# Patient Record
Sex: MALE | Race: WHITE | NOT HISPANIC OR LATINO | URBAN - METROPOLITAN AREA
[De-identification: names, ages, dates, MRNs, and addresses within clinical notes are randomized per-mention and may not be internally consistent; named-entity substitution may affect disease eponyms.]

---

## 2017-05-11 ENCOUNTER — INPATIENT (INPATIENT)
Facility: HOSPITAL | Age: 82
LOS: 3 days | Discharge: HOME HEALTH | End: 2017-05-15
Attending: HOSPITALIST

## 2017-06-28 DIAGNOSIS — E78.5 HYPERLIPIDEMIA, UNSPECIFIED: ICD-10-CM

## 2017-06-28 DIAGNOSIS — I50.33 ACUTE ON CHRONIC DIASTOLIC (CONGESTIVE) HEART FAILURE: ICD-10-CM

## 2017-06-28 DIAGNOSIS — I50.9 HEART FAILURE, UNSPECIFIED: ICD-10-CM

## 2017-06-28 DIAGNOSIS — M10.072 IDIOPATHIC GOUT, LEFT ANKLE AND FOOT: ICD-10-CM

## 2017-06-28 DIAGNOSIS — J40 BRONCHITIS, NOT SPECIFIED AS ACUTE OR CHRONIC: ICD-10-CM

## 2017-06-28 DIAGNOSIS — E87.70 FLUID OVERLOAD, UNSPECIFIED: ICD-10-CM

## 2017-06-28 DIAGNOSIS — Z87.891 PERSONAL HISTORY OF NICOTINE DEPENDENCE: ICD-10-CM

## 2017-06-28 DIAGNOSIS — Z95.0 PRESENCE OF CARDIAC PACEMAKER: ICD-10-CM

## 2017-06-28 DIAGNOSIS — E11.65 TYPE 2 DIABETES MELLITUS WITH HYPERGLYCEMIA: ICD-10-CM

## 2017-06-28 DIAGNOSIS — D63.8 ANEMIA IN OTHER CHRONIC DISEASES CLASSIFIED ELSEWHERE: ICD-10-CM

## 2017-06-28 DIAGNOSIS — I27.2 OTHER SECONDARY PULMONARY HYPERTENSION: ICD-10-CM

## 2017-06-28 DIAGNOSIS — I11.0 HYPERTENSIVE HEART DISEASE WITH HEART FAILURE: ICD-10-CM

## 2017-06-28 DIAGNOSIS — Z79.4 LONG TERM (CURRENT) USE OF INSULIN: ICD-10-CM

## 2017-06-28 DIAGNOSIS — G47.33 OBSTRUCTIVE SLEEP APNEA (ADULT) (PEDIATRIC): ICD-10-CM

## 2017-06-28 DIAGNOSIS — Z85.46 PERSONAL HISTORY OF MALIGNANT NEOPLASM OF PROSTATE: ICD-10-CM

## 2019-04-14 ENCOUNTER — INPATIENT (INPATIENT)
Facility: HOSPITAL | Age: 84
LOS: 2 days | Discharge: HOME | End: 2019-04-17
Attending: HOSPITALIST | Admitting: HOSPITALIST
Payer: MEDICARE

## 2019-04-14 VITALS
DIASTOLIC BLOOD PRESSURE: 74 MMHG | TEMPERATURE: 97 F | SYSTOLIC BLOOD PRESSURE: 179 MMHG | HEART RATE: 60 BPM | WEIGHT: 199.96 LBS | OXYGEN SATURATION: 95 % | RESPIRATION RATE: 22 BRPM

## 2019-04-14 DIAGNOSIS — Z96.651 PRESENCE OF RIGHT ARTIFICIAL KNEE JOINT: Chronic | ICD-10-CM

## 2019-04-14 DIAGNOSIS — R09.89 OTHER SPECIFIED SYMPTOMS AND SIGNS INVOLVING THE CIRCULATORY AND RESPIRATORY SYSTEMS: ICD-10-CM

## 2019-04-14 LAB
ALBUMIN SERPL ELPH-MCNC: 3.7 G/DL — SIGNIFICANT CHANGE UP (ref 3.5–5.2)
ALP SERPL-CCNC: 94 U/L — SIGNIFICANT CHANGE UP (ref 30–115)
ALT FLD-CCNC: 15 U/L — SIGNIFICANT CHANGE UP (ref 0–41)
ANION GAP SERPL CALC-SCNC: 12 MMOL/L — SIGNIFICANT CHANGE UP (ref 7–14)
AST SERPL-CCNC: 33 U/L — SIGNIFICANT CHANGE UP (ref 0–41)
BASE EXCESS BLDV CALC-SCNC: -2.8 MMOL/L — LOW (ref -2–2)
BILIRUB SERPL-MCNC: 0.7 MG/DL — SIGNIFICANT CHANGE UP (ref 0.2–1.2)
BUN SERPL-MCNC: 38 MG/DL — HIGH (ref 10–20)
CA-I SERPL-SCNC: 1.14 MMOL/L — SIGNIFICANT CHANGE UP (ref 1.12–1.3)
CALCIUM SERPL-MCNC: 8.7 MG/DL — SIGNIFICANT CHANGE UP (ref 8.5–10.1)
CHLORIDE SERPL-SCNC: 106 MMOL/L — SIGNIFICANT CHANGE UP (ref 98–110)
CO2 SERPL-SCNC: 19 MMOL/L — SIGNIFICANT CHANGE UP (ref 17–32)
CREAT SERPL-MCNC: 1.9 MG/DL — HIGH (ref 0.7–1.5)
GAS PNL BLDV: 138 MMOL/L — SIGNIFICANT CHANGE UP (ref 136–145)
GAS PNL BLDV: SIGNIFICANT CHANGE UP
GLUCOSE BLDC GLUCOMTR-MCNC: 211 MG/DL — HIGH (ref 70–99)
GLUCOSE SERPL-MCNC: 206 MG/DL — HIGH (ref 70–99)
HCO3 BLDV-SCNC: 22 MMOL/L — SIGNIFICANT CHANGE UP (ref 22–29)
HCT VFR BLD CALC: 32 % — LOW (ref 42–52)
HCT VFR BLDA CALC: 33.9 % — LOW (ref 34–44)
HGB BLD CALC-MCNC: 11.1 G/DL — LOW (ref 14–18)
HGB BLD-MCNC: 10.1 G/DL — LOW (ref 14–18)
HOROWITZ INDEX BLDV+IHG-RTO: 21 — SIGNIFICANT CHANGE UP
LACTATE BLDV-MCNC: 1.8 MMOL/L — HIGH (ref 0.5–1.6)
MCHC RBC-ENTMCNC: 27.5 PG — SIGNIFICANT CHANGE UP (ref 27–31)
MCHC RBC-ENTMCNC: 31.6 G/DL — LOW (ref 32–37)
MCV RBC AUTO: 87.2 FL — SIGNIFICANT CHANGE UP (ref 80–94)
NRBC # BLD: 0 /100 WBCS — SIGNIFICANT CHANGE UP (ref 0–0)
NT-PROBNP SERPL-SCNC: HIGH PG/ML (ref 0–300)
PCO2 BLDV: 39 MMHG — LOW (ref 41–51)
PH BLDV: 7.36 — SIGNIFICANT CHANGE UP (ref 7.26–7.43)
PLATELET # BLD AUTO: 185 K/UL — SIGNIFICANT CHANGE UP (ref 130–400)
PO2 BLDV: 25 MMHG — SIGNIFICANT CHANGE UP (ref 20–40)
POTASSIUM BLDV-SCNC: 4.6 MMOL/L — SIGNIFICANT CHANGE UP (ref 3.3–5.6)
POTASSIUM SERPL-MCNC: 7 MMOL/L — CRITICAL HIGH (ref 3.5–5)
POTASSIUM SERPL-SCNC: 7 MMOL/L — CRITICAL HIGH (ref 3.5–5)
PROT SERPL-MCNC: 7.2 G/DL — SIGNIFICANT CHANGE UP (ref 6–8)
RBC # BLD: 3.67 M/UL — LOW (ref 4.7–6.1)
RBC # FLD: 14.8 % — HIGH (ref 11.5–14.5)
SAO2 % BLDV: 41 % — SIGNIFICANT CHANGE UP
SODIUM SERPL-SCNC: 137 MMOL/L — SIGNIFICANT CHANGE UP (ref 135–146)
TROPONIN T SERPL-MCNC: 0.06 NG/ML — CRITICAL HIGH
TROPONIN T SERPL-MCNC: 0.07 NG/ML — CRITICAL HIGH
WBC # BLD: 6.65 K/UL — SIGNIFICANT CHANGE UP (ref 4.8–10.8)
WBC # FLD AUTO: 6.65 K/UL — SIGNIFICANT CHANGE UP (ref 4.8–10.8)

## 2019-04-14 PROCEDURE — 99285 EMERGENCY DEPT VISIT HI MDM: CPT

## 2019-04-14 PROCEDURE — 71045 X-RAY EXAM CHEST 1 VIEW: CPT | Mod: 26

## 2019-04-14 RX ORDER — FUROSEMIDE 40 MG
40 TABLET ORAL ONCE
Qty: 0 | Refills: 0 | Status: COMPLETED | OUTPATIENT
Start: 2019-04-14 | End: 2019-04-14

## 2019-04-14 RX ORDER — HEPARIN SODIUM 5000 [USP'U]/ML
5000 INJECTION INTRAVENOUS; SUBCUTANEOUS EVERY 8 HOURS
Qty: 0 | Refills: 0 | Status: DISCONTINUED | OUTPATIENT
Start: 2019-04-14 | End: 2019-04-17

## 2019-04-14 RX ORDER — METOPROLOL TARTRATE 50 MG
50 TABLET ORAL DAILY
Qty: 0 | Refills: 0 | Status: DISCONTINUED | OUTPATIENT
Start: 2019-04-14 | End: 2019-04-17

## 2019-04-14 RX ORDER — NIFEDIPINE 30 MG
60 TABLET, EXTENDED RELEASE 24 HR ORAL DAILY
Qty: 0 | Refills: 0 | Status: DISCONTINUED | OUTPATIENT
Start: 2019-04-14 | End: 2019-04-17

## 2019-04-14 RX ORDER — GABAPENTIN 400 MG/1
300 CAPSULE ORAL DAILY
Qty: 0 | Refills: 0 | Status: DISCONTINUED | OUTPATIENT
Start: 2019-04-14 | End: 2019-04-17

## 2019-04-14 RX ORDER — SIMVASTATIN 20 MG/1
40 TABLET, FILM COATED ORAL AT BEDTIME
Qty: 0 | Refills: 0 | Status: DISCONTINUED | OUTPATIENT
Start: 2019-04-14 | End: 2019-04-17

## 2019-04-14 RX ORDER — FUROSEMIDE 40 MG
40 TABLET ORAL EVERY 12 HOURS
Qty: 0 | Refills: 0 | Status: DISCONTINUED | OUTPATIENT
Start: 2019-04-15 | End: 2019-04-17

## 2019-04-14 RX ORDER — CLONAZEPAM 1 MG
1 TABLET ORAL DAILY
Qty: 0 | Refills: 0 | Status: DISCONTINUED | OUTPATIENT
Start: 2019-04-14 | End: 2019-04-17

## 2019-04-14 RX ADMIN — Medication 40 MILLIGRAM(S): at 15:51

## 2019-04-14 RX ADMIN — SIMVASTATIN 40 MILLIGRAM(S): 20 TABLET, FILM COATED ORAL at 21:40

## 2019-04-14 RX ADMIN — Medication 50 MILLIGRAM(S): at 21:40

## 2019-04-14 RX ADMIN — Medication 40 MILLIGRAM(S): at 21:40

## 2019-04-14 RX ADMIN — Medication 1 MILLIGRAM(S): at 21:44

## 2019-04-14 RX ADMIN — HEPARIN SODIUM 5000 UNIT(S): 5000 INJECTION INTRAVENOUS; SUBCUTANEOUS at 21:40

## 2019-04-14 NOTE — ED PROVIDER NOTE - MUSCULOSKELETAL, MLM
Spine appears normal, range of motion is not limited, no muscle or joint tenderness. Trace pitting edema bilaterally

## 2019-04-14 NOTE — H&P ADULT - NSICDXPASTMEDICALHX_GEN_ALL_CORE_FT
PAST MEDICAL HISTORY:  CHF (congestive heart failure)     DM (diabetes mellitus)     HTN (hypertension)     Pacemaker     Prostate CA in remission

## 2019-04-14 NOTE — H&P ADULT - ASSESSMENT
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 He reports orthopnea and PND. 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 He reports orthopnea and PND.    ASSESSMENT:   Congestive heart failure , Type not specified  elevated Troponin  PARKER on possible CKD( no baseline known)  PLAN:      Telemetry monitoring  Lasix 40mg IVP Q12hrs  Strict input/output monitoring  UA TSH HA1C LFT's  Daily weight monitoring.  Fluid restriction to 1 L/24 hrs  O2 via NC@2l/min, keep sat>94% at all times  3 Sets of cardiac enzymes   Aspirin 81 mg poqdaily  ECHO in am  Metoprolol 50mg po q12hrs  Enalapril held because of Elvated Cr  NTG 0.4mg SL q5mins x 3 doses PRN for chest pain  Simva 40mg poqhs  Monitor Electrolytes Qdaily, Keep K+>4, Mg>2  Cardiac Evaulation requested.   Continue home medications for chronic active medical conditions except those held, if any , as per plan.    Elevate Head end of bed >35*, aspiration prec  GI Prophylaxis with  protonix 40 milligrams daily  DVT Prophylaxis with Heparin 5000units sc q8hrs. . 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 He reports orthopnea and PND.    ASSESSMENT:   Congestive heart failure , Type not specified  elevated Troponin  PARKER on possible CKD( no baseline known)  PLAN:      Telemetry monitoring  Lasix 40mg IVP Q12hrs  Strict input/output monitoring  UA TSH HA1C LFT's  Daily weight monitoring.  Fluid restriction to 1 L/24 hrs  O2 via NC@2l/min, keep sat>94% at all times  3 Sets of cardiac enzymes - possibly secondary to demand. Will start heparin infusion ir repeat trop is significantly elevated  Aspirin 81 mg poqdaily  ECHO in am  Metoprolol 50mg po q12hrs  Enalapril held because of Elvated Cr  NTG 0.4mg SL q5mins x 3 doses PRN for chest pain  Simva 40mg poqhs  Monitor Electrolytes Qdaily, Keep K+>4, Mg>2  Cardiac Evaulation requested.   Continue home medications for chronic active medical conditions except those held, if any , as per plan.    Elevate Head end of bed >35*, aspiration prec  GI Prophylaxis with  protonix 40 milligrams daily  DVT Prophylaxis with Heparin 5000units sc q8hrs. .

## 2019-04-14 NOTE — ED ADULT NURSE NOTE - NSIMPLEMENTINTERV_GEN_ALL_ED
Implemented All Fall with Harm Risk Interventions:  [place] to call system. Call bell, personal items and telephone within reach. Instruct patient to call for assistance. Room bathroom lighting operational. Non-slip footwear when patient is off stretcher. Physically safe environment: no spills, clutter or unnecessary equipment. Stretcher in lowest position, wheels locked, appropriate side rails in place. Provide visual cue, wrist band, yellow gown, etc. Monitor gait and stability. Monitor for mental status changes and reorient to person, place, and time. Review medications for side effects contributing to fall risk. Reinforce activity limits and safety measures with patient and family. Provide visual clues: red socks.

## 2019-04-14 NOTE — H&P ADULT - HISTORY OF PRESENT ILLNESS
An 87 year old man with past medical history significant for Hypertension , Dyslipidemia ,  Congestive heart failure (type unspecified), 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 He reports orthopnea and PND. He denies most of the complaints on ROS.  Patient denies any headache, any vision complaints, runny nose, fever, chills, sore throat. Denies chest pain, palpitation. Denies nausea, vomiting, abdominal pain, diarrhoea, Denies urinary burning, urgency, frequency, dysuria. Denies weakness in any part of the body or numbness.

## 2019-04-14 NOTE — ED PROVIDER NOTE - ATTENDING CONTRIBUTION TO CARE
87y male above PMH with SOB since last night, no cp, no fever, +dry cough, eventually admits to chinese food last night and [employer] the night before, on exam vital signs appreciated, comfortable at rest but dyspneic on minimal exertion, conj pink cor reg lungs with bibasilar crackles abd protuberant, snt, trace bipedal edema calves nontender +healing abrasion left shin, labs and studies reviewed, will admit for diuresis

## 2019-04-14 NOTE — ED PROVIDER NOTE - OBJECTIVE STATEMENT
88 yo M with hx of CHF, HTN, DM, pacemaker, presents for evaluation of worsening of shortness of breath, onset last night, associated with dyspnea on exertion with a couple of steps. No fever, no chills, no headache, no chest pain, no back pain, no abdominal pain, no URI symptoms. Pt states that he has been taking his medications as indicated. He reports that last night he started having shortness of breath when walking. Pt denies any other symptoms 86 yo M with hx of CHF, HTN, DM, pacemaker, presents for evaluation of worsening of shortness of breath, onset last night, associated with dyspnea on exertion with a couple of steps. No fever, no chills, no headache, no chest pain, no back pain, no abdominal pain, no URI symptoms. Pt states that he has been taking his medications as indicated. He reports that last night he started having shortness of breath when walking. Pt reports eating chinese food and burger [employer] the past few days. Pt denies any other symptoms

## 2019-04-14 NOTE — ED PROVIDER NOTE - PROGRESS NOTE DETAILS
Pt in no distress Discussed labs and imaging with pt. Discussed need to be admitted for CHF exacerbation, pt understands and agrees with plan.

## 2019-04-14 NOTE — ED PROVIDER NOTE - CLINICAL SUMMARY MEDICAL DECISION MAKING FREE TEXT BOX
87y male above PMH with SOB since last night, no cp, no fever, +dry cough, eventually admits to chinese food last night and [employer] the night before, on exam vital signs appreciated, comfortable at rest but dyspneic on minimal exertion, conj pink cor reg lungs with bibasilar crackles abd protuberant, snt, trace bipedal edema calves nontender +healing abrasion left shin, labs and studies reviewed, will admit for diuresis

## 2019-04-14 NOTE — H&P ADULT - NSHPREVIEWOFSYSTEMS_GEN_ALL_CORE
He denies most of the complaints on ROS.  Patient denies any headache, any vision complaints, runny nose, fever, chills, sore throat. Denies chest pain, palpitation. Denies nausea, vomiting, abdominal pain, diarrhoea, Denies urinary burning, urgency, frequency, dysuria. Denies weakness in any part of the body or numbness.

## 2019-04-14 NOTE — ED ADULT NURSE NOTE - PMH
CHF (congestive heart failure)    DM (diabetes mellitus)    HTN (hypertension)    Pacemaker    Prostate CA  in remission

## 2019-04-15 LAB
ALBUMIN SERPL ELPH-MCNC: 3.7 G/DL — SIGNIFICANT CHANGE UP (ref 3.5–5.2)
ALP SERPL-CCNC: 89 U/L — SIGNIFICANT CHANGE UP (ref 30–115)
ALT FLD-CCNC: 13 U/L — SIGNIFICANT CHANGE UP (ref 0–41)
ANION GAP SERPL CALC-SCNC: 14 MMOL/L — SIGNIFICANT CHANGE UP (ref 7–14)
APPEARANCE UR: CLEAR — SIGNIFICANT CHANGE UP
AST SERPL-CCNC: 12 U/L — SIGNIFICANT CHANGE UP (ref 0–41)
BILIRUB SERPL-MCNC: 0.8 MG/DL — SIGNIFICANT CHANGE UP (ref 0.2–1.2)
BILIRUB UR-MCNC: NEGATIVE — SIGNIFICANT CHANGE UP
BUN SERPL-MCNC: 42 MG/DL — HIGH (ref 10–20)
CALCIUM SERPL-MCNC: 8.6 MG/DL — SIGNIFICANT CHANGE UP (ref 8.5–10.1)
CHLORIDE SERPL-SCNC: 102 MMOL/L — SIGNIFICANT CHANGE UP (ref 98–110)
CO2 SERPL-SCNC: 23 MMOL/L — SIGNIFICANT CHANGE UP (ref 17–32)
COLOR SPEC: YELLOW — SIGNIFICANT CHANGE UP
CREAT ?TM UR-MCNC: 119 MG/DL — SIGNIFICANT CHANGE UP
CREAT SERPL-MCNC: 2.1 MG/DL — HIGH (ref 0.7–1.5)
DIFF PNL FLD: ABNORMAL
EPI CELLS # UR: ABNORMAL /HPF
ESTIMATED AVERAGE GLUCOSE: 186 MG/DL — HIGH (ref 68–114)
GLUCOSE BLDC GLUCOMTR-MCNC: 237 MG/DL — HIGH (ref 70–99)
GLUCOSE BLDC GLUCOMTR-MCNC: 237 MG/DL — HIGH (ref 70–99)
GLUCOSE BLDC GLUCOMTR-MCNC: 268 MG/DL — HIGH (ref 70–99)
GLUCOSE BLDC GLUCOMTR-MCNC: 273 MG/DL — HIGH (ref 70–99)
GLUCOSE SERPL-MCNC: 250 MG/DL — HIGH (ref 70–99)
GLUCOSE UR QL: NEGATIVE MG/DL — SIGNIFICANT CHANGE UP
HBA1C BLD-MCNC: 8.1 % — HIGH (ref 4–5.6)
HCT VFR BLD CALC: 29.1 % — LOW (ref 42–52)
HGB BLD-MCNC: 9.4 G/DL — LOW (ref 14–18)
HYALINE CASTS # UR AUTO: SIGNIFICANT CHANGE UP /LPF
KETONES UR-MCNC: NEGATIVE — SIGNIFICANT CHANGE UP
LEUKOCYTE ESTERASE UR-ACNC: NEGATIVE — SIGNIFICANT CHANGE UP
MCHC RBC-ENTMCNC: 27.7 PG — SIGNIFICANT CHANGE UP (ref 27–31)
MCHC RBC-ENTMCNC: 32.3 G/DL — SIGNIFICANT CHANGE UP (ref 32–37)
MCV RBC AUTO: 85.8 FL — SIGNIFICANT CHANGE UP (ref 80–94)
NITRITE UR-MCNC: NEGATIVE — SIGNIFICANT CHANGE UP
NRBC # BLD: 0 /100 WBCS — SIGNIFICANT CHANGE UP (ref 0–0)
NT-PROBNP SERPL-SCNC: HIGH PG/ML (ref 0–300)
PH UR: 5.5 — SIGNIFICANT CHANGE UP (ref 5–8)
PLATELET # BLD AUTO: 183 K/UL — SIGNIFICANT CHANGE UP (ref 130–400)
POTASSIUM SERPL-MCNC: 4.4 MMOL/L — SIGNIFICANT CHANGE UP (ref 3.5–5)
POTASSIUM SERPL-SCNC: 4.4 MMOL/L — SIGNIFICANT CHANGE UP (ref 3.5–5)
PROT ?TM UR-MCNC: 84 MG/DLG/24H — SIGNIFICANT CHANGE UP
PROT SERPL-MCNC: 6.6 G/DL — SIGNIFICANT CHANGE UP (ref 6–8)
PROT UR-MCNC: 100 MG/DL
PROT/CREAT UR-RTO: 0.7 RATIO — HIGH (ref 0–0.2)
RBC # BLD: 3.39 M/UL — LOW (ref 4.7–6.1)
RBC # FLD: 14.9 % — HIGH (ref 11.5–14.5)
RBC CASTS # UR COMP ASSIST: ABNORMAL /HPF
SODIUM SERPL-SCNC: 139 MMOL/L — SIGNIFICANT CHANGE UP (ref 135–146)
SP GR SPEC: 1.02 — SIGNIFICANT CHANGE UP (ref 1.01–1.03)
TROPONIN T SERPL-MCNC: 0.06 NG/ML — CRITICAL HIGH
TSH SERPL-MCNC: 0.94 UIU/ML — SIGNIFICANT CHANGE UP (ref 0.27–4.2)
UROBILINOGEN FLD QL: 0.2 MG/DL — SIGNIFICANT CHANGE UP (ref 0.2–0.2)
WBC # BLD: 6.19 K/UL — SIGNIFICANT CHANGE UP (ref 4.8–10.8)
WBC # FLD AUTO: 6.19 K/UL — SIGNIFICANT CHANGE UP (ref 4.8–10.8)
WBC UR QL: NEGATIVE — SIGNIFICANT CHANGE UP

## 2019-04-15 PROCEDURE — 76770 US EXAM ABDO BACK WALL COMP: CPT | Mod: 26

## 2019-04-15 RX ORDER — DEXTROSE 50 % IN WATER 50 %
12.5 SYRINGE (ML) INTRAVENOUS ONCE
Qty: 0 | Refills: 0 | Status: DISCONTINUED | OUTPATIENT
Start: 2019-04-15 | End: 2019-04-17

## 2019-04-15 RX ORDER — GLUCAGON INJECTION, SOLUTION 0.5 MG/.1ML
1 INJECTION, SOLUTION SUBCUTANEOUS ONCE
Qty: 0 | Refills: 0 | Status: DISCONTINUED | OUTPATIENT
Start: 2019-04-15 | End: 2019-04-17

## 2019-04-15 RX ORDER — DEXTROSE 50 % IN WATER 50 %
25 SYRINGE (ML) INTRAVENOUS ONCE
Qty: 0 | Refills: 0 | Status: DISCONTINUED | OUTPATIENT
Start: 2019-04-15 | End: 2019-04-17

## 2019-04-15 RX ORDER — DEXTROSE 50 % IN WATER 50 %
15 SYRINGE (ML) INTRAVENOUS ONCE
Qty: 0 | Refills: 0 | Status: DISCONTINUED | OUTPATIENT
Start: 2019-04-15 | End: 2019-04-17

## 2019-04-15 RX ORDER — SODIUM CHLORIDE 9 MG/ML
1000 INJECTION, SOLUTION INTRAVENOUS
Qty: 0 | Refills: 0 | Status: DISCONTINUED | OUTPATIENT
Start: 2019-04-15 | End: 2019-04-17

## 2019-04-15 RX ORDER — INSULIN LISPRO 100/ML
VIAL (ML) SUBCUTANEOUS
Qty: 0 | Refills: 0 | Status: DISCONTINUED | OUTPATIENT
Start: 2019-04-15 | End: 2019-04-17

## 2019-04-15 RX ORDER — INSULIN GLARGINE 100 [IU]/ML
20 INJECTION, SOLUTION SUBCUTANEOUS AT BEDTIME
Qty: 0 | Refills: 0 | Status: DISCONTINUED | OUTPATIENT
Start: 2019-04-15 | End: 2019-04-17

## 2019-04-15 RX ADMIN — Medication 40 MILLIGRAM(S): at 05:15

## 2019-04-15 RX ADMIN — Medication 40 MILLIGRAM(S): at 16:47

## 2019-04-15 RX ADMIN — Medication 60 MILLIGRAM(S): at 05:15

## 2019-04-15 RX ADMIN — Medication 50 MILLIGRAM(S): at 05:13

## 2019-04-15 RX ADMIN — HEPARIN SODIUM 5000 UNIT(S): 5000 INJECTION INTRAVENOUS; SUBCUTANEOUS at 05:15

## 2019-04-15 RX ADMIN — Medication 2: at 16:46

## 2019-04-15 RX ADMIN — INSULIN GLARGINE 20 UNIT(S): 100 INJECTION, SOLUTION SUBCUTANEOUS at 21:15

## 2019-04-15 RX ADMIN — SIMVASTATIN 40 MILLIGRAM(S): 20 TABLET, FILM COATED ORAL at 21:13

## 2019-04-15 RX ADMIN — Medication 1 MILLIGRAM(S): at 21:49

## 2019-04-15 RX ADMIN — HEPARIN SODIUM 5000 UNIT(S): 5000 INJECTION INTRAVENOUS; SUBCUTANEOUS at 21:14

## 2019-04-15 RX ADMIN — HEPARIN SODIUM 5000 UNIT(S): 5000 INJECTION INTRAVENOUS; SUBCUTANEOUS at 13:06

## 2019-04-15 RX ADMIN — GABAPENTIN 300 MILLIGRAM(S): 400 CAPSULE ORAL at 13:05

## 2019-04-15 NOTE — CONSULT NOTE ADULT - ASSESSMENT
Patient feels much better. No overt chf now. Possible volume overload because ckd 4. He does not always follow diet. Follow lytes weight. Po lasix soon

## 2019-04-15 NOTE — CONSULT NOTE ADULT - SUBJECTIVE AND OBJECTIVE BOX
NEPHROLOGY CONSULTATION NOTE    Patient is a 87y Male whom presented to the hospital with dyspnea, found to have fluid overload and renal insuffiicency.  Pt with DM>35 yrs, HTN, CHF, on diuretics at home, and CPAP at home. Started on IV LAsix felt better,. Creat from 1/9->2.1.    PAST MEDICAL & SURGICAL HISTORY:  Pacemaker  Prostate CA: in remission  HTN (hypertension)  DM (diabetes mellitus)  CHF (congestive heart failure)  H/O total knee replacement, right    Allergies:  No Known Allergies    Home Medications Reviewed  Hospital Medications:   MEDICATIONS  (STANDING):  furosemide   Injectable 40 milliGRAM(s) IV Push every 12 hours  gabapentin 300 milliGRAM(s) Oral daily  heparin  Injectable 5000 Unit(s) SubCutaneous every 8 hours  metoprolol succinate ER 50 milliGRAM(s) Oral daily  NIFEdipine XL 60 milliGRAM(s) Oral daily  simvastatin 40 milliGRAM(s) Oral at bedtime      SOCIAL HISTORY:  Denies ETOH,Smoking,   FAMILY HISTORY:        REVIEW OF SYSTEMS:  CONSTITUTIONAL: No weakness, fevers or chills  EYES/ENT: No visual changes;  No vertigo or throat pain   NECK: No pain or stiffness  RESPIRATORY: No cough, wheezing, hemoptysis; shortness of breath- improved  CARDIOVASCULAR: No chest pain or palpitations.  GASTROINTESTINAL: No abdominal or epigastric pain. No nausea, vomiting,  GENITOURINARY: No dysuria, frequency, foamy urine,   SKIN: No itching, burning, rashes, or lesions   VASCULAR: No bilateral lower extremity edema.   All other review of systems is negative unless indicated above.    VITALS:  T(F): 98 (04-15-19 @ 06:03), Max: 99.1 (04-14-19 @ 22:06)  HR: 60 (04-15-19 @ 06:03)  BP: 138/63 (04-15-19 @ 06:03)  RR: 20 (04-15-19 @ 06:03)  SpO2: 97% (04-14-19 @ 17:44)    04-14 @ 07:01  -  04-15 @ 07:00  --------------------------------------------------------  IN: 0 mL / OUT: 1500 mL / NET: -1500 mL      Height (cm): 167.64 (04-14 @ 18:35)  Weight (kg): 93.2 (04-14 @ 18:35)  BMI (kg/m2): 33.2 (04-14 @ 18:35)  BSA (m2): 2.02 (04-14 @ 18:35)      I&O's Detail    14 Apr 2019 07:01  -  15 Apr 2019 07:00  --------------------------------------------------------  IN:  Total IN: 0 mL    OUT:    Voided: 1500 mL  Total OUT: 1500 mL    Total NET: -1500 mL            PHYSICAL EXAM:  Constitutional: NAD  HEENT: anicteric sclera, oropharynx clear, MMM  Neck: No JVD  Respiratory: CTAB, no wheezes, rales or rhonchi  Cardiovascular: S1, S2, RRR 3/6 HSM  Gastrointestinal: BS+, soft, NT/ND  Extremities: No cyanosis or clubbing. No peripheral edema  Neurological: A/O x 3  Psychiatric: Normal mood, normal affect  : No CVA tenderness. No bowden.   Skin: No rashes  Vascular Access:    LABS:  04-15    139  |  102  |  42<H>  ----------------------------<  250<H>  4.4   |  23  |  2.1<H>    Ca    8.6      15 Apr 2019 07:17    TPro  6.6  /  Alb  3.7  /  TBili  0.8  /  DBili      /  AST  12  /  ALT  13  /  AlkPhos  89  04-15    Creatinine Trend: 2.1 <--, 1.9 <--                        9.4    6.19  )-----------( 183      ( 15 Apr 2019 07:17 )             29.1     Urine Studies:              RADIOLOGY & ADDITIONAL STUDIES:    < from: Xray Chest 1 View-PORTABLE IMMEDIATE (04.14.19 @ 15:45) >    Right lower lobe opacity/pleural effusion. No air leak.    < end of copied text >

## 2019-04-15 NOTE — CONSULT NOTE ADULT - SUBJECTIVE AND OBJECTIVE BOX
CARDIOLOGY CONSULT NOTE     CHIEF COMPLAINT/REASON FOR CONSULT:    HPI:  An 87 year old man with past medical history significant for Hypertension , Dyslipidemia ,  Congestive heart failure (type unspecified), Diabetes mellitis type 2  with   complications including neuropathy, who comes to ER with complaint of dyspnea which started last night. He reports orthopnea and PND. He denies most of the complaints on ROS.  Patient denies any headache, any vision complaints, runny nose, fever, chills, sore throat. Denies chest pain, palpitation. Denies nausea, vomiting, abdominal pain, diarrhoea, Denies urinary burning, urgency, frequency, dysuria. Denies weakness in any part of the body or numbness. (14 Apr 2019 17:49)      PAST MEDICAL & SURGICAL HISTORY:  Pacemaker  Prostate CA: in remission  HTN (hypertension)  DM (diabetes mellitus)  CHF (congestive heart failure)  H/O total knee replacement, right      Cardiac Risks:   [x ]HTN, [x ] DM, [ ] Smoking, [ ] FH,  [x ] Lipids        MEDICATIONS:  MEDICATIONS  (STANDING):  furosemide   Injectable 40 milliGRAM(s) IV Push every 12 hours  gabapentin 300 milliGRAM(s) Oral daily  heparin  Injectable 5000 Unit(s) SubCutaneous every 8 hours  metoprolol succinate ER 50 milliGRAM(s) Oral daily  NIFEdipine XL 60 milliGRAM(s) Oral daily  simvastatin 40 milliGRAM(s) Oral at bedtime      FAMILY HISTORY:      SOCIAL HISTORY:      [ ] Marital status    Allergies    No Known Allergies        	    REVIEW OF SYSTEMS:  CONSTITUTIONAL: No fever, weight loss, or fatigue  EYES: No eye pain, visual disturbances, or discharge  ENMT:  No difficulty hearing, tinnitus, vertigo; No sinus or throat pain  NECK: No pain or stiffness  RESPIRATORY: No cough, wheezing, chills or hemoptysis; No Shortness of Breath  CARDIOVASCULAR: See above  GASTROINTESTINAL: No abdominal or epigastric pain. No nausea, vomiting, or hematemesis; No diarrhea or constipation. No melena or hematochezia.  GENITOURINARY: No dysuria, frequency, hematuria, or incontinence  NEUROLOGICAL: No headaches, memory loss, loss of strength, numbness, or tremors  SKIN: No itching, burning, rashes, or lesions   	      PHYSICAL EXAM:  T(C): 36.7 (04-15-19 @ 06:03), Max: 37.3 (04-14-19 @ 22:06)  HR: 60 (04-15-19 @ 06:03) (60 - 60)  BP: 138/63 (04-15-19 @ 06:03) (138/63 - 193/96)  RR: 20 (04-15-19 @ 06:03) (20 - 22)  SpO2: 97% (04-14-19 @ 17:44) (95% - 97%)  Wt(kg): --  I&O's Summary    14 Apr 2019 07:01  -  15 Apr 2019 07:00  --------------------------------------------------------  IN: 0 mL / OUT: 1500 mL / NET: -1500 mL        Appearance: Normal	  Psychiatry: A & O x 3, Mood & affect appropriate  HEENT:   Normal oral mucosa, PERRL, EOMI	  Lymphatic: No lymphadenopathy  Cardiovascular: Normal S1 S2,RRR, No JVD, No murmurs  Respiratory: Lungs clear to auscultation	  Gastrointestinal:  Soft, Non-tender, + BS	  Skin: No rashes, No ecchymoses, No cyanosis	  Neurologic: Non-focal  Extremities: Normal range of motion, No clubbing, cyanosis or edema  Vascular: Peripheral pulses palpable 2+ bilaterally      ECG:  	not available    	  LABS:	 	    CARDIAC MARKERS:          Serum Pro-Brain Natriuretic Peptide: 48636 pg/mL (04-15 @ 07:17)  Serum Pro-Brain Natriuretic Peptide: 25904 pg/mL (04-14 @ 15:34)                            9.4    6.19  )-----------( 183      ( 15 Apr 2019 07:17 )             29.1     04-15    139  |  102  |  42<H>  ----------------------------<  250<H>  4.4   |  23  |  2.1<H>    Ca    8.6      15 Apr 2019 07:17    TPro  6.6  /  Alb  3.7  /  TBili  0.8  /  DBili  x   /  AST  12  /  ALT  13  /  AlkPhos  89  04-15      proBNP: Serum Pro-Brain Natriuretic Peptide: 47407 pg/mL (04-15 @ 07:17)  Serum Pro-Brain Natriuretic Peptide: 30128 pg/mL (04-14 @ 15:34)

## 2019-04-15 NOTE — CONSULT NOTE ADULT - ASSESSMENT
87/M with DM>30 yrs, was on Metformin (lactic acid 1.8), HTN, Prostate Ca (in remission) admitted with dyspnea, high BNP, improving with IV diuretics.    PARKER on probable CKD - unknown baseline creatinine  - cerat is acceptable on IV LAsix - likely CRS   - please obtain UA, Urine protein/creat ratio, kidney and bladder sono  -strict Is and Os  - cont CPAP  - check phos, iPTH, 25 Vit D  -trend lactate (was on metformin)    HTN - well controlled  cont Nifedipine    Anemia - iron studies    Will follow  Thank you.

## 2019-04-16 LAB
ANION GAP SERPL CALC-SCNC: 13 MMOL/L — SIGNIFICANT CHANGE UP (ref 7–14)
BUN SERPL-MCNC: 46 MG/DL — HIGH (ref 10–20)
CALCIUM SERPL-MCNC: 8.9 MG/DL — SIGNIFICANT CHANGE UP (ref 8.5–10.1)
CHLORIDE SERPL-SCNC: 101 MMOL/L — SIGNIFICANT CHANGE UP (ref 98–110)
CO2 SERPL-SCNC: 24 MMOL/L — SIGNIFICANT CHANGE UP (ref 17–32)
CREAT SERPL-MCNC: 1.9 MG/DL — HIGH (ref 0.7–1.5)
ESTIMATED AVERAGE GLUCOSE: 186 MG/DL — HIGH (ref 68–114)
GLUCOSE BLDC GLUCOMTR-MCNC: 196 MG/DL — HIGH (ref 70–99)
GLUCOSE BLDC GLUCOMTR-MCNC: 245 MG/DL — HIGH (ref 70–99)
GLUCOSE BLDC GLUCOMTR-MCNC: 274 MG/DL — HIGH (ref 70–99)
GLUCOSE BLDC GLUCOMTR-MCNC: 386 MG/DL — HIGH (ref 70–99)
GLUCOSE SERPL-MCNC: 216 MG/DL — HIGH (ref 70–99)
HBA1C BLD-MCNC: 8.1 % — HIGH (ref 4–5.6)
HCT VFR BLD CALC: 30.1 % — LOW (ref 42–52)
HGB BLD-MCNC: 9.8 G/DL — LOW (ref 14–18)
MCHC RBC-ENTMCNC: 27.7 PG — SIGNIFICANT CHANGE UP (ref 27–31)
MCHC RBC-ENTMCNC: 32.6 G/DL — SIGNIFICANT CHANGE UP (ref 32–37)
MCV RBC AUTO: 85 FL — SIGNIFICANT CHANGE UP (ref 80–94)
NRBC # BLD: 0 /100 WBCS — SIGNIFICANT CHANGE UP (ref 0–0)
PLATELET # BLD AUTO: 184 K/UL — SIGNIFICANT CHANGE UP (ref 130–400)
POTASSIUM SERPL-MCNC: 4.5 MMOL/L — SIGNIFICANT CHANGE UP (ref 3.5–5)
POTASSIUM SERPL-SCNC: 4.5 MMOL/L — SIGNIFICANT CHANGE UP (ref 3.5–5)
RBC # BLD: 3.54 M/UL — LOW (ref 4.7–6.1)
RBC # FLD: 14.6 % — HIGH (ref 11.5–14.5)
SODIUM SERPL-SCNC: 138 MMOL/L — SIGNIFICANT CHANGE UP (ref 135–146)
TROPONIN T SERPL-MCNC: 0.03 NG/ML — CRITICAL HIGH
WBC # BLD: 5.02 K/UL — SIGNIFICANT CHANGE UP (ref 4.8–10.8)
WBC # FLD AUTO: 5.02 K/UL — SIGNIFICANT CHANGE UP (ref 4.8–10.8)

## 2019-04-16 RX ORDER — WARFARIN SODIUM 2.5 MG/1
1.5 TABLET ORAL ONCE
Qty: 0 | Refills: 0 | Status: DISCONTINUED | OUTPATIENT
Start: 2019-04-16 | End: 2019-04-16

## 2019-04-16 RX ORDER — ACETAMINOPHEN 500 MG
650 TABLET ORAL EVERY 6 HOURS
Qty: 0 | Refills: 0 | Status: DISCONTINUED | OUTPATIENT
Start: 2019-04-16 | End: 2019-04-17

## 2019-04-16 RX ADMIN — SIMVASTATIN 40 MILLIGRAM(S): 20 TABLET, FILM COATED ORAL at 21:38

## 2019-04-16 RX ADMIN — Medication 40 MILLIGRAM(S): at 05:32

## 2019-04-16 RX ADMIN — Medication 650 MILLIGRAM(S): at 10:34

## 2019-04-16 RX ADMIN — GABAPENTIN 300 MILLIGRAM(S): 400 CAPSULE ORAL at 11:49

## 2019-04-16 RX ADMIN — Medication 50 MILLIGRAM(S): at 05:32

## 2019-04-16 RX ADMIN — Medication 60 MILLIGRAM(S): at 05:32

## 2019-04-16 RX ADMIN — Medication 2: at 17:10

## 2019-04-16 RX ADMIN — HEPARIN SODIUM 5000 UNIT(S): 5000 INJECTION INTRAVENOUS; SUBCUTANEOUS at 05:32

## 2019-04-16 RX ADMIN — Medication 1: at 08:30

## 2019-04-16 RX ADMIN — Medication 5: at 11:48

## 2019-04-16 RX ADMIN — Medication 40 MILLIGRAM(S): at 17:11

## 2019-04-16 RX ADMIN — Medication 650 MILLIGRAM(S): at 11:19

## 2019-04-16 RX ADMIN — HEPARIN SODIUM 5000 UNIT(S): 5000 INJECTION INTRAVENOUS; SUBCUTANEOUS at 21:37

## 2019-04-16 RX ADMIN — Medication 1 MILLIGRAM(S): at 21:39

## 2019-04-16 RX ADMIN — HEPARIN SODIUM 5000 UNIT(S): 5000 INJECTION INTRAVENOUS; SUBCUTANEOUS at 13:51

## 2019-04-16 RX ADMIN — INSULIN GLARGINE 20 UNIT(S): 100 INJECTION, SOLUTION SUBCUTANEOUS at 21:38

## 2019-04-16 NOTE — DIETITIAN INITIAL EVALUATION ADULT. - OTHER INFO
Reason for assessment: Consult: reason not provided. PMH: CHF, DM, neuropathy, pacemaker, prostate CA (in remission), dyslipidemia. Pt presented to ED for dyspnea. In ED he was found to have fluid overload and renal insufficiency. On admit his K+ was 7 but now it's 4.4. Pt denies wt loss. Last BM was today 4/16. NKFA.

## 2019-04-16 NOTE — DIETITIAN INITIAL EVALUATION ADULT. - FACTORS AFF FOOD INTAKE
Pt reports that he had a poor appetite yesterday but he ate well for breakfast this morning. EMR reports 100% intake./none

## 2019-04-16 NOTE — PROVIDER CONTACT NOTE (MEDICATION) - SITUATION
Pt's /88. Notified Dr. Shah. Pt due for lasix IV push @ 1800. As per MD give lasix and recheck bp in 30 minutes. Will continue to monitor. Pt's /81. Notified Dr. Shah. Pt due for lasix IV push @ 1800. As per MD give lasix and recheck bp in 30 minutes. Will continue to monitor.

## 2019-04-16 NOTE — DIETITIAN INITIAL EVALUATION ADULT. - ENERGY NEEDS
Calories: 1550-1705kcals/day (MSJ A.F 1-1.1) lower A.F due to obesity   Protein: 65-78g/day (1-1.2g/IBW) Monitor renal function   Fluid: 1:1ml/kcal or fluid as per LIP

## 2019-04-17 ENCOUNTER — TRANSCRIPTION ENCOUNTER (OUTPATIENT)
Age: 84
End: 2019-04-17

## 2019-04-17 VITALS — HEART RATE: 60 BPM | DIASTOLIC BLOOD PRESSURE: 77 MMHG | SYSTOLIC BLOOD PRESSURE: 186 MMHG

## 2019-04-17 LAB
ANION GAP SERPL CALC-SCNC: 13 MMOL/L — SIGNIFICANT CHANGE UP (ref 7–14)
BUN SERPL-MCNC: 46 MG/DL — HIGH (ref 10–20)
CALCIUM SERPL-MCNC: 8.5 MG/DL — SIGNIFICANT CHANGE UP (ref 8.5–10.1)
CHLORIDE SERPL-SCNC: 100 MMOL/L — SIGNIFICANT CHANGE UP (ref 98–110)
CO2 SERPL-SCNC: 24 MMOL/L — SIGNIFICANT CHANGE UP (ref 17–32)
CREAT SERPL-MCNC: 1.7 MG/DL — HIGH (ref 0.7–1.5)
GLUCOSE BLDC GLUCOMTR-MCNC: 208 MG/DL — HIGH (ref 70–99)
GLUCOSE BLDC GLUCOMTR-MCNC: 368 MG/DL — HIGH (ref 70–99)
GLUCOSE SERPL-MCNC: 200 MG/DL — HIGH (ref 70–99)
HCT VFR BLD CALC: 29.3 % — LOW (ref 42–52)
HGB BLD-MCNC: 9.5 G/DL — LOW (ref 14–18)
MCHC RBC-ENTMCNC: 27.6 PG — SIGNIFICANT CHANGE UP (ref 27–31)
MCHC RBC-ENTMCNC: 32.4 G/DL — SIGNIFICANT CHANGE UP (ref 32–37)
MCV RBC AUTO: 85.2 FL — SIGNIFICANT CHANGE UP (ref 80–94)
NRBC # BLD: 0 /100 WBCS — SIGNIFICANT CHANGE UP (ref 0–0)
PLATELET # BLD AUTO: 196 K/UL — SIGNIFICANT CHANGE UP (ref 130–400)
POTASSIUM SERPL-MCNC: 4 MMOL/L — SIGNIFICANT CHANGE UP (ref 3.5–5)
POTASSIUM SERPL-SCNC: 4 MMOL/L — SIGNIFICANT CHANGE UP (ref 3.5–5)
RBC # BLD: 3.44 M/UL — LOW (ref 4.7–6.1)
RBC # FLD: 14.6 % — HIGH (ref 11.5–14.5)
SODIUM SERPL-SCNC: 137 MMOL/L — SIGNIFICANT CHANGE UP (ref 135–146)
WBC # BLD: 4.39 K/UL — LOW (ref 4.8–10.8)
WBC # FLD AUTO: 4.39 K/UL — LOW (ref 4.8–10.8)

## 2019-04-17 RX ORDER — SITAGLIPTIN 50 MG/1
1 TABLET, FILM COATED ORAL
Qty: 30 | Refills: 0
Start: 2019-04-17 | End: 2019-05-16

## 2019-04-17 RX ORDER — NIFEDIPINE 30 MG
90 TABLET, EXTENDED RELEASE 24 HR ORAL DAILY
Qty: 0 | Refills: 0 | Status: DISCONTINUED | OUTPATIENT
Start: 2019-04-17 | End: 2019-04-17

## 2019-04-17 RX ORDER — NIFEDIPINE 30 MG
30 TABLET, EXTENDED RELEASE 24 HR ORAL ONCE
Qty: 0 | Refills: 0 | Status: COMPLETED | OUTPATIENT
Start: 2019-04-17 | End: 2019-04-17

## 2019-04-17 RX ORDER — METFORMIN HYDROCHLORIDE 850 MG/1
1 TABLET ORAL
Qty: 0 | Refills: 0 | COMMUNITY

## 2019-04-17 RX ORDER — INSULIN LISPRO 100/ML
10 VIAL (ML) SUBCUTANEOUS ONCE
Qty: 0 | Refills: 0 | Status: COMPLETED | OUTPATIENT
Start: 2019-04-17 | End: 2019-04-17

## 2019-04-17 RX ORDER — SITAGLIPTIN 50 MG/1
1 TABLET, FILM COATED ORAL
Qty: 30 | Refills: 0 | OUTPATIENT
Start: 2019-04-17 | End: 2019-05-16

## 2019-04-17 RX ORDER — SITAGLIPTIN 50 MG/1
1 TABLET, FILM COATED ORAL
Qty: 0 | Refills: 0 | COMMUNITY

## 2019-04-17 RX ADMIN — GABAPENTIN 300 MILLIGRAM(S): 400 CAPSULE ORAL at 11:16

## 2019-04-17 RX ADMIN — Medication 50 MILLIGRAM(S): at 06:40

## 2019-04-17 RX ADMIN — Medication 40 MILLIGRAM(S): at 06:32

## 2019-04-17 RX ADMIN — Medication 30 MILLIGRAM(S): at 11:15

## 2019-04-17 RX ADMIN — Medication 5: at 12:04

## 2019-04-17 RX ADMIN — Medication 10 UNIT(S): at 12:36

## 2019-04-17 RX ADMIN — Medication 2: at 08:39

## 2019-04-17 RX ADMIN — HEPARIN SODIUM 5000 UNIT(S): 5000 INJECTION INTRAVENOUS; SUBCUTANEOUS at 06:40

## 2019-04-17 RX ADMIN — Medication 60 MILLIGRAM(S): at 06:40

## 2019-04-17 NOTE — PROGRESS NOTE ADULT - SUBJECTIVE AND OBJECTIVE BOX
Nephrology progress note    Patient is seen and examined, events over the last 24 h noted .  SOB improved.   Allergies:  No Known Allergies    Hospital Medications:   MEDICATIONS  (STANDING):  dextrose 5%. 1000 milliLiter(s) (50 mL/Hr) IV Continuous <Continuous>  dextrose 50% Injectable 12.5 Gram(s) IV Push once  dextrose 50% Injectable 25 Gram(s) IV Push once  dextrose 50% Injectable 25 Gram(s) IV Push once  furosemide   Injectable 40 milliGRAM(s) IV Push every 12 hours  gabapentin 300 milliGRAM(s) Oral daily  heparin  Injectable 5000 Unit(s) SubCutaneous every 8 hours  insulin glargine Injectable (LANTUS) 20 Unit(s) SubCutaneous at bedtime  insulin lispro (HumaLOG) corrective regimen sliding scale   SubCutaneous three times a day before meals  metoprolol succinate ER 50 milliGRAM(s) Oral daily  NIFEdipine XL 60 milliGRAM(s) Oral daily  simvastatin 40 milliGRAM(s) Oral at bedtime        VITALS:  T(F): 97 (19 @ 05:30), Max: 99.9 (04-15-19 @ 14:16)  HR: 60 (19 @ 05:30)  BP: 155/67 (19 @ 05:30)  RR: 20 (19 @ 05:30)  SpO2: 91% (04-15-19 @ 15:54)  Wt(kg): --     @ 07:  -  04-15 @ 07:00  --------------------------------------------------------  IN: 0 mL / OUT: 1500 mL / NET: -1500 mL    04-15 @ 07:01  -   @ 07:00  --------------------------------------------------------  IN: 0 mL / OUT: 1425 mL / NET: -1425 mL          PHYSICAL EXAM:  Constitutional: NAD  HEENT: anicteric sclera, MMM  Neck: No JVD  Respiratory: CTAB, no wheezes, rales or rhonchi  Cardiovascular: S1, S2, RRR  Gastrointestinal: BS+, soft, NT/ND  Extremities:  No peripheral edema  Neurological: A/O x 3  : No CVA tenderness. No bowden.   Skin: No rashes  Vascular Access:    LABS:      138  |  101  |  46<H>  ----------------------------<  216<H>  4.5   |  24  |  1.9<H>  Creatinine Trend: 1.9<--, 2.1<--, 1.9<--    Ca    8.9      2019 06:47    TPro  6.6  /  Alb  3.7  /  TBili  0.8  /  DBili      /  AST  12  /  ALT  13  /  AlkPhos  89  04-15                          9.8    5.02  )-----------( 184      ( 2019 06:47 )             30.1       Urine Studies:  Urinalysis Basic - ( 15 Apr 2019 14:09 )    Color: Yellow / Appearance: Clear / S.020 / pH:   Gluc:  / Ketone: Negative  / Bili: Negative / Urobili: 0.2 mg/dL   Blood:  / Protein: 100 mg/dL / Nitrite: Negative   Leuk Esterase: Negative / RBC: 3-5 /HPF / WBC Negative   Sq Epi:  / Non Sq Epi: Occasional /HPF / Bacteria:       Protein/Creatinine Ratio Calculation: 0.7 Ratio (04-15 @ 14:09)  Creatinine, Random Urine: 119 mg/dL (04-15 @ 14:09)    RADIOLOGY & ADDITIONAL STUDIES:  < from: US Renal (04.15.19 @ 13:45) >  No hydronephrosis or stone in either kidney.    Increase bilateral renal cortical echogenicity, consistent with renal   parenchymal disease.    < end of copied text >
Progress Note:  Provider Speciality                            Hospitalist      AMANDA CASTORENA MRN-[id number] 87y Male     CHIEF PRESENTING COMPLAINT:  Patient is a 87y old  Male who presents with a chief complaint of dyspnea (15 Apr 2019 09:49)        SUBJECTIVE:  Patient was seen and examined at bedside. Reports improvement in  presenting complaint. No significant overnight events reported.     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 He reports orthopnea and PND. He denies most of the complaints on ROS.  Patient denies any headache, any vision complaints, runny nose, fever, chills, sore throat. Denies chest pain, palpitation. Denies nausea, vomiting, abdominal pain, diarrhoea, Denies urinary burning, urgency, frequency, dysuria. Denies weakness in any part of the body or numbness. (2019 17:49)      PAST MEDICAL & SURGICAL HISTORY:  PAST MEDICAL & SURGICAL HISTORY:  Pacemaker  Prostate CA: in remission  HTN (hypertension)  DM (diabetes mellitus)  CHF (congestive heart failure)  H/O total knee replacement, right      VITAL SIGNS:  Vital Signs Last 24 Hrs  T(C): 36.1 (2019 05:30), Max: 37.7 (15 Apr 2019 14:16)  T(F): 97 (2019 05:30), Max: 99.9 (15 Apr 2019 14:16)  HR: 60 (2019 05:30) (60 - 60)  BP: 155/67 (2019 05:30) (155/67 - 180/72)  BP(mean): --  RR: 20 (2019 05:30) (20 - 20)  SpO2: 91% (15 Apr 2019 15:54) (91% - 91%)    PHYSICAL EXAMINATION:    Not in acute distress, obese  General: No pallor, or icterus, afebrile  HEENT:  BRANDI, EOMI, no JVD, no Bruit.  Heart: S1+S2 audible, no murmur  Lungs: bilateral  fair air entry, no wheezing, bilateral  crepitations.  Abdomen: Soft, non-tender, non-distended , no  rigidity or guarding.  CNS: AAOx3, CN  grossly intact.  Extremities:  bilateral pitting edema              REVIEW OF SYSTEMS:  Patient denies any headache, any vision complaints, runny nose, fever, chills, sore throat. Denies chest pain, shortness of breath, palpitation. Denies nausea, vomiting, abdominal pain, diarrhoea, Denies urinary burning, urgency, frequency, dysuria. Denies weakness in any part of the body or numbness.   At least 10 systems were reviewed in ROS. All systems reviewed  are within normal limits except for the complaints as described in Subjective.    CONSULTS:  Consultant(s) Notes Reviewed by me.   Care Discussed with Consultants/Other Providers where required.        MEDICATIONS:  MEDICATIONS  (STANDING):  dextrose 5%. 1000 milliLiter(s) (50 mL/Hr) IV Continuous <Continuous>  dextrose 50% Injectable 12.5 Gram(s) IV Push once  dextrose 50% Injectable 25 Gram(s) IV Push once  dextrose 50% Injectable 25 Gram(s) IV Push once  furosemide   Injectable 40 milliGRAM(s) IV Push every 12 hours  gabapentin 300 milliGRAM(s) Oral daily  heparin  Injectable 5000 Unit(s) SubCutaneous every 8 hours  insulin glargine Injectable (LANTUS) 20 Unit(s) SubCutaneous at bedtime  insulin lispro (HumaLOG) corrective regimen sliding scale   SubCutaneous three times a day before meals  metoprolol succinate ER 50 milliGRAM(s) Oral daily  NIFEdipine XL 60 milliGRAM(s) Oral daily  simvastatin 40 milliGRAM(s) Oral at bedtime    MEDICATIONS  (PRN):  acetaminophen   Tablet .. 650 milliGRAM(s) Oral every 6 hours PRN Temp greater or equal to 38C (100.4F), Mild Pain (1 - 3), Moderate Pain (4 - 6)  clonazePAM Tablet 1 milliGRAM(s) Oral daily PRN anxiety  dextrose 40% Gel 15 Gram(s) Oral once PRN Blood Glucose LESS THAN 70 milliGRAM(s)/deciliter  glucagon  Injectable 1 milliGRAM(s) IntraMuscular once PRN Glucose LESS THAN 70 milligrams/deciliter      [facility]/I & O's:                        9.8    5.02  )-----------( 184      ( 2019 06:47 )             30.1     04-16    138  |  101  |  46<H>  ----------------------------<  216<H>  4.5   |  24  |  1.9<H>    Ca    8.9      2019 06:47    TPro  6.6  /  Alb  3.7  /  TBili  0.8  /  DBili  x   /  AST  12  /  ALT  13  /  AlkPhos  89  04-15      Urinalysis Basic - ( 15 Apr 2019 14:09 )    Color: Yellow / Appearance: Clear / S.020 / pH: x  Gluc: x / Ketone: Negative  / Bili: Negative / Urobili: 0.2 mg/dL   Blood: x / Protein: 100 mg/dL / Nitrite: Negative   Leuk Esterase: Negative / RBC: 3-5 /HPF / WBC Negative   Sq Epi: x / Non Sq Epi: Occasional /HPF / Bacteria: x      CAPILLARY BLOOD GLUCOSE      POCT Blood Glucose.: 386 mg/dL (2019 11:00)  POCT Blood Glucose.: 196 mg/dL (2019 07:47)  POCT Blood Glucose.: 268 mg/dL (15 Apr 2019 21:11)  POCT Blood Glucose.: 237 mg/dL (15 Apr 2019 16:11)        Urinalysis Basic - ( 15 Apr 2019 14:09 )    Color: Yellow / Appearance: Clear / S.020 / pH: x  Gluc: x / Ketone: Negative  / Bili: Negative / Urobili: 0.2 mg/dL   Blood: x / Protein: 100 mg/dL / Nitrite: Negative   Leuk Esterase: Negative / RBC: 3-5 /HPF / WBC Negative   Sq Epi: x / Non Sq Epi: Occasional /HPF / Bacteria: x            04-15-19 @ 07:01  -  19 @ 07:00  --------------------------------------------------------  IN: 0 mL / OUT: 1425 mL / NET: -1425 mL              ASSESSMENT:    An 87 year old man with past medical history significant for Hypertension , Dyslipidemia ,  Congestive heart failure (type unspecified), 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 He reports orthopnea and PND.    ASSESSMENT:  Congestive heart failure , possibly diastolic  elevated Troponin-improving  PARKER on possible CKD stage 3B( no baseline known)  Proteinura- non-nephrotic    PLAN:    Telemetrydiscontinued   Lasix 40mg IVP Q12hrs  Strict input/output monitoring  UA TSH HA1C LFT's  Daily weight monitoring.  Fluid restriction to 1.2 L/24 hrs  O2 via NC@2l/min, keep sat>94% at all times  cardiac enzymes - trending down- possibly secondary to demand.   Aspirin 81 mg poqdaily  ECHO preliminary essentially normal with EF>55%, mild TR & MS  Metoprolol 50mg po q12hrs  Enalapril held because of Elvated Cr  NTG 0.4mg SL q5mins x 3 doses PRN for chest pain  Dyslipidemia Simva 40mg poqhs  Hypertension - well controlle don procardia, metoprolol  Monitor Electrolytes Qdaily, Keep K+>4, Mg>2  Cardiac Evaulation appreciated   Continue home medications for chronic active medical conditions except those held, if any , as per plan.    Elevate Head end of bed >35*, aspiration prec  GI Prophylaxis with  protonix 40 milligrams daily  DVT Prophylaxis with Heparin 5000units sc q8hrs.  Patient should not be on Metformin at discharge  Discharge Disposition: Anticipate discharge to home in 24-48 hrs after achieving euvolemia
Progress Note:  Provider Speciality                            Hospitalist      AMANDA CASTORENA MRN-[id number] 87y Male     CHIEF PRESENTING COMPLAINT:  Patient is a 87y old  Male who presents with a chief complaint of dyspnea (15 Apr 2019 09:49)        SUBJECTIVE:  Patient was seen and examined at bedside. Reports improvement in  presenting complaint. No significant overnight events reported.     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 He reports orthopnea and PND. He denies most of the complaints on ROS.  Patient denies any headache, any vision complaints, runny nose, fever, chills, sore throat. Denies chest pain, palpitation. Denies nausea, vomiting, abdominal pain, diarrhoea, Denies urinary burning, urgency, frequency, dysuria. Denies weakness in any part of the body or numbness. (2019 17:49)      PAST MEDICAL & SURGICAL HISTORY:  PAST MEDICAL & SURGICAL HISTORY:  Pacemaker  Prostate CA: in remission  HTN (hypertension)  DM (diabetes mellitus)  CHF (congestive heart failure)  H/O total knee replacement, right      VITAL SIGNS:  Vital Signs Last 24 Hrs  T(C): 37.7 (15 Apr 2019 14:16), Max: 37.7 (15 Apr 2019 14:16)  T(F): 99.9 (15 Apr 2019 14:16), Max: 99.9 (15 Apr 2019 14:16)  HR: 60 (15 Apr 2019 14:16) (60 - 60)  BP: 159/71 (15 Apr 2019 14:16) (138/63 - 189/74)  BP(mean): --  RR: 20 (15 Apr 2019 14:16) (20 - 20)  SpO2: 91% (15 Apr 2019 15:54) (91% - 97%)    PHYSICAL EXAMINATION:  Not in acute distress, obese  General: No pallor, or icterus, afebrile  HEENT:  BRANDI, EOMI, no JVD, no Bruit.  Heart: S1+S2 audible, no murmur  Lungs: bilateral  fair air entry, no wheezing, bilateral  crepitations.  Abdomen: Soft, non-tender, non-distended , no  rigidity or guarding.  CNS: AAOx3, CN  grossly intact.  Extremities:  bilateral pitting edema          REVIEW OF SYSTEMS:  Patient denies any headache, any vision complaints, runny nose, fever, chills, sore throat. Denies chest pain, shortness of breath, palpitation. Denies nausea, vomiting, abdominal pain, diarrhoea, Denies urinary burning, urgency, frequency, dysuria. Denies weakness in any part of the body or numbness.   At least 10 systems were reviewed in ROS. All systems reviewed  are within normal limits except for the complaints as described in Subjective.    CONSULTS:  Consultant(s) Notes Reviewed by me.   Care Discussed with Consultants/Other Providers where required.        MEDICATIONS:  MEDICATIONS  (STANDING):  dextrose 5%. 1000 milliLiter(s) (50 mL/Hr) IV Continuous <Continuous>  dextrose 50% Injectable 12.5 Gram(s) IV Push once  dextrose 50% Injectable 25 Gram(s) IV Push once  dextrose 50% Injectable 25 Gram(s) IV Push once  furosemide   Injectable 40 milliGRAM(s) IV Push every 12 hours  gabapentin 300 milliGRAM(s) Oral daily  heparin  Injectable 5000 Unit(s) SubCutaneous every 8 hours  insulin glargine Injectable (LANTUS) 20 Unit(s) SubCutaneous at bedtime  insulin lispro (HumaLOG) corrective regimen sliding scale   SubCutaneous three times a day before meals  metoprolol succinate ER 50 milliGRAM(s) Oral daily  NIFEdipine XL 60 milliGRAM(s) Oral daily  simvastatin 40 milliGRAM(s) Oral at bedtime    MEDICATIONS  (PRN):  clonazePAM Tablet 1 milliGRAM(s) Oral daily PRN anxiety  dextrose 40% Gel 15 Gram(s) Oral once PRN Blood Glucose LESS THAN 70 milliGRAM(s)/deciliter  glucagon  Injectable 1 milliGRAM(s) IntraMuscular once PRN Glucose LESS THAN 70 milligrams/deciliter      [facility] DATA/MICROBIOLOGY/I & O's:                        9.4    6.19  )-----------( 183      ( 15 Apr 2019 07:17 )             29.1     04-15    139  |  102  |  42<H>  ----------------------------<  250<H>  4.4   |  23  |  2.1<H>    Ca    8.6      15 Apr 2019 07:17    TPro  6.6  /  Alb  3.7  /  TBili  0.8  /  DBili  x   /  AST  12  /  ALT  13  /  AlkPhos  89  -15      Urinalysis Basic - ( 15 Apr 2019 14:09 )    Color: Yellow / Appearance: Clear / S.020 / pH: x  Gluc: x / Ketone: Negative  / Bili: Negative / Urobili: 0.2 mg/dL   Blood: x / Protein: 100 mg/dL / Nitrite: Negative   Leuk Esterase: Negative / RBC: 3-5 /HPF / WBC Negative   Sq Epi: x / Non Sq Epi: Occasional /HPF / Bacteria: x      CAPILLARY BLOOD GLUCOSE      POCT Blood Glucose.: 237 mg/dL (15 Apr 2019 16:11)  POCT Blood Glucose.: 273 mg/dL (15 Apr 2019 11:42)  POCT Blood Glucose.: 237 mg/dL (15 Apr 2019 07:27)  POCT Blood Glucose.: 211 mg/dL (2019 18:33)        Urinalysis Basic - ( 15 Apr 2019 14:09 )    Color: Yellow / Appearance: Clear / S.020 / pH: x  Gluc: x / Ketone: Negative  / Bili: Negative / Urobili: 0.2 mg/dL   Blood: x / Protein: 100 mg/dL / Nitrite: Negative   Leuk Esterase: Negative / RBC: 3-5 /HPF / WBC Negative   Sq Epi: x / Non Sq Epi: Occasional /HPF / Bacteria: x            19 @ 07:01  -  04-15-19 @ 07:00  --------------------------------------------------------  IN: 0 mL / OUT: 1500 mL / NET: -1500 mL    04-15-19 @ 07:01  -  04-15-19 @ 16:37  --------------------------------------------------------  IN: 0 mL / OUT: 200 mL / NET: -200 mL              ASSESSMENT:    An 87 year old man with past medical history significant for Hypertension , Dyslipidemia ,  Congestive heart failure (type unspecified), 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 He reports orthopnea and PND.    ASSESSMENT:  Congestive heart failure , Type not specified at this time  elevated Troponin  PARKER on possible CKD stage 4( no baseline known)    PLAN:    Telemetry monitoring  Lasix 40mg IVP Q12hrs  Strict input/output monitoring  UA TSH HA1C LFT's  Daily weight monitoring.  Fluid restriction to 1 L/24 hrs  O2 via NC@2l/min, keep sat>94% at all times  cardiac enzymes - plaeateau at 0.06- possibly secondary to demand. Will start heparin infusion ir repeat trop is significantly elevated  Aspirin 81 mg poqdaily  ECHO awaited  Metoprolol 50mg po q12hrs  Enalapril held because of Elvated Cr  NTG 0.4mg SL q5mins x 3 doses PRN for chest pain  Simva 40mg poqhs  Monitor Electrolytes Qdaily, Keep K+>4, Mg>2  Cardiac Evaulation appreciated   Continue home medications for chronic active medical conditions except those held, if any , as per plan.    Elevate Head end of bed >35*, aspiration prec  GI Prophylaxis with  protonix 40 milligrams daily  DVT Prophylaxis with Heparin 5000units sc q8hrs. .
Nephrology progress note    Patient is seen and examined at 8:30 am today, events over the last 24 h noted .    Allergies:  No Known Allergies    Hospital Medications:   MEDICATIONS  (STANDING):  dextrose 5%. 1000 milliLiter(s) (50 mL/Hr) IV Continuous <Continuous>  dextrose 50% Injectable 12.5 Gram(s) IV Push once  dextrose 50% Injectable 25 Gram(s) IV Push once  dextrose 50% Injectable 25 Gram(s) IV Push once  furosemide   Injectable 40 milliGRAM(s) IV Push every 12 hours  gabapentin 300 milliGRAM(s) Oral daily  heparin  Injectable 5000 Unit(s) SubCutaneous every 8 hours  insulin glargine Injectable (LANTUS) 20 Unit(s) SubCutaneous at bedtime  insulin lispro (HumaLOG) corrective regimen sliding scale   SubCutaneous three times a day before meals  metoprolol succinate ER 50 milliGRAM(s) Oral daily  NIFEdipine XL 90 milliGRAM(s) Oral daily  simvastatin 40 milliGRAM(s) Oral at bedtime        VITALS:  T(F): 98.4 (19 @ 06:00), Max: 98.4 (19 @ 06:00)  HR: 60 (19 @ 11:14)  BP: 186/77 (19 @ 11:14)  RR: 18 (19 @ 06:00)  SpO2: 94% (19 @ 09:45)  Wt(kg): --    04-15 @ 07:  -   @ 07:00  --------------------------------------------------------  IN: 0 mL / OUT: 1425 mL / NET: -1425 mL     @ 07:01  -   @ 07:00  --------------------------------------------------------  IN: 0 mL / OUT: 575 mL / NET: -575 mL     @ 07:01  -   @ 21:56  --------------------------------------------------------  IN: 720 mL / OUT: 700 mL / NET: 20 mL          PHYSICAL EXAM:  Constitutional: NAD  HEENT: anicteric sclera, oropharynx clear, MMM  Neck: No JVD  Respiratory: CTAB, no wheezes, rales or rhonchi  Cardiovascular: S1, S2, RRR  Gastrointestinal: BS+, soft, NT/ND  Extremities: No cyanosis or clubbing. No peripheral edema  Neurological: A/O x 3  : No CVA tenderness. No bowden.   Skin: No rashes  Vascular Access:    LABS:      137  |  100  |  46<H>  ----------------------------<  200<H>  4.0   |  24  |  1.7<H>    Ca    8.5      2019 07:04                            9.5    4.39  )-----------( 196      ( 2019 07:04 )             29.3       Urine Studies:  Urinalysis Basic - ( 15 Apr 2019 14:09 )    Color: Yellow / Appearance: Clear / S.020 / pH:   Gluc:  / Ketone: Negative  / Bili: Negative / Urobili: 0.2 mg/dL   Blood:  / Protein: 100 mg/dL / Nitrite: Negative   Leuk Esterase: Negative / RBC: 3-5 /HPF / WBC Negative   Sq Epi:  / Non Sq Epi: Occasional /HPF / Bacteria:       Protein/Creatinine Ratio Calculation: 0.7 Ratio (04-15 @ 14:09)  Creatinine, Random Urine: 119 mg/dL (04-15 @ 14:09)    RADIOLOGY & ADDITIONAL STUDIES:

## 2019-04-17 NOTE — DISCHARGE NOTE PROVIDER - NSDCCPCAREPLAN_GEN_ALL_CORE_FT
PRINCIPAL DISCHARGE DIAGNOSIS  Diagnosis: CHF exacerbation  Assessment and Plan of Treatment: continue fluid and salt restriction, continue on lasix      SECONDARY DISCHARGE DIAGNOSES  Diagnosis: Type 2 diabetes mellitus  Assessment and Plan of Treatment: stop metformin, decrease januvia to 50mg daily.  continue your other diabetes medications

## 2019-04-17 NOTE — PROGRESS NOTE ADULT - ASSESSMENT
87/M with DM>30 yrs, was on Metformin (lactic acid 1.8), HTN, Prostate Ca (in remission) admitted with dyspnea, high BNP, improving with IV diuretics.    PARKER on probable CKD - unknown baseline creatinine  - cerat is acceptable on IV LAsix - likely CRS   -  UA - noted protein 100, no blood  SPC ratio 0.7 non nephrotic, etiology likely DM, if MM is ruled out - can do the work up as OP  - kidney and bladder sono - no hydro, increased echogenicity c/w CKD  -strict Is and Os  - cont CPAP  - check phos, iPTH, 25 Vit D  -trend lactate (was on metformin)    HTN - well controlled  cont Nifedipine    DM - cannot be on MEtformin as OP  - cont LAntus  Anemia - iron studies    Will follow  Thank you.
87/M with DM>30 yrs, was on Metformin (lactic acid 1.8), HTN, Prostate Ca (in remission) admitted with dyspnea, high BNP, improving with IV diuretics.    PARKER on probable CKD - unknown baseline creatinine  - creat is stable on Lasix  -  UA - noted protein 100, no blood  SPC ratio 0.7 non nephrotic, etiology likely DM, if MM is ruled out - can do the work up as OP  - kidney and bladder sono - no hydro, increased echogenicity c/w CKD    - check phos, iPTH, 25 Vit D  -trend lactate (was on metformin)    HTN - BP to be monitored closely for meds adjustment as needed  cont Nifedipine    DM - cannot be on MEtformin as OP  - cont LAntus  Anemia - iron studies  OP follow up

## 2019-04-17 NOTE — DISCHARGE NOTE PROVIDER - CARE PROVIDER_API CALL
Thomas Parker)  [id number]  [address] [place] [address]  Phone: [phone]  Fax: [phone]  Follow Up Time: 1 week

## 2019-04-17 NOTE — DISCHARGE NOTE PROVIDER - HOSPITAL COURSE
An 87 year old man with past medical history significant for Hypertension , Dyslipidemia ,  diastolic CHF, Diabetes mellitis type 2 with complications including neuropathy,  CKD3, who came to the ER w/ SOB, dyspnea, and orthopnea.  He was also noted to have elevated troponins, likely from CHF exacerbation and was treated with IV lasix.   His respiratory status has improved and he is down to room air.  He had mild PARKER on CKD3, which improved with diuresis, likely has CRS.  his ECHO showed EF 55% with mild TR and MS.  His BP was elevated because his enalapril was held and his sugars have been elevated as his PO DM medications have been held on admission.  He will restart his home medication with exception of metformin, which has been discontinued.  His januvia is also decreased to 50mg daily due to his GFR.  Pt in otherwise stable condition and eager for discharge to home.        #acute on chronic diastolic CHF exacerbation    #troponemia    #PARKER on CKD3    #DM2    #diabetic neuropathy    #HTN    #HLD        pt medically stable for discharge to home    he needs close f/u with his PMD within 1 week            T(F): 98.4 (04-17-19 @ 06:00), Max: 98.5 (04-16-19 @ 21:01)    HR: 60 (04-17-19 @ 06:00)    BP: 179/79 (04-17-19 @ 06:00) (152/69 - 199/74)    RR: 18 (04-17-19 @ 06:00)    SpO2: 95% (04-16-19 @ 15:13)        GENERAL: NAD    HEENT: No Swelling    CHEST/LUNG: Good air entry, No wheezing, no rales    HEART: RRR, No murmurs    ABDOMEN: Soft, Bowel sounds present    EXTREMITIES:  no edema    NEURO: AOx3                                9.5      4.39  )-----------( 196      ( 17 Apr 2019 07:04 )               29.3         04-17        137  |  100  |  46<H>    ----------------------------<  200<H>    4.0   |  24  |  1.7<H>        Ca    8.5      17 Apr 2019 07:04                This is only a brief summary of the pt's hospital course.  Further details outlined in the EMR.        Total time:  40 min

## 2019-04-17 NOTE — DISCHARGE NOTE NURSING/CASE MANAGEMENT/SOCIAL WORK - NSDCDPATPORTLINK_GEN_ALL_CORE
You can access the [facility], offered by [facility], by registering with the following website: [email or web address]

## 2019-04-17 NOTE — DISCHARGE NOTE PROVIDER - CARE PROVIDERS DIRECT ADDRESSES
[email or web address]

## 2019-04-19 DIAGNOSIS — E11.22 TYPE 2 DIABETES MELLITUS WITH DIABETIC CHRONIC KIDNEY DISEASE: ICD-10-CM

## 2019-04-19 DIAGNOSIS — D64.9 ANEMIA, UNSPECIFIED: ICD-10-CM

## 2019-04-19 DIAGNOSIS — Z99.89 DEPENDENCE ON OTHER ENABLING MACHINES AND DEVICES: ICD-10-CM

## 2019-04-19 DIAGNOSIS — N18.3 CHRONIC KIDNEY DISEASE, STAGE 3 (MODERATE): ICD-10-CM

## 2019-04-19 DIAGNOSIS — R06.02 SHORTNESS OF BREATH: ICD-10-CM

## 2019-04-19 DIAGNOSIS — E11.65 TYPE 2 DIABETES MELLITUS WITH HYPERGLYCEMIA: ICD-10-CM

## 2019-04-19 DIAGNOSIS — Z95.0 PRESENCE OF CARDIAC PACEMAKER: ICD-10-CM

## 2019-04-19 DIAGNOSIS — Z85.46 PERSONAL HISTORY OF MALIGNANT NEOPLASM OF PROSTATE: ICD-10-CM

## 2019-04-19 DIAGNOSIS — Z87.891 PERSONAL HISTORY OF NICOTINE DEPENDENCE: ICD-10-CM

## 2019-04-19 DIAGNOSIS — I13.0 HYPERTENSIVE HEART AND CHRONIC KIDNEY DISEASE WITH HEART FAILURE AND STAGE 1 THROUGH STAGE 4 CHRONIC KIDNEY DISEASE, OR UNSPECIFIED CHRONIC KIDNEY DISEASE: ICD-10-CM

## 2019-04-19 DIAGNOSIS — E11.40 TYPE 2 DIABETES MELLITUS WITH DIABETIC NEUROPATHY, UNSPECIFIED: ICD-10-CM

## 2019-04-19 DIAGNOSIS — I50.33 ACUTE ON CHRONIC DIASTOLIC (CONGESTIVE) HEART FAILURE: ICD-10-CM

## 2019-04-19 DIAGNOSIS — R79.89 OTHER SPECIFIED ABNORMAL FINDINGS OF BLOOD CHEMISTRY: ICD-10-CM

## 2019-04-19 DIAGNOSIS — E78.5 HYPERLIPIDEMIA, UNSPECIFIED: ICD-10-CM

## 2019-04-19 DIAGNOSIS — Z91.11 PATIENT'S NONCOMPLIANCE WITH DIETARY REGIMEN: ICD-10-CM

## 2019-04-19 DIAGNOSIS — R80.9 PROTEINURIA, UNSPECIFIED: ICD-10-CM

## 2019-04-19 DIAGNOSIS — N17.9 ACUTE KIDNEY FAILURE, UNSPECIFIED: ICD-10-CM

## 2019-04-19 DIAGNOSIS — Z79.84 LONG TERM (CURRENT) USE OF ORAL HYPOGLYCEMIC DRUGS: ICD-10-CM

## 2021-06-09 ENCOUNTER — EMERGENCY (EMERGENCY)
Facility: HOSPITAL | Age: 86
LOS: 0 days | Discharge: HOME | End: 2021-06-09
Attending: EMERGENCY MEDICINE | Admitting: EMERGENCY MEDICINE
Payer: MEDICARE

## 2021-06-09 VITALS
RESPIRATION RATE: 18 BRPM | OXYGEN SATURATION: 98 % | HEART RATE: 59 BPM | WEIGHT: 190.04 LBS | DIASTOLIC BLOOD PRESSURE: 74 MMHG | HEIGHT: 66 IN | TEMPERATURE: 96 F | SYSTOLIC BLOOD PRESSURE: 159 MMHG

## 2021-06-09 VITALS
TEMPERATURE: 96 F | RESPIRATION RATE: 18 BRPM | DIASTOLIC BLOOD PRESSURE: 69 MMHG | HEART RATE: 60 BPM | SYSTOLIC BLOOD PRESSURE: 155 MMHG | OXYGEN SATURATION: 99 %

## 2021-06-09 DIAGNOSIS — S00.81XA ABRASION OF OTHER PART OF HEAD, INITIAL ENCOUNTER: ICD-10-CM
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
## 2021-06-09 DIAGNOSIS — E11.9 TYPE 2 DIABETES MELLITUS WITHOUT COMPLICATIONS: ICD-10-CM

## 2021-06-09 DIAGNOSIS — Z96.659 PRESENCE OF UNSPECIFIED ARTIFICIAL KNEE JOINT: ICD-10-CM

## 2021-06-09 DIAGNOSIS — Z85.3 PERSONAL HISTORY OF MALIGNANT NEOPLASM OF BREAST: ICD-10-CM

## 2021-06-09 DIAGNOSIS — S80.01XA CONTUSION OF RIGHT KNEE, INITIAL ENCOUNTER: ICD-10-CM

## 2021-06-09 DIAGNOSIS — Z96.651 PRESENCE OF RIGHT ARTIFICIAL KNEE JOINT: Chronic | ICD-10-CM

## 2021-06-09 DIAGNOSIS — S12.111A POSTERIOR DISPLACED TYPE II DENS FRACTURE, INITIAL ENCOUNTER FOR CLOSED FRACTURE: ICD-10-CM

## 2021-06-09 DIAGNOSIS — M54.2 CERVICALGIA: ICD-10-CM

## 2021-06-09 DIAGNOSIS — Z95.0 PRESENCE OF CARDIAC PACEMAKER: ICD-10-CM
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
## 2021-06-09 DIAGNOSIS — Z79.899 OTHER LONG TERM (CURRENT) DRUG THERAPY: ICD-10-CM

## 2021-06-09 DIAGNOSIS — S80.02XA CONTUSION OF LEFT KNEE, INITIAL ENCOUNTER: ICD-10-CM

## 2021-06-09 DIAGNOSIS — Z79.84 LONG TERM (CURRENT) USE OF ORAL HYPOGLYCEMIC DRUGS: ICD-10-CM

## 2021-06-09 DIAGNOSIS — I50.9 HEART FAILURE, UNSPECIFIED: ICD-10-CM

## 2021-06-09 DIAGNOSIS — I11.0 HYPERTENSIVE HEART DISEASE WITH HEART FAILURE: ICD-10-CM

## 2021-06-09 DIAGNOSIS — S13.100A SUBLUXATION OF UNSPECIFIED CERVICAL VERTEBRAE, INITIAL ENCOUNTER: ICD-10-CM

## 2021-06-09 DIAGNOSIS — Z20.822 CONTACT WITH AND (SUSPECTED) EXPOSURE TO COVID-19: ICD-10-CM

## 2021-06-09 PROBLEM — I10 ESSENTIAL (PRIMARY) HYPERTENSION: Chronic | Status: ACTIVE | Noted: 2019-04-14

## 2021-06-09 PROBLEM — C61 MALIGNANT NEOPLASM OF PROSTATE: Chronic | Status: ACTIVE | Noted: 2019-04-14

## 2021-06-09 LAB
ALBUMIN SERPL ELPH-MCNC: 4.1 G/DL — SIGNIFICANT CHANGE UP (ref 3.5–5.2)
ALP SERPL-CCNC: 135 U/L — HIGH (ref 30–115)
ALT FLD-CCNC: 12 U/L — SIGNIFICANT CHANGE UP (ref 0–41)
ANION GAP SERPL CALC-SCNC: 14 MMOL/L — SIGNIFICANT CHANGE UP (ref 7–14)
ANION GAP SERPL CALC-SCNC: 9 MMOL/L — SIGNIFICANT CHANGE UP (ref 7–14)
AST SERPL-CCNC: 15 U/L — SIGNIFICANT CHANGE UP (ref 0–41)
BASOPHILS # BLD AUTO: 0.04 K/UL — SIGNIFICANT CHANGE UP (ref 0–0.2)
BASOPHILS NFR BLD AUTO: 0.6 % — SIGNIFICANT CHANGE UP (ref 0–1)
BILIRUB SERPL-MCNC: 0.4 MG/DL — SIGNIFICANT CHANGE UP (ref 0.2–1.2)
BUN SERPL-MCNC: 54 MG/DL — HIGH (ref 10–20)
BUN SERPL-MCNC: 60 MG/DL — HIGH (ref 10–20)
CALCIUM SERPL-MCNC: 9.1 MG/DL — SIGNIFICANT CHANGE UP (ref 8.5–10.1)
CALCIUM SERPL-MCNC: 9.2 MG/DL — SIGNIFICANT CHANGE UP (ref 8.5–10.1)
CHLORIDE SERPL-SCNC: 100 MMOL/L — SIGNIFICANT CHANGE UP (ref 98–110)
CHLORIDE SERPL-SCNC: 98 MMOL/L — SIGNIFICANT CHANGE UP (ref 98–110)
CK SERPL-CCNC: 380 U/L — HIGH (ref 0–225)
CO2 SERPL-SCNC: 21 MMOL/L — SIGNIFICANT CHANGE UP (ref 17–32)
CO2 SERPL-SCNC: 23 MMOL/L — SIGNIFICANT CHANGE UP (ref 17–32)
CREAT SERPL-MCNC: 1.8 MG/DL — HIGH (ref 0.7–1.5)
CREAT SERPL-MCNC: 2 MG/DL — HIGH (ref 0.7–1.5)
EOSINOPHIL # BLD AUTO: 0.03 K/UL — SIGNIFICANT CHANGE UP (ref 0–0.7)
EOSINOPHIL NFR BLD AUTO: 0.4 % — SIGNIFICANT CHANGE UP (ref 0–8)
GLUCOSE SERPL-MCNC: 146 MG/DL — HIGH (ref 70–99)
GLUCOSE SERPL-MCNC: 351 MG/DL — HIGH (ref 70–99)
HCT VFR BLD CALC: 37.4 % — LOW (ref 42–52)
HGB BLD-MCNC: 12.5 G/DL — LOW (ref 14–18)
IMM GRANULOCYTES NFR BLD AUTO: 0.4 % — HIGH (ref 0.1–0.3)
LACTATE SERPL-SCNC: 1.1 MMOL/L — SIGNIFICANT CHANGE UP (ref 0.7–2)
LYMPHOCYTES # BLD AUTO: 1.06 K/UL — LOW (ref 1.2–3.4)
LYMPHOCYTES # BLD AUTO: 14.9 % — LOW (ref 20.5–51.1)
MCHC RBC-ENTMCNC: 27.9 PG — SIGNIFICANT CHANGE UP (ref 27–31)
MCHC RBC-ENTMCNC: 33.4 G/DL — SIGNIFICANT CHANGE UP (ref 32–37)
MCV RBC AUTO: 83.5 FL — SIGNIFICANT CHANGE UP (ref 80–94)
MONOCYTES # BLD AUTO: 0.48 K/UL — SIGNIFICANT CHANGE UP (ref 0.1–0.6)
MONOCYTES NFR BLD AUTO: 6.7 % — SIGNIFICANT CHANGE UP (ref 1.7–9.3)
NEUTROPHILS # BLD AUTO: 5.49 K/UL — SIGNIFICANT CHANGE UP (ref 1.4–6.5)
NEUTROPHILS NFR BLD AUTO: 77 % — HIGH (ref 42.2–75.2)
NRBC # BLD: 0 /100 WBCS — SIGNIFICANT CHANGE UP (ref 0–0)
PLATELET # BLD AUTO: 190 K/UL — SIGNIFICANT CHANGE UP (ref 130–400)
POTASSIUM SERPL-MCNC: 4.4 MMOL/L — SIGNIFICANT CHANGE UP (ref 3.5–5)
POTASSIUM SERPL-MCNC: 5.8 MMOL/L — HIGH (ref 3.5–5)
POTASSIUM SERPL-SCNC: 4.4 MMOL/L — SIGNIFICANT CHANGE UP (ref 3.5–5)
POTASSIUM SERPL-SCNC: 5.8 MMOL/L — HIGH (ref 3.5–5)
PROT SERPL-MCNC: 7.1 G/DL — SIGNIFICANT CHANGE UP (ref 6–8)
RAPID RVP RESULT: SIGNIFICANT CHANGE UP
RBC # BLD: 4.48 M/UL — LOW (ref 4.7–6.1)
RBC # FLD: 14.8 % — HIGH (ref 11.5–14.5)
SARS-COV-2 RNA SPEC QL NAA+PROBE: SIGNIFICANT CHANGE UP
SODIUM SERPL-SCNC: 130 MMOL/L — LOW (ref 135–146)
SODIUM SERPL-SCNC: 135 MMOL/L — SIGNIFICANT CHANGE UP (ref 135–146)
WBC # BLD: 7.13 K/UL — SIGNIFICANT CHANGE UP (ref 4.8–10.8)
WBC # FLD AUTO: 7.13 K/UL — SIGNIFICANT CHANGE UP (ref 4.8–10.8)

## 2021-06-09 PROCEDURE — 93010 ELECTROCARDIOGRAM REPORT: CPT

## 2021-06-09 PROCEDURE — 74177 CT ABD & PELVIS W/CONTRAST: CPT | Mod: 26,MA

## 2021-06-09 PROCEDURE — 93010 ELECTROCARDIOGRAM REPORT: CPT | Mod: NC

## 2021-06-09 PROCEDURE — 72125 CT NECK SPINE W/O DYE: CPT | Mod: 26

## 2021-06-09 PROCEDURE — 71045 X-RAY EXAM CHEST 1 VIEW: CPT | Mod: 26

## 2021-06-09 PROCEDURE — 70450 CT HEAD/BRAIN W/O DYE: CPT | Mod: 26

## 2021-06-09 PROCEDURE — 71260 CT THORAX DX C+: CPT | Mod: 26,MA

## 2021-06-09 PROCEDURE — 72170 X-RAY EXAM OF PELVIS: CPT | Mod: 26

## 2021-06-09 PROCEDURE — 99285 EMERGENCY DEPT VISIT HI MDM: CPT

## 2021-06-09 RX ORDER — ACETAMINOPHEN 500 MG
975 TABLET ORAL ONCE
Refills: 0 | Status: COMPLETED | OUTPATIENT
Start: 2021-06-09 | End: 2021-06-09

## 2021-06-09 RX ORDER — MORPHINE SULFATE 50 MG/1
4 CAPSULE, EXTENDED RELEASE ORAL ONCE
Refills: 0 | Status: DISCONTINUED | OUTPATIENT
Start: 2021-06-09 | End: 2021-06-09

## 2021-06-09 RX ORDER — INSULIN HUMAN 100 [IU]/ML
10 INJECTION, SOLUTION SUBCUTANEOUS ONCE
Refills: 0 | Status: COMPLETED | OUTPATIENT
Start: 2021-06-09 | End: 2021-06-09

## 2021-06-09 RX ADMIN — INSULIN HUMAN 10 UNIT(S): 100 INJECTION, SOLUTION SUBCUTANEOUS at 15:11

## 2021-06-09 RX ADMIN — Medication 975 MILLIGRAM(S): at 11:25

## 2021-06-09 RX ADMIN — MORPHINE SULFATE 4 MILLIGRAM(S): 50 CAPSULE, EXTENDED RELEASE ORAL at 15:16

## 2021-06-09 NOTE — ED PROVIDER NOTE - NSFOLLOWUPINSTRUCTIONS_ED_ALL_ED_FT
Vertebral Fracture    A vertebral fracture is a break in one of the bones that make up the spine (vertebrae). The vertebrae are stacked on top of each other to form the spinal column. They support the body and protect the spinal cord. The vertebral column has an upper part (cervical spine), a middle part (thoracic spine), and a lower part (lumbar spine). Most vertebral fractures occur in the thoracic spine or lumbar spine.     There are three main types of vertebral fractures:    Flexion fracture. This happens when vertebrae collapse. Vertebrae can collapse:  In the front (compression fracture). This type of fracture is common in people who have a condition that causes their bones to be weak and brittle (osteoporosis). The fracture can make a person lose height.  In the front and back (axial burst fracture).  Extension fracture. This happens when an external force pulls apart the vertebrae.  Rotation fracture. This happens when the spine bends extremely in one direction. This type can cause a piece of a vertebra to break off (transverse process fracture) or move out of its normal position (fracture dislocation). This type of fracture has a high risk for spinal cord injury.    Vertebral fractures can range from mild to very severe. The most severe types are those that cause the broken bones to move out of place (unstable) and those that injure or press on the spinal cord.    CAUSES  This condition is usually caused by a forceful injury. This type of injury commonly results from:    Car accidents.  Falling or jumping from a great height.  Collisions in contact sports.  Violent acts, such as an assault or a gunshot wound.    RISK FACTORS  This injury is more likely to happen to people who:    Have osteoporosis.  Participate in contact sports.  Are in situations that could result in falls or other violent injuries.    SYMPTOMS  Symptoms of this injury depend on the location and the type of fracture. The most common symptom is back pain that gets worse with movement. You may also have trouble standing or walking. If a fracture has damaged your spinal cord or is pressing on it, you may also have:    Numbness.  Tingling.  Weakness.  Loss of movement.  Loss of bowel or bladder control.    DIAGNOSIS  This injury may be diagnosed based on symptoms, medical history, and a physical exam. You may also have imaging tests to confirm the diagnosis. These may include:    Spine X-ray.  CT scan.  MRI.    TREATMENT  Treatment for this injury depends on the type of fracture. If your fracture is stable and does not affect your spinal cord, it may heal with nonsurgical treatment, such as:    Taking pain medicine.   Wearing a cast or a brace.  Doing physical therapy exercises.    If your vertebral fracture is unstable or it affects your spinal cord, you may need surgical treatment, such as:    Laminectomy. This procedure involves removing the part of a vertebra that is pushing on the spinal cord (spinal decompression surgery). Bone fragments may also be removed.  Spinal fusion. This procedure is used to stabilize an unstable fracture. Vertebrae may be joined together with a piece of bone from another part of your body (graft) and held in place with rods, plates, or screws.  Vertebroplasty. In this procedure, bone cement is used to rebuild collapsed vertebrae.    HOME CARE INSTRUCTIONS  General Instructions    Take medicines only as directed by your health care provider.  Do not drive or operate heavy machinery while taking pain medicine.  If directed, apply ice to the injured area:  Put ice in a plastic bag.  Place a towel between your skin and the bag.  Leave the ice on for 30 minutes every two hours at first. Then apply the ice as needed.  Wear your neck brace or back brace as directed by your health care provider.  Do not drink alcohol. Alcohol can interfere with your treatment.  Keep all follow-up visits as directed by your health care provider. This is important. It can help to prevent permanent injury, disability, and long-lasting (chronic) pain.    Activity    Stay in bed (on bed rest) only as directed by your health care provider. Being on bed rest for too long can make your condition worse.  Return to your normal activities as directed by your health care provider. Ask what activities are safe for you.  Do exercises to improve motion and strength in your back (physical therapy), as recommended by your health care provider.    Exercise regularly as directed by your health care provider.    SEEK MEDICAL CARE IF:  You have a fever.  You develop a cough that makes your pain worse.  Your pain medicine is not helping.  Your pain does not get better over time.  You cannot return to your normal activities as planned or expected.    SEEK IMMEDIATE MEDICAL CARE IF:  Your pain is very bad and it suddenly gets worse.  You are unable to move any body part (paralysis) that is below the level of your injury.  You have numbness, tingling, or weakness in any body part that is below the level of your injury.  You cannot control your bladder or bowels.    ADDITIONAL NOTES AND INSTRUCTIONS    Please follow up with your Primary MD in 24-48 hr.  Seek immediate medical care for any new/worsening signs or symptoms. Vertebral Fracture    A vertebral fracture is a break in one of the bones that make up the spine (vertebrae). The vertebrae are stacked on top of each other to form the spinal column. They support the body and protect the spinal cord. The vertebral column has an upper part (cervical spine), a middle part (thoracic spine), and a lower part (lumbar spine). Most vertebral fractures occur in the thoracic spine or lumbar spine.     There are three main types of vertebral fractures:    Flexion fracture. This happens when vertebrae collapse. Vertebrae can collapse:  In the front (compression fracture). This type of fracture is common in people who have a condition that causes their bones to be weak and brittle (osteoporosis). The fracture can make a person lose height.  In the front and back (axial burst fracture).  Extension fracture. This happens when an external force pulls apart the vertebrae.  Rotation fracture. This happens when the spine bends extremely in one direction. This type can cause a piece of a vertebra to break off (transverse process fracture) or move out of its normal position (fracture dislocation). This type of fracture has a high risk for spinal cord injury.    Vertebral fractures can range from mild to very severe. The most severe types are those that cause the broken bones to move out of place (unstable) and those that injure or press on the spinal cord.    CAUSES  This condition is usually caused by a forceful injury. This type of injury commonly results from:    Car accidents.  Falling or jumping from a great height.  Collisions in contact sports.  Violent acts, such as an assault or a gunshot wound.    RISK FACTORS  This injury is more likely to happen to people who:    Have osteoporosis.  Participate in contact sports.  Are in situations that could result in falls or other violent injuries.    SYMPTOMS  Symptoms of this injury depend on the location and the type of fracture. The most common symptom is back pain that gets worse with movement. You may also have trouble standing or walking. If a fracture has damaged your spinal cord or is pressing on it, you may also have:    Numbness.  Tingling.  Weakness.  Loss of movement.  Loss of bowel or bladder control.    DIAGNOSIS  This injury may be diagnosed based on symptoms, medical history, and a physical exam. You may also have imaging tests to confirm the diagnosis. These may include:    Spine X-ray.  CT scan.  MRI.    TREATMENT  Treatment for this injury depends on the type of fracture. If your fracture is stable and does not affect your spinal cord, it may heal with nonsurgical treatment, such as:    Taking pain medicine.   Wearing a cast or a brace.  Doing physical therapy exercises.    If your vertebral fracture is unstable or it affects your spinal cord, you may need surgical treatment, such as:    Laminectomy. This procedure involves removing the part of a vertebra that is pushing on the spinal cord (spinal decompression surgery). Bone fragments may also be removed.  Spinal fusion. This procedure is used to stabilize an unstable fracture. Vertebrae may be joined together with a piece of bone from another part of your body (graft) and held in place with rods, plates, or screws.  Vertebroplasty. In this procedure, bone cement is used to rebuild collapsed vertebrae.    HOME CARE INSTRUCTIONS  General Instructions    Take medicines only as directed by your health care provider.  Do not drive or operate heavy machinery while taking pain medicine.  If directed, apply ice to the injured area:  Put ice in a plastic bag.  Place a towel between your skin and the bag.  Leave the ice on for 30 minutes every two hours at first. Then apply the ice as needed.  Wear your neck brace or back brace as directed by your health care provider.  Do not drink alcohol. Alcohol can interfere with your treatment.  Keep all follow-up visits as directed by your health care provider. This is important. It can help to prevent permanent injury, disability, and long-lasting (chronic) pain.    Activity    Stay in bed (on bed rest) only as directed by your health care provider. Being on bed rest for too long can make your condition worse.  Return to your normal activities as directed by your health care provider. Ask what activities are safe for you.  Do exercises to improve motion and strength in your back (physical therapy), as recommended by your health care provider.    Exercise regularly as directed by your health care provider.    SEEK MEDICAL CARE IF:  You have a fever.  You develop a cough that makes your pain worse.  Your pain medicine is not helping.  Your pain does not get better over time.  You cannot return to your normal activities as planned or expected.    SEEK IMMEDIATE MEDICAL CARE IF:  Your pain is very bad and it suddenly gets worse.  You are unable to move any body part (paralysis) that is below the level of your injury.  You have numbness, tingling, or weakness in any body part that is below the level of your injury.  You cannot control your bladder or bowels.    ADDITIONAL NOTES AND INSTRUCTIONS    Please follow up with your Primary MD in 24-48 hr.  Seek immediate medical care for any new/worsening signs or symptoms.    maintain [place] collar at all times for at least 10-12 weeks  give pt second collar to switch for showering  will need to follow up as outpt homer Carr in 8 weeks or sooner if pt has neurologic changes

## 2021-06-09 NOTE — ED PROVIDER NOTE - CARE PROVIDER_API CALL
Jannet Carr)  [facility]  [address] [place] [address]  Phone: [phone]  Fax: [phone]  Follow Up Time: 1-3 Days

## 2021-06-09 NOTE — CONSULT NOTE ADULT - SUBJECTIVE AND OBJECTIVE BOX
89y m  TRAUMA ACTIVATION LEVEL:  Consult     MECHANISM OF INJURY:      [] Blunt  	[] MVC	[x] Fall	[] Pedestrian Struck	[] Motorcycle   [] Assault   [] Bicycle collision  [] Sports injury     [] Penetrating  	[] Gun Shot Wound 		[] Stab Wound    GCS: 	E: 4	V: 5	M: 6    HPI:  89y old m s/p unwitnessed fall from bed this morning, able to ambulate and get up after fall. Later in the day the patient told his family that he fell and that he had neck pain and they brought him to the [facility] site. He was scanned and found to have a dens fracture and was sent to the [facility] for neurosurgical and trauma eval. Here in the ED he is not in pain and ambulatory. A hard collar was placed. Neurosurgery saw the patient and due to his pacemaker he will not be able to get an MRI.     PAST MEDICAL & SURGICAL HISTORY:  CHF (congestive heart failure)  DM (diabetes mellitus)  HTN (hypertension)  Prostate CA in remission  Pacemaker  H/O total knee replacement, right    Allergies: No Known Allergies    Home Medications:  CeleXA:  (09 Jun 2021 11:32)  enalapril 20 mg oral tablet: 1 tab(s) orally once a day (09 Jun 2021 11:32)  gabapentin 300 mg oral tablet: orally once a day (09 Jun 2021 11:32)  glimepiride 4 mg oral tablet: orally 2 times a day (09 Jun 2021 11:32)  KlonoPIN 1 mg oral tablet:  (09 Jun 2021 11:32)  Lantus:  (09 Jun 2021 11:32)  Lasix 40 mg oral tablet: 1 tab(s) orally once a day (09 Jun 2021 11:32)  metoprolol succinate 50 mg oral tablet, extended release: 1 tab(s) orally once a day (09 Jun 2021 11:32)  Nifedical XL 60 mg oral tablet, extended release: 1 tab(s) orally once a day (09 Jun 2021 11:32)  simvastatin 40 mg oral tablet: 1 tab(s) orally once a day (at bedtime) (09 Jun 2021 11:32)    ROS: 10-system review is otherwise negative except HPI above.      Primary Survey:    A - airway intact  B - bilateral breath sounds and good chest rise  C - palpable pulses in all extremities  D - GCS 15 on arrival, AG  Exposure obtained    Vital Signs Last 24 Hrs  T(C): 35.6 (09 Jun 2021 10:58), Max: 35.6 (09 Jun 2021 10:58)  T(F): 96.1 (09 Jun 2021 10:58), Max: 96.1 (09 Jun 2021 10:58)  HR: 60 (09 Jun 2021 13:27) (59 - 60)  BP: 149/69 (09 Jun 2021 13:27) (149/69 - 159/74)  RR: 18 (09 Jun 2021 13:27) (18 - 18)  SpO2: 99% (09 Jun 2021 13:27) (98% - 99%)    Secondary Survey:   General: NAD  HEENT: Normocephalic, atraumatic, EOMI, PEERLA. no scalp lacerations   Neck: Soft, midline trachea. mild c spine tenderness, hard collar in place  Chest: No chest wall tenderness. or subq  emphysema   Cardiac: S1, S2, RRR  Respiratory: Bilateral breath sounds, clear and equal bilaterally  Abdomen: Soft, non-distended, non-tender, no rebound,   Groin: Normal appearing, pelvis stable   Ext: palp radial b/l UE, b/l DP palp in Lower Extrem.   Back: no TTP, no palpable runoff/stepoff/deformity      FAST    Procedures:    LABS:                     12.5   7.13  )-----------( 190      ( 09 Jun 2021 12:53 )             37.4       Auto Immature Granulocyte %: 0.4 % (06-09-21 @ 12:53)  Auto Neutrophil %: 77.0 % (06-09-21 @ 12:53)    06-09    130<L>  |  100  |  60<H>  ----------------------------<  351<H>  5.8<H>   |  21  |  2.0<H>      Calcium, Total Serum: 9.1 mg/dL (06-09-21 @ 12:53)      LFTs:             7.1  | 0.4  | 15       ------------------[135     ( 09 Jun 2021 12:53 )  4.1  | x    | 12          Lactate, Blood: 1.1 mmol/L (06-09-21 @ 14:27)    CARDIAC MARKERS ( 09 Jun 2021 14:27 )  x     / x     / 380 U/L / x     / x        RADIOLOGY & ADDITIONAL STUDIES:  < from: CT Cervical Spine No Cont (06.09.21 @ 12:11) >  IMPRESSION:  CT HEAD:  No acute intracranial findings.    CT CERVICAL SPINE:  Mildly displaced transverse fracture in the base of the dens with the fracture lineextending to the left junction of the odontoid and body compatible with type III dens fracture, likely acute. No associated cervical cord compression.    Mild posterior subluxation of the C1 and C2.    Multilevel degenerative changes with diffuse idiopathic skeletal hyperostosis pattern osteophytosis.  < end of copied text >      ---------------------------------------------------------------------------------------    ASSESSMENT:  89y old m s/p unwitnessed fall from bed with dens fracture     PLAN:    - Neurosurgery consult   - Maintain hard collar at all times   - NPO  - Completion pan scan with CT CAP  -  d/w Dr. Vera    89y m  TRAUMA ACTIVATION LEVEL:  Consult     MECHANISM OF INJURY:      [] Blunt  	[] MVC	[x] Fall	[] Pedestrian Struck	[] Motorcycle   [] Assault   [] Bicycle collision  [] Sports injury     [] Penetrating  	[] Gun Shot Wound 		[] Stab Wound    GCS: 	E: 4	V: 5	M: 6    HPI:  89y old m s/p unwitnessed fall from bed this morning, able to ambulate and get up after fall. Later in the day the patient told his family that he fell and that he had neck pain and they brought him to the [facility] site. He was scanned and found to have a dens fracture and was sent to the [facility] for neurosurgical and trauma eval. Here in the ED he is not in pain and ambulatory. A hard collar was placed. Neurosurgery saw the patient and due to his pacemaker he will not be able to get an MRI.     PAST MEDICAL & SURGICAL HISTORY:  CHF (congestive heart failure)  DM (diabetes mellitus)  HTN (hypertension)  Prostate CA in remission  Pacemaker  H/O total knee replacement, right    Allergies: No Known Allergies    Home Medications:  CeleXA:  (09 Jun 2021 11:32)  enalapril 20 mg oral tablet: 1 tab(s) orally once a day (09 Jun 2021 11:32)  gabapentin 300 mg oral tablet: orally once a day (09 Jun 2021 11:32)  glimepiride 4 mg oral tablet: orally 2 times a day (09 Jun 2021 11:32)  KlonoPIN 1 mg oral tablet:  (09 Jun 2021 11:32)  Lantus:  (09 Jun 2021 11:32)  Lasix 40 mg oral tablet: 1 tab(s) orally once a day (09 Jun 2021 11:32)  metoprolol succinate 50 mg oral tablet, extended release: 1 tab(s) orally once a day (09 Jun 2021 11:32)  Nifedical XL 60 mg oral tablet, extended release: 1 tab(s) orally once a day (09 Jun 2021 11:32)  simvastatin 40 mg oral tablet: 1 tab(s) orally once a day (at bedtime) (09 Jun 2021 11:32)    ROS: 10-system review is otherwise negative except HPI above.      Primary Survey:    A - airway intact  B - bilateral breath sounds and good chest rise  C - palpable pulses in all extremities  D - GCS 15 on arrival, AG  Exposure obtained    Vital Signs Last 24 Hrs  T(C): 35.6 (09 Jun 2021 10:58), Max: 35.6 (09 Jun 2021 10:58)  T(F): 96.1 (09 Jun 2021 10:58), Max: 96.1 (09 Jun 2021 10:58)  HR: 60 (09 Jun 2021 13:27) (59 - 60)  BP: 149/69 (09 Jun 2021 13:27) (149/69 - 159/74)  RR: 18 (09 Jun 2021 13:27) (18 - 18)  SpO2: 99% (09 Jun 2021 13:27) (98% - 99%)    Secondary Survey:   General: NAD  HEENT: Normocephalic, atraumatic, EOMI, PEERLA. no scalp lacerations   Neck: Soft, midline trachea. mild c spine tenderness, hard collar in place  Chest: No chest wall tenderness. or subq  emphysema   Cardiac: S1, S2, RRR  Respiratory: Bilateral breath sounds, clear and equal bilaterally  Abdomen: Soft, non-distended, non-tender, no rebound,   Groin: Normal appearing, pelvis stable   Ext: palp radial b/l UE, b/l DP palp in Lower Extrem.   Back: no TTP, no palpable runoff/stepoff/deformity      FAST    Procedures:    LABS:                     12.5   7.13  )-----------( 190      ( 09 Jun 2021 12:53 )             37.4       Auto Immature Granulocyte %: 0.4 % (06-09-21 @ 12:53)  Auto Neutrophil %: 77.0 % (06-09-21 @ 12:53)    06-09    130<L>  |  100  |  60<H>  ----------------------------<  351<H>  5.8<H>   |  21  |  2.0<H>      Calcium, Total Serum: 9.1 mg/dL (06-09-21 @ 12:53)      LFTs:             7.1  | 0.4  | 15       ------------------[135     ( 09 Jun 2021 12:53 )  4.1  | x    | 12          Lactate, Blood: 1.1 mmol/L (06-09-21 @ 14:27)    CARDIAC MARKERS ( 09 Jun 2021 14:27 )  x     / x     / 380 U/L / x     / x        RADIOLOGY & ADDITIONAL STUDIES:  < from: CT Cervical Spine No Cont (06.09.21 @ 12:11) >  IMPRESSION:  CT HEAD:  No acute intracranial findings.    CT CERVICAL SPINE:  Mildly displaced transverse fracture in the base of the dens with the fracture lineextending to the left junction of the odontoid and body compatible with type III dens fracture, likely acute. No associated cervical cord compression.    Mild posterior subluxation of the C1 and C2.    Multilevel degenerative changes with diffuse idiopathic skeletal hyperostosis pattern osteophytosis.  < end of copied text >      ---------------------------------------------------------------------------------------

## 2021-06-09 NOTE — ED ADULT TRIAGE NOTE - CHIEF COMPLAINT QUOTE
Patient complaining of pain to neck and back of head. Patient fell at 5am, patient believes he lost his footing. Abrasion noted to left upper eyebrow region, swelling noted to right knee. Patient denies LOC. Pain 10/10

## 2021-06-09 NOTE — ED PROVIDER NOTE - ATTENDING CONTRIBUTION TO CARE
89 yo M PMHx noted including HTN, DM, prostate cancer, knee replacement presents with daughter from home for evaluation s/p fall this am.  Pt states he got up to use the bathroom and tripped and fell.  Pt did hit head, no LOC.  Initially had a hard time getting up but he eventually did and then called his family.  He also made himself a pot of coffee.  no back pain, no abc pain, no n/v.  On exam pt in NAD AAO x 3, GCS 15, + abrasion to right forehead with bruise, no raccoon no dawson, PERRL, EOMI, + tender cervical spine, no step off, no bruising to back, abd soft nt nd, equal AE b/l. + bruise b/l knees, good rom, good tone, equal strength, sensation intact    will image, treat pain

## 2021-06-09 NOTE — CONSULT NOTE ADULT - SUBJECTIVE AND OBJECTIVE BOX
HPI: This is an 89 yo male s/p unwitnessed fall from bed this morning, able to ambulate and get up after fall. Later in the day the patient told his family that he fell and that he had neck pain and they brought him to the [facility] site. He was scanned and found to have a dens fracture and was sent to the [facility] for neurosurgical and trauma eval. Here in the ED he is not in pain and ambulatory. A hard collar was placed. Neurosurgery saw the patient and due to his pacemaker he will not be able to get an MRI.     Pt seen and examined at bedside with daughter present.  Pt reportedly fell from standing per the daughter.  It was an unwitnessed fall.  Pt c/o neck pain since fall but denies pain radiating down arms, numbness, weakness or paresthesias.  Pt was able to ambulate once he got off floor.      PAST MEDICAL & SURGICAL HISTORY:  CHF (congestive heart failure)    DM (diabetes mellitus)    HTN (hypertension)    Prostate CA  in remission    Pacemaker    H/O total knee replacement, right        Imaging:< from: CT Cervical Spine No Cont (06.09.21 @ 12:11) >  CT HEAD:  No acute intracranial findings.        CT CERVICAL SPINE:  Mildly displaced transverse fracture in the base of the dens with the fracture line extending to the left junction of the odontoid and body compatible with type III dens fracture, likely acute. No associated cervical cord compression.  Mild posterior subluxation of the C1 and C2.  Multilevel degenerative changes with diffuse idiopathic skeletal hyperostosis pattern osteophytosis.  < end of copied text >      Home Medications:  CeleXA:  (09 Jun 2021 11:32)  enalapril 20 mg oral tablet: 1 tab(s) orally once a day (09 Jun 2021 11:32)  gabapentin 300 mg oral tablet: orally once a day (09 Jun 2021 11:32)  glimepiride 4 mg oral tablet: orally 2 times a day (09 Jun 2021 11:32)  KlonoPIN 1 mg oral tablet:  (09 Jun 2021 11:32)  Lantus:  (09 Jun 2021 11:32)  Lasix 40 mg oral tablet: 1 tab(s) orally once a day (09 Jun 2021 11:32)  metoprolol succinate 50 mg oral tablet, extended release: 1 tab(s) orally once a day (09 Jun 2021 11:32)  Nifedical XL 60 mg oral tablet, extended release: 1 tab(s) orally once a day (09 Jun 2021 11:32)  simvastatin 40 mg oral tablet: 1 tab(s) orally once a day (at bedtime) (09 Jun 2021 11:32)      Allergies    No Known Allergies    Intolerances        ROS:  [ x ] A ten-point review of systems is negative except as noted   [  ] Due to altered mental status/intubation, subjective information were not able to be obtained from the patient. History was obtained, to the extent possible, from review of the chart and collateral sources of information    MEDICATIONS  (STANDING):    MEDICATIONS  (PRN):      ICU Vital Signs Last 24 Hrs  T(C): 35.6 (09 Jun 2021 10:58), Max: 35.6 (09 Jun 2021 10:58)  T(F): 96.1 (09 Jun 2021 10:58), Max: 96.1 (09 Jun 2021 10:58)  HR: 60 (09 Jun 2021 13:27) (59 - 60)  BP: 149/69 (09 Jun 2021 13:27) (149/69 - 159/74)  BP(mean): --  ABP: --  ABP(mean): --  RR: 18 (09 Jun 2021 13:27) (18 - 18)  SpO2: 99% (09 Jun 2021 13:27) (98% - 99%)      I&O's Detail      CBC Full  -  ( 09 Jun 2021 12:53 )  WBC Count : 7.13 K/uL  RBC Count : 4.48 M/uL  Hemoglobin : 12.5 g/dL  Hematocrit : 37.4 %  Platelet Count - Automated : 190 K/uL  Mean Cell Volume : 83.5 fL  Mean Cell Hemoglobin : 27.9 pg  Mean Cell Hemoglobin Concentration : 33.4 g/dL  Auto Neutrophil # : 5.49 K/uL  Auto Lymphocyte # : 1.06 K/uL  Auto Monocyte # : 0.48 K/uL  Auto Eosinophil # : 0.03 K/uL  Auto Basophil # : 0.04 K/uL  Auto Neutrophil % : 77.0 %  Auto Lymphocyte % : 14.9 %  Auto Monocyte % : 6.7 %  Auto Eosinophil % : 0.4 %  Auto Basophil % : 0.6 %    06-09    130<L>  |  100  |  60<H>  ----------------------------<  351<H>  5.8<H>   |  21  |  2.0<H>    Ca    9.1      09 Jun 2021 12:53    TPro  7.1  /  Alb  4.1  /  TBili  0.4  /  DBili  x   /  AST  15  /  ALT  12  /  AlkPhos  135<H>  06-09    CARDIAC MARKERS ( 09 Jun 2021 14:27 )  x     / x     / 380 U/L / x     / x              AAOX3. Verbal function intact  follows commands  facial motions symmetric  PERRL  Motor: MAEx4  5/5 power in b/l bicep/tricep/delt  5/5 power in b/l hand   intrinsic strength good  No Hoffmans  5/5 power b/l LE's  Sensation: equal b/l to FT  TTP posterior neck    [place] initially in place-> changed to [place]-J      Assessment/Plan:  No acute neurosurgical intervention  maintain [place]-J collar at all times  if MRI contraindicated 2/2 PPM, obtain Bone scan  d/w attg   HPI: This is an 88 yo male s/p unwitnessed fall from bed this morning, able to ambulate and get up after fall. Later in the day the patient told his family that he fell and that he had neck pain and they brought him to the [facility] site. He was scanned and found to have a dens fracture and was sent to the [facility] for neurosurgical and trauma eval. Here in the ED he is not in pain and ambulatory. A hard collar was placed. Neurosurgery saw the patient and due to his pacemaker he will not be able to get an MRI.     Pt seen and examined at bedside with daughter present.  Pt reportedly fell from standing per the daughter.  It was an unwitnessed fall.  Pt c/o neck pain since fall but denies pain radiating down arms, numbness, weakness or paresthesias.  Pt was able to ambulate once he got off floor.      PAST MEDICAL & SURGICAL HISTORY:  CHF (congestive heart failure)    DM (diabetes mellitus)    HTN (hypertension)    Prostate CA  in remission    Pacemaker    H/O total knee replacement, right        Imaging:< from: CT Cervical Spine No Cont (06.09.21 @ 12:11) >  CT HEAD:  No acute intracranial findings.        CT CERVICAL SPINE:  Mildly displaced transverse fracture in the base of the dens with the fracture line extending to the left junction of the odontoid and body compatible with type III dens fracture, likely acute. No associated cervical cord compression.  Mild posterior subluxation of the C1 and C2.  Multilevel degenerative changes with diffuse idiopathic skeletal hyperostosis pattern osteophytosis.  < end of copied text >      Home Medications:  CeleXA:  (09 Jun 2021 11:32)  enalapril 20 mg oral tablet: 1 tab(s) orally once a day (09 Jun 2021 11:32)  gabapentin 300 mg oral tablet: orally once a day (09 Jun 2021 11:32)  glimepiride 4 mg oral tablet: orally 2 times a day (09 Jun 2021 11:32)  KlonoPIN 1 mg oral tablet:  (09 Jun 2021 11:32)  Lantus:  (09 Jun 2021 11:32)  Lasix 40 mg oral tablet: 1 tab(s) orally once a day (09 Jun 2021 11:32)  metoprolol succinate 50 mg oral tablet, extended release: 1 tab(s) orally once a day (09 Jun 2021 11:32)  Nifedical XL 60 mg oral tablet, extended release: 1 tab(s) orally once a day (09 Jun 2021 11:32)  simvastatin 40 mg oral tablet: 1 tab(s) orally once a day (at bedtime) (09 Jun 2021 11:32)      Allergies    No Known Allergies    Intolerances        ROS:  [ x ] A ten-point review of systems is negative except as noted   [  ] Due to altered mental status/intubation, subjective information were not able to be obtained from the patient. History was obtained, to the extent possible, from review of the chart and collateral sources of information    MEDICATIONS  (STANDING):    MEDICATIONS  (PRN):      ICU Vital Signs Last 24 Hrs  T(C): 35.6 (09 Jun 2021 10:58), Max: 35.6 (09 Jun 2021 10:58)  T(F): 96.1 (09 Jun 2021 10:58), Max: 96.1 (09 Jun 2021 10:58)  HR: 60 (09 Jun 2021 13:27) (59 - 60)  BP: 149/69 (09 Jun 2021 13:27) (149/69 - 159/74)  BP(mean): --  ABP: --  ABP(mean): --  RR: 18 (09 Jun 2021 13:27) (18 - 18)  SpO2: 99% (09 Jun 2021 13:27) (98% - 99%)      I&O's Detail      CBC Full  -  ( 09 Jun 2021 12:53 )  WBC Count : 7.13 K/uL  RBC Count : 4.48 M/uL  Hemoglobin : 12.5 g/dL  Hematocrit : 37.4 %  Platelet Count - Automated : 190 K/uL  Mean Cell Volume : 83.5 fL  Mean Cell Hemoglobin : 27.9 pg  Mean Cell Hemoglobin Concentration : 33.4 g/dL  Auto Neutrophil # : 5.49 K/uL  Auto Lymphocyte # : 1.06 K/uL  Auto Monocyte # : 0.48 K/uL  Auto Eosinophil # : 0.03 K/uL  Auto Basophil # : 0.04 K/uL  Auto Neutrophil % : 77.0 %  Auto Lymphocyte % : 14.9 %  Auto Monocyte % : 6.7 %  Auto Eosinophil % : 0.4 %  Auto Basophil % : 0.6 %    06-09    130<L>  |  100  |  60<H>  ----------------------------<  351<H>  5.8<H>   |  21  |  2.0<H>    Ca    9.1      09 Jun 2021 12:53    TPro  7.1  /  Alb  4.1  /  TBili  0.4  /  DBili  x   /  AST  15  /  ALT  12  /  AlkPhos  135<H>  06-09    CARDIAC MARKERS ( 09 Jun 2021 14:27 )  x     / x     / 380 U/L / x     / x              AAOX3. Verbal function intact  follows commands  facial motions symmetric  PERRL  Motor: MAEx4  5/5 power in b/l bicep/tricep/delt  5/5 power in b/l hand   intrinsic strength good  No Hoffmans  5/5 power b/l LE's  Sensation: equal b/l to FT  TTP posterior neck    [place] initially in place-> changed to [place]-J      Assessment/Plan:  No acute neurosurgical intervention  maintain [place]-J collar at all times for at least 10-12 weeks  give pt second collar to switch for showering  If pt ambulates with C-collar on with normal gait, may be discharged from neurosurgical perspective  will need to follow up as outpt w Dr Carr in 8 weeks or sooner if pt has neurologic changes  Dr Carr [phone]  d/w attg

## 2021-06-09 NOTE — CONSULT NOTE ADULT - ASSESSMENT
ASSESSMENT:  89y old m s/p unwitnessed fall from bed with dens fracture     PLAN:    - Neurosurgery consult   - Maintain hard collar at all times, even in shower for at least 12 weeks   - Will follow up with Dr. Carr in office   - Patient can go home if he can ambulate   - Cleared from trauma perspective   - d/w Dr. Vera

## 2021-06-09 NOTE — ED ADULT NURSE REASSESSMENT NOTE - NS ED NURSE REASSESS COMMENT FT1
[place]  [place] [facility] attempted to give report to crit jassi Lopez, who stated she was busy and unable to take report at this time. The phone for charge RN is continually busy

## 2021-06-09 NOTE — ED PROVIDER NOTE - NS_ATTENDINGSCRIBE_ED_ALL_ED
oral I personally performed the service described in the documentation recorded by the scribe in my presence, and it accurately and completely records my words and actions.

## 2021-06-09 NOTE — ED PROVIDER NOTE - OBJECTIVE STATEMENT
88 y/o male with a PMH of CHF, DM, HTN, Pacemaker, and Prostate CA in remission presents to the ED for evaluation of neck pain s/p fall around 5AM. as per pt daughter, pt lives alone and got up this morning, and is unaware of how, but fell forward onto the floor in his house. pt reports he was able to get himself up off the floor, and called his family who brought him to the [facility] ED. pt was found to have a dens fracture with c1/c2 subluxation and transferred to the ED for trauma evaluation. pt reports mild neck pain. pt denies headache, dizziness, use of blood thinners, chest pain, sob, abdominal pain, back pain, numbness, tingling, weakness, n/v/d/c, or urinary or bowel retention or incontinence.

## 2021-06-09 NOTE — ED PROVIDER NOTE - NS ED ROS FT
CONST: No fever, chills or bodyaches  EYES: No pain, redness, drainage or visual changes.  ENT: No ear pain or discharge, nasal discharge or congestion. No sore throat  CARD: No chest pain, palpitations  RESP: No SOB, cough, hemoptysis. No hx of asthma or COPD  GI: No abdominal pain, N/V/D  : No urinary symptoms  MS: (+) neck pain. No joint pain, back pain or extremity pain/injury  SKIN: No rashes  NEURO: No headache, dizziness, paresthesias or LOC

## 2021-06-09 NOTE — ED PROVIDER NOTE - PROGRESS NOTE DETAILS
neuro surg aware, trauma deroma aware, transfer awaree by dr major. KS- Family and pt updated on results.  Pt is in C collar, repeat exam without deficits.  Will transfer [facility].  Neurosurgery JEZ Rain aware.  ED Dr Diallo aware and Dr Pandey of trauma aware,  Pt and family agree with plan PT ARRIVED AT [facility]. NEUROSURGERY AND TRAUMA CONSULTED. FF: pt does not have a nephrologist but is aware of kidney disease. PT SIGNED OUT TO DR. TREJO, FOLLOW UP NEUROSURGERY AND TRAUMA CONSULTS, REASSESS AND DISPO. ATTENDING NOTE: 88 y/o M signed out to me by Dr. Flores presents s/p fall this morning with neck pain. Pt was initially evaluated at [facility] and was found to have a DEN’s fracture. Pt was neurologically intact and transferred to [facility] for further evaluation. Trauma and neuro-surgery saw the Pt at bedside and cleared him. Plan: [place]-J-Collar for the next 12 weeks at all times. Will D/c with o/p follow up to Dr. Carr in 8 weeks. Advised if he has any changed in symptoms to follow up sooner. FF: pt is ambulating without assistance. as per neurosurg recommendation giving pt a second [place] j collar for showers. FF: pt is ambulating without assistance. as per neurosurg recommendation giving pt a second [place] j collar for showers. pt had rp bmp FF: pt rp bmp potassium is within normal limits FF: discussed radiology and lab results with pt and daughter at bedside. pt rp bmp potassium is within normal limits. pt is ambulating in the ED without assistance with the [place] j collar on. pt given a second [place] j to use for the shower. advised collar must be worn at all times for 10-12 weeks. advised pt should be seen by neurosurg dr. godfrey in 8 weeks. advised of strict return precautions discussed at bedside. agreeable to dc. Progress Authored by Dr. Rashad Rausch: Family and Pt aware of pulmonary nodule, thyroid abnormality, and mediastinal lymph nodes; circled on CT results. Will follow up. wrap up note jass: 88 y/o M signed out to me by Dr. Flores presents s/p fall this morning with neck pain. Pt was initially evaluated at [facility] and was found to have a DEN’s fracture. Pt was neurologically intact and transferred to [facility] for further evaluation. Trauma and neuro-surgery saw the Pt at bedside and cleared him. Plan: [place]-J-Collar for the next 12 weeks at all times. Will D/c with o/p follow up to Dr. Carr in 8 weeks. Advised if he has any changed in symptoms to follow up sooner.

## 2021-06-09 NOTE — ED PROVIDER NOTE - CARE PLAN
Principal Discharge DX:	Fall   Principal Discharge DX:	Fall  Secondary Diagnosis:	Dens fracture  Secondary Diagnosis:	Subluxation complex (vertebral) of cervical region

## 2021-06-09 NOTE — ED PROVIDER NOTE - PATIENT PORTAL LINK FT
You can access the FollowMyHealth Patient Portal offered by [facility] by registering at the following website: [email or web address]. By joining [facility]’s FollowMyHealth portal, you will also be able to view your health information using other applications (apps) compatible with our system.

## 2021-06-09 NOTE — ED PROVIDER NOTE - PHYSICAL EXAMINATION
Physical Exam    Vital Signs: I have reviewed the initial vital signs.  Constitutional: well-nourished, appears stated age, no acute distress  Eyes: Conjunctiva pink, Sclera clear, PERRLA, EOMI without pain. negative hyphema. negative subconjunctival hemorrhage. visual fields intact.  Cardiovascular: S1 and S2, regular rate, regular rhythm, well-perfused extremities, radial pulses equal and 2+ b/l.   Respiratory: unlabored respiratory effort, clear to auscultation bilaterally no wheezing, rales and rhonchi. pt is speaking full sentences. no accessory muscle use. no reproducible chest tenderness or chest wall crepitus.   Gastrointestinal: soft, non-tender, nondistended abdomen, no pulsatile mass, normal bowl sounds, no rebound, no guarding, negative psoas, negative obturator, negative murphys. no organomegaly.   Musculoskeletal: no lower extremity edema, no calf tenderness, no midline tenderness, no palpable spinal step offs. pt is in a neck collar. FROM of b/l upper and lower extremities.   Integumentary: warm, dry, no rash. no abrasions, lacerations, or ecchymosis.   Neurologic: awake, alert, cranial nerves II-XII grossly intact, extremities’ motor and sensory functions grossly intact. finger to nose intact. negative pronator drift. 5/5 strength throughout.   Psychiatric: appropriate mood, appropriate affect Physical Exam    Vital Signs: I have reviewed the initial vital signs.  Constitutional: well-nourished, appears stated age, no acute distress  Eyes: Conjunctiva pink, Sclera clear, PERRLA, EOMI without pain. negative hyphema. negative subconjunctival hemorrhage. visual fields intact.  Cardiovascular: S1 and S2, regular rate, regular rhythm, well-perfused extremities, radial pulses equal and 2+ b/l.   Respiratory: unlabored respiratory effort, clear to auscultation bilaterally no wheezing, rales and rhonchi. pt is speaking full sentences. no accessory muscle use. no reproducible chest tenderness or chest wall crepitus.   Gastrointestinal: soft, non-tender, nondistended abdomen, no pulsatile mass, normal bowl sounds, no rebound, no guarding, negative psoas, negative obturator, negative murphys. no organomegaly.   Musculoskeletal: no lower extremity edema, no calf tenderness, no midline tenderness, no palpable spinal step offs. pt is in a neck collar. FROM of b/l upper and lower extremities.   Integumentary: warm, dry, no rash. no abrasions, lacerations, or ecchymosis.   Neurologic: awake, alert, cranial nerves II-XII grossly intact, extremities’ motor and sensory functions grossly intact. finger to nose intact. negative pronator drift. 5/5 strength throughout.   Psychiatric: appropriate mood, appropriate affect.

## 2021-06-10 NOTE — ED POST DISCHARGE NOTE - DETAILS
SPOKE WITH DAUGHTER ARUN, AND SHE IS AWARE OF LUNG NODULE AND THYROID NODULE AND WILL HAVE PATIENT F/U WITH PMD.

## 2021-06-10 NOTE — ED POST DISCHARGE NOTE - RESULT SUMMARY
CT ABD- 1.2 CM APPLE NODULE NOTED, CHEST CT 3 MONTHS. THYROID NODULE NOTED, THYROID SONO ADVISED. CT ABD- 1.2 CM APPLE NODULE NOTED, CHEST CT  OR PET SCAN IN 3 MONTHS. THYROID NODULE NOTED, F/U WITH PMD FOR FURTHER EVAUATION.

## 2021-06-11 ENCOUNTER — INPATIENT (INPATIENT)
Facility: HOSPITAL | Age: 86
LOS: 3 days | Discharge: NURSING, REHAB OR LONG-TERM CARE | End: 2021-06-15
Attending: HOSPITALIST | Admitting: HOSPITALIST
Payer: MEDICARE

## 2021-06-11 VITALS
SYSTOLIC BLOOD PRESSURE: 141 MMHG | WEIGHT: 190.04 LBS | HEIGHT: 66 IN | TEMPERATURE: 96 F | HEART RATE: 87 BPM | OXYGEN SATURATION: 97 % | RESPIRATION RATE: 18 BRPM | DIASTOLIC BLOOD PRESSURE: 73 MMHG

## 2021-06-11 DIAGNOSIS — Z96.651 PRESENCE OF RIGHT ARTIFICIAL KNEE JOINT: Chronic | ICD-10-CM

## 2021-06-11 LAB
ALBUMIN SERPL ELPH-MCNC: 3.9 G/DL — SIGNIFICANT CHANGE UP (ref 3.5–5.2)
ALP SERPL-CCNC: 132 U/L — HIGH (ref 30–115)
ALT FLD-CCNC: 9 U/L — SIGNIFICANT CHANGE UP (ref 0–41)
ANION GAP SERPL CALC-SCNC: 13 MMOL/L — SIGNIFICANT CHANGE UP (ref 7–14)
AST SERPL-CCNC: 18 U/L — SIGNIFICANT CHANGE UP (ref 0–41)
BASOPHILS # BLD AUTO: 0.06 K/UL — SIGNIFICANT CHANGE UP (ref 0–0.2)
BASOPHILS NFR BLD AUTO: 0.9 % — SIGNIFICANT CHANGE UP (ref 0–1)
BILIRUB SERPL-MCNC: 0.3 MG/DL — SIGNIFICANT CHANGE UP (ref 0.2–1.2)
BUN SERPL-MCNC: 52 MG/DL — HIGH (ref 10–20)
CALCIUM SERPL-MCNC: 8.7 MG/DL — SIGNIFICANT CHANGE UP (ref 8.5–10.1)
CHLORIDE SERPL-SCNC: 102 MMOL/L — SIGNIFICANT CHANGE UP (ref 98–110)
CO2 SERPL-SCNC: 19 MMOL/L — SIGNIFICANT CHANGE UP (ref 17–32)
CREAT SERPL-MCNC: 2 MG/DL — HIGH (ref 0.7–1.5)
EOSINOPHIL # BLD AUTO: 0.27 K/UL — SIGNIFICANT CHANGE UP (ref 0–0.7)
EOSINOPHIL NFR BLD AUTO: 3.9 % — SIGNIFICANT CHANGE UP (ref 0–8)
GLUCOSE BLDC GLUCOMTR-MCNC: 243 MG/DL — HIGH (ref 70–99)
GLUCOSE BLDC GLUCOMTR-MCNC: 263 MG/DL — HIGH (ref 70–99)
GLUCOSE BLDC GLUCOMTR-MCNC: 267 MG/DL — HIGH (ref 70–99)
GLUCOSE SERPL-MCNC: 363 MG/DL — HIGH (ref 70–99)
HCT VFR BLD CALC: 38.4 % — LOW (ref 42–52)
HGB BLD-MCNC: 12.5 G/DL — LOW (ref 14–18)
IMM GRANULOCYTES NFR BLD AUTO: 0.6 % — HIGH (ref 0.1–0.3)
LYMPHOCYTES # BLD AUTO: 1.31 K/UL — SIGNIFICANT CHANGE UP (ref 1.2–3.4)
LYMPHOCYTES # BLD AUTO: 19 % — LOW (ref 20.5–51.1)
MCHC RBC-ENTMCNC: 27.8 PG — SIGNIFICANT CHANGE UP (ref 27–31)
MCHC RBC-ENTMCNC: 32.6 G/DL — SIGNIFICANT CHANGE UP (ref 32–37)
MCV RBC AUTO: 85.5 FL — SIGNIFICANT CHANGE UP (ref 80–94)
MONOCYTES # BLD AUTO: 0.5 K/UL — SIGNIFICANT CHANGE UP (ref 0.1–0.6)
MONOCYTES NFR BLD AUTO: 7.2 % — SIGNIFICANT CHANGE UP (ref 1.7–9.3)
NEUTROPHILS # BLD AUTO: 4.73 K/UL — SIGNIFICANT CHANGE UP (ref 1.4–6.5)
NEUTROPHILS NFR BLD AUTO: 68.4 % — SIGNIFICANT CHANGE UP (ref 42.2–75.2)
NRBC # BLD: 0 /100 WBCS — SIGNIFICANT CHANGE UP (ref 0–0)
PLATELET # BLD AUTO: 206 K/UL — SIGNIFICANT CHANGE UP (ref 130–400)
POTASSIUM SERPL-MCNC: 5.3 MMOL/L — HIGH (ref 3.5–5)
POTASSIUM SERPL-SCNC: 5.3 MMOL/L — HIGH (ref 3.5–5)
PROT SERPL-MCNC: 7 G/DL — SIGNIFICANT CHANGE UP (ref 6–8)
RBC # BLD: 4.49 M/UL — LOW (ref 4.7–6.1)
RBC # FLD: 15 % — HIGH (ref 11.5–14.5)
SARS-COV-2 RNA SPEC QL NAA+PROBE: SIGNIFICANT CHANGE UP
SODIUM SERPL-SCNC: 134 MMOL/L — LOW (ref 135–146)
WBC # BLD: 6.91 K/UL — SIGNIFICANT CHANGE UP (ref 4.8–10.8)
WBC # FLD AUTO: 6.91 K/UL — SIGNIFICANT CHANGE UP (ref 4.8–10.8)

## 2021-06-11 PROCEDURE — 99223 1ST HOSP IP/OBS HIGH 75: CPT

## 2021-06-11 PROCEDURE — 99285 EMERGENCY DEPT VISIT HI MDM: CPT

## 2021-06-11 RX ORDER — SODIUM CHLORIDE 9 MG/ML
1000 INJECTION, SOLUTION INTRAVENOUS
Refills: 0 | Status: DISCONTINUED | OUTPATIENT
Start: 2021-06-11 | End: 2021-06-15

## 2021-06-11 RX ORDER — INSULIN LISPRO 100/ML
VIAL (ML) SUBCUTANEOUS
Refills: 0 | Status: DISCONTINUED | OUTPATIENT
Start: 2021-06-11 | End: 2021-06-15

## 2021-06-11 RX ORDER — CITALOPRAM 10 MG/1
0 TABLET, FILM COATED ORAL
Qty: 0 | Refills: 0 | DISCHARGE

## 2021-06-11 RX ORDER — PANTOPRAZOLE SODIUM 20 MG/1
40 TABLET, DELAYED RELEASE ORAL
Refills: 0 | Status: DISCONTINUED | OUTPATIENT
Start: 2021-06-11 | End: 2021-06-15

## 2021-06-11 RX ORDER — SODIUM CHLORIDE 9 MG/ML
1000 INJECTION INTRAMUSCULAR; INTRAVENOUS; SUBCUTANEOUS ONCE
Refills: 0 | Status: COMPLETED | OUTPATIENT
Start: 2021-06-11 | End: 2021-06-11

## 2021-06-11 RX ORDER — HEPARIN SODIUM 5000 [USP'U]/ML
5000 INJECTION INTRAVENOUS; SUBCUTANEOUS EVERY 12 HOURS
Refills: 0 | Status: DISCONTINUED | OUTPATIENT
Start: 2021-06-11 | End: 2021-06-15

## 2021-06-11 RX ORDER — DEXTROSE 50 % IN WATER 50 %
25 SYRINGE (ML) INTRAVENOUS ONCE
Refills: 0 | Status: DISCONTINUED | OUTPATIENT
Start: 2021-06-11 | End: 2021-06-15

## 2021-06-11 RX ORDER — CLONAZEPAM 1 MG
0 TABLET ORAL
Qty: 0 | Refills: 0 | DISCHARGE

## 2021-06-11 RX ORDER — FUROSEMIDE 40 MG
40 TABLET ORAL DAILY
Refills: 0 | Status: DISCONTINUED | OUTPATIENT
Start: 2021-06-11 | End: 2021-06-15

## 2021-06-11 RX ORDER — MORPHINE SULFATE 50 MG/1
4 CAPSULE, EXTENDED RELEASE ORAL ONCE
Refills: 0 | Status: DISCONTINUED | OUTPATIENT
Start: 2021-06-11 | End: 2021-06-11

## 2021-06-11 RX ORDER — CLONAZEPAM 1 MG
1 TABLET ORAL THREE TIMES A DAY
Refills: 0 | Status: DISCONTINUED | OUTPATIENT
Start: 2021-06-11 | End: 2021-06-15

## 2021-06-11 RX ORDER — INSULIN GLARGINE 100 [IU]/ML
0 INJECTION, SOLUTION SUBCUTANEOUS
Qty: 0 | Refills: 0 | DISCHARGE

## 2021-06-11 RX ORDER — INSULIN GLARGINE 100 [IU]/ML
18 INJECTION, SOLUTION SUBCUTANEOUS AT BEDTIME
Refills: 0 | Status: DISCONTINUED | OUTPATIENT
Start: 2021-06-11 | End: 2021-06-15

## 2021-06-11 RX ORDER — DEXTROSE 50 % IN WATER 50 %
15 SYRINGE (ML) INTRAVENOUS ONCE
Refills: 0 | Status: DISCONTINUED | OUTPATIENT
Start: 2021-06-11 | End: 2021-06-15

## 2021-06-11 RX ORDER — DEXTROSE 50 % IN WATER 50 %
12.5 SYRINGE (ML) INTRAVENOUS ONCE
Refills: 0 | Status: DISCONTINUED | OUTPATIENT
Start: 2021-06-11 | End: 2021-06-15

## 2021-06-11 RX ORDER — GABAPENTIN 400 MG/1
600 CAPSULE ORAL AT BEDTIME
Refills: 0 | Status: DISCONTINUED | OUTPATIENT
Start: 2021-06-11 | End: 2021-06-15

## 2021-06-11 RX ORDER — METOPROLOL TARTRATE 50 MG
50 TABLET ORAL DAILY
Refills: 0 | Status: DISCONTINUED | OUTPATIENT
Start: 2021-06-11 | End: 2021-06-13

## 2021-06-11 RX ORDER — GABAPENTIN 400 MG/1
0 CAPSULE ORAL
Qty: 0 | Refills: 0 | DISCHARGE

## 2021-06-11 RX ORDER — NIFEDIPINE 30 MG
60 TABLET, EXTENDED RELEASE 24 HR ORAL DAILY
Refills: 0 | Status: DISCONTINUED | OUTPATIENT
Start: 2021-06-11 | End: 2021-06-14

## 2021-06-11 RX ORDER — NIFEDIPINE 30 MG
1 TABLET, EXTENDED RELEASE 24 HR ORAL
Qty: 0 | Refills: 0 | DISCHARGE

## 2021-06-11 RX ORDER — GABAPENTIN 400 MG/1
300 CAPSULE ORAL DAILY
Refills: 0 | Status: DISCONTINUED | OUTPATIENT
Start: 2021-06-11 | End: 2021-06-15

## 2021-06-11 RX ORDER — ACETAMINOPHEN 500 MG
650 TABLET ORAL EVERY 6 HOURS
Refills: 0 | Status: DISCONTINUED | OUTPATIENT
Start: 2021-06-11 | End: 2021-06-15

## 2021-06-11 RX ORDER — MORPHINE SULFATE 50 MG/1
2 CAPSULE, EXTENDED RELEASE ORAL EVERY 4 HOURS
Refills: 0 | Status: DISCONTINUED | OUTPATIENT
Start: 2021-06-11 | End: 2021-06-15

## 2021-06-11 RX ORDER — GLUCAGON INJECTION, SOLUTION 0.5 MG/.1ML
1 INJECTION, SOLUTION SUBCUTANEOUS ONCE
Refills: 0 | Status: DISCONTINUED | OUTPATIENT
Start: 2021-06-11 | End: 2021-06-15

## 2021-06-11 RX ORDER — SIMVASTATIN 20 MG/1
40 TABLET, FILM COATED ORAL AT BEDTIME
Refills: 0 | Status: DISCONTINUED | OUTPATIENT
Start: 2021-06-11 | End: 2021-06-15

## 2021-06-11 RX ORDER — INSULIN LISPRO 100/ML
6 VIAL (ML) SUBCUTANEOUS
Refills: 0 | Status: DISCONTINUED | OUTPATIENT
Start: 2021-06-11 | End: 2021-06-15

## 2021-06-11 RX ADMIN — Medication 650 MILLIGRAM(S): at 23:48

## 2021-06-11 RX ADMIN — MORPHINE SULFATE 2 MILLIGRAM(S): 50 CAPSULE, EXTENDED RELEASE ORAL at 22:39

## 2021-06-11 RX ADMIN — Medication 650 MILLIGRAM(S): at 18:19

## 2021-06-11 RX ADMIN — INSULIN GLARGINE 18 UNIT(S): 100 INJECTION, SOLUTION SUBCUTANEOUS at 23:52

## 2021-06-11 RX ADMIN — Medication 1 MILLIGRAM(S): at 22:24

## 2021-06-11 RX ADMIN — MORPHINE SULFATE 4 MILLIGRAM(S): 50 CAPSULE, EXTENDED RELEASE ORAL at 13:35

## 2021-06-11 RX ADMIN — MORPHINE SULFATE 2 MILLIGRAM(S): 50 CAPSULE, EXTENDED RELEASE ORAL at 22:24

## 2021-06-11 RX ADMIN — SODIUM CHLORIDE 1000 MILLILITER(S): 9 INJECTION INTRAMUSCULAR; INTRAVENOUS; SUBCUTANEOUS at 14:50

## 2021-06-11 RX ADMIN — GABAPENTIN 600 MILLIGRAM(S): 400 CAPSULE ORAL at 22:27

## 2021-06-11 RX ADMIN — HEPARIN SODIUM 5000 UNIT(S): 5000 INJECTION INTRAVENOUS; SUBCUTANEOUS at 18:19

## 2021-06-11 RX ADMIN — SIMVASTATIN 40 MILLIGRAM(S): 20 TABLET, FILM COATED ORAL at 22:25

## 2021-06-11 RX ADMIN — Medication 2: at 23:51

## 2021-06-11 NOTE — H&P ADULT - ASSESSMENT
89 YO M with PMHx of HTN, HLD, DM type II, CHF (unknown EF), PPM, prostate CA, CKD III presented to the ED with c/c of neck pain s/p recent fall    #Type III Dens fracture s/p fall  - CT cervical spine: Mildly displaced transverse fracture in the base of the dens with the fracture line extending to the left junction of the odontoid and body compatible with type III dens fracture, likely acute. No associated cervical cord compression.  - Recently evaluated by trauma and neurosurgery   - Maintain Cervical collar for 12 weeks  - Tylenol 650 Q6 for pain. Morphine PRN  - Gabapentin 300mg in AM and 600mg in PM  - Physiatry, PT, OT    #Diabetes Mellitus type II   - Follow HbA1C. Hold home oral medications  - Monitor FS. Keep between 140 - 180. Carb consistent diet  - Will start 18 units Lantus and 6 Lispro units TID + 1 CF SS    #CHF; unknown EF  - Cardiologist Dr Amor. Not in acute decompensation. Outpatient follow up  - Lasix 40 mg PO QD. Metoprolol succinate 50 mg PO QD. simvastatin 40 mg PO QHS    #CKD III - stable. Outpatient follow up    #HTN - Enalapril 20 mg PO QD. Nifedipine 60 mg PO QD    #Anxiety - Clonazepam 1 mg PO TID PRN    #Misc  - DVT Prophylaxis: Heparin   - Diet: DASH/TLC, Carb consistent diet   - GI Prophylaxis: Pantoprazole   - Activity: AAT  - Code Status: Full Code    Dispo: Admit to medicine. Family requesting rehab 88 YO M with PMHx of HTN, HLD, DM type II, CHF (unknown EF), PPM, prostate CA, CKD III presented to the ED with c/c of neck pain s/p recent fall    #Type III Dens fracture s/p fall  - CT cervical spine: Mildly displaced transverse fracture in the base of the dens with the fracture line extending to the left junction of the odontoid and body compatible with type III dens fracture, likely acute. No associated cervical cord compression.  - Recently evaluated by trauma and neurosurgery   - Maintain Cervical collar for 12 weeks  - Tylenol 650 Q6 for pain. Morphine PRN  - Gabapentin 300mg in AM and 600mg in PM  - Physiatry, PT, OT    #Diabetes Mellitus type II   - Follow HbA1C. Hold home oral medications  - Monitor FS. Keep between 140 - 180. Carb consistent diet  - Will start 18 units Lantus and 6 Lispro units TID + 1 CF SS    #CHF; unknown EF  - Cardiologist Dr Amor. Not in acute decompensation. Outpatient follow up  - Lasix 40 mg PO QD. Metoprolol succinate 50 mg PO QD. simvastatin 40 mg PO QHS    #CKD III - stable. Outpatient follow up  #HTN - Enalapril 20 mg PO QD. Nifedipine 60 mg PO QD  #Anxiety - Clonazepam 1 mg PO TID PRN    #Misc  - DVT Prophylaxis: Heparin   - Diet: DASH/TLC, Carb consistent diet   - GI Prophylaxis: Pantoprazole   - Activity: AAT  - Code Status: Full Code    Dispo: Admit to medicine. Family requesting rehab

## 2021-06-11 NOTE — H&P ADULT - HISTORY OF PRESENT ILLNESS
89 YO M with PMHx of HTN, HLD, DM type II, CHF (unknown EF), PPM, prostate CA, CKD III presented to the ED with c/c of neck pain s/p recent fall  Patient was recently in ED after a fall where he sustained a type III dens fracture. He was evaluated by neurosurgery and trauma surgery and was discharged with instructions to maintain [place]-J collar at all times for at least 10-12 weeks. He returns today with c/c of uncontrolled pain and inability to take care of himself due to pain.  Patient denies weakness, fevers, chills, headache, cough, shortness of breath, chest pain, palpitations, abdominal pain, nausea, vomiting, diarrhea, constipation, melena, hematochezia, dysuria, urinary frequency or urgency, numbness, tingling, recent travel or any known sick contact. 89 YO M with PMHx of HTN, HLD, DM type II, CHF (unknown EF), s/p PPM, prostate CA (in remission), CKD III presented to the ED with c/c of neck pain s/p recent fall  Patient was recently in ED after a fall which resulted in type III dens fracture. He was evaluated by neurosurgery and trauma surgery and was discharged with instructions to maintain cervical collar at all times for at least 10-12 weeks. He returns today with c/c of uncontrolled pain and inability to take care of himself due to pain.  Patient denies weakness, fevers, chills, headache, cough, shortness of breath, chest pain, palpitations, abdominal pain, nausea, vomiting, diarrhea, constipation, melena, hematochezia, dysuria, urinary frequency or urgency, numbness, tingling, recent travel or any known sick contact.

## 2021-06-11 NOTE — ED ADULT TRIAGE NOTE - ARRIVAL INFO ADDITIONAL COMMENTS
no neuro deficits in triage, pt a&ox3 & ambulatory no neuro deficits in triage, pt a&ox3 & ambulatory  cleared by critical care MD

## 2021-06-11 NOTE — PATIENT PROFILE ADULT - NSTRANSFERBELONGINGSDISPO_GEN_A_NUR
ICD-10-CM ICD-9-CM    1. Myofascial pain M79.1 729.1 ACUPUNCT W/ ELEC STIMUL 15 MIN   2. Cervical spondylosis without myelopathy M47.812 721.0 ACUPUNCT W/ ELEC STIMUL 15 MIN   3. Degeneration of cervical intervertebral disc M50.30 722.4 ACUPUNCT W/ ELEC STIMUL 15 MIN     Here for second treatment for above diagnoses. No new complaints. [facility]  OFFICE PROCEDURE PROGRESS NOTE    Chart reviewed for the following:   Maya Hankins MD, have reviewed the History, Physical and updated the Allergic reactions for [address] performed immediately prior to start of procedure:   Maya Hankins MD, have performed the following reviews on [address] [place] [address] prior to the start of the procedure:            * Patient was identified by name and date of birth   * Agreement on procedure being performed was verified  * Procedure site verified and marked as necessary  * Patient was positioned for comfort  * Consent was signed and verified    Patient consents to acupuncture after having been made aware of the following possible complications: infection (rare), bruising and bleeding into the tissues, pain and discomfort, weakness, pneumothorax, tiredness, fainting, nausea, aggravation of existing symptoms for a short time, etc.      Time: 10:45am    Date of procedure: 9/18/2018    Procedure performed by: Wade Soliman MD    Provider assisted by:  MD    Patient assisted by: self     Electroacupuncture Treatment:    Cervical myofascial treatment as follows:    Acupuncture needles placed along the bilateral bladder line using the following points: BL-10, BL-18, BL-22. Also needles placed along bilateral SI-12 or near functionally tender point of the trapezius on each side. Electric stimulation applied at 30Hz connecting BL-10 (negative) to BL-18 (positive) for 20 minutes. On the contralateral side, the current running the opposite direction.   Also bilateral SI-12 functional point (negative) to BL-22 (positive) at [facility] for 20 minutes. Patient is advised to refrain from use of cigarettes, alcohol, or illicit drug use, especially on day of treatment. Patient is advised to avoid strensuous activity and extremes of temperature.       How tolerated by patient: tolerated the procedure well with no complications with patient

## 2021-06-11 NOTE — ED ADULT TRIAGE NOTE - CHIEF COMPLAINT QUOTE
pt reports falling 2 days ago and was d/c with dx of a "broken neck"  pt returns c/o worsening neck pain despite use of acetaminophen

## 2021-06-11 NOTE — ED ADULT NURSE NOTE - NSIMPLEMENTINTERV_GEN_ALL_ED
Implemented All Fall with Harm Risk Interventions:  Philip to call system. Call bell, personal items and telephone within reach. Instruct patient to call for assistance. Room bathroom lighting operational. Non-slip footwear when patient is off stretcher. Physically safe environment: no spills, clutter or unnecessary equipment. Stretcher in lowest position, wheels locked, appropriate side rails in place. Provide visual cue, wrist band, yellow gown, etc. Monitor gait and stability. Monitor for mental status changes and reorient to person, place, and time. Review medications for side effects contributing to fall risk. Reinforce activity limits and safety measures with patient and family. Provide visual clues: red socks.

## 2021-06-11 NOTE — H&P ADULT - ATTENDING COMMENTS
88 YO M with PMH of HTN, HLD, DM2, CHF (unknown EF) s/p PPM, prostate CA (in remission), CKD3, and recent type III dens fracture s/p mechanical fall presents to the hospital with a c/o inability to care for self due to neck pain. Denies any bowel/bladder incontinence or saddle paresthesia. No focal weakness or sensory deficits. ROS negative except as stated above.     In the ED, pt given pain meds. Will be admitted for PT eval and possible rehab placement.     FMHx: Reviewed, not relevant    Physical exam shows pt in NAD. VSS, afebrile, not hypoxic on RA. A&Ox3. Non-focal neuro exam. Muscle strength/sensation intact. CTA B/L with no W/C/R. RRR, no M/G/R. ABD is soft and non-tender, normoactive BSs. LEs without swelling. No rashes. Labs and radiology as above.     Inability to care for self due to neck pain from type III dens fracture. Maintain C-collar. PRN pain meds. PT eval. Fall precautions.     Hypertensive urgency. Restart home meds. Monitor VSs.     Normocytic anemia, at baseline. Pt denies bleeding symptoms. Replace as necessary.     Hyperkalemia, hemolyzed. Repeat BMP    Diabetes mellitus with hyperglycemia. A1c. FSs. Insulin PRN.     HX of HTN, HLD, CHF (unknown EF) s/p PPM, prostate CA (in remission), and CKD3. Restart home meds, except as stated above. DVT PPX. Inform PCP of pt's admission to hospital. My note supersedes the residents note.

## 2021-06-11 NOTE — ED PROVIDER NOTE - CLINICAL SUMMARY MEDICAL DECISION MAKING FREE TEXT BOX
pt here for persistent pain, unable to be managed at home requesting admission for pain management and possible placement

## 2021-06-11 NOTE — ED PROVIDER NOTE - ATTENDING CONTRIBUTION TO CARE
89 y/o male with a PMH of CHF, DM, HTN, Pacemaker, and Prostate CA presented for eval of neck pain. pt was s/p fall 6/9 and was found to have    < from: CT Cervical Spine No Cont (06.09.21 @ 12:11) >  Mildly displaced transverse fracture in the base of the dens with the fracture lineextending to the left junction of the odontoid and body compatible with type III dens fracture, likely acute. No associated cervical cord compression.  Mild posterior subluxation of the C1 and C2.  < end of copied text >    pt was seen by trauma and nsurg and cleared for outpt management.  pt was managing pain at home with acetaminophen. pt states he could not adequately manage the pain at home and came for reassessment and pain management. no ha, n,v,numbness, weakness. no change in pain. no new trauma.     vss  gen- NAD, aaox3  card-rrr  lungs-ctab, no wheezing or rhonchi  abd-sntnd, no guarding or rebound  neuro- full str/sensation, cn ii-xii grossly intact, normal coordination  Spine- collar in place    will get basic labs, pain control, plan to admit

## 2021-06-11 NOTE — ED PROVIDER NOTE - PHYSICAL EXAMINATION
Gen: NAD, AOx3  Head: NCAT  HEENT: PERRL, oral mucosa moist, normal conjunctiva, oropharynx clear without exudate or erythema  Lung: CTAB, no respiratory distress, no wheezing, rales, rhonchi  CV: normal s1/s2, rrr, Normal perfusion, pulses 2+ throughout  Abd: soft, NTND, no CVA tenderness  Genitourinary: no pelvic tenderness  MSK: No edema, no visible deformities, full range of motion in all 4 extremities, c-collar in place   Neuro: CN II-XII grossly intact, No focal neurologic deficits, strength 5/5 BUE/BLE  Skin: No rash   Psych: normal affect

## 2021-06-11 NOTE — H&P ADULT - NSHPLABSRESULTS_GEN_ALL_CORE
VITAL SIGNS: Last 24 Hours  T(C): 35.7 (11 Jun 2021 12:32), Max: 35.7 (11 Jun 2021 12:32)  T(F): 96.3 (11 Jun 2021 12:32), Max: 96.3 (11 Jun 2021 12:32)  HR: 87 (11 Jun 2021 12:32) (87 - 87)  BP: 141/73 (11 Jun 2021 12:32) (141/73 - 141/73)  BP(mean): --  RR: 18 (11 Jun 2021 12:32) (18 - 18)  SpO2: 97% (11 Jun 2021 12:32) (97% - 97%)    LABS:                        12.5   6.91  )-----------( 206      ( 11 Jun 2021 13:33 )             38.4     06-11    134<L>  |  102  |  52<H>  ----------------------------<  363<H>  5.3<H>   |  19  |  2.0<H>    Ca    8.7      11 Jun 2021 13:33    TPro  7.0  /  Alb  3.9  /  TBili  0.3  /  DBili  x   /  AST  18  /  ALT  9   /  AlkPhos  132<H>  06-11    RADIOLOGY:    < from: CT Cervical Spine No Cont (06.09.21 @ 12:11) >  IMPRESSION:  CT HEAD:  No acute intracranial findings.    CT CERVICAL SPINE:  Mildly displaced transverse fracture in the base of the dens with the fracture lineextending to the left junction of the odontoid and body compatible with type III dens fracture, likely acute. No associated cervical cord compression.    Mild posterior subluxation of the C1 and C2.    Multilevel degenerative changes with diffuse idiopathic skeletal hyperostosis pattern osteophytosis.  < end of copied text >

## 2021-06-11 NOTE — H&P ADULT - NSHPPHYSICALEXAM_GEN_ALL_CORE
CONSTITUTIONAL: No acute distress, well-developed, well-groomed, AAOx3  HEAD: Atraumatic, normocephalic  EYES: EOM intact, PERRLA, conjunctiva and sclera clear  ENT: Supple, no masses, no thyromegaly, no bruits, no JVD; moist mucous membranes  PULMONARY: Clear to auscultation bilaterally; no wheezes, rales, or rhonchi  CARDIOVASCULAR: Regular rate and rhythm; no murmurs, rubs, or gallops  GASTROINTESTINAL: Soft, non-tender, non-distended; bowel sounds present  MUSCULOSKELETAL: C-collar in place   NEUROLOGY: non-focal  SKIN: No rashes or lesions; warm and dry

## 2021-06-11 NOTE — ED PROVIDER NOTE - OBJECTIVE STATEMENT
88 yo male with a pmh of HTN, DM, CHF, pacer, and prostate CA presents for neck pain. pt was recently in ED after a fall where he obtain a type III dens fracture and returns today due to uncontrolled pain. pt denies any new fall/numbness/tingling. denies any other symptoms including fevers, chill, headache, recent illness/travel, cough, abdominal pain, chest pain, or SOB.

## 2021-06-11 NOTE — PATIENT PROFILE ADULT - FLU SEASON?
Writer received a call from patient's pharmacy [facility] after discharge, regarding the inability for them to compound the liquid vanco and the expense related to the alternative capsule vano.  MD notified and new orders where sent to [facility] for the patient. Patient decided to fill the capsule order.    No

## 2021-06-12 LAB
A1C WITH ESTIMATED AVERAGE GLUCOSE RESULT: 10.4 % — HIGH (ref 4–5.6)
ALBUMIN SERPL ELPH-MCNC: 3.8 G/DL — SIGNIFICANT CHANGE UP (ref 3.5–5.2)
ALP SERPL-CCNC: 118 U/L — HIGH (ref 30–115)
ALT FLD-CCNC: 9 U/L — SIGNIFICANT CHANGE UP (ref 0–41)
ANION GAP SERPL CALC-SCNC: 10 MMOL/L — SIGNIFICANT CHANGE UP (ref 7–14)
AST SERPL-CCNC: 13 U/L — SIGNIFICANT CHANGE UP (ref 0–41)
BASOPHILS # BLD AUTO: 0.05 K/UL — SIGNIFICANT CHANGE UP (ref 0–0.2)
BASOPHILS NFR BLD AUTO: 1.1 % — HIGH (ref 0–1)
BILIRUB SERPL-MCNC: 0.3 MG/DL — SIGNIFICANT CHANGE UP (ref 0.2–1.2)
BUN SERPL-MCNC: 46 MG/DL — HIGH (ref 10–20)
CALCIUM SERPL-MCNC: 8.4 MG/DL — LOW (ref 8.5–10.1)
CHLORIDE SERPL-SCNC: 104 MMOL/L — SIGNIFICANT CHANGE UP (ref 98–110)
CHOLEST SERPL-MCNC: 154 MG/DL — SIGNIFICANT CHANGE UP
CO2 SERPL-SCNC: 24 MMOL/L — SIGNIFICANT CHANGE UP (ref 17–32)
COVID-19 SPIKE DOMAIN AB INTERP: POSITIVE
COVID-19 SPIKE DOMAIN ANTIBODY RESULT: >250 U/ML — HIGH
CREAT SERPL-MCNC: 1.7 MG/DL — HIGH (ref 0.7–1.5)
EOSINOPHIL # BLD AUTO: 0.31 K/UL — SIGNIFICANT CHANGE UP (ref 0–0.7)
EOSINOPHIL NFR BLD AUTO: 6.5 % — SIGNIFICANT CHANGE UP (ref 0–8)
ESTIMATED AVERAGE GLUCOSE: 252 MG/DL — HIGH (ref 68–114)
GLUCOSE BLDC GLUCOMTR-MCNC: 109 MG/DL — HIGH (ref 70–99)
GLUCOSE BLDC GLUCOMTR-MCNC: 156 MG/DL — HIGH (ref 70–99)
GLUCOSE BLDC GLUCOMTR-MCNC: 191 MG/DL — HIGH (ref 70–99)
GLUCOSE BLDC GLUCOMTR-MCNC: 241 MG/DL — HIGH (ref 70–99)
GLUCOSE SERPL-MCNC: 146 MG/DL — HIGH (ref 70–99)
HCT VFR BLD CALC: 35.9 % — LOW (ref 42–52)
HDLC SERPL-MCNC: 28 MG/DL — LOW
HGB BLD-MCNC: 11.7 G/DL — LOW (ref 14–18)
IMM GRANULOCYTES NFR BLD AUTO: 0.8 % — HIGH (ref 0.1–0.3)
LIPID PNL WITH DIRECT LDL SERPL: 85 MG/DL — SIGNIFICANT CHANGE UP
LYMPHOCYTES # BLD AUTO: 1.29 K/UL — SIGNIFICANT CHANGE UP (ref 1.2–3.4)
LYMPHOCYTES # BLD AUTO: 27.2 % — SIGNIFICANT CHANGE UP (ref 20.5–51.1)
MAGNESIUM SERPL-MCNC: 2 MG/DL — SIGNIFICANT CHANGE UP (ref 1.8–2.4)
MCHC RBC-ENTMCNC: 28.2 PG — SIGNIFICANT CHANGE UP (ref 27–31)
MCHC RBC-ENTMCNC: 32.6 G/DL — SIGNIFICANT CHANGE UP (ref 32–37)
MCV RBC AUTO: 86.5 FL — SIGNIFICANT CHANGE UP (ref 80–94)
MONOCYTES # BLD AUTO: 0.41 K/UL — SIGNIFICANT CHANGE UP (ref 0.1–0.6)
MONOCYTES NFR BLD AUTO: 8.6 % — SIGNIFICANT CHANGE UP (ref 1.7–9.3)
NEUTROPHILS # BLD AUTO: 2.64 K/UL — SIGNIFICANT CHANGE UP (ref 1.4–6.5)
NEUTROPHILS NFR BLD AUTO: 55.8 % — SIGNIFICANT CHANGE UP (ref 42.2–75.2)
NON HDL CHOLESTEROL: 126 MG/DL — SIGNIFICANT CHANGE UP
NRBC # BLD: 0 /100 WBCS — SIGNIFICANT CHANGE UP (ref 0–0)
PHOSPHATE SERPL-MCNC: 4.1 MG/DL — SIGNIFICANT CHANGE UP (ref 2.1–4.9)
PLATELET # BLD AUTO: 205 K/UL — SIGNIFICANT CHANGE UP (ref 130–400)
POTASSIUM SERPL-MCNC: 4.7 MMOL/L — SIGNIFICANT CHANGE UP (ref 3.5–5)
POTASSIUM SERPL-SCNC: 4.7 MMOL/L — SIGNIFICANT CHANGE UP (ref 3.5–5)
PROT SERPL-MCNC: 6.3 G/DL — SIGNIFICANT CHANGE UP (ref 6–8)
RBC # BLD: 4.15 M/UL — LOW (ref 4.7–6.1)
RBC # FLD: 15.2 % — HIGH (ref 11.5–14.5)
SARS-COV-2 IGG+IGM SERPL QL IA: >250 U/ML — HIGH
SARS-COV-2 IGG+IGM SERPL QL IA: POSITIVE
SODIUM SERPL-SCNC: 138 MMOL/L — SIGNIFICANT CHANGE UP (ref 135–146)
TRIGL SERPL-MCNC: 187 MG/DL — HIGH
WBC # BLD: 4.74 K/UL — LOW (ref 4.8–10.8)
WBC # FLD AUTO: 4.74 K/UL — LOW (ref 4.8–10.8)

## 2021-06-12 PROCEDURE — 99233 SBSQ HOSP IP/OBS HIGH 50: CPT

## 2021-06-12 RX ADMIN — Medication 650 MILLIGRAM(S): at 11:16

## 2021-06-12 RX ADMIN — Medication 6 UNIT(S): at 11:15

## 2021-06-12 RX ADMIN — Medication 20 MILLIGRAM(S): at 06:38

## 2021-06-12 RX ADMIN — Medication 650 MILLIGRAM(S): at 21:19

## 2021-06-12 RX ADMIN — HEPARIN SODIUM 5000 UNIT(S): 5000 INJECTION INTRAVENOUS; SUBCUTANEOUS at 06:38

## 2021-06-12 RX ADMIN — Medication 6 UNIT(S): at 08:12

## 2021-06-12 RX ADMIN — Medication 1: at 17:14

## 2021-06-12 RX ADMIN — Medication 40 MILLIGRAM(S): at 06:38

## 2021-06-12 RX ADMIN — Medication 1: at 08:11

## 2021-06-12 RX ADMIN — Medication 6 UNIT(S): at 17:15

## 2021-06-12 RX ADMIN — SIMVASTATIN 40 MILLIGRAM(S): 20 TABLET, FILM COATED ORAL at 21:16

## 2021-06-12 RX ADMIN — PANTOPRAZOLE SODIUM 40 MILLIGRAM(S): 20 TABLET, DELAYED RELEASE ORAL at 06:38

## 2021-06-12 RX ADMIN — Medication 650 MILLIGRAM(S): at 00:18

## 2021-06-12 RX ADMIN — Medication 60 MILLIGRAM(S): at 06:38

## 2021-06-12 RX ADMIN — Medication 50 MILLIGRAM(S): at 06:38

## 2021-06-12 RX ADMIN — Medication 2: at 11:15

## 2021-06-12 RX ADMIN — INSULIN GLARGINE 18 UNIT(S): 100 INJECTION, SOLUTION SUBCUTANEOUS at 21:16

## 2021-06-12 RX ADMIN — GABAPENTIN 600 MILLIGRAM(S): 400 CAPSULE ORAL at 21:12

## 2021-06-12 RX ADMIN — HEPARIN SODIUM 5000 UNIT(S): 5000 INJECTION INTRAVENOUS; SUBCUTANEOUS at 17:15

## 2021-06-12 RX ADMIN — Medication 650 MILLIGRAM(S): at 17:15

## 2021-06-12 RX ADMIN — Medication 650 MILLIGRAM(S): at 06:38

## 2021-06-12 RX ADMIN — GABAPENTIN 300 MILLIGRAM(S): 400 CAPSULE ORAL at 11:15

## 2021-06-12 RX ADMIN — Medication 650 MILLIGRAM(S): at 23:43

## 2021-06-12 NOTE — PROGRESS NOTE ADULT - ASSESSMENT
88 YO M with PMHx of HTN, HLD, DM type II, CHF (unknown EF), PPM, prostate CA, CKD III presented to the ED with c/c of neck pain s/p recent fall    #Type III Dens fracture s/p fall  - CT cervical spine: Mildly displaced transverse fracture in the base of the dens with the fracture line extending to the left junction of the odontoid and body compatible with type III dens fracture, likely acute. No associated cervical cord compression.  - Recently evaluated by trauma and neurosurgery  and no intervention to be done   will call neurosx today ro reassess  - Maintain Cervical collar for 12 weeks  - Tylenol 650 Q6 for pain. Morphine PRN  - Gabapentin 300mg in AM and 600mg in PM  - Physiatry, PT, OT    #Diabetes Mellitus type II   - Follow HbA1C. Hold home oral medications  - Monitor FS. Keep between 140 - 180. Carb consistent diet      #CHF; unknown EF  - Cardiologist Dr Amor. Not in acute decompensation. Outpatient follow up  - Lasix 40 mg PO QD. Metoprolol succinate 50 mg PO QD. simvastatin 40 mg PO QHS    #CKD III - stable. Outpatient follow up  #HTN - Enalapril 20 mg PO QD. Nifedipine 60 mg PO QD  #Anxiety - Clonazepam 1 mg PO TID PRN    #Misc  - DVT Prophylaxis: Heparin   - Diet: DASH/TLC, Carb consistent diet   - GI Prophylaxis: Pantoprazole   - Activity: AAT  - Code Status: Full Code    Dispo: Admit to medicine. Family requesting rehab

## 2021-06-12 NOTE — PROGRESS NOTE ADULT - SUBJECTIVE AND OBJECTIVE BOX
SUBJECTIVE:    Patient is a 89y old Male who presents with a chief complaint of Neck pain s/p recent fall (11 Jun 2021 16:21)    Currently admitted to medicine with the primary diagnosis of Dens fracture       Today is hospital day 1d. This morning he is resting comfortably in bed and reports no new issues or overnight events.     PAST MEDICAL & SURGICAL HISTORY  CHF (congestive heart failure)    DM (diabetes mellitus)    HTN (hypertension)    Prostate CA  in remission    Pacemaker    H/O total knee replacement, right      SOCIAL HISTORY:  Negative for smoking/alcohol/drug use.     ALLERGIES:  No Known Allergies    MEDICATIONS:  STANDING MEDICATIONS  acetaminophen   Tablet .. 650 milliGRAM(s) Oral every 6 hours  dextrose 40% Gel 15 Gram(s) Oral once  dextrose 5%. 1000 milliLiter(s) IV Continuous <Continuous>  dextrose 5%. 1000 milliLiter(s) IV Continuous <Continuous>  dextrose 50% Injectable 25 Gram(s) IV Push once  dextrose 50% Injectable 12.5 Gram(s) IV Push once  dextrose 50% Injectable 25 Gram(s) IV Push once  enalapril 20 milliGRAM(s) Oral daily  furosemide    Tablet 40 milliGRAM(s) Oral daily  gabapentin 300 milliGRAM(s) Oral daily  gabapentin 600 milliGRAM(s) Oral at bedtime  glucagon  Injectable 1 milliGRAM(s) IntraMuscular once  heparin   Injectable 5000 Unit(s) SubCutaneous every 12 hours  insulin glargine Injectable (LANTUS) 18 Unit(s) SubCutaneous at bedtime  insulin lispro (ADMELOG) corrective regimen sliding scale   SubCutaneous three times a day before meals  insulin lispro Injectable (ADMELOG) 6 Unit(s) SubCutaneous three times a day before meals  metoprolol succinate ER 50 milliGRAM(s) Oral daily  NIFEdipine XL 60 milliGRAM(s) Oral daily  pantoprazole    Tablet 40 milliGRAM(s) Oral before breakfast  simvastatin 40 milliGRAM(s) Oral at bedtime    PRN MEDICATIONS  clonazePAM  Tablet 1 milliGRAM(s) Oral three times a day PRN  morphine  - Injectable 2 milliGRAM(s) IV Push every 4 hours PRN    VITALS:   T(F): 96.4  HR: 60  BP: 188/78  RR: 18  SpO2: 97%    LABS:                        12.5   6.91  )-----------( 206      ( 11 Jun 2021 13:33 )             38.4     06-11    134<L>  |  102  |  52<H>  ----------------------------<  363<H>  5.3<H>   |  19  |  2.0<H>    Ca    8.7      11 Jun 2021 13:33    TPro  7.0  /  Alb  3.9  /  TBili  0.3  /  DBili  x   /  AST  18  /  ALT  9   /  AlkPhos  132<H>  06-11                  RADIOLOGY:    PHYSICAL EXAM:  GEN: No acute distress  LUNGS: Clear to auscultation bilaterally   HEART: S1/S2 present. RRR.   ABD: Soft, non-tender, non-distended. Bowel sounds present  EXT: NC/NC/NE/2+PP/AG/Skin Intact.   NEURO: AAOX3    Intravenous access:   NG tube:   Wen Catheter:

## 2021-06-12 NOTE — PHYSICAL THERAPY INITIAL EVALUATION ADULT - PERTINENT HX OF CURRENT PROBLEM, REHAB EVAL
89 YO M with PMHx of HTN, HLD, DM type II, CHF (unknown EF), s/p PPM, prostate CA (in remission), CKD III presented to the ED with c/c of neck pain s/p recent fallPatient was recently in ED after a fall which resulted in type III dens fracture. He was evaluated by neurosurgery and trauma surgery and was discharged with instructions to maintain cervical collar at all times for at least 10-12 weeks

## 2021-06-12 NOTE — PHYSICAL THERAPY INITIAL EVALUATION ADULT - ADDITIONAL COMMENTS
no MARTY Pt lives alone. Pt has son and dtr however they are unable to provide adequate support for pt to minimize falls risk.

## 2021-06-12 NOTE — PHYSICAL THERAPY INITIAL EVALUATION ADULT - GENERAL OBSERVATIONS, REHAB EVAL
13:40-14:05. Pt encountered semifowlers in bed in NAD. +pts dtr & son present at b/s. +C-Collar in place. Pt agreeable to PT IE. PT IE Complete. Pt educated on seated/supine therex. Pt educated to continue ambulation with [facility] staff. PT to f/u.

## 2021-06-12 NOTE — CONSULT NOTE ADULT - ASSESSMENT
IMPRESSION: Rehab of gait abnormality, dens FX    PRECAUTIONS: [x  ] Cardiac  [  ] Respiratory  [  ] Seizures [  ] Contact Isolation  [  ] Droplet Isolation  [ x ] Other: hard cervical collar    Weight Bearing Status:     RECOMMENDATION: Psychiatry consult is suggested when at the SNF; he is 89 yo and reports his primary care doctor has prescribed clonopin 1 mg po tid for insomnia.     Out of Bed to Chair     DVT/Decubiti Prophylaxis    REHAB PLAN:     [  x ] Bedside P/T 3-5 times a week   [   ]   Bedside O/T  2-3 times a week             [   ] No Rehab Therapy Indicated                   [   ]  Speech Therapy   Conditioning/ROM                                    ADL  Bed Mobility                                               Conditioning/ROM  Transfers                                                     Bed Mobility  Sitting /Standing Balance                         Transfers                                        Gait Training                                               Sitting/Standing Balance  Stair Training [   ]Applicable                    Home equipment Eval                                                                        Splinting  [   ] Only      GOALS:   ADL   [  x ]   Independent                    Transfers  [ x  ] Independent                          Ambulation  [ x  ] Independent     [    ] With device                            [   ]  CG                                                         [   ]  CG                                                                  [   ] CG                            [    ] Min A                                                   [   ] Min A                                                              [   ] Min  A          DISCHARGE PLAN:   [   ]  Good candidate for Intensive Rehabilitation/Hospital based-[facility]                                             Will tolerate 3hrs Intensive Rehab Daily                                       [  xx  ]  Short Term Rehab in Skilled Nursing Facility                                       [    ]  Home with Outpatient or VN services                                         [    ]  Possible Candidate for Intensive Hospital based Rehab

## 2021-06-12 NOTE — PHYSICAL THERAPY INITIAL EVALUATION ADULT - DISCHARGE DISPOSITION, PT EVAL
home w/ home PT Pt will benefit from continued PT at rehab facility in order to improve functional status and minimize falls risk at home./rehabilitation facility

## 2021-06-12 NOTE — CONSULT NOTE ADULT - SUBJECTIVE AND OBJECTIVE BOX
HPI:  88 YO M with PMHx of HTN, HLD, DM type II, CHF (unknown EF), s/p PPM, prostate CA (in remission), CKD III presented to the ED with c/c of neck pain s/p recent fall  Patient was recently in ED after a fall which resulted in type III dens fracture. He was evaluated by neurosurgery and trauma surgery and was discharged with instructions to maintain cervical collar at all times for at least 10-12 weeks. He returns today with c/c of uncontrolled pain and inability to take care of himself due to pain.  Patient denies weakness, fevers, chills, headache, cough, shortness of breath, chest pain, palpitations, abdominal pain, nausea, vomiting, diarrhea, constipation, melena, hematochezia, dysuria, urinary frequency or urgency, numbness, tingling, recent travel or any known sick contact. (11 Jun 2021 16:21)      PAST MEDICAL & SURGICAL HISTORY:  CHF (congestive heart failure)    DM (diabetes mellitus)    HTN (hypertension)    Prostate CA  in remission    Pacemaker    H/O total knee replacement, right        Hospital Course:  He has cervical pain. He is in a heard collar.    TODAY'S SUBJECTIVE & REVIEW OF SYMPTOMS:     Constitutional WNL   Cardio CHF, PPM   Resp WNL   GI WNL  Heme WNL  Endo WNL  Skin WNL  MSK WNL  Neuro WNL  Cognitive WNL  Psych reports taking clonopin 1 mg po tid prescirbed by a primary care doctor for insomnia      MEDICATIONS  (STANDING):  acetaminophen   Tablet .. 650 milliGRAM(s) Oral every 6 hours  dextrose 40% Gel 15 Gram(s) Oral once  dextrose 5%. 1000 milliLiter(s) (50 mL/Hr) IV Continuous <Continuous>  dextrose 5%. 1000 milliLiter(s) (100 mL/Hr) IV Continuous <Continuous>  dextrose 50% Injectable 25 Gram(s) IV Push once  dextrose 50% Injectable 12.5 Gram(s) IV Push once  dextrose 50% Injectable 25 Gram(s) IV Push once  enalapril 20 milliGRAM(s) Oral daily  furosemide    Tablet 40 milliGRAM(s) Oral daily  gabapentin 300 milliGRAM(s) Oral daily  gabapentin 600 milliGRAM(s) Oral at bedtime  glucagon  Injectable 1 milliGRAM(s) IntraMuscular once  heparin   Injectable 5000 Unit(s) SubCutaneous every 12 hours  insulin glargine Injectable (LANTUS) 18 Unit(s) SubCutaneous at bedtime  insulin lispro (ADMELOG) corrective regimen sliding scale   SubCutaneous three times a day before meals  insulin lispro Injectable (ADMELOG) 6 Unit(s) SubCutaneous three times a day before meals  metoprolol succinate ER 50 milliGRAM(s) Oral daily  NIFEdipine XL 60 milliGRAM(s) Oral daily  pantoprazole    Tablet 40 milliGRAM(s) Oral before breakfast  simvastatin 40 milliGRAM(s) Oral at bedtime    MEDICATIONS  (PRN):  clonazePAM  Tablet 1 milliGRAM(s) Oral three times a day PRN Anxiety  morphine  - Injectable 2 milliGRAM(s) IV Push every 4 hours PRN Severe Pain (7 - 10)      FAMILY HISTORY:      Allergies    No Known Allergies    Intolerances        SOCIAL HISTORY:    [  ] Etoh  [  ] Smoking  [  ] Substance abuse     Home Environment:  [ x ] Home Alone  [  ] Lives with Family  [  ] Home Health Aid    Dwelling:  [ x ] Apartment  [  ] Private House  [  ] Adult Home  [  ] Skilled Nursing Facility      [  ] Short Term  [  ] Long Term  [  ] Stairs       Elevator [  ]    FUNCTIONAL STATUS PTA: (Check all that apply)  Ambulation: [ x  ]Independent    [  ] Dependent     [  ] Non-Ambulatory  Assistive Device: [  ] SA Cane  [  ]  Q Cane  [  ] Walker  [  ]  Wheelchair  ADL : [x  ] Independent  [  ]  Dependent       Vital Signs Last 24 Hrs  T(C): 35.8 (12 Jun 2021 08:11), Max: 36.4 (11 Jun 2021 17:25)  T(F): 96.4 (12 Jun 2021 08:11), Max: 97.6 (11 Jun 2021 17:25)  HR: 60 (12 Jun 2021 08:11) (60 - 87)  BP: 188/78 (12 Jun 2021 08:11) (137/59 - 188/78)  BP(mean): 85 (11 Jun 2021 20:48) (85 - 85)  RR: 18 (12 Jun 2021 08:11) (18 - 18)  SpO2: 97% (12 Jun 2021 00:00) (97% - 97%)      PHYSICAL EXAM: Alert & Oriented X3  GENERAL: NAD, well-groomed, well-developed  HEAD:  Atraumatic, Normocephalic  EYES: EOMI, PERRLA, conjunctiva and sclera clear  NECK: Supple, No JVD, Normal thyroid  CHEST/LUNG: Clear bilaterally; No rales, rhonchi, wheezing, or rubs  HEART: Regular rate and rhythm; No murmurs, rubs, or gallops  ABDOMEN: Soft, Nontender, Nondistended; Bowel sounds present  EXTREMITIES:  2+ Peripheral Pulses, No clubbing, cyanosis, or edema    NERVOUS SYSTEM:  Cranial Nerves 2-12 intact [  ] Abnormal  [  ]  ROM: WFL all extremities [  ]  Abnormal [  ]  Motor Strength: WFL all extremities  [  ]  Abnormal [  ]  Sensation: intact to light touch [  ] Abnormal [  ]  Reflexes: Symmetric [  ]  Abnormal [  ]    FUNCTIONAL STATUS:  Bed Mobility: Independent [  ]  Supervision [  ]  Needs Assistance [  ]  N/A [  ]  Transfers: Independent [  ]  Supervision [  ]  Needs Assistance [  ]  N/A [  ]   Ambulation: Independent [  ]  Supervision [  ]  Needs Assistance [  ]  N/A [  ]  ADL: Independent [  ] Requires Assistance [  ] N/A [  ]      LABS:                        11.7   4.74  )-----------( 205      ( 12 Jun 2021 07:27 )             35.9     06-12    138  |  104  |  46<H>  ----------------------------<  146<H>  4.7   |  24  |  1.7<H>    Ca    8.4<L>      12 Jun 2021 07:27  Phos  4.1     06-12  Mg     2.0     06-12    TPro  6.3  /  Alb  3.8  /  TBili  0.3  /  DBili  x   /  AST  13  /  ALT  9   /  AlkPhos  118<H>  06-12          RADIOLOGY & ADDITIONAL STUDIES:    Assesment:

## 2021-06-13 LAB
ANION GAP SERPL CALC-SCNC: 12 MMOL/L — SIGNIFICANT CHANGE UP (ref 7–14)
BUN SERPL-MCNC: 42 MG/DL — HIGH (ref 10–20)
CALCIUM SERPL-MCNC: 8.6 MG/DL — SIGNIFICANT CHANGE UP (ref 8.5–10.1)
CHLORIDE SERPL-SCNC: 106 MMOL/L — SIGNIFICANT CHANGE UP (ref 98–110)
CO2 SERPL-SCNC: 20 MMOL/L — SIGNIFICANT CHANGE UP (ref 17–32)
CREAT SERPL-MCNC: 1.7 MG/DL — HIGH (ref 0.7–1.5)
GLUCOSE BLDC GLUCOMTR-MCNC: 189 MG/DL — HIGH (ref 70–99)
GLUCOSE BLDC GLUCOMTR-MCNC: 202 MG/DL — HIGH (ref 70–99)
GLUCOSE BLDC GLUCOMTR-MCNC: 228 MG/DL — HIGH (ref 70–99)
GLUCOSE BLDC GLUCOMTR-MCNC: 320 MG/DL — HIGH (ref 70–99)
GLUCOSE SERPL-MCNC: 136 MG/DL — HIGH (ref 70–99)
MAGNESIUM SERPL-MCNC: 1.8 MG/DL — SIGNIFICANT CHANGE UP (ref 1.8–2.4)
POTASSIUM SERPL-MCNC: 4.5 MMOL/L — SIGNIFICANT CHANGE UP (ref 3.5–5)
POTASSIUM SERPL-SCNC: 4.5 MMOL/L — SIGNIFICANT CHANGE UP (ref 3.5–5)
SODIUM SERPL-SCNC: 138 MMOL/L — SIGNIFICANT CHANGE UP (ref 135–146)

## 2021-06-13 PROCEDURE — 99233 SBSQ HOSP IP/OBS HIGH 50: CPT

## 2021-06-13 RX ORDER — CARVEDILOL PHOSPHATE 80 MG/1
25 CAPSULE, EXTENDED RELEASE ORAL EVERY 12 HOURS
Refills: 0 | Status: DISCONTINUED | OUTPATIENT
Start: 2021-06-13 | End: 2021-06-15

## 2021-06-13 RX ORDER — CARVEDILOL PHOSPHATE 80 MG/1
12.5 CAPSULE, EXTENDED RELEASE ORAL EVERY 12 HOURS
Refills: 0 | Status: DISCONTINUED | OUTPATIENT
Start: 2021-06-13 | End: 2021-06-13

## 2021-06-13 RX ADMIN — Medication 1: at 17:08

## 2021-06-13 RX ADMIN — Medication 40 MILLIGRAM(S): at 05:51

## 2021-06-13 RX ADMIN — CARVEDILOL PHOSPHATE 12.5 MILLIGRAM(S): 80 CAPSULE, EXTENDED RELEASE ORAL at 10:51

## 2021-06-13 RX ADMIN — MORPHINE SULFATE 2 MILLIGRAM(S): 50 CAPSULE, EXTENDED RELEASE ORAL at 23:09

## 2021-06-13 RX ADMIN — Medication 6 UNIT(S): at 10:49

## 2021-06-13 RX ADMIN — Medication 6 UNIT(S): at 08:09

## 2021-06-13 RX ADMIN — INSULIN GLARGINE 18 UNIT(S): 100 INJECTION, SOLUTION SUBCUTANEOUS at 21:26

## 2021-06-13 RX ADMIN — Medication 4: at 10:50

## 2021-06-13 RX ADMIN — MORPHINE SULFATE 2 MILLIGRAM(S): 50 CAPSULE, EXTENDED RELEASE ORAL at 21:25

## 2021-06-13 RX ADMIN — Medication 2: at 08:09

## 2021-06-13 RX ADMIN — Medication 60 MILLIGRAM(S): at 05:51

## 2021-06-13 RX ADMIN — GABAPENTIN 300 MILLIGRAM(S): 400 CAPSULE ORAL at 11:14

## 2021-06-13 RX ADMIN — Medication 20 MILLIGRAM(S): at 05:51

## 2021-06-13 RX ADMIN — Medication 50 MILLIGRAM(S): at 05:51

## 2021-06-13 RX ADMIN — Medication 650 MILLIGRAM(S): at 23:58

## 2021-06-13 RX ADMIN — Medication 650 MILLIGRAM(S): at 17:09

## 2021-06-13 RX ADMIN — GABAPENTIN 600 MILLIGRAM(S): 400 CAPSULE ORAL at 21:25

## 2021-06-13 RX ADMIN — Medication 6 UNIT(S): at 17:08

## 2021-06-13 RX ADMIN — HEPARIN SODIUM 5000 UNIT(S): 5000 INJECTION INTRAVENOUS; SUBCUTANEOUS at 05:51

## 2021-06-13 RX ADMIN — CARVEDILOL PHOSPHATE 25 MILLIGRAM(S): 80 CAPSULE, EXTENDED RELEASE ORAL at 17:11

## 2021-06-13 RX ADMIN — SIMVASTATIN 40 MILLIGRAM(S): 20 TABLET, FILM COATED ORAL at 21:25

## 2021-06-13 RX ADMIN — HEPARIN SODIUM 5000 UNIT(S): 5000 INJECTION INTRAVENOUS; SUBCUTANEOUS at 17:10

## 2021-06-13 RX ADMIN — PANTOPRAZOLE SODIUM 40 MILLIGRAM(S): 20 TABLET, DELAYED RELEASE ORAL at 08:10

## 2021-06-13 NOTE — PROGRESS NOTE ADULT - ASSESSMENT
89 YO M with PMH of HTN, HLD, DM2, CHF (unknown EF) s/p PPM, prostate CA (in remission), CKD3, and recent type III dens fracture s/p mechanical fall presents to the hospital with a c/o inability to care for self due to neck pain. Denies any bowel/bladder incontinence or saddle paresthesia. No focal weakness or sensory deficits. ROS negative except as stated above.     In the ED, pt given pain meds. Will be admitted for PT eval and possible rehab placement.       #Inability to care for self due to neck pain from type III dens fracture. Maintain C-collar. PRN pain meds. PT eval. Fall precautions.  gabapentin dose is adjusted    #Hypertensive urgency.  -- on nifedipine XL 60mg and enalapril 20mg along with lasix 40mg.-- metoprolol changed to carvedilol 25mg q12h    #Normocytic anemia, hb is stable    # Diabetes mellitus with hyperglycemia. A1c  10.4.  Insulin glargine 18 and lispro 8units     # chr diastolic CHF (unknown EF) s/p PPM-- on po lasix   #prostate CA (in remission)  patient will need SNF placement.

## 2021-06-13 NOTE — PROGRESS NOTE ADULT - SUBJECTIVE AND OBJECTIVE BOX
SUBJECTIVE:    Patient is a 89y old Male who presents with a chief complaint of Neck pain s/p recent fall (12 Jun 2021 11:34)    Currently admitted to medicine with the primary diagnosis of Dens fracture       Today is hospital day 2d.     PAST MEDICAL & SURGICAL HISTORY  CHF (congestive heart failure)    DM (diabetes mellitus)    HTN (hypertension)    Prostate CA  in remission    Pacemaker    H/O total knee replacement, right      ALLERGIES:  No Known Allergies    MEDICATIONS:  STANDING MEDICATIONS  acetaminophen   Tablet .. 650 milliGRAM(s) Oral every 6 hours  carvedilol 12.5 milliGRAM(s) Oral every 12 hours  dextrose 40% Gel 15 Gram(s) Oral once  dextrose 5%. 1000 milliLiter(s) IV Continuous <Continuous>  dextrose 5%. 1000 milliLiter(s) IV Continuous <Continuous>  dextrose 50% Injectable 25 Gram(s) IV Push once  dextrose 50% Injectable 12.5 Gram(s) IV Push once  dextrose 50% Injectable 25 Gram(s) IV Push once  enalapril 20 milliGRAM(s) Oral daily  furosemide    Tablet 40 milliGRAM(s) Oral daily  gabapentin 300 milliGRAM(s) Oral daily  gabapentin 600 milliGRAM(s) Oral at bedtime  glucagon  Injectable 1 milliGRAM(s) IntraMuscular once  heparin   Injectable 5000 Unit(s) SubCutaneous every 12 hours  insulin glargine Injectable (LANTUS) 18 Unit(s) SubCutaneous at bedtime  insulin lispro (ADMELOG) corrective regimen sliding scale   SubCutaneous three times a day before meals  insulin lispro Injectable (ADMELOG) 6 Unit(s) SubCutaneous three times a day before meals  NIFEdipine XL 60 milliGRAM(s) Oral daily  pantoprazole    Tablet 40 milliGRAM(s) Oral before breakfast  simvastatin 40 milliGRAM(s) Oral at bedtime    PRN MEDICATIONS  clonazePAM  Tablet 1 milliGRAM(s) Oral three times a day PRN  morphine  - Injectable 2 milliGRAM(s) IV Push every 4 hours PRN    VITALS:   T(F): 98.1  HR: 60  BP: 188/79  RR: 18  SpO2: 96%    LABS:                        11.7   4.74  )-----------( 205      ( 12 Jun 2021 07:27 )             35.9     06-13    138  |  106  |  42<H>  ----------------------------<  136<H>  4.5   |  20  |  1.7<H>    Ca    8.6      13 Jun 2021 05:33  Phos  4.1     06-12  Mg     1.8     06-13    TPro  6.3  /  Alb  3.8  /  TBili  0.3  /  DBili  x   /  AST  13  /  ALT  9   /  AlkPhos  118<H>  06-12                  RADIOLOGY:    PHYSICAL EXAM:  GEN: No acute distress, neck in collar  LUNGS: Clear to auscultation bilaterally   HEART: S1/S2 present. RRR.   ABD/ GI: Soft, non-tender, non-distended. Bowel sounds present  EXT: NC/NC/NE/2+PP/AG  NEURO: AAOX3

## 2021-06-14 LAB
ALBUMIN SERPL ELPH-MCNC: 3.6 G/DL — SIGNIFICANT CHANGE UP (ref 3.5–5.2)
ALP SERPL-CCNC: 104 U/L — SIGNIFICANT CHANGE UP (ref 30–115)
ALT FLD-CCNC: 12 U/L — SIGNIFICANT CHANGE UP (ref 0–41)
ANION GAP SERPL CALC-SCNC: 11 MMOL/L — SIGNIFICANT CHANGE UP (ref 7–14)
AST SERPL-CCNC: 17 U/L — SIGNIFICANT CHANGE UP (ref 0–41)
BILIRUB SERPL-MCNC: 0.2 MG/DL — SIGNIFICANT CHANGE UP (ref 0.2–1.2)
BUN SERPL-MCNC: 39 MG/DL — HIGH (ref 10–20)
CALCIUM SERPL-MCNC: 8.3 MG/DL — LOW (ref 8.5–10.1)
CHLORIDE SERPL-SCNC: 108 MMOL/L — SIGNIFICANT CHANGE UP (ref 98–110)
CO2 SERPL-SCNC: 22 MMOL/L — SIGNIFICANT CHANGE UP (ref 17–32)
CREAT SERPL-MCNC: 1.6 MG/DL — HIGH (ref 0.7–1.5)
GLUCOSE BLDC GLUCOMTR-MCNC: 166 MG/DL — HIGH (ref 70–99)
GLUCOSE BLDC GLUCOMTR-MCNC: 182 MG/DL — HIGH (ref 70–99)
GLUCOSE BLDC GLUCOMTR-MCNC: 183 MG/DL — HIGH (ref 70–99)
GLUCOSE BLDC GLUCOMTR-MCNC: 243 MG/DL — HIGH (ref 70–99)
GLUCOSE SERPL-MCNC: 131 MG/DL — HIGH (ref 70–99)
HCT VFR BLD CALC: 33.6 % — LOW (ref 42–52)
HGB BLD-MCNC: 10.8 G/DL — LOW (ref 14–18)
MCHC RBC-ENTMCNC: 27.8 PG — SIGNIFICANT CHANGE UP (ref 27–31)
MCHC RBC-ENTMCNC: 32.1 G/DL — SIGNIFICANT CHANGE UP (ref 32–37)
MCV RBC AUTO: 86.4 FL — SIGNIFICANT CHANGE UP (ref 80–94)
NRBC # BLD: 0 /100 WBCS — SIGNIFICANT CHANGE UP (ref 0–0)
PLATELET # BLD AUTO: 195 K/UL — SIGNIFICANT CHANGE UP (ref 130–400)
POTASSIUM SERPL-MCNC: 4.4 MMOL/L — SIGNIFICANT CHANGE UP (ref 3.5–5)
POTASSIUM SERPL-SCNC: 4.4 MMOL/L — SIGNIFICANT CHANGE UP (ref 3.5–5)
PROT SERPL-MCNC: 6 G/DL — SIGNIFICANT CHANGE UP (ref 6–8)
RBC # BLD: 3.89 M/UL — LOW (ref 4.7–6.1)
RBC # FLD: 15.4 % — HIGH (ref 11.5–14.5)
SODIUM SERPL-SCNC: 141 MMOL/L — SIGNIFICANT CHANGE UP (ref 135–146)
WBC # BLD: 5.42 K/UL — SIGNIFICANT CHANGE UP (ref 4.8–10.8)
WBC # FLD AUTO: 5.42 K/UL — SIGNIFICANT CHANGE UP (ref 4.8–10.8)

## 2021-06-14 PROCEDURE — 99232 SBSQ HOSP IP/OBS MODERATE 35: CPT

## 2021-06-14 RX ORDER — NIFEDIPINE 30 MG
60 TABLET, EXTENDED RELEASE 24 HR ORAL DAILY
Refills: 0 | Status: DISCONTINUED | OUTPATIENT
Start: 2021-06-15 | End: 2021-06-15

## 2021-06-14 RX ORDER — OXYCODONE AND ACETAMINOPHEN 5; 325 MG/1; MG/1
1 TABLET ORAL EVERY 6 HOURS
Refills: 0 | Status: DISCONTINUED | OUTPATIENT
Start: 2021-06-14 | End: 2021-06-15

## 2021-06-14 RX ORDER — NIFEDIPINE 30 MG
30 TABLET, EXTENDED RELEASE 24 HR ORAL ONCE
Refills: 0 | Status: DISCONTINUED | OUTPATIENT
Start: 2021-06-14 | End: 2021-06-14

## 2021-06-14 RX ADMIN — GABAPENTIN 300 MILLIGRAM(S): 400 CAPSULE ORAL at 11:37

## 2021-06-14 RX ADMIN — MORPHINE SULFATE 2 MILLIGRAM(S): 50 CAPSULE, EXTENDED RELEASE ORAL at 23:09

## 2021-06-14 RX ADMIN — Medication 650 MILLIGRAM(S): at 17:03

## 2021-06-14 RX ADMIN — CARVEDILOL PHOSPHATE 25 MILLIGRAM(S): 80 CAPSULE, EXTENDED RELEASE ORAL at 06:27

## 2021-06-14 RX ADMIN — HEPARIN SODIUM 5000 UNIT(S): 5000 INJECTION INTRAVENOUS; SUBCUTANEOUS at 06:27

## 2021-06-14 RX ADMIN — Medication 650 MILLIGRAM(S): at 12:48

## 2021-06-14 RX ADMIN — SIMVASTATIN 40 MILLIGRAM(S): 20 TABLET, FILM COATED ORAL at 21:00

## 2021-06-14 RX ADMIN — Medication 6 UNIT(S): at 17:02

## 2021-06-14 RX ADMIN — Medication 6 UNIT(S): at 11:36

## 2021-06-14 RX ADMIN — Medication 650 MILLIGRAM(S): at 06:27

## 2021-06-14 RX ADMIN — Medication 1: at 17:02

## 2021-06-14 RX ADMIN — Medication 20 MILLIGRAM(S): at 06:27

## 2021-06-14 RX ADMIN — OXYCODONE AND ACETAMINOPHEN 1 TABLET(S): 5; 325 TABLET ORAL at 12:48

## 2021-06-14 RX ADMIN — GABAPENTIN 600 MILLIGRAM(S): 400 CAPSULE ORAL at 21:00

## 2021-06-14 RX ADMIN — Medication 650 MILLIGRAM(S): at 23:06

## 2021-06-14 RX ADMIN — OXYCODONE AND ACETAMINOPHEN 1 TABLET(S): 5; 325 TABLET ORAL at 11:12

## 2021-06-14 RX ADMIN — Medication 1 MILLIGRAM(S): at 21:56

## 2021-06-14 RX ADMIN — Medication 60 MILLIGRAM(S): at 06:27

## 2021-06-14 RX ADMIN — Medication 650 MILLIGRAM(S): at 22:49

## 2021-06-14 RX ADMIN — Medication 650 MILLIGRAM(S): at 06:28

## 2021-06-14 RX ADMIN — PANTOPRAZOLE SODIUM 40 MILLIGRAM(S): 20 TABLET, DELAYED RELEASE ORAL at 06:26

## 2021-06-14 RX ADMIN — CARVEDILOL PHOSPHATE 25 MILLIGRAM(S): 80 CAPSULE, EXTENDED RELEASE ORAL at 17:03

## 2021-06-14 RX ADMIN — INSULIN GLARGINE 18 UNIT(S): 100 INJECTION, SOLUTION SUBCUTANEOUS at 21:01

## 2021-06-14 RX ADMIN — HEPARIN SODIUM 5000 UNIT(S): 5000 INJECTION INTRAVENOUS; SUBCUTANEOUS at 17:05

## 2021-06-14 RX ADMIN — Medication 40 MILLIGRAM(S): at 06:27

## 2021-06-14 RX ADMIN — Medication 6 UNIT(S): at 07:37

## 2021-06-14 RX ADMIN — Medication 2: at 11:36

## 2021-06-14 RX ADMIN — Medication 650 MILLIGRAM(S): at 11:37

## 2021-06-14 RX ADMIN — Medication 1: at 07:37

## 2021-06-14 NOTE — PROGRESS NOTE ADULT - SUBJECTIVE AND OBJECTIVE BOX
SUBJECTIVE:    Patient is a 89y old Male who presents with a chief complaint of Neck pain s/p recent fall (13 Jun 2021 16:34)    Currently admitted to medicine with the primary diagnosis of Dens fracture     Today is hospital day 4d. Patient seen and examined at bedside this AM. No acute overnight events.    PAST MEDICAL & SURGICAL HISTORY  CHF (congestive heart failure)    DM (diabetes mellitus)    HTN (hypertension)    Prostate CA  in remission    Pacemaker    H/O total knee replacement, right      SOCIAL HISTORY:  Negative for smoking/alcohol/drug use.     ALLERGIES:  No Known Allergies    MEDICATIONS:  STANDING MEDICATIONS  acetaminophen   Tablet .. 650 milliGRAM(s) Oral every 6 hours  carvedilol 25 milliGRAM(s) Oral every 12 hours  dextrose 40% Gel 15 Gram(s) Oral once  dextrose 5%. 1000 milliLiter(s) IV Continuous <Continuous>  dextrose 5%. 1000 milliLiter(s) IV Continuous <Continuous>  dextrose 50% Injectable 25 Gram(s) IV Push once  dextrose 50% Injectable 12.5 Gram(s) IV Push once  dextrose 50% Injectable 25 Gram(s) IV Push once  enalapril 20 milliGRAM(s) Oral daily  furosemide    Tablet 40 milliGRAM(s) Oral daily  gabapentin 300 milliGRAM(s) Oral daily  gabapentin 600 milliGRAM(s) Oral at bedtime  glucagon  Injectable 1 milliGRAM(s) IntraMuscular once  heparin   Injectable 5000 Unit(s) SubCutaneous every 12 hours  insulin glargine Injectable (LANTUS) 18 Unit(s) SubCutaneous at bedtime  insulin lispro (ADMELOG) corrective regimen sliding scale   SubCutaneous three times a day before meals  insulin lispro Injectable (ADMELOG) 6 Unit(s) SubCutaneous three times a day before meals  pantoprazole    Tablet 40 milliGRAM(s) Oral before breakfast  simvastatin 40 milliGRAM(s) Oral at bedtime    PRN MEDICATIONS  clonazePAM  Tablet 1 milliGRAM(s) Oral three times a day PRN  morphine  - Injectable 2 milliGRAM(s) IV Push every 4 hours PRN  oxycodone    5 mG/acetaminophen 325 mG 1 Tablet(s) Oral every 6 hours PRN    VITALS:   T(F): 96.4  HR: 60  BP: 144/66  RR: 19  SpO2: --    LABS:                        10.8   5.42  )-----------( 195      ( 14 Jun 2021 06:22 )             33.6     06-14    141  |  108  |  39<H>  ----------------------------<  131<H>  4.4   |  22  |  1.6<H>    Ca    8.3<L>      14 Jun 2021 06:22  Mg     1.8     06-13    TPro  6.0  /  Alb  3.6  /  TBili  0.2  /  DBili  x   /  AST  17  /  ALT  12  /  AlkPhos  104  06-14    PHYSICAL EXAM:  GEN: No acute distress  HEENT: C-collar in place  LUNGS: b/l breath sounds, no respiratory distress  HEART: S1/S2 present, not tachycardic  ABD: Soft, non-tender, non-distended  EXT: no LE edema   NEURO: awake and alert, follows commands, responds appropriately to questions    Intravenous access:   NG tube:   Wen Catheter:

## 2021-06-14 NOTE — PROGRESS NOTE ADULT - ASSESSMENT
89 M with PMH of HTN, HLD, DM2, CHF (unknown EF) s/p PPM, prostate CA (in remission), CKD3, and recent type III dens fracture s/p mechanical fall presents to the hospital with a c/o inability to care for self due to neck pain.    #Neck pain  - Secondary to type III dens fracture  - Maintain C-collar  - PRN pain meds -> on Tylenol ATC, Percocet added on today as pain was not so well controlled previously, PRN morphine  - Evaluated by PT on 6/12  - Fall precautions  - C/w gabapentin 300mg once during the daytime and 600mg QHS    #Hypertensive urgency, improved  - C/w Coreg 25 Q12, Lasix 40 PO QD, enalapril 20 QD, and Procardia 60 QD    #Normocytic anemia, stable  - Monitor CBC    #DM2  - A1C 10.4 on 6/12/21  - C/w basal/bolus insulin regimen 18/6/6/6 + sliding scale  - Monitor FS    #Chronic diastolic CHF (unknown EF) s/p PPM  - Appears compensated  - C/w Coreg 25 Q12H and Lasix 40 PO QD    #CKD 3  - Cr seems to be around baseline  - Monitor BMP  - Avoid nephrotoxins    #HLD  - C/w Zocor    #Prostate CA (in remission)    #Misc  Diet: DASH/TLC  GI PPx: Protonix  DVT PPx: heparin subq  Dispo: SNF

## 2021-06-15 ENCOUNTER — TRANSCRIPTION ENCOUNTER (OUTPATIENT)
Age: 86
End: 2021-06-15

## 2021-06-15 VITALS
HEART RATE: 60 BPM | TEMPERATURE: 98 F | SYSTOLIC BLOOD PRESSURE: 129 MMHG | DIASTOLIC BLOOD PRESSURE: 60 MMHG | RESPIRATION RATE: 18 BRPM

## 2021-06-15 LAB
ALBUMIN SERPL ELPH-MCNC: 3.6 G/DL — SIGNIFICANT CHANGE UP (ref 3.5–5.2)
ALP SERPL-CCNC: 105 U/L — SIGNIFICANT CHANGE UP (ref 30–115)
ALT FLD-CCNC: 28 U/L — SIGNIFICANT CHANGE UP (ref 0–41)
ANION GAP SERPL CALC-SCNC: 10 MMOL/L — SIGNIFICANT CHANGE UP (ref 7–14)
AST SERPL-CCNC: 30 U/L — SIGNIFICANT CHANGE UP (ref 0–41)
BILIRUB SERPL-MCNC: 0.3 MG/DL — SIGNIFICANT CHANGE UP (ref 0.2–1.2)
BUN SERPL-MCNC: 42 MG/DL — HIGH (ref 10–20)
CALCIUM SERPL-MCNC: 8.6 MG/DL — SIGNIFICANT CHANGE UP (ref 8.5–10.1)
CHLORIDE SERPL-SCNC: 103 MMOL/L — SIGNIFICANT CHANGE UP (ref 98–110)
CO2 SERPL-SCNC: 25 MMOL/L — SIGNIFICANT CHANGE UP (ref 17–32)
CREAT SERPL-MCNC: 1.7 MG/DL — HIGH (ref 0.7–1.5)
GLUCOSE BLDC GLUCOMTR-MCNC: 132 MG/DL — HIGH (ref 70–99)
GLUCOSE BLDC GLUCOMTR-MCNC: 168 MG/DL — HIGH (ref 70–99)
GLUCOSE BLDC GLUCOMTR-MCNC: 263 MG/DL — HIGH (ref 70–99)
GLUCOSE BLDC GLUCOMTR-MCNC: 287 MG/DL — HIGH (ref 70–99)
GLUCOSE SERPL-MCNC: 145 MG/DL — HIGH (ref 70–99)
HCT VFR BLD CALC: 34.6 % — LOW (ref 42–52)
HGB BLD-MCNC: 11 G/DL — LOW (ref 14–18)
MAGNESIUM SERPL-MCNC: 1.9 MG/DL — SIGNIFICANT CHANGE UP (ref 1.8–2.4)
MCHC RBC-ENTMCNC: 27.4 PG — SIGNIFICANT CHANGE UP (ref 27–31)
MCHC RBC-ENTMCNC: 31.8 G/DL — LOW (ref 32–37)
MCV RBC AUTO: 86.3 FL — SIGNIFICANT CHANGE UP (ref 80–94)
NRBC # BLD: 0 /100 WBCS — SIGNIFICANT CHANGE UP (ref 0–0)
PLATELET # BLD AUTO: 189 K/UL — SIGNIFICANT CHANGE UP (ref 130–400)
POTASSIUM SERPL-MCNC: 4.5 MMOL/L — SIGNIFICANT CHANGE UP (ref 3.5–5)
POTASSIUM SERPL-SCNC: 4.5 MMOL/L — SIGNIFICANT CHANGE UP (ref 3.5–5)
PROT SERPL-MCNC: 6.4 G/DL — SIGNIFICANT CHANGE UP (ref 6–8)
RBC # BLD: 4.01 M/UL — LOW (ref 4.7–6.1)
RBC # FLD: 15 % — HIGH (ref 11.5–14.5)
SARS-COV-2 RNA SPEC QL NAA+PROBE: SIGNIFICANT CHANGE UP
SODIUM SERPL-SCNC: 138 MMOL/L — SIGNIFICANT CHANGE UP (ref 135–146)
WBC # BLD: 4.03 K/UL — LOW (ref 4.8–10.8)
WBC # FLD AUTO: 4.03 K/UL — LOW (ref 4.8–10.8)

## 2021-06-15 PROCEDURE — 99239 HOSP IP/OBS DSCHRG MGMT >30: CPT

## 2021-06-15 RX ORDER — METOPROLOL TARTRATE 50 MG
1 TABLET ORAL
Qty: 0 | Refills: 0 | DISCHARGE

## 2021-06-15 RX ORDER — NIFEDIPINE 30 MG
1 TABLET, EXTENDED RELEASE 24 HR ORAL
Qty: 0 | Refills: 0 | DISCHARGE

## 2021-06-15 RX ORDER — GLIMEPIRIDE 1 MG
0 TABLET ORAL
Qty: 0 | Refills: 0 | DISCHARGE

## 2021-06-15 RX ORDER — INSULIN GLARGINE 100 [IU]/ML
20 INJECTION, SOLUTION SUBCUTANEOUS
Qty: 0 | Refills: 0 | DISCHARGE

## 2021-06-15 RX ORDER — ACETAMINOPHEN 500 MG
2 TABLET ORAL
Qty: 0 | Refills: 0 | DISCHARGE
Start: 2021-06-15

## 2021-06-15 RX ORDER — CARVEDILOL PHOSPHATE 80 MG/1
1 CAPSULE, EXTENDED RELEASE ORAL
Qty: 0 | Refills: 0 | DISCHARGE
Start: 2021-06-15

## 2021-06-15 RX ORDER — NIFEDIPINE 30 MG
1 TABLET, EXTENDED RELEASE 24 HR ORAL
Qty: 0 | Refills: 0 | DISCHARGE
Start: 2021-06-15

## 2021-06-15 RX ADMIN — Medication 650 MILLIGRAM(S): at 05:21

## 2021-06-15 RX ADMIN — Medication 6 UNIT(S): at 16:49

## 2021-06-15 RX ADMIN — Medication 3: at 16:49

## 2021-06-15 RX ADMIN — Medication 650 MILLIGRAM(S): at 11:20

## 2021-06-15 RX ADMIN — Medication 6 UNIT(S): at 11:19

## 2021-06-15 RX ADMIN — HEPARIN SODIUM 5000 UNIT(S): 5000 INJECTION INTRAVENOUS; SUBCUTANEOUS at 05:20

## 2021-06-15 RX ADMIN — Medication 650 MILLIGRAM(S): at 05:22

## 2021-06-15 RX ADMIN — Medication 1: at 07:52

## 2021-06-15 RX ADMIN — Medication 6 UNIT(S): at 07:51

## 2021-06-15 RX ADMIN — Medication 650 MILLIGRAM(S): at 16:53

## 2021-06-15 RX ADMIN — MORPHINE SULFATE 2 MILLIGRAM(S): 50 CAPSULE, EXTENDED RELEASE ORAL at 05:23

## 2021-06-15 RX ADMIN — Medication 60 MILLIGRAM(S): at 05:21

## 2021-06-15 RX ADMIN — Medication 20 MILLIGRAM(S): at 05:22

## 2021-06-15 RX ADMIN — GABAPENTIN 600 MILLIGRAM(S): 400 CAPSULE ORAL at 21:40

## 2021-06-15 RX ADMIN — SIMVASTATIN 40 MILLIGRAM(S): 20 TABLET, FILM COATED ORAL at 21:40

## 2021-06-15 RX ADMIN — CARVEDILOL PHOSPHATE 25 MILLIGRAM(S): 80 CAPSULE, EXTENDED RELEASE ORAL at 05:21

## 2021-06-15 RX ADMIN — Medication 650 MILLIGRAM(S): at 17:01

## 2021-06-15 RX ADMIN — Medication 3: at 11:20

## 2021-06-15 RX ADMIN — PANTOPRAZOLE SODIUM 40 MILLIGRAM(S): 20 TABLET, DELAYED RELEASE ORAL at 05:21

## 2021-06-15 RX ADMIN — MORPHINE SULFATE 2 MILLIGRAM(S): 50 CAPSULE, EXTENDED RELEASE ORAL at 17:57

## 2021-06-15 RX ADMIN — HEPARIN SODIUM 5000 UNIT(S): 5000 INJECTION INTRAVENOUS; SUBCUTANEOUS at 17:01

## 2021-06-15 RX ADMIN — GABAPENTIN 300 MILLIGRAM(S): 400 CAPSULE ORAL at 11:20

## 2021-06-15 RX ADMIN — Medication 40 MILLIGRAM(S): at 05:21

## 2021-06-15 RX ADMIN — CARVEDILOL PHOSPHATE 25 MILLIGRAM(S): 80 CAPSULE, EXTENDED RELEASE ORAL at 17:01

## 2021-06-15 NOTE — DISCHARGE NOTE PROVIDER - NSDCCPCAREPLAN_GEN_ALL_CORE_FT
PRINCIPAL DISCHARGE DIAGNOSIS  Diagnosis: Dens fracture  Assessment and Plan of Treatment: You presented with neck pain secondary to a fracture of your dens (a bone in your cervical spine). You were evaluated by physical therapy and will be discharged to a rehabilitation facility.

## 2021-06-15 NOTE — DISCHARGE NOTE PROVIDER - CARE PROVIDER_API CALL
Thomas Parker)  [id number]  [address] [place] [address]  Phone: [phone]  Fax: [phone]  Established Patient  Follow Up Time: 1 week    Jannet Carr)  [facility]  [address] [place] [address]  Phone: [phone]  Fax: [phone]  Established Patient  Follow Up Time: 1 month

## 2021-06-15 NOTE — DISCHARGE NOTE PROVIDER - NSDCMRMEDTOKEN_GEN_ALL_CORE_FT
acetaminophen 325 mg oral tablet: 2 tab(s) orally 2 times a day, As Needed  carvedilol 25 mg oral tablet: 1 tab(s) orally every 12 hours  clonazePAM 1 mg oral tablet: 1 tab(s) orally 3 times a day, As Needed  enalapril 20 mg oral tablet: 1 tab(s) orally once a day  gabapentin 300 mg oral capsule: 1 cap(s) orally once a day  gabapentin 600 mg oral tablet: 1 tab(s) orally once a day (at bedtime)  insulin lispro 100 units/mL injectable solution: 6 unit(s) subcutaneous 3 times a day (before meals)  Lantus 100 units/mL subcutaneous solution: 18 unit(s) subcutaneous once a day (at bedtime)  Lasix 40 mg oral tablet: 1 tab(s) orally once a day  NIFEdipine 60 mg oral tablet, extended release: 1 tab(s) orally once a day  oxycodone-acetaminophen 5 mg-325 mg oral tablet: 1 tab(s) orally once a day, As Needed - 6)  simvastatin 40 mg oral tablet: 1 tab(s) orally once a day (at bedtime)

## 2021-06-15 NOTE — DISCHARGE NOTE PROVIDER - CARE PROVIDERS DIRECT ADDRESSES
[email or web address]

## 2021-06-15 NOTE — DISCHARGE NOTE PROVIDER - HOSPITAL COURSE
89 yo M with PMH of HTN, HLD, DM2, CHF (unknown EF) s/p PPM, prostate CA (in remission), CKD3, and recent type III dens fracture s/p mechanical fall presented to the hospital with a c/o inability to care for self due to neck pain. Denied any bowel/bladder incontinence or saddle paresthesia. No focal weakness or sensory deficits. In the ED, pt given pain meds. Admitted for PT eval and possible rehab placement. Hospital course significant for hypertensive urgency, which improved with administration of anti-HTN meds and with better pain control. Evaluated by PT -> recommended discharge to rehabilitation facility. The patient is medically stable for discharge. Medication reconciliation and plan discussed with attending on day of discharge.

## 2021-06-15 NOTE — PROGRESS NOTE ADULT - ATTENDING COMMENTS
88 YO M with PMH of HTN, HLD, DM2, CHF (unknown EF) s/p PPM, prostate CA (in remission), CKD3, and recent type III dens fracture s/p mechanical fall presents to the hospital with a c/o inability to care for self due to neck pain. Denies any bowel/bladder incontinence or saddle paresthesia. No focal weakness or sensory deficits. ROS negative except as stated above.     In the ED, pt given pain meds. Will be admitted for PT eval and possible rehab placement.       #Inability to care for self due to neck pain from type III dens fracture. Maintain C-collar. PRN pain meds. PT eval. Fall precautions.  gabapentin dose is adjusted    #Hypertensive urgency.  -- on nifedipine XL 60mg and enalapril 20mg along with lasix 40mg.    #Normocytic anemia, hb is stable    # Diabetes mellitus with hyperglycemia. A1c pending.  Insulin PRN.      # chr diastolic CHF (unknown EF) s/p PPM-- on po lasix   #prostate CA (in remission)  patient will need SNF placement.
89 YO M with PMH of HTN, HLD, DM2, CHF (unknown EF) s/p PPM, prostate CA (in remission), CKD3, and recent type III dens fracture s/p mechanical fall presents to the hospital with a c/o inability to care for self due to neck pain. Denies any bowel/bladder incontinence or saddle paresthesia. No focal weakness or sensory deficits. ROS negative except as stated above.     In the ED, pt given pain meds. Will be admitted for PT eval and possible rehab placement.       #Inability to care for self due to neck pain from type III dens fracture. Maintain C-collar. PRN pain meds. PT eval. Fall precautions.  gabapentin dose is adjusted    #Hypertensive urgency.  -- on nifedipine XL 60mg and enalapril 20mg along with lasix 40mg.-- metoprolol changed to carvedilol 25mg q12h-- BP is better controlled    #Normocytic anemia, hb is stable    # Diabetes mellitus with hyperglycemia. A1c  10.4.  Insulin glargine 18 and lispro 8units     # chr diastolic CHF (unknown EF) s/p PPM-- on po lasix   #prostate CA (in remission)  patient will need SNF placement.  DC to SNF spent more than 30mins-- pending covid test.
89 YO M with PMH of HTN, HLD, DM2, CHF (unknown EF) s/p PPM, prostate CA (in remission), CKD3, and recent type III dens fracture s/p mechanical fall presents to the hospital with a c/o inability to care for self due to neck pain. Denies any bowel/bladder incontinence or saddle paresthesia. No focal weakness or sensory deficits. ROS negative except as stated above.     In the ED, pt given pain meds. Will be admitted for PT eval and possible rehab placement.       #Inability to care for self due to neck pain from type III dens fracture. Maintain C-collar. PRN pain meds. PT eval. Fall precautions.  gabapentin dose is adjusted    #Hypertensive urgency.  -- on nifedipine XL 60mg and enalapril 20mg along with lasix 40mg.-- metoprolol changed to carvedilol 25mg q12h-- BP is better today.    #Normocytic anemia, hb is stable    # Diabetes mellitus with hyperglycemia. A1c  10.4.  Insulin glargine 18 and lispro 8units     # chr diastolic CHF (unknown EF) s/p PPM-- on po lasix   #prostate CA (in remission)  patient will need SNF placement.

## 2021-06-15 NOTE — PROGRESS NOTE ADULT - ASSESSMENT
89 M with PMH of HTN, HLD, DM2, CHF (unknown EF) s/p PPM, prostate CA (in remission), CKD3, and recent type III dens fracture s/p mechanical fall presents to the hospital with a c/o inability to care for self due to neck pain.    #Neck pain  - Secondary to type III dens fracture  - Maintain C-collar  - PRN pain meds -> on Tylenol ATC, PRN Percocet, PRN morphine  - Evaluated by PT on 6/12  - Fall precautions  - C/w gabapentin 300mg once during the daytime and 600mg QHS    #Hypertensive urgency, improved  - C/w Coreg 25 Q12, Lasix 40 PO QD, enalapril 20 QD, and Procardia 60 QD    #Normocytic anemia, stable  - Monitor CBC    #DM2  - A1C 10.4 on 6/12/21  - C/w basal/bolus insulin regimen 18/6/6/6 + sliding scale  - Monitor FS    #Chronic diastolic CHF (unknown EF) s/p PPM  - Appears compensated  - C/w Coreg 25 Q12H and Lasix 40 PO QD    #CKD 3  - Cr seems to be around baseline  - Monitor BMP  - Avoid nephrotoxins    #HLD  - C/w Zocor    #Prostate CA (in remission)    #Misc  Diet: DASH/TLC  GI PPx: Protonix  DVT PPx: heparin subq  Dispo: SNF

## 2021-06-15 NOTE — DISCHARGE NOTE PROVIDER - NSDCFUADDINST_GEN_ALL_CORE_FT
Please follow up with your primary care provider within 1 week of discharge.    You must maintain your [place] cervical collar at all times for at least 10-12 weeks.    You will need to follow up as outpatient with neurosurgery Dr Carr in 8 weeks or sooner if you notice any neurologic changes, such as new-onset weakness, numbness, bladder or bowel incontinence, and/or worsening neck pain.    Dr Carr: [phone]    Return to the ED immediately if you experience chest pain, shortness of breath, worsening neck pain, new-onset weakness, numbness, or bladder or bowel incontinence.

## 2021-06-15 NOTE — DISCHARGE NOTE PROVIDER - PROVIDER TOKENS
PROVIDER:[TOKEN:[[id number]],FOLLOWUP:[1 week],ESTABLISHEDPATIENT:[T]],PROVIDER:[TOKEN:[[id number]],FOLLOWUP:[1 month],ESTABLISHEDPATIENT:[T]]

## 2021-06-15 NOTE — PROGRESS NOTE ADULT - SUBJECTIVE AND OBJECTIVE BOX
SUBJECTIVE:    Patient is a 89y old Male who presents with a chief complaint of Neck pain s/p recent fall (14 Jun 2021 09:08)    Currently admitted to medicine with the primary diagnosis of Dens fracture     Today is hospital day 5d. Patient seen and examined at bedside this AM. No acute overnight events.    PAST MEDICAL & SURGICAL HISTORY  CHF (congestive heart failure)    DM (diabetes mellitus)    HTN (hypertension)    Prostate CA  in remission    Pacemaker    H/O total knee replacement, right      SOCIAL HISTORY:  Negative for smoking/alcohol/drug use.     ALLERGIES:  No Known Allergies    MEDICATIONS:  STANDING MEDICATIONS  acetaminophen   Tablet .. 650 milliGRAM(s) Oral every 6 hours  carvedilol 25 milliGRAM(s) Oral every 12 hours  dextrose 40% Gel 15 Gram(s) Oral once  dextrose 5%. 1000 milliLiter(s) IV Continuous <Continuous>  dextrose 5%. 1000 milliLiter(s) IV Continuous <Continuous>  dextrose 50% Injectable 25 Gram(s) IV Push once  dextrose 50% Injectable 12.5 Gram(s) IV Push once  dextrose 50% Injectable 25 Gram(s) IV Push once  enalapril 20 milliGRAM(s) Oral daily  furosemide    Tablet 40 milliGRAM(s) Oral daily  gabapentin 300 milliGRAM(s) Oral daily  gabapentin 600 milliGRAM(s) Oral at bedtime  glucagon  Injectable 1 milliGRAM(s) IntraMuscular once  heparin   Injectable 5000 Unit(s) SubCutaneous every 12 hours  insulin glargine Injectable (LANTUS) 18 Unit(s) SubCutaneous at bedtime  insulin lispro (ADMELOG) corrective regimen sliding scale   SubCutaneous three times a day before meals  insulin lispro Injectable (ADMELOG) 6 Unit(s) SubCutaneous three times a day before meals  NIFEdipine XL 60 milliGRAM(s) Oral daily  pantoprazole    Tablet 40 milliGRAM(s) Oral before breakfast  simvastatin 40 milliGRAM(s) Oral at bedtime    PRN MEDICATIONS  clonazePAM  Tablet 1 milliGRAM(s) Oral three times a day PRN  morphine  - Injectable 2 milliGRAM(s) IV Push every 4 hours PRN  oxycodone    5 mG/acetaminophen 325 mG 1 Tablet(s) Oral every 6 hours PRN    VITALS:   T(F): 97.1  HR: 59  BP: 125/59  RR: 18  SpO2: 99%    LABS:                        11.0   4.03  )-----------( 189      ( 15 Magdy 2021 06:41 )             34.6     06-15    138  |  103  |  42<H>  ----------------------------<  145<H>  4.5   |  25  |  1.7<H>    Ca    8.6      15 Magdy 2021 06:41  Mg     1.9     06-15    TPro  6.4  /  Alb  3.6  /  TBili  0.3  /  DBili  x   /  AST  30  /  ALT  28  /  AlkPhos  105  06-15                  RADIOLOGY:    PHYSICAL EXAM:  GEN: No acute distress  LUNGS: Clear to auscultation bilaterally   HEART: Regular  ABD: Soft, non-tender, non-distended.  EXT: NC/NC/NE/2+PP/AG/Skin Intact.   NEURO: AAOX3    Intravenous access:   NG tube:   Wen Catheter:        SUBJECTIVE:    Patient is a 89y old Male who presents with a chief complaint of Neck pain s/p recent fall (14 Jun 2021 09:08)    Currently admitted to medicine with the primary diagnosis of Dens fracture     Today is hospital day 5d. Patient seen and examined at bedside this AM. No acute overnight events.    PAST MEDICAL & SURGICAL HISTORY  CHF (congestive heart failure)    DM (diabetes mellitus)    HTN (hypertension)    Prostate CA  in remission    Pacemaker    H/O total knee replacement, right      SOCIAL HISTORY:  Negative for smoking/alcohol/drug use.     ALLERGIES:  No Known Allergies    MEDICATIONS:  STANDING MEDICATIONS  acetaminophen   Tablet .. 650 milliGRAM(s) Oral every 6 hours  carvedilol 25 milliGRAM(s) Oral every 12 hours  dextrose 40% Gel 15 Gram(s) Oral once  dextrose 5%. 1000 milliLiter(s) IV Continuous <Continuous>  dextrose 5%. 1000 milliLiter(s) IV Continuous <Continuous>  dextrose 50% Injectable 25 Gram(s) IV Push once  dextrose 50% Injectable 12.5 Gram(s) IV Push once  dextrose 50% Injectable 25 Gram(s) IV Push once  enalapril 20 milliGRAM(s) Oral daily  furosemide    Tablet 40 milliGRAM(s) Oral daily  gabapentin 300 milliGRAM(s) Oral daily  gabapentin 600 milliGRAM(s) Oral at bedtime  glucagon  Injectable 1 milliGRAM(s) IntraMuscular once  heparin   Injectable 5000 Unit(s) SubCutaneous every 12 hours  insulin glargine Injectable (LANTUS) 18 Unit(s) SubCutaneous at bedtime  insulin lispro (ADMELOG) corrective regimen sliding scale   SubCutaneous three times a day before meals  insulin lispro Injectable (ADMELOG) 6 Unit(s) SubCutaneous three times a day before meals  NIFEdipine XL 60 milliGRAM(s) Oral daily  pantoprazole    Tablet 40 milliGRAM(s) Oral before breakfast  simvastatin 40 milliGRAM(s) Oral at bedtime    PRN MEDICATIONS  clonazePAM  Tablet 1 milliGRAM(s) Oral three times a day PRN  morphine  - Injectable 2 milliGRAM(s) IV Push every 4 hours PRN  oxycodone    5 mG/acetaminophen 325 mG 1 Tablet(s) Oral every 6 hours PRN    VITALS:   T(F): 97.1  HR: 59  BP: 125/59  RR: 18  SpO2: 99%    LABS:                        11.0   4.03  )-----------( 189      ( 15 Magdy 2021 06:41 )             34.6     06-15    138  |  103  |  42<H>  ----------------------------<  145<H>  4.5   |  25  |  1.7<H>    Ca    8.6      15 Magdy 2021 06:41  Mg     1.9     06-15    TPro  6.4  /  Alb  3.6  /  TBili  0.3  /  DBili  x   /  AST  30  /  ALT  28  /  AlkPhos  105  06-15    PHYSICAL EXAM:  GEN: No acute distress  HEENT: C-collar in place  LUNGS: bilateral breath sounds, no respiratory distress   HEART: S1/S2 present, not tachycardic  ABD: Soft, non-tender, non-distended  EXT: no LE edema   NEURO: awake and alert, follows commands, responds appropriately to questions    Intravenous access:   NG tube:   Wen Catheter:

## 2021-06-15 NOTE — DISCHARGE NOTE NURSING/CASE MANAGEMENT/SOCIAL WORK - PATIENT PORTAL LINK FT
You can access the FollowMyHealth Patient Portal offered by [facility] by registering at the following website: [email or web address]. By joining [employer]’s FollowMyHealth portal, you will also be able to view your health information using other applications (apps) compatible with our system.

## 2021-06-23 DIAGNOSIS — S12.120A OTHER DISPLACED DENS FRACTURE, INITIAL ENCOUNTER FOR CLOSED FRACTURE: ICD-10-CM

## 2021-06-23 DIAGNOSIS — E11.22 TYPE 2 DIABETES MELLITUS WITH DIABETIC CHRONIC KIDNEY DISEASE: ICD-10-CM

## 2021-06-23 DIAGNOSIS — D64.9 ANEMIA, UNSPECIFIED: ICD-10-CM
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
## 2021-06-23 DIAGNOSIS — F41.9 ANXIETY DISORDER, UNSPECIFIED: ICD-10-CM

## 2021-06-23 DIAGNOSIS — N18.30 CHRONIC KIDNEY DISEASE, STAGE 3 UNSPECIFIED: ICD-10-CM
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
## 2021-06-23 DIAGNOSIS — E11.65 TYPE 2 DIABETES MELLITUS WITH HYPERGLYCEMIA: ICD-10-CM

## 2021-06-23 DIAGNOSIS — I50.32 CHRONIC DIASTOLIC (CONGESTIVE) HEART FAILURE: ICD-10-CM

## 2021-06-23 DIAGNOSIS — Z85.46 PERSONAL HISTORY OF MALIGNANT NEOPLASM OF PROSTATE: ICD-10-CM

## 2021-06-23 DIAGNOSIS — E78.5 HYPERLIPIDEMIA, UNSPECIFIED: ICD-10-CM

## 2021-06-23 DIAGNOSIS — Z20.822 CONTACT WITH AND (SUSPECTED) EXPOSURE TO COVID-19: ICD-10-CM

## 2021-06-23 DIAGNOSIS — Z87.891 PERSONAL HISTORY OF NICOTINE DEPENDENCE: ICD-10-CM

## 2021-06-23 DIAGNOSIS — Z79.4 LONG TERM (CURRENT) USE OF INSULIN: ICD-10-CM

## 2021-06-23 DIAGNOSIS — I13.0 HYPERTENSIVE HEART AND CHRONIC KIDNEY DISEASE WITH HEART FAILURE AND STAGE 1 THROUGH STAGE 4 CHRONIC KIDNEY DISEASE, OR UNSPECIFIED CHRONIC KIDNEY DISEASE: ICD-10-CM

## 2021-06-23 DIAGNOSIS — Z95.0 PRESENCE OF CARDIAC PACEMAKER: ICD-10-CM

## 2021-06-23 DIAGNOSIS — E87.5 HYPERKALEMIA: ICD-10-CM

## 2021-06-23 DIAGNOSIS — G47.00 INSOMNIA, UNSPECIFIED: ICD-10-CM

## 2021-06-23 DIAGNOSIS — I16.0 HYPERTENSIVE URGENCY: ICD-10-CM

## 2021-07-19 ENCOUNTER — TRANSCRIPTION ENCOUNTER (OUTPATIENT)
Age: 86
End: 2021-07-19

## 2021-09-03 ENCOUNTER — OUTPATIENT (OUTPATIENT)
Dept: OUTPATIENT SERVICES | Facility: HOSPITAL | Age: 86
LOS: 1 days | Discharge: HOME | End: 2021-09-03
Payer: MEDICARE

## 2021-09-03 DIAGNOSIS — S12.190D: ICD-10-CM

## 2021-09-03 DIAGNOSIS — Z96.651 PRESENCE OF RIGHT ARTIFICIAL KNEE JOINT: Chronic | ICD-10-CM

## 2021-09-03 PROCEDURE — 72125 CT NECK SPINE W/O DYE: CPT | Mod: 26,MH

## 2021-09-03 PROCEDURE — 72050 X-RAY EXAM NECK SPINE 4/5VWS: CPT | Mod: 26

## 2021-09-30 ENCOUNTER — EMERGENCY (EMERGENCY)
Facility: HOSPITAL | Age: 86
LOS: 0 days | Discharge: HOME | End: 2021-09-30
Attending: EMERGENCY MEDICINE | Admitting: EMERGENCY MEDICINE
Payer: MEDICARE

## 2021-09-30 VITALS
TEMPERATURE: 98 F | OXYGEN SATURATION: 98 % | SYSTOLIC BLOOD PRESSURE: 180 MMHG | HEIGHT: 66 IN | HEART RATE: 101 BPM | DIASTOLIC BLOOD PRESSURE: 91 MMHG | RESPIRATION RATE: 22 BRPM | WEIGHT: 220.02 LBS

## 2021-09-30 VITALS — SYSTOLIC BLOOD PRESSURE: 180 MMHG | HEART RATE: 98 BPM | DIASTOLIC BLOOD PRESSURE: 88 MMHG

## 2021-09-30 DIAGNOSIS — Z85.46 PERSONAL HISTORY OF MALIGNANT NEOPLASM OF PROSTATE: ICD-10-CM

## 2021-09-30 DIAGNOSIS — M79.621 PAIN IN RIGHT UPPER ARM: ICD-10-CM

## 2021-09-30 DIAGNOSIS — I11.0 HYPERTENSIVE HEART DISEASE WITH HEART FAILURE: ICD-10-CM

## 2021-09-30 DIAGNOSIS — R06.02 SHORTNESS OF BREATH: ICD-10-CM

## 2021-09-30 DIAGNOSIS — E11.9 TYPE 2 DIABETES MELLITUS WITHOUT COMPLICATIONS: ICD-10-CM

## 2021-09-30 DIAGNOSIS — E78.5 HYPERLIPIDEMIA, UNSPECIFIED: ICD-10-CM

## 2021-09-30 DIAGNOSIS — Z96.651 PRESENCE OF RIGHT ARTIFICIAL KNEE JOINT: Chronic | ICD-10-CM

## 2021-09-30 DIAGNOSIS — Z96.651 PRESENCE OF RIGHT ARTIFICIAL KNEE JOINT: ICD-10-CM

## 2021-09-30 DIAGNOSIS — R07.89 OTHER CHEST PAIN: ICD-10-CM

## 2021-09-30 DIAGNOSIS — Z20.822 CONTACT WITH AND (SUSPECTED) EXPOSURE TO COVID-19: ICD-10-CM

## 2021-09-30 DIAGNOSIS — Z79.4 LONG TERM (CURRENT) USE OF INSULIN: ICD-10-CM

## 2021-09-30 DIAGNOSIS — M25.511 PAIN IN RIGHT SHOULDER: ICD-10-CM

## 2021-09-30 DIAGNOSIS — Z95.0 PRESENCE OF CARDIAC PACEMAKER: ICD-10-CM

## 2021-09-30 DIAGNOSIS — I50.9 HEART FAILURE, UNSPECIFIED: ICD-10-CM

## 2021-09-30 LAB
ALBUMIN SERPL ELPH-MCNC: 4 G/DL — SIGNIFICANT CHANGE UP (ref 3.5–5.2)
ALP SERPL-CCNC: 146 U/L — HIGH (ref 30–115)
ALT FLD-CCNC: 14 U/L — SIGNIFICANT CHANGE UP (ref 0–41)
ANION GAP SERPL CALC-SCNC: 12 MMOL/L — SIGNIFICANT CHANGE UP (ref 7–14)
AST SERPL-CCNC: 14 U/L — SIGNIFICANT CHANGE UP (ref 0–41)
BASOPHILS # BLD AUTO: 0.07 K/UL — SIGNIFICANT CHANGE UP (ref 0–0.2)
BASOPHILS NFR BLD AUTO: 0.8 % — SIGNIFICANT CHANGE UP (ref 0–1)
BILIRUB SERPL-MCNC: <0.2 MG/DL — SIGNIFICANT CHANGE UP (ref 0.2–1.2)
BUN SERPL-MCNC: 35 MG/DL — HIGH (ref 10–20)
CALCIUM SERPL-MCNC: 8.5 MG/DL — SIGNIFICANT CHANGE UP (ref 8.5–10.1)
CHLORIDE SERPL-SCNC: 103 MMOL/L — SIGNIFICANT CHANGE UP (ref 98–110)
CO2 SERPL-SCNC: 20 MMOL/L — SIGNIFICANT CHANGE UP (ref 17–32)
CREAT SERPL-MCNC: 1.5 MG/DL — SIGNIFICANT CHANGE UP (ref 0.7–1.5)
D DIMER BLD IA.RAPID-MCNC: 964 NG/ML DDU — HIGH (ref 0–230)
EOSINOPHIL # BLD AUTO: 0.37 K/UL — SIGNIFICANT CHANGE UP (ref 0–0.7)
EOSINOPHIL NFR BLD AUTO: 4.4 % — SIGNIFICANT CHANGE UP (ref 0–8)
GLUCOSE SERPL-MCNC: 237 MG/DL — HIGH (ref 70–99)
HCT VFR BLD CALC: 36 % — LOW (ref 42–52)
HGB BLD-MCNC: 11.6 G/DL — LOW (ref 14–18)
IMM GRANULOCYTES NFR BLD AUTO: 0.6 % — HIGH (ref 0.1–0.3)
LYMPHOCYTES # BLD AUTO: 1.57 K/UL — SIGNIFICANT CHANGE UP (ref 1.2–3.4)
LYMPHOCYTES # BLD AUTO: 18.8 % — LOW (ref 20.5–51.1)
MCHC RBC-ENTMCNC: 29.1 PG — SIGNIFICANT CHANGE UP (ref 27–31)
MCHC RBC-ENTMCNC: 32.2 G/DL — SIGNIFICANT CHANGE UP (ref 32–37)
MCV RBC AUTO: 90.2 FL — SIGNIFICANT CHANGE UP (ref 80–94)
MONOCYTES # BLD AUTO: 0.51 K/UL — SIGNIFICANT CHANGE UP (ref 0.1–0.6)
MONOCYTES NFR BLD AUTO: 6.1 % — SIGNIFICANT CHANGE UP (ref 1.7–9.3)
NEUTROPHILS # BLD AUTO: 5.78 K/UL — SIGNIFICANT CHANGE UP (ref 1.4–6.5)
NEUTROPHILS NFR BLD AUTO: 69.3 % — SIGNIFICANT CHANGE UP (ref 42.2–75.2)
NRBC # BLD: 0 /100 WBCS — SIGNIFICANT CHANGE UP (ref 0–0)
NT-PROBNP SERPL-SCNC: 1830 PG/ML — HIGH (ref 0–300)
PLATELET # BLD AUTO: 193 K/UL — SIGNIFICANT CHANGE UP (ref 130–400)
POTASSIUM SERPL-MCNC: 5.4 MMOL/L — HIGH (ref 3.5–5)
POTASSIUM SERPL-SCNC: 5.4 MMOL/L — HIGH (ref 3.5–5)
PROT SERPL-MCNC: 6.8 G/DL — SIGNIFICANT CHANGE UP (ref 6–8)
RBC # BLD: 3.99 M/UL — LOW (ref 4.7–6.1)
RBC # FLD: 15.2 % — HIGH (ref 11.5–14.5)
SARS-COV-2 RNA SPEC QL NAA+PROBE: SIGNIFICANT CHANGE UP
SODIUM SERPL-SCNC: 135 MMOL/L — SIGNIFICANT CHANGE UP (ref 135–146)
TROPONIN T SERPL-MCNC: 0.05 NG/ML — CRITICAL HIGH
WBC # BLD: 8.35 K/UL — SIGNIFICANT CHANGE UP (ref 4.8–10.8)
WBC # FLD AUTO: 8.35 K/UL — SIGNIFICANT CHANGE UP (ref 4.8–10.8)

## 2021-09-30 PROCEDURE — 99053 MED SERV 10PM-8AM 24 HR FAC: CPT

## 2021-09-30 PROCEDURE — 71045 X-RAY EXAM CHEST 1 VIEW: CPT | Mod: 26

## 2021-09-30 PROCEDURE — 71275 CT ANGIOGRAPHY CHEST: CPT | Mod: 26,MA

## 2021-09-30 PROCEDURE — 93010 ELECTROCARDIOGRAM REPORT: CPT

## 2021-09-30 PROCEDURE — 99285 EMERGENCY DEPT VISIT HI MDM: CPT

## 2021-09-30 RX ORDER — CALCIUM GLUCONATE 100 MG/ML
1 VIAL (ML) INTRAVENOUS ONCE
Refills: 0 | Status: DISCONTINUED | OUTPATIENT
Start: 2021-09-30 | End: 2021-09-30

## 2021-09-30 NOTE — ED PROVIDER NOTE - CLINICAL SUMMARY MEDICAL DECISION MAKING FREE TEXT BOX
89 yo M, hx of recent dens fx in June, sp imagining 1 month ago, is stable, HTN, DMII, ppm for complete heart block, HLD, CHF HTN, here for assessment of R shoulder, arm and chest pain. Patient notes he woke up with pain in those areas, not sure where it started. Put on his soft neck collar and pain improved.     No associated diaphoresis, dizziness, nausea. Does note mild dyspnea when pain was severe. Describes pain as sharp, worse with movement.    No recent trauma.    VS notable for tachycardia, no hypoxia, tachypnea. On exam has clear lungs, RR, soft, NT, Nd abdomen, has mild trapezius spam on R, no midline CTL spine ttp, intact radial pulses, intact median, radial and ulnar nerve function and sensation.     Ekg without acute ischemia though difficult to compare to previous as his most recent EKGs were completely ventricularly paced.    Labs reivewed, trop 0.05, previous 0.06-0.07, BNP elevated 7K but previous were 10-11k, d dimer positive, but CTA negative for clot. CTA does demonstrate mild nonspecific GG changes in RUL, ahs no cough, fever, leukocytosis to suggest PNA.     Currently pain free. Despite significant risk factors, pain was very atypical for cardiac ischemia, had negative trop. Was more concerning for PE but CTA negative.    Discussed results with the patient and daughter -- aware of need for VERY close monitoring of sx, follow up, very low threshold for return.

## 2021-09-30 NOTE — ED PROVIDER NOTE - OBJECTIVE STATEMENT
89 year old male, past medical history htn, hdl, chf, htn, dm, ppm, dens fx 6/2021, who presents with chest pain. 89 year old male, past medical history htn, hdl, chf, htn, dm, ppm, dens fx 6/2021, who presents with chest pain. patient reports sudden onset of right sided chest pain radiating to RUE that began x1 hour PTA. reports associated SOB. denies fever, chills, abd pain, nausea/vomiting, weakness/numbness/paresthesias. no recent falls/trauma.

## 2021-09-30 NOTE — ED PROVIDER NOTE - PHYSICAL EXAMINATION
CONSTITUTIONAL: Well-developed; well-nourished; in no acute distress, nontoxic appearing  SKIN: skin exam is warm and dry  HEAD: Normocephalic; atraumatic  EYES: PERRL, conjunctiva and sclera clear  ENT: MMM  NECK: soft collar to neck   CARD: S1, S2 normal, no murmur  RESP: No increased WOB. Good air movement bilaterally  ABD: soft; non-distended; non-tender. No CVAT   EXT: Normal ROM. no chest wall ttp no skin changes. pulses 2+ bilaterally    NEURO: awake, alert, following commands, oriented, grossly unremarkable. No Focal deficits. GCS 15.   PSYCH: Cooperative, appropriate.

## 2021-09-30 NOTE — ED PROVIDER NOTE - CARE PROVIDER_API CALL
Thomas Parker)  [id number]  [address] [place] [address]  Phone: [phone]  Fax: [phone]  Follow Up Time: 1-3 Days

## 2021-09-30 NOTE — ED PROVIDER NOTE - CARE PROVIDERS DIRECT ADDRESSES
[email or web address]

## 2021-09-30 NOTE — ED PROVIDER NOTE - NSFOLLOWUPINSTRUCTIONS_ED_ALL_ED_FT
Please follow up with your primary care doctor in 1-3 days  Please be aware of any new or worsening signs or symptoms that should prompt your return to the ER.      Arm Pain    WHAT YOU NEED TO KNOW:    Your arm pain may be caused by a number of conditions. Examples include arthritis, nerve problems, or an awkward position while you sleep. X-rays did not show a broken bone in your arm or wrist. Arm pain may be a sign of a serious condition that needs immediate care, such as a heart attack.    DISCHARGE INSTRUCTIONS:    Call 911 for any of the following: You have any of the following signs of a heart attack:     Squeezing, pressure, or pain in your chest that lasts longer than 5 minutes or returns      Discomfort or pain in your back, neck, jaw, stomach, or arm       Trouble breathing or a fast, fluttery heartbeat      Nausea or vomiting      Lightheadedness or a sudden cold sweat, especially with chest pain or trouble breathing    Return to the emergency department if:     You have severe pain, or pain that spreads from your arm to other areas.      You have swelling, tingling, or numbness in your hand or fingers, or the skin turns blue.      You cannot move your arm.    Contact your healthcare provider if:     You have questions or concerns about your condition or care.        Medicines: You may need any of the following:     Prescription pain medicine may be given. Ask how to take this medicine safely.      NSAIDs, such as ibuprofen, help decrease swelling, pain, and fever. This medicine is available with or without a doctor's order. NSAIDs can cause stomach bleeding or kidney problems in certain people. If you take blood thinner medicine, always ask your healthcare provider if NSAIDs are safe for you. Always read the medicine label and follow directions.      Take your medicine as directed. Contact your healthcare provider if you think your medicine is not helping or if you have side effects. Tell him or her if you are allergic to any medicine. Keep a list of the medicines, vitamins, and herbs you take. Include the amounts, and when and why you take them. Bring the list or the pill bottles to follow-up visits. Carry your medicine list with you in case of an emergency.    Self-care:     Rest your arm as directed. A sling may be used to keep your arm from moving while it heals.      Apply ice as directed. Ice helps decrease pain and swelling. Ice may also help prevent tissue damage. Use an ice pack, or put crushed ice in a plastic bag. Cover it with a towel. Apply it to your arm for 20 minutes every few hours, or as directed. Ask how many times to apply ice each day, and for how many days.      Elevate your arm above the level of your heart as often as you can. This will help decrease swelling and pain. Prop your arm on pillows or blankets to keep the area elevated comfortably.      Adjust your position if you work in front of a computer. You may need arm or wrist supports or change the height of your chair.       Keep a pain record. Write down when your pain happens and how severe it is. Include any other symptoms you have with your pain. A record will help you keep track of pain cycles. Bring the record with you to your follow-up visits. It may also help your healthcare provider find out what is causing your pain.    Follow up with your healthcare provider as directed: You may need physical therapy. You may need to see an orthopedic specialist. Write down your questions so you remember to ask them during your visits.       © Copyright [employer] 2019 All illustrations and images included in CareNotes are the copyrighted property of [employer] or [employer].

## 2021-09-30 NOTE — ED PROVIDER NOTE - NS ED ROS FT
Review of Systems:  	•	CONSTITUTIONAL: no fever, no chills  	•	SKIN: no rash  	•	ENT: no sore throat, no difficulty   	•	RESPIRATORY: no shortness of breath, no cough  	•	CARDIAC: no chest pain, no palpitations  	•	GI: no abd pain, no nausea, no vomiting, no diarrhea  	•	GENITO-URINARY: no discharge, no dysuria; no hematuria, no increased urinary frequency  	•	MUSCULOSKELETAL: no joint paint, no swelling, no redness  	•	NEUROLOGIC: no weakness, no numbness, no paresthesias, no headache   	•	PSYCH: no anxiety, non suicidal, non homicidal, no hallucination, no depression

## 2021-09-30 NOTE — ED ADULT NURSE NOTE - NSIMPLEMENTINTERV_GEN_ALL_ED
Implemented All Fall with Harm Risk Interventions:  [place] to call system. Call bell, personal items and telephone within reach. Instruct patient to call for assistance. Room bathroom lighting operational. Non-slip footwear when patient is off stretcher. Physically safe environment: no spills, clutter or unnecessary equipment. Stretcher in lowest position, wheels locked, appropriate side rails in place. Provide visual cue, wrist band, yellow gown, etc. Monitor gait and stability. Monitor for mental status changes and reorient to person, place, and time. Review medications for side effects contributing to fall risk. Reinforce activity limits and safety measures with patient and family. Provide visual clues: red socks.

## 2021-09-30 NOTE — ED ADULT TRIAGE NOTE - BP NONINVASIVE DIASTOLIC (MM HG)
91 Alert-The patient is alert, awake and responds to voice. The patient is oriented to time, place, and person. The triage nurse is able to obtain subjective information.

## 2021-09-30 NOTE — ED PROVIDER NOTE - PATIENT PORTAL LINK FT
You can access the FollowMyHealth Patient Portal offered by [facility] by registering at the following website: [email or web address]. By joining [employer]’s FollowMyHealth portal, you will also be able to view your health information using other applications (apps) compatible with our system.

## 2021-09-30 NOTE — ED PROVIDER NOTE - PROGRESS NOTE DETAILS
ck: results discussed with patient educated on signs and symptoms to be aware of that should prompt return to the er. patient and daughter at bedside verbalize understanding of plan.

## 2021-10-13 ENCOUNTER — INPATIENT (INPATIENT)
Facility: HOSPITAL | Age: 86
LOS: 0 days | Discharge: HOME | End: 2021-10-14
Attending: INTERNAL MEDICINE | Admitting: INTERNAL MEDICINE
Payer: MEDICARE

## 2021-10-13 VITALS
RESPIRATION RATE: 22 BRPM | WEIGHT: 195.11 LBS | HEART RATE: 99 BPM | SYSTOLIC BLOOD PRESSURE: 147 MMHG | TEMPERATURE: 97 F | HEIGHT: 66 IN | DIASTOLIC BLOOD PRESSURE: 69 MMHG

## 2021-10-13 DIAGNOSIS — I21.A1 MYOCARDIAL INFARCTION TYPE 2: ICD-10-CM

## 2021-10-13 DIAGNOSIS — Z91.11 PATIENT'S NONCOMPLIANCE WITH DIETARY REGIMEN: ICD-10-CM

## 2021-10-13 DIAGNOSIS — N18.30 CHRONIC KIDNEY DISEASE, STAGE 3 UNSPECIFIED: ICD-10-CM

## 2021-10-13 DIAGNOSIS — R06.02 SHORTNESS OF BREATH: ICD-10-CM

## 2021-10-13 DIAGNOSIS — I13.0 HYPERTENSIVE HEART AND CHRONIC KIDNEY DISEASE WITH HEART FAILURE AND STAGE 1 THROUGH STAGE 4 CHRONIC KIDNEY DISEASE, OR UNSPECIFIED CHRONIC KIDNEY DISEASE: ICD-10-CM

## 2021-10-13 DIAGNOSIS — Z95.0 PRESENCE OF CARDIAC PACEMAKER: ICD-10-CM

## 2021-10-13 DIAGNOSIS — E11.40 TYPE 2 DIABETES MELLITUS WITH DIABETIC NEUROPATHY, UNSPECIFIED: ICD-10-CM

## 2021-10-13 DIAGNOSIS — E11.22 TYPE 2 DIABETES MELLITUS WITH DIABETIC CHRONIC KIDNEY DISEASE: ICD-10-CM

## 2021-10-13 DIAGNOSIS — E87.5 HYPERKALEMIA: ICD-10-CM

## 2021-10-13 DIAGNOSIS — I50.33 ACUTE ON CHRONIC DIASTOLIC (CONGESTIVE) HEART FAILURE: ICD-10-CM

## 2021-10-13 DIAGNOSIS — I25.10 ATHEROSCLEROTIC HEART DISEASE OF NATIVE CORONARY ARTERY WITHOUT ANGINA PECTORIS: ICD-10-CM

## 2021-10-13 DIAGNOSIS — Z85.46 PERSONAL HISTORY OF MALIGNANT NEOPLASM OF PROSTATE: ICD-10-CM

## 2021-10-13 DIAGNOSIS — Z96.651 PRESENCE OF RIGHT ARTIFICIAL KNEE JOINT: ICD-10-CM

## 2021-10-13 DIAGNOSIS — Z92.22 PERSONAL HISTORY OF MONOCLONAL DRUG THERAPY: ICD-10-CM

## 2021-10-13 DIAGNOSIS — Z96.651 PRESENCE OF RIGHT ARTIFICIAL KNEE JOINT: Chronic | ICD-10-CM

## 2021-10-13 DIAGNOSIS — E11.65 TYPE 2 DIABETES MELLITUS WITH HYPERGLYCEMIA: ICD-10-CM

## 2021-10-13 DIAGNOSIS — D64.9 ANEMIA, UNSPECIFIED: ICD-10-CM

## 2021-10-13 DIAGNOSIS — Z79.84 LONG TERM (CURRENT) USE OF ORAL HYPOGLYCEMIC DRUGS: ICD-10-CM

## 2021-10-13 LAB
ALBUMIN SERPL ELPH-MCNC: 3.7 G/DL — SIGNIFICANT CHANGE UP (ref 3.5–5.2)
ALP SERPL-CCNC: 129 U/L — HIGH (ref 30–115)
ALT FLD-CCNC: 11 U/L — SIGNIFICANT CHANGE UP (ref 0–41)
ANION GAP SERPL CALC-SCNC: 11 MMOL/L — SIGNIFICANT CHANGE UP (ref 7–14)
ANION GAP SERPL CALC-SCNC: 12 MMOL/L — SIGNIFICANT CHANGE UP (ref 7–14)
AST SERPL-CCNC: 11 U/L — SIGNIFICANT CHANGE UP (ref 0–41)
BASOPHILS # BLD AUTO: 0.01 K/UL — SIGNIFICANT CHANGE UP (ref 0–0.2)
BASOPHILS # BLD AUTO: 0.05 K/UL — SIGNIFICANT CHANGE UP (ref 0–0.2)
BASOPHILS NFR BLD AUTO: 0.3 % — SIGNIFICANT CHANGE UP (ref 0–1)
BASOPHILS NFR BLD AUTO: 0.6 % — SIGNIFICANT CHANGE UP (ref 0–1)
BILIRUB SERPL-MCNC: 0.2 MG/DL — SIGNIFICANT CHANGE UP (ref 0.2–1.2)
BUN SERPL-MCNC: 25 MG/DL — HIGH (ref 10–20)
BUN SERPL-MCNC: 27 MG/DL — HIGH (ref 10–20)
CALCIUM SERPL-MCNC: 8.6 MG/DL — SIGNIFICANT CHANGE UP (ref 8.5–10.1)
CALCIUM SERPL-MCNC: 9.4 MG/DL — SIGNIFICANT CHANGE UP (ref 8.5–10.1)
CHLORIDE SERPL-SCNC: 103 MMOL/L — SIGNIFICANT CHANGE UP (ref 98–110)
CHLORIDE SERPL-SCNC: 96 MMOL/L — LOW (ref 98–110)
CO2 SERPL-SCNC: 20 MMOL/L — SIGNIFICANT CHANGE UP (ref 17–32)
CO2 SERPL-SCNC: 23 MMOL/L — SIGNIFICANT CHANGE UP (ref 17–32)
CREAT SERPL-MCNC: 1.5 MG/DL — SIGNIFICANT CHANGE UP (ref 0.7–1.5)
CREAT SERPL-MCNC: 1.5 MG/DL — SIGNIFICANT CHANGE UP (ref 0.7–1.5)
EOSINOPHIL # BLD AUTO: 0.01 K/UL — SIGNIFICANT CHANGE UP (ref 0–0.7)
EOSINOPHIL # BLD AUTO: 0.4 K/UL — SIGNIFICANT CHANGE UP (ref 0–0.7)
EOSINOPHIL NFR BLD AUTO: 0.3 % — SIGNIFICANT CHANGE UP (ref 0–8)
EOSINOPHIL NFR BLD AUTO: 5.1 % — SIGNIFICANT CHANGE UP (ref 0–8)
GLUCOSE BLDC GLUCOMTR-MCNC: 228 MG/DL — HIGH (ref 70–99)
GLUCOSE BLDC GLUCOMTR-MCNC: 274 MG/DL — HIGH (ref 70–99)
GLUCOSE BLDC GLUCOMTR-MCNC: 374 MG/DL — HIGH (ref 70–99)
GLUCOSE BLDC GLUCOMTR-MCNC: 432 MG/DL — HIGH (ref 70–99)
GLUCOSE SERPL-MCNC: 328 MG/DL — HIGH (ref 70–99)
GLUCOSE SERPL-MCNC: 466 MG/DL — CRITICAL HIGH (ref 70–99)
HCT VFR BLD CALC: 32.5 % — LOW (ref 42–52)
HCT VFR BLD CALC: 36.6 % — LOW (ref 42–52)
HGB BLD-MCNC: 10.5 G/DL — LOW (ref 14–18)
HGB BLD-MCNC: 11.8 G/DL — LOW (ref 14–18)
IMM GRANULOCYTES NFR BLD AUTO: 0.8 % — HIGH (ref 0.1–0.3)
IMM GRANULOCYTES NFR BLD AUTO: 0.9 % — HIGH (ref 0.1–0.3)
LYMPHOCYTES # BLD AUTO: 0.44 K/UL — LOW (ref 1.2–3.4)
LYMPHOCYTES # BLD AUTO: 0.94 K/UL — LOW (ref 1.2–3.4)
LYMPHOCYTES # BLD AUTO: 11.9 % — LOW (ref 20.5–51.1)
LYMPHOCYTES # BLD AUTO: 12.8 % — LOW (ref 20.5–51.1)
MAGNESIUM SERPL-MCNC: 1.7 MG/DL — LOW (ref 1.8–2.4)
MAGNESIUM SERPL-MCNC: 1.8 MG/DL — SIGNIFICANT CHANGE UP (ref 1.8–2.4)
MCHC RBC-ENTMCNC: 28.9 PG — SIGNIFICANT CHANGE UP (ref 27–31)
MCHC RBC-ENTMCNC: 29.3 PG — SIGNIFICANT CHANGE UP (ref 27–31)
MCHC RBC-ENTMCNC: 32.2 G/DL — SIGNIFICANT CHANGE UP (ref 32–37)
MCHC RBC-ENTMCNC: 32.3 G/DL — SIGNIFICANT CHANGE UP (ref 32–37)
MCV RBC AUTO: 89.7 FL — SIGNIFICANT CHANGE UP (ref 80–94)
MCV RBC AUTO: 90.8 FL — SIGNIFICANT CHANGE UP (ref 80–94)
MONOCYTES # BLD AUTO: 0.07 K/UL — LOW (ref 0.1–0.6)
MONOCYTES # BLD AUTO: 0.48 K/UL — SIGNIFICANT CHANGE UP (ref 0.1–0.6)
MONOCYTES NFR BLD AUTO: 2 % — SIGNIFICANT CHANGE UP (ref 1.7–9.3)
MONOCYTES NFR BLD AUTO: 6.1 % — SIGNIFICANT CHANGE UP (ref 1.7–9.3)
NEUTROPHILS # BLD AUTO: 2.87 K/UL — SIGNIFICANT CHANGE UP (ref 1.4–6.5)
NEUTROPHILS # BLD AUTO: 5.95 K/UL — SIGNIFICANT CHANGE UP (ref 1.4–6.5)
NEUTROPHILS NFR BLD AUTO: 75.5 % — HIGH (ref 42.2–75.2)
NEUTROPHILS NFR BLD AUTO: 83.7 % — HIGH (ref 42.2–75.2)
NRBC # BLD: 0 /100 WBCS — SIGNIFICANT CHANGE UP (ref 0–0)
NRBC # BLD: 0 /100 WBCS — SIGNIFICANT CHANGE UP (ref 0–0)
NT-PROBNP SERPL-SCNC: 3101 PG/ML — HIGH (ref 0–300)
PLATELET # BLD AUTO: 186 K/UL — SIGNIFICANT CHANGE UP (ref 130–400)
PLATELET # BLD AUTO: 198 K/UL — SIGNIFICANT CHANGE UP (ref 130–400)
POTASSIUM SERPL-MCNC: 4.8 MMOL/L — SIGNIFICANT CHANGE UP (ref 3.5–5)
POTASSIUM SERPL-MCNC: 6.2 MMOL/L — CRITICAL HIGH (ref 3.5–5)
POTASSIUM SERPL-SCNC: 4.8 MMOL/L — SIGNIFICANT CHANGE UP (ref 3.5–5)
POTASSIUM SERPL-SCNC: 6.2 MMOL/L — CRITICAL HIGH (ref 3.5–5)
PROT SERPL-MCNC: 6.3 G/DL — SIGNIFICANT CHANGE UP (ref 6–8)
RBC # BLD: 3.58 M/UL — LOW (ref 4.7–6.1)
RBC # BLD: 4.08 M/UL — LOW (ref 4.7–6.1)
RBC # FLD: 14.9 % — HIGH (ref 11.5–14.5)
RBC # FLD: 14.9 % — HIGH (ref 11.5–14.5)
SARS-COV-2 RNA SPEC QL NAA+PROBE: SIGNIFICANT CHANGE UP
SODIUM SERPL-SCNC: 131 MMOL/L — LOW (ref 135–146)
SODIUM SERPL-SCNC: 134 MMOL/L — LOW (ref 135–146)
TROPONIN T SERPL-MCNC: 0.04 NG/ML — CRITICAL HIGH
WBC # BLD: 3.43 K/UL — LOW (ref 4.8–10.8)
WBC # BLD: 7.88 K/UL — SIGNIFICANT CHANGE UP (ref 4.8–10.8)
WBC # FLD AUTO: 3.43 K/UL — LOW (ref 4.8–10.8)
WBC # FLD AUTO: 7.88 K/UL — SIGNIFICANT CHANGE UP (ref 4.8–10.8)

## 2021-10-13 PROCEDURE — 99223 1ST HOSP IP/OBS HIGH 75: CPT

## 2021-10-13 PROCEDURE — 99291 CRITICAL CARE FIRST HOUR: CPT

## 2021-10-13 PROCEDURE — 99053 MED SERV 10PM-8AM 24 HR FAC: CPT

## 2021-10-13 PROCEDURE — 71045 X-RAY EXAM CHEST 1 VIEW: CPT | Mod: 26

## 2021-10-13 PROCEDURE — 99222 1ST HOSP IP/OBS MODERATE 55: CPT

## 2021-10-13 PROCEDURE — 93306 TTE W/DOPPLER COMPLETE: CPT | Mod: 26

## 2021-10-13 PROCEDURE — 99497 ADVNCD CARE PLAN 30 MIN: CPT | Mod: 25

## 2021-10-13 PROCEDURE — 93010 ELECTROCARDIOGRAM REPORT: CPT | Mod: 76

## 2021-10-13 RX ORDER — INSULIN LISPRO 100/ML
6 VIAL (ML) SUBCUTANEOUS
Refills: 0 | Status: DISCONTINUED | OUTPATIENT
Start: 2021-10-13 | End: 2021-10-13

## 2021-10-13 RX ORDER — FUROSEMIDE 40 MG
40 TABLET ORAL DAILY
Refills: 0 | Status: DISCONTINUED | OUTPATIENT
Start: 2021-10-13 | End: 2021-10-14

## 2021-10-13 RX ORDER — SODIUM ZIRCONIUM CYCLOSILICATE 10 G/10G
5 POWDER, FOR SUSPENSION ORAL THREE TIMES A DAY
Refills: 0 | Status: DISCONTINUED | OUTPATIENT
Start: 2021-10-13 | End: 2021-10-14

## 2021-10-13 RX ORDER — CARVEDILOL PHOSPHATE 80 MG/1
25 CAPSULE, EXTENDED RELEASE ORAL EVERY 12 HOURS
Refills: 0 | Status: DISCONTINUED | OUTPATIENT
Start: 2021-10-13 | End: 2021-10-14

## 2021-10-13 RX ORDER — SODIUM CHLORIDE 9 MG/ML
1000 INJECTION, SOLUTION INTRAVENOUS
Refills: 0 | Status: DISCONTINUED | OUTPATIENT
Start: 2021-10-13 | End: 2021-10-14

## 2021-10-13 RX ORDER — DEXTROSE 50 % IN WATER 50 %
15 SYRINGE (ML) INTRAVENOUS ONCE
Refills: 0 | Status: DISCONTINUED | OUTPATIENT
Start: 2021-10-13 | End: 2021-10-14

## 2021-10-13 RX ORDER — LANOLIN ALCOHOL/MO/W.PET/CERES
5 CREAM (GRAM) TOPICAL AT BEDTIME
Refills: 0 | Status: DISCONTINUED | OUTPATIENT
Start: 2021-10-13 | End: 2021-10-14

## 2021-10-13 RX ORDER — OXYCODONE AND ACETAMINOPHEN 5; 325 MG/1; MG/1
1 TABLET ORAL DAILY
Refills: 0 | Status: DISCONTINUED | OUTPATIENT
Start: 2021-10-13 | End: 2021-10-14

## 2021-10-13 RX ORDER — DEXTROSE 50 % IN WATER 50 %
12.5 SYRINGE (ML) INTRAVENOUS ONCE
Refills: 0 | Status: DISCONTINUED | OUTPATIENT
Start: 2021-10-13 | End: 2021-10-14

## 2021-10-13 RX ORDER — INSULIN LISPRO 100/ML
VIAL (ML) SUBCUTANEOUS
Refills: 0 | Status: DISCONTINUED | OUTPATIENT
Start: 2021-10-13 | End: 2021-10-14

## 2021-10-13 RX ORDER — CLONAZEPAM 1 MG
1 TABLET ORAL EVERY 8 HOURS
Refills: 0 | Status: DISCONTINUED | OUTPATIENT
Start: 2021-10-13 | End: 2021-10-14

## 2021-10-13 RX ORDER — INSULIN GLARGINE 100 [IU]/ML
24 INJECTION, SOLUTION SUBCUTANEOUS AT BEDTIME
Refills: 0 | Status: DISCONTINUED | OUTPATIENT
Start: 2021-10-13 | End: 2021-10-14

## 2021-10-13 RX ORDER — INSULIN LISPRO 100/ML
VIAL (ML) SUBCUTANEOUS
Refills: 0 | Status: DISCONTINUED | OUTPATIENT
Start: 2021-10-13 | End: 2021-10-13

## 2021-10-13 RX ORDER — INSULIN LISPRO 100/ML
8 VIAL (ML) SUBCUTANEOUS
Refills: 0 | Status: DISCONTINUED | OUTPATIENT
Start: 2021-10-13 | End: 2021-10-14

## 2021-10-13 RX ORDER — SIMVASTATIN 20 MG/1
40 TABLET, FILM COATED ORAL AT BEDTIME
Refills: 0 | Status: DISCONTINUED | OUTPATIENT
Start: 2021-10-13 | End: 2021-10-14

## 2021-10-13 RX ORDER — GLUCAGON INJECTION, SOLUTION 0.5 MG/.1ML
1 INJECTION, SOLUTION SUBCUTANEOUS ONCE
Refills: 0 | Status: DISCONTINUED | OUTPATIENT
Start: 2021-10-13 | End: 2021-10-14

## 2021-10-13 RX ORDER — INSULIN GLARGINE 100 [IU]/ML
18 INJECTION, SOLUTION SUBCUTANEOUS AT BEDTIME
Refills: 0 | Status: DISCONTINUED | OUTPATIENT
Start: 2021-10-13 | End: 2021-10-13

## 2021-10-13 RX ORDER — GABAPENTIN 400 MG/1
300 CAPSULE ORAL DAILY
Refills: 0 | Status: DISCONTINUED | OUTPATIENT
Start: 2021-10-13 | End: 2021-10-14

## 2021-10-13 RX ORDER — FUROSEMIDE 40 MG
60 TABLET ORAL ONCE
Refills: 0 | Status: COMPLETED | OUTPATIENT
Start: 2021-10-13 | End: 2021-10-13

## 2021-10-13 RX ORDER — FUROSEMIDE 40 MG
40 TABLET ORAL ONCE
Refills: 0 | Status: COMPLETED | OUTPATIENT
Start: 2021-10-13 | End: 2021-10-13

## 2021-10-13 RX ORDER — DEXTROSE 50 % IN WATER 50 %
25 SYRINGE (ML) INTRAVENOUS ONCE
Refills: 0 | Status: DISCONTINUED | OUTPATIENT
Start: 2021-10-13 | End: 2021-10-14

## 2021-10-13 RX ORDER — GABAPENTIN 400 MG/1
600 CAPSULE ORAL AT BEDTIME
Refills: 0 | Status: DISCONTINUED | OUTPATIENT
Start: 2021-10-13 | End: 2021-10-14

## 2021-10-13 RX ORDER — ASPIRIN/CALCIUM CARB/MAGNESIUM 324 MG
81 TABLET ORAL DAILY
Refills: 0 | Status: DISCONTINUED | OUTPATIENT
Start: 2021-10-13 | End: 2021-10-14

## 2021-10-13 RX ORDER — NIFEDIPINE 30 MG
60 TABLET, EXTENDED RELEASE 24 HR ORAL DAILY
Refills: 0 | Status: DISCONTINUED | OUTPATIENT
Start: 2021-10-13 | End: 2021-10-14

## 2021-10-13 RX ADMIN — OXYCODONE AND ACETAMINOPHEN 1 TABLET(S): 5; 325 TABLET ORAL at 21:27

## 2021-10-13 RX ADMIN — INSULIN GLARGINE 24 UNIT(S): 100 INJECTION, SOLUTION SUBCUTANEOUS at 21:27

## 2021-10-13 RX ADMIN — GABAPENTIN 300 MILLIGRAM(S): 400 CAPSULE ORAL at 11:57

## 2021-10-13 RX ADMIN — CARVEDILOL PHOSPHATE 25 MILLIGRAM(S): 80 CAPSULE, EXTENDED RELEASE ORAL at 06:59

## 2021-10-13 RX ADMIN — SODIUM ZIRCONIUM CYCLOSILICATE 5 GRAM(S): 10 POWDER, FOR SUSPENSION ORAL at 16:13

## 2021-10-13 RX ADMIN — Medication 40 MILLIGRAM(S): at 03:07

## 2021-10-13 RX ADMIN — Medication 5: at 08:35

## 2021-10-13 RX ADMIN — Medication 81 MILLIGRAM(S): at 11:57

## 2021-10-13 RX ADMIN — SIMVASTATIN 40 MILLIGRAM(S): 20 TABLET, FILM COATED ORAL at 21:27

## 2021-10-13 RX ADMIN — Medication 8 UNIT(S): at 17:06

## 2021-10-13 RX ADMIN — OXYCODONE AND ACETAMINOPHEN 1 TABLET(S): 5; 325 TABLET ORAL at 21:58

## 2021-10-13 RX ADMIN — Medication 6: at 17:06

## 2021-10-13 RX ADMIN — Medication 60 MILLIGRAM(S): at 06:59

## 2021-10-13 RX ADMIN — Medication 20 MILLIGRAM(S): at 06:59

## 2021-10-13 RX ADMIN — GABAPENTIN 600 MILLIGRAM(S): 400 CAPSULE ORAL at 21:27

## 2021-10-13 RX ADMIN — CARVEDILOL PHOSPHATE 25 MILLIGRAM(S): 80 CAPSULE, EXTENDED RELEASE ORAL at 18:16

## 2021-10-13 RX ADMIN — SODIUM ZIRCONIUM CYCLOSILICATE 5 GRAM(S): 10 POWDER, FOR SUSPENSION ORAL at 21:28

## 2021-10-13 RX ADMIN — Medication 60 MILLIGRAM(S): at 16:13

## 2021-10-13 RX ADMIN — Medication 40 MILLIGRAM(S): at 06:59

## 2021-10-13 RX ADMIN — Medication 6: at 11:57

## 2021-10-13 RX ADMIN — Medication 6 UNIT(S): at 11:57

## 2021-10-13 RX ADMIN — Medication 1 MILLIGRAM(S): at 22:31

## 2021-10-13 NOTE — H&P ADULT - ASSESSMENT
89 yr old male with hx of HTN, HLD, DM2, CAD, HFpEF, s/p PPM, prostate CA (in remission), CKD3 admitted for progressive sob for past 3 days.     # Acute on Chronic HFpEF   # s/p PPM  # CAD  - hypervolumic on exam   - CXR   - echo (04/2019): EF 55%  - repeat echo   - C/w Coreg 25 Q12H and Lasix 40 PO QD  - cardiology consult     # Elevated Trop   - chronic  - likely NSTEMI type 2    # HTN  - c/w Lasix, Nifedipine enalapril, carvedilol     # DM2 with neuropathy   - monitor FS  - start basal/bolus insulin regimen 18/6/6/6 + sliding scale  - c/w gabapentin     # CKD 3  - Cr 1.5 (at baseline)   - Monitor BMP  - Avoid nephrotoxins    # Chronic Normocytic anemia  - Hb 10.5 (at baseline)  - no further workup at this time     # HLD  - c/w Zocor    # Prostate CA (in remission)    # DASH diet    # Ambulate as tolerated  - PT eval     # Full code 89 yr old male with hx of HTN, HLD, DM2, CAD, HFpEF, s/p PPM, prostate CA (in remission), CKD3 admitted for progressive sob for past 3 days.     # Acute on Chronic HFpEF   - hypervolumic on exam   - CXR: pending  - echo (04/2019): EF 55%  - start lasix 40mgm IV daily (take 40mg PO daily)  - daily weight  - strict ins and outs  - c/w coreg    - repeat echo   - cardiology consult     # Elevated Trop   - chronic  - likely NSTEMI type 2    # s/p PPM  # CAD  - c/w ASA, coreg, statin     # HTN  - c/w Lasix, Nifedipine enalapril, carvedilol     # DM2 with neuropathy   - monitor FS  - start basal/bolus insulin regimen 18/6/6/6 + sliding scale  - c/w gabapentin     # CKD 3  - Cr 1.5 (at baseline)   - Monitor BMP  - Avoid nephrotoxins    # Chronic Normocytic anemia  - Hb 10.5 (at baseline)  - no further workup at this time     # Prostate CA (in remission)  - outpt f/u    # DASH diet    # Ambulate as tolerated  - PT eval     # Full code 89 yr old male with hx of HTN, HLD, DM2, CAD, HFpEF, s/p PPM, prostate CA (in remission), CKD3 admitted for progressive sob for since midnight.   Pt's daughter providing HPI, pt is wearing CPaP.  Daughter states that pt was out and about today running errands etc.  During the early night pt states that he had difficulty sleeping due to SOB, SOB worsened about midnight, pt activated his "life alert" and was later BIBA.  Pt is NAD, lives alone.      # Acute on Chronic HFpEF   - hypervolumic on exam   - CXR: pending  - echo (04/2019): EF 55%  - start lasix 40mgm IV daily (take 40mg PO daily)  - daily weight  - strict ins and outs  - c/w coreg    - repeat echo   - cardiology consult     # Elevated Trop   - chronic  - likely NSTEMI type 2    # s/p PPM  # CAD  - c/w ASA, coreg, statin     # HTN  - c/w Lasix, Nifedipine enalapril, carvedilol     # DM2 with neuropathy   - monitor FS  - start basal/bolus insulin regimen 18/6/6/6 + sliding scale  - c/w gabapentin     # CKD 3  - Cr 1.5 (at baseline)   - Monitor BMP  - Avoid nephrotoxins    # Chronic Normocytic anemia  - Hb 10.5 (at baseline)  - no further workup at this time     # Prostate CA (in remission)  - outpt f/u    # DASH diet    # Ambulate as tolerated  - PT eval     # Full code 89 yr old male with hx of HTN, HLD, DM2, CAD, HFpEF, s/p PPM, prostate CA (in remission), CKD3 admitted for progressive sob for since midnight    # Acute on Chronic HFpEF   # s/p PPM  - hypervolemic on exam   - BNP: 3100  - EKG: Afib with acute ST or T wave changes   - CXR: pending  - echo (04/2019): EF 55%  - start lasix 40mgm IV daily (take 40mg PO daily)  - c/w coreg    - daily weight  - strict ins and outs  - repeat echo   - cardiology consult     # Elevated Trop   - chronic  - likely NSTEMI type 2    # CAD  - c/w ASA, coreg and statin      # HTN  - c/w Lasix, Nifedipine enalapril, carvedilol     # DM2 with neuropathy   - monitor FS  - start basal/bolus insulin regimen 18/6/6/6 + sliding scale  - c/w gabapentin     # CKD 3  - Cr 1.5 (at baseline)   - Monitor BMP  - Avoid nephrotoxins    # Chronic Normocytic anemia  - Hb 10.5 (at baseline)  - no further workup at this time     # Prostate CA (in remission)  - outpt f/u    # DASH diet    # Ambulate as tolerated  - PT eval     # Full code    *** pls verify meds in am

## 2021-10-13 NOTE — CONSULT NOTE ADULT - SUBJECTIVE AND OBJECTIVE BOX
CARDIOLOGY CONSULT NOTE     CHIEF COMPLAINT/REASON FOR CONSULT:    HPI:  89 yr old male with hx of HTN, HLD, DM2, CAD, HFpEF, s/p PPM, prostate CA (in remission), CKD3 admitted for progressive sob for past 3 days.    Pt is wearing CPaP.  Daughter states that pt was out and about yesterday running errands etc.  During the early night pt states that he had difficulty sleeping due to SOB worsened about midnight, pt activated his "life alert" and was later BIBA.  Pt is NAD, lives alone.   (13 Oct 2021 04:53)      PAST MEDICAL & SURGICAL HISTORY:  CHF (congestive heart failure)    DM (diabetes mellitus)    HTN (hypertension)    Prostate CA  in remission    Pacemaker    H/O total knee replacement, right        Cardiac Risks:   [x ]HTN, [x ] DM, [ ] Smoking, [ ] FH,  [ ] Lipids        MEDICATIONS:  MEDICATIONS  (STANDING):  aspirin  chewable 81 milliGRAM(s) Oral daily  carvedilol 25 milliGRAM(s) Oral every 12 hours  dextrose 40% Gel 15 Gram(s) Oral once  dextrose 5%. 1000 milliLiter(s) (50 mL/Hr) IV Continuous <Continuous>  dextrose 5%. 1000 milliLiter(s) (100 mL/Hr) IV Continuous <Continuous>  dextrose 50% Injectable 25 Gram(s) IV Push once  dextrose 50% Injectable 12.5 Gram(s) IV Push once  dextrose 50% Injectable 25 Gram(s) IV Push once  enalapril 20 milliGRAM(s) Oral daily  furosemide   Injectable 40 milliGRAM(s) IV Push daily  gabapentin 600 milliGRAM(s) Oral at bedtime  gabapentin 300 milliGRAM(s) Oral daily  glucagon  Injectable 1 milliGRAM(s) IntraMuscular once  insulin glargine Injectable (LANTUS) 18 Unit(s) SubCutaneous at bedtime  insulin lispro (ADMELOG) corrective regimen sliding scale   SubCutaneous three times a day before meals  insulin lispro Injectable (ADMELOG) 6 Unit(s) SubCutaneous before breakfast  insulin lispro Injectable (ADMELOG) 6 Unit(s) SubCutaneous before lunch  insulin lispro Injectable (ADMELOG) 6 Unit(s) SubCutaneous before dinner  NIFEdipine XL 60 milliGRAM(s) Oral daily  simvastatin 40 milliGRAM(s) Oral at bedtime      FAMILY HISTORY:  FH: type 2 diabetes (Father)        SOCIAL HISTORY:      [ ] Marital status    Allergies    No Known Allergies    Intolerances    	    REVIEW OF SYSTEMS:  CONSTITUTIONAL: No fever, weight loss, or fatigue  EYES: No eye pain, visual disturbances, or discharge  ENMT:  No difficulty hearing, tinnitus, vertigo; No sinus or throat pain  NECK: No pain or stiffness  RESPIRATORY: No cough, wheezing, chills or hemoptysis; No Shortness of Breath  CARDIOVASCULAR: See above  GASTROINTESTINAL: No abdominal or epigastric pain. No nausea, vomiting, or hematemesis; No diarrhea or constipation. No melena or hematochezia.  GENITOURINARY: No dysuria, frequency, hematuria, or incontinence  NEUROLOGICAL: No headaches, memory loss, loss of strength, numbness, or tremors  SKIN: No itching, burning, rashes, or lesions   	      PHYSICAL EXAM:  T(C): 36 (10-13-21 @ 06:45), Max: 36.1 (10-13-21 @ 02:27)  HR: 71 (10-13-21 @ 06:45) (68 - 99)  BP: 192/77 (10-13-21 @ 06:45) (147/69 - 192/77)  RR: 20 (10-13-21 @ 06:45) (20 - 22)  SpO2: 100% (10-13-21 @ 07:50) (100% - 100%)  Wt(kg): --  I&O's Summary      Appearance: Normal	  Psychiatry: A & O x 3, Mood & affect appropriate  HEENT:   Normal oral mucosa, PERRL, EOMI	  Lymphatic: No lymphadenopathy  Cardiovascular: Normal S1 S2,RRR, No JVD, No murmurs  Respiratory: Lungs clear to auscultation	Decreased bs  Gastrointestinal:  Soft, Non-tender, + BS	  Skin: No rashes, No ecchymoses, No cyanosis	  Neurologic: Non-focal  Extremities: Normal range of motion, No clubbing, cyanosis or edema  Vascular: Peripheral pulses palpable 2+ bilaterally      ECG:  	< from: 12 Lead ECG (09.30.21 @ 02:41) >  Diagnosis Line Sinus tachycardia with marked sinus arrhythmia with Premature ventricular  complexes or Fusion complexes  Incomplete right bundle branch block  Possible Right ventricular hypertrophy  Nonspecific ST-T changes    Confirmed by MYKEL ANDERSON MD ([id number]) on 9/30/2021 12:43:59 PM    < end of copied text >      	  LABS:	 	    CARDIAC MARKERS:          Serum Pro-Brain Natriuretic Peptide: 3101 pg/mL (10-13 @ 03:00)                            10.5   7.88  )-----------( 186      ( 13 Oct 2021 03:00 )             32.5     10-13    134<L>  |  103  |  25<H>  ----------------------------<  328<H>  4.8   |  20  |  1.5    Ca    8.6      13 Oct 2021 03:00  Mg     1.8     10-13    TPro  6.3  /  Alb  3.7  /  TBili  0.2  /  DBili  x   /  AST  11  /  ALT  11  /  AlkPhos  129<H>  10-13      proBNP: Serum Pro-Brain Natriuretic Peptide: 3101 pg/mL (10-13 @ 03:00)

## 2021-10-13 NOTE — PHYSICAL THERAPY INITIAL EVALUATION ADULT - GAIT DEVIATIONS NOTED, PT EVAL
forward flexed trunk, dec heel strike and push off/decreased maurizio/decreased step length/decreased stride length/decreased weight-shifting ability

## 2021-10-13 NOTE — H&P ADULT - NSHPPHYSICALEXAM_GEN_ALL_CORE
GENERAL: frail   HEAD:  Atraumatic, Normocephalic  EYES: EOMI, PERRLA, conjunctiva and sclera clear  ENT: Normal tympanic membrane. No nasal obstruction or discharge. No tonsillar exudate, swelling or erythema.  NECK: Supple, No JVD  CHEST/LUNG: Clear to auscultation bilaterally; No wheeze  HEART: Regular rate and rhythm; No murmurs, rubs, or gallops  ABDOMEN: Soft, Nontender, Nondistended; Bowel sounds present  EXTREMITIES:  2+ Peripheral Pulses, No clubbing, cyanosis, or edema  PSYCH: AAOx3  NEUROLOGY: non-focal  SKIN: No rashes or lesions GENERAL: appears young for age  HEAD:  Atraumatic, Normocephalic  EYES: EOMI, PERRLA, conjunctiva and sclera clear  ENT: Normal tympanic membrane. No nasal obstruction or discharge. No tonsillar exudate, swelling or erythema.  NECK: Supple, No JVD  CHEST/LUNG: Clear to auscultation bilaterally; No wheeze  HEART: Regular rate and rhythm; No murmurs, rubs, or gallops  ABDOMEN: Soft, Nontender, Nondistended; Bowel sounds present  EXTREMITIES:  2+ Peripheral Pulses, No clubbing, cyanosis, or edema  PSYCH: AAOx3  NEUROLOGY: non-focal  SKIN: No rashes or lesions GENERAL: appears young for age  HEAD:  Atraumatic, Normocephalic  EYES: EOMI, PERRLA, conjunctiva and sclera clear  ENT: Normal tympanic membrane. No nasal obstruction or discharge. No tonsillar exudate, swelling or erythema.  NECK: Supple, No JVD  CHEST/LUNG: mild crackles at lung base; No wheeze  HEART: irregular rate and rhythm; No murmurs, rubs, or gallops  ABDOMEN: Soft, Nontender, Nondistended; Bowel sounds present  EXTREMITIES:  2+ Peripheral Pulses, No clubbing, cyanosis, 1+ b/l LE edema   PSYCH: AAOx3  NEUROLOGY: CN intact, motor and sensory to all ext intact   SKIN: No rashes or lesions

## 2021-10-13 NOTE — PHYSICAL THERAPY INITIAL EVALUATION ADULT - ADL SKILLS, REHAB EVAL
needed assist
Detail Level: Generalized
General Sunscreen Counseling: I recommended a broad spectrum sunscreen with a SPF of 30 or higher.  I explained that SPF 30 sunscreens block approximately 97 percent of the sun's harmful rays.  Sunscreens should be applied at least 15 minutes prior to expected sun exposure and then every 2 hours after that as long as sun exposure continues. If swimming or exercising sunscreen should be reapplied every 45 minutes to an hour after getting wet or sweating.  One ounce, or the equivalent of a shot glass full of sunscreen, is adequate to protect the skin not covered by a bathing suit. I also recommended a lip balm with a sunscreen as well. Sun protective clothing can be used in lieu of sunscreen but must be worn the entire time you are exposed to the sun's rays.

## 2021-10-13 NOTE — PHYSICAL THERAPY INITIAL EVALUATION ADULT - ADDITIONAL COMMENTS
Pt reports he lives alone in an apartment in 2 family house. Pt states 1 step to enter apt then all living space on 1st level. Pt reports he has SC and RW at home - ambulates with AD as needed. Pt states he is able to take care of himself during the day but daughter assists with other ADLs.

## 2021-10-13 NOTE — ED PROVIDER NOTE - CLINICAL SUMMARY MEDICAL DECISION MAKING FREE TEXT BOX
[id number] lives alone  pmhx CHF  on lasix 40 (intermittent compliance with diet - per daughter at bedside) ,  PM, (cardiology  [facility])  IDDM htn hld, ckd3 pw orthopnea  SOB  started at midnight t no chest pain  fever cough  no leg pain or swelling     pt  with tachypnea mild accessory  muscle use  pt was  86% on RA  100% on NRB  bedside US with bl  b-lines  trace pleural effusions.  ekg  no acute  ischemia trop .04,  bnp 3000,  ckd 1.5  baseline crea.  pt  given pip and lasix  , feels bett er-  pt  admitted for  CHF exacerbation

## 2021-10-13 NOTE — ED ADULT TRIAGE NOTE - HEIGHT IN FEET
· As addressed in HPI  · Reassessment of upper thoracic and generator site incision revealed appropriately healed scar , intact skin  · Refer to attachment photos of both incisions  · SCS  is not relieving burning sensation in right sided cervical and scapular  area, right sided  cervical paraspinal area  Severity 7/10 on verbal numeric scale  · Spinal cord stimulator relieving pain  in bilateral hands and arms  · Current medication regimen not helping pain, cyclobenzaprine,  Gabapentin or percocet  not helping  Was prescribed methocarbamol but insurance would not cover  Cymbalta she reports is for anxiety and mood  · Is tearful , argumentative with spouse , remarked she is in so much discomfort this causes anxiety, asked for another antispasmodic order  ·      PLAN;  · Instructed to call office if incision dehiscence, drainage , redness, fever , chills   · Tizanidine prescribed 30 days supply  to assess effect , call me in 3 weeks with condition update   · F/U with pain management , maintain relationship  Dr Nicolette Monte   5

## 2021-10-13 NOTE — CONSULT NOTE ADULT - ASSESSMENT
Patient with CKD 3 . It was worse past. Troponin elevated higher in past. Now volume overloaded. Probably secondary renal function diet and chf preserved LV. Continue meds. Agree Iv lasix. Check lytes and daily weight. Po lasix when improved. Review low na diet

## 2021-10-13 NOTE — ED PROVIDER NOTE - OBJECTIVE STATEMENT
89 year old past medical history of CHF on lasix, CKD, comes to emergency room for shortness of breath. patient states tarted suddenly tonight around 12am and has been getting worse. 911 called found patient hypoxic to 86% on RA and placed on NRB and transported to hospital.

## 2021-10-13 NOTE — H&P ADULT - NSHPSOCIALHISTORY_GEN_ALL_CORE
Substance Use (street drugs): ( x ) never used  (  ) other:  Tobacco Usage:  (   ) never smoked   ( x  ) former smoker   (   ) current smoker  (     ) pack year  Alcohol Usage: None

## 2021-10-13 NOTE — PHYSICAL THERAPY INITIAL EVALUATION ADULT - GENERAL OBSERVATIONS, REHAB EVAL
Pt encountered semi-reclined in bed, NAD, on 3 L02/min via NC, ok to be seen by PT as confirmed by RN and pt agreeable. +chart reviewed. Pt states fall a few months ago with cervical fx - wears soft collar only as needed

## 2021-10-13 NOTE — PROVIDER CONTACT NOTE (CRITICAL VALUE NOTIFICATION) - SITUATION
pt to receive 6u admelog sliding scale and 6u standing dose. no further interventions. 12u total administered.

## 2021-10-13 NOTE — ED PROVIDER NOTE - NS ED MD DISPO ISOLATION TYPES
None Tissue Cultured Epidermal Autograft Text: The defect edges were debeveled with a #15 scalpel blade.  Given the location of the defect, shape of the defect and the proximity to free margins a tissue cultured epidermal autograft was deemed most appropriate.  The graft was then trimmed to fit the size of the defect.  The graft was then placed in the primary defect and oriented appropriately.

## 2021-10-13 NOTE — H&P ADULT - HISTORY OF PRESENT ILLNESS
89 yr old male with hx of HTN, HLD, DM2, CAD, HFpEF, s/p PPM, prostate CA (in remission), CKD3 admitted for progressive sob for past 3 days.  89 yr old male with hx of HTN, HLD, DM2, CAD, HFpEF, s/p PPM, prostate CA (in remission), CKD3 admitted for progressive sob for past 3 days.   Pt's daughter providing HPI, pt is wearing CPaP.  Daughter states that pt was out and about today running errands etc.  During the early night pt states that he had difficulty sleeping due to SOB, SOB worsened about midnight, pt activated his "life alert" and was later BIBA.  Pt is NAD, lives alone.   89 yr old male with hx of HTN, HLD, DM2, CAD, HFpEF, s/p PPM, prostate CA (in remission), CKD3 admitted for progressive sob for past 3 days.   Pt's daughter providing HPI, pt is wearing CPaP.  Daughter states that pt was out and about today running errands etc.  During the early night pt states that he had difficulty sleeping due to SOB worsened about midnight, pt activated his "life alert" and was later BIBA.  Pt is NAD, lives alone.

## 2021-10-13 NOTE — ED ADULT NURSE NOTE - NSIMPLEMENTINTERV_GEN_ALL_ED
Implemented All Fall with Harm Risk Interventions:  [place] to call system. Call bell, personal items and telephone within reach. Instruct patient to call for assistance. Room bathroom lighting operational. Non-slip footwear when patient is off stretcher. Physically safe environment: no spills, clutter or unnecessary equipment. Stretcher in lowest position, wheels locked, appropriate side rails in place. Provide visual cue, wrist band, yellow gown, etc. Monitor gait and stability. Monitor for mental status changes and reorient to person, place, and time. Review medications for side effects contributing to fall risk. Reinforce activity limits and safety measures with patient and family. Provide visual clues: red socks.

## 2021-10-13 NOTE — ED PROVIDER NOTE - PHYSICAL EXAMINATION
Physical Exam    Vital Signs: I have reviewed the initial vital signs.  Constitutional: well-nourished, appears stated age, Mod acute distress  Eyes: Conjunctiva pink, Sclera clear, PERRLA, EOMI.  Cardiovascular: S1 and S2, regular rate, regular rhythm, well-perfused extremities, radial pulses equal and 2+  Respiratory: +labored respiratory effort, +b/l rales and decreased lung sounds at bases   Gastrointestinal: soft, non-tender abdomen, no pulsatile mass, normal bowl sounds  Musculoskeletal: supple neck, + lower extremity edema, no midline tenderness  Integumentary: warm, dry, no rash  Neurologic: awake, alert, cranial nerves II-XII grossly intact, extremities’ motor and sensory functions grossly intact  Psychiatric: appropriate mood, appropriate affect

## 2021-10-13 NOTE — H&P ADULT - NSHPLABSRESULTS_GEN_ALL_CORE
10.5   7.88  )-----------( 186      ( 13 Oct 2021 03:00 )             32.5       134<L>  |  103  |  25<H>  ----------------------------<  328<H>  4.8   |  20  |  1.5    Ca    8.6      13 Oct 2021 03:00  Mg     1.8     10-13    TPro  6.3  /  Alb  3.7  /  TBili  0.2  /  DBili  x   /  AST  11  /  ALT  11  /  AlkPhos  129<H>  10-13

## 2021-10-13 NOTE — ED ADULT NURSE NOTE - CHIEF COMPLAINT QUOTE
Pt BIB [facility] from home for SOB; HX COPD; 86% RA on scene; 100% on NRB; 12mg dexamethasone given IV by EMS.

## 2021-10-14 ENCOUNTER — TRANSCRIPTION ENCOUNTER (OUTPATIENT)
Age: 86
End: 2021-10-14

## 2021-10-14 VITALS
SYSTOLIC BLOOD PRESSURE: 126 MMHG | TEMPERATURE: 97 F | OXYGEN SATURATION: 95 % | HEART RATE: 67 BPM | DIASTOLIC BLOOD PRESSURE: 58 MMHG | RESPIRATION RATE: 18 BRPM

## 2021-10-14 LAB
A1C WITH ESTIMATED AVERAGE GLUCOSE RESULT: 8 % — HIGH (ref 4–5.6)
ANION GAP SERPL CALC-SCNC: 15 MMOL/L — HIGH (ref 7–14)
BUN SERPL-MCNC: 38 MG/DL — HIGH (ref 10–20)
CALCIUM SERPL-MCNC: 8.6 MG/DL — SIGNIFICANT CHANGE UP (ref 8.5–10.1)
CHLORIDE SERPL-SCNC: 98 MMOL/L — SIGNIFICANT CHANGE UP (ref 98–110)
CO2 SERPL-SCNC: 21 MMOL/L — SIGNIFICANT CHANGE UP (ref 17–32)
CREAT SERPL-MCNC: 1.8 MG/DL — HIGH (ref 0.7–1.5)
ESTIMATED AVERAGE GLUCOSE: 183 MG/DL — HIGH (ref 68–114)
GLUCOSE BLDC GLUCOMTR-MCNC: 217 MG/DL — HIGH (ref 70–99)
GLUCOSE BLDC GLUCOMTR-MCNC: 335 MG/DL — HIGH (ref 70–99)
GLUCOSE SERPL-MCNC: 213 MG/DL — HIGH (ref 70–99)
HCT VFR BLD CALC: 31.4 % — LOW (ref 42–52)
HGB BLD-MCNC: 10.2 G/DL — LOW (ref 14–18)
MCHC RBC-ENTMCNC: 29 PG — SIGNIFICANT CHANGE UP (ref 27–31)
MCHC RBC-ENTMCNC: 32.5 G/DL — SIGNIFICANT CHANGE UP (ref 32–37)
MCV RBC AUTO: 89.2 FL — SIGNIFICANT CHANGE UP (ref 80–94)
NRBC # BLD: 0 /100 WBCS — SIGNIFICANT CHANGE UP (ref 0–0)
PLATELET # BLD AUTO: 194 K/UL — SIGNIFICANT CHANGE UP (ref 130–400)
POTASSIUM SERPL-MCNC: 4.6 MMOL/L — SIGNIFICANT CHANGE UP (ref 3.5–5)
POTASSIUM SERPL-SCNC: 4.6 MMOL/L — SIGNIFICANT CHANGE UP (ref 3.5–5)
RBC # BLD: 3.52 M/UL — LOW (ref 4.7–6.1)
RBC # FLD: 15 % — HIGH (ref 11.5–14.5)
SODIUM SERPL-SCNC: 134 MMOL/L — LOW (ref 135–146)
WBC # BLD: 7.47 K/UL — SIGNIFICANT CHANGE UP (ref 4.8–10.8)
WBC # FLD AUTO: 7.47 K/UL — SIGNIFICANT CHANGE UP (ref 4.8–10.8)

## 2021-10-14 PROCEDURE — 99238 HOSP IP/OBS DSCHRG MGMT 30/<: CPT

## 2021-10-14 RX ORDER — CLONAZEPAM 1 MG
1 TABLET ORAL
Qty: 0 | Refills: 0 | DISCHARGE

## 2021-10-14 RX ORDER — INSULIN LISPRO 100/ML
6 VIAL (ML) SUBCUTANEOUS
Qty: 0 | Refills: 0 | DISCHARGE

## 2021-10-14 RX ORDER — INSULIN GLARGINE 100 [IU]/ML
18 INJECTION, SOLUTION SUBCUTANEOUS
Qty: 0 | Refills: 0 | DISCHARGE

## 2021-10-14 RX ORDER — GABAPENTIN 400 MG/1
1 CAPSULE ORAL
Qty: 0 | Refills: 0 | DISCHARGE

## 2021-10-14 RX ORDER — CARVEDILOL PHOSPHATE 80 MG/1
1 CAPSULE, EXTENDED RELEASE ORAL
Qty: 60 | Refills: 0
Start: 2021-10-14 | End: 2021-11-12

## 2021-10-14 RX ORDER — INSULIN GLARGINE 100 [IU]/ML
24 INJECTION, SOLUTION SUBCUTANEOUS
Qty: 0 | Refills: 0 | DISCHARGE

## 2021-10-14 RX ORDER — HEPARIN SODIUM 5000 [USP'U]/ML
5000 INJECTION INTRAVENOUS; SUBCUTANEOUS EVERY 12 HOURS
Refills: 0 | Status: DISCONTINUED | OUTPATIENT
Start: 2021-10-14 | End: 2021-10-14

## 2021-10-14 RX ORDER — ASPIRIN/CALCIUM CARB/MAGNESIUM 324 MG
1 TABLET ORAL
Qty: 0 | Refills: 0 | DISCHARGE

## 2021-10-14 RX ORDER — INSULIN LISPRO 100/ML
8 VIAL (ML) SUBCUTANEOUS
Qty: 0 | Refills: 0 | DISCHARGE

## 2021-10-14 RX ADMIN — Medication 81 MILLIGRAM(S): at 12:38

## 2021-10-14 RX ADMIN — Medication 40 MILLIGRAM(S): at 05:59

## 2021-10-14 RX ADMIN — Medication 4: at 08:09

## 2021-10-14 RX ADMIN — Medication 60 MILLIGRAM(S): at 05:59

## 2021-10-14 RX ADMIN — CARVEDILOL PHOSPHATE 25 MILLIGRAM(S): 80 CAPSULE, EXTENDED RELEASE ORAL at 05:59

## 2021-10-14 RX ADMIN — GABAPENTIN 300 MILLIGRAM(S): 400 CAPSULE ORAL at 12:38

## 2021-10-14 RX ADMIN — Medication 8 UNIT(S): at 08:08

## 2021-10-14 RX ADMIN — Medication 8: at 12:19

## 2021-10-14 RX ADMIN — Medication 8 UNIT(S): at 12:18

## 2021-10-14 RX ADMIN — SODIUM ZIRCONIUM CYCLOSILICATE 5 GRAM(S): 10 POWDER, FOR SUSPENSION ORAL at 05:59

## 2021-10-14 NOTE — DISCHARGE NOTE PROVIDER - CARE PROVIDER_API CALL
Thomas Parker)  [id number]  [address] [place] [address]  Phone: [phone]  Fax: [phone]  Follow Up Time: 2 weeks

## 2021-10-14 NOTE — PROGRESS NOTE ADULT - SUBJECTIVE AND OBJECTIVE BOX
HPI:  Pt feels better, denies any cp or sob today.    PAST MEDICAL & SURGICAL HISTORY:  CHF (congestive heart failure)    DM (diabetes mellitus)    HTN (hypertension)    Prostate CA  in remission    Pacemaker    H/O total knee replacement, right        Allergies    No Known Allergies      MEDICATIONS  (STANDING):  aspirin  chewable 81 milliGRAM(s) Oral daily  carvedilol 25 milliGRAM(s) Oral every 12 hours  dextrose 40% Gel 15 Gram(s) Oral once  dextrose 5%. 1000 milliLiter(s) (50 mL/Hr) IV Continuous <Continuous>  dextrose 5%. 1000 milliLiter(s) (100 mL/Hr) IV Continuous <Continuous>  dextrose 50% Injectable 25 Gram(s) IV Push once  dextrose 50% Injectable 12.5 Gram(s) IV Push once  dextrose 50% Injectable 25 Gram(s) IV Push once  furosemide   Injectable 40 milliGRAM(s) IV Push daily  gabapentin 600 milliGRAM(s) Oral at bedtime  gabapentin 300 milliGRAM(s) Oral daily  glucagon  Injectable 1 milliGRAM(s) IntraMuscular once  insulin glargine Injectable (LANTUS) 24 Unit(s) SubCutaneous at bedtime  insulin lispro (ADMELOG) corrective regimen sliding scale   SubCutaneous three times a day before meals  insulin lispro Injectable (ADMELOG) 8 Unit(s) SubCutaneous three times a day with meals  NIFEdipine XL 60 milliGRAM(s) Oral daily  simvastatin 40 milliGRAM(s) Oral at bedtime  sodium zirconium cyclosilicate 5 Gram(s) Oral three times a day    MEDICATIONS  (PRN):  clonazePAM  Tablet 1 milliGRAM(s) Oral every 8 hours PRN anxiety  melatonin 5 milliGRAM(s) Oral at bedtime PRN Insomnia  oxycodone    5 mG/acetaminophen 325 mG 1 Tablet(s) Oral daily PRN Moderate Pain (4 - 6)    ROS:  General:	no fever, weight loss,  chills  Skin: no rash, ulcers  Respiratory and Thorax: no cough, wheeze, + sob  Cardiovascular:	no chest pain, palpitations, dizziness  Gastrointestinal:	no nausea, vomiting, diarrhea, abd pain  Genitourinary:	no dysuria, hematuria  Musculoskeletal:	no joint pains  Neurological:	 no speech disturbance, focal weakness, numbness        Vital Signs Last 24 Hrs  T(F): 96.3 (14 Oct 2021 05:00), Max: 96.8 (13 Oct 2021 22:38)  HR: 63 (14 Oct 2021 05:00) (63 - 89)  BP: 142/76 (14 Oct 2021 05:00) (142/76 - 155/70)  RR: 18 (14 Oct 2021 09:01) (18 - 18)  SpO2: 97% (14 Oct 2021 09:01) (97% - 98%)    PHYSICAL EXAM:      Constitutional: A&Ox4  Respiratory: cta b/l  Cardiovascular: s1 s2 rrr  Gastrointestinal: soft nt  nd + bs no rebound or guarding  Genitourinary: no cva tenderness  Extremities: normal rom, no edema, calf tenderness  Neurological: no focal deficits  Skin: no rash                            10.2   7.47  )-----------( 194      ( 14 Oct 2021 06:34 )             31.4           < from: Xray Chest 1 View- PORTABLE-Urgent (10.13.21 @ 07:23) >  IMPRESSION:    Increasing interstitial opacities bilaterally in left basilar opacity/effusion. Correlate for fluid overload.

## 2021-10-14 NOTE — DISCHARGE NOTE PROVIDER - NSDCMRMEDTOKEN_GEN_ALL_CORE_FT
acetaminophen 325 mg oral tablet: 2 tab(s) orally 2 times a day, As Needed  aspirin 81 mg oral tablet, chewable: 1 tab(s) orally once a day  carvedilol 25 mg oral tablet: 1 tab(s) orally every 12 hours  clonazePAM 1 mg oral tablet: 1 tab(s) orally 3 times a day, As Needed  gabapentin 300 mg oral capsule: 1 cap(s) orally once a day  gabapentin 600 mg oral tablet: 1 tab(s) orally once a day (at bedtime)  insulin lispro 100 units/mL injectable solution: 8 unit(s) subcutaneous 3 times a day (before meals)  Lantus 100 units/mL subcutaneous solution: 24 unit(s) subcutaneous once a day (at bedtime)  Lasix 40 mg oral tablet: 1 tab(s) orally once a day  NIFEdipine 60 mg oral tablet, extended release: 1 tab(s) orally once a day  oxycodone-acetaminophen 5 mg-325 mg oral tablet: 1 tab(s) orally once a day, As Needed - 6)  simvastatin 40 mg oral tablet: 1 tab(s) orally once a day (at bedtime)   allopurinol 100 mg oral tablet: 1 tab(s) orally once a day  carvedilol 25 mg oral tablet: 1 tab(s) orally every 12 hours  citalopram 20 mg oral tablet: 1 tab(s) orally once a day  clonazePAM 1 mg oral tablet: 2 tab(s) orally once a day (at bedtime), As Needed  gabapentin 300 mg oral capsule: 1 cap(s) orally 2 times a day  glimepiride 4 mg oral tablet: 1 tab(s) orally 2 times a day  Lantus 100 units/mL subcutaneous solution: 30 unit(s) subcutaneous once a day (at bedtime)  Lasix 40 mg oral tablet: 1 tab(s) orally once a day  NIFEdipine 60 mg oral tablet, extended release: 1 tab(s) orally once a day  simvastatin 40 mg oral tablet: 1 tab(s) orally once a day (at bedtime)

## 2021-10-14 NOTE — DISCHARGE NOTE PROVIDER - CARE PROVIDERS DIRECT ADDRESSES
[email or web address]

## 2021-10-14 NOTE — PROGRESS NOTE ADULT - ASSESSMENT
89 yr old male with hx of HTN, HLD, DM2, CAD, HFpEF, s/p PPM, prostate CA (in remission), CKD3 admitted for progressive sob for since midnight    # Acute on Chronic HFpEF   # s/p PPM  - hypervolemic on exam, much improved today   - BNP: 3100 on admission  - admission EKG: Afib with acute ST or T wave changes   - CXR: as above  - echo--pending  - continue lasix 40mg IV daily  - c/w coreg    - daily weight  - strict ins and outs  - cardiology consult appreciated  - taper oxygen and check ambulating pox today    #hyperkalemia  - enalapril on hold  - c/w lokelma  - on lasix  - f/u potassium level today  - added potassium restriction to diet    # Elevated Trop   - chronic  - cardiology consult appreciated    # CAD  - c/w ASA, coreg and statin      # HTN  - c/w Lasix, Nifedipine, carvedilol  - enalapril on hold for hyperkalemia     # DM2 with neuropathy and hypergylcemia  - continue basal/bolus insulin regimen, + sliding scale, insulin increased yesterday and bs improving  - monitor FS  - ada diet  - c/w gabapentin     #DVT prophylaxis  -sq heparin    # CKD 3  - Cr 1.5 (at baseline)   - Monitor BMP  - Avoid nephrotoxins    # Chronic Normocytic anemia  - Hb 10.5 (at baseline)  - no further workup at this time     # Prostate CA (in remission)  - outpt f/u    # DASH diet    # Ambulate as tolerated  - PT eval     # Full code  #dispo--pt followup  and tapering oxygen

## 2021-10-14 NOTE — DISCHARGE NOTE PROVIDER - HOSPITAL COURSE
89 yr old male with hx of HTN, HLD, DM2, CAD, HFpEF, s/p PPM, prostate CA (in remission), CKD3 admitted for progressive sob for since midnight. Patient was admitted with acute on chronic diastolic CHF. Clinically improved with IV lasix and BIPAP therapy. Medically stable for discharge with close cardio follow-up.

## 2021-10-14 NOTE — DISCHARGE NOTE PROVIDER - NSDCCPCAREPLAN_GEN_ALL_CORE_FT
PRINCIPAL DISCHARGE DIAGNOSIS  Diagnosis: Acute CHF  Assessment and Plan of Treatment: Continue taking lasix 40mg once a day. Restrict fluid intake to 1.2-1.5L per day. Monitor weights daily. If you notice weight going up, may take lasix lasix 40mg twice a day. Please follow up with your cardiologist within 1-2 weeks.       PRINCIPAL DISCHARGE DIAGNOSIS  Diagnosis: Acute CHF  Assessment and Plan of Treatment: Continue taking lasix 40mg once a day. Restrict fluid intake to 1.2-1.5L per day. Monitor weights daily. If you notice weight going up, may take lasix lasix 40mg twice a day. Please follow up with your cardiologist within 1-2 weeks.      SECONDARY DISCHARGE DIAGNOSES  Diagnosis: Hyperkalemia  Assessment and Plan of Treatment: Due to hyperkalemia, enalapril 20mg once a day has been discontinued. Metoprolol ER 50mg once a day discontinued, and coreg 25 twice a day has been prescribed for BP control. Follow up with your cardiologist for further management.

## 2021-10-14 NOTE — PROGRESS NOTE ADULT - ATTENDING COMMENTS
Pt seen and examined. Endorses feeling well today. Pulse ox on ambulation on RA 91%, 96% at rest. Plan for discharge today.

## 2021-10-14 NOTE — DISCHARGE NOTE NURSING/CASE MANAGEMENT/SOCIAL WORK - PATIENT PORTAL LINK FT
You can access the FollowMyHealth Patient Portal offered by [facility] by registering at the following website: [email or web address]. By joining [employer]’s FollowMyHealth portal, you will also be able to view your health information using other applications (apps) compatible with our system.

## 2021-10-15 LAB
COVID-19 SPIKE DOMAIN AB INTERP: POSITIVE
COVID-19 SPIKE DOMAIN ANTIBODY RESULT: >250 U/ML — HIGH
SARS-COV-2 IGG+IGM SERPL QL IA: >250 U/ML — HIGH
SARS-COV-2 IGG+IGM SERPL QL IA: POSITIVE

## 2021-10-25 ENCOUNTER — INPATIENT (INPATIENT)
Facility: HOSPITAL | Age: 86
LOS: 2 days | Discharge: HOME | End: 2021-10-28
Attending: INTERNAL MEDICINE | Admitting: INTERNAL MEDICINE
Payer: MEDICARE

## 2021-10-25 VITALS — HEIGHT: 66 IN

## 2021-10-25 DIAGNOSIS — Z96.651 PRESENCE OF RIGHT ARTIFICIAL KNEE JOINT: Chronic | ICD-10-CM

## 2021-10-25 LAB
ALBUMIN SERPL ELPH-MCNC: 4 G/DL — SIGNIFICANT CHANGE UP (ref 3.5–5.2)
ALP SERPL-CCNC: 127 U/L — HIGH (ref 30–115)
ALT FLD-CCNC: 12 U/L — SIGNIFICANT CHANGE UP (ref 0–41)
ANION GAP SERPL CALC-SCNC: 15 MMOL/L — HIGH (ref 7–14)
ANION GAP SERPL CALC-SCNC: 17 MMOL/L — HIGH (ref 7–14)
APPEARANCE UR: CLEAR — SIGNIFICANT CHANGE UP
AST SERPL-CCNC: 16 U/L — SIGNIFICANT CHANGE UP (ref 0–41)
B-OH-BUTYR SERPL-SCNC: 0.2 MMOL/L — SIGNIFICANT CHANGE UP
BASE EXCESS BLDV CALC-SCNC: -6 MMOL/L — LOW (ref -2–3)
BASE EXCESS BLDV CALC-SCNC: -6.7 MMOL/L — LOW (ref -2–3)
BASOPHILS # BLD AUTO: 0.04 K/UL — SIGNIFICANT CHANGE UP (ref 0–0.2)
BASOPHILS NFR BLD AUTO: 0.5 % — SIGNIFICANT CHANGE UP (ref 0–1)
BILIRUB SERPL-MCNC: 0.4 MG/DL — SIGNIFICANT CHANGE UP (ref 0.2–1.2)
BILIRUB UR-MCNC: NEGATIVE — SIGNIFICANT CHANGE UP
BUN SERPL-MCNC: 52 MG/DL — HIGH (ref 10–20)
BUN SERPL-MCNC: 52 MG/DL — HIGH (ref 10–20)
CA-I SERPL-SCNC: 1.09 MMOL/L — LOW (ref 1.15–1.33)
CALCIUM SERPL-MCNC: 8.3 MG/DL — LOW (ref 8.5–10.1)
CALCIUM SERPL-MCNC: 8.4 MG/DL — LOW (ref 8.5–10.1)
CHLORIDE SERPL-SCNC: 102 MMOL/L — SIGNIFICANT CHANGE UP (ref 98–110)
CHLORIDE SERPL-SCNC: 103 MMOL/L — SIGNIFICANT CHANGE UP (ref 98–110)
CO2 SERPL-SCNC: 15 MMOL/L — LOW (ref 17–32)
CO2 SERPL-SCNC: 18 MMOL/L — SIGNIFICANT CHANGE UP (ref 17–32)
COLOR SPEC: COLORLESS — SIGNIFICANT CHANGE UP
CREAT SERPL-MCNC: 1.8 MG/DL — HIGH (ref 0.7–1.5)
CREAT SERPL-MCNC: 1.9 MG/DL — HIGH (ref 0.7–1.5)
DIFF PNL FLD: NEGATIVE — SIGNIFICANT CHANGE UP
EOSINOPHIL # BLD AUTO: 0.25 K/UL — SIGNIFICANT CHANGE UP (ref 0–0.7)
EOSINOPHIL NFR BLD AUTO: 2.9 % — SIGNIFICANT CHANGE UP (ref 0–8)
GAS PNL BLDV: 125 MMOL/L — LOW (ref 136–145)
GAS PNL BLDV: SIGNIFICANT CHANGE UP
GAS PNL BLDV: SIGNIFICANT CHANGE UP
GLUCOSE BLDC GLUCOMTR-MCNC: 197 MG/DL — HIGH (ref 70–99)
GLUCOSE SERPL-MCNC: 224 MG/DL — HIGH (ref 70–99)
GLUCOSE SERPL-MCNC: 365 MG/DL — HIGH (ref 70–99)
GLUCOSE UR QL: NEGATIVE — SIGNIFICANT CHANGE UP
HCO3 BLDV-SCNC: 22 MMOL/L — SIGNIFICANT CHANGE UP (ref 22–29)
HCO3 BLDV-SCNC: 22 MMOL/L — SIGNIFICANT CHANGE UP (ref 22–29)
HCT VFR BLD CALC: 31.7 % — LOW (ref 42–52)
HCT VFR BLDA CALC: 65 % — CRITICAL HIGH (ref 39–51)
HGB BLD CALC-MCNC: 21.8 G/DL — CRITICAL HIGH (ref 12.6–17.4)
HGB BLD-MCNC: 10 G/DL — LOW (ref 14–18)
IMM GRANULOCYTES NFR BLD AUTO: 0.7 % — HIGH (ref 0.1–0.3)
KETONES UR-MCNC: NEGATIVE — SIGNIFICANT CHANGE UP
LACTATE BLDV-MCNC: 1 MMOL/L — SIGNIFICANT CHANGE UP (ref 0.5–2)
LACTATE BLDV-MCNC: 1.4 MMOL/L — SIGNIFICANT CHANGE UP (ref 0.5–2)
LACTATE SERPL-SCNC: 1.7 MMOL/L — SIGNIFICANT CHANGE UP (ref 0.7–2)
LEUKOCYTE ESTERASE UR-ACNC: NEGATIVE — SIGNIFICANT CHANGE UP
LYMPHOCYTES # BLD AUTO: 0.87 K/UL — LOW (ref 1.2–3.4)
LYMPHOCYTES # BLD AUTO: 10.2 % — LOW (ref 20.5–51.1)
MCHC RBC-ENTMCNC: 28.4 PG — SIGNIFICANT CHANGE UP (ref 27–31)
MCHC RBC-ENTMCNC: 31.5 G/DL — LOW (ref 32–37)
MCV RBC AUTO: 90.1 FL — SIGNIFICANT CHANGE UP (ref 80–94)
MONOCYTES # BLD AUTO: 0.39 K/UL — SIGNIFICANT CHANGE UP (ref 0.1–0.6)
MONOCYTES NFR BLD AUTO: 4.6 % — SIGNIFICANT CHANGE UP (ref 1.7–9.3)
NEUTROPHILS # BLD AUTO: 6.9 K/UL — HIGH (ref 1.4–6.5)
NEUTROPHILS NFR BLD AUTO: 81.1 % — HIGH (ref 42.2–75.2)
NITRITE UR-MCNC: NEGATIVE — SIGNIFICANT CHANGE UP
NRBC # BLD: 0 /100 WBCS — SIGNIFICANT CHANGE UP (ref 0–0)
NT-PROBNP SERPL-SCNC: 8494 PG/ML — HIGH (ref 0–300)
PCO2 BLDV: 49 MMHG — SIGNIFICANT CHANGE UP (ref 42–55)
PCO2 BLDV: 54 MMHG — SIGNIFICANT CHANGE UP (ref 42–55)
PH BLDV: 7.22 — LOW (ref 7.32–7.43)
PH BLDV: 7.25 — LOW (ref 7.32–7.43)
PH UR: 6 — SIGNIFICANT CHANGE UP (ref 5–8)
PLATELET # BLD AUTO: 239 K/UL — SIGNIFICANT CHANGE UP (ref 130–400)
PO2 BLDV: 25 MMHG — SIGNIFICANT CHANGE UP
PO2 BLDV: 33 MMHG — SIGNIFICANT CHANGE UP
POTASSIUM BLDV-SCNC: 5.2 MMOL/L — HIGH (ref 3.5–5.1)
POTASSIUM SERPL-MCNC: 5.1 MMOL/L — HIGH (ref 3.5–5)
POTASSIUM SERPL-MCNC: 5.9 MMOL/L — HIGH (ref 3.5–5)
POTASSIUM SERPL-SCNC: 5.1 MMOL/L — HIGH (ref 3.5–5)
POTASSIUM SERPL-SCNC: 5.9 MMOL/L — HIGH (ref 3.5–5)
PROT SERPL-MCNC: 7.3 G/DL — SIGNIFICANT CHANGE UP (ref 6–8)
PROT UR-MCNC: SIGNIFICANT CHANGE UP
RBC # BLD: 3.52 M/UL — LOW (ref 4.7–6.1)
RBC # FLD: 15.2 % — HIGH (ref 11.5–14.5)
SAO2 % BLDV: 32.2 % — SIGNIFICANT CHANGE UP
SAO2 % BLDV: 55.3 % — SIGNIFICANT CHANGE UP
SARS-COV-2 RNA SPEC QL NAA+PROBE: SIGNIFICANT CHANGE UP
SODIUM SERPL-SCNC: 135 MMOL/L — SIGNIFICANT CHANGE UP (ref 135–146)
SODIUM SERPL-SCNC: 135 MMOL/L — SIGNIFICANT CHANGE UP (ref 135–146)
SP GR SPEC: 1.01 — SIGNIFICANT CHANGE UP (ref 1.01–1.03)
TROPONIN T SERPL-MCNC: 0.16 NG/ML — CRITICAL HIGH
TROPONIN T SERPL-MCNC: 0.17 NG/ML — CRITICAL HIGH
UROBILINOGEN FLD QL: SIGNIFICANT CHANGE UP
WBC # BLD: 8.51 K/UL — SIGNIFICANT CHANGE UP (ref 4.8–10.8)
WBC # FLD AUTO: 8.51 K/UL — SIGNIFICANT CHANGE UP (ref 4.8–10.8)

## 2021-10-25 PROCEDURE — 99223 1ST HOSP IP/OBS HIGH 75: CPT

## 2021-10-25 PROCEDURE — 93010 ELECTROCARDIOGRAM REPORT: CPT

## 2021-10-25 PROCEDURE — 99285 EMERGENCY DEPT VISIT HI MDM: CPT

## 2021-10-25 PROCEDURE — 71045 X-RAY EXAM CHEST 1 VIEW: CPT | Mod: 26

## 2021-10-25 RX ORDER — GABAPENTIN 400 MG/1
1 CAPSULE ORAL
Qty: 0 | Refills: 0 | DISCHARGE

## 2021-10-25 RX ORDER — INSULIN HUMAN 100 [IU]/ML
5 INJECTION, SOLUTION SUBCUTANEOUS
Qty: 100 | Refills: 0 | Status: DISCONTINUED | OUTPATIENT
Start: 2021-10-25 | End: 2021-10-25

## 2021-10-25 RX ORDER — NIFEDIPINE 30 MG
60 TABLET, EXTENDED RELEASE 24 HR ORAL DAILY
Refills: 0 | Status: DISCONTINUED | OUTPATIENT
Start: 2021-10-26 | End: 2021-10-28

## 2021-10-25 RX ORDER — CITALOPRAM 10 MG/1
20 TABLET, FILM COATED ORAL DAILY
Refills: 0 | Status: DISCONTINUED | OUTPATIENT
Start: 2021-10-25 | End: 2021-10-28

## 2021-10-25 RX ORDER — INSULIN GLARGINE 100 [IU]/ML
30 INJECTION, SOLUTION SUBCUTANEOUS
Qty: 0 | Refills: 0 | DISCHARGE

## 2021-10-25 RX ORDER — CHLORHEXIDINE GLUCONATE 213 G/1000ML
1 SOLUTION TOPICAL
Refills: 0 | Status: DISCONTINUED | OUTPATIENT
Start: 2021-10-25 | End: 2021-10-28

## 2021-10-25 RX ORDER — FUROSEMIDE 40 MG
40 TABLET ORAL ONCE
Refills: 0 | Status: COMPLETED | OUTPATIENT
Start: 2021-10-25 | End: 2021-10-25

## 2021-10-25 RX ORDER — INSULIN LISPRO 100/ML
VIAL (ML) SUBCUTANEOUS
Refills: 0 | Status: DISCONTINUED | OUTPATIENT
Start: 2021-10-25 | End: 2021-10-28

## 2021-10-25 RX ORDER — ALLOPURINOL 300 MG
1 TABLET ORAL
Qty: 0 | Refills: 0 | DISCHARGE

## 2021-10-25 RX ORDER — HEPARIN SODIUM 5000 [USP'U]/ML
5000 INJECTION INTRAVENOUS; SUBCUTANEOUS EVERY 8 HOURS
Refills: 0 | Status: DISCONTINUED | OUTPATIENT
Start: 2021-10-25 | End: 2021-10-28

## 2021-10-25 RX ORDER — INSULIN GLARGINE 100 [IU]/ML
21 INJECTION, SOLUTION SUBCUTANEOUS AT BEDTIME
Refills: 0 | Status: DISCONTINUED | OUTPATIENT
Start: 2021-10-25 | End: 2021-10-26

## 2021-10-25 RX ORDER — CITALOPRAM 10 MG/1
1 TABLET, FILM COATED ORAL
Qty: 0 | Refills: 0 | DISCHARGE

## 2021-10-25 RX ORDER — CLONAZEPAM 1 MG
2 TABLET ORAL
Qty: 0 | Refills: 0 | DISCHARGE

## 2021-10-25 RX ORDER — INSULIN HUMAN 100 [IU]/ML
9 INJECTION, SOLUTION SUBCUTANEOUS
Qty: 100 | Refills: 0 | Status: DISCONTINUED | OUTPATIENT
Start: 2021-10-25 | End: 2021-10-25

## 2021-10-25 RX ORDER — ACETAMINOPHEN 500 MG
650 TABLET ORAL EVERY 6 HOURS
Refills: 0 | Status: DISCONTINUED | OUTPATIENT
Start: 2021-10-25 | End: 2021-10-28

## 2021-10-25 RX ORDER — SODIUM CHLORIDE 9 MG/ML
1000 INJECTION, SOLUTION INTRAVENOUS
Refills: 0 | Status: DISCONTINUED | OUTPATIENT
Start: 2021-10-25 | End: 2021-10-28

## 2021-10-25 RX ORDER — CARVEDILOL PHOSPHATE 80 MG/1
25 CAPSULE, EXTENDED RELEASE ORAL EVERY 12 HOURS
Refills: 0 | Status: DISCONTINUED | OUTPATIENT
Start: 2021-10-25 | End: 2021-10-28

## 2021-10-25 RX ORDER — CLONAZEPAM 1 MG
2 TABLET ORAL AT BEDTIME
Refills: 0 | Status: DISCONTINUED | OUTPATIENT
Start: 2021-10-25 | End: 2021-10-28

## 2021-10-25 RX ORDER — FUROSEMIDE 40 MG
40 TABLET ORAL
Refills: 0 | Status: DISCONTINUED | OUTPATIENT
Start: 2021-10-26 | End: 2021-10-27

## 2021-10-25 RX ORDER — DEXTROSE 50 % IN WATER 50 %
25 SYRINGE (ML) INTRAVENOUS ONCE
Refills: 0 | Status: DISCONTINUED | OUTPATIENT
Start: 2021-10-25 | End: 2021-10-28

## 2021-10-25 RX ORDER — SIMVASTATIN 20 MG/1
40 TABLET, FILM COATED ORAL AT BEDTIME
Refills: 0 | Status: DISCONTINUED | OUTPATIENT
Start: 2021-10-25 | End: 2021-10-28

## 2021-10-25 RX ORDER — INSULIN LISPRO 100/ML
7 VIAL (ML) SUBCUTANEOUS
Refills: 0 | Status: DISCONTINUED | OUTPATIENT
Start: 2021-10-25 | End: 2021-10-28

## 2021-10-25 RX ORDER — GLUCAGON INJECTION, SOLUTION 0.5 MG/.1ML
1 INJECTION, SOLUTION SUBCUTANEOUS ONCE
Refills: 0 | Status: DISCONTINUED | OUTPATIENT
Start: 2021-10-25 | End: 2021-10-28

## 2021-10-25 RX ORDER — DEXTROSE 50 % IN WATER 50 %
15 SYRINGE (ML) INTRAVENOUS ONCE
Refills: 0 | Status: DISCONTINUED | OUTPATIENT
Start: 2021-10-25 | End: 2021-10-28

## 2021-10-25 RX ORDER — ALLOPURINOL 300 MG
100 TABLET ORAL DAILY
Refills: 0 | Status: DISCONTINUED | OUTPATIENT
Start: 2021-10-25 | End: 2021-10-28

## 2021-10-25 RX ORDER — GABAPENTIN 400 MG/1
300 CAPSULE ORAL
Refills: 0 | Status: DISCONTINUED | OUTPATIENT
Start: 2021-10-25 | End: 2021-10-28

## 2021-10-25 RX ADMIN — Medication 40 MILLIGRAM(S): at 17:48

## 2021-10-25 RX ADMIN — INSULIN HUMAN 9 UNIT(S)/HR: 100 INJECTION, SOLUTION SUBCUTANEOUS at 20:45

## 2021-10-25 RX ADMIN — Medication 40 MILLIGRAM(S): at 22:48

## 2021-10-25 RX ADMIN — Medication 2 MILLIGRAM(S): at 23:53

## 2021-10-25 RX ADMIN — INSULIN GLARGINE 21 UNIT(S): 100 INJECTION, SOLUTION SUBCUTANEOUS at 22:48

## 2021-10-25 NOTE — H&P ADULT - NSHPSOCIALHISTORY_GEN_ALL_CORE
denies smoking, drinking alcohol, drug use  walks independently and lives alone despite neck fracture

## 2021-10-25 NOTE — H&P ADULT - HISTORY OF PRESENT ILLNESS
89yo M w/ pmhx of HTN, HLD, DM2, CAD, HFpEF (EF 50-55%), s/p PPM, prostate CA (in remission), CKD3, neck fracture presents to ED with SOB. Patient denies any orthopnea, PND, chest pain, or palpitations. Felt SOB this afternoon associated with wet cough but not productive of sputum. Denies any fever/chills. Takes meds and compliant with diet. Of note, patient was recently admitted to [facility] for HF and echo showed EF 50-55% with wall motion abnormalities.     ED course:   vitals: /58, HR 60, T 98F, O2 100% on bipap 40% for respiratory distress  labs: Hgb 10, K 5.9 hemolyzed, Cr 1.9 -> 1.8, Trop 0.16 -> 0.17  imaging:   - CXR b/l pleural effusions on wet read   given: lasix 40mg IV   progress: patient comfortable and weaned to nasal cannula

## 2021-10-25 NOTE — ED PROVIDER NOTE - ATTENDING CONTRIBUTION TO CARE
I personally evaluated the patient. I reviewed the Resident’s or Physician Assistant’s note (as assigned above), and agree with the findings and plan except as documented in my note.     89 year old male with pmhx of HTN, HLD, DM2, CAD, HFpEF EF 50%, s/p PPM, prostate CA (in remission), CKD3, recently discharged 10 days ago presenting for SOB that started this morning and worsened prior to presentation. Patient denies any recent changes to his medications or his diet. Patient's symptoms started to improve with CPAP and Nitro x1 by EMS. Patient was started on BiPAP in the ED. Patient's cardiologist is Dr. Amor.

## 2021-10-25 NOTE — ED PROVIDER NOTE - PHYSICAL EXAMINATION
Vital Signs: I have reviewed the initial vital signs.  Constitutional: well-nourished, appears stated age, no acute distress.  HEENT: Airway patent, moist MM  CV: regular rate, regular rhythm, well-perfused extremities  Lungs: Diffuse crackles.  ABD: Non-tender, Non-distended, no guarding or rebound  MSK: Neck supple, nontender Chest nontender. Back nontender   INTEG: Skin warm, dry, no rash.  NEURO: A&Ox3, moving all extremities, normal speech  PSYCH: Calm, cooperative, normal affect and interaction.

## 2021-10-25 NOTE — H&P ADULT - NSHPLABSRESULTS_GEN_ALL_CORE
LABS:  cret                        10.0   8.51  )-----------( 239      ( 25 Oct 2021 17:47 )             31.7     10-25    135  |  102  |  52<H>  ----------------------------<  224<H>  5.1<H>   |  18  |  1.8<H>    Ca    8.4<L>      25 Oct 2021 21:37    TPro  7.3  /  Alb  4.0  /  TBili  0.4  /  DBili  x   /  AST  16  /  ALT  12  /  AlkPhos  127<H>  10-25    imaging:   - CXR b/l pleural effusions on wet read

## 2021-10-25 NOTE — H&P ADULT - NSHPREVIEWOFSYSTEMS_GEN_ALL_CORE
CONSTITUTIONAL: No weakness, fevers or chills  EYES/ENT: No visual changes;  No vertigo or throat pain   NECK: No pain or stiffness  RESPIRATORY: + SOB, + cough; no wheezing, no hemoptysis   CARDIOVASCULAR: No chest pain or palpitations  GASTROINTESTINAL: No abdominal or epigastric pain. No nausea, vomiting, or hematemesis; No diarrhea or constipation. No melena or hematochezia.  GENITOURINARY: No dysuria, frequency or hematuria  NEUROLOGICAL: No numbness or weakness  SKIN: No itching, rashes

## 2021-10-25 NOTE — ED PROVIDER NOTE - CARE PLAN
1 Principal Discharge DX:	Shortness of breath  Secondary Diagnosis:	Fluid overload  Secondary Diagnosis:	Elevated troponin

## 2021-10-25 NOTE — ED PROVIDER NOTE - CLINICAL SUMMARY MEDICAL DECISION MAKING FREE TEXT BOX
89 year old male with pmhx of HTN, HLD, DM2, CAD, HFpEF EF 50%, s/p PPM, prostate CA (in remission), CKD3, recently discharged 10 days ago presenting for SOB that started this morning and worsened prior to presentation. Patient denies any recent changes to his medications or his diet. Patient's symptoms started to improve with CPAP and Nitro x1 by EMS. Patient was started on BiPAP in the ED. Patient's cardiologist is Dr. Amor.    CHF exacerbation s/p improvement w/ nitro and Bipap   Labs reviewed  Cardiology fellow Selvin evaluated patient for troponin elevation   Patient to be admitted to Telemetry   Lasix 40 mg IV   Patient off bipap and improved WOB satting 100% on RA

## 2021-10-25 NOTE — H&P ADULT - ATTENDING COMMENTS
HPI:  89yo M w/ pmhx of HTN, HLD, DM2, CAD, HFpEF (EF 50-55%), s/p PPM, prostate CA (in remission), CKD3, neck fracture presents to ED with SOB. Patient denies any orthopnea, PND, chest pain, or palpitations. Felt SOB this afternoon associated with wet cough but not productive of sputum. Denies any fever/chills. Takes meds and compliant with diet. Of note, patient was recently admitted to [facility] for HF and echo showed EF 50-55% with wall motion abnormalities.     ED course:   vitals: /58, HR 60, T 98F, O2 100% on bipap 40% for respiratory distress  labs: Hgb 10, K 5.9 hemolyzed, Cr 1.9 -> 1.8, Trop 0.16 -> 0.17  imaging:   - CXR b/l pleural effusions on wet read   given: lasix 40mg IV   progress: patient comfortable and weaned to nasal cannula    (25 Oct 2021 22:18)    REVIEW OF SYSTEMS: see cc/HPI   CONSTITUTIONAL: No weakness, fevers or chills  EYES/ENT: No visual changes;  No vertigo or throat pain   NECK: No pain or stiffness  RESPIRATORY: No cough, wheezing, hemoptysis; (+) shortness of breath  CARDIOVASCULAR: No chest pain or palpitations, (+) see cc/HPI  GASTROINTESTINAL: No abdominal or epigastric pain. No nausea, vomiting, or hematemesis; No diarrhea or constipation. No melena or hematochezia.  GENITOURINARY: No dysuria, frequency or hematuria  NEUROLOGICAL: No numbness or weakness  SKIN: No itching, rashes    Physical Exam:   General: WN/WD NAD  Neurology: A&Ox3, nonfocal, follows commands  Eyes: PERRLA/ EOMI  ENT/Neck: Neck supple, trachea midline, No JVD  Respiratory: CTA B/L, No wheezing, rales, rhonchi  CV: Normal rate regular rhythm, S1S2, no murmurs, rubs or gallops  Abdominal: Soft, NT, ND +BS,   Extremities: No edema, + peripheral pulses  Skin: No Rashes, Hematoma, Ecchymosis  Incisions: n/a  Tubes: n/a    A/p  Acute exacerbation for HFrEF   Elevated Trop   H/o CAD    PARKER on CKD III    HTN     Dyslipidemia     DM type II     JOHN on CPAP     s/p Neck fracture HPI:  89yo M w/ pmhx of HTN, HLD, DM2, CAD, HFpEF (EF 50-55%), s/p PPM, prostate CA (in remission), CKD3, neck fracture presents to ED with SOB. Patient denies any orthopnea, PND, chest pain, or palpitations. Felt SOB this afternoon associated with wet cough but not productive of sputum. Denies any fever/chills. Takes meds and compliant with diet. Of note, patient was recently admitted to [facility] for HF and echo showed EF 50-55% with wall motion abnormalities.     ED course:   vitals: /58, HR 60, T 98F, O2 100% on bipap 40% for respiratory distress  labs: Hgb 10, K 5.9 hemolyzed, Cr 1.9 -> 1.8, Trop 0.16 -> 0.17  imaging:   - CXR b/l pleural effusions on wet read   given: lasix 40mg IV   progress: patient comfortable and weaned to nasal cannula    (25 Oct 2021 22:18)    REVIEW OF SYSTEMS: see cc/HPI   CONSTITUTIONAL: No weakness, fevers or chills  EYES/ENT: No visual changes;  No vertigo or throat pain   NECK: No pain or stiffness  RESPIRATORY: No cough, wheezing, hemoptysis; (+) shortness of breath  CARDIOVASCULAR: No chest pain or palpitations, (+) see cc/HPI  GASTROINTESTINAL: No abdominal or epigastric pain. No nausea, vomiting, or hematemesis; No diarrhea or constipation. No melena or hematochezia.  GENITOURINARY: No dysuria, frequency or hematuria  NEUROLOGICAL: No numbness or weakness  SKIN: No itching, rashes    Physical Exam:   General: WN/WD NAD  Neurology: A&Ox3, nonfocal, follows commands  Eyes: PERRLA/ EOMI  ENT/Neck: Neck supple, trachea midline, No JVD  Respiratory: CTA B/L, No wheezing, rales, rhonchi  CV: Normal rate regular rhythm, S1S2, no murmurs, rubs or gallops  Abdominal: Soft, NT, ND +BS,   Extremities: No edema, + peripheral pulses  Skin: No Rashes, Hematoma, Ecchymosis  Incisions: n/a  Tubes: n/a    A/p  Acute exacerbation for HFrEF improved w/ BiPAP / Lasix / NTG  Elevated Trop   H/o CAD  -admit to tele   -serial EKG and Dorys   -IV lasix / daily weights/ fluid restriction/ Is and Os  -Cardiology eval     PARKER on CKD III/Hyperkalemia ( w/ enalapril recently held)  -Is and Os   -urine lytes and renal U/S     HTN -stable  -monitor for hypotension w/ diuresing     Dyslipidemia   -statin     DM type II - uncontrolled  -Lantus / AC Lispro w/ sliding scale     JOHN on CPAP     s/p Neck fracture  -maintain collar     DVT prophylaxis HPI:  89yo M w/ pmhx of HTN, HLD, DM2, CAD, HFpEF (EF 50-55%), s/p PPM, prostate CA (in remission), CKD3, neck fracture presents to ED with SOB. Patient denies any orthopnea, PND, chest pain, or palpitations. Felt SOB this afternoon associated with wet cough but not productive of sputum. Denies any fever/chills. Takes meds and compliant with diet. Of note, patient was recently admitted to [facility] for HF and echo showed EF 50-55% with wall motion abnormalities.     ED course:   vitals: /58, HR 60, T 98F, O2 100% on bipap 40% for respiratory distress  labs: Hgb 10, K 5.9 hemolyzed, Cr 1.9 -> 1.8, Trop 0.16 -> 0.17  imaging:   - CXR b/l pleural effusions on wet read   given: lasix 40mg IV   progress: patient comfortable and weaned to nasal cannula    (25 Oct 2021 22:18)    REVIEW OF SYSTEMS: see cc/HPI   CONSTITUTIONAL: No weakness, fevers or chills  EYES/ENT: No visual changes;  No vertigo or throat pain   NECK: No pain or stiffness  RESPIRATORY: No cough, wheezing, hemoptysis; (+) shortness of breath  CARDIOVASCULAR: No chest pain or palpitations, (+) see cc/HPI  GASTROINTESTINAL: No abdominal or epigastric pain. No nausea, vomiting, or hematemesis; No diarrhea or constipation. No melena or hematochezia.  GENITOURINARY: No dysuria, frequency or hematuria  NEUROLOGICAL: No numbness or weakness  SKIN: No itching, rashes    Physical Exam:   General: WN/WD NAD  Neurology: A&Ox3, nonfocal, follows commands  Eyes: PERRLA/ EOMI  ENT/Neck: Neck brace in place, trachea midline, No JVD  Respiratory: B/L decrease breath sounds at the bases , No wheezing, (+) minimal rales- s/p Lasix, No rhonchi  CV: Normal rate regular rhythm, S1S2, no murmurs, rubs or gallops  Abdominal: Soft, NT, ND +BS,   Extremities: (+) pedal edema, + peripheral pulses  Skin: No Rashes, Hematoma, Ecchymosis  Incisions: n/a  Tubes: n/a    A/p  Acute exacerbation for HFrEF improved w/ BiPAP / Lasix / NTG  Elevated Trop   H/o CAD  -admit to tele   -serial EKG and Dorys   -IV lasix / daily weights/ fluid restriction/ Is and Os  -Cardiology eval     PARKER on CKD III/Hyperkalemia ( w/ enalapril recently held)  -Is and Os   -urine lytes and renal U/S     HTN -stable  -monitor for hypotension w/ diuresing     Dyslipidemia   -statin     DM type II - uncontrolled  -Lantus / AC Lispro w/ sliding scale     JOHN on CPAP     s/p Neck fracture  -maintain collar     DVT prophylaxis

## 2021-10-25 NOTE — H&P ADULT - ASSESSMENT
89yo M w/ pmhx of HTN, HLD, DM2, CAD, HFpEF, s/p PPM, prostate CA (in remission), CKD3, neck fracture presents to ED with SOB. Admitted for HF exacerbation.     #Acute decompensated HFpEF s/p PPM   #CAD  - trop 0.16 -> 0.17  - Echo 10/13: EF 50-55%, Apical septal segment and apical inferior segment are abnormal   - PVCs on tele   - get echo; trend trops, get lipid profile/a1c/TSH, repeat ecg in am   - Cardio consult Dr. Amor for HF exacerbation and wall motion abnormalities   - c/w ASA, coreg and statin   - start lasix 40mg IV BID (takes 40mg daily at home)   - strict i/o, daily weights, fluid restriction   - wean O2     #PARKER on CKD 3 likely pre-renal from CHF   - Cr 1.9 on admission; Cr 1.5 (at baseline)   - get urine lytes, renal ultrasound    #normocytic anemia  - hgb 10, at baseline  - get iron studies (useful for HF as well)     # HTN  - c/w Lasix, Nifedipine 60mg daily and coreg 25mg bid; enalapril was on hold in [facility] for hyperkalemia    #HLD  - c/w simvastatin 40mg hs     # DM2 with neuropathy   - takes glimepiride 4mg bid and lantus 30u at home   - monitor FS; start basal/bolus insulin regimen  - c/w gabapentin     # Prostate CA (in remission)  - outpt f/u    #gout  - c/w allopurinol     #JOHN on CPAP  - unsure of pressure setting, can do bipap hs for now     DVT ppx: hep subq  GI ppx: none  Diet: DASH/CC/fluid restriction  Code Status: full code  Dispo: from home; cardio consult, echo, lipid/a1c/TSH, iron studies, trend trops     89yo M w/ pmhx of HTN, HLD, DM2, CAD, HFpEF, s/p PPM, prostate CA (in remission), CKD3, neck fracture presents to ED with SOB. Admitted for HF exacerbation.     #Acute decompensated HFpEF s/p PPM   #CAD  - trop 0.16 -> 0.17  - Echo 10/13: EF 50-55%, Apical septal segment and apical inferior segment are abnormal   - PVCs on tele   - get echo; trend trops, get lipid profile/a1c/TSH, repeat ecg in am   - Cardio consult Dr. Amor for HF exacerbation and wall motion abnormalities   - c/w ASA, coreg and statin   - start lasix 40mg IV BID (takes 40mg daily at home)   - strict i/o, daily weights, fluid restriction   - wean O2     #PARKER on CKD 3 likely pre-renal from CHF   - Cr 1.9 on admission; Cr 1.5 (at baseline)   - get urine lytes, renal ultrasound    #normocytic anemia  - hgb 10, at baseline  - get iron studies (useful for HF as well)     # HTN  - c/w Lasix, Nifedipine 60mg daily and coreg 25mg bid; en[facility]april was on hold in [facility] for hyperkalemia    #HLD  - c/w simvastatin 40mg hs     # DM2 with neuropathy   - takes glimepiride 4mg bid and lantus 30u at home   - monitor FS; start basal/bolus insulin regimen  - c/w gabapentin     # Prostate CA (in remission)  - outpt f/u    #gout  - c/w allopurinol     #JOHN on CPAP  - unsure of pressure setting, can do bipap hs for now     #Hx of neck fracture  - f/u o/p Dr. Carr  - doesn't get PT at home; hold off on PT eval for now     DVT ppx: hep subq  GI ppx: none  Diet: DASH/CC/fluid restriction  Code Status: full code  Dispo: from home; cardio consult, echo, lipid/a1c/TSH, iron studies, trend trops

## 2021-10-25 NOTE — ED PROVIDER NOTE - OBJECTIVE STATEMENT
Patient is a 89 year old male with pmhx of HTN, HLD, DM2, CAD, HFpEF EF 50%, s/p PPM, prostate CA (in remission), CKD3, recently discharged 10 days ago presenting for SOB that started this morning. Patient denies any recent changes to his medications or his diet. Patient's symptoms improved with Patient is a 89 year old male with pmhx of HTN, HLD, DM2, CAD, HFpEF EF 50%, s/p PPM, prostate CA (in remission), CKD3, recently discharged 10 days ago presenting for SOB that started this morning and worsened prior to presentation. Patient denies any recent changes to his medications or his diet. Patient's symptoms started to improve with CPAP and Nitro x1 by EMS. Patient was started on BiPAP in the ED. Patient's cardiologist is Dr. Amor.

## 2021-10-25 NOTE — ED ADULT NURSE NOTE - NSIMPLEMENTINTERV_GEN_ALL_ED
Implemented All Fall with Harm Risk Interventions:  [place] to call system. Call bell, personal items and telephone within reach. Instruct patient to call for assistance. Room bathroom lighting operational. Non-slip footwear when patient is off stretcher. Physically safe environment: no spills, clutter or unnecessary equipment. Stretcher in lowest position, wheels locked, appropriate side rails in place. Provide visual cue, wrist band, yellow gown, etc. Monitor gait and stability. Monitor for mental status changes and reorient to person, place, and time. Review medications for side effects contributing to fall risk. Reinforce activity limits and safety measures with patient and family. Provide visual clues: red socks.

## 2021-10-26 LAB
A1C WITH ESTIMATED AVERAGE GLUCOSE RESULT: 7.6 % — HIGH (ref 4–5.6)
ALBUMIN SERPL ELPH-MCNC: 4 G/DL — SIGNIFICANT CHANGE UP (ref 3.5–5.2)
ALP SERPL-CCNC: 119 U/L — HIGH (ref 30–115)
ALT FLD-CCNC: 11 U/L — SIGNIFICANT CHANGE UP (ref 0–41)
ANION GAP SERPL CALC-SCNC: 16 MMOL/L — HIGH (ref 7–14)
ANION GAP SERPL CALC-SCNC: 17 MMOL/L — HIGH (ref 7–14)
AST SERPL-CCNC: 12 U/L — SIGNIFICANT CHANGE UP (ref 0–41)
BASOPHILS # BLD AUTO: 0.06 K/UL — SIGNIFICANT CHANGE UP (ref 0–0.2)
BASOPHILS NFR BLD AUTO: 1 % — SIGNIFICANT CHANGE UP (ref 0–1)
BILIRUB SERPL-MCNC: 0.4 MG/DL — SIGNIFICANT CHANGE UP (ref 0.2–1.2)
BUN SERPL-MCNC: 51 MG/DL — HIGH (ref 10–20)
BUN SERPL-MCNC: 54 MG/DL — HIGH (ref 10–20)
CALCIUM SERPL-MCNC: 8.5 MG/DL — SIGNIFICANT CHANGE UP (ref 8.5–10.1)
CALCIUM SERPL-MCNC: 9 MG/DL — SIGNIFICANT CHANGE UP (ref 8.5–10.1)
CHLORIDE SERPL-SCNC: 101 MMOL/L — SIGNIFICANT CHANGE UP (ref 98–110)
CHLORIDE SERPL-SCNC: 102 MMOL/L — SIGNIFICANT CHANGE UP (ref 98–110)
CHOLEST SERPL-MCNC: 155 MG/DL — SIGNIFICANT CHANGE UP
CO2 SERPL-SCNC: 18 MMOL/L — SIGNIFICANT CHANGE UP (ref 17–32)
CO2 SERPL-SCNC: 19 MMOL/L — SIGNIFICANT CHANGE UP (ref 17–32)
CREAT SERPL-MCNC: 1.9 MG/DL — HIGH (ref 0.7–1.5)
CREAT SERPL-MCNC: 2.1 MG/DL — HIGH (ref 0.7–1.5)
EOSINOPHIL # BLD AUTO: 0.28 K/UL — SIGNIFICANT CHANGE UP (ref 0–0.7)
EOSINOPHIL NFR BLD AUTO: 4.8 % — SIGNIFICANT CHANGE UP (ref 0–8)
ESTIMATED AVERAGE GLUCOSE: 171 MG/DL — HIGH (ref 68–114)
GLUCOSE BLDC GLUCOMTR-MCNC: 170 MG/DL — HIGH (ref 70–99)
GLUCOSE BLDC GLUCOMTR-MCNC: 198 MG/DL — HIGH (ref 70–99)
GLUCOSE BLDC GLUCOMTR-MCNC: 272 MG/DL — HIGH (ref 70–99)
GLUCOSE BLDC GLUCOMTR-MCNC: 311 MG/DL — HIGH (ref 70–99)
GLUCOSE BLDC GLUCOMTR-MCNC: 319 MG/DL — HIGH (ref 70–99)
GLUCOSE SERPL-MCNC: 227 MG/DL — HIGH (ref 70–99)
GLUCOSE SERPL-MCNC: 233 MG/DL — HIGH (ref 70–99)
HCT VFR BLD CALC: 30.9 % — LOW (ref 42–52)
HDLC SERPL-MCNC: 31 MG/DL — LOW
HGB BLD-MCNC: 9.7 G/DL — LOW (ref 14–18)
IMM GRANULOCYTES NFR BLD AUTO: 0.5 % — HIGH (ref 0.1–0.3)
IRON SATN MFR SERPL: 23 % — SIGNIFICANT CHANGE UP (ref 15–50)
IRON SATN MFR SERPL: 59 UG/DL — SIGNIFICANT CHANGE UP (ref 35–150)
LIPID PNL WITH DIRECT LDL SERPL: 88 MG/DL — SIGNIFICANT CHANGE UP
LYMPHOCYTES # BLD AUTO: 1.01 K/UL — LOW (ref 1.2–3.4)
LYMPHOCYTES # BLD AUTO: 17.1 % — LOW (ref 20.5–51.1)
MAGNESIUM SERPL-MCNC: 2 MG/DL — SIGNIFICANT CHANGE UP (ref 1.8–2.4)
MCHC RBC-ENTMCNC: 29.2 PG — SIGNIFICANT CHANGE UP (ref 27–31)
MCHC RBC-ENTMCNC: 31.4 G/DL — LOW (ref 32–37)
MCV RBC AUTO: 93.1 FL — SIGNIFICANT CHANGE UP (ref 80–94)
MONOCYTES # BLD AUTO: 0.49 K/UL — SIGNIFICANT CHANGE UP (ref 0.1–0.6)
MONOCYTES NFR BLD AUTO: 8.3 % — SIGNIFICANT CHANGE UP (ref 1.7–9.3)
NEUTROPHILS # BLD AUTO: 4.02 K/UL — SIGNIFICANT CHANGE UP (ref 1.4–6.5)
NEUTROPHILS NFR BLD AUTO: 68.3 % — SIGNIFICANT CHANGE UP (ref 42.2–75.2)
NON HDL CHOLESTEROL: 124 MG/DL — SIGNIFICANT CHANGE UP
NRBC # BLD: 0 /100 WBCS — SIGNIFICANT CHANGE UP (ref 0–0)
PHOSPHATE SERPL-MCNC: 3.4 MG/DL — SIGNIFICANT CHANGE UP (ref 2.1–4.9)
PLATELET # BLD AUTO: 226 K/UL — SIGNIFICANT CHANGE UP (ref 130–400)
POTASSIUM SERPL-MCNC: 4.3 MMOL/L — SIGNIFICANT CHANGE UP (ref 3.5–5)
POTASSIUM SERPL-MCNC: 5.3 MMOL/L — HIGH (ref 3.5–5)
POTASSIUM SERPL-SCNC: 4.3 MMOL/L — SIGNIFICANT CHANGE UP (ref 3.5–5)
POTASSIUM SERPL-SCNC: 5.3 MMOL/L — HIGH (ref 3.5–5)
PROT SERPL-MCNC: 7 G/DL — SIGNIFICANT CHANGE UP (ref 6–8)
RBC # BLD: 3.32 M/UL — LOW (ref 4.7–6.1)
RBC # FLD: 15.3 % — HIGH (ref 11.5–14.5)
SODIUM SERPL-SCNC: 136 MMOL/L — SIGNIFICANT CHANGE UP (ref 135–146)
SODIUM SERPL-SCNC: 137 MMOL/L — SIGNIFICANT CHANGE UP (ref 135–146)
TIBC SERPL-MCNC: 254 UG/DL — SIGNIFICANT CHANGE UP (ref 220–430)
TRIGL SERPL-MCNC: 172 MG/DL — HIGH
TROPONIN T SERPL-MCNC: 0.21 NG/ML — CRITICAL HIGH
TSH SERPL-MCNC: 0.75 UIU/ML — SIGNIFICANT CHANGE UP (ref 0.27–4.2)
UIBC SERPL-MCNC: 195 UG/DL — SIGNIFICANT CHANGE UP (ref 110–370)
WBC # BLD: 5.89 K/UL — SIGNIFICANT CHANGE UP (ref 4.8–10.8)
WBC # FLD AUTO: 5.89 K/UL — SIGNIFICANT CHANGE UP (ref 4.8–10.8)

## 2021-10-26 PROCEDURE — 99233 SBSQ HOSP IP/OBS HIGH 50: CPT

## 2021-10-26 PROCEDURE — 93010 ELECTROCARDIOGRAM REPORT: CPT

## 2021-10-26 PROCEDURE — 76770 US EXAM ABDO BACK WALL COMP: CPT | Mod: 26

## 2021-10-26 PROCEDURE — 71045 X-RAY EXAM CHEST 1 VIEW: CPT | Mod: 26

## 2021-10-26 RX ORDER — INSULIN GLARGINE 100 [IU]/ML
28 INJECTION, SOLUTION SUBCUTANEOUS AT BEDTIME
Refills: 0 | Status: DISCONTINUED | OUTPATIENT
Start: 2021-10-26 | End: 2021-10-28

## 2021-10-26 RX ORDER — SODIUM ZIRCONIUM CYCLOSILICATE 10 G/10G
5 POWDER, FOR SUSPENSION ORAL ONCE
Refills: 0 | Status: COMPLETED | OUTPATIENT
Start: 2021-10-26 | End: 2021-10-26

## 2021-10-26 RX ORDER — ASPIRIN/CALCIUM CARB/MAGNESIUM 324 MG
81 TABLET ORAL DAILY
Refills: 0 | Status: DISCONTINUED | OUTPATIENT
Start: 2021-10-26 | End: 2021-10-28

## 2021-10-26 RX ADMIN — Medication 7 UNIT(S): at 09:41

## 2021-10-26 RX ADMIN — Medication 4: at 12:08

## 2021-10-26 RX ADMIN — HEPARIN SODIUM 5000 UNIT(S): 5000 INJECTION INTRAVENOUS; SUBCUTANEOUS at 22:28

## 2021-10-26 RX ADMIN — SIMVASTATIN 40 MILLIGRAM(S): 20 TABLET, FILM COATED ORAL at 22:28

## 2021-10-26 RX ADMIN — HEPARIN SODIUM 5000 UNIT(S): 5000 INJECTION INTRAVENOUS; SUBCUTANEOUS at 08:19

## 2021-10-26 RX ADMIN — Medication 40 MILLIGRAM(S): at 08:20

## 2021-10-26 RX ADMIN — CARVEDILOL PHOSPHATE 25 MILLIGRAM(S): 80 CAPSULE, EXTENDED RELEASE ORAL at 18:31

## 2021-10-26 RX ADMIN — INSULIN GLARGINE 28 UNIT(S): 100 INJECTION, SOLUTION SUBCUTANEOUS at 22:28

## 2021-10-26 RX ADMIN — GABAPENTIN 300 MILLIGRAM(S): 400 CAPSULE ORAL at 08:20

## 2021-10-26 RX ADMIN — CARVEDILOL PHOSPHATE 25 MILLIGRAM(S): 80 CAPSULE, EXTENDED RELEASE ORAL at 08:20

## 2021-10-26 RX ADMIN — GABAPENTIN 300 MILLIGRAM(S): 400 CAPSULE ORAL at 18:31

## 2021-10-26 RX ADMIN — Medication 1: at 18:35

## 2021-10-26 RX ADMIN — Medication 60 MILLIGRAM(S): at 08:20

## 2021-10-26 RX ADMIN — Medication 100 MILLIGRAM(S): at 11:59

## 2021-10-26 RX ADMIN — HEPARIN SODIUM 5000 UNIT(S): 5000 INJECTION INTRAVENOUS; SUBCUTANEOUS at 15:00

## 2021-10-26 RX ADMIN — CITALOPRAM 20 MILLIGRAM(S): 10 TABLET, FILM COATED ORAL at 11:59

## 2021-10-26 RX ADMIN — Medication 7 UNIT(S): at 18:35

## 2021-10-26 RX ADMIN — Medication 4: at 09:41

## 2021-10-26 RX ADMIN — SODIUM ZIRCONIUM CYCLOSILICATE 5 GRAM(S): 10 POWDER, FOR SUSPENSION ORAL at 16:01

## 2021-10-26 RX ADMIN — Medication 40 MILLIGRAM(S): at 18:31

## 2021-10-26 RX ADMIN — Medication 7 UNIT(S): at 12:08

## 2021-10-26 NOTE — CONSULT NOTE ADULT - SUBJECTIVE AND OBJECTIVE BOX
Cardiologist: Dr. Amor    HPI:  89yo M w/ pmhx of HTN, HLD, DM2, CAD, HFpEF (EF 50-55%), s/p PPM, prostate CA (in remission), CKD3, neck fracture presents to ED with SOB. Patient denies any orthopnea, PND, chest pain, or palpitations. Felt SOB this afternoon associated with wet cough but not productive of sputum. Denies any fever/chills. Takes meds and compliant with diet. Of note, patient was recently admitted to [facility] for HF and echo showed EF 50-55% with wall motion abnormalities.     ED course:   vitals: /58, HR 60, T 98F, O2 100% on bipap 40% for respiratory distress  labs: Hgb 10, K 5.9 hemolyzed, Cr 1.9 -> 1.8, Trop 0.16 -> 0.17  imaging:   - CXR b/l pleural effusions on wet read   given: lasix 40mg IV   progress: patient comfortable and weaned to nasal cannula    (25 Oct 2021 22:18)    PAST MEDICAL & SURGICAL HISTORY  CHF (congestive heart failure)    DM (diabetes mellitus)    HTN (hypertension)    Prostate CA  in remission    Pacemaker    H/O total knee replacement, right    FAMILY HISTORY:  FAMILY HISTORY:  FH: type 2 diabetes (Father)    [ ] no pertinent family history of premature cardiovascular disease in first degree relatives.  Mother:   Father:   Siblings:     SOCIAL HISTORY:  []smoker  []Alcohol  []Drug    ALLERGIES:  No Known Allergies    MEDICATIONS:  MEDICATIONS  (STANDING):  allopurinol 100 milliGRAM(s) Oral daily  carvedilol 25 milliGRAM(s) Oral every 12 hours  chlorhexidine 4% Liquid 1 Application(s) Topical <User Schedule>  citalopram 20 milliGRAM(s) Oral daily  dextrose 40% Gel 15 Gram(s) Oral once  dextrose 5%. 1000 milliLiter(s) (50 mL/Hr) IV Continuous <Continuous>  dextrose 50% Injectable 25 Gram(s) IV Push once  furosemide   Injectable 40 milliGRAM(s) IV Push two times a day  gabapentin 300 milliGRAM(s) Oral two times a day  glucagon  Injectable 1 milliGRAM(s) IntraMuscular once  heparin   Injectable 5000 Unit(s) SubCutaneous every 8 hours  insulin glargine Injectable (LANTUS) 21 Unit(s) SubCutaneous at bedtime  insulin lispro (ADMELOG) corrective regimen sliding scale   SubCutaneous three times a day before meals  insulin lispro Injectable (ADMELOG) 7 Unit(s) SubCutaneous three times a day before meals  NIFEdipine XL 60 milliGRAM(s) Oral daily  simvastatin 40 milliGRAM(s) Oral at bedtime    MEDICATIONS  (PRN):  acetaminophen     Tablet .. 650 milliGRAM(s) Oral every 6 hours PRN Temp greater or equal to 38C (100.4F), Mild Pain (1 - 3)  clonazePAM  Tablet 2 milliGRAM(s) Oral at bedtime PRN anxiety      HOME MEDICATIONS:  Home Medications:  allopurinol 100 mg oral tablet: 1 tab(s) orally once a day (25 Oct 2021 22:30)  citalopram 20 mg oral tablet: 1 tab(s) orally once a day (25 Oct 2021 22:30)  clonazePAM 1 mg oral tablet: 2 tab(s) orally once a day (at bedtime), As Needed (25 Oct 2021 22:30)  gabapentin 300 mg oral capsule: 1 cap(s) orally 2 times a day (25 Oct 2021 22:30)  glimepiride 4 mg oral tablet: 1 tab(s) orally 2 times a day (25 Oct 2021 22:30)  Lantus 100 units/mL subcutaneous solution: 30 unit(s) subcutaneous once a day (at bedtime) (25 Oct 2021 22:30)  Lasix 40 mg oral tablet: 1 tab(s) orally once a day (25 Oct 2021 22:30)  NIFEdipine 60 mg oral tablet, extended release: 1 tab(s) orally once a day (25 Oct 2021 22:30)  simvastatin 40 mg oral tablet: 1 tab(s) orally once a day (at bedtime) (25 Oct 2021 22:30)      VITALS:   T(F): 96.9 (10-26 @ 07:50), Max: 98.7 (10-25 @ 17:40)  HR: 62 (10-26 @ 08:15) (58 - 77)  BP: 149/67 (10-26 @ 08:15) (126/81 - 184/77)  BP(mean): --  RR: 18 (10-26 @ 08:15) (18 - 22)  SpO2: 98% (10-26 @ 08:15) (98% - 100%)    I&O's Summary      REVIEW OF SYSTEMS:  CONSTITUTIONAL: No weakness, fevers or chills  EYES: No visual changes  ENT: No vertigo or throat pain   NECK: No pain or stiffness  RESPIRATORY: No cough, wheezing, hemoptysis; No shortness of breath  CARDIOVASCULAR: No chest pain or palpitations  GASTROINTESTINAL: No abdominal or epigastric pain. No nausea, vomiting, or hematemesis; No diarrhea or constipation. No melena or hematochezia.  GENITOURINARY: No dysuria, frequency or hematuria  NEUROLOGICAL: No numbness or weakness  SKIN: No itching, no rashes  MSK: FROM x4    PHYSICAL EXAM:  NEURO: patient is awake , alert and oriented  GEN: Not in acute distress  NECK: no thyroid enlargement, no JVD  LUNGS: course b/l bs  CARDIOVASCULAR: S1/S2 present, RRR , no murmurs or rubs, no carotid bruits,  + PP bilaterally  ABD: Soft, non-tender, non-distended, +BS  EXT: 1+ MERLYN  SKIN: Intact    LABS:                        10.0   8.51  )-----------( 239      ( 25 Oct 2021 17:47 )             31.7     10-25    135  |  102  |  52<H>  ----------------------------<  224<H>  5.1<H>   |  18  |  1.8<H>    Ca    8.4<L>      25 Oct 2021 21:37    TPro  7.3  /  Alb  4.0  /  TBili  0.4  /  DBili  x   /  AST  16  /  ALT  12  /  AlkPhos  127<H>  10-25      Troponin T, Serum: 0.17 ng/mL *HH* (10-25-21 @ 21:37)  Troponin T, Serum: 0.16 ng/mL *HH* (10-25-21 @ 17:47)  Lactate, Blood: 1.7 mmol/L (10-25-21 @ 17:47)    CARDIAC MARKERS ( 25 Oct 2021 21:37 )  x     / 0.17 ng/mL / x     / x     / x      CARDIAC MARKERS ( 25 Oct 2021 17:47 )  x     / 0.16 ng/mL / x     / x     / x        Serum Pro-Brain Natriuretic Peptide: 8494 pg/mL (10-25-21 @ 17:47)    RADIOLOGY:  -CXR:  < from: Xray Chest 1 View- PORTABLE-Routine (Xray Chest 1 View- PORTABLE-Routine in AM.) (10.26.21 @ 06:02) >  Impression:    Bilateral opacities, unchanged.    Left-sided permanent pacemaker.    < end of copied text >    -TTE:  < from: TTE Echo Complete w/o Contrast w/ Doppler (10.13.21 @ 08:36) >  Summary:   1. Left ventricular ejection fraction, by visual estimation, is 50 to 55%.   2. Apical septal segment and apical inferior segment are abnormal as described above.   3. Moderately enlarged left atrium.   4. There is no evidence of pericardial effusion.   5. Mild mitral valve regurgitation.   6. Structurally normal mitral valve, with normal leaflet excursion.   7. Sclerotic aortic valve with normal opening.   8. There is mild aortic root calcification.    < end of copied text >    TTE (2019): EF >55% and no wall motion abnormalities    -CCTA:    -STRESS TEST:    -CATHETERIZATION:      ECG:  < from: 12 Lead ECG (10.26.21 @ 00:25) >  Ventricular Rate 62 BPM    Atrial Rate 75 BPM    QRS Duration 178 ms    Q-T Interval 518 ms    QTC Calculation(Bazett) 525 ms    R Axis -35 degrees    T Axis 143 degrees    Diagnosis Line Ventricular-paced rhythm  Abnormal ECG    < end of copied text >      TELEMETRY EVENTS:

## 2021-10-26 NOTE — PROGRESS NOTE ADULT - SUBJECTIVE AND OBJECTIVE BOX
HPI  Patient is a 89y old Male who presents with a chief complaint of HF exacerbation (25 Oct 2021 22:18)    Currently admitted to medicine with the primary diagnosis of Shortness of breath       Today is hospital day 1d.     INTERVAL HPI / OVERNIGHT EVENTS:  Patient was examined and seen at bedside. This morning he is on bipap, reports feeling somewhat better. He is complaining of neck pain, he has a hx of neck fracture from fall 3-4 weeks prior.     ROS: Otherwise unremarkable     PAST MEDICAL & SURGICAL HISTORY  CHF (congestive heart failure)    DM (diabetes mellitus)    HTN (hypertension)    Prostate CA  in remission    Pacemaker    H/O total knee replacement, right      ALLERGIES  No Known Allergies    MEDICATIONS  STANDING MEDICATIONS  allopurinol 100 milliGRAM(s) Oral daily  carvedilol 25 milliGRAM(s) Oral every 12 hours  chlorhexidine 4% Liquid 1 Application(s) Topical <User Schedule>  citalopram 20 milliGRAM(s) Oral daily  dextrose 40% Gel 15 Gram(s) Oral once  dextrose 5%. 1000 milliLiter(s) IV Continuous <Continuous>  dextrose 50% Injectable 25 Gram(s) IV Push once  furosemide   Injectable 40 milliGRAM(s) IV Push two times a day  gabapentin 300 milliGRAM(s) Oral two times a day  glucagon  Injectable 1 milliGRAM(s) IntraMuscular once  heparin   Injectable 5000 Unit(s) SubCutaneous every 8 hours  insulin glargine Injectable (LANTUS) 21 Unit(s) SubCutaneous at bedtime  insulin lispro (ADMELOG) corrective regimen sliding scale   SubCutaneous three times a day before meals  insulin lispro Injectable (ADMELOG) 7 Unit(s) SubCutaneous three times a day before meals  NIFEdipine XL 60 milliGRAM(s) Oral daily  simvastatin 40 milliGRAM(s) Oral at bedtime    PRN MEDICATIONS  acetaminophen     Tablet .. 650 milliGRAM(s) Oral every 6 hours PRN  clonazePAM  Tablet 2 milliGRAM(s) Oral at bedtime PRN    VITALS:  T(F): 96.9  HR: 62  BP: 149/67  RR: 18  SpO2: 98%    PHYSICAL EXAM  GEN: NAD, Resting comfortably in bed, frail elderly male, on BIPAP  PULM: Decreased breath sounds bilaterally  CVS: Regular rate and rhythm, S1-S2, no murmurs  ABD: Soft, non-tender, non-distended, no guarding  EXT: 1+ edema in left leg  NEURO: A&Ox3, no focal deficits    LABS                        10.0   8.51  )-----------( 239      ( 25 Oct 2021 17:47 )             31.7     10-25    135  |  102  |  52<H>  ----------------------------<  224<H>  5.1<H>   |  18  |  1.8<H>    Ca    8.4<L>      25 Oct 2021 21:37    TPro  7.3  /  Alb  4.0  /  TBili  0.4  /  DBili  x   /  AST  16  /  ALT  12  /  AlkPhos  127<H>  10-25      Urinalysis Basic - ( 25 Oct 2021 18:49 )    Color: Colorless / Appearance: Clear / S.010 / pH: x  Gluc: x / Ketone: Negative  / Bili: Negative / Urobili: <2 mg/dL   Blood: x / Protein: Trace / Nitrite: Negative   Leuk Esterase: Negative / RBC: x / WBC x   Sq Epi: x / Non Sq Epi: x / Bacteria: x        Troponin T, Serum: 0.17 ng/mL *HH* (10-25-21 @ 21:37)  Troponin T, Serum: 0.16 ng/mL *HH* (10-25-21 @ 17:47)  Lactate, Blood: 1.7 mmol/L (10-25-21 @ 17:47)      CARDIAC MARKERS ( 25 Oct 2021 21:37 )  x     / 0.17 ng/mL / x     / x     / x      CARDIAC MARKERS ( 25 Oct 2021 17:47 )  x     / 0.16 ng/mL / x     / x     / x          RADIOLOGY      EXAM:  XR CHEST PORTABLE ROUTINE 1V            PROCEDURE DATE:  10/26/2021            INTERPRETATION:  Clinical History / Reason for exam: Heart failure.    Comparison : Chest radiograph 2021.    Technique/Positioning: Adequate.    Findings:    Support devices: Left-sided permanent pacemaker.    Cardiac/mediastinum/hilum: Stable    Lung parenchyma/Pleura: Bilateral opacities, unchanged. No pneumothorax is seen.    Skeleton/soft tissues: Stable    Impression:    Bilateral opacities, unchanged.    Left-sided permanent pacemaker.

## 2021-10-26 NOTE — CONSULT NOTE ADULT - ASSESSMENT
Acute HFpEF exacerbation  CAD, new segmental apical septal and apical inferior hypokinesis since 2019  PARKER on CKD III  HTN  DLD  DM II  HFpEF (EF 50-55%)  s/p PPM    -telemetry monitoring  -c/w iv lasix BID  -keep euvolemic  -may need cath to assess for obstructive CAD in light of new wall motion abnl with increasing troponin once PARKER resolves  -trend troponin  -repeat EKG especially if chest pain  -bp control  -c/w nifedipine, carvedilol  -hold ACEI due to PARKER

## 2021-10-26 NOTE — PROGRESS NOTE ADULT - ATTENDING COMMENTS
Patient seen by nocturnist in AM.   Patient seen and examined in the ED. Patient appears well. On nasal cannula. Improved with diuresis. Plan: c/w IV diuresis for now, cardio f/u regarding ischemic w/u. Start ASA. C/w statin.   Hyperglycemia noted, insulin adjusted.   Rest of plan as H&P.

## 2021-10-27 LAB
ALBUMIN SERPL ELPH-MCNC: 3.6 G/DL — SIGNIFICANT CHANGE UP (ref 3.5–5.2)
ALP SERPL-CCNC: 109 U/L — SIGNIFICANT CHANGE UP (ref 30–115)
ALT FLD-CCNC: 10 U/L — SIGNIFICANT CHANGE UP (ref 0–41)
ANION GAP SERPL CALC-SCNC: 16 MMOL/L — HIGH (ref 7–14)
APPEARANCE UR: CLEAR — SIGNIFICANT CHANGE UP
AST SERPL-CCNC: 11 U/L — SIGNIFICANT CHANGE UP (ref 0–41)
BASOPHILS # BLD AUTO: 0.04 K/UL — SIGNIFICANT CHANGE UP (ref 0–0.2)
BASOPHILS NFR BLD AUTO: 0.6 % — SIGNIFICANT CHANGE UP (ref 0–1)
BILIRUB SERPL-MCNC: 0.5 MG/DL — SIGNIFICANT CHANGE UP (ref 0.2–1.2)
BILIRUB UR-MCNC: NEGATIVE — SIGNIFICANT CHANGE UP
BUN SERPL-MCNC: 54 MG/DL — HIGH (ref 10–20)
CALCIUM SERPL-MCNC: 8.6 MG/DL — SIGNIFICANT CHANGE UP (ref 8.5–10.1)
CHLORIDE SERPL-SCNC: 105 MMOL/L — SIGNIFICANT CHANGE UP (ref 98–110)
CO2 SERPL-SCNC: 18 MMOL/L — SIGNIFICANT CHANGE UP (ref 17–32)
COLOR SPEC: YELLOW — SIGNIFICANT CHANGE UP
COVID-19 SPIKE DOMAIN AB INTERP: POSITIVE
COVID-19 SPIKE DOMAIN ANTIBODY RESULT: >250 U/ML — HIGH
CREAT ?TM UR-MCNC: 127 MG/DL — SIGNIFICANT CHANGE UP
CREAT SERPL-MCNC: 2 MG/DL — HIGH (ref 0.7–1.5)
DIFF PNL FLD: NEGATIVE — SIGNIFICANT CHANGE UP
EOSINOPHIL # BLD AUTO: 0.21 K/UL — SIGNIFICANT CHANGE UP (ref 0–0.7)
EOSINOPHIL NFR BLD AUTO: 3.2 % — SIGNIFICANT CHANGE UP (ref 0–8)
FERRITIN SERPL-MCNC: 277 NG/ML — SIGNIFICANT CHANGE UP (ref 30–400)
GLUCOSE BLDC GLUCOMTR-MCNC: 187 MG/DL — HIGH (ref 70–99)
GLUCOSE BLDC GLUCOMTR-MCNC: 238 MG/DL — HIGH (ref 70–99)
GLUCOSE BLDC GLUCOMTR-MCNC: 253 MG/DL — HIGH (ref 70–99)
GLUCOSE SERPL-MCNC: 233 MG/DL — HIGH (ref 70–99)
GLUCOSE UR QL: NEGATIVE — SIGNIFICANT CHANGE UP
HCT VFR BLD CALC: 29.1 % — LOW (ref 42–52)
HGB BLD-MCNC: 9.4 G/DL — LOW (ref 14–18)
IMM GRANULOCYTES NFR BLD AUTO: 0.6 % — HIGH (ref 0.1–0.3)
KETONES UR-MCNC: NEGATIVE — SIGNIFICANT CHANGE UP
LEUKOCYTE ESTERASE UR-ACNC: NEGATIVE — SIGNIFICANT CHANGE UP
LYMPHOCYTES # BLD AUTO: 1.19 K/UL — LOW (ref 1.2–3.4)
LYMPHOCYTES # BLD AUTO: 17.9 % — LOW (ref 20.5–51.1)
MAGNESIUM SERPL-MCNC: 1.8 MG/DL — SIGNIFICANT CHANGE UP (ref 1.8–2.4)
MCHC RBC-ENTMCNC: 29.6 PG — SIGNIFICANT CHANGE UP (ref 27–31)
MCHC RBC-ENTMCNC: 32.3 G/DL — SIGNIFICANT CHANGE UP (ref 32–37)
MCV RBC AUTO: 91.5 FL — SIGNIFICANT CHANGE UP (ref 80–94)
MONOCYTES # BLD AUTO: 0.47 K/UL — SIGNIFICANT CHANGE UP (ref 0.1–0.6)
MONOCYTES NFR BLD AUTO: 7.1 % — SIGNIFICANT CHANGE UP (ref 1.7–9.3)
NEUTROPHILS # BLD AUTO: 4.7 K/UL — SIGNIFICANT CHANGE UP (ref 1.4–6.5)
NEUTROPHILS NFR BLD AUTO: 70.6 % — SIGNIFICANT CHANGE UP (ref 42.2–75.2)
NITRITE UR-MCNC: NEGATIVE — SIGNIFICANT CHANGE UP
NRBC # BLD: 0 /100 WBCS — SIGNIFICANT CHANGE UP (ref 0–0)
PH UR: 5 — SIGNIFICANT CHANGE UP (ref 5–8)
PLATELET # BLD AUTO: 212 K/UL — SIGNIFICANT CHANGE UP (ref 130–400)
POTASSIUM SERPL-MCNC: 4.3 MMOL/L — SIGNIFICANT CHANGE UP (ref 3.5–5)
POTASSIUM SERPL-SCNC: 4.3 MMOL/L — SIGNIFICANT CHANGE UP (ref 3.5–5)
PROT ?TM UR-MCNC: 17 MG/DLG/24H — SIGNIFICANT CHANGE UP
PROT SERPL-MCNC: 6.5 G/DL — SIGNIFICANT CHANGE UP (ref 6–8)
PROT UR-MCNC: SIGNIFICANT CHANGE UP
PROT/CREAT UR-RTO: 0.1 RATIO — SIGNIFICANT CHANGE UP (ref 0–0.2)
RBC # BLD: 3.18 M/UL — LOW (ref 4.7–6.1)
RBC # FLD: 15.2 % — HIGH (ref 11.5–14.5)
SARS-COV-2 IGG+IGM SERPL QL IA: >250 U/ML — HIGH
SARS-COV-2 IGG+IGM SERPL QL IA: POSITIVE
SODIUM SERPL-SCNC: 139 MMOL/L — SIGNIFICANT CHANGE UP (ref 135–146)
SODIUM UR-SCNC: 47 MMOL/L — SIGNIFICANT CHANGE UP
SP GR SPEC: 1.01 — SIGNIFICANT CHANGE UP (ref 1.01–1.03)
UROBILINOGEN FLD QL: SIGNIFICANT CHANGE UP
UUN UR-MCNC: 540 MG/DL — SIGNIFICANT CHANGE UP
WBC # BLD: 6.65 K/UL — SIGNIFICANT CHANGE UP (ref 4.8–10.8)
WBC # FLD AUTO: 6.65 K/UL — SIGNIFICANT CHANGE UP (ref 4.8–10.8)

## 2021-10-27 PROCEDURE — 93306 TTE W/DOPPLER COMPLETE: CPT | Mod: 26

## 2021-10-27 PROCEDURE — 99233 SBSQ HOSP IP/OBS HIGH 50: CPT

## 2021-10-27 RX ORDER — FUROSEMIDE 40 MG
40 TABLET ORAL EVERY 12 HOURS
Refills: 0 | Status: DISCONTINUED | OUTPATIENT
Start: 2021-10-27 | End: 2021-10-28

## 2021-10-27 RX ADMIN — Medication 7 UNIT(S): at 08:20

## 2021-10-27 RX ADMIN — CARVEDILOL PHOSPHATE 25 MILLIGRAM(S): 80 CAPSULE, EXTENDED RELEASE ORAL at 05:51

## 2021-10-27 RX ADMIN — Medication 2: at 12:49

## 2021-10-27 RX ADMIN — Medication 7 UNIT(S): at 18:16

## 2021-10-27 RX ADMIN — Medication 1: at 18:16

## 2021-10-27 RX ADMIN — Medication 40 MILLIGRAM(S): at 17:12

## 2021-10-27 RX ADMIN — Medication 60 MILLIGRAM(S): at 05:52

## 2021-10-27 RX ADMIN — CITALOPRAM 20 MILLIGRAM(S): 10 TABLET, FILM COATED ORAL at 12:47

## 2021-10-27 RX ADMIN — HEPARIN SODIUM 5000 UNIT(S): 5000 INJECTION INTRAVENOUS; SUBCUTANEOUS at 05:52

## 2021-10-27 RX ADMIN — CARVEDILOL PHOSPHATE 25 MILLIGRAM(S): 80 CAPSULE, EXTENDED RELEASE ORAL at 17:11

## 2021-10-27 RX ADMIN — HEPARIN SODIUM 5000 UNIT(S): 5000 INJECTION INTRAVENOUS; SUBCUTANEOUS at 13:46

## 2021-10-27 RX ADMIN — Medication 100 MILLIGRAM(S): at 12:47

## 2021-10-27 RX ADMIN — GABAPENTIN 300 MILLIGRAM(S): 400 CAPSULE ORAL at 17:12

## 2021-10-27 RX ADMIN — GABAPENTIN 300 MILLIGRAM(S): 400 CAPSULE ORAL at 05:52

## 2021-10-27 RX ADMIN — HEPARIN SODIUM 5000 UNIT(S): 5000 INJECTION INTRAVENOUS; SUBCUTANEOUS at 22:05

## 2021-10-27 RX ADMIN — Medication 81 MILLIGRAM(S): at 12:47

## 2021-10-27 RX ADMIN — Medication 40 MILLIGRAM(S): at 05:52

## 2021-10-27 RX ADMIN — INSULIN GLARGINE 28 UNIT(S): 100 INJECTION, SOLUTION SUBCUTANEOUS at 22:05

## 2021-10-27 RX ADMIN — Medication 7 UNIT(S): at 12:48

## 2021-10-27 RX ADMIN — SIMVASTATIN 40 MILLIGRAM(S): 20 TABLET, FILM COATED ORAL at 22:05

## 2021-10-27 RX ADMIN — CHLORHEXIDINE GLUCONATE 1 APPLICATION(S): 213 SOLUTION TOPICAL at 05:52

## 2021-10-27 RX ADMIN — Medication 3: at 08:20

## 2021-10-27 NOTE — PROGRESS NOTE ADULT - SUBJECTIVE AND OBJECTIVE BOX
SUBJ: Patient seen and examined. No events overnight. Reports breathing better      MEDICATIONS  (STANDING):  allopurinol 100 milliGRAM(s) Oral daily  aspirin  chewable 81 milliGRAM(s) Oral daily  carvedilol 25 milliGRAM(s) Oral every 12 hours  chlorhexidine 4% Liquid 1 Application(s) Topical <User Schedule>  citalopram 20 milliGRAM(s) Oral daily  dextrose 40% Gel 15 Gram(s) Oral once  dextrose 5%. 1000 milliLiter(s) (50 mL/Hr) IV Continuous <Continuous>  dextrose 50% Injectable 25 Gram(s) IV Push once  furosemide   Injectable 40 milliGRAM(s) IV Push two times a day  gabapentin 300 milliGRAM(s) Oral two times a day  glucagon  Injectable 1 milliGRAM(s) IntraMuscular once  heparin   Injectable 5000 Unit(s) SubCutaneous every 8 hours  insulin glargine Injectable (LANTUS) 28 Unit(s) SubCutaneous at bedtime  insulin lispro (ADMELOG) corrective regimen sliding scale   SubCutaneous three times a day before meals  insulin lispro Injectable (ADMELOG) 7 Unit(s) SubCutaneous three times a day before meals  NIFEdipine XL 60 milliGRAM(s) Oral daily  simvastatin 40 milliGRAM(s) Oral at bedtime    MEDICATIONS  (PRN):  acetaminophen     Tablet .. 650 milliGRAM(s) Oral every 6 hours PRN Temp greater or equal to 38C (100.4F), Mild Pain (1 - 3)  clonazePAM  Tablet 2 milliGRAM(s) Oral at bedtime PRN anxiety            Vital Signs Last 24 Hrs  T(C): 36.1 (27 Oct 2021 05:53), Max: 36.1 (27 Oct 2021 05:53)  T(F): 97 (27 Oct 2021 05:53), Max: 97 (27 Oct 2021 05:53)  HR: 60 (27 Oct 2021 05:53) (59 - 64)  BP: 138/80 (27 Oct 2021 05:53) (134/64 - 138/80)  BP(mean): --  RR: 18 (27 Oct 2021 09:18) (18 - 18)  SpO2: 94% (27 Oct 2021 09:18) (90% - 99%)     REVIEW OF SYSTEMS:  CONSTITUTIONAL: No fever  NECK: No pain or stiffness  CARDIOVASCULAR: patient denies chest pain, shortness of breath improved.  Repiratory: No cough or wheezing.  NEUROLOGICAL: No focal deficits to report.  GI: no BRBPR, no N,V,diarrhea.    PSYCHIATRY: normal mood and affect  HEENT: no nasal discharge, no ecchymosis  SKIN: no ecchymosis, no breakdown  MUSCULOSKELETAL: Full range of motion x4.      PHYSICAL EXAM:  GENERAL: NAD  HEAD:  Atraumatic, Normocephalic  NECK: Supple, No JVD  NERVOUS SYSTEM:  Alert & Oriented X3, Good concentration  CHEST/LUNG: Clear to anterior auscultation bilaterally  HEART: Regular rate and rhythm  ABDOMEN: Soft, Nontender, Nondistended;   EXTREMITIES:  Trace edema  SKIN: No rashes or lesions    	      LABS:                        9.4    6.65  )-----------( 212      ( 27 Oct 2021 06:20 )             29.1     10-27    139  |  105  |  54<H>  ----------------------------<  233<H>  4.3   |  18  |  2.0<H>    Ca    8.6      27 Oct 2021 06:20  Phos  3.4     10-26  Mg     1.8     10-27    TPro  6.5  /  Alb  3.6  /  TBili  0.5  /  DBili  x   /  AST  11  /  ALT  10  /  AlkPhos  109  10-27    CARDIAC MARKERS ( 26 Oct 2021 04:30 )  x     / 0.21 ng/mL / x     / x     / x      CARDIAC MARKERS ( 25 Oct 2021 21:37 )  x     / 0.17 ng/mL / x     / x     / x      CARDIAC MARKERS ( 25 Oct 2021 17:47 )  x     / 0.16 ng/mL / x     / x     / x              I&O's Summary    26 Oct 2021 07:01  -  27 Oct 2021 07:00  --------------------------------------------------------  IN: 180 mL / OUT: 400 mL / NET: -220 mL      BNP  RADIOLOGY & ADDITIONAL STUDIES:    IMPRESSION AND PLAN:    Acute HFpEF exacerbation  Type II MI  TTE showed new segmental apical septal and apical inferior hypokinesis since 2019  PARKER on CKD III  HTN/DLD/DM II  s/p PPM  -Management as per primary team   -May switch to oral diuresis   -I/O  -c/w nifedipine, carvedilol  -hold ACEI due to PARKER  - Will plan for out patient [facility] given his PARKER

## 2021-10-27 NOTE — PHYSICAL THERAPY INITIAL EVALUATION ADULT - GENERAL OBSERVATIONS, REHAB EVAL
Pt encountered OOB sitting in chair in NAD, +tele, +O2 3L via NC, no c/o pain and agreeable to PT. Pt needs CGA in transfer mobility and ambulated 150 ft CGA using RW with cont O2 3L. Pt reports fatigue after ambulation but tolerated therapy session. Pt left seated in chair as found.

## 2021-10-27 NOTE — PROGRESS NOTE ADULT - SUBJECTIVE AND OBJECTIVE BOX
AMANDA CASTORENA 89y Male  MRN#: [id number]   CODE STATUS:  Hospital Day: 2d  Pt is currently admitted with the primary diagnosis of: CHF Exacerbation.    SUBJECTIVE  Overnight events: None reported.  Subjective complaints: Denies shortness of breath, chest pain, palpitations, leg swelling.                                          ----------------------------------------------------------  OBJECTIVE  PAST MEDICAL & SURGICAL HISTORY  CHF (congestive heart failure)    DM (diabetes mellitus)    HTN (hypertension)    Prostate CA  in remission    Pacemaker    H/O total knee replacement, right                                          -----------------------------------------------------------  ALLERGIES:  No Known Allergies                                          ------------------------------------------------------------  HOME MEDICATIONS  Home Medications:  allopurinol 100 mg oral tablet: 1 tab(s) orally once a day (25 Oct 2021 22:30)  citalopram 20 mg oral tablet: 1 tab(s) orally once a day (25 Oct 2021 22:30)  clonazePAM 1 mg oral tablet: 2 tab(s) orally once a day (at bedtime), As Needed (25 Oct 2021 22:30)  gabapentin 300 mg oral capsule: 1 cap(s) orally 2 times a day (25 Oct 2021 22:30)  glimepiride 4 mg oral tablet: 1 tab(s) orally 2 times a day (25 Oct 2021 22:30)  Lantus 100 units/mL subcutaneous solution: 30 unit(s) subcutaneous once a day (at bedtime) (25 Oct 2021 22:30)  Lasix 40 mg oral tablet: 1 tab(s) orally once a day (25 Oct 2021 22:30)  NIFEdipine 60 mg oral tablet, extended release: 1 tab(s) orally once a day (25 Oct 2021 22:30)  simvastatin 40 mg oral tablet: 1 tab(s) orally once a day (at bedtime) (25 Oct 2021 22:30)                         MEDICATIONS:  STANDING MEDICATIONS  allopurinol 100 milliGRAM(s) Oral daily  aspirin  chewable 81 milliGRAM(s) Oral daily  carvedilol 25 milliGRAM(s) Oral every 12 hours  chlorhexidine 4% Liquid 1 Application(s) Topical <User Schedule>  citalopram 20 milliGRAM(s) Oral daily  dextrose 40% Gel 15 Gram(s) Oral once  dextrose 5%. 1000 milliLiter(s) IV Continuous <Continuous>  dextrose 50% Injectable 25 Gram(s) IV Push once  furosemide   Injectable 40 milliGRAM(s) IV Push two times a day  gabapentin 300 milliGRAM(s) Oral two times a day  glucagon  Injectable 1 milliGRAM(s) IntraMuscular once  heparin   Injectable 5000 Unit(s) SubCutaneous every 8 hours  insulin glargine Injectable (LANTUS) 28 Unit(s) SubCutaneous at bedtime  insulin lispro (ADMELOG) corrective regimen sliding scale   SubCutaneous three times a day before meals  insulin lispro Injectable (ADMELOG) 7 Unit(s) SubCutaneous three times a day before meals  NIFEdipine XL 60 milliGRAM(s) Oral daily  simvastatin 40 milliGRAM(s) Oral at bedtime    PRN MEDICATIONS  acetaminophen     Tablet .. 650 milliGRAM(s) Oral every 6 hours PRN  clonazePAM  Tablet 2 milliGRAM(s) Oral at bedtime PRN                                          ------------------------------------------------------------  VITAL SIGNS: Last 24 Hours  T(C): 36.1 (27 Oct 2021 05:53), Max: 36.1 (27 Oct 2021 05:53)  T(F): 97 (27 Oct 2021 05:53), Max: 97 (27 Oct 2021 05:53)  HR: 60 (27 Oct 2021 05:53) (59 - 64)  BP: 138/80 (27 Oct 2021 05:53) (134/64 - 138/80)  BP(mean): --  RR: 18 (27 Oct 2021 09:18) (18 - 18)  SpO2: 94% (27 Oct 2021 09:18) (90% - 99%)    10-26-21 @ 07:01  -  10-27-21 @ 07:00  --------------------------------------------------------  IN: 180 mL / OUT: 400 mL / NET: -220 mL                                         --------------------------------------------------------------  LABS:             9.4    6.65  )-----------( 212      ( 27 Oct 2021 06:20 )             29.1     10    139  |  105  |  54<H>  ----------------------------<  233<H>  4.3   |  18  |  2.0<H>    Ca    8.6      27 Oct 2021 06:20  Phos  3.4     10-26  Mg     1.8     10-27    TPro  6.5  /  Alb  3.6  /  TBili  0.5  /  DBili  x   /  AST  11  /  ALT  10  /  AlkPhos  109  10-27    Urinalysis Basic - ( 25 Oct 2021 18:49 )    Color: Colorless / Appearance: Clear / S.010 / pH: x  Gluc: x / Ketone: Negative  / Bili: Negative / Urobili: <2 mg/dL   Blood: x / Protein: Trace / Nitrite: Negative   Leuk Esterase: Negative / RBC: x / WBC x   Sq Epi: x / Non Sq Epi: x / Bacteria: x    Culture - Blood (collected 25 Oct 2021 17:47)  Source: .Blood Blood-Peripheral  Preliminary Report (27 Oct 2021 01:02):    No growth to date.    Culture - Blood (collected 25 Oct 2021 17:47)  Source: .Blood Blood-Peripheral  Preliminary Report (27 Oct 2021 01:02):    No growth to date.    CARDIAC MARKERS ( 26 Oct 2021 04:30 )  x     / 0.21 ng/mL / x     / x     / x      CARDIAC MARKERS ( 25 Oct 2021 21:37 )  x     / 0.17 ng/mL / x     / x     / x      CARDIAC MARKERS ( 25 Oct 2021 17:47 )  x     / 0.16 ng/mL / x     / x     / x                                              -------------------------------------------------------------  RADIOLOGY:  < from: US Kidney and Bladder (10.26.21 @ 14:31) >  IMPRESSION:    Normal renal and urinary bladder ultrasound.    < end of copied text >                                          --------------------------------------------------------------  PHYSICAL EXAM:  General: alert, awake, no acute distress  HEENT: atraumatic  LUNGS: clear to auscultation bilaterally, no wheezes noted  HEART: regular rate/rhythm, no murmurs/gallops  ABDOMEN: soft, nontender, nondistended, normoactive bowel sounds  EXT: 2+ radial pulses, no edema noted  NEURO: aaox4, no focal deficits noted  SKIN: intact, no new lesions noted                                         --------------------------------------------------------------  ASSESSMENT & PLAN  Past medical history and hospital course:  89yo M w/ pmhx of HTN, HLD, DM2, CAD, HFpEF, s/p PPM, prostate CA (in remission), CKD3, neck fracture presents to ED with SOB. Admitted for HF exacerbation.     #Hx of coronary artery diseaseompensated HFpEF s/p PPM   #Hx of CAD  #Troponin emia - 0.16, 0.17, 0.21 this AM  - TTE 10/13: EF 50-55%, Apical septal segment and apical inferior segment are abnormal, repeat TTE 10/25 pending  - PVCs on tele   - Cardiology consulted (follows with Dr. Amor, seen by Dr. Sheth today), planning for outpatient left heart catheterization  - c/w ASA, coreg and statin, Lasix 40mg IV BID  - monitor Electrolytes while diuresis, keep K>4, Mg > 2   - strict Is/Os, daily weights, fluid restriction   - wean O2 as tolerated  - 10/26 lipid panel: Lipid Profile in AM (10.26.21 @ 04:30), Cholesterol, Serum: 155 mg/dL, Triglycerides, Serum: 172 mg/dL, HDL Cholesterol, Serum: 31 mg/dL, LDL 88    #PARKER on CKD 3 likely pre-renal from CHF   - creatinine has been around 2.0 since admission; Cr 1.5 (at baseline)   - Urine lytes pending, renal ultrasound no hydronephrosis    #Normocytic anemia- Hb around 9-10, at baseline  - Iron studies unremarkable, likely secondary to anemia of chronic (kidney) disease    #HTN  - c/w Lasix, Nifedipine 60mg daily and coreg 25mg bid  - holding ACEI as per cardiology given CKD    #HLD  - c/w simvastatin 40mg hs     # DM2 with #Diabetic Neuropathy   - takes glimepiride 4mg bid and lantus 30u at home   - Lantus 28U + Lispro  + SSI  - c/w gabapentin     #Prostate CA (in remission) - outpatient f/u    #Gout - c/w allopurinol     #JOHN on CPAP  - unsure of pressure setting, can do bipap hs for now     #Hx of neck fracture  - f/u o/p Dr. Carr  - currently has soft collar   - doesn't get PT at home; hold off on PT eval for now until clinical improvement in CHF                                                                            ----------------------------------------------------  # DVT prophylaxis: Heparin 5000U subQ q8h  # GI prophylaxis: N/A  # Diet: DASH/TLC + 1.2L Fluid Restriction  # Activity: Increase as Tolerated  # Code status: Full  # Disposition: Remain on 3C                                                                           --------------------------------------------------------  # Handoff: f/u with Cardio

## 2021-10-27 NOTE — PROGRESS NOTE ADULT - SUBJECTIVE AND OBJECTIVE BOX
CIRILOAMANDA NEGRETE  89y  Male      Patient is a 89y old  Male who presents with a chief complaint of CHF exacerbation (27 Oct 2021 10:56)      INTERVAL HPI/OVERNIGHT EVENTS:  He feels better, no chest pain or SOB.   Vital Signs Last 24 Hrs  T(C): 36.1 (27 Oct 2021 05:53), Max: 36.1 (27 Oct 2021 05:53)  T(F): 97 (27 Oct 2021 05:53), Max: 97 (27 Oct 2021 05:53)  HR: 60 (27 Oct 2021 18:17) (59 - 64)  BP: 121/60 (27 Oct 2021 18:17) (121/60 - 138/80)  BP(mean): --  RR: 17 (27 Oct 2021 19:52) (17 - 18)  SpO2: 98% (27 Oct 2021 19:52) (90% - 98%)      10-26-21 @ 07:01  -  10-27-21 @ 07:00  --------------------------------------------------------  IN: 180 mL / OUT: 400 mL / NET: -220 mL    10-27-21 @ 07:01  -  10-27-21 @ 20:42  --------------------------------------------------------  IN: 400 mL / OUT: 750 mL / NET: -350 mL            Consultant(s) Notes Reviewed:  [x ] YES  [ ] NO          MEDICATIONS  (STANDING):  allopurinol 100 milliGRAM(s) Oral daily  aspirin  chewable 81 milliGRAM(s) Oral daily  carvedilol 25 milliGRAM(s) Oral every 12 hours  chlorhexidine 4% Liquid 1 Application(s) Topical <User Schedule>  citalopram 20 milliGRAM(s) Oral daily  dextrose 40% Gel 15 Gram(s) Oral once  dextrose 5%. 1000 milliLiter(s) (50 mL/Hr) IV Continuous <Continuous>  dextrose 50% Injectable 25 Gram(s) IV Push once  furosemide    Tablet 40 milliGRAM(s) Oral every 12 hours  gabapentin 300 milliGRAM(s) Oral two times a day  glucagon  Injectable 1 milliGRAM(s) IntraMuscular once  heparin   Injectable 5000 Unit(s) SubCutaneous every 8 hours  insulin glargine Injectable (LANTUS) 28 Unit(s) SubCutaneous at bedtime  insulin lispro (ADMELOG) corrective regimen sliding scale   SubCutaneous three times a day before meals  insulin lispro Injectable (ADMELOG) 7 Unit(s) SubCutaneous three times a day before meals  NIFEdipine XL 60 milliGRAM(s) Oral daily  simvastatin 40 milliGRAM(s) Oral at bedtime    MEDICATIONS  (PRN):  acetaminophen     Tablet .. 650 milliGRAM(s) Oral every 6 hours PRN Temp greater or equal to 38C (100.4F), Mild Pain (1 - 3)  clonazePAM  Tablet 2 milliGRAM(s) Oral at bedtime PRN anxiety      LABS                          9.4    6.65  )-----------( 212      ( 27 Oct 2021 06:20 )             29.1     10-27    139  |  105  |  54<H>  ----------------------------<  233<H>  4.3   |  18  |  2.0<H>    Ca    8.6      27 Oct 2021 06:20  Phos  3.4     10-26  Mg     1.8     10-27    TPro  6.5  /  Alb  3.6  /  TBili  0.5  /  DBili  x   /  AST  11  /  ALT  10  /  AlkPhos  109  10-27      Urinalysis Basic - ( 27 Oct 2021 11:00 )    Color: Yellow / Appearance: Clear / S.013 / pH: x  Gluc: x / Ketone: Negative  / Bili: Negative / Urobili: <2 mg/dL   Blood: x / Protein: Trace / Nitrite: Negative   Leuk Esterase: Negative / RBC: x / WBC x   Sq Epi: x / Non Sq Epi: x / Bacteria: x        Lactate Trend  10-25 @ 17:47 Lactate:1.7     CARDIAC MARKERS ( 26 Oct 2021 04:30 )  x     / 0.21 ng/mL / x     / x     / x      CARDIAC MARKERS ( 25 Oct 2021 21:37 )  x     / 0.17 ng/mL / x     / x     / x          CAPILLARY BLOOD GLUCOSE      POCT Blood Glucose.: 187 mg/dL (27 Oct 2021 16:55)      Culture - Blood (collected 10-25-21 @ 17:47)  Source: .Blood Blood-Peripheral  Preliminary Report (10-27-21 @ 01:02):    No growth to date.    Culture - Blood (collected 10-25-21 @ 17:47)  Source: .Blood Blood-Peripheral  Preliminary Report (10-27-21 @ 01:02):    No growth to date.        RADIOLOGY & ADDITIONAL TESTS:    Imaging Personally Reviewed:  [ ] YES  [ ] NO    HEALTH ISSUES - PROBLEM Dx:          PHYSICAL EXAM:  GENERAL: NAD, well-developed.  HEAD:  Atraumatic, Normocephalic.  EYES: EOMI, PERRLA, conjunctiva and sclera clear.  NECK: Supple, No JVD.  CHEST/LUNG: Bibasilar rales.   HEART: Regular rate and rhythm; S1 S2.   ABDOMEN: Soft, Nontender, Nondistended; Bowel sounds present.  EXTREMITIES:  2+ Peripheral Pulses, No clubbing, cyanosis, or edema.  PSYCH: AAOx3.  NEUROLOGY: non-focal.  SKIN: No rashes or lesions.

## 2021-10-27 NOTE — PHYSICAL THERAPY INITIAL EVALUATION ADULT - PERTINENT HX OF CURRENT PROBLEM, REHAB EVAL
89yo M w/ pmhx of HTN, HLD, DM2, CAD, HFpEF (EF 50-55%), s/p PPM, prostate CA (in remission), CKD3, neck fracture presents to ED with SOB. Patient denies any orthopnea, PND, chest pain, or palpitations. Felt SOB this afternoon associated with wet cough but not productive of sputum. Denies any fever/chills. Takes meds and compliant with diet. Of note, patient was recently admitted to [facility] for HF and echo showed EF 50-55% with wall motion abnormalities.

## 2021-10-28 ENCOUNTER — TRANSCRIPTION ENCOUNTER (OUTPATIENT)
Age: 86
End: 2021-10-28

## 2021-10-28 VITALS
DIASTOLIC BLOOD PRESSURE: 54 MMHG | TEMPERATURE: 98 F | HEART RATE: 60 BPM | RESPIRATION RATE: 18 BRPM | SYSTOLIC BLOOD PRESSURE: 107 MMHG

## 2021-10-28 LAB
ALBUMIN SERPL ELPH-MCNC: 3.6 G/DL — SIGNIFICANT CHANGE UP (ref 3.5–5.2)
ALP SERPL-CCNC: 105 U/L — SIGNIFICANT CHANGE UP (ref 30–115)
ALT FLD-CCNC: 10 U/L — SIGNIFICANT CHANGE UP (ref 0–41)
ANION GAP SERPL CALC-SCNC: 13 MMOL/L — SIGNIFICANT CHANGE UP (ref 7–14)
AST SERPL-CCNC: 10 U/L — SIGNIFICANT CHANGE UP (ref 0–41)
BASOPHILS # BLD AUTO: 0.05 K/UL — SIGNIFICANT CHANGE UP (ref 0–0.2)
BASOPHILS NFR BLD AUTO: 0.9 % — SIGNIFICANT CHANGE UP (ref 0–1)
BILIRUB SERPL-MCNC: 0.4 MG/DL — SIGNIFICANT CHANGE UP (ref 0.2–1.2)
BUN SERPL-MCNC: 60 MG/DL — HIGH (ref 10–20)
CALCIUM SERPL-MCNC: 8.5 MG/DL — SIGNIFICANT CHANGE UP (ref 8.5–10.1)
CHLORIDE SERPL-SCNC: 103 MMOL/L — SIGNIFICANT CHANGE UP (ref 98–110)
CO2 SERPL-SCNC: 21 MMOL/L — SIGNIFICANT CHANGE UP (ref 17–32)
CREAT SERPL-MCNC: 2 MG/DL — HIGH (ref 0.7–1.5)
EOSINOPHIL # BLD AUTO: 0.27 K/UL — SIGNIFICANT CHANGE UP (ref 0–0.7)
EOSINOPHIL NFR BLD AUTO: 4.8 % — SIGNIFICANT CHANGE UP (ref 0–8)
GLUCOSE BLDC GLUCOMTR-MCNC: 109 MG/DL — HIGH (ref 70–99)
GLUCOSE BLDC GLUCOMTR-MCNC: 138 MG/DL — HIGH (ref 70–99)
GLUCOSE BLDC GLUCOMTR-MCNC: 283 MG/DL — HIGH (ref 70–99)
GLUCOSE BLDC GLUCOMTR-MCNC: 286 MG/DL — HIGH (ref 70–99)
GLUCOSE SERPL-MCNC: 147 MG/DL — HIGH (ref 70–99)
HCT VFR BLD CALC: 26.7 % — LOW (ref 42–52)
HGB BLD-MCNC: 8.6 G/DL — LOW (ref 14–18)
IMM GRANULOCYTES NFR BLD AUTO: 0.4 % — HIGH (ref 0.1–0.3)
LYMPHOCYTES # BLD AUTO: 1.05 K/UL — LOW (ref 1.2–3.4)
LYMPHOCYTES # BLD AUTO: 18.5 % — LOW (ref 20.5–51.1)
MAGNESIUM SERPL-MCNC: 2 MG/DL — SIGNIFICANT CHANGE UP (ref 1.8–2.4)
MCHC RBC-ENTMCNC: 29.1 PG — SIGNIFICANT CHANGE UP (ref 27–31)
MCHC RBC-ENTMCNC: 32.2 G/DL — SIGNIFICANT CHANGE UP (ref 32–37)
MCV RBC AUTO: 90.2 FL — SIGNIFICANT CHANGE UP (ref 80–94)
MONOCYTES # BLD AUTO: 0.45 K/UL — SIGNIFICANT CHANGE UP (ref 0.1–0.6)
MONOCYTES NFR BLD AUTO: 7.9 % — SIGNIFICANT CHANGE UP (ref 1.7–9.3)
NEUTROPHILS # BLD AUTO: 3.84 K/UL — SIGNIFICANT CHANGE UP (ref 1.4–6.5)
NEUTROPHILS NFR BLD AUTO: 67.5 % — SIGNIFICANT CHANGE UP (ref 42.2–75.2)
NRBC # BLD: 0 /100 WBCS — SIGNIFICANT CHANGE UP (ref 0–0)
PLATELET # BLD AUTO: 196 K/UL — SIGNIFICANT CHANGE UP (ref 130–400)
POTASSIUM SERPL-MCNC: 4.4 MMOL/L — SIGNIFICANT CHANGE UP (ref 3.5–5)
POTASSIUM SERPL-SCNC: 4.4 MMOL/L — SIGNIFICANT CHANGE UP (ref 3.5–5)
PROT SERPL-MCNC: 6.3 G/DL — SIGNIFICANT CHANGE UP (ref 6–8)
RBC # BLD: 2.96 M/UL — LOW (ref 4.7–6.1)
RBC # FLD: 15.2 % — HIGH (ref 11.5–14.5)
SODIUM SERPL-SCNC: 137 MMOL/L — SIGNIFICANT CHANGE UP (ref 135–146)
WBC # BLD: 5.68 K/UL — SIGNIFICANT CHANGE UP (ref 4.8–10.8)
WBC # FLD AUTO: 5.68 K/UL — SIGNIFICANT CHANGE UP (ref 4.8–10.8)

## 2021-10-28 PROCEDURE — 99239 HOSP IP/OBS DSCHRG MGMT >30: CPT

## 2021-10-28 RX ORDER — ASPIRIN/CALCIUM CARB/MAGNESIUM 324 MG
1 TABLET ORAL
Qty: 30 | Refills: 0
Start: 2021-10-28 | End: 2021-11-26

## 2021-10-28 RX ORDER — FUROSEMIDE 40 MG
1 TABLET ORAL
Qty: 0 | Refills: 0 | DISCHARGE

## 2021-10-28 RX ORDER — FUROSEMIDE 40 MG
1 TABLET ORAL
Qty: 60 | Refills: 0
Start: 2021-10-28 | End: 2021-11-26

## 2021-10-28 RX ADMIN — Medication 7 UNIT(S): at 11:47

## 2021-10-28 RX ADMIN — Medication 7 UNIT(S): at 09:16

## 2021-10-28 RX ADMIN — Medication 60 MILLIGRAM(S): at 05:34

## 2021-10-28 RX ADMIN — CHLORHEXIDINE GLUCONATE 1 APPLICATION(S): 213 SOLUTION TOPICAL at 05:34

## 2021-10-28 RX ADMIN — HEPARIN SODIUM 5000 UNIT(S): 5000 INJECTION INTRAVENOUS; SUBCUTANEOUS at 14:26

## 2021-10-28 RX ADMIN — GABAPENTIN 300 MILLIGRAM(S): 400 CAPSULE ORAL at 05:34

## 2021-10-28 RX ADMIN — Medication 40 MILLIGRAM(S): at 05:34

## 2021-10-28 RX ADMIN — HEPARIN SODIUM 5000 UNIT(S): 5000 INJECTION INTRAVENOUS; SUBCUTANEOUS at 05:34

## 2021-10-28 RX ADMIN — CARVEDILOL PHOSPHATE 25 MILLIGRAM(S): 80 CAPSULE, EXTENDED RELEASE ORAL at 05:34

## 2021-10-28 RX ADMIN — CITALOPRAM 20 MILLIGRAM(S): 10 TABLET, FILM COATED ORAL at 11:48

## 2021-10-28 RX ADMIN — Medication 100 MILLIGRAM(S): at 11:48

## 2021-10-28 RX ADMIN — Medication 81 MILLIGRAM(S): at 11:49

## 2021-10-28 NOTE — DISCHARGE NOTE PROVIDER - HOSPITAL COURSE
89yo M w/ pmhx of HTN, HLD, DM2, CAD, HFpEF (EF 50-55%), s/p PPM, prostate CA (in remission), CKD3, neck fracture presents to ED with SOB. Patient denies any orthopnea, PND, chest pain, or palpitations. Felt SOB this afternoon associated with wet cough but not productive of sputum. Denies any fever/chills. Takes meds and compliant with diet. Of note, patient was recently admitted to [facility] for HF and echo showed EF 50-55% with wall motion abnormalities.     ED course:   vitals: /58, HR 60, T 98F, O2 100% on bipap 40% for respiratory distress  labs: Hgb 10, K 5.9 hemolyzed, Cr 1.9 -> 1.8, Trop 0.16 -> 0.17  imaging:   - CXR b/l pleural effusions on wet read   given: lasix 40mg IV   progress: patient comfortable and weaned to nasal cannula     89yo M w/ pmhx of HTN, HLD, DM2, CAD, HFpEF, s/p PPM, prostate CA (in remission), CKD3, neck fracture presents to ED with SOB. Admitted for HF exacerbation.     #Hx of coronary artery diseaseompensated HFpEF s/p PPM   #Hx of CAD  #Troponin emia - 0.16, 0.17, 0.21 this AM  - TTE 10/13: EF 50-55%, Apical septal segment and apical inferior segment are abnormal, repeat TTE 10/25 pending  - PVCs on tele   - Cardiology consulted (follows with Dr. Amor, seen by Dr. Sheth today), planning for outpatient left heart catheterization  - c/w ASA, coreg and statin, Lasix 40mg IV BID  - monitor Electrolytes while diuresis, keep K>4, Mg > 2   - strict Is/Os, daily weights, fluid restriction   - wean O2 as tolerated  - 10/26 lipid panel: Lipid Profile in AM (10.26.21 @ 04:30), Cholesterol, Serum: 155 mg/dL, Triglycerides, Serum: 172 mg/dL, HDL Cholesterol, Serum: 31 mg/dL, LDL 88    #PARKER on CKD 3 likely pre-renal from CHF   - creatinine has been around 2.0 since admission; Cr 1.5 (at baseline)   - Urine lytes pending, renal ultrasound no hydronephrosis    #Normocytic anemia- Hb around 9-10, at baseline  - Iron studies unremarkable, likely secondary to anemia of chronic (kidney) disease    #HTN  - c/w Lasix, Nifedipine 60mg daily and coreg 25mg bid  - holding ACEI as per cardiology given CKD    #HLD  - c/w simvastatin 40mg hs     # DM2 with #Diabetic Neuropathy   - takes glimepiride 4mg bid and lantus 30u at home   - Lantus 28U + Lispro 7/7/7 + SSI  - c/w gabapentin     #Prostate CA (in remission) - outpatient f/u    #Gout - c/w allopurinol     #JOHN on CPAP  - unsure of pressure setting, can do bipap hs for now     #Hx of neck fracture  - f/u o/p Dr. Carr  - currently has soft collar   - doesn't get PT at home; hold off on PT eval for now until clinical improvement in CHF                                                                            ----------------------------------------------------  # DVT prophylaxis: Heparin 5000U subQ q8h  # GI prophylaxis: N/A  # Diet: DASH/TLC + 1.2L Fluid Restriction  # Activity: Increase as Tolerated  # Code status: Full  # Disposition: Remain on 3C                                                                           --------------------------------------------------------  # Handoff: f/u with Cardio     Patient is a 89 year old male w/ PMHx of HTN, HLD, DM2, CAD, HFpEF (EF 50-55%), s/p PPM, prostate CA (in remission), CKD3, neck fracture that presented to ED with SOB. Patient denied any orthopnea, PND, chest pain, or palpitations. Felt SOB prior PM associated with wet cough but not productive of sputum. Denies any fever/chills. Takes meds and compliant with diet. Of note, patient was recently admitted to [facility] for HF and Echo showed EF 50-55% with wall motion abnormalities.     In ED found to be in PARKER with elevated troponins. CXR showing bilateral pleural effusions on wet read. Given Lasix 40mg IV    Patient admitted to Tele. Lasix BID given IV push. Troponins increased slightly. New TTE showed moderated MS. Patient's volume overload improved with diuresis. Cardiology followed (Dr. Amor follows outpatient), planned for possible outpatient left heart catheterization. Volume overload has resolved. PARKER has been stable. Stable for discharge. Patient is a 89 year old male w/ PMHx of HTN, HLD, DM2, CAD, HFpEF (EF 50-55%), s/p PPM, prostate CA (in remission), CKD3, neck fracture that presented to ED with SOB. Patient denied any orthopnea, PND, chest pain, or palpitations. Felt SOB prior PM associated with wet cough but not productive of sputum. Denies any fever/chills. Takes meds and compliant with diet. Of note, patient was recently admitted to [facility] for HF and Echo showed EF 50-55% with wall motion abnormalities.     In ED found to be in PARKER with elevated troponins. CXR showing bilateral pleural effusions on wet read. Given Lasix 40mg IV    Patient admitted to [facility]. Lasix BID given IV push. Troponins increased slightly. New TTE showed moderated MS. Patient's volume overload improved with diuresis. Cardiology followed (Dr. Amor follows outpatient), planned for possible outpatient left heart catheterization. Volume overload has resolved. PARKER has been stable. Stable for discharge.  Acute HFpEF:   Severe Pulmonary HTN:   patient with SOB, Pro-BNP: 8K, was 3 K on 10/13  CXR showed diffuse bilateral interstitial infiltrates   EKG paced rhythm.   Echo showed normal LVEF, mild MR, mild to mod TR, severe pulmonary HTN, mod MS, mild AS.  s/p Lasix IV, switch to Lasix 40mg PO BID for one week until follow up with cardiology.  Fluid restriction, low sodium diet.     CAD  NSTEMI type 2:   Troponin 0.16, 0.17, 0.21   Echo 10/13: EF 50-55%, Apical septal segment and apical inferior segment are abnormal, repeat TTE 10/25 pending  Cardiology recommended cardiac cath outpatient.   Continue ASA, Coreg and statin,     PARKER on CKD 3 likely pre-renal from CHF   Cr 2.0, baseline 1.5,   Reduce diuresis. Hold ACEi for now, can be resumed outpatient once renal function is stable.     Normocytic anemia- Hb around 9-10, at baseline  Iron studies unremarkable, likely secondary to anemia of chronic (kidney) disease    HTN  Continue Nifedipine 60mg daily and coreg 25mg bid       DM2 with Diabetic Neuropathy   takes glimepiride 4mg bid and lantus 30u at home

## 2021-10-28 NOTE — DISCHARGE NOTE NURSING/CASE MANAGEMENT/SOCIAL WORK - PATIENT PORTAL LINK FT
You can access the FollowMyHealth Patient Portal offered by [facility] by registering at the following website: [email or web address]. By joining [facility]’s FollowMyHealth portal, you will also be able to view your health information using other applications (apps) compatible with our system.

## 2021-10-28 NOTE — PROGRESS NOTE ADULT - SUBJECTIVE AND OBJECTIVE BOX
GILLLASHELLAMANDA NEGRETE  89y  Male      Patient is a 89y old  Male who presents with a chief complaint of HF exacerbation (28 Oct 2021 09:44)      INTERVAL HPI/OVERNIGHT EVENTS:  He feels ok, no chest pain or SOB.   Vital Signs Last 24 Hrs  T(C): 35.7 (28 Oct 2021 05:31), Max: 35.8 (27 Oct 2021 21:04)  T(F): 96.2 (28 Oct 2021 05:31), Max: 96.4 (27 Oct 2021 21:04)  HR: 65 (28 Oct 2021 05:31) (59 - 65)  BP: 130/63 (28 Oct 2021 05:31) (121/60 - 130/63)  BP(mean): --  RR: 18 (28 Oct 2021 10:54) (17 - 18)  SpO2: 95% (28 Oct 2021 10:54) (95% - 98%)      10-27-21 @ 07:01  -  10-28-21 @ 07:00  --------------------------------------------------------  IN: 990 mL / OUT: 1450 mL / NET: -460 mL            Consultant(s) Notes Reviewed:  [x ] YES  [ ] NO          MEDICATIONS  (STANDING):  allopurinol 100 milliGRAM(s) Oral daily  aspirin  chewable 81 milliGRAM(s) Oral daily  carvedilol 25 milliGRAM(s) Oral every 12 hours  chlorhexidine 4% Liquid 1 Application(s) Topical <User Schedule>  citalopram 20 milliGRAM(s) Oral daily  dextrose 40% Gel 15 Gram(s) Oral once  dextrose 5%. 1000 milliLiter(s) (50 mL/Hr) IV Continuous <Continuous>  dextrose 50% Injectable 25 Gram(s) IV Push once  furosemide    Tablet 40 milliGRAM(s) Oral every 12 hours  gabapentin 300 milliGRAM(s) Oral two times a day  glucagon  Injectable 1 milliGRAM(s) IntraMuscular once  heparin   Injectable 5000 Unit(s) SubCutaneous every 8 hours  insulin glargine Injectable (LANTUS) 28 Unit(s) SubCutaneous at bedtime  insulin lispro (ADMELOG) corrective regimen sliding scale   SubCutaneous three times a day before meals  insulin lispro Injectable (ADMELOG) 7 Unit(s) SubCutaneous three times a day before meals  NIFEdipine XL 60 milliGRAM(s) Oral daily  simvastatin 40 milliGRAM(s) Oral at bedtime    MEDICATIONS  (PRN):  acetaminophen     Tablet .. 650 milliGRAM(s) Oral every 6 hours PRN Temp greater or equal to 38C (100.4F), Mild Pain (1 - 3)  clonazePAM  Tablet 2 milliGRAM(s) Oral at bedtime PRN anxiety      LABS                          8.6    5.68  )-----------( 196      ( 28 Oct 2021 06:30 )             26.7     10-28    137  |  103  |  60<H>  ----------------------------<  147<H>  4.4   |  21  |  2.0<H>    Ca    8.5      28 Oct 2021 06:30  Mg     2.0     10-28    TPro  6.3  /  Alb  3.6  /  TBili  0.4  /  DBili  x   /  AST  10  /  ALT  10  /  AlkPhos  105  10-28      Urinalysis Basic - ( 27 Oct 2021 11:00 )    Color: Yellow / Appearance: Clear / S.013 / pH: x  Gluc: x / Ketone: Negative  / Bili: Negative / Urobili: <2 mg/dL   Blood: x / Protein: Trace / Nitrite: Negative   Leuk Esterase: Negative / RBC: x / WBC x   Sq Epi: x / Non Sq Epi: x / Bacteria: x        Lactate Trend  10-25 @ 17:47 Lactate:1.7         CAPILLARY BLOOD GLUCOSE      POCT Blood Glucose.: 109 mg/dL (28 Oct 2021 11:38)      Culture - Blood (collected 10-25-21 @ 17:47)  Source: .Blood Blood-Peripheral  Preliminary Report (10-27-21 @ 01:02):    No growth to date.    Culture - Blood (collected 10-25-21 @ 17:47)  Source: .Blood Blood-Peripheral  Preliminary Report (10-27-21 @ 01:02):    No growth to date.        RADIOLOGY & ADDITIONAL TESTS:    Imaging Personally Reviewed:  [ ] YES  [ ] NO    HEALTH ISSUES - PROBLEM Dx:          PHYSICAL EXAM:  GENERAL: NAD, well-developed.  HEAD:  Atraumatic, Normocephalic.  EYES: EOMI, PERRLA, conjunctiva and sclera clear.  NECK: Supple, No JVD.  CHEST/LUNG: Bibasilar rales.   HEART: Regular rate and rhythm; S1 S2.   ABDOMEN: Soft, Nontender, Nondistended; Bowel sounds present.  EXTREMITIES:  2+ Peripheral Pulses, No clubbing, cyanosis, or edema.  PSYCH: AAOx3.  NEUROLOGY: non-focal.  SKIN: No rashes or lesions.

## 2021-10-28 NOTE — PROGRESS NOTE ADULT - SUBJECTIVE AND OBJECTIVE BOX
AMANDA CASTORENA 89y Male  MRN#: [id number]   CODE STATUS:  Hospital Day: 3d  Pt is currently admitted with the primary diagnosis of: CHF Exacerbation.    SUBJECTIVE  Overnight events: None reported.  Subjective complaints: Denies shortness of breath, chest pain, palpitations, leg swelling.                                          ----------------------------------------------------------  OBJECTIVE  PAST MEDICAL & SURGICAL HISTORY  CHF (congestive heart failure)    DM (diabetes mellitus)    HTN (hypertension)    Prostate CA  in remission    Pacemaker    H/O total knee replacement, right                                          -----------------------------------------------------------  ALLERGIES:  No Known Allergies                                          ------------------------------------------------------------  HOME MEDICATIONS  Home Medications:  allopurinol 100 mg oral tablet: 1 tab(s) orally once a day (25 Oct 2021 22:30)  citalopram 20 mg oral tablet: 1 tab(s) orally once a day (25 Oct 2021 22:30)  clonazePAM 1 mg oral tablet: 2 tab(s) orally once a day (at bedtime), As Needed (25 Oct 2021 22:30)  gabapentin 300 mg oral capsule: 1 cap(s) orally 2 times a day (25 Oct 2021 22:30)  glimepiride 4 mg oral tablet: 1 tab(s) orally 2 times a day (25 Oct 2021 22:30)  Lantus 100 units/mL subcutaneous solution: 30 unit(s) subcutaneous once a day (at bedtime) (25 Oct 2021 22:30)  Lasix 40 mg oral tablet: 1 tab(s) orally once a day (25 Oct 2021 22:30)  NIFEdipine 60 mg oral tablet, extended release: 1 tab(s) orally once a day (25 Oct 2021 22:30)  simvastatin 40 mg oral tablet: 1 tab(s) orally once a day (at bedtime) (25 Oct 2021 22:30)                         MEDICATIONS:  STANDING MEDICATIONS  allopurinol 100 milliGRAM(s) Oral daily  aspirin  chewable 81 milliGRAM(s) Oral daily  carvedilol 25 milliGRAM(s) Oral every 12 hours  chlorhexidine 4% Liquid 1 Application(s) Topical <User Schedule>  citalopram 20 milliGRAM(s) Oral daily  dextrose 40% Gel 15 Gram(s) Oral once  dextrose 5%. 1000 milliLiter(s) IV Continuous <Continuous>  dextrose 50% Injectable 25 Gram(s) IV Push once  furosemide    Tablet 40 milliGRAM(s) Oral every 12 hours  gabapentin 300 milliGRAM(s) Oral two times a day  glucagon  Injectable 1 milliGRAM(s) IntraMuscular once  heparin   Injectable 5000 Unit(s) SubCutaneous every 8 hours  insulin glargine Injectable (LANTUS) 28 Unit(s) SubCutaneous at bedtime  insulin lispro (ADMELOG) corrective regimen sliding scale   SubCutaneous three times a day before meals  insulin lispro Injectable (ADMELOG) 7 Unit(s) SubCutaneous three times a day before meals  NIFEdipine XL 60 milliGRAM(s) Oral daily  simvastatin 40 milliGRAM(s) Oral at bedtime    PRN MEDICATIONS  acetaminophen     Tablet .. 650 milliGRAM(s) Oral every 6 hours PRN  clonazePAM  Tablet 2 milliGRAM(s) Oral at bedtime PRN                                          ------------------------------------------------------------  VITAL SIGNS: Last 24 Hours  T(C): 35.7 (28 Oct 2021 05:31), Max: 35.8 (27 Oct 2021 21:04)  T(F): 96.2 (28 Oct 2021 05:31), Max: 96.4 (27 Oct 2021 21:04)  HR: 65 (28 Oct 2021 05:31) (59 - 65)  BP: 130/63 (28 Oct 2021 05:31) (121/60 - 130/63)  BP(mean): --  RR: 18 (28 Oct 2021 05:31) (17 - 18)  SpO2: 98% (27 Oct 2021 19:52) (90% - 98%)    10-27-21 @ 07:01  -  10-28-21 @ 07:00  --------------------------------------------------------  IN: 990 mL / OUT: 1450 mL / NET: -460 mL                                         --------------------------------------------------------------  LABS:             8.6    5.68  )-----------( 196      ( 28 Oct 2021 06:30 )             26.7   10    137  |  103  |  60<H>  ----------------------------<  147<H>  4.4   |  21  |  2.0<H>    Ca    8.5      28 Oct 2021 06:30  Mg     2.0     10-28    TPro  6.3  /  Alb  3.6  /  TBili  0.4  /  DBili  x   /  AST  10  /  ALT  10  /  AlkPhos  105  10-28    Urinalysis Basic - ( 27 Oct 2021 11:00 )    Color: Yellow / Appearance: Clear / S.013 / pH: x  Gluc: x / Ketone: Negative  / Bili: Negative / Urobili: <2 mg/dL   Blood: x / Protein: Trace / Nitrite: Negative   Leuk Esterase: Negative / RBC: x / WBC x   Sq Epi: x / Non Sq Epi: x / Bacteria: x    Culture - Blood (collected 25 Oct 2021 17:47)  Source: .Blood Blood-Peripheral  Preliminary Report (27 Oct 2021 01:02):    No growth to date.    Culture - Blood (collected 25 Oct 2021 17:47)  Source: .Blood Blood-Peripheral  Preliminary Report (27 Oct 2021 01:02):    No growth to date.                                          -------------------------------------------------------------  RADIOLOGY:                                          --------------------------------------------------------------  PHYSICAL EXAM:  General: alert, awake, no acute distress  HEENT: atraumatic  LUNGS: clear to auscultation bilaterally, no wheezes noted  HEART: regular rate/rhythm, no murmurs/gallops  ABDOMEN: soft, nontender, nondistended, normoactive bowel sounds  EXT: 2+ radial pulses, no edema noted  NEURO: aaox4, no focal deficits noted  SKIN: intact, no new lesions noted                                         --------------------------------------------------------------  ASSESSMENT & PLAN  Past medical history and hospital course:  89yo M w/ pmhx of HTN, HLD, DM2, CAD, HFpEF, s/p PPM, prostate CA (in remission), CKD3, neck fracture presents to ED with SOB. Admitted for HF exacerbation.     #Decompensated HFpEF s/p PPM   #PMHx of CAD  #Troponinemia - 0.16, 0.17, 0.21 this AM  - TTE 10/13: EF 50-55%, Apical septal segment and apical inferior segment are abnormal, repeat TTE 10/25 pending  - PVCs on tele   - Cardiology consulted (follows with Dr. Amor, seen by Dr. Sheth today), planning for outpatient left heart catheterization  - c/w ASA, coreg and statin, Lasix 40mg IV BID  - monitor Electrolytes while diuresis, keep K>4, Mg > 2   - strict Is/Os, daily weights, fluid restriction   - wean O2 as tolerated  - 10/26 lipid panel: Lipid Profile in AM (10.26.21 @ 04:30), Cholesterol, Serum: 155 mg/dL, Triglycerides, Serum: 172 mg/dL, HDL Cholesterol, Serum: 31 mg/dL, LDL 88    #PARKER on CKD 3 likely pre-renal from CHF   - creatinine has been around 2.0 since admission; Cr 1.5 (at baseline)   - Urine lytes pending, renal ultrasound no hydronephrosis    #Normocytic anemia- Hb around 9-10, at baseline  - Iron studies unremarkable, likely secondary to anemia of chronic (kidney) disease    #HTN  - c/w Lasix, Nifedipine 60mg daily and coreg 25mg bid  - holding ACEI as per cardiology given CKD    #HLD  - c/w simvastatin 40mg hs     # DM2 with #Diabetic Neuropathy   - takes glimepiride 4mg bid and lantus 30u at home   - Lantus 28U + Lispro  + SSI  - c/w gabapentin     #Prostate CA (in remission) - outpatient f/u    #Gout - c/w allopurinol     #JOHN on CPAP  - unsure of pressure setting, can do bipap hs for now     #Hx of neck fracture  - f/u o/p Dr. Carr  - currently has soft collar   - doesn't get PT at home; hold off on PT eval for now until clinical improvement in CHF                                                                            ----------------------------------------------------  # DVT prophylaxis: Heparin 5000U subQ q8h  # GI prophylaxis: N/A  # Diet: DASH/TLC + 1.2L Fluid Restriction  # Activity: Increase as Tolerated  # Code status: Full  # Disposition: Remain on 3C                                                                           --------------------------------------------------------  # Handoff: f/u with Cardio   AMANDA CASTORENA 89y Male  MRN#: [id number]   CODE STATUS:  Hospital Day: 3d  Pt is currently admitted with the primary diagnosis of: CHF Exacerbation.    SUBJECTIVE  Overnight events: None reported.  Subjective complaints: Denies shortness of breath, chest pain, palpitations, leg swelling.                                          ----------------------------------------------------------  OBJECTIVE  PAST MEDICAL & SURGICAL HISTORY  CHF (congestive heart failure)    DM (diabetes mellitus)    HTN (hypertension)    Prostate CA  in remission    Pacemaker    H/O total knee replacement, right                                          -----------------------------------------------------------  ALLERGIES:  No Known Allergies                                          ------------------------------------------------------------  HOME MEDICATIONS  Home Medications:  allopurinol 100 mg oral tablet: 1 tab(s) orally once a day (25 Oct 2021 22:30)  citalopram 20 mg oral tablet: 1 tab(s) orally once a day (25 Oct 2021 22:30)  clonazePAM 1 mg oral tablet: 2 tab(s) orally once a day (at bedtime), As Needed (25 Oct 2021 22:30)  gabapentin 300 mg oral capsule: 1 cap(s) orally 2 times a day (25 Oct 2021 22:30)  glimepiride 4 mg oral tablet: 1 tab(s) orally 2 times a day (25 Oct 2021 22:30)  Lantus 100 units/mL subcutaneous solution: 30 unit(s) subcutaneous once a day (at bedtime) (25 Oct 2021 22:30)  Lasix 40 mg oral tablet: 1 tab(s) orally once a day (25 Oct 2021 22:30)  NIFEdipine 60 mg oral tablet, extended release: 1 tab(s) orally once a day (25 Oct 2021 22:30)  simvastatin 40 mg oral tablet: 1 tab(s) orally once a day (at bedtime) (25 Oct 2021 22:30)                         MEDICATIONS:  STANDING MEDICATIONS  allopurinol 100 milliGRAM(s) Oral daily  aspirin  chewable 81 milliGRAM(s) Oral daily  carvedilol 25 milliGRAM(s) Oral every 12 hours  chlorhexidine 4% Liquid 1 Application(s) Topical <User Schedule>  citalopram 20 milliGRAM(s) Oral daily  dextrose 40% Gel 15 Gram(s) Oral once  dextrose 5%. 1000 milliLiter(s) IV Continuous <Continuous>  dextrose 50% Injectable 25 Gram(s) IV Push once  furosemide    Tablet 40 milliGRAM(s) Oral every 12 hours  gabapentin 300 milliGRAM(s) Oral two times a day  glucagon  Injectable 1 milliGRAM(s) IntraMuscular once  heparin   Injectable 5000 Unit(s) SubCutaneous every 8 hours  insulin glargine Injectable (LANTUS) 28 Unit(s) SubCutaneous at bedtime  insulin lispro (ADMELOG) corrective regimen sliding scale   SubCutaneous three times a day before meals  insulin lispro Injectable (ADMELOG) 7 Unit(s) SubCutaneous three times a day before meals  NIFEdipine XL 60 milliGRAM(s) Oral daily  simvastatin 40 milliGRAM(s) Oral at bedtime    PRN MEDICATIONS  acetaminophen     Tablet .. 650 milliGRAM(s) Oral every 6 hours PRN  clonazePAM  Tablet 2 milliGRAM(s) Oral at bedtime PRN                                          ------------------------------------------------------------  VITAL SIGNS: Last 24 Hours  T(C): 35.7 (28 Oct 2021 05:31), Max: 35.8 (27 Oct 2021 21:04)  T(F): 96.2 (28 Oct 2021 05:31), Max: 96.4 (27 Oct 2021 21:04)  HR: 65 (28 Oct 2021 05:31) (59 - 65)  BP: 130/63 (28 Oct 2021 05:31) (121/60 - 130/63)  BP(mean): --  RR: 18 (28 Oct 2021 05:31) (17 - 18)  SpO2: 98% (27 Oct 2021 19:52) (90% - 98%)    10-27-21 @ 07:01  -  10-28-21 @ 07:00  --------------------------------------------------------  IN: 990 mL / OUT: 1450 mL / NET: -460 mL                                         --------------------------------------------------------------  LABS:             8.6    5.68  )-----------( 196      ( 28 Oct 2021 06:30 )             26.7   10    137  |  103  |  60<H>  ----------------------------<  147<H>  4.4   |  21  |  2.0<H>    Ca    8.5      28 Oct 2021 06:30  Mg     2.0     10-28    TPro  6.3  /  Alb  3.6  /  TBili  0.4  /  DBili  x   /  AST  10  /  ALT  10  /  AlkPhos  105  10-28    Urinalysis Basic - ( 27 Oct 2021 11:00 )    Color: Yellow / Appearance: Clear / S.013 / pH: x  Gluc: x / Ketone: Negative  / Bili: Negative / Urobili: <2 mg/dL   Blood: x / Protein: Trace / Nitrite: Negative   Leuk Esterase: Negative / RBC: x / WBC x   Sq Epi: x / Non Sq Epi: x / Bacteria: x    Culture - Blood (collected 25 Oct 2021 17:47)  Source: .Blood Blood-Peripheral  Preliminary Report (27 Oct 2021 01:02):    No growth to date.    Culture - Blood (collected 25 Oct 2021 17:47)  Source: .Blood Blood-Peripheral  Preliminary Report (27 Oct 2021 01:02):    No growth to date.                                          -------------------------------------------------------------  RADIOLOGY:                                          --------------------------------------------------------------  PHYSICAL EXAM:  General: alert, awake, no acute distress  HEENT: atraumatic  LUNGS: clear to auscultation bilaterally, no wheezes noted  HEART: regular rate/rhythm, no murmurs/gallops  ABDOMEN: soft, nontender, nondistended, normoactive bowel sounds  EXT: 2+ radial pulses, no edema noted  NEURO: aaox4, no focal deficits noted  SKIN: intact, no new lesions noted                                         --------------------------------------------------------------  ASSESSMENT & PLAN  Past medical history and hospital course:  89yo M w/ pmhx of HTN, HLD, DM2, CAD, HFpEF, s/p PPM, prostate CA (in remission), CKD3, neck fracture presents to ED with SOB. Admitted for HF exacerbation.     #Decompensated HFpEF s/p PPM   #PMHx of CAD  #Troponinemia - 0.16, 0.17, 0.21 this AM  - TTE 10/13: EF 50-55%, Apical septal segment and apical inferior segment are abnormal, repeat TTE 10/25 pending  - PVCs on tele   - Cardiology consulted (follows with Dr. Amor, seen by Dr. Sheth today), planning for outpatient left heart catheterization  - c/w ASA, coreg and statin, Lasix 40mg IV BID  - monitor Electrolytes while diuresis, keep K>4, Mg > 2   - strict Is/Os, daily weights, fluid restriction   - wean O2 as tolerated  - 10/26 lipid panel: Lipid Profile in AM (10.26.21 @ 04:30), Cholesterol, Serum: 155 mg/dL, Triglycerides, Serum: 172 mg/dL, HDL Cholesterol, Serum: 31 mg/dL, LDL 88    #PARKER on CKD 3 likely pre-renal from CHF   - creatinine has been around 2.0 since admission; Cr 1.5 (at baseline)   - Urine lytes pending, renal ultrasound no hydronephrosis    #Normocytic anemia- Hb around 9-10, at baseline  - Iron studies unremarkable, likely secondary to anemia of chronic (kidney) disease    #HTN  - c/w Lasix, Nifedipine 60mg daily and coreg 25mg bid  - holding ACEI as per cardiology given CKD    #HLD  - c/w simvastatin 40mg hs     # DM2 with #Diabetic Neuropathy   - takes glimepiride 4mg bid and lantus 30u at home   - Lantus 28U + Lispro  + SSI  - c/w gabapentin     #Prostate CA (in remission) - outpatient f/u    #Gout - c/w allopurinol     #JOHN on CPAP  - unsure of pressure setting, can do bipap hs for now     #Hx of neck fracture  - f/u o/p Dr. Carr  - currently has soft collar   - doesn't get PT at home; hold off on PT eval for now until clinical improvement in CHF                                                                            ----------------------------------------------------  # DVT prophylaxis: Heparin 5000U subQ q8h  # GI prophylaxis: N/A  # Diet: DASH/TLC + 1.2L Fluid Restriction  # Activity: Increase as Tolerated  # Code status: Full  # Disposition: d/c today                                                                           --------------------------------------------------------  # Handoff: d/c today AMANDA CASTORENA 89y Male  MRN#: [id number]   CODE STATUS:  Hospital Day: 3d  Pt is currently admitted with the primary diagnosis of: CHF Exacerbation.    SUBJECTIVE  Overnight events: None reported.  Subjective complaints: Denies shortness of breath, chest pain, palpitations, leg swelling.                                          ----------------------------------------------------------  OBJECTIVE  PAST MEDICAL & SURGICAL HISTORY  CHF (congestive heart failure)    DM (diabetes mellitus)    HTN (hypertension)    Prostate CA  in remission    Pacemaker    H/O total knee replacement, right                                          -----------------------------------------------------------  ALLERGIES:  No Known Allergies                                          ------------------------------------------------------------  HOME MEDICATIONS  Home Medications:  allopurinol 100 mg oral tablet: 1 tab(s) orally once a day (25 Oct 2021 22:30)  citalopram 20 mg oral tablet: 1 tab(s) orally once a day (25 Oct 2021 22:30)  clonazePAM 1 mg oral tablet: 2 tab(s) orally once a day (at bedtime), As Needed (25 Oct 2021 22:30)  gabapentin 300 mg oral capsule: 1 cap(s) orally 2 times a day (25 Oct 2021 22:30)  glimepiride 4 mg oral tablet: 1 tab(s) orally 2 times a day (25 Oct 2021 22:30)  Lantus 100 units/mL subcutaneous solution: 30 unit(s) subcutaneous once a day (at bedtime) (25 Oct 2021 22:30)  Lasix 40 mg oral tablet: 1 tab(s) orally once a day (25 Oct 2021 22:30)  NIFEdipine 60 mg oral tablet, extended release: 1 tab(s) orally once a day (25 Oct 2021 22:30)  simvastatin 40 mg oral tablet: 1 tab(s) orally once a day (at bedtime) (25 Oct 2021 22:30)                         MEDICATIONS:  STANDING MEDICATIONS  allopurinol 100 milliGRAM(s) Oral daily  aspirin  chewable 81 milliGRAM(s) Oral daily  carvedilol 25 milliGRAM(s) Oral every 12 hours  chlorhexidine 4% Liquid 1 Application(s) Topical <User Schedule>  citalopram 20 milliGRAM(s) Oral daily  dextrose 40% Gel 15 Gram(s) Oral once  dextrose 5%. 1000 milliLiter(s) IV Continuous <Continuous>  dextrose 50% Injectable 25 Gram(s) IV Push once  furosemide    Tablet 40 milliGRAM(s) Oral every 12 hours  gabapentin 300 milliGRAM(s) Oral two times a day  glucagon  Injectable 1 milliGRAM(s) IntraMuscular once  heparin   Injectable 5000 Unit(s) SubCutaneous every 8 hours  insulin glargine Injectable (LANTUS) 28 Unit(s) SubCutaneous at bedtime  insulin lispro (ADMELOG) corrective regimen sliding scale   SubCutaneous three times a day before meals  insulin lispro Injectable (ADMELOG) 7 Unit(s) SubCutaneous three times a day before meals  NIFEdipine XL 60 milliGRAM(s) Oral daily  simvastatin 40 milliGRAM(s) Oral at bedtime    PRN MEDICATIONS  acetaminophen     Tablet .. 650 milliGRAM(s) Oral every 6 hours PRN  clonazePAM  Tablet 2 milliGRAM(s) Oral at bedtime PRN                                          ------------------------------------------------------------  VITAL SIGNS: Last 24 Hours  T(C): 35.7 (28 Oct 2021 05:31), Max: 35.8 (27 Oct 2021 21:04)  T(F): 96.2 (28 Oct 2021 05:31), Max: 96.4 (27 Oct 2021 21:04)  HR: 65 (28 Oct 2021 05:31) (59 - 65)  BP: 130/63 (28 Oct 2021 05:31) (121/60 - 130/63)  BP(mean): --  RR: 18 (28 Oct 2021 05:31) (17 - 18)  SpO2: 98% (27 Oct 2021 19:52) (90% - 98%)    10-27-21 @ 07:01  -  10-28-21 @ 07:00  --------------------------------------------------------  IN: 990 mL / OUT: 1450 mL / NET: -460 mL                                         --------------------------------------------------------------  LABS:             8.6    5.68  )-----------( 196      ( 28 Oct 2021 06:30 )             26.7   10    137  |  103  |  60<H>  ----------------------------<  147<H>  4.4   |  21  |  2.0<H>    Ca    8.5      28 Oct 2021 06:30  Mg     2.0     10-28    TPro  6.3  /  Alb  3.6  /  TBili  0.4  /  DBili  x   /  AST  10  /  ALT  10  /  AlkPhos  105  10-28    Urinalysis Basic - ( 27 Oct 2021 11:00 )    Color: Yellow / Appearance: Clear / S.013 / pH: x  Gluc: x / Ketone: Negative  / Bili: Negative / Urobili: <2 mg/dL   Blood: x / Protein: Trace / Nitrite: Negative   Leuk Esterase: Negative / RBC: x / WBC x   Sq Epi: x / Non Sq Epi: x / Bacteria: x    Culture - Blood (collected 25 Oct 2021 17:47)  Source: .Blood Blood-Peripheral  Preliminary Report (27 Oct 2021 01:02):    No growth to date.    Culture - Blood (collected 25 Oct 2021 17:47)  Source: .Blood Blood-Peripheral  Preliminary Report (27 Oct 2021 01:02):    No growth to date.                                          -------------------------------------------------------------  RADIOLOGY:                                          --------------------------------------------------------------  PHYSICAL EXAM:  General: alert, awake, no acute distress  HEENT: atraumatic  LUNGS: clear to auscultation bilaterally, no wheezes noted  HEART: regular rate/rhythm, no murmurs/gallops  ABDOMEN: soft, nontender, nondistended, normoactive bowel sounds  EXT: 2+ radial pulses, no edema noted  NEURO: aaox4, no focal deficits noted  SKIN: intact, no new lesions noted                                         --------------------------------------------------------------  ASSESSMENT & PLAN  Past medical history and hospital course:  89yo M w/ pmhx of HTN, HLD, DM2, CAD, HFpEF, s/p PPM, prostate CA (in remission), CKD3, neck fracture presents to ED with SOB. Admitted for HF exacerbation.     #Decompensated HFpEF s/p PPM   #PMHx of CAD  #Troponinemia - 0.16, 0.17, 0.21 this AM  - TTE 10/13: EF 50-55%, Apical septal segment and apical inferior segment are abnormal, repeat TTE 10/25 pending  - PVCs on tele   - Cardiology consulted (follows with Dr. Amor, seen by Dr. Sheth today), planning for outpatient left heart catheterization  - c/w ASA, coreg and statin, converted to PO Lasix 40mg BID  - strict Is/Os, daily weights, fluid restriction   - 10/26 lipid panel: Lipid Profile in AM (10.26.21 @ 04:30), Cholesterol, Serum: 155 mg/dL, Triglycerides, Serum: 172 mg/dL, HDL Cholesterol, Serum: 31 mg/dL, LDL 88    #PARKER on CKD 3 likely pre-renal from CHF   - creatinine has been around 2.0 since admission; Cr 1.5 (at baseline)   - Urine lytes pending, renal ultrasound no hydronephrosis    #Normocytic anemia- Hb around 9-10, at baseline  - Iron studies unremarkable, likely secondary to anemia of chronic (kidney) disease    #HTN  - c/w Lasix, Nifedipine 60mg daily and coreg 25mg bid  - holding ACEI as per cardiology given CKD    #HLD  - c/w simvastatin 40mg hs     # DM2 with #Diabetic Neuropathy   - takes glimepiride 4mg bid and lantus 30u at home   - Lantus 28U + Lispro  + SSI  - c/w gabapentin     #Prostate CA (in remission) - outpatient f/u    #Gout - c/w allopurinol     #JOHN on CPAP  - unsure of pressure setting, can do bipap hs for now     #Hx of neck fracture  - f/u o/p Dr. Carr  - currently has soft collar   - doesn't get PT at home; hold off on PT eval for now until clinical improvement in CHF                                                                            ----------------------------------------------------  # DVT prophylaxis: Heparin 5000U subQ q8h  # GI prophylaxis: N/A  # Diet: DASH/TLC + 1.2L Fluid Restriction  # Activity: Increase as Tolerated  # Code status: Full  # Disposition: d/c today                                                                           --------------------------------------------------------  # Handoff: d/c today

## 2021-10-28 NOTE — DISCHARGE NOTE PROVIDER - CARE PROVIDERS DIRECT ADDRESSES
,DirectAddress_Unknown,[email or web address]

## 2021-10-28 NOTE — PROGRESS NOTE ADULT - ASSESSMENT
89yo M w/ pmhx of HTN, HLD, DM2, CAD, HFpEF, s/p PPM, prostate CA (in remission), CKD3, neck fracture presents to ED with SOB. Admitted for HF exacerbation.     A/P:   Acute HFpEF:   Severe Pulmonary HTN:   patient with SOB, Pro-BNP: 8K, was 3 K on 10/13  CXR showed diffuse bilateral interstitial infiltrates   EKG paced rhythm.   Echo showed normal LVEF, mild MR, mild to mod TR, severe pulmonary HTN, mod MS, mild AS.  s/p Lasix IV, switch to Lasix 40mg PO BID for one week until follow up with cardiology.  Fluid restriction, low sodium diet.     CAD  NSTEMI type 2:   Troponin 0.16, 0.17, 0.21   Echo 10/13: EF 50-55%, Apical septal segment and apical inferior segment are abnormal, repeat TTE 10/25 pending  Cardiology recommended cardiac cath outpatient.   Continue ASA, Coreg and statin,     PARKER on CKD 3 likely pre-renal from CHF   Cr 2.0, baseline 1.5,   Reduce diuresis. Hold ACEi for now, can be resumed outpatient once renal function is stable.     Normocytic anemia- Hb around 9-10, at baseline  Iron studies unremarkable, likely secondary to anemia of chronic (kidney) disease    HTN  Continue Nifedipine 60mg daily and coreg 25mg bid       DM2 with Diabetic Neuropathy   takes glimepiride 4mg bid and lantus 30u at home     Prostate CA (in remission) - outpatient f/u  Gout - c/w allopurinol   JOHN on CPAP      #Hx of neck fracture  - f/u o/p Dr. Carr  - currently has soft collar   - doesn't get PT at home; hold off on PT eval for now until clinical improvement in CHF                                                                        DVT prophylaxis: Heparin 5000U subQ q8h      #Progress Note Handoff:  Pending (specify):    Family discussion:  Disposition: Home today.   
89yo M w/ pmhx of HTN, HLD, DM2, CAD, HFpEF, s/p PPM, prostate CA (in remission), CKD3, neck fracture presents to ED with SOB. Admitted for HF exacerbation.     A/P:   Acute HFpEF:   Severe Pulmonary HTN:   patient with SOB, Pro-BNP: 8K, was 3 K on 10/13  CXR showed diffuse bilateral interstitial infiltrates   EKG paced rhythm.   Echo showed normal LVEF, mild MR, mild to mod TR, severe pulmonary HTN, mod MS, mild AS.  On Lasix, IV, patient improved, switch to Lasix po tomorrow.   Fluid restriction, low sodium diet.     CAD  NSTEMI type 2:   Troponin 0.16, 0.17, 0.21   Echo 10/13: EF 50-55%, Apical septal segment and apical inferior segment are abnormal, repeat TTE 10/25 pending  Cardiology recommended cardiac cath outpatient.    ASA, coreg and statin,     PARKER on CKD 3 likely pre-renal from CHF   Cr 2.0, baseline   Reduce diuresis.     Normocytic anemia- Hb around 9-10, at baseline  Iron studies unremarkable, likely secondary to anemia of chronic (kidney) disease    HTN  Continue Nifedipine 60mg daily and coreg 25mg bid  holding ACEI as per cardiology given CKD       DM2 with Diabetic Neuropathy   takes glimepiride 4mg bid and lantus 30u at home   - Lantus 28U + Lispro 7/7/7 + SSI  c/w gabapentin     Prostate CA (in remission) - outpatient f/u    Gout - c/w allopurinol     JOHN on CPAP  - unsure of pressure setting, can do bipap hs for now     #Hx of neck fracture  - f/u o/p Dr. Carr  - currently has soft collar   - doesn't get PT at home; hold off on PT eval for now until clinical improvement in CHF                                                                        # DVT prophylaxis: Heparin 5000U subQ q8h      #Progress Note Handoff:  Pending (specify):  improving Cr, renal failure, monitor for SOB, on IV diuresis.   Family discussion:  Disposition: Home in 24-48 hrs. 
89yo M w/ pmhx of HTN, HLD, DM2, CAD, HFpEF, s/p PPM, prostate CA (in remission), CKD3, neck fracture presents to ED with SOB. Admitted for HF exacerbation.     #Acute decompensated HFpEF s/p PPM   #Hx of CAD  - trop 0.16 -> 0.17  - Echo 10/13: EF 50-55%, Apical septal segment and apical inferior segment are abnormal   - PVCs on tele   - f/u Echo, trend Trop   - Cardiology consult (Dr. Amor) pending   - c/w ASA, coreg and statin   - continue with Lasix 40mg IV BID  - monitor Electrolytes while diuresis, keep K>4, Mg > 2   - strict i/o, daily weights, fluid restriction   - wean O2     #PARKER on CKD 3 likely pre-renal from CHF   - Cr 1.9 on admission; Cr 1.5 (at baseline)   - Urine lytes and Renal ultrasound pending   - trend Cr    #Normocytic anemia  - hgb 10, at baseline  - Iron studies pending     # HTN  - c/w Lasix, Nifedipine 60mg daily and coreg 25mg bid  - enalapril was on hold in [facility] for hyperkalemia    #HLD  - c/w simvastatin 40mg hs     # DM2 with neuropathy   - takes glimepiride 4mg bid and lantus 30u at home   - monitor FS; start basal/bolus insulin regimen  - c/w gabapentin     # Prostate CA (in remission)  - outpt f/u    #gout  - c/w allopurinol     #JOHN on CPAP  - unsure of pressure setting, can do bipap hs for now     #Hx of neck fracture  - f/u o/p Dr. Carr  - currently has soft collar   - doesn't get PT at home; hold off on PT eval for now until clinic improvement in CHF    DVT ppx: hep subq  GI ppx: none  Diet: DASH/CC/fluid restriction  Dispo: from home; cardio consult, echo, lipid/a1c/TSH, iron studies, trend trops

## 2021-10-28 NOTE — DISCHARGE NOTE PROVIDER - PROVIDER TOKENS
PROVIDER:[TOKEN:[[id number]],FOLLOWUP:[1 week]],PROVIDER:[TOKEN:[[id number]],FOLLOWUP:[1 month]]

## 2021-10-28 NOTE — DISCHARGE NOTE PROVIDER - NSDCCPCAREPLAN_GEN_ALL_CORE_FT
PRINCIPAL DISCHARGE DIAGNOSIS  Diagnosis: CHF exacerbation  Assessment and Plan of Treatment: Please follow-up with your cardiologist in one week.  Congestive heart failure is a chronic condition in which the heart has trouble pumping blood. In some cases of heart failure, fluid may back up into your lungs or you may have swelling (edema) in your lower legs. There are many causes of heart failure including high blood pressure, coronary artery disease, abnormal heart valves, heart muscle disease, lung disease, diabetes, etc. Symptoms include shortness of breath with activity or when lying flat, cough, swelling of the legs, fatigue, or increased urination during the night.   Treatment is aimed at managing the symptoms of heart failure and may include lifestyle changes, medications, or surgical procedures. Take medicines only as directed by your health care provider and do not stop unless instructed to do so. Eat heart-healthy foods with low or no trans/saturated fats, cholesterol and salt. Weigh yourself every day for early recognition of fluid accumulation.  SEEK IMMEDIATE MEDICAL CARE IF YOU HAVE ANY OF THE FOLLOWING SYMPTOMS: shortness of breath, change in mental status, chest pain, lightheadedness/dizziness/fainting, or worsening of symptoms including not being able to conduct normal physical activity.      SECONDARY DISCHARGE DIAGNOSES  Diagnosis: Elevated troponin  Assessment and Plan of Treatment: Troponins are cardiac enzymes that may be elevated secondary to cardiac disease or kidney disease Please follow-up with your cardiologist in one week.

## 2021-10-28 NOTE — DISCHARGE NOTE PROVIDER - CARE PROVIDER_API CALL
Bell Amor)  Cardiovascular Disease; Interventional Cardiology  [address] [place] [address]  Phone: [phone]  Fax: [phone]  Follow Up Time: 1 week    Thomas Parker)  [address] [place], [place] [id number] [place] [id number]  Phone: [phone]  Fax: [phone]  Follow Up Time: 1 month

## 2021-10-28 NOTE — DISCHARGE NOTE PROVIDER - NSDCMRMEDTOKEN_GEN_ALL_CORE_FT
allopurinol 100 mg oral tablet: 1 tab(s) orally once a day  carvedilol 25 mg oral tablet: 1 tab(s) orally every 12 hours  citalopram 20 mg oral tablet: 1 tab(s) orally once a day  clonazePAM 1 mg oral tablet: 2 tab(s) orally once a day (at bedtime), As Needed  gabapentin 300 mg oral capsule: 1 cap(s) orally 2 times a day  glimepiride 4 mg oral tablet: 1 tab(s) orally 2 times a day  Lantus 100 units/mL subcutaneous solution: 30 unit(s) subcutaneous once a day (at bedtime)  Lasix 40 mg oral tablet: 1 tab(s) orally once a day  NIFEdipine 60 mg oral tablet, extended release: 1 tab(s) orally once a day  simvastatin 40 mg oral tablet: 1 tab(s) orally once a day (at bedtime)   allopurinol 100 mg oral tablet: 1 tab(s) orally once a day  aspirin 81 mg oral tablet, chewable: 1 tab(s) orally once a day  carvedilol 25 mg oral tablet: 1 tab(s) orally every 12 hours  citalopram 20 mg oral tablet: 1 tab(s) orally once a day  clonazePAM 1 mg oral tablet: 2 tab(s) orally once a day (at bedtime), As Needed  furosemide 40 mg oral tablet: 1 tab(s) orally every 12 hours  gabapentin 300 mg oral capsule: 1 cap(s) orally 2 times a day  glimepiride 4 mg oral tablet: 1 tab(s) orally 2 times a day  Lantus 100 units/mL subcutaneous solution: 30 unit(s) subcutaneous once a day (at bedtime)  NIFEdipine 60 mg oral tablet, extended release: 1 tab(s) orally once a day  simvastatin 40 mg oral tablet: 1 tab(s) orally once a day (at bedtime)

## 2021-10-31 LAB
CULTURE RESULTS: SIGNIFICANT CHANGE UP
CULTURE RESULTS: SIGNIFICANT CHANGE UP
SPECIMEN SOURCE: SIGNIFICANT CHANGE UP
SPECIMEN SOURCE: SIGNIFICANT CHANGE UP

## 2021-11-02 DIAGNOSIS — G47.33 OBSTRUCTIVE SLEEP APNEA (ADULT) (PEDIATRIC): ICD-10-CM

## 2021-11-02 DIAGNOSIS — Z95.0 PRESENCE OF CARDIAC PACEMAKER: ICD-10-CM

## 2021-11-02 DIAGNOSIS — E78.5 HYPERLIPIDEMIA, UNSPECIFIED: ICD-10-CM

## 2021-11-02 DIAGNOSIS — E11.22 TYPE 2 DIABETES MELLITUS WITH DIABETIC CHRONIC KIDNEY DISEASE: ICD-10-CM

## 2021-11-02 DIAGNOSIS — I25.10 ATHEROSCLEROTIC HEART DISEASE OF NATIVE CORONARY ARTERY WITHOUT ANGINA PECTORIS: ICD-10-CM

## 2021-11-02 DIAGNOSIS — I13.0 HYPERTENSIVE HEART AND CHRONIC KIDNEY DISEASE WITH HEART FAILURE AND STAGE 1 THROUGH STAGE 4 CHRONIC KIDNEY DISEASE, OR UNSPECIFIED CHRONIC KIDNEY DISEASE: ICD-10-CM

## 2021-11-02 DIAGNOSIS — E11.65 TYPE 2 DIABETES MELLITUS WITH HYPERGLYCEMIA: ICD-10-CM

## 2021-11-02 DIAGNOSIS — I21.A1 MYOCARDIAL INFARCTION TYPE 2: ICD-10-CM

## 2021-11-02 DIAGNOSIS — I50.33 ACUTE ON CHRONIC DIASTOLIC (CONGESTIVE) HEART FAILURE: ICD-10-CM

## 2021-11-02 DIAGNOSIS — M10.9 GOUT, UNSPECIFIED: ICD-10-CM

## 2021-11-02 DIAGNOSIS — I08.3 COMBINED RHEUMATIC DISORDERS OF MITRAL, AORTIC AND TRICUSPID VALVES: ICD-10-CM

## 2021-11-02 DIAGNOSIS — N18.30 CHRONIC KIDNEY DISEASE, STAGE 3 UNSPECIFIED: ICD-10-CM

## 2021-11-02 DIAGNOSIS — Z99.89 DEPENDENCE ON OTHER ENABLING MACHINES AND DEVICES: ICD-10-CM

## 2021-11-02 DIAGNOSIS — E11.40 TYPE 2 DIABETES MELLITUS WITH DIABETIC NEUROPATHY, UNSPECIFIED: ICD-10-CM

## 2021-11-02 DIAGNOSIS — D63.1 ANEMIA IN CHRONIC KIDNEY DISEASE: ICD-10-CM

## 2021-11-02 DIAGNOSIS — R06.02 SHORTNESS OF BREATH: ICD-10-CM

## 2021-11-02 DIAGNOSIS — I27.20 PULMONARY HYPERTENSION, UNSPECIFIED: ICD-10-CM

## 2022-03-03 ENCOUNTER — APPOINTMENT (OUTPATIENT)
Dept: NEUROSURGERY | Facility: CLINIC | Age: 87
End: 2022-03-03
Payer: MEDICARE

## 2022-03-03 PROCEDURE — 99204 OFFICE O/P NEW MOD 45 MIN: CPT

## 2022-03-16 PROBLEM — Z00.00 ENCOUNTER FOR PREVENTIVE HEALTH EXAMINATION: Status: ACTIVE | Noted: 2022-03-16

## 2022-10-12 NOTE — ED PROVIDER NOTE - HIV OFFER
Interventional Cardiologist: Francisco Shen MD  Assistant : Francisco Shen MD
Previously Declined (within the last year)

## 2022-10-18 ENCOUNTER — INPATIENT (INPATIENT)
Facility: HOSPITAL | Age: 87
LOS: 8 days | Discharge: HOME HEALTH | End: 2022-10-27
Attending: INTERNAL MEDICINE | Admitting: INTERNAL MEDICINE

## 2022-10-18 VITALS
RESPIRATION RATE: 18 BRPM | HEART RATE: 88 BPM | WEIGHT: 188.05 LBS | DIASTOLIC BLOOD PRESSURE: 96 MMHG | HEIGHT: 66 IN | SYSTOLIC BLOOD PRESSURE: 217 MMHG | OXYGEN SATURATION: 99 %

## 2022-10-18 DIAGNOSIS — Z96.651 PRESENCE OF RIGHT ARTIFICIAL KNEE JOINT: Chronic | ICD-10-CM

## 2022-10-18 LAB
ALBUMIN SERPL ELPH-MCNC: 4.1 G/DL — SIGNIFICANT CHANGE UP (ref 3.5–5.2)
ALP SERPL-CCNC: 144 U/L — HIGH (ref 30–115)
ALT FLD-CCNC: 11 U/L — SIGNIFICANT CHANGE UP (ref 0–41)
ANION GAP SERPL CALC-SCNC: 14 MMOL/L — SIGNIFICANT CHANGE UP (ref 7–14)
APTT BLD: 28.9 SEC — SIGNIFICANT CHANGE UP (ref 27–39.2)
AST SERPL-CCNC: 16 U/L — SIGNIFICANT CHANGE UP (ref 0–41)
BASOPHILS # BLD AUTO: 0.04 K/UL — SIGNIFICANT CHANGE UP (ref 0–0.2)
BASOPHILS NFR BLD AUTO: 0.6 % — SIGNIFICANT CHANGE UP (ref 0–1)
BILIRUB SERPL-MCNC: 0.3 MG/DL — SIGNIFICANT CHANGE UP (ref 0.2–1.2)
BUN SERPL-MCNC: 38 MG/DL — HIGH (ref 10–20)
CALCIUM SERPL-MCNC: 9 MG/DL — SIGNIFICANT CHANGE UP (ref 8.4–10.5)
CHLORIDE SERPL-SCNC: 100 MMOL/L — SIGNIFICANT CHANGE UP (ref 98–110)
CO2 SERPL-SCNC: 20 MMOL/L — SIGNIFICANT CHANGE UP (ref 17–32)
CREAT SERPL-MCNC: 1.6 MG/DL — HIGH (ref 0.7–1.5)
EGFR: 40 ML/MIN/1.73M2 — LOW
EOSINOPHIL # BLD AUTO: 0.24 K/UL — SIGNIFICANT CHANGE UP (ref 0–0.7)
EOSINOPHIL NFR BLD AUTO: 3.4 % — SIGNIFICANT CHANGE UP (ref 0–8)
GLUCOSE SERPL-MCNC: 367 MG/DL — HIGH (ref 70–99)
HCT VFR BLD CALC: 38.3 % — LOW (ref 42–52)
HGB BLD-MCNC: 12.7 G/DL — LOW (ref 14–18)
IMM GRANULOCYTES NFR BLD AUTO: 0.7 % — HIGH (ref 0.1–0.3)
INR BLD: 0.99 RATIO — SIGNIFICANT CHANGE UP (ref 0.65–1.3)
LYMPHOCYTES # BLD AUTO: 1.11 K/UL — LOW (ref 1.2–3.4)
LYMPHOCYTES # BLD AUTO: 15.7 % — LOW (ref 20.5–51.1)
MAGNESIUM SERPL-MCNC: 2 MG/DL — SIGNIFICANT CHANGE UP (ref 1.8–2.4)
MCHC RBC-ENTMCNC: 28.5 PG — SIGNIFICANT CHANGE UP (ref 27–31)
MCHC RBC-ENTMCNC: 33.2 G/DL — SIGNIFICANT CHANGE UP (ref 32–37)
MCV RBC AUTO: 85.9 FL — SIGNIFICANT CHANGE UP (ref 80–94)
MONOCYTES # BLD AUTO: 0.41 K/UL — SIGNIFICANT CHANGE UP (ref 0.1–0.6)
MONOCYTES NFR BLD AUTO: 5.8 % — SIGNIFICANT CHANGE UP (ref 1.7–9.3)
NEUTROPHILS # BLD AUTO: 5.2 K/UL — SIGNIFICANT CHANGE UP (ref 1.4–6.5)
NEUTROPHILS NFR BLD AUTO: 73.8 % — SIGNIFICANT CHANGE UP (ref 42.2–75.2)
NRBC # BLD: 0 /100 WBCS — SIGNIFICANT CHANGE UP (ref 0–0)
PLATELET # BLD AUTO: 219 K/UL — SIGNIFICANT CHANGE UP (ref 130–400)
POTASSIUM SERPL-MCNC: 5.3 MMOL/L — HIGH (ref 3.5–5)
POTASSIUM SERPL-SCNC: 5.3 MMOL/L — HIGH (ref 3.5–5)
PROT SERPL-MCNC: 7.5 G/DL — SIGNIFICANT CHANGE UP (ref 6–8)
PROTHROM AB SERPL-ACNC: 11.3 SEC — SIGNIFICANT CHANGE UP (ref 9.95–12.87)
RBC # BLD: 4.46 M/UL — LOW (ref 4.7–6.1)
RBC # FLD: 14.2 % — SIGNIFICANT CHANGE UP (ref 11.5–14.5)
SODIUM SERPL-SCNC: 134 MMOL/L — LOW (ref 135–146)
TROPONIN T SERPL-MCNC: 0.11 NG/ML — CRITICAL HIGH
WBC # BLD: 7.05 K/UL — SIGNIFICANT CHANGE UP (ref 4.8–10.8)
WBC # FLD AUTO: 7.05 K/UL — SIGNIFICANT CHANGE UP (ref 4.8–10.8)

## 2022-10-18 PROCEDURE — 74177 CT ABD & PELVIS W/CONTRAST: CPT | Mod: 26,MA

## 2022-10-18 PROCEDURE — 71275 CT ANGIOGRAPHY CHEST: CPT | Mod: 26,MA

## 2022-10-18 PROCEDURE — 99053 MED SERV 10PM-8AM 24 HR FAC: CPT

## 2022-10-18 PROCEDURE — 99285 EMERGENCY DEPT VISIT HI MDM: CPT

## 2022-10-18 PROCEDURE — 71045 X-RAY EXAM CHEST 1 VIEW: CPT | Mod: 26

## 2022-10-18 PROCEDURE — 93010 ELECTROCARDIOGRAM REPORT: CPT

## 2022-10-18 NOTE — ED ADULT TRIAGE NOTE - GLASGOW COMA SCALE: BEST VERBAL RESPONSE, MLM
(V5) oriented Telephone Encounter by Riya Culp RN at 02/06/17 01:40 PM     Author:  Riya Culp RN Service:  (none) Author Type:  Registered Nurse     Filed:  02/06/17 01:41 PM Encounter Date:  2/6/2017 Status:  Signed     :  Riya Culp RN (Registered Nurse)            Left message on answering machine to call back. [YP1.1T]        Revision History        User Key Date/Time User Provider Type Action    > YP1.1 02/06/17 01:41 PM Riya Culp RN Registered Nurse Sign    T - Template

## 2022-10-18 NOTE — ED ADULT NURSE NOTE - NS PRO PASSIVE SMOKE EXP
Any Depression?: No Hypertriglyceridemia Monitoring: I explained this is common when taking isotretinoin. If this worsens they will contact us. Hypertriglyceridemia Treatment: I explained this is common when taking isotretinoin. If this worsens they will contact us. They may try OTC ibuprofen. Counseling Text: I reviewed the side effect in detail. Patient should get monthly blood tests, not donate blood, not drive at night if vision affected, and not share medication. Female Pregnancy Counseling Text: Female patients should also be on two forms of birth control while taking this medication and for one month after their last dose. No Hypercholesterolemia Monitoring: I explained this is common when taking isotretinoin. We will monitor closely. Use Therapeutic Ranged Or Therapeutic Target: please select Range or Target Pounds Preamble Statement (Weight Entered In Details Tab): Reported Weight in pounds: Female Completion Statement: After discussing her treatment course we decided to discontinue isotretinoin therapy at this time. I explained that she would need to continue her birth control methods for at least one month after the last dosage. She should also get a pregnancy test one month after the last dose. She shouldn't donate blood for one month after the last dose. She should call with any new symptoms of depression. Kilograms Preamble Statement (Weight Entered In Details Tab): Reported Weight in kilograms: Are Labs Available For Review?: Yes Xerosis Normal Treatment: I recommended application of Cetaphil or CeraVe numerous times a day going to bed to all dry areas. Xerosis Aggressive Treatment: I recommended application of Cetaphil or CeraVe numerous times a day going to bed to all dry areas. I also prescribed a topical steroid for twice daily use. Ipledge Number (Optional): [id number] Retinoid Dermatitis Normal Treatment: I recommended more frequent application of Cetaphil or CeraVe to the areas of dermatitis. Male Completion Statement: After discussing his treatment course we decided to discontinue isotretinoin therapy at this time. He shouldn't donate blood for one month after the last dose. He should call with any new symptoms of depression. Lower Range (In Mg/Kg): 120 Weight Units: pounds Nosebleeds Normal Treatment: I explained this is common when taking isotretinoin. I recommended saline mist in each nostril multiple times a day. If this worsens they will contact us. Headache Monitoring: I recommended monitoring the headaches for now. There is no evidence of increased intracranial pressure. They were instructed to call if the headaches are worsening. Upper Range (In Mg/Kg): 150 Retinoid Dermatitis Aggressive Treatment: I recommended more frequent application of Cetaphil or CeraVe to the areas of dermatitis. I also prescribed a topical steroid for twice daily use until the dermatitis resolves. Patient Weight (Optional But Required For Cumulative Dose-Numbers And Decimals Only): 143 Cheilitis Normal Treatment: I recommended application of Vaseline or Aquaphor numerous times a day (as often as every hour) and before going to bed. Target Cumulative Dosage (In Mg/Kg): 135 Cheilitis Aggressive Treatment: I recommended application of Vaseline or Aquaphor numerous times a day (as often as every hour) and before going to bed. I also prescribed a topical steroid for twice daily use. Next Month's Dosage: 10mg BID Months Of Therapy Completed: 1 Xerosis Aggressive Treatment: I recommended application of Cetaphil or CeraVe numerous times a day and before going to bed to all dry areas. I also prescribed a topical steroid for twice daily use. Xerosis Normal Treatment: I recommended application of Cetaphil or CeraVe numerous times a day and before going to bed to all dry areas. Dosing Month 1 (Required For Cumulative Dosing): 10mg Daily Detail Level: Zone What Is The Patient's Gender: Female

## 2022-10-18 NOTE — ED ADULT TRIAGE NOTE - SPO2 (%)
----- Message from Anay Field sent at 12/17/2018 11:53 AM CST -----  Contact: Matt [phone]  The patient is returning a call back to Ms. Clement. Please call at your earliest convenience.   99

## 2022-10-19 LAB
APTT BLD: 33.7 SEC — SIGNIFICANT CHANGE UP (ref 27–39.2)
APTT BLD: 34.6 SEC — SIGNIFICANT CHANGE UP (ref 27–39.2)
APTT BLD: 44.3 SEC — HIGH (ref 27–39.2)
CK MB CFR SERPL CALC: 61.1 NG/ML — HIGH (ref 0.6–6.3)
CK MB CFR SERPL CALC: 94.1 NG/ML — HIGH (ref 0.6–6.3)
CK SERPL-CCNC: 411 U/L — HIGH (ref 0–225)
CK SERPL-CCNC: 555 U/L — HIGH (ref 0–225)
GLUCOSE BLDC GLUCOMTR-MCNC: 177 MG/DL — HIGH (ref 70–99)
GLUCOSE BLDC GLUCOMTR-MCNC: 230 MG/DL — HIGH (ref 70–99)
GLUCOSE BLDC GLUCOMTR-MCNC: 289 MG/DL — HIGH (ref 70–99)
GLUCOSE BLDC GLUCOMTR-MCNC: 303 MG/DL — HIGH (ref 70–99)
HCT VFR BLD CALC: 35.1 % — LOW (ref 42–52)
HCT VFR BLD CALC: 35.3 % — LOW (ref 42–52)
HGB BLD-MCNC: 11.7 G/DL — LOW (ref 14–18)
HGB BLD-MCNC: 11.8 G/DL — LOW (ref 14–18)
MCHC RBC-ENTMCNC: 28.7 PG — SIGNIFICANT CHANGE UP (ref 27–31)
MCHC RBC-ENTMCNC: 28.8 PG — SIGNIFICANT CHANGE UP (ref 27–31)
MCHC RBC-ENTMCNC: 33.1 G/DL — SIGNIFICANT CHANGE UP (ref 32–37)
MCHC RBC-ENTMCNC: 33.6 G/DL — SIGNIFICANT CHANGE UP (ref 32–37)
MCV RBC AUTO: 85.6 FL — SIGNIFICANT CHANGE UP (ref 80–94)
MCV RBC AUTO: 86.5 FL — SIGNIFICANT CHANGE UP (ref 80–94)
NRBC # BLD: 0 /100 WBCS — SIGNIFICANT CHANGE UP (ref 0–0)
NRBC # BLD: 0 /100 WBCS — SIGNIFICANT CHANGE UP (ref 0–0)
PLATELET # BLD AUTO: 194 K/UL — SIGNIFICANT CHANGE UP (ref 130–400)
PLATELET # BLD AUTO: 203 K/UL — SIGNIFICANT CHANGE UP (ref 130–400)
RBC # BLD: 4.08 M/UL — LOW (ref 4.7–6.1)
RBC # BLD: 4.1 M/UL — LOW (ref 4.7–6.1)
RBC # FLD: 14.3 % — SIGNIFICANT CHANGE UP (ref 11.5–14.5)
RBC # FLD: 14.3 % — SIGNIFICANT CHANGE UP (ref 11.5–14.5)
SARS-COV-2 RNA SPEC QL NAA+PROBE: SIGNIFICANT CHANGE UP
TROPONIN T SERPL-MCNC: 1.75 NG/ML — CRITICAL HIGH
TROPONIN T SERPL-MCNC: 2.23 NG/ML — CRITICAL HIGH
TROPONIN T SERPL-MCNC: 2.3 NG/ML — CRITICAL HIGH
WBC # BLD: 6.03 K/UL — SIGNIFICANT CHANGE UP (ref 4.8–10.8)
WBC # BLD: 6.82 K/UL — SIGNIFICANT CHANGE UP (ref 4.8–10.8)
WBC # FLD AUTO: 6.03 K/UL — SIGNIFICANT CHANGE UP (ref 4.8–10.8)
WBC # FLD AUTO: 6.82 K/UL — SIGNIFICANT CHANGE UP (ref 4.8–10.8)

## 2022-10-19 PROCEDURE — 93306 TTE W/DOPPLER COMPLETE: CPT | Mod: 26

## 2022-10-19 PROCEDURE — 99222 1ST HOSP IP/OBS MODERATE 55: CPT

## 2022-10-19 PROCEDURE — 99223 1ST HOSP IP/OBS HIGH 75: CPT

## 2022-10-19 RX ORDER — DEXTROSE 50 % IN WATER 50 %
15 SYRINGE (ML) INTRAVENOUS ONCE
Refills: 0 | Status: DISCONTINUED | OUTPATIENT
Start: 2022-10-19 | End: 2022-10-27

## 2022-10-19 RX ORDER — ONDANSETRON 8 MG/1
4 TABLET, FILM COATED ORAL EVERY 8 HOURS
Refills: 0 | Status: DISCONTINUED | OUTPATIENT
Start: 2022-10-19 | End: 2022-10-27

## 2022-10-19 RX ORDER — FUROSEMIDE 40 MG
40 TABLET ORAL EVERY 12 HOURS
Refills: 0 | Status: DISCONTINUED | OUTPATIENT
Start: 2022-10-19 | End: 2022-10-26

## 2022-10-19 RX ORDER — SODIUM CHLORIDE 9 MG/ML
1000 INJECTION, SOLUTION INTRAVENOUS
Refills: 0 | Status: DISCONTINUED | OUTPATIENT
Start: 2022-10-19 | End: 2022-10-27

## 2022-10-19 RX ORDER — DEXTROSE 50 % IN WATER 50 %
25 SYRINGE (ML) INTRAVENOUS ONCE
Refills: 0 | Status: DISCONTINUED | OUTPATIENT
Start: 2022-10-19 | End: 2022-10-27

## 2022-10-19 RX ORDER — CARVEDILOL PHOSPHATE 80 MG/1
25 CAPSULE, EXTENDED RELEASE ORAL EVERY 12 HOURS
Refills: 0 | Status: DISCONTINUED | OUTPATIENT
Start: 2022-10-19 | End: 2022-10-27

## 2022-10-19 RX ORDER — NIFEDIPINE 30 MG
60 TABLET, EXTENDED RELEASE 24 HR ORAL DAILY
Refills: 0 | Status: DISCONTINUED | OUTPATIENT
Start: 2022-10-19 | End: 2022-10-27

## 2022-10-19 RX ORDER — INSULIN LISPRO 100/ML
3 VIAL (ML) SUBCUTANEOUS
Refills: 0 | Status: DISCONTINUED | OUTPATIENT
Start: 2022-10-19 | End: 2022-10-24

## 2022-10-19 RX ORDER — HEPARIN SODIUM 5000 [USP'U]/ML
INJECTION INTRAVENOUS; SUBCUTANEOUS
Qty: 25000 | Refills: 0 | Status: DISCONTINUED | OUTPATIENT
Start: 2022-10-19 | End: 2022-10-20

## 2022-10-19 RX ORDER — ACETAMINOPHEN 500 MG
650 TABLET ORAL EVERY 6 HOURS
Refills: 0 | Status: DISCONTINUED | OUTPATIENT
Start: 2022-10-19 | End: 2022-10-27

## 2022-10-19 RX ORDER — SIMVASTATIN 20 MG/1
40 TABLET, FILM COATED ORAL AT BEDTIME
Refills: 0 | Status: DISCONTINUED | OUTPATIENT
Start: 2022-10-19 | End: 2022-10-19

## 2022-10-19 RX ORDER — DEXTROSE 50 % IN WATER 50 %
12.5 SYRINGE (ML) INTRAVENOUS ONCE
Refills: 0 | Status: DISCONTINUED | OUTPATIENT
Start: 2022-10-19 | End: 2022-10-27

## 2022-10-19 RX ORDER — HEPARIN SODIUM 5000 [USP'U]/ML
5000 INJECTION INTRAVENOUS; SUBCUTANEOUS EVERY 8 HOURS
Refills: 0 | Status: DISCONTINUED | OUTPATIENT
Start: 2022-10-19 | End: 2022-10-19

## 2022-10-19 RX ORDER — ATORVASTATIN CALCIUM 80 MG/1
80 TABLET, FILM COATED ORAL AT BEDTIME
Refills: 0 | Status: DISCONTINUED | OUTPATIENT
Start: 2022-10-19 | End: 2022-10-27

## 2022-10-19 RX ORDER — INSULIN LISPRO 100/ML
VIAL (ML) SUBCUTANEOUS
Refills: 0 | Status: DISCONTINUED | OUTPATIENT
Start: 2022-10-19 | End: 2022-10-27

## 2022-10-19 RX ORDER — ALLOPURINOL 300 MG
100 TABLET ORAL DAILY
Refills: 0 | Status: DISCONTINUED | OUTPATIENT
Start: 2022-10-19 | End: 2022-10-27

## 2022-10-19 RX ORDER — HEPARIN SODIUM 5000 [USP'U]/ML
5000 INJECTION INTRAVENOUS; SUBCUTANEOUS EVERY 6 HOURS
Refills: 0 | Status: DISCONTINUED | OUTPATIENT
Start: 2022-10-19 | End: 2022-10-25

## 2022-10-19 RX ORDER — CITALOPRAM 10 MG/1
20 TABLET, FILM COATED ORAL DAILY
Refills: 0 | Status: DISCONTINUED | OUTPATIENT
Start: 2022-10-19 | End: 2022-10-27

## 2022-10-19 RX ORDER — ASPIRIN/CALCIUM CARB/MAGNESIUM 324 MG
81 TABLET ORAL DAILY
Refills: 0 | Status: DISCONTINUED | OUTPATIENT
Start: 2022-10-19 | End: 2022-10-27

## 2022-10-19 RX ORDER — INSULIN GLARGINE 100 [IU]/ML
28 INJECTION, SOLUTION SUBCUTANEOUS AT BEDTIME
Refills: 0 | Status: DISCONTINUED | OUTPATIENT
Start: 2022-10-19 | End: 2022-10-27

## 2022-10-19 RX ORDER — GABAPENTIN 400 MG/1
300 CAPSULE ORAL
Refills: 0 | Status: DISCONTINUED | OUTPATIENT
Start: 2022-10-19 | End: 2022-10-27

## 2022-10-19 RX ORDER — GLUCAGON INJECTION, SOLUTION 0.5 MG/.1ML
1 INJECTION, SOLUTION SUBCUTANEOUS ONCE
Refills: 0 | Status: DISCONTINUED | OUTPATIENT
Start: 2022-10-19 | End: 2022-10-27

## 2022-10-19 RX ORDER — CLONAZEPAM 1 MG
2 TABLET ORAL AT BEDTIME
Refills: 0 | Status: DISCONTINUED | OUTPATIENT
Start: 2022-10-19 | End: 2022-10-25

## 2022-10-19 RX ORDER — LANOLIN ALCOHOL/MO/W.PET/CERES
3 CREAM (GRAM) TOPICAL AT BEDTIME
Refills: 0 | Status: DISCONTINUED | OUTPATIENT
Start: 2022-10-19 | End: 2022-10-27

## 2022-10-19 RX ADMIN — Medication 6: at 11:40

## 2022-10-19 RX ADMIN — Medication 2: at 17:00

## 2022-10-19 RX ADMIN — Medication 3 UNIT(S): at 16:59

## 2022-10-19 RX ADMIN — CITALOPRAM 20 MILLIGRAM(S): 10 TABLET, FILM COATED ORAL at 11:38

## 2022-10-19 RX ADMIN — ATORVASTATIN CALCIUM 80 MILLIGRAM(S): 80 TABLET, FILM COATED ORAL at 21:55

## 2022-10-19 RX ADMIN — GABAPENTIN 300 MILLIGRAM(S): 400 CAPSULE ORAL at 18:09

## 2022-10-19 RX ADMIN — Medication 40 MILLIGRAM(S): at 18:10

## 2022-10-19 RX ADMIN — Medication 81 MILLIGRAM(S): at 11:39

## 2022-10-19 RX ADMIN — CARVEDILOL PHOSPHATE 25 MILLIGRAM(S): 80 CAPSULE, EXTENDED RELEASE ORAL at 18:09

## 2022-10-19 RX ADMIN — Medication 8: at 07:40

## 2022-10-19 RX ADMIN — Medication 3 UNIT(S): at 11:40

## 2022-10-19 RX ADMIN — HEPARIN SODIUM 1000 UNIT(S)/HR: 5000 INJECTION INTRAVENOUS; SUBCUTANEOUS at 11:03

## 2022-10-19 RX ADMIN — Medication 3 UNIT(S): at 07:40

## 2022-10-19 RX ADMIN — GABAPENTIN 300 MILLIGRAM(S): 400 CAPSULE ORAL at 06:36

## 2022-10-19 RX ADMIN — Medication 40 MILLIGRAM(S): at 06:36

## 2022-10-19 RX ADMIN — CARVEDILOL PHOSPHATE 25 MILLIGRAM(S): 80 CAPSULE, EXTENDED RELEASE ORAL at 06:36

## 2022-10-19 RX ADMIN — Medication 100 MILLIGRAM(S): at 11:39

## 2022-10-19 RX ADMIN — HEPARIN SODIUM 5000 UNIT(S): 5000 INJECTION INTRAVENOUS; SUBCUTANEOUS at 06:40

## 2022-10-19 RX ADMIN — Medication 60 MILLIGRAM(S): at 06:37

## 2022-10-19 RX ADMIN — INSULIN GLARGINE 28 UNIT(S): 100 INJECTION, SOLUTION SUBCUTANEOUS at 21:56

## 2022-10-19 RX ADMIN — HEPARIN SODIUM 1250 UNIT(S)/HR: 5000 INJECTION INTRAVENOUS; SUBCUTANEOUS at 18:18

## 2022-10-19 NOTE — H&P ADULT - NSHPPHYSICALEXAM_GEN_ALL_CORE
GENERAL:  89y/o Male NAD, resting comfortably.  HEAD:  Atraumatic, Normocephalic  EYES: EOMI, PERRLA, conjunctiva and sclera clear  NECK: Supple, No JVD, no cervical lymphadenopathy, non-tender  CHEST/LUNG: Clear to auscultation bilaterally; No wheeze, rhonchi, or rales  HEART: Regular rate and rhythm; S1&S2  ABDOMEN: Soft, Nontender, Nondistended x 4 quadrants; Bowel sounds present  EXTREMITIES:   Peripheral Pulses Present, No clubbing, no cyanosis, or no edema, no calf tenderness  PSYCH: AAOx3, cooperative, appropriate  NEUROLOGY: WNL  SKIN: WNL

## 2022-10-19 NOTE — ED PROVIDER NOTE - ATTENDING APP SHARED VISIT CONTRIBUTION OF CARE
Patient is a 89-year-old male past medical history of hypertension CHF pacemaker for evaluation of chest pain that started suddenly at rest at 5 PM with radiation to his upper back as well as down both his arms no diaphoresis no shortness of breath no vomiting he denies any other abdominal pain patient has a history of CHF and states that this feels similar in the past    On physical exam patient is normocephalic atraumatic pupils equally round react light accommodation extraocular muscles intact oropharynx dry chest clear to auscultation bilaterally abdomen soft nontender nondistended bowel sounds positive no guarding no rebound extremities patient has no edema pedal pulses 2+ and equal radial pulses 2+ and equal able to move all 4 extremities    Assessment plan we obtained EKG not consistent with STEMI considering the patient's history of radiation to upper back with chest pain we obtained a dissection protocol which is negative we obtained troponin found to be elevated however upon review of the patient's chart patient has had higher elevations in the past his pain is improved at this time nevertheless elevation in troponin as well as BNP is concerning I will admit to telemetry for further evaluation

## 2022-10-19 NOTE — PATIENT PROFILE ADULT - FALL HARM RISK - HARM RISK INTERVENTIONS

## 2022-10-19 NOTE — H&P ADULT - NSHPLABSRESULTS_GEN_ALL_CORE
12.7   7.05  )-----------( 219      ( 18 Oct 2022 22:50 )             38.3       10-18    134<L>  |  100  |  38<H>  ----------------------------<  367<H>  5.3<H>   |  20  |  1.6<H>    Ca    9.0      18 Oct 2022 22:50  Mg     2.0     10-18    TPro  7.5  /  Alb  4.1  /  TBili  0.3  /  DBili  x   /  AST  16  /  ALT  11  /  AlkPhos  144<H>  10-18                  PT/INR - ( 18 Oct 2022 22:50 )   PT: 11.30 sec;   INR: 0.99 ratio         PTT - ( 18 Oct 2022 22:50 )  PTT:28.9 sec    Lactate Trend      CARDIAC MARKERS ( 18 Oct 2022 22:50 )  x     / 0.11 ng/mL / x     / x     / x            CAPILLARY BLOOD GLUCOSE        Vital Signs Last 24 Hrs  T(C): --  T(F): --  HR: 90 (19 Oct 2022 01:20) (88 - 90)  BP: 179/92 (19 Oct 2022 01:20) (179/92 - 217/96)  BP(mean): --  RR: 18 (19 Oct 2022 01:20) (18 - 18)  SpO2: 95% (19 Oct 2022 01:20) (95% - 99%)    Parameters below as of 19 Oct 2022 01:20  Patient On (Oxygen Delivery Method): room air

## 2022-10-19 NOTE — CHART NOTE - NSCHARTNOTEFT_GEN_A_CORE
Patient was admitted earlier this AM; see H&P for full A/P    Patient was seen and examined at bedside this AM; he reports feeling better; reports his chest pain has resolved.  PE:  Gen: NAD, comfortable  HEENT: AT, NC, EOMI, MMM  Resp: CTAb/l, no w/r/r  CVS: RRR, no m/r/g, no LE edema  Abd: soft, NT, ND, +BS  Neuro: AAOX3, no focal deficit    89 y/o male  with a past medical HX of bypass, CAD, CHF , HTN, dyslipidemia, DM , anxiety c/o mid line chest pain radiating to right side and right     #Elevated troponin, chest pain likely 2/2 NSTEMI  #Hx of CAD  -Upgraded to CCU, spoke to Dr. Pina  -tele  -ECHO  -trend trops  -Endorsed to Dr. Grant  -will start heparin drip  -c/w ASA and statin

## 2022-10-19 NOTE — PATIENT PROFILE ADULT - FUNCTIONAL ASSESSMENT - BASIC MOBILITY 6.
3-calculated by average/Not able to assess (calculate score using [facility] averaging method)

## 2022-10-19 NOTE — H&P ADULT - HISTORY OF PRESENT ILLNESS
89 y/o male  with a past medical HX of bypass, CAD, CHF , HTN, dyslipidemia, DM , anxiety c/o mid line chest pain radiating to right side and right arm. Pt states the pain started at 4 pm continuous pain 7/10. Pt denies sob , N/V dizziness, or visual change.

## 2022-10-19 NOTE — ED PROVIDER NOTE - NS ED ATTENDING STATEMENT MOD
This was a shared visit with the ELSY. I reviewed and verified the documentation and independently performed the documented:

## 2022-10-19 NOTE — ED PROVIDER NOTE - CARE PLAN
1 Principal Discharge DX:	Chest pain  Secondary Diagnosis:	CKD (chronic kidney disease)  Secondary Diagnosis:	Elevated troponin

## 2022-10-19 NOTE — ED PROVIDER NOTE - CLINICAL SUMMARY MEDICAL DECISION MAKING FREE TEXT BOX
we obtained EKG not consistent with STEMI considering the patient's history of radiation to upper back with chest pain we obtained a dissection protocol which is negative we obtained troponin found to be elevated however upon review of the patient's chart patient has had higher elevations in the past his pain is improved at this time nevertheless elevation in troponin as well as BNP is concerning I will admit to telemetry for further evaluation

## 2022-10-19 NOTE — H&P ADULT - NSHPPOAPULMEMBOLUS_GEN_A_CORE
Dr. Kaylyn Armas triage RN called states pt came into office said he just walked right in office states pain and swollen and was requesting pain medication from them at  please advise no

## 2022-10-19 NOTE — ED PROVIDER NOTE - OBJECTIVE STATEMENT
Mic Lambert(Attending)
89 year old male past medical history of CHF, Hypertension, pacemaker comes to emergency room for chest pain. patient stats that started suddenly at 5pm and goes to his back and down both of his arms.

## 2022-10-19 NOTE — H&P ADULT - NS ATTEND AMEND GEN_ALL_CORE FT
Seen at bedside agree with assessment and plan as outlined (Not in decompensated CHF and current creatinine level seems better then baseline to me)

## 2022-10-19 NOTE — ED PROVIDER NOTE - PHYSICAL EXAMINATION
Physical Exam    Vital Signs: I have reviewed the initial vital signs.  Constitutional: elderly, no acute distress  Eyes: Conjunctiva pink, Sclera clear, PERRLA, EOMI.  Cardiovascular: S1 and S2, regular rate, regular rhythm, well-perfused extremities, radial pulses equal and 2+  Respiratory: unlabored respiratory effort, clear to auscultation bilaterally no wheezing, rales and rhonchi  Gastrointestinal: soft, non-tender abdomen, no pulsatile mass, normal bowl sounds  Musculoskeletal: supple neck, no lower extremity edema, no midline tenderness  Integumentary: warm, dry, no rash  Neurologic: awake, alert, cranial nerves II-XII grossly intact, extremities’ motor and sensory functions grossly intact  Psychiatric: appropriate mood, appropriate affect

## 2022-10-19 NOTE — H&P ADULT - ASSESSMENT
89 y/o male  with a past medical HX of bypass, CAD, CHF , HTN, dyslipidemia, DM , anxiety c/o mid line chest pain radiating to right side and right arm. Pt states the pain started at 4 pm continuous pain 7/10. Pt denies sob , N/V dizziness, or visual change.    DX chest pain . R/O ACS  tele  Am EKG  CE X3  cardiology  TTE    CHF  suspect systolic   continue lasix 40mg po q 12    DM  FS ACHS  lantus to 30 unit  at QHS    continue hoome meds as per PMD    prophylaxis GI/VTE

## 2022-10-20 LAB
A1C WITH ESTIMATED AVERAGE GLUCOSE RESULT: 9.7 % — HIGH (ref 4–5.6)
ALBUMIN SERPL ELPH-MCNC: 3.5 G/DL — SIGNIFICANT CHANGE UP (ref 3.5–5.2)
ALP SERPL-CCNC: 112 U/L — SIGNIFICANT CHANGE UP (ref 30–115)
ALT FLD-CCNC: 10 U/L — SIGNIFICANT CHANGE UP (ref 0–41)
ANION GAP SERPL CALC-SCNC: 10 MMOL/L — SIGNIFICANT CHANGE UP (ref 7–14)
APTT BLD: 50.8 SEC — HIGH (ref 27–39.2)
APTT BLD: 60.6 SEC — HIGH (ref 27–39.2)
APTT BLD: 63.9 SEC — HIGH (ref 27–39.2)
AST SERPL-CCNC: 23 U/L — SIGNIFICANT CHANGE UP (ref 0–41)
BASOPHILS # BLD AUTO: 0.04 K/UL — SIGNIFICANT CHANGE UP (ref 0–0.2)
BASOPHILS NFR BLD AUTO: 0.8 % — SIGNIFICANT CHANGE UP (ref 0–1)
BILIRUB SERPL-MCNC: 0.3 MG/DL — SIGNIFICANT CHANGE UP (ref 0.2–1.2)
BLD GP AB SCN SERPL QL: SIGNIFICANT CHANGE UP
BUN SERPL-MCNC: 39 MG/DL — HIGH (ref 10–20)
CALCIUM SERPL-MCNC: 8.3 MG/DL — LOW (ref 8.4–10.5)
CHLORIDE SERPL-SCNC: 100 MMOL/L — SIGNIFICANT CHANGE UP (ref 98–110)
CO2 SERPL-SCNC: 23 MMOL/L — SIGNIFICANT CHANGE UP (ref 17–32)
CREAT SERPL-MCNC: 1.8 MG/DL — HIGH (ref 0.7–1.5)
EGFR: 35 ML/MIN/1.73M2 — LOW
EOSINOPHIL # BLD AUTO: 0.25 K/UL — SIGNIFICANT CHANGE UP (ref 0–0.7)
EOSINOPHIL NFR BLD AUTO: 4.8 % — SIGNIFICANT CHANGE UP (ref 0–8)
ESTIMATED AVERAGE GLUCOSE: 232 MG/DL — HIGH (ref 68–114)
GLUCOSE BLDC GLUCOMTR-MCNC: 214 MG/DL — HIGH (ref 70–99)
GLUCOSE BLDC GLUCOMTR-MCNC: 231 MG/DL — HIGH (ref 70–99)
GLUCOSE BLDC GLUCOMTR-MCNC: 234 MG/DL — HIGH (ref 70–99)
GLUCOSE BLDC GLUCOMTR-MCNC: 375 MG/DL — HIGH (ref 70–99)
GLUCOSE SERPL-MCNC: 243 MG/DL — HIGH (ref 70–99)
HCT VFR BLD CALC: 32.5 % — LOW (ref 42–52)
HGB BLD-MCNC: 10.8 G/DL — LOW (ref 14–18)
IMM GRANULOCYTES NFR BLD AUTO: 0.8 % — HIGH (ref 0.1–0.3)
LYMPHOCYTES # BLD AUTO: 1.43 K/UL — SIGNIFICANT CHANGE UP (ref 1.2–3.4)
LYMPHOCYTES # BLD AUTO: 27.3 % — SIGNIFICANT CHANGE UP (ref 20.5–51.1)
MAGNESIUM SERPL-MCNC: 1.9 MG/DL — SIGNIFICANT CHANGE UP (ref 1.8–2.4)
MCHC RBC-ENTMCNC: 28.6 PG — SIGNIFICANT CHANGE UP (ref 27–31)
MCHC RBC-ENTMCNC: 33.2 G/DL — SIGNIFICANT CHANGE UP (ref 32–37)
MCV RBC AUTO: 86 FL — SIGNIFICANT CHANGE UP (ref 80–94)
MONOCYTES # BLD AUTO: 0.43 K/UL — SIGNIFICANT CHANGE UP (ref 0.1–0.6)
MONOCYTES NFR BLD AUTO: 8.2 % — SIGNIFICANT CHANGE UP (ref 1.7–9.3)
NEUTROPHILS # BLD AUTO: 3.04 K/UL — SIGNIFICANT CHANGE UP (ref 1.4–6.5)
NEUTROPHILS NFR BLD AUTO: 58.1 % — SIGNIFICANT CHANGE UP (ref 42.2–75.2)
NRBC # BLD: 0 /100 WBCS — SIGNIFICANT CHANGE UP (ref 0–0)
PLATELET # BLD AUTO: 181 K/UL — SIGNIFICANT CHANGE UP (ref 130–400)
POTASSIUM SERPL-MCNC: 4.8 MMOL/L — SIGNIFICANT CHANGE UP (ref 3.5–5)
POTASSIUM SERPL-SCNC: 4.8 MMOL/L — SIGNIFICANT CHANGE UP (ref 3.5–5)
PROT SERPL-MCNC: 5.9 G/DL — LOW (ref 6–8)
RBC # BLD: 3.78 M/UL — LOW (ref 4.7–6.1)
RBC # FLD: 14.4 % — SIGNIFICANT CHANGE UP (ref 11.5–14.5)
SODIUM SERPL-SCNC: 133 MMOL/L — LOW (ref 135–146)
TROPONIN T SERPL-MCNC: 1.53 NG/ML — CRITICAL HIGH
WBC # BLD: 5.23 K/UL — SIGNIFICANT CHANGE UP (ref 4.8–10.8)
WBC # FLD AUTO: 5.23 K/UL — SIGNIFICANT CHANGE UP (ref 4.8–10.8)

## 2022-10-20 PROCEDURE — 99233 SBSQ HOSP IP/OBS HIGH 50: CPT

## 2022-10-20 PROCEDURE — 93010 ELECTROCARDIOGRAM REPORT: CPT

## 2022-10-20 RX ORDER — HEPARIN SODIUM 5000 [USP'U]/ML
1450 INJECTION INTRAVENOUS; SUBCUTANEOUS
Qty: 25000 | Refills: 0 | Status: DISCONTINUED | OUTPATIENT
Start: 2022-10-20 | End: 2022-10-25

## 2022-10-20 RX ORDER — SODIUM CHLORIDE 9 MG/ML
1000 INJECTION INTRAMUSCULAR; INTRAVENOUS; SUBCUTANEOUS
Refills: 0 | Status: DISCONTINUED | OUTPATIENT
Start: 2022-10-20 | End: 2022-10-21

## 2022-10-20 RX ORDER — INSULIN GLARGINE 100 [IU]/ML
15 INJECTION, SOLUTION SUBCUTANEOUS ONCE
Refills: 0 | Status: COMPLETED | OUTPATIENT
Start: 2022-10-20 | End: 2022-10-20

## 2022-10-20 RX ADMIN — Medication 4: at 07:46

## 2022-10-20 RX ADMIN — ATORVASTATIN CALCIUM 80 MILLIGRAM(S): 80 TABLET, FILM COATED ORAL at 21:39

## 2022-10-20 RX ADMIN — Medication 60 MILLIGRAM(S): at 06:18

## 2022-10-20 RX ADMIN — Medication 100 MILLIGRAM(S): at 14:20

## 2022-10-20 RX ADMIN — HEPARIN SODIUM 1450 UNIT(S)/HR: 5000 INJECTION INTRAVENOUS; SUBCUTANEOUS at 10:03

## 2022-10-20 RX ADMIN — HEPARIN SODIUM 1450 UNIT(S)/HR: 5000 INJECTION INTRAVENOUS; SUBCUTANEOUS at 18:36

## 2022-10-20 RX ADMIN — Medication 10: at 11:43

## 2022-10-20 RX ADMIN — GABAPENTIN 300 MILLIGRAM(S): 400 CAPSULE ORAL at 17:08

## 2022-10-20 RX ADMIN — Medication 4: at 16:55

## 2022-10-20 RX ADMIN — CARVEDILOL PHOSPHATE 25 MILLIGRAM(S): 80 CAPSULE, EXTENDED RELEASE ORAL at 06:18

## 2022-10-20 RX ADMIN — Medication 3 UNIT(S): at 11:43

## 2022-10-20 RX ADMIN — INSULIN GLARGINE 15 UNIT(S): 100 INJECTION, SOLUTION SUBCUTANEOUS at 21:41

## 2022-10-20 RX ADMIN — SODIUM CHLORIDE 50 MILLILITER(S): 9 INJECTION INTRAMUSCULAR; INTRAVENOUS; SUBCUTANEOUS at 16:56

## 2022-10-20 RX ADMIN — Medication 2 MILLIGRAM(S): at 21:39

## 2022-10-20 RX ADMIN — Medication 40 MILLIGRAM(S): at 17:09

## 2022-10-20 RX ADMIN — HEPARIN SODIUM 1450 UNIT(S)/HR: 5000 INJECTION INTRAVENOUS; SUBCUTANEOUS at 07:29

## 2022-10-20 RX ADMIN — Medication 81 MILLIGRAM(S): at 12:06

## 2022-10-20 RX ADMIN — CARVEDILOL PHOSPHATE 25 MILLIGRAM(S): 80 CAPSULE, EXTENDED RELEASE ORAL at 17:08

## 2022-10-20 RX ADMIN — GABAPENTIN 300 MILLIGRAM(S): 400 CAPSULE ORAL at 06:18

## 2022-10-20 RX ADMIN — Medication 3 UNIT(S): at 16:55

## 2022-10-20 RX ADMIN — HEPARIN SODIUM 1450 UNIT(S)/HR: 5000 INJECTION INTRAVENOUS; SUBCUTANEOUS at 06:31

## 2022-10-20 RX ADMIN — Medication 40 MILLIGRAM(S): at 06:18

## 2022-10-20 RX ADMIN — HEPARIN SODIUM 1450 UNIT(S)/HR: 5000 INJECTION INTRAVENOUS; SUBCUTANEOUS at 01:28

## 2022-10-20 RX ADMIN — CITALOPRAM 20 MILLIGRAM(S): 10 TABLET, FILM COATED ORAL at 14:20

## 2022-10-20 NOTE — PROGRESS NOTE ADULT - ASSESSMENT
89 y/o M with a past medical HX of bypass, CAD, CHF , HTN, dyslipidemia, DM , anxiety c/o mid line chest pain radiating to right side and right arm    #NSTEMI  #H/O CAD s/p CABG  - Trop 0.11 on admission, repeat 2.13, trending down  - ECG w/o signs of ischemia  - Placed on Hep GGT, Monitor PTT, keep b/w 55-70  - Cardio consult appreciated, will go for cath reji, NPO after MN  - TTE EF 55-60%, no wall abnormalities noted  - c/w ASA 81 mg qD, Lipitor 80 mg qD, Carvedilol 23 mg BID, Lasix 40 mg qD    #T2DM - c/w Lantus 28 U qHS, Lispro 3 U qAC w/ SS, monitor FS  #Gout - c/w Allopurinol 100 mg qD  #HTN - c/w Home Meds - Nifedipine 60 mg qD  #Anxiety - c/w Citalopram 20 mg qD    #Diet: DASH/CC  #DVT pro: Hep GGT  #GI pro: Protonix  #Dispo: CCU

## 2022-10-20 NOTE — PROGRESS NOTE ADULT - SUBJECTIVE AND OBJECTIVE BOX
Patient seen and evaluated this am, no chest pain, no SOB      T(F): 96.7 (10-20-22 @ 07:10), Max: 97.9 (10-19-22 @ 21:13)  HR: 68 (10-20-22 @ 08:54)  BP: 133/61 (10-20-22 @ 08:54)  RR: 20  SpO2: 96% (10-20-22 @ 08:54) (92% - 100%)    PHYSICAL EXAM:  GENERAL: NAD  HEAD:  Atraumatic, Normocephalic  EYES: EOMI, PERRLA, conjunctiva and sclera clear  NERVOUS SYSTEM:  Alert & Oriented X3, no focal deficits   CHEST/LUNG: Clear to percussion bilaterally; No rales, rhonchi, wheezing, or rubs  HEART: Regular rate and rhythm; No murmurs, rubs, or gallops  ABDOMEN: Soft, Nontender, Nondistended; Bowel sounds present  EXTREMITIES:  2+ Peripheral Pulses, No clubbing, cyanosis, or edema    LABS  10-20    133<L>  |  100  |  39<H>  ----------------------------<  243<H>  4.8   |  23  |  1.8<H>    Ca    8.3<L>      20 Oct 2022 06:03  Mg     1.9     10-20    TPro  5.9<L>  /  Alb  3.5  /  TBili  0.3  /  DBili  x   /  AST  23  /  ALT  10  /  AlkPhos  112  10-20                          10.8   5.23  )-----------( 181      ( 20 Oct 2022 06:03 )             32.5     PT/INR - ( 18 Oct 2022 22:50 )   PT: 11.30 sec;   INR: 0.99 ratio         PTT - ( 20 Oct 2022 07:00 )  PTT:63.9 sec    CARDIAC ENZYMES  Creatine Kinase, Serum: 411 (10-19 @ 15:23)  Creatine Kinase, Serum: 555 (10-19 @ 06:12)    CKMB Units: 61.1 (10-19 @ 15:23)  CKMB Units: 94.1 (10-19 @ 06:12)    Troponin T, Serum: 1.53 ng/mL (10-20-22 @ 06:03)  Troponin T, Serum: 1.75 ng/mL (10-19-22 @ 21:34)  Troponin T, Serum: 2.30 ng/mL (10-19-22 @ 15:23)  Troponin T, Serum: 2.23 ng/mL (10-19-22 @ 06:12)  Troponin T, Serum: 0.11 ng/mL (10-18-22 @ 22:50)    < from: 12 Lead ECG (10.18.22 @ 22:24) >    Diagnosis Line Normal sinus rhythm  Incomplete right bundle branch block  Right ventricular hypertrophy with repolarization abnormality  Nonspecific T wave abnormality  Abnormal ECG      < end of copied text >  < from: TTE Echo Complete w/o Contrast w/ Doppler (10.19.22 @ 14:20) >  Summary:   1. Technically difficult study.   2. Left ventricular ejection fraction, by visual estimation, is 55 to   60%.   3. Normal left ventricular internal cavity size.   4. Mildly enlarged left atrium.   5. Mild mitral annular calcification.   6. Mild aortic valve stenosis.    < end of copied text >      RADIOLOGY  < from: CT Abdomen and Pelvis w/ IV Cont (10.18.22 @ 23:05) >  IMPRESSION:    1. Interstitial edema with evidence of mild pulmonary edema, greatest at   right lung. Evidence of CHF.    2. No evidence of acute aortic or acute intra-abdominal pathology.    3. Unchanged 1.3 cm left upper lobe solid pulmonary nodule with punctate   calcifications, unchanged since 6/9/2021. Continued follow-up recommended.    < end of copied text >    MEDICATIONS  (STANDING):  allopurinol 100 milliGRAM(s) Oral daily  aspirin  chewable 81 milliGRAM(s) Oral daily  atorvastatin 80 milliGRAM(s) Oral at bedtime  carvedilol 25 milliGRAM(s) Oral every 12 hours  citalopram 20 milliGRAM(s) Oral daily  furosemide    Tablet 40 milliGRAM(s) Oral every 12 hours  gabapentin 300 milliGRAM(s) Oral two times a day  heparin  Infusion. 1450 Unit(s)/Hr (14.5 mL/Hr) IV Continuous <Continuous>  insulin glargine Injectable (LANTUS) 28 Unit(s) SubCutaneous at bedtime  insulin lispro (ADMELOG) corrective regimen sliding scale   SubCutaneous three times a day before meals  insulin lispro Injectable (ADMELOG) 3 Unit(s) SubCutaneous three times a day before meals  NIFEdipine XL 60 milliGRAM(s) Oral daily    MEDICATIONS  (PRN):  acetaminophen     Tablet .. 650 milliGRAM(s) Oral every 6 hours PRN Temp greater or equal to 38C (100.4F), Mild Pain (1 - 3)  aluminum hydroxide/magnesium hydroxide/simethicone Suspension 30 milliLiter(s) Oral every 4 hours PRN Dyspepsia  clonazePAM  Tablet 2 milliGRAM(s) Oral at bedtime PRN anxiety  dextrose Oral Gel 15 Gram(s) Oral once PRN Blood Glucose LESS THAN 70 milliGRAM(s)/deciliter  heparin   Injectable 5000 Unit(s) IV Push every 6 hours PRN For aPTT less than 40  melatonin 3 milliGRAM(s) Oral at bedtime PRN Insomnia  ondansetron Injectable 4 milliGRAM(s) IV Push every 8 hours PRN Nausea and/or Vomiting

## 2022-10-20 NOTE — PROGRESS NOTE ADULT - SUBJECTIVE AND OBJECTIVE BOX
Patient is a 89y old  Male who presents with a chief complaint of cp (19 Oct 2022 10:33)      T(F): 97.4 (10-20-22 @ 05:00), Max: 97.9 (10-19-22 @ 21:13)  HR: 67 (10-20-22 @ 07:06)  BP: 117/65 (10-20-22 @ 07:06)  RR: 18 (10-20-22 @ 07:06)  SpO2: 94% (10-20-22 @ 07:06) (92% - 100%)    PHYSICAL EXAM:  GENERAL: NAD, well-groomed, well-developed  HEAD:  Atraumatic, Normocephalic  EYES: EOMI, PERRLA, conjunctiva and sclera clear  ENMT: No tonsillar erythema, exudates, or enlargement; Moist mucous membranes, Good dentition, No lesions  NECK: Supple, No JVD, Normal thyroid  NERVOUS SYSTEM:  Alert & Oriented X3,  Motor Strength 5/5 B/L upper and lower extremities  CHEST/LUNG: Clear to percussion bilaterally; No rales, rhonchi, wheezing, or rubs  HEART: Regular rate and rhythm; No murmurs, rubs, or gallops  ABDOMEN: Soft, Nontender, Nondistended; Bowel sounds present  EXTREMITIES:   No clubbing, cyanosis, or edema  LYMPH: No lymphadenopathy noted  SKIN: No rashes or lesions    labs  10-19    137  |  103  |  35<H>  ----------------------------<  315<H>  4.9   |  24  |  1.6<H>    Ca    8.6      19 Oct 2022 06:12  Mg     2.0     10-18    TPro  7.5  /  Alb  4.1  /  TBili  0.3  /  DBili  x   /  AST  16  /  ALT  11  /  AlkPhos  144<H>  10-18                          10.8   5.23  )-----------( 181      ( 20 Oct 2022 06:03 )             32.5       PT/INR - ( 18 Oct 2022 22:50 )   PT: 11.30 sec;   INR: 0.99 ratio         PTT - ( 20 Oct 2022 00:30 )  PTT:50.8 sec    Troponin T, Serum: 1.53 ng/mL *HH* (10-20-22 @ 06:03)  Troponin T, Serum: 1.75 ng/mL *HH* (10-19-22 @ 21:34)  Creatine Kinase, Serum: 411 U/L *H* (10-19-22 @ 15:23)  Troponin T, Serum: 2.30 ng/mL *HH* (10-19-22 @ 15:23)      acetaminophen     Tablet .. 650 milliGRAM(s) Oral every 6 hours PRN  allopurinol 100 milliGRAM(s) Oral daily  aluminum hydroxide/magnesium hydroxide/simethicone Suspension 30 milliLiter(s) Oral every 4 hours PRN  aspirin  chewable 81 milliGRAM(s) Oral daily  atorvastatin 80 milliGRAM(s) Oral at bedtime  carvedilol 25 milliGRAM(s) Oral every 12 hours  citalopram 20 milliGRAM(s) Oral daily  clonazePAM  Tablet 2 milliGRAM(s) Oral at bedtime PRN  dextrose 5%. 1000 milliLiter(s) IV Continuous <Continuous>  dextrose 5%. 1000 milliLiter(s) IV Continuous <Continuous>  dextrose 50% Injectable 25 Gram(s) IV Push once  dextrose 50% Injectable 12.5 Gram(s) IV Push once  dextrose 50% Injectable 25 Gram(s) IV Push once  dextrose Oral Gel 15 Gram(s) Oral once PRN  furosemide    Tablet 40 milliGRAM(s) Oral every 12 hours  gabapentin 300 milliGRAM(s) Oral two times a day  glucagon  Injectable 1 milliGRAM(s) IntraMuscular once  heparin   Injectable 5000 Unit(s) IV Push every 6 hours PRN  heparin  Infusion. 1450 Unit(s)/Hr IV Continuous <Continuous>  insulin glargine Injectable (LANTUS) 28 Unit(s) SubCutaneous at bedtime  insulin lispro (ADMELOG) corrective regimen sliding scale   SubCutaneous three times a day before meals  insulin lispro Injectable (ADMELOG) 3 Unit(s) SubCutaneous three times a day before meals  melatonin 3 milliGRAM(s) Oral at bedtime PRN  NIFEdipine XL 60 milliGRAM(s) Oral daily  ondansetron Injectable 4 milliGRAM(s) IV Push every 8 hours PRN

## 2022-10-20 NOTE — PROGRESS NOTE ADULT - ASSESSMENT
89 year old male past medical history of chronic HFpEF, Hypertension, DM,  pacemaker comes to emergency room for chest pain. patient states that it was located across anterior R and L chest wall   and radiated  to his back and down both of his arms. It resolved when he arrived at the emergency room, now with no pain    NSTEMI     - aspirin, Lipitor, coreg and IV heparin   - check ptt and adjust   - NPO after midnight for cardiac cath in am   - tele

## 2022-10-20 NOTE — PROGRESS NOTE ADULT - SUBJECTIVE AND OBJECTIVE BOX
SUBJECTIVE:  HPI:  89 y/o male  with a past medical HX of bypass, CAD, CHF , HTN, dyslipidemia, DM , anxiety c/o mid line chest pain radiating to right side and right arm. Pt states the pain started at 4 pm continuous pain 7/10. Pt denies sob , N/V dizziness, or visual change. (19 Oct 2022 01:49)      PAST MEDICAL & SURGICAL HISTORY  PAST MEDICAL & SURGICAL HISTORY:  CHF (congestive heart failure)      DM (diabetes mellitus)      HTN (hypertension)      Prostate CA  in remission      Pacemaker      H/O total knee replacement, right        SOCIAL HISTORY:    ALLERGIES:  No Known Allergies    MEDICATIONS:  STANDING MEDICATIONS  allopurinol 100 milliGRAM(s) Oral daily  aspirin  chewable 81 milliGRAM(s) Oral daily  atorvastatin 80 milliGRAM(s) Oral at bedtime  carvedilol 25 milliGRAM(s) Oral every 12 hours  citalopram 20 milliGRAM(s) Oral daily  dextrose 5%. 1000 milliLiter(s) IV Continuous <Continuous>  dextrose 5%. 1000 milliLiter(s) IV Continuous <Continuous>  dextrose 50% Injectable 25 Gram(s) IV Push once  dextrose 50% Injectable 12.5 Gram(s) IV Push once  dextrose 50% Injectable 25 Gram(s) IV Push once  furosemide    Tablet 40 milliGRAM(s) Oral every 12 hours  gabapentin 300 milliGRAM(s) Oral two times a day  glucagon  Injectable 1 milliGRAM(s) IntraMuscular once  heparin  Infusion. 1450 Unit(s)/Hr IV Continuous <Continuous>  insulin glargine Injectable (LANTUS) 28 Unit(s) SubCutaneous at bedtime  insulin lispro (ADMELOG) corrective regimen sliding scale   SubCutaneous three times a day before meals  insulin lispro Injectable (ADMELOG) 3 Unit(s) SubCutaneous three times a day before meals  NIFEdipine XL 60 milliGRAM(s) Oral daily    PRN MEDICATIONS  acetaminophen     Tablet .. 650 milliGRAM(s) Oral every 6 hours PRN  aluminum hydroxide/magnesium hydroxide/simethicone Suspension 30 milliLiter(s) Oral every 4 hours PRN  clonazePAM  Tablet 2 milliGRAM(s) Oral at bedtime PRN  dextrose Oral Gel 15 Gram(s) Oral once PRN  heparin   Injectable 5000 Unit(s) IV Push every 6 hours PRN  melatonin 3 milliGRAM(s) Oral at bedtime PRN  ondansetron Injectable 4 milliGRAM(s) IV Push every 8 hours PRN    VITALS:   T(F): 96.7  HR: 67  BP: 117/65  RR: 23  SpO2: 94%    LABS:                        10.8   5.23  )-----------( 181      ( 20 Oct 2022 06:03 )             32.5     10-20    133<L>  |  100  |  39<H>  ----------------------------<  243<H>  4.8   |  23  |  1.8<H>    Ca    8.3<L>      20 Oct 2022 06:03  Mg     1.9     10-20    TPro  5.9<L>  /  Alb  3.5  /  TBili  0.3  /  DBili  x   /  AST  23  /  ALT  10  /  AlkPhos  112  10-20    PT/INR - ( 18 Oct 2022 22:50 )   PT: 11.30 sec;   INR: 0.99 ratio         PTT - ( 20 Oct 2022 07:00 )  PTT:63.9 sec      Troponin T, Serum: 1.53 ng/mL *HH* (10-20-22 @ 06:03)  Troponin T, Serum: 1.75 ng/mL *HH* (10-19-22 @ 21:34)  Creatine Kinase, Serum: 411 U/L *H* (10-19-22 @ 15:23)  Troponin T, Serum: 2.30 ng/mL *HH* (10-19-22 @ 15:23)      CARDIAC MARKERS ( 20 Oct 2022 06:03 )  x     / 1.53 ng/mL / x     / x     / x      CARDIAC MARKERS ( 19 Oct 2022 21:34 )  x     / 1.75 ng/mL / x     / x     / x      CARDIAC MARKERS ( 19 Oct 2022 15:23 )  x     / 2.30 ng/mL / 411 U/L / x     / 61.1 ng/mL  CARDIAC MARKERS ( 19 Oct 2022 06:12 )  x     / 2.23 ng/mL / 555 U/L / x     / 94.1 ng/mL  CARDIAC MARKERS ( 18 Oct 2022 22:50 )  x     / 0.11 ng/mL / x     / x     / x          RADIOLOGY:    PHYSICAL EXAM:  GEN: No acute distress  HEENT: AT/NC PEERLA, EOMI  LUNGS: Clear to auscultation bilaterally  HEART: S1/S2 present  ABD: Soft, non-tender, non-distended. Bowel sounds present in all 4 quadrants  EXT: No edema, no rashes, no cyanosis  NEURO: AAOX3

## 2022-10-20 NOTE — PROGRESS NOTE ADULT - ASSESSMENT
Patient with no further chest pain. No sob . No complaints. He had MI. Echo ef 55%. OOB to chair. He needs asa beta heparin statin. Possible cardiac cath

## 2022-10-20 NOTE — CHART NOTE - NSCHARTNOTEFT_GEN_A_CORE
spoke with pt's cardiologist Dr Amor, [facility] now planned for tmrw 10/21 at [facility], primary team made aware  NPO from midnight except meds

## 2022-10-21 LAB
ANION GAP SERPL CALC-SCNC: 12 MMOL/L — SIGNIFICANT CHANGE UP (ref 7–14)
APTT BLD: 24.7 SEC — LOW (ref 27–39.2)
APTT BLD: 26.1 SEC — LOW (ref 27–39.2)
APTT BLD: 74.7 SEC — CRITICAL HIGH (ref 27–39.2)
BLD GP AB SCN SERPL QL: SIGNIFICANT CHANGE UP
BUN SERPL-MCNC: 45 MG/DL — HIGH (ref 10–20)
CALCIUM SERPL-MCNC: 8 MG/DL — LOW (ref 8.4–10.5)
CHLORIDE SERPL-SCNC: 102 MMOL/L — SIGNIFICANT CHANGE UP (ref 98–110)
CO2 SERPL-SCNC: 22 MMOL/L — SIGNIFICANT CHANGE UP (ref 17–32)
CREAT SERPL-MCNC: 1.7 MG/DL — HIGH (ref 0.7–1.5)
EGFR: 38 ML/MIN/1.73M2 — LOW
GLUCOSE BLDC GLUCOMTR-MCNC: 248 MG/DL — HIGH (ref 70–99)
GLUCOSE BLDC GLUCOMTR-MCNC: 275 MG/DL — HIGH (ref 70–99)
GLUCOSE BLDC GLUCOMTR-MCNC: 319 MG/DL — HIGH (ref 70–99)
GLUCOSE SERPL-MCNC: 247 MG/DL — HIGH (ref 70–99)
HCT VFR BLD CALC: 32.4 % — LOW (ref 42–52)
HGB BLD-MCNC: 10.8 G/DL — LOW (ref 14–18)
MCHC RBC-ENTMCNC: 28.9 PG — SIGNIFICANT CHANGE UP (ref 27–31)
MCHC RBC-ENTMCNC: 33.3 G/DL — SIGNIFICANT CHANGE UP (ref 32–37)
MCV RBC AUTO: 86.6 FL — SIGNIFICANT CHANGE UP (ref 80–94)
NRBC # BLD: 0 /100 WBCS — SIGNIFICANT CHANGE UP (ref 0–0)
PLATELET # BLD AUTO: 181 K/UL — SIGNIFICANT CHANGE UP (ref 130–400)
POTASSIUM SERPL-MCNC: 4.4 MMOL/L — SIGNIFICANT CHANGE UP (ref 3.5–5)
POTASSIUM SERPL-SCNC: 4.4 MMOL/L — SIGNIFICANT CHANGE UP (ref 3.5–5)
RBC # BLD: 3.74 M/UL — LOW (ref 4.7–6.1)
RBC # FLD: 14.4 % — SIGNIFICANT CHANGE UP (ref 11.5–14.5)
SODIUM SERPL-SCNC: 136 MMOL/L — SIGNIFICANT CHANGE UP (ref 135–146)
WBC # BLD: 5.17 K/UL — SIGNIFICANT CHANGE UP (ref 4.8–10.8)
WBC # FLD AUTO: 5.17 K/UL — SIGNIFICANT CHANGE UP (ref 4.8–10.8)

## 2022-10-21 PROCEDURE — 99233 SBSQ HOSP IP/OBS HIGH 50: CPT

## 2022-10-21 PROCEDURE — 93010 ELECTROCARDIOGRAM REPORT: CPT | Mod: 76

## 2022-10-21 PROCEDURE — 99232 SBSQ HOSP IP/OBS MODERATE 35: CPT

## 2022-10-21 RX ORDER — SODIUM CHLORIDE 9 MG/ML
1000 INJECTION INTRAMUSCULAR; INTRAVENOUS; SUBCUTANEOUS
Refills: 0 | Status: DISCONTINUED | OUTPATIENT
Start: 2022-10-21 | End: 2022-10-27

## 2022-10-21 RX ADMIN — SODIUM CHLORIDE 50 MILLILITER(S): 9 INJECTION INTRAMUSCULAR; INTRAVENOUS; SUBCUTANEOUS at 17:38

## 2022-10-21 RX ADMIN — Medication 81 MILLIGRAM(S): at 11:24

## 2022-10-21 RX ADMIN — Medication 8: at 17:21

## 2022-10-21 RX ADMIN — INSULIN GLARGINE 28 UNIT(S): 100 INJECTION, SOLUTION SUBCUTANEOUS at 23:02

## 2022-10-21 RX ADMIN — Medication 3 UNIT(S): at 17:51

## 2022-10-21 RX ADMIN — CARVEDILOL PHOSPHATE 25 MILLIGRAM(S): 80 CAPSULE, EXTENDED RELEASE ORAL at 06:18

## 2022-10-21 RX ADMIN — Medication 60 MILLIGRAM(S): at 06:18

## 2022-10-21 RX ADMIN — GABAPENTIN 300 MILLIGRAM(S): 400 CAPSULE ORAL at 17:38

## 2022-10-21 RX ADMIN — ATORVASTATIN CALCIUM 80 MILLIGRAM(S): 80 TABLET, FILM COATED ORAL at 23:02

## 2022-10-21 RX ADMIN — HEPARIN SODIUM 1450 UNIT(S)/HR: 5000 INJECTION INTRAVENOUS; SUBCUTANEOUS at 00:44

## 2022-10-21 RX ADMIN — HEPARIN SODIUM 1350 UNIT(S)/HR: 5000 INJECTION INTRAVENOUS; SUBCUTANEOUS at 09:11

## 2022-10-21 RX ADMIN — HEPARIN SODIUM 1600 UNIT(S)/HR: 5000 INJECTION INTRAVENOUS; SUBCUTANEOUS at 23:40

## 2022-10-21 RX ADMIN — Medication 40 MILLIGRAM(S): at 06:18

## 2022-10-21 RX ADMIN — CARVEDILOL PHOSPHATE 25 MILLIGRAM(S): 80 CAPSULE, EXTENDED RELEASE ORAL at 17:38

## 2022-10-21 RX ADMIN — Medication 40 MILLIGRAM(S): at 17:38

## 2022-10-21 NOTE — PROGRESS NOTE ADULT - ASSESSMENT
89 y/o M with a past medical HX of bypass, CAD, CHF , HTN, dyslipidemia, DM , anxiety c/o mid line chest pain radiating to right side and right arm    #NSTEMI  #H/O CAD s/p CABG  - Trop 0.11 on admission, repeat 2.13, trending down  - ECG w/o signs of ischemia  - Placed on Hep GGT, Monitor PTT, keep b/w 55-70  - Cardio consult appreciated, will go for cath today, NPO for now  - TTE EF 55-60%, no wall abnormalities noted  - c/w ASA 81 mg qD, Lipitor 80 mg qD, Carvedilol 23 mg BID, Lasix 40 mg qD    #T2DM - c/w Lantus 28 U qHS, Lispro 3 U qAC w/ SS, monitor FS  #Gout - c/w Allopurinol 100 mg qD  #HTN - c/w Home Meds - Nifedipine 60 mg qD  #Anxiety - c/w Citalopram 20 mg qD    #Diet: DASH/CC  #DVT pro: Hep GGT  #GI pro: Protonix  #Dispo: CCU

## 2022-10-21 NOTE — PROGRESS NOTE ADULT - SUBJECTIVE AND OBJECTIVE BOX
Patient is a 89y old  Male who presents with a chief complaint of chest pain (20 Oct 2022 11:09)      T(F): 98.6 (10-21-22 @ 05:00), Max: 98.6 (10-21-22 @ 03:00)  HR: 67 (10-21-22 @ 05:30)  BP: 144/67 (10-21-22 @ 05:30)  RR: 22 (10-21-22 @ 05:30)  SpO2: 99% (10-21-22 @ 05:30) (95% - 100%)    PHYSICAL EXAM:  GENERAL: NAD, well-groomed, well-developed  HEAD:  Atraumatic, Normocephalic  EYES: EOMI, PERRLA, conjunctiva and sclera clear  ENMT: No tonsillar erythema, exudates, or enlargement; Moist mucous membranes, Good dentition, No lesions  NECK: Supple, No JVD, Normal thyroid  NERVOUS SYSTEM:  Alert & Oriented X3,  Motor Strength 5/5 B/L upper and lower extremities  CHEST/LUNG: Clear to percussion bilaterally; No rales, rhonchi, wheezing, or rubs  HEART: Regular rate and rhythm; No murmurs, rubs, or gallops  ABDOMEN: Soft, Nontender, Nondistended; Bowel sounds present  EXTREMITIES:   No clubbing, cyanosis, or edema  LYMPH: No lymphadenopathy noted  SKIN: No rashes or lesions    labs  10-20    133<L>  |  100  |  39<H>  ----------------------------<  243<H>  4.8   |  23  |  1.8<H>    Ca    8.3<L>      20 Oct 2022 06:03  Mg     1.9     10-20    TPro  5.9<L>  /  Alb  3.5  /  TBili  0.3  /  DBili  x   /  AST  23  /  ALT  10  /  AlkPhos  112  10-20                          10.8   5.17  )-----------( 181      ( 21 Oct 2022 05:59 )             32.4       PTT - ( 20 Oct 2022 17:40 )  PTT:60.6 sec        acetaminophen     Tablet .. 650 milliGRAM(s) Oral every 6 hours PRN  allopurinol 100 milliGRAM(s) Oral daily  aluminum hydroxide/magnesium hydroxide/simethicone Suspension 30 milliLiter(s) Oral every 4 hours PRN  aspirin  chewable 81 milliGRAM(s) Oral daily  atorvastatin 80 milliGRAM(s) Oral at bedtime  carvedilol 25 milliGRAM(s) Oral every 12 hours  citalopram 20 milliGRAM(s) Oral daily  clonazePAM  Tablet 2 milliGRAM(s) Oral at bedtime PRN  dextrose 5%. 1000 milliLiter(s) IV Continuous <Continuous>  dextrose 5%. 1000 milliLiter(s) IV Continuous <Continuous>  dextrose 50% Injectable 25 Gram(s) IV Push once  dextrose 50% Injectable 12.5 Gram(s) IV Push once  dextrose 50% Injectable 25 Gram(s) IV Push once  dextrose Oral Gel 15 Gram(s) Oral once PRN  furosemide    Tablet 40 milliGRAM(s) Oral every 12 hours  gabapentin 300 milliGRAM(s) Oral two times a day  glucagon  Injectable 1 milliGRAM(s) IntraMuscular once  heparin   Injectable 5000 Unit(s) IV Push every 6 hours PRN  heparin  Infusion. 1450 Unit(s)/Hr IV Continuous <Continuous>  insulin glargine Injectable (LANTUS) 28 Unit(s) SubCutaneous at bedtime  insulin lispro (ADMELOG) corrective regimen sliding scale   SubCutaneous three times a day before meals  insulin lispro Injectable (ADMELOG) 3 Unit(s) SubCutaneous three times a day before meals  melatonin 3 milliGRAM(s) Oral at bedtime PRN  NIFEdipine XL 60 milliGRAM(s) Oral daily  ondansetron Injectable 4 milliGRAM(s) IV Push every 8 hours PRN  sodium chloride 0.9%. 1000 milliLiter(s) IV Continuous <Continuous>

## 2022-10-21 NOTE — CHART NOTE - NSCHARTNOTEFT_GEN_A_CORE
PRE-OP DIAGNOSIS:      PROCEDURE:   [ ] Coronary Angiogram   [ x] LHC   [ ] LVG   [ ] RHC   [ ] Intervention (see below)       PHYSICIAN:  Dr. Amor  FELLOW: Kristin    PROCEDURE DESCRIPTION:     Consent:    [x] Patient   [] Family Member   []  Used      Anesthesia:   [ ] General   [X] Sedation   [X] Local     Access & Closure:   [x ] 6 Fr Radial Artery  -> D-stat     [x ] 6 Fr Femoral Artery -> Manual compression    [ ]  Fr Femoral Vein ->   [ ]  Fr Brachial Vein ->     IV Contrast: 75 mL      Intervention: none    Implants:  none                FINDINGS:   Coronary Dominance: right  LM: mild disease  LAD: 99% pLAD and 99% mLAD [place]. Severe atherosclerosis in remainder of vessel and D1  CX: 90% pLCx stenosis. Moderate to severe atherosclerosis in remainder of vessel  RCA: mild diffuse disease    LVEDP:  24 mmHg     EF:  50%      ESTIMATED BLOOD LOSS: < 10 mL      CONDITION:   [x] Good   [ ] Fair   [ ] Critical     SPECIMEN REMOVED: N/A     POST-OP DIAGNOSIS:    [ ] Normal Coronary Angiogram   [ ] Mild Coronary Artery Disease (< 50% stenosis)   [ x] 2- Vessel Coronary Artery Disease      PLAN OF CARE:   [ ] D/C Home Today   [x ] Return to In-patient bed   [ ] Admit for observation   [ x] Return for Staged Procedure next tuesday 10/25/22  [ ] CT Surgery Consult   [X] Medications: ASA,  statin, plavix  [X] IV Fluids: NS @ 100cc/h x 6 hours  [ ] EP Consult  [x] Remove D-stat and femoral sheath and Hold manual pressure if signs of hematoma or bleeding over radial and/or femoral access site.  [x] Smoking cessation  [x] Monitor Cr

## 2022-10-21 NOTE — PROGRESS NOTE ADULT - SUBJECTIVE AND OBJECTIVE BOX
SUBJECTIVE:  89 y/o male  with a past medical HX of bypass, CAD, CHF , HTN, dyslipidemia, DM , anxiety c/o mid line chest pain radiating to right side and right arm. Pt states the pain started at 4 pm continuous pain 7/10. Pt denies sob , N/V dizziness, or visual change. (19 Oct 2022 01:49)      PAST MEDICAL & SURGICAL HISTORY  PAST MEDICAL & SURGICAL HISTORY:  CHF (congestive heart failure)      DM (diabetes mellitus)      HTN (hypertension)      Prostate CA  in remission      Pacemaker      H/O total knee replacement, right        SOCIAL HISTORY:    ALLERGIES:  No Known Allergies    MEDICATIONS:  STANDING MEDICATIONS  allopurinol 100 milliGRAM(s) Oral daily  aspirin  chewable 81 milliGRAM(s) Oral daily  atorvastatin 80 milliGRAM(s) Oral at bedtime  carvedilol 25 milliGRAM(s) Oral every 12 hours  citalopram 20 milliGRAM(s) Oral daily  dextrose 5%. 1000 milliLiter(s) IV Continuous <Continuous>  dextrose 5%. 1000 milliLiter(s) IV Continuous <Continuous>  dextrose 50% Injectable 25 Gram(s) IV Push once  dextrose 50% Injectable 12.5 Gram(s) IV Push once  dextrose 50% Injectable 25 Gram(s) IV Push once  furosemide    Tablet 40 milliGRAM(s) Oral every 12 hours  gabapentin 300 milliGRAM(s) Oral two times a day  glucagon  Injectable 1 milliGRAM(s) IntraMuscular once  heparin  Infusion. 1450 Unit(s)/Hr IV Continuous <Continuous>  insulin glargine Injectable (LANTUS) 28 Unit(s) SubCutaneous at bedtime  insulin lispro (ADMELOG) corrective regimen sliding scale   SubCutaneous three times a day before meals  insulin lispro Injectable (ADMELOG) 3 Unit(s) SubCutaneous three times a day before meals  NIFEdipine XL 60 milliGRAM(s) Oral daily  sodium chloride 0.9%. 1000 milliLiter(s) IV Continuous <Continuous>    PRN MEDICATIONS  acetaminophen     Tablet .. 650 milliGRAM(s) Oral every 6 hours PRN  aluminum hydroxide/magnesium hydroxide/simethicone Suspension 30 milliLiter(s) Oral every 4 hours PRN  clonazePAM  Tablet 2 milliGRAM(s) Oral at bedtime PRN  dextrose Oral Gel 15 Gram(s) Oral once PRN  heparin   Injectable 5000 Unit(s) IV Push every 6 hours PRN  melatonin 3 milliGRAM(s) Oral at bedtime PRN  ondansetron Injectable 4 milliGRAM(s) IV Push every 8 hours PRN    VITALS:   T(F): 98.3  HR: 62  BP: 124/63  RR: 18  SpO2: 94%    LABS:                        10.8   5.17  )-----------( 181      ( 21 Oct 2022 05:59 )             32.4     10-21    136  |  102  |  45<H>  ----------------------------<  247<H>  4.4   |  22  |  1.7<H>    Ca    8.0<L>      21 Oct 2022 05:59  Mg     1.9     10-20    TPro  5.9<L>  /  Alb  3.5  /  TBili  0.3  /  DBili  x   /  AST  23  /  ALT  10  /  AlkPhos  112  10-20    PTT - ( 21 Oct 2022 05:59 )  PTT:74.7 sec          CARDIAC MARKERS ( 20 Oct 2022 06:03 )  x     / 1.53 ng/mL / x     / x     / x      CARDIAC MARKERS ( 19 Oct 2022 21:34 )  x     / 1.75 ng/mL / x     / x     / x      CARDIAC MARKERS ( 19 Oct 2022 15:23 )  x     / 2.30 ng/mL / 411 U/L / x     / 61.1 ng/mL      RADIOLOGY:    PHYSICAL EXAM:  GEN: No acute distress  HEENT: AT/NC PEERLA, EOMI  LUNGS: Clear to auscultation bilaterally  HEART: S1/S2 present  ABD: Soft, non-tender, non-distended. Bowel sounds present in all 4 quadrants  EXT: No edema, no rashes, no cyanosis  NEURO: AAOX3

## 2022-10-21 NOTE — PROGRESS NOTE ADULT - ASSESSMENT
89 year old male past medical history of chronic HFpEF, Hypertension, DM,  pacemaker comes to emergency room for chest pain. patient states that it was located across anterior R and L chest wall   and radiated  to his back and down both of his arms. It resolved when he arrived at the emergency room, now with no pain    NSTEMI     - aspirin, Lipitor, coreg and IV heparin   - check ptt and adjust   - NPO  for cardiac cath today

## 2022-10-21 NOTE — PROGRESS NOTE ADULT - SUBJECTIVE AND OBJECTIVE BOX
Patient is comfortable in bed, no chest pain, no SOB      T(F): 98.3 (10-21-22 @ 07:10), Max: 98.6 (10-21-22 @ 03:00)  HR: 67 (10-21-22 @ 05:30)  BP: 144/67 (10-21-22 @ 05:30)  RR: 18  SpO2: 99% (10-21-22 @ 05:30) (95% - 100%)    PHYSICAL EXAM:  GENERAL: NAD  HEAD:  Atraumatic, Normocephalic  EYES: EOMI, PERRLA, conjunctiva and sclera clear  NERVOUS SYSTEM:  Alert & Oriented X3, no focal deficits   CHEST/LUNG: Clear to percussion bilaterally; No rales, rhonchi, wheezing, or rubs  HEART: Regular rate and rhythm; No murmurs, rubs, or gallops  ABDOMEN: Soft, Nontender, Nondistended; Bowel sounds present  EXTREMITIES:  2+ Peripheral Pulses, No clubbing, cyanosis, or edema    LABS  10-21    136  |  102  |  45<H>  ----------------------------<  247<H>  4.4   |  22  |  1.7<H>    Ca    8.0<L>      21 Oct 2022 05:59  Mg     1.9     10-20    TPro  5.9<L>  /  Alb  3.5  /  TBili  0.3  /  DBili  x   /  AST  23  /  ALT  10  /  AlkPhos  112  10-20                          10.8   5.17  )-----------( 181      ( 21 Oct 2022 05:59 )             32.4     PTT - ( 21 Oct 2022 05:59 )  PTT:74.7 sec    CARDIAC ENZYMES  Creatine Kinase, Serum: 411 (10-19 @ 15:23)  Creatine Kinase, Serum: 555 (10-19 @ 06:12)    CKMB Units: 61.1 (10-19 @ 15:23)  CKMB Units: 94.1 (10-19 @ 06:12)    Troponin T, Serum: 1.53 ng/mL (10-20-22 @ 06:03)  Troponin T, Serum: 1.75 ng/mL (10-19-22 @ 21:34)  Troponin T, Serum: 2.30 ng/mL (10-19-22 @ 15:23)  Troponin T, Serum: 2.23 ng/mL (10-19-22 @ 06:12)  Troponin T, Serum: 0.11 ng/mL (10-18-22 @ 22:50)    < from: 12 Lead ECG (10.20.22 @ 08:31) >  Diagnosis Line Normal sinus rhythm  Right bundle branch block  T wave abnormality, consider lateral ischemia  Abnormal ECG      < end of copied text >  < from: TTE Echo Complete w/o Contrast w/ Doppler (10.19.22 @ 14:20) >    Summary:   1. Technically difficult study.   2. Left ventricular ejection fraction, by visual estimation, is 55 to   60%.   3. Normal left ventricular internal cavity size.   4. Mildly enlarged left atrium.   5. Mild mitral annular calcification.   6. Mild aortic valve stenosis.    < end of copied text >      RADIOLOGY  < from: Xray Chest 1 View-PORTABLE IMMEDIATE (10.18.22 @ 23:40) >    No radiographically evident acute displaced fracture within the   limitations of this exam.    < end of copied text >    MEDICATIONS  (STANDING):  allopurinol 100 milliGRAM(s) Oral daily  aspirin  chewable 81 milliGRAM(s) Oral daily  atorvastatin 80 milliGRAM(s) Oral at bedtime  carvedilol 25 milliGRAM(s) Oral every 12 hours  citalopram 20 milliGRAM(s) Oral daily  furosemide    Tablet 40 milliGRAM(s) Oral every 12 hours  gabapentin 300 milliGRAM(s) Oral two times a day  glucagon  Injectable 1 milliGRAM(s) IntraMuscular once  heparin  Infusion. 1450 Unit(s)/Hr (14.5 mL/Hr) IV Continuous <Continuous>  insulin glargine Injectable (LANTUS) 28 Unit(s) SubCutaneous at bedtime  insulin lispro (ADMELOG) corrective regimen sliding scale   SubCutaneous three times a day before meals  insulin lispro Injectable (ADMELOG) 3 Unit(s) SubCutaneous three times a day before meals  NIFEdipine XL 60 milliGRAM(s) Oral daily  sodium chloride 0.9%. 1000 milliLiter(s) (50 mL/Hr) IV Continuous <Continuous>    MEDICATIONS  (PRN):  acetaminophen     Tablet .. 650 milliGRAM(s) Oral every 6 hours PRN Temp greater or equal to 38C (100.4F), Mild Pain (1 - 3)  aluminum hydroxide/magnesium hydroxide/simethicone Suspension 30 milliLiter(s) Oral every 4 hours PRN Dyspepsia  clonazePAM  Tablet 2 milliGRAM(s) Oral at bedtime PRN anxiety  dextrose Oral Gel 15 Gram(s) Oral once PRN Blood Glucose LESS THAN 70 milliGRAM(s)/deciliter  heparin   Injectable 5000 Unit(s) IV Push every 6 hours PRN For aPTT less than 40  melatonin 3 milliGRAM(s) Oral at bedtime PRN Insomnia  ondansetron Injectable 4 milliGRAM(s) IV Push every 8 hours PRN Nausea and/or Vomiting

## 2022-10-21 NOTE — PROGRESS NOTE ADULT - ASSESSMENT
Patient with no further chest pain. No sob . No complaints. Lying flat.  He had MI. Echo ef 55%. OOB to chair. He needs asa beta heparin statin. Cardiac cath today

## 2022-10-22 LAB
ALBUMIN SERPL ELPH-MCNC: 3.5 G/DL — SIGNIFICANT CHANGE UP (ref 3.5–5.2)
ALP SERPL-CCNC: 110 U/L — SIGNIFICANT CHANGE UP (ref 30–115)
ALT FLD-CCNC: 12 U/L — SIGNIFICANT CHANGE UP (ref 0–41)
ANION GAP SERPL CALC-SCNC: 11 MMOL/L — SIGNIFICANT CHANGE UP (ref 7–14)
APTT BLD: 66.5 SEC — HIGH (ref 27–39.2)
APTT BLD: 72.5 SEC — CRITICAL HIGH (ref 27–39.2)
AST SERPL-CCNC: 15 U/L — SIGNIFICANT CHANGE UP (ref 0–41)
BASOPHILS # BLD AUTO: 0.03 K/UL — SIGNIFICANT CHANGE UP (ref 0–0.2)
BASOPHILS NFR BLD AUTO: 0.6 % — SIGNIFICANT CHANGE UP (ref 0–1)
BILIRUB SERPL-MCNC: 0.3 MG/DL — SIGNIFICANT CHANGE UP (ref 0.2–1.2)
BUN SERPL-MCNC: 52 MG/DL — HIGH (ref 10–20)
CALCIUM SERPL-MCNC: 7.8 MG/DL — LOW (ref 8.4–10.5)
CHLORIDE SERPL-SCNC: 102 MMOL/L — SIGNIFICANT CHANGE UP (ref 98–110)
CO2 SERPL-SCNC: 22 MMOL/L — SIGNIFICANT CHANGE UP (ref 17–32)
CREAT SERPL-MCNC: 1.9 MG/DL — HIGH (ref 0.7–1.5)
EGFR: 33 ML/MIN/1.73M2 — LOW
EOSINOPHIL # BLD AUTO: 0.23 K/UL — SIGNIFICANT CHANGE UP (ref 0–0.7)
EOSINOPHIL NFR BLD AUTO: 4.7 % — SIGNIFICANT CHANGE UP (ref 0–8)
GLUCOSE BLDC GLUCOMTR-MCNC: 187 MG/DL — HIGH (ref 70–99)
GLUCOSE BLDC GLUCOMTR-MCNC: 229 MG/DL — HIGH (ref 70–99)
GLUCOSE BLDC GLUCOMTR-MCNC: 245 MG/DL — HIGH (ref 70–99)
GLUCOSE BLDC GLUCOMTR-MCNC: 348 MG/DL — HIGH (ref 70–99)
GLUCOSE SERPL-MCNC: 227 MG/DL — HIGH (ref 70–99)
HCT VFR BLD CALC: 30.6 % — LOW (ref 42–52)
HGB BLD-MCNC: 10.2 G/DL — LOW (ref 14–18)
IMM GRANULOCYTES NFR BLD AUTO: 0.6 % — HIGH (ref 0.1–0.3)
LYMPHOCYTES # BLD AUTO: 1.27 K/UL — SIGNIFICANT CHANGE UP (ref 1.2–3.4)
LYMPHOCYTES # BLD AUTO: 25.7 % — SIGNIFICANT CHANGE UP (ref 20.5–51.1)
MAGNESIUM SERPL-MCNC: 1.8 MG/DL — SIGNIFICANT CHANGE UP (ref 1.8–2.4)
MCHC RBC-ENTMCNC: 28.9 PG — SIGNIFICANT CHANGE UP (ref 27–31)
MCHC RBC-ENTMCNC: 33.3 G/DL — SIGNIFICANT CHANGE UP (ref 32–37)
MCV RBC AUTO: 86.7 FL — SIGNIFICANT CHANGE UP (ref 80–94)
MONOCYTES # BLD AUTO: 0.44 K/UL — SIGNIFICANT CHANGE UP (ref 0.1–0.6)
MONOCYTES NFR BLD AUTO: 8.9 % — SIGNIFICANT CHANGE UP (ref 1.7–9.3)
NEUTROPHILS # BLD AUTO: 2.94 K/UL — SIGNIFICANT CHANGE UP (ref 1.4–6.5)
NEUTROPHILS NFR BLD AUTO: 59.5 % — SIGNIFICANT CHANGE UP (ref 42.2–75.2)
NRBC # BLD: 0 /100 WBCS — SIGNIFICANT CHANGE UP (ref 0–0)
PLATELET # BLD AUTO: 169 K/UL — SIGNIFICANT CHANGE UP (ref 130–400)
POTASSIUM SERPL-MCNC: 4.5 MMOL/L — SIGNIFICANT CHANGE UP (ref 3.5–5)
POTASSIUM SERPL-SCNC: 4.5 MMOL/L — SIGNIFICANT CHANGE UP (ref 3.5–5)
PROT SERPL-MCNC: 5.8 G/DL — LOW (ref 6–8)
RBC # BLD: 3.53 M/UL — LOW (ref 4.7–6.1)
RBC # FLD: 14.5 % — SIGNIFICANT CHANGE UP (ref 11.5–14.5)
SODIUM SERPL-SCNC: 135 MMOL/L — SIGNIFICANT CHANGE UP (ref 135–146)
WBC # BLD: 4.94 K/UL — SIGNIFICANT CHANGE UP (ref 4.8–10.8)
WBC # FLD AUTO: 4.94 K/UL — SIGNIFICANT CHANGE UP (ref 4.8–10.8)

## 2022-10-22 PROCEDURE — 99232 SBSQ HOSP IP/OBS MODERATE 35: CPT

## 2022-10-22 RX ADMIN — Medication 3 UNIT(S): at 08:34

## 2022-10-22 RX ADMIN — Medication 40 MILLIGRAM(S): at 06:10

## 2022-10-22 RX ADMIN — HEPARIN SODIUM 1600 UNIT(S)/HR: 5000 INJECTION INTRAVENOUS; SUBCUTANEOUS at 20:33

## 2022-10-22 RX ADMIN — Medication 2: at 17:43

## 2022-10-22 RX ADMIN — GABAPENTIN 300 MILLIGRAM(S): 400 CAPSULE ORAL at 06:10

## 2022-10-22 RX ADMIN — HEPARIN SODIUM 1600 UNIT(S)/HR: 5000 INJECTION INTRAVENOUS; SUBCUTANEOUS at 10:52

## 2022-10-22 RX ADMIN — Medication 3 UNIT(S): at 12:03

## 2022-10-22 RX ADMIN — Medication 4: at 08:33

## 2022-10-22 RX ADMIN — Medication 60 MILLIGRAM(S): at 06:10

## 2022-10-22 RX ADMIN — CARVEDILOL PHOSPHATE 25 MILLIGRAM(S): 80 CAPSULE, EXTENDED RELEASE ORAL at 06:10

## 2022-10-22 RX ADMIN — INSULIN GLARGINE 28 UNIT(S): 100 INJECTION, SOLUTION SUBCUTANEOUS at 22:37

## 2022-10-22 RX ADMIN — CITALOPRAM 20 MILLIGRAM(S): 10 TABLET, FILM COATED ORAL at 12:01

## 2022-10-22 RX ADMIN — ATORVASTATIN CALCIUM 80 MILLIGRAM(S): 80 TABLET, FILM COATED ORAL at 21:30

## 2022-10-22 RX ADMIN — CARVEDILOL PHOSPHATE 25 MILLIGRAM(S): 80 CAPSULE, EXTENDED RELEASE ORAL at 17:45

## 2022-10-22 RX ADMIN — Medication 40 MILLIGRAM(S): at 17:45

## 2022-10-22 RX ADMIN — Medication 8: at 12:02

## 2022-10-22 RX ADMIN — Medication 3 UNIT(S): at 17:44

## 2022-10-22 RX ADMIN — GABAPENTIN 300 MILLIGRAM(S): 400 CAPSULE ORAL at 17:46

## 2022-10-22 RX ADMIN — Medication 81 MILLIGRAM(S): at 12:01

## 2022-10-22 RX ADMIN — HEPARIN SODIUM 1600 UNIT(S)/HR: 5000 INJECTION INTRAVENOUS; SUBCUTANEOUS at 15:02

## 2022-10-22 RX ADMIN — Medication 100 MILLIGRAM(S): at 12:02

## 2022-10-22 NOTE — PROGRESS NOTE ADULT - ASSESSMENT
89 yo M PMHx CAD s/p CABG, chronic HFpEF, HTN, dyslipidemia, DM II, anxiety presented for evaluation of mid line chest pain radiating to right side and right arm. Pt admitted to [facility] to CCU and was transferred north for cath.    NSTEMI  CAD s/p CABG  - Trop 0.11 on admission, repeat 2.13, trending down  - ECG w/o signs of ischemia  - c/w heparin  - TTE EF 55-60%, no wall abnormalities noted  - c/w ASA 81 mg qD, Lipitor 80 mg qD, Carvedilol 23 mg BID, Lasix 40 mg qD  - cath showed 2 v disease. Planned for staged PCI on 10/25    DM II   - c/w Lantus 28 U qHS, Lispro 3 U qAC w/ SS, monitor FS    Gout   - c/w Allopurinol 100 mg qD    HTN   - c/w Home Meds - Nifedipine 60 mg qD    Anxiety   - c/w Citalopram 20 mg qD    #Progress Note Handoff:  Pending (specify):  staged pci  Family discussion: spoke with pt regarding pending cath  Disposition: Home___/SNF___/Other________/Unknown at this time________   89 yo M PMHx CAD s/p CABG, chronic HFpEF, HTN, dyslipidemia, DM II, anxiety presented for evaluation of mid line chest pain radiating to right side and right arm. Pt admitted to [facility] to CCU and was transferred north for cath.    NSTEMI  CAD s/p CABG  - Trop 0.11 on admission, repeat 2.13, trending down  - ECG w/o signs of ischemia  - c/w heparin  - TTE EF 55-60%, no wall abnormalities noted  - c/w ASA 81 mg qD, Lipitor 80 mg qD, Carvedilol 23 mg BID, Lasix 40 mg qD  - cath showed 2 v disease. Planned for staged PCI on 10/25    DM II   - c/w Lantus 28 U qHS, Lispro 3 U qAC w/ SS, monitor FS    Gout   - c/w Allopurinol 100 mg qD    CKD III  - stable    HTN   - c/w Home Meds - Nifedipine 60 mg qD    Anxiety   - c/w Citalopram 20 mg qD    #Progress Note Handoff:  Pending (specify):  staged pci  Family discussion: spoke with pt regarding pending cath  Disposition: Home___/SNF___/Other________/Unknown at this time________

## 2022-10-22 NOTE — PROGRESS NOTE ADULT - SUBJECTIVE AND OBJECTIVE BOX
CHIEF COMPLAINT:    Patient is a 89y old  Male who presents with a chief complaint of cp (21 Oct 2022 11:38)      INTERVAL HPI/OVERNIGHT EVENTS:    Patient seen and examined at bedside. No acute overnight events occurred.    ROS: All other systems are negative.    Medications:  Standing  allopurinol 100 milliGRAM(s) Oral daily  aspirin  chewable 81 milliGRAM(s) Oral daily  atorvastatin 80 milliGRAM(s) Oral at bedtime  carvedilol 25 milliGRAM(s) Oral every 12 hours  citalopram 20 milliGRAM(s) Oral daily  dextrose 5%. 1000 milliLiter(s) IV Continuous <Continuous>  dextrose 5%. 1000 milliLiter(s) IV Continuous <Continuous>  dextrose 50% Injectable 25 Gram(s) IV Push once  dextrose 50% Injectable 12.5 Gram(s) IV Push once  dextrose 50% Injectable 25 Gram(s) IV Push once  furosemide    Tablet 40 milliGRAM(s) Oral every 12 hours  gabapentin 300 milliGRAM(s) Oral two times a day  glucagon  Injectable 1 milliGRAM(s) IntraMuscular once  heparin  Infusion. 1450 Unit(s)/Hr IV Continuous <Continuous>  insulin glargine Injectable (LANTUS) 28 Unit(s) SubCutaneous at bedtime  insulin lispro (ADMELOG) corrective regimen sliding scale   SubCutaneous three times a day before meals  insulin lispro Injectable (ADMELOG) 3 Unit(s) SubCutaneous three times a day before meals  NIFEdipine XL 60 milliGRAM(s) Oral daily  sodium chloride 0.9%. 1000 milliLiter(s) IV Continuous <Continuous>    PRN Meds  acetaminophen     Tablet .. 650 milliGRAM(s) Oral every 6 hours PRN  aluminum hydroxide/magnesium hydroxide/simethicone Suspension 30 milliLiter(s) Oral every 4 hours PRN  clonazePAM  Tablet 2 milliGRAM(s) Oral at bedtime PRN  dextrose Oral Gel 15 Gram(s) Oral once PRN  heparin   Injectable 5000 Unit(s) IV Push every 6 hours PRN  melatonin 3 milliGRAM(s) Oral at bedtime PRN  ondansetron Injectable 4 milliGRAM(s) IV Push every 8 hours PRN        Vital Signs:    T(F): 98 (10-22-22 @ 04:50), Max: 98.8 (10-21-22 @ 20:00)  HR: 63 (10-22-22 @ 06:05) (58 - 75)  BP: 142/65 (10-22-22 @ 06:05) (123/59 - 142/65)  RR: 18 (10-22-22 @ 06:05) (18 - 19)  SpO2: 96% (10-22-22 @ 06:05) (96% - 96%)  I&O's Summary    21 Oct 2022 07:01  -  22 Oct 2022 07:00  --------------------------------------------------------  IN: 292 mL / OUT: 1000 mL / NET: -708 mL      Daily     Daily Weight in k (22 Oct 2022 04:50)  CAPILLARY BLOOD GLUCOSE      POCT Blood Glucose.: 348 mg/dL (22 Oct 2022 11:57)  POCT Blood Glucose.: 229 mg/dL (22 Oct 2022 07:56)  POCT Blood Glucose.: 248 mg/dL (21 Oct 2022 21:47)  POCT Blood Glucose.: 319 mg/dL (21 Oct 2022 17:17)      PHYSICAL EXAM:  GENERAL:  NAD  SKIN: No rashes or lesions  HEENT: Atraumatic. Normocephalic. Anicteric  NECK:  No JVD.   PULMONARY: Clear to ausculation bilaterally. No wheezing. No rales  CVS: Normal S1, S2. Regular rate and rhythm. No murmurs.  ABDOMEN/GI: Soft, Nontender, Nondistended; Bowel sounds are present  EXTREMITIES:  No edema B/L LE.  NEUROLOGIC:  No motor deficit.  PSYCH: Alert & oriented x 3, normal affect      LABS:                        10.2   4.94  )-----------( 169      ( 22 Oct 2022 07:10 )             30.6     10    135  |  102  |  52<H>  ----------------------------<  227<H>  4.5   |  22  |  1.9<H>    Ca    7.8<L>      22 Oct 2022 07:10  Mg     1.8     10-22    TPro  5.8<L>  /  Alb  3.5  /  TBili  0.3  /  DBili  x   /  AST  15  /  ALT  12  /  AlkPhos  110  10-22    PTT - ( 22 Oct 2022 07:10 )  PTT:72.5 sec    Trop 1.53, CKMB --, CK --, 10-20-22 @ 06:03  Trop 1.75, CKMB --, CK --, 10-19-22 @ 21:34  Trop 2.30, CKMB 61.1, , 10-19-22 @ 15:23        RADIOLOGY & ADDITIONAL TESTS:  Imaging or report Personally Reviewed:  [ ] YES  [ ] NO -->no new images    Telemetry reviewed independently - NSR, no acute events  EKG reviewed independently -->no new EKGs    Consultant(s) Notes Reviewed:  [ ] YES  [ ] NO  Care Discussed with Consultants/Other Providers [ ] YES  [ ] NO    Case discussed with resident  Care discussed with pt         CHIEF COMPLAINT:    Patient is a 89y old  Male who presents with a chief complaint of cp (21 Oct 2022 11:38)      INTERVAL HPI/OVERNIGHT EVENTS:    Patient seen and examined at bedside. No acute overnight events occurred.    ROS: Denies chest pain. All other systems are negative.    Medications:  Standing  allopurinol 100 milliGRAM(s) Oral daily  aspirin  chewable 81 milliGRAM(s) Oral daily  atorvastatin 80 milliGRAM(s) Oral at bedtime  carvedilol 25 milliGRAM(s) Oral every 12 hours  citalopram 20 milliGRAM(s) Oral daily  dextrose 5%. 1000 milliLiter(s) IV Continuous <Continuous>  dextrose 5%. 1000 milliLiter(s) IV Continuous <Continuous>  dextrose 50% Injectable 25 Gram(s) IV Push once  dextrose 50% Injectable 12.5 Gram(s) IV Push once  dextrose 50% Injectable 25 Gram(s) IV Push once  furosemide    Tablet 40 milliGRAM(s) Oral every 12 hours  gabapentin 300 milliGRAM(s) Oral two times a day  glucagon  Injectable 1 milliGRAM(s) IntraMuscular once  heparin  Infusion. 1450 Unit(s)/Hr IV Continuous <Continuous>  insulin glargine Injectable (LANTUS) 28 Unit(s) SubCutaneous at bedtime  insulin lispro (ADMELOG) corrective regimen sliding scale   SubCutaneous three times a day before meals  insulin lispro Injectable (ADMELOG) 3 Unit(s) SubCutaneous three times a day before meals  NIFEdipine XL 60 milliGRAM(s) Oral daily  sodium chloride 0.9%. 1000 milliLiter(s) IV Continuous <Continuous>    PRN Meds  acetaminophen     Tablet .. 650 milliGRAM(s) Oral every 6 hours PRN  aluminum hydroxide/magnesium hydroxide/simethicone Suspension 30 milliLiter(s) Oral every 4 hours PRN  clonazePAM  Tablet 2 milliGRAM(s) Oral at bedtime PRN  dextrose Oral Gel 15 Gram(s) Oral once PRN  heparin   Injectable 5000 Unit(s) IV Push every 6 hours PRN  melatonin 3 milliGRAM(s) Oral at bedtime PRN  ondansetron Injectable 4 milliGRAM(s) IV Push every 8 hours PRN        Vital Signs:    T(F): 98 (10-22-22 @ 04:50), Max: 98.8 (10-21-22 @ 20:00)  HR: 63 (10-22-22 @ 06:05) (58 - 75)  BP: 142/65 (10-22-22 @ 06:05) (123/59 - 142/65)  RR: 18 (10-22-22 @ 06:05) (18 - 19)  SpO2: 96% (10-22-22 @ 06:05) (96% - 96%)  I&O's Summary    21 Oct 2022 07:01  -  22 Oct 2022 07:00  --------------------------------------------------------  IN: 292 mL / OUT: 1000 mL / NET: -708 mL      Daily     Daily Weight in k (22 Oct 2022 04:50)  CAPILLARY BLOOD GLUCOSE      POCT Blood Glucose.: 348 mg/dL (22 Oct 2022 11:57)  POCT Blood Glucose.: 229 mg/dL (22 Oct 2022 07:56)  POCT Blood Glucose.: 248 mg/dL (21 Oct 2022 21:47)  POCT Blood Glucose.: 319 mg/dL (21 Oct 2022 17:17)      PHYSICAL EXAM:  GENERAL:  NAD  SKIN: No rashes or lesions  HEENT: Atraumatic. Normocephalic. Anicteric  NECK:  No JVD.   PULMONARY: Clear to ausculation bilaterally. No wheezing. No rales  CVS: Normal S1, S2. Regular rate and rhythm. No murmurs.  ABDOMEN/GI: Soft, Nontender, Nondistended; Bowel sounds are present  EXTREMITIES:  No edema B/L LE.  NEUROLOGIC:  No motor deficit.  PSYCH: Alert & oriented x 3, normal affect      LABS:                        10.2   4.94  )-----------( 169      ( 22 Oct 2022 07:10 )             30.6     10    135  |  102  |  52<H>  ----------------------------<  227<H>  4.5   |  22  |  1.9<H>    Ca    7.8<L>      22 Oct 2022 07:10  Mg     1.8     10-22    TPro  5.8<L>  /  Alb  3.5  /  TBili  0.3  /  DBili  x   /  AST  15  /  ALT  12  /  AlkPhos  110  10-22    PTT - ( 22 Oct 2022 07:10 )  PTT:72.5 sec    Trop 1.53, CKMB --, CK --, 10-20-22 @ 06:03  Trop 1.75, CKMB --, CK --, 10-19-22 @ 21:34  Trop 2.30, CKMB 61.1, , 10-19-22 @ 15:23        RADIOLOGY & ADDITIONAL TESTS:  Imaging or report Personally Reviewed:  [ ] YES  [ ] NO -->no new images    Telemetry reviewed independently - NSR, no acute events  EKG reviewed independently -->no new EKGs    Consultant(s) Notes Reviewed:  [ ] YES  [ ] NO  Care Discussed with Consultants/Other Providers [ ] YES  [ ] NO    Case discussed with resident  Care discussed with pt

## 2022-10-23 ENCOUNTER — TRANSCRIPTION ENCOUNTER (OUTPATIENT)
Age: 87
End: 2022-10-23

## 2022-10-23 LAB
APTT BLD: 63.8 SEC — HIGH (ref 27–39.2)
GLUCOSE BLDC GLUCOMTR-MCNC: 234 MG/DL — HIGH (ref 70–99)
GLUCOSE BLDC GLUCOMTR-MCNC: 247 MG/DL — HIGH (ref 70–99)
GLUCOSE BLDC GLUCOMTR-MCNC: 293 MG/DL — HIGH (ref 70–99)
GLUCOSE BLDC GLUCOMTR-MCNC: 329 MG/DL — HIGH (ref 70–99)

## 2022-10-23 PROCEDURE — 99233 SBSQ HOSP IP/OBS HIGH 50: CPT

## 2022-10-23 RX ADMIN — Medication 3 UNIT(S): at 12:35

## 2022-10-23 RX ADMIN — Medication 3 UNIT(S): at 08:14

## 2022-10-23 RX ADMIN — Medication 100 MILLIGRAM(S): at 13:48

## 2022-10-23 RX ADMIN — GABAPENTIN 300 MILLIGRAM(S): 400 CAPSULE ORAL at 06:09

## 2022-10-23 RX ADMIN — Medication 60 MILLIGRAM(S): at 06:00

## 2022-10-23 RX ADMIN — CARVEDILOL PHOSPHATE 25 MILLIGRAM(S): 80 CAPSULE, EXTENDED RELEASE ORAL at 18:19

## 2022-10-23 RX ADMIN — Medication 81 MILLIGRAM(S): at 12:34

## 2022-10-23 RX ADMIN — GABAPENTIN 300 MILLIGRAM(S): 400 CAPSULE ORAL at 18:19

## 2022-10-23 RX ADMIN — Medication 40 MILLIGRAM(S): at 06:00

## 2022-10-23 RX ADMIN — Medication 3 UNIT(S): at 18:23

## 2022-10-23 RX ADMIN — ATORVASTATIN CALCIUM 80 MILLIGRAM(S): 80 TABLET, FILM COATED ORAL at 21:21

## 2022-10-23 RX ADMIN — HEPARIN SODIUM 1600 UNIT(S)/HR: 5000 INJECTION INTRAVENOUS; SUBCUTANEOUS at 13:49

## 2022-10-23 RX ADMIN — CARVEDILOL PHOSPHATE 25 MILLIGRAM(S): 80 CAPSULE, EXTENDED RELEASE ORAL at 06:01

## 2022-10-23 RX ADMIN — Medication 40 MILLIGRAM(S): at 18:19

## 2022-10-23 RX ADMIN — HEPARIN SODIUM 1600 UNIT(S)/HR: 5000 INJECTION INTRAVENOUS; SUBCUTANEOUS at 22:06

## 2022-10-23 RX ADMIN — Medication 6: at 18:20

## 2022-10-23 RX ADMIN — CITALOPRAM 20 MILLIGRAM(S): 10 TABLET, FILM COATED ORAL at 12:34

## 2022-10-23 RX ADMIN — HEPARIN SODIUM 1600 UNIT(S)/HR: 5000 INJECTION INTRAVENOUS; SUBCUTANEOUS at 06:00

## 2022-10-23 RX ADMIN — Medication 4: at 08:13

## 2022-10-23 RX ADMIN — Medication 8: at 12:35

## 2022-10-23 RX ADMIN — INSULIN GLARGINE 28 UNIT(S): 100 INJECTION, SOLUTION SUBCUTANEOUS at 22:05

## 2022-10-23 NOTE — PROGRESS NOTE ADULT - SUBJECTIVE AND OBJECTIVE BOX
CHIEF COMPLAINT:    Patient is a 89y old  Male who presents with a chief complaint of cp (22 Oct 2022 12:50)      INTERVAL HPI/OVERNIGHT EVENTS:    Patient seen and examined at bedside. No acute overnight events occurred.    ROS: Denies chest pain. All other systems are negative.    Medications:  Standing  allopurinol 100 milliGRAM(s) Oral daily  aspirin  chewable 81 milliGRAM(s) Oral daily  atorvastatin 80 milliGRAM(s) Oral at bedtime  carvedilol 25 milliGRAM(s) Oral every 12 hours  citalopram 20 milliGRAM(s) Oral daily  dextrose 5%. 1000 milliLiter(s) IV Continuous <Continuous>  dextrose 5%. 1000 milliLiter(s) IV Continuous <Continuous>  dextrose 50% Injectable 25 Gram(s) IV Push once  dextrose 50% Injectable 12.5 Gram(s) IV Push once  dextrose 50% Injectable 25 Gram(s) IV Push once  furosemide    Tablet 40 milliGRAM(s) Oral every 12 hours  gabapentin 300 milliGRAM(s) Oral two times a day  glucagon  Injectable 1 milliGRAM(s) IntraMuscular once  heparin  Infusion. 1450 Unit(s)/Hr IV Continuous <Continuous>  insulin glargine Injectable (LANTUS) 28 Unit(s) SubCutaneous at bedtime  insulin lispro (ADMELOG) corrective regimen sliding scale   SubCutaneous three times a day before meals  insulin lispro Injectable (ADMELOG) 3 Unit(s) SubCutaneous three times a day before meals  NIFEdipine XL 60 milliGRAM(s) Oral daily  sodium chloride 0.9%. 1000 milliLiter(s) IV Continuous <Continuous>    PRN Meds  acetaminophen     Tablet .. 650 milliGRAM(s) Oral every 6 hours PRN  aluminum hydroxide/magnesium hydroxide/simethicone Suspension 30 milliLiter(s) Oral every 4 hours PRN  clonazePAM  Tablet 2 milliGRAM(s) Oral at bedtime PRN  dextrose Oral Gel 15 Gram(s) Oral once PRN  heparin   Injectable 5000 Unit(s) IV Push every 6 hours PRN  melatonin 3 milliGRAM(s) Oral at bedtime PRN  ondansetron Injectable 4 milliGRAM(s) IV Push every 8 hours PRN        Vital Signs:    T(F): 97 (10-23-22 @ 05:00), Max: 98.7 (10-22-22 @ 21:00)  HR: 59 (10-23-22 @ 05:00) (59 - 90)  BP: 129/58 (10-23-22 @ 05:00) (105/56 - 149/66)  RR: 20 (10-23-22 @ 05:00) (18 - 20)  SpO2: --  I&O's Summary    22 Oct 2022 07:01  -  23 Oct 2022 07:00  --------------------------------------------------------  IN: 568 mL / OUT: 0 mL / NET: 568 mL      Daily     Daily   CAPILLARY BLOOD GLUCOSE      POCT Blood Glucose.: 245 mg/dL (22 Oct 2022 21:36)  POCT Blood Glucose.: 187 mg/dL (22 Oct 2022 16:37)  POCT Blood Glucose.: 348 mg/dL (22 Oct 2022 11:57)  POCT Blood Glucose.: 229 mg/dL (22 Oct 2022 07:56)      PHYSICAL EXAM:  GENERAL:  NAD  SKIN: No rashes or lesions  HEENT: Atraumatic. Normocephalic. Anicteric  NECK:  No JVD.   PULMONARY: Clear to ausculation bilaterally. No wheezing. No rales  CVS: Normal S1, S2. Regular rate and rhythm. No murmurs.  ABDOMEN/GI: Soft, Nontender, Nondistended; Bowel sounds are present  EXTREMITIES:  No edema B/L LE.  NEUROLOGIC:  No motor deficit.  PSYCH: Alert & oriented x 3, normal affect      LABS:                        10.2   4.94  )-----------( 169      ( 22 Oct 2022 07:10 )             30.6     10-22    135  |  102  |  52<H>  ----------------------------<  227<H>  4.5   |  22  |  1.9<H>    Ca    7.8<L>      22 Oct 2022 07:10  Mg     1.8     10-22    TPro  5.8<L>  /  Alb  3.5  /  TBili  0.3  /  DBili  x   /  AST  15  /  ALT  12  /  AlkPhos  110  10-22    PTT - ( 22 Oct 2022 19:42 )  PTT:66.5 sec          RADIOLOGY & ADDITIONAL TESTS:  Imaging or report Personally Reviewed:  [ ] YES  [ ] NO -->no new images    Telemetry reviewed independently - NSR, no acute events  EKG reviewed independently -->no new EKGs    Consultant(s) Notes Reviewed:  [ ] YES  [ ] NO  Care Discussed with Consultants/Other Providers [ ] YES  [ ] NO    Case discussed with resident  Care discussed with pt

## 2022-10-23 NOTE — DISCHARGE NOTE NURSING/CASE MANAGEMENT/SOCIAL WORK - CASE MANAGER'S NAME
watson Suarez/BARAK /CALEB /[phone] watson Suarez/RN /CALEB /[phone]/RN/CALEB/[phone]

## 2022-10-23 NOTE — PROGRESS NOTE ADULT - ASSESSMENT
89 yo M PMHx CAD s/p CABG, chronic HFpEF, HTN, dyslipidemia, DM II, anxiety presented for evaluation of mid line chest pain radiating to right side and right arm. Pt admitted to [facility] to CCU and was transferred north for cath.    NSTEMI  CAD s/p CABG  - Trop 0.11 on admission, repeat 2.13, trending down  - ECG w/o signs of ischemia  - c/w heparin  - TTE EF 55-60%, no wall abnormalities noted  - c/w ASA 81 mg qD, Lipitor 80 mg qD, Carvedilol 23 mg BID, Lasix 40 mg qD  - cath showed 2 v disease. Planned for staged PCI on 10/25    DM II   - c/w Lantus 28 U qHS, Lispro 3 U qAC w/ SS, monitor FS    Gout   - c/w Allopurinol 100 mg qD    CKD III  - stable    HTN   - c/w Home Meds - Nifedipine 60 mg qD    Anxiety   - c/w Citalopram 20 mg qD    #Progress Note Handoff:  Pending (specify):  staged pci  Family discussion: spoke with pt regarding pending cath as well as son Bert ([phone]) who stated cardiology is awaiting family decision regarding cath due to risk of HANNAH. Nephrology consult placed to assist in risk stratification.   Disposition: Home___/SNF___/Other________/Unknown at this time________

## 2022-10-23 NOTE — PROGRESS NOTE ADULT - ASSESSMENT
89 yo M PMHx CAD s/p CABG, chronic HFpEF, HTN, dyslipidemia, DM II, anxiety presented for evaluation of mid line chest pain radiating to right side and right arm. Pt admitted to [facility] to CCU and was transferred north for cath.    NSTEMI  CAD s/p CABG  - Trop 0.11 on admission, repeat 2.13, trending down  - ECG w/o signs of ischemia  - c/w heparin as per cardiology  - TTE EF 55-60%, no wall abnormalities noted  - c/w ASA 81 mg qD, Lipitor 80 mg qD, Carvedilol 23 mg BID, Lasix 40 mg-- currently on BID, listed as BID as home dose  - cath showed 2 v disease. Planned for staged PCI on 10/25    DM II   -c/w lispro, lantus  - will need to adjust throughout the day as FS uncontrolled    Gout   - c/w Allopurinol 100 mg qD    CKD III  - stable    HTN   - c/w Home Meds - Nifedipine 60 mg qD    Anxiety   - c/w Citalopram 20 mg qD    #Progress Note Handoff:  Pending (specify):  staged pci  Family discussion: spoke with pt regarding pending cath  Disposition: Home___/SNF___/Other________/Unknown at this time________

## 2022-10-23 NOTE — DISCHARGE NOTE NURSING/CASE MANAGEMENT/SOCIAL WORK - PATIENT PORTAL LINK FT
You can access the FollowMyHealth Patient Portal offered by [facility] by registering at the following website: [email or web address]. By joining [employer]’s FollowMyHealth portal, you will also be able to view your health information using other applications (apps) compatible with our system.

## 2022-10-23 NOTE — PROGRESS NOTE ADULT - SUBJECTIVE AND OBJECTIVE BOX
CHIEF COMPLAINT:    Patient is a 89y old  Male who presents with a chief complaint of cp     INTERVAL HPI/OVERNIGHT EVENTS:    Patient seen and examined at bedside. No acute overnight events occurred.    ROS: Denies chest pain. All other systems are negative.    Medications:  Standing  allopurinol 100 milliGRAM(s) Oral daily  aspirin  chewable 81 milliGRAM(s) Oral daily  atorvastatin 80 milliGRAM(s) Oral at bedtime  carvedilol 25 milliGRAM(s) Oral every 12 hours  citalopram 20 milliGRAM(s) Oral daily  dextrose 5%. 1000 milliLiter(s) IV Continuous <Continuous>  dextrose 5%. 1000 milliLiter(s) IV Continuous <Continuous>  dextrose 50% Injectable 25 Gram(s) IV Push once  dextrose 50% Injectable 12.5 Gram(s) IV Push once  dextrose 50% Injectable 25 Gram(s) IV Push once  furosemide    Tablet 40 milliGRAM(s) Oral every 12 hours  gabapentin 300 milliGRAM(s) Oral two times a day  glucagon  Injectable 1 milliGRAM(s) IntraMuscular once  heparin  Infusion. 1450 Unit(s)/Hr IV Continuous <Continuous>  insulin glargine Injectable (LANTUS) 28 Unit(s) SubCutaneous at bedtime  insulin lispro (ADMELOG) corrective regimen sliding scale   SubCutaneous three times a day before meals  insulin lispro Injectable (ADMELOG) 3 Unit(s) SubCutaneous three times a day before meals  NIFEdipine XL 60 milliGRAM(s) Oral daily  sodium chloride 0.9%. 1000 milliLiter(s) IV Continuous <Continuous>    PRN Meds  acetaminophen     Tablet .. 650 milliGRAM(s) Oral every 6 hours PRN  aluminum hydroxide/magnesium hydroxide/simethicone Suspension 30 milliLiter(s) Oral every 4 hours PRN  clonazePAM  Tablet 2 milliGRAM(s) Oral at bedtime PRN  dextrose Oral Gel 15 Gram(s) Oral once PRN  heparin   Injectable 5000 Unit(s) IV Push every 6 hours PRN  melatonin 3 milliGRAM(s) Oral at bedtime PRN  ondansetron Injectable 4 milliGRAM(s) IV Push every 8 hours PRN        Vital Signs:    T(F): 97 (10-23-22 @ 05:00), Max: 98.7 (10-22-22 @ 21:00)  HR: 59 (10-23-22 @ 05:00) (59 - 90)  BP: 129/58 (10-23-22 @ 05:00) (105/56 - 149/66)  RR: 20 (10-23-22 @ 05:00) (18 - 20)  SpO2: --  I&O's Summary    22 Oct 2022 07:01  -  23 Oct 2022 07:00  --------------------------------------------------------  IN: 568 mL / OUT: 0 mL / NET: 568 mL        POCT Blood Glucose.: 329 mg/dL (23 Oct 2022 11:49)  POCT Blood Glucose.: 234 mg/dL (23 Oct 2022 07:46)  POCT Blood Glucose.: 245 mg/dL (22 Oct 2022 21:36)  POCT Blood Glucose.: 187 mg/dL (22 Oct 2022 16:37)      PHYSICAL EXAM:  GENERAL:  NAD  SKIN: No rashes or lesions  HEENT: Atraumatic. Normocephalic. Anicteric  NECK:  No JVD.   PULMONARY: Clear to ausculation bilaterally. No wheezing. No rales  CVS: Normal S1, S2. Regular rate and rhythm. No murmurs.  ABDOMEN/GI: Soft, Nontender, Nondistended; Bowel sounds are present  EXTREMITIES:  No edema B/L LE.  NEUROLOGIC:  No motor deficit.  PSYCH: Alert & oriented x 3, normal affect      LABS:                        10.2   4.94  )-----------( 169      ( 22 Oct 2022 07:10 )             30.6     10-22    135  |  102  |  52<H>  ----------------------------<  227<H>  4.5   |  22  |  1.9<H>    Ca    7.8<L>      22 Oct 2022 07:10  Mg     1.8     10-22    TPro  5.8<L>  /  Alb  3.5  /  TBili  0.3  /  DBili  x   /  AST  15  /  ALT  12  /  AlkPhos  110  10-22    PTT - ( 23 Oct 2022 08:40 )  PTT:63.8 sec          RADIOLOGY & ADDITIONAL TESTS:  Imaging or report Personally Reviewed:  [ ] YES  [ ] NO -->no new images    Telemetry reviewed independently - NSR, no acute events  EKG reviewed independently -->no new EKGs    Consultant(s) Notes Reviewed:  [ ] YES  [ ] NO  Care Discussed with Consultants/Other Providers [ ] YES  [ ] NO    Case discussed with resident  Care discussed with pt

## 2022-10-24 LAB
APTT BLD: 68.7 SEC — HIGH (ref 27–39.2)
GLUCOSE BLDC GLUCOMTR-MCNC: 232 MG/DL — HIGH (ref 70–99)
GLUCOSE BLDC GLUCOMTR-MCNC: 236 MG/DL — HIGH (ref 70–99)
GLUCOSE BLDC GLUCOMTR-MCNC: 246 MG/DL — HIGH (ref 70–99)
SARS-COV-2 RNA SPEC QL NAA+PROBE: SIGNIFICANT CHANGE UP

## 2022-10-24 PROCEDURE — 99221 1ST HOSP IP/OBS SF/LOW 40: CPT

## 2022-10-24 PROCEDURE — 99232 SBSQ HOSP IP/OBS MODERATE 35: CPT

## 2022-10-24 RX ORDER — INSULIN LISPRO 100/ML
10 VIAL (ML) SUBCUTANEOUS
Refills: 0 | Status: DISCONTINUED | OUTPATIENT
Start: 2022-10-24 | End: 2022-10-27

## 2022-10-24 RX ADMIN — HEPARIN SODIUM 1600 UNIT(S)/HR: 5000 INJECTION INTRAVENOUS; SUBCUTANEOUS at 16:49

## 2022-10-24 RX ADMIN — GABAPENTIN 300 MILLIGRAM(S): 400 CAPSULE ORAL at 06:00

## 2022-10-24 RX ADMIN — CITALOPRAM 20 MILLIGRAM(S): 10 TABLET, FILM COATED ORAL at 11:27

## 2022-10-24 RX ADMIN — Medication 4: at 08:15

## 2022-10-24 RX ADMIN — Medication 100 MILLIGRAM(S): at 11:28

## 2022-10-24 RX ADMIN — Medication 4: at 11:27

## 2022-10-24 RX ADMIN — HEPARIN SODIUM 1600 UNIT(S)/HR: 5000 INJECTION INTRAVENOUS; SUBCUTANEOUS at 08:16

## 2022-10-24 RX ADMIN — Medication 60 MILLIGRAM(S): at 06:00

## 2022-10-24 RX ADMIN — CARVEDILOL PHOSPHATE 25 MILLIGRAM(S): 80 CAPSULE, EXTENDED RELEASE ORAL at 06:00

## 2022-10-24 RX ADMIN — INSULIN GLARGINE 28 UNIT(S): 100 INJECTION, SOLUTION SUBCUTANEOUS at 23:03

## 2022-10-24 RX ADMIN — Medication 10 UNIT(S): at 16:48

## 2022-10-24 RX ADMIN — Medication 40 MILLIGRAM(S): at 06:00

## 2022-10-24 RX ADMIN — Medication 81 MILLIGRAM(S): at 11:30

## 2022-10-24 RX ADMIN — CARVEDILOL PHOSPHATE 25 MILLIGRAM(S): 80 CAPSULE, EXTENDED RELEASE ORAL at 17:33

## 2022-10-24 RX ADMIN — ATORVASTATIN CALCIUM 80 MILLIGRAM(S): 80 TABLET, FILM COATED ORAL at 21:59

## 2022-10-24 RX ADMIN — Medication 3 UNIT(S): at 08:15

## 2022-10-24 RX ADMIN — Medication 4: at 16:47

## 2022-10-24 RX ADMIN — Medication 3 UNIT(S): at 11:27

## 2022-10-24 RX ADMIN — GABAPENTIN 300 MILLIGRAM(S): 400 CAPSULE ORAL at 17:41

## 2022-10-24 RX ADMIN — Medication 40 MILLIGRAM(S): at 17:33

## 2022-10-24 NOTE — CONSULT NOTE ADULT - NS ATTEND AMEND GEN_ALL_CORE FT
The patient is a 89-year-old gentleman we were called to evaluate by the medical and cardiology teams for a discussion regarding his coronary artery disease.    The patient tells me that he has been following up with the cardiologist for the past 5 or 6 years and underwent a permanent pacemaker implantation following his initial consultations with the cardiologist.    He was admitted with an acute myocardial infarction at this outside hospital and was transferred to [facility] for cardiac catheterization which was completed on 10/21/2022 which showed severe double vessel coronary artery disease as described above.    His past history significant for hypertension, hypercholesterolemia, diabetes.    He is an ex-smoker having quit many years ago and he retired as a .    Over the last couple years he has had at least 3 admissions for recurrent congestive heart failure but this is the first time that he has had a cardiac catheterization in his life.    He has a remote history of prostate cancer for which he got radiation seeds.    Also present at the bedside during the evaluation of the patient was the patient's daughter.    I reviewed the cardiac catheterization which is discussed above and it does show critical double vessel disease in the circumflex and LAD with diffuse distal disease additionally.  There is a dominant right coronary artery with mild disease.    He also has severe mitral annular calcification.    His baseline creatinine appears to be between 1.6 and 1.9 and this would put him at a high risk of dialysis dependent renal failure.    I discussed the various approaches to coronary artery disease including very high risk surgery versus percutaneous coronary intervention and the patient and the family were quite sure that they would rather prefer coronary stents rather than very high risk surgery.  Given his advanced age, renal insufficiency, clinical frailty and multiple medical issues he would be a high risk surgical candidate and therefore his best option would be an attempt at a high risk PCI.    Had to spend a significant amount of time at the patient's bedside as the patient and his daughter had multiple questions and these were all answered.    I also discussed his care with his referring cardiologist, Dr. Stevens

## 2022-10-24 NOTE — CONSULT NOTE ADULT - SUBJECTIVE AND OBJECTIVE BOX
CARDIOLOGY CONSULT NOTE     CHIEF COMPLAINT/REASON FOR CONSULT:    HPI:  89 y/o male  with a past medical HX of bypass, CAD, CHF , HTN, dyslipidemia, DM , anxiety c/o mid line chest pain radiating to right side and right arm. Pt states the pain started at 4 pm continuous pain 7/10. Pt denies sob , N/V dizziness, or visual change. (19 Oct 2022 01:49)      PAST MEDICAL & SURGICAL HISTORY:  CHF (congestive heart failure)      DM (diabetes mellitus)      HTN (hypertension)      Prostate CA  in remission      Pacemaker      H/O total knee replacement, right          Cardiac Risks:   [x ]HTN, [x ] DM, [ ] Smoking, [ ] FH,  [x ] Lipids        MEDICATIONS:  MEDICATIONS  (STANDING):  allopurinol 100 milliGRAM(s) Oral daily  aspirin  chewable 81 milliGRAM(s) Oral daily  carvedilol 25 milliGRAM(s) Oral every 12 hours  citalopram 20 milliGRAM(s) Oral daily  dextrose 5%. 1000 milliLiter(s) (100 mL/Hr) IV Continuous <Continuous>  dextrose 5%. 1000 milliLiter(s) (50 mL/Hr) IV Continuous <Continuous>  dextrose 50% Injectable 25 Gram(s) IV Push once  dextrose 50% Injectable 12.5 Gram(s) IV Push once  dextrose 50% Injectable 25 Gram(s) IV Push once  furosemide    Tablet 40 milliGRAM(s) Oral every 12 hours  gabapentin 300 milliGRAM(s) Oral two times a day  glucagon  Injectable 1 milliGRAM(s) IntraMuscular once  heparin   Injectable 5000 Unit(s) SubCutaneous every 8 hours  insulin glargine Injectable (LANTUS) 28 Unit(s) SubCutaneous at bedtime  insulin lispro (ADMELOG) corrective regimen sliding scale   SubCutaneous three times a day before meals  insulin lispro Injectable (ADMELOG) 3 Unit(s) SubCutaneous three times a day before meals  NIFEdipine XL 60 milliGRAM(s) Oral daily  simvastatin 40 milliGRAM(s) Oral at bedtime      FAMILY HISTORY:  FH: type 2 diabetes (Father)        SOCIAL HISTORY:      Allergies    No Known Allergies        	    REVIEW OF SYSTEMS:  CONSTITUTIONAL: No fever, weight loss, or fatigue  EYES: No eye pain, visual disturbances, or discharge  ENMT:  No difficulty hearing, tinnitus, vertigo; No sinus or throat pain  NECK: No pain or stiffness  RESPIRATORY: No cough, wheezing, chills or hemoptysis; No Shortness of Breath  CARDIOVASCULAR: No chest pain, palpitations, passing out, dizziness, or leg swelling  GASTROINTESTINAL: No abdominal or epigastric pain. No nausea, vomiting, or hematemesis; No diarrhea or constipation. No melena or hematochezia.  GENITOURINARY: No dysuria, frequency, hematuria, or incontinence  NEUROLOGICAL: No headaches, memory loss, loss of strength, numbness, or tremors  SKIN: No itching, burning, rashes, or lesions   	        PHYSICAL EXAM:  T(C): 37.1 (10-19-22 @ 06:41), Max: 37.1 (10-19-22 @ 06:41)  HR: 77 (10-19-22 @ 05:52) (77 - 90)  BP: 145/70 (10-19-22 @ 06:41) (145/70 - 217/96)  RR: 18 (10-19-22 @ 05:52) (18 - 18)  SpO2: 95% (10-19-22 @ 05:52) (95% - 99%)  Wt(kg): --  I&O's Summary      Appearance: Normal	  Psychiatry: A & O x 3, Mood & affect appropriate  HEENT:   Normal oral mucosa, PERRL, EOMI	  Lymphatic: No lymphadenopathy  Cardiovascular: Normal S1 S2,RRR, No JVD, I/vi erasmo lsb  Respiratory: Lungs clear to auscultation	  Gastrointestinal:  Soft, Non-tender, + BS	  Skin: No rashes, No ecchymoses, No cyanosis	  Neurologic: Non-focal  Extremities: Normal range of motion, No clubbing, cyanosis or edema  Vascular: Peripheral pulses palpable 2+ bilaterally      ECG:  	< from: 12 Lead ECG (10.26.21 @ 00:25) >  Diagnosis Line Ventricular-paced rhythm  Abnormal ECG    Confirmed by NINI EDWARDS MD ([id number]) on 10/26/2021 7:01:33 AM    < end of copied text >      	  LABS:	 	    CARDIAC MARKERS:                                    12.7   7.05  )-----------( 219      ( 18 Oct 2022 22:50 )             38.3     10-19    137  |  103  |  35<H>  ----------------------------<  315<H>  4.9   |  24  |  1.6<H>    Ca    8.6      19 Oct 2022 06:12  Mg     2.0     10-18    TPro  7.5  /  Alb  4.1  /  TBili  0.3  /  DBili  x   /  AST  16  /  ALT  11  /  AlkPhos  144<H>  10-18    PT/INR - ( 18 Oct 2022 22:50 )   PT: 11.30 sec;   INR: 0.99 ratio         PTT - ( 18 Oct 2022 22:50 )  PTT:28.9 sec        
NEPHROLOGY CONSULTATION NOTE    89 yo M PMHx CAD s/p CABG, chronic HFpEF, HTN, dyslipidemia, DM II, anxiety presented for evaluation of mid line chest pain radiating to right side and right arm , found to have NSTEMI. Pt admitted to [facility] to CCU and was transferred north for cath.  Patient seen at bedside, no major complaints.  VS within acceptable range, saturating well on RA.    PAST MEDICAL & SURGICAL HISTORY:  CHF (congestive heart failure)      DM (diabetes mellitus)      HTN (hypertension)      Prostate CA  in remission      Pacemaker      H/O total knee replacement, right        Allergies:  No Known Allergies    Home Medications Reviewed  Hospital Medications:   MEDICATIONS  (STANDING):  allopurinol 100 milliGRAM(s) Oral daily  aspirin  chewable 81 milliGRAM(s) Oral daily  atorvastatin 80 milliGRAM(s) Oral at bedtime  carvedilol 25 milliGRAM(s) Oral every 12 hours  citalopram 20 milliGRAM(s) Oral daily  dextrose 5%. 1000 milliLiter(s) (100 mL/Hr) IV Continuous <Continuous>  dextrose 5%. 1000 milliLiter(s) (50 mL/Hr) IV Continuous <Continuous>  dextrose 50% Injectable 25 Gram(s) IV Push once  dextrose 50% Injectable 12.5 Gram(s) IV Push once  dextrose 50% Injectable 25 Gram(s) IV Push once  furosemide    Tablet 40 milliGRAM(s) Oral every 12 hours  gabapentin 300 milliGRAM(s) Oral two times a day  glucagon  Injectable 1 milliGRAM(s) IntraMuscular once  heparin  Infusion. 1450 Unit(s)/Hr (14.5 mL/Hr) IV Continuous <Continuous>  insulin glargine Injectable (LANTUS) 28 Unit(s) SubCutaneous at bedtime  insulin lispro (ADMELOG) corrective regimen sliding scale   SubCutaneous three times a day before meals  insulin lispro Injectable (ADMELOG) 10 Unit(s) SubCutaneous three times a day before meals  NIFEdipine XL 60 milliGRAM(s) Oral daily  sodium chloride 0.9%. 1000 milliLiter(s) (50 mL/Hr) IV Continuous <Continuous>    SOCIAL HISTORY:  Denies ETOH,Smoking,   FAMILY HISTORY:  FH: type 2 diabetes (Father)        REVIEW OF SYSTEMS:  CONSTITUTIONAL: No weakness, fevers or chills  EYES/ENT: No visual changes;  No vertigo or throat pain   NECK: No pain or stiffness  RESPIRATORY: No cough, wheezing, hemoptysis; No shortness of breath  CARDIOVASCULAR: No chest pain or palpitations.  GASTROINTESTINAL: No abdominal or epigastric pain. No nausea, vomiting, or hematemesis; No diarrhea or constipation. No melena or hematochezia.  GENITOURINARY: No dysuria, frequency, foamy urine, urinary urgency, incontinence or hematuria  NEUROLOGICAL: No numbness or weakness  SKIN: No itching, burning, rashes, or lesions   VASCULAR: No bilateral lower extremity edema.   All other review of systems is negative unless indicated above.    VITALS:  Vital Signs Last 24 Hrs  T(C): 36.9 (24 Oct 2022 12:49), Max: 36.9 (24 Oct 2022 12:49)  T(F): 98.5 (24 Oct 2022 12:49), Max: 98.5 (24 Oct 2022 12:49)  HR: 60 (24 Oct 2022 12:49) (60 - 60)  BP: 139/60 (24 Oct 2022 12:49) (123/59 - 139/60)  BP(mean): --  RR: 17 (24 Oct 2022 12:49) (17 - 18)  SpO2: 100% (24 Oct 2022 05:46) (100% - 100%)    Parameters below as of 24 Oct 2022 05:46  Patient On (Oxygen Delivery Method): BiPAP/CPAP        10-23 @ 07:01  -  10-24 @ 07:00  --------------------------------------------------------  IN: 536 mL / OUT: 400 mL / NET: 136 mL    10-24 @ 07:01  -  10-24 @ 21:04  --------------------------------------------------------  IN: 420 mL / OUT: 0 mL / NET: 420 mL        PHYSICAL EXAM:  Constitutional: NAD  HEENT: anicteric sclera, oropharynx clear, MMM  Neck: No JVD  Respiratory: CTAB, no wheezes, rales or rhonchi  Cardiovascular: S1, S2, RRR  Gastrointestinal: BS+, soft, NT/ND  Extremities: No cyanosis or clubbing. No peripheral edema  Neurological: A/O x 3, no focal deficits  Psychiatric: Normal mood, normal affect  : No CVA tenderness. No bowden.   Skin: No rashes    LABS:        Creatinine Trend: 1.9 <--, 1.7 <--, 1.8 <--, 1.6 <--, 1.6 <--                    
Surgeon: /Hardeep/Canelo/Juliocesar    Consult requesting by: dr albarran    HISTORY OF PRESENT ILLNESS:  89 y/o male  with a past medical HX of CHF , HTN, dyslipidemia, DM , and  anxiety c/o mid line chest pain radiating to right side and right arm presented to the [facility].  Pt ruled in for a NSTEMI via cardiac enzymes and was then transferred to [facility] for cardiac cath that demonstrated multi-vessel CAD.  Patient currently is without complaints.      NYHA functional class    [ ] Class I (no limitation) [ ] Class II (slight limitation) [ ] Class III (marked limitation) [ ] Class IV (symptoms at rest)    PAST MEDICAL & SURGICAL HISTORY:  CHF (congestive heart failure)      DM (diabetes mellitus)      HTN (hypertension)      Prostate CA  in remission      Pacemaker    H/O total knee replacement, right    MEDICATIONS  (STANDING):  allopurinol 100 milliGRAM(s) Oral daily  aspirin  chewable 81 milliGRAM(s) Oral daily  atorvastatin 80 milliGRAM(s) Oral at bedtime  carvedilol 25 milliGRAM(s) Oral every 12 hours  citalopram 20 milliGRAM(s) Oral daily  dextrose 5%. 1000 milliLiter(s) (100 mL/Hr) IV Continuous <Continuous>  dextrose 5%. 1000 milliLiter(s) (50 mL/Hr) IV Continuous <Continuous>  dextrose 50% Injectable 25 Gram(s) IV Push once  dextrose 50% Injectable 12.5 Gram(s) IV Push once  dextrose 50% Injectable 25 Gram(s) IV Push once  furosemide    Tablet 40 milliGRAM(s) Oral every 12 hours  gabapentin 300 milliGRAM(s) Oral two times a day  glucagon  Injectable 1 milliGRAM(s) IntraMuscular once  heparin  Infusion. 1450 Unit(s)/Hr (14.5 mL/Hr) IV Continuous <Continuous>  insulin glargine Injectable (LANTUS) 28 Unit(s) SubCutaneous at bedtime  insulin lispro (ADMELOG) corrective regimen sliding scale   SubCutaneous three times a day before meals  insulin lispro Injectable (ADMELOG) 10 Unit(s) SubCutaneous three times a day before meals  NIFEdipine XL 60 milliGRAM(s) Oral daily  sodium chloride 0.9%. 1000 milliLiter(s) (50 mL/Hr) IV Continuous <Continuous>    MEDICATIONS  (PRN):  acetaminophen     Tablet .. 650 milliGRAM(s) Oral every 6 hours PRN Temp greater or equal to 38C (100.4F), Mild Pain (1 - 3)  aluminum hydroxide/magnesium hydroxide/simethicone Suspension 30 milliLiter(s) Oral every 4 hours PRN Dyspepsia  clonazePAM  Tablet 2 milliGRAM(s) Oral at bedtime PRN anxiety  dextrose Oral Gel 15 Gram(s) Oral once PRN Blood Glucose LESS THAN 70 milliGRAM(s)/deciliter  heparin   Injectable 5000 Unit(s) IV Push every 6 hours PRN For aPTT less than 40  melatonin 3 milliGRAM(s) Oral at bedtime PRN Insomnia  ondansetron Injectable 4 milliGRAM(s) IV Push every 8 hours PRN Nausea and/or Vomiting    Antiplatelet therapy:                           Last dose/amt:    Allergies    No Known Allergies    Intolerances    SOCIAL HISTORY:  Smoker: [ x] Yes  [ ] No      20 PACK YEARS:                         WHEN QUIT? 40 years ago  ETOH use: [ ] Yes  [x ] No              FREQUENCY / QUANTITY:  Ilicit Drug use:  [ ] Yes  [x ] No  Occupation: retired  Lives with: alone  Assisted device use:- not done pt on bedrest from cath  5 meter walk test: 1____sec, 2____sec, 3___sec  FAMILY HISTORY:  FH: type 2 diabetes (Father)        Review of Systems  CONSTITUTIONAL:  Fevers[ ] chills[ ] sweats[ ] fatigue[ ] weight loss[ ] weight gain [ ]                                     NEGATIVE [X ]   NEURO:  parathesias[ ] seizures [ ]  syncope [ ]  confusion [ ]                                                                                NEGATIVE[ X]   EYES: glasses[ ]  blurry vision[ ]  discharge[ ] pain[ ] glaucoma [ ]                                                                          NEGATIVE[X ]   ENMT:  difficulty hearing [ ]  vertigo[ ]  dysphagia[ ] epistaxis[ ] recent dental work [ ]                                    NEGATIVE[ X]   CV:  chest pain[ ] palpitations[ ] DEL TORO [ ] diaphoresis [ ]                                                                                           NEGATIVE[ X]   RESPIRATORY:  wheezing[ ] SOB[ ] cough [ ] sputum[ ] hemoptysis[ ]                                                                  NEGATIVE[ ]   GI:  nausea[ ]  vommiting [ ]  diarrhea[ ] constipation [ ] melena [ ]                                                                      NEGATIVE[ X]   : hematuria[ ]  dysuria[ ] urgency[ ] incontinence[ ]                                                                                            NEGATIVE[ X]   MUSKULOSKELETAL:  arthritis[ ]  joint swelling [ ] muscle weakness [ ] Hx vein stripping [ ]                             NEGATIVE[X ]   SKIN/BREAST:  rash[ ] itching [ ]  hair loss[ ] masses[ ]                                                                                              NEGATIVE[ X]   PSYCH:  dementia [ ] depresion [ ] anxiety[ ]                                                                                                               NEGATIVE[X ]   HEME/LYMPH:  bruises easily[ ] enlarged lymph nodes[ ] tender lymph nodes[ ]                                               NEGATIVE[ X]   ENDOCRINE:  cold intolerance[ ] heat intolerance[ ] polydipsia[ ]                                                                          NEGATIVE[ X]     PHYSICAL EXAM  Vital Signs Last 24 Hrs  T(C): 36.9 (24 Oct 2022 12:49), Max: 36.9 (24 Oct 2022 12:49)  T(F): 98.5 (24 Oct 2022 12:49), Max: 98.5 (24 Oct 2022 12:49)  HR: 60 (24 Oct 2022 12:49) (60 - 60)  BP: 139/60 (24 Oct 2022 12:49) (123/59 - 139/60)  BP(mean): --  RR: 17 (24 Oct 2022 12:49) (17 - 18)  SpO2: 100% (24 Oct 2022 05:46) (100% - 100%)    Parameters below as of 24 Oct 2022 05:46  Patient On (Oxygen Delivery Method): BiPAP/CPAP    CONSTITUTIONAL:                                                                          WNL[ ]   Neuro: WNL[ ] Normal exam oriented to person/place & time with no focal motor or sensory  deficits. Other                     Eyes: WNL[ ]   Normal exam of conjunctiva & lids, pupils equally reactive. Other     ENT: WNL[ ]    Normal exam of nasal/oral mucosa with absence of cyanosis. Other  Neck: WNL[ ]  Normal exam of jugular veins, trachea & thyroid. Other  Chest: WNL[ ] Normal lung exam with good air movement absence of wheezes, rales, or rhonchi: Other                                                                                CV:  Auscultation: normal [ ] S3[ ] S4[ ] Irregular [ ] Rub[ ] Clicks[ ]    Murmurs none:[ ]systolic [ ]  diastolic [ ] holosystolic [ ]  Carotids: No Bruits[ ] Other                 Abdominal Aorta: normal [ ] nonpalpable[ ]Other                                                                                      GI:           WNL[ ] Normal exam of abdomen, liver & spleen with no noted masses or tenderness. Other                                                                                                        Extremities: WNL[ ] Normal no evidence of cyanosis or deformity Edema: none[ ]trace[ ]1+[ ]2+[ ]3+[ ]4+[ ]  Lower Extremity Pulses: Right[ ] Left[ ]Varicosities[ ]  SKIN :WNL[ ] Normal exam to inspection & palation. Other:                                                          LABS:          PTT - ( 24 Oct 2022 06:36 )  PTT:68.7 sec            Cardiac Cath:  FINDINGS:   Coronary Dominance: right  LM: mild disease  LAD: 99% pLAD and 99% mLAD st[place]. Severe atherosclerosis in remainder of vessel and D1  CX: 90% pLCx stenosis. Moderate to severe atherosclerosis in remainder of vessel  RCA: mild diffuse disease    TTE / ELLIOT:      ACC: [id number] EXAM:  ECHO TTE WO CON COMP W DOPP                          PROCEDURE DATE:  10/19/2022          INTERPRETATION:   [place] [address]                Phone: [phone].   TRANSTHORACIC ECHOCARDIOGRAM REPORT    Patient Name:   AMANDA CASTORENA Accession #: [id number]  Medical Rec #:  [id number]         Height:      66.0 in 167.6 cm  YOB: 1931        Weight:      188.0 lb 85.28 kg  Patient Age:    89 years         BSA:         1.95 m²  Patient Gender: M                BP:          145/70 mmHg    Date of Exam:        10/19/2022 2:20:18 PM  Referring Physician: [id number] ED UNASSIGNED  Sonographer:         Jojo Riggs  Reading Physician:   Norberto Pina M.D.    Procedure:     2D Echo/Doppler/Color Doppler Complete.  Indications:   R07.9 - Chest Pain, unspecified  Diagnosis:     R07.9 - Chest Pain, unspecified  Study Details: Technically difficult study.    Summary:   1. Technically difficult study.   2. Left ventricular ejection fraction, by visual estimation, is 55 to   60%.   3. Normal left ventricular internal cavity size.   4. Mildly enlarged left atrium.   5. Mild mitral annular calcification.   6. Mild aortic valve stenosis.    PHYSICIAN INTERPRETATION:  Left Ventricle: The left ventricular internal cavity size is normal. Left   ventricular wall thickness is normal. Left ventricular ejection fraction,   by visual estimation, is 55 to 60%. Normal segmental left ventricular   systolic function.  Left Atrium: Mildly enlarged left atrium.  Mitral Valve: There is mild mitral annular calcification.  Aortic Valve: Mild aortic stenosis is present.  SPECTRAL DOPPLER ANALYSIS:  LV DIASTOLIC FUNCTION:  MV Peak E: 1.39 m/s Decel Time: 203 msec  MV Peak A: 1.41 m/s  E/A Ratio: 0.99    Aortic Valve:  AoV VMax:    1.83 m/s  AoV VTI:     0.40 m  AoV Pk Grad: 13.5 mmHg  AoV Mn Grad: 7.6 mmHg    LVOT Vmax: 0.98 m/s  LVOT VTI:  0.22 m    Mitral Valve:  MV VMax:    1.56 m/s MV P1/2 Time: 58.97 msec  MV Mn Grad: 5.3 mmHg MV Area, PHT: 3.73 cm²      [id number] Norberto Pina M.D., Electronically signed on 10/19/2022 at   4:09:05 PM      *** Final ***    STS Score: Risk of Mortality:  8.436%  Renal Failure:  21.241%  Permanent Stroke:  1.798%  Prolonged Ventilation:  19.221%  DSW Infection:  0.774%  Reoperation:  3.539%  Morbidity or Mortality:  34.548%  Short Length of Stay:  9.459%  Long Length of Stay:  25.063%      Impression: Patient is a 89 yo male s/p nstemi with significant double vessel cad with parker as well as with a hx of hld, dm, prostate ca, oa, htn, acute diastolic dysfunction chf  cont present tx as per card/medicine   cad- pt most likely will undergo pci for tx of significant 2 vessel cad as opposed to cabg due to his advanced age, elevated STS risk score, and other co-morbid conditions  surgeon note to follow    CAD [x ]  Valvular  disease [ ]   Aortic Disease [ ]   PARKER: Yes[ x] No [ ]   CKD Stage I [ ] , Stage II [ ] , Stage III [x ], Stage IV [ ]   Anemia: Yes [x ], No [ ]  Diabetes :Yes [x ], No [ ]  Acute MI: Yes [ x], No [ ]   Heart Failure: Yes [ ] , No [ ] HFpEF [ ], HFrEF [ ]

## 2022-10-24 NOTE — PROGRESS NOTE ADULT - ASSESSMENT
89 yo M PMHx CAD s/p CABG, chronic HFpEF, HTN, dyslipidemia, DM II, anxiety presented for evaluation of mid line chest pain radiating to right side and right arm. Pt admitted to [facility] to CCU and was transferred north for cath.    NSTEMI  CAD s/p CABG  - Trop 0.11 on admission, repeat 2.13, trending down  - ECG w/o signs of ischemia  - c/w heparin  - TTE EF 55-60%, no wall abnormalities noted  - c/w ASA 81 mg qD, Lipitor 80 mg qD, Carvedilol 23 mg BID, Lasix 40 mg qD  - cath showed 2 v disease. Planned for staged PCI on 10/25  - cardiology team requseting CTSx eval prior to cath--case d/w CT sx PA  - nephrology eval requested for HANNAH risk assessment    DM II   - c/w Lantus   - FS uncontrolled, lispro increasead    Gout   - c/w Allopurinol 100 mg qD    CKD III  - stable    HTN   - c/w Home Meds - Nifedipine 60 mg qD    Anxiety   - c/w Citalopram 20 mg qD    #Progress Note Handoff:  Pending (specify):  staged pci  Family discussion: spoke with pt regarding pending cath  Disposition: Home___/SNF___/Other________/Unknown at this time________

## 2022-10-24 NOTE — CONSULT NOTE ADULT - ASSESSMENT
89 y/o male  with a past medical HX of bypass, CAD, CHF , HTN, dyslipidemia, DM , anxiety c/o mid line chest pain radiating to right side and right arm. Pt states the pain started at 4 pm continuous pain 7/10. Pt denies sob . Patient with a acute mi. Rx with beta asa statin heparin. Repeat enzymes echo. He sees Dr aaron. Bennett discuss further rx, 
89 yo M PMHx CAD s/p CABG, chronic HFpEF, HTN, dyslipidemia, DM II, anxiety presented for evaluation of mid line chest pain radiating to right side and right arm , found to have NSTEMI. Pt admitted to [facility] to CCU and was transferred north for cath. Nephro eval for contrast induced nephropathy risk assessment.    Assessment and plan  CKD 3b/ NSTEMI/ HFpEF  Creatinine at baseline  Patient euvolemic on exam  patient at moderate risk for contrast induced nephropathy  no IVF pre and post procedure given HF history  hold lasix on day of cardiac cath  risk explained in details for patient and family at bedside  if benefits outweigh risks proceed with cath  continue to monitor BMP and UO  will sign off, recall prn

## 2022-10-24 NOTE — PROGRESS NOTE ADULT - ASSESSMENT
89 yo M PMHx CAD s/p CABG, chronic HFpEF, HTN, dyslipidemia, DM II, anxiety presented for evaluation of mid line chest pain radiating to right side and right arm. Pt admitted to [facility] to CCU and was transferred north for cath.    NSTEMI  CAD s/p CABG  - Trop 0.11 on admission, repeat 2.13, trending down  - ECG w/o signs of ischemia  - c/w heparin  - TTE EF 55-60%, no wall abnormalities noted  - c/w ASA 81 mg qD, Lipitor 80 mg qD, Carvedilol 23 mg BID, Lasix 40 mg qD  - cath showed 2 v disease. Planned for staged PCI on 10/25  - cardiology team requseting CTSx eval prior to cath--case d/w CT sx PA - CT recs PCI, high risk for CABG  - nephrology eval requested for HANNAH risk assessment    DM II   - c/w Lantus   - FS uncontrolled, lispro increasead    Gout   - c/w Allopurinol 100 mg qD    CKD III  - stable    HTN   - c/w Home Meds - Nifedipine 60 mg qD    Anxiety   - c/w Citalopram 20 mg qD    #Progress Note Handoff:  Pending (specify):  staged pci  Family discussion: spoke with pt regarding pending cath  Disposition: Home___/SNF___/Other________/Unknown at this time________

## 2022-10-24 NOTE — PROGRESS NOTE ADULT - SUBJECTIVE AND OBJECTIVE BOX
CHIEF COMPLAINT:    Patient is a 89y old  Male who presents with a chief complaint of cp     INTERVAL HPI/OVERNIGHT EVENTS:    Patient seen and examined at bedside. No acute overnight events occurred.    ROS: Denies chest pain. All other systems are negative.    Medications:  Standing  allopurinol 100 milliGRAM(s) Oral daily  aspirin  chewable 81 milliGRAM(s) Oral daily  atorvastatin 80 milliGRAM(s) Oral at bedtime  carvedilol 25 milliGRAM(s) Oral every 12 hours  citalopram 20 milliGRAM(s) Oral daily  dextrose 5%. 1000 milliLiter(s) IV Continuous <Continuous>  dextrose 5%. 1000 milliLiter(s) IV Continuous <Continuous>  dextrose 50% Injectable 25 Gram(s) IV Push once  dextrose 50% Injectable 25 Gram(s) IV Push once  dextrose 50% Injectable 12.5 Gram(s) IV Push once  furosemide    Tablet 40 milliGRAM(s) Oral every 12 hours  gabapentin 300 milliGRAM(s) Oral two times a day  glucagon  Injectable 1 milliGRAM(s) IntraMuscular once  heparin  Infusion. 1450 Unit(s)/Hr IV Continuous <Continuous>  insulin glargine Injectable (LANTUS) 28 Unit(s) SubCutaneous at bedtime  insulin lispro (ADMELOG) corrective regimen sliding scale   SubCutaneous three times a day before meals  insulin lispro Injectable (ADMELOG) 3 Unit(s) SubCutaneous three times a day before meals  NIFEdipine XL 60 milliGRAM(s) Oral daily  sodium chloride 0.9%. 1000 milliLiter(s) IV Continuous <Continuous>    PRN Meds  acetaminophen     Tablet .. 650 milliGRAM(s) Oral every 6 hours PRN  aluminum hydroxide/magnesium hydroxide/simethicone Suspension 30 milliLiter(s) Oral every 4 hours PRN  clonazePAM  Tablet 2 milliGRAM(s) Oral at bedtime PRN  dextrose Oral Gel 15 Gram(s) Oral once PRN  heparin   Injectable 5000 Unit(s) IV Push every 6 hours PRN  melatonin 3 milliGRAM(s) Oral at bedtime PRN  ondansetron Injectable 4 milliGRAM(s) IV Push every 8 hours PRN        Vital Signs:    T(F): 98.5 (10-24-22 @ 12:49), Max: 98.5 (10-24-22 @ 12:49)  HR: 60 (10-24-22 @ 12:49) (60 - 60)  BP: 139/60 (10-24-22 @ 12:49) (123/59 - 139/60)  RR: 17 (10-24-22 @ 12:49) (17 - 19)  SpO2: 100% (10-24-22 @ 05:46) (100% - 100%)  I&O's Summary    23 Oct 2022 07:01  -  24 Oct 2022 07:00  --------------------------------------------------------  IN: 536 mL / OUT: 400 mL / NET: 136 mL    24 Oct 2022 07:01  -  24 Oct 2022 13:49  --------------------------------------------------------  IN: 420 mL / OUT: 0 mL / NET: 420 mL      Daily     Daily   CAPILLARY BLOOD GLUCOSE      POCT Blood Glucose.: 236 mg/dL (24 Oct 2022 11:11)  POCT Blood Glucose.: 246 mg/dL (24 Oct 2022 07:55)  POCT Blood Glucose.: 247 mg/dL (23 Oct 2022 21:31)  POCT Blood Glucose.: 293 mg/dL (23 Oct 2022 16:38)      PHYSICAL EXAM:  GENERAL:  NAD  SKIN: No rashes or lesions  HEENT: Atraumatic. Normocephalic. Anicteric  NECK:  No JVD.   PULMONARY: Clear to ausculation bilaterally. No wheezing. No rales  CVS: Normal S1, S2. Regular rate and rhythm. No murmurs.  ABDOMEN/GI: Soft, Nontender, Nondistended; Bowel sounds are present  EXTREMITIES:  No edema B/L LE.  NEUROLOGIC:  No motor deficit.  PSYCH: Alert & oriented x 3, normal affect      LABS:          PTT - ( 24 Oct 2022 06:36 )  PTT:68.7 sec          RADIOLOGY & ADDITIONAL TESTS:  Imaging or report Personally Reviewed:  [ ] YES  [ ] NO -->no new images    Telemetry reviewed independently - NSR, no acute events  EKG reviewed independently -->no new EKGs    Consultant(s) Notes Reviewed:  [ ] YES  [ ] NO  Care Discussed with Consultants/Other Providers [ ] YES  [ ] NO    Case discussed with resident  Care discussed with pt

## 2022-10-24 NOTE — PROGRESS NOTE ADULT - SUBJECTIVE AND OBJECTIVE BOX
SUBJ: Patient seen and examined. No events overnight.       MEDICATIONS  (STANDING):  allopurinol 100 milliGRAM(s) Oral daily  aspirin  chewable 81 milliGRAM(s) Oral daily  atorvastatin 80 milliGRAM(s) Oral at bedtime  carvedilol 25 milliGRAM(s) Oral every 12 hours  citalopram 20 milliGRAM(s) Oral daily  dextrose 5%. 1000 milliLiter(s) (100 mL/Hr) IV Continuous <Continuous>  dextrose 5%. 1000 milliLiter(s) (50 mL/Hr) IV Continuous <Continuous>  dextrose 50% Injectable 25 Gram(s) IV Push once  dextrose 50% Injectable 12.5 Gram(s) IV Push once  dextrose 50% Injectable 25 Gram(s) IV Push once  furosemide    Tablet 40 milliGRAM(s) Oral every 12 hours  gabapentin 300 milliGRAM(s) Oral two times a day  glucagon  Injectable 1 milliGRAM(s) IntraMuscular once  heparin  Infusion. 1450 Unit(s)/Hr (14.5 mL/Hr) IV Continuous <Continuous>  insulin glargine Injectable (LANTUS) 28 Unit(s) SubCutaneous at bedtime  insulin lispro (ADMELOG) corrective regimen sliding scale   SubCutaneous three times a day before meals  insulin lispro Injectable (ADMELOG) 10 Unit(s) SubCutaneous three times a day before meals  NIFEdipine XL 60 milliGRAM(s) Oral daily  sodium chloride 0.9%. 1000 milliLiter(s) (50 mL/Hr) IV Continuous <Continuous>    MEDICATIONS  (PRN):  acetaminophen     Tablet .. 650 milliGRAM(s) Oral every 6 hours PRN Temp greater or equal to 38C (100.4F), Mild Pain (1 - 3)  aluminum hydroxide/magnesium hydroxide/simethicone Suspension 30 milliLiter(s) Oral every 4 hours PRN Dyspepsia  clonazePAM  Tablet 2 milliGRAM(s) Oral at bedtime PRN anxiety  dextrose Oral Gel 15 Gram(s) Oral once PRN Blood Glucose LESS THAN 70 milliGRAM(s)/deciliter  heparin   Injectable 5000 Unit(s) IV Push every 6 hours PRN For aPTT less than 40  melatonin 3 milliGRAM(s) Oral at bedtime PRN Insomnia  ondansetron Injectable 4 milliGRAM(s) IV Push every 8 hours PRN Nausea and/or Vomiting            Vital Signs Last 24 Hrs  T(C): 36.9 (24 Oct 2022 12:49), Max: 36.9 (24 Oct 2022 12:49)  T(F): 98.5 (24 Oct 2022 12:49), Max: 98.5 (24 Oct 2022 12:49)  HR: 60 (24 Oct 2022 12:49) (60 - 60)  BP: 139/60 (24 Oct 2022 12:49) (123/59 - 139/60)  BP(mean): --  RR: 17 (24 Oct 2022 12:49) (17 - 18)  SpO2: 100% (24 Oct 2022 05:46) (100% - 100%)    Parameters below as of 24 Oct 2022 05:46  Patient On (Oxygen Delivery Method): BiPAP/CPAP         REVIEW OF SYSTEMS:  CONSTITUTIONAL: No fever  NECK: No pain or stiffness  CARDIOVASCULAR: no chest pain  Repiratory: No cough or wheezing.  NEUROLOGICAL: No focal deficits to report.  GI: no BRBPR, no N,V,diarrhea.    PSYCHIATRY: normal mood and affect  HEENT: no nasal discharge, no ecchymosis        PHYSICAL EXAM:  GENERAL: NAD  HEAD:  Atraumatic, Normocephalic  NECK: Supple, No JVD  NERVOUS SYSTEM:  Alert & Oriented X3, Good concentration  CHEST/LUNG: Clear to anterior auscultation bilaterally  HEART: Regular rate and rhythm;  ABDOMEN: Soft, Nontender, Nondistended;   EXTREMITIES:  No  edema      	  TELEMETRY:      LABS:              PTT - ( 24 Oct 2022 06:36 )  PTT:68.7 sec    I&O's Summary    23 Oct 2022 07:01  -  24 Oct 2022 07:00  --------------------------------------------------------  IN: 536 mL / OUT: 400 mL / NET: 136 mL    24 Oct 2022 07:01  -  24 Oct 2022 20:03  --------------------------------------------------------  IN: 420 mL / OUT: 0 mL / NET: 420 mL      BNP  RADIOLOGY & ADDITIONAL STUDIES:    IMPRESSION AND PLAN:    2 vessel VAD  CKD  Chest pain free    Management as per primary team  Please consult CT surgery / patient might benefit from minimally invasive LIMA to LAD   Renal consult  Will follow

## 2022-10-24 NOTE — PROGRESS NOTE ADULT - SUBJECTIVE AND OBJECTIVE BOX
SUBJECTIVE / OVERNIGHT EVENTS  Patient slept well overnight. No acute complaints this AM. Patient does not report fevers, chills, CP, SOB, or n/v/d    MEDICATIONS  allopurinol 100 milliGRAM(s) Oral daily  aspirin  chewable 81 milliGRAM(s) Oral daily  atorvastatin 80 milliGRAM(s) Oral at bedtime  carvedilol 25 milliGRAM(s) Oral every 12 hours  citalopram 20 milliGRAM(s) Oral daily  dextrose 5%. 1000 milliLiter(s) IV Continuous <Continuous>  dextrose 5%. 1000 milliLiter(s) IV Continuous <Continuous>  dextrose 50% Injectable 25 Gram(s) IV Push once  dextrose 50% Injectable 12.5 Gram(s) IV Push once  dextrose 50% Injectable 25 Gram(s) IV Push once  furosemide    Tablet 40 milliGRAM(s) Oral every 12 hours  gabapentin 300 milliGRAM(s) Oral two times a day  glucagon  Injectable 1 milliGRAM(s) IntraMuscular once  heparin  Infusion. 1450 Unit(s)/Hr IV Continuous <Continuous>  insulin glargine Injectable (LANTUS) 28 Unit(s) SubCutaneous at bedtime  insulin lispro (ADMELOG) corrective regimen sliding scale   SubCutaneous three times a day before meals  insulin lispro Injectable (ADMELOG) 10 Unit(s) SubCutaneous three times a day before meals  NIFEdipine XL 60 milliGRAM(s) Oral daily  sodium chloride 0.9%. 1000 milliLiter(s) IV Continuous <Continuous>    acetaminophen     Tablet .. 650 milliGRAM(s) Oral every 6 hours PRN Temp greater or equal to 38C (100.4F), Mild Pain (1 - 3)  aluminum hydroxide/magnesium hydroxide/simethicone Suspension 30 milliLiter(s) Oral every 4 hours PRN Dyspepsia  clonazePAM  Tablet 2 milliGRAM(s) Oral at bedtime PRN anxiety  dextrose Oral Gel 15 Gram(s) Oral once PRN Blood Glucose LESS THAN 70 milliGRAM(s)/deciliter  heparin   Injectable 5000 Unit(s) IV Push every 6 hours PRN For aPTT less than 40  melatonin 3 milliGRAM(s) Oral at bedtime PRN Insomnia  ondansetron Injectable 4 milliGRAM(s) IV Push every 8 hours PRN Nausea and/or Vomiting    VITALS /  EXAM    T(C): 36.9 (10-24-22 @ 12:49), Max: 36.9 (10-24-22 @ 12:49)  HR: 60 (10-24-22 @ 12:49) (60 - 60)  BP: 139/60 (10-24-22 @ 12:49) (123/59 - 139/60)  RR: 17 (10-24-22 @ 12:49) (17 - 18)  SpO2: 100% (10-24-22 @ 05:46) (100% - 100%)  POCT Blood Glucose.: 232 mg/dL (10-24-22 @ 16:35)  POCT Blood Glucose.: 236 mg/dL (10-24-22 @ 11:11)  POCT Blood Glucose.: 246 mg/dL (10-24-22 @ 07:55)  POCT Blood Glucose.: 247 mg/dL (10-23-22 @ 21:31)    GENERAL: NAD, well-developed  CHEST/LUNG: Clear to auscultation bilaterally; No wheezes, rales or rhonchi  HEART: Regular rate and rhythm; No murmurs, rubs, or gallops  ABDOMEN: Soft, Nontender, Nondistended; Bowel sounds present, no masses.  EXTREMITIES:  2+ Peripheral Pulses, No clubbing, cyanosis, or edema    I's & O's     10-23-22 @ 07:01  -  10-24-22 @ 07:00  --------------------------------------------------------  IN:    Heparin Infusion: 336 mL    Oral Fluid: 200 mL  Total IN: 536 mL    OUT:    Voided (mL): 400 mL  Total OUT: 400 mL    Total NET: 136 mL      10-24-22 @ 07:01  -  10-24-22 @ 18:09  --------------------------------------------------------  IN:    Oral Fluid: 420 mL  Total IN: 420 mL    OUT:  Total OUT: 0 mL    Total NET: 420 mL        LABS          PTT - ( 10-24-22 @ 06:36 )  PTT:68.7 sec                    MICRO / IMAGING / CARDIOLOGY  Telemetry: Reviewed   EKG: Reviewed    CULTURES    IMAGING    CARDIOLOGY

## 2022-10-25 LAB
BASOPHILS # BLD AUTO: 0.04 K/UL — SIGNIFICANT CHANGE UP (ref 0–0.2)
BASOPHILS NFR BLD AUTO: 0.8 % — SIGNIFICANT CHANGE UP (ref 0–1)
EOSINOPHIL # BLD AUTO: 0.27 K/UL — SIGNIFICANT CHANGE UP (ref 0–0.7)
EOSINOPHIL NFR BLD AUTO: 5.3 % — SIGNIFICANT CHANGE UP (ref 0–8)
GLUCOSE BLDC GLUCOMTR-MCNC: 111 MG/DL — HIGH (ref 70–99)
GLUCOSE BLDC GLUCOMTR-MCNC: 121 MG/DL — HIGH (ref 70–99)
GLUCOSE BLDC GLUCOMTR-MCNC: 140 MG/DL — HIGH (ref 70–99)
GLUCOSE BLDC GLUCOMTR-MCNC: 186 MG/DL — HIGH (ref 70–99)
GLUCOSE BLDC GLUCOMTR-MCNC: 262 MG/DL — HIGH (ref 70–99)
HCT VFR BLD CALC: 28.9 % — LOW (ref 42–52)
HGB BLD-MCNC: 9.7 G/DL — LOW (ref 14–18)
IMM GRANULOCYTES NFR BLD AUTO: 0.6 % — HIGH (ref 0.1–0.3)
LYMPHOCYTES # BLD AUTO: 0.88 K/UL — LOW (ref 1.2–3.4)
LYMPHOCYTES # BLD AUTO: 17.3 % — LOW (ref 20.5–51.1)
MCHC RBC-ENTMCNC: 29.1 PG — SIGNIFICANT CHANGE UP (ref 27–31)
MCHC RBC-ENTMCNC: 33.6 G/DL — SIGNIFICANT CHANGE UP (ref 32–37)
MCV RBC AUTO: 86.8 FL — SIGNIFICANT CHANGE UP (ref 80–94)
MONOCYTES # BLD AUTO: 0.39 K/UL — SIGNIFICANT CHANGE UP (ref 0.1–0.6)
MONOCYTES NFR BLD AUTO: 7.7 % — SIGNIFICANT CHANGE UP (ref 1.7–9.3)
NEUTROPHILS # BLD AUTO: 3.48 K/UL — SIGNIFICANT CHANGE UP (ref 1.4–6.5)
NEUTROPHILS NFR BLD AUTO: 68.3 % — SIGNIFICANT CHANGE UP (ref 42.2–75.2)
NRBC # BLD: 0 /100 WBCS — SIGNIFICANT CHANGE UP (ref 0–0)
PLATELET # BLD AUTO: 158 K/UL — SIGNIFICANT CHANGE UP (ref 130–400)
RBC # BLD: 3.33 M/UL — LOW (ref 4.7–6.1)
RBC # FLD: 14.5 % — SIGNIFICANT CHANGE UP (ref 11.5–14.5)
WBC # BLD: 5.09 K/UL — SIGNIFICANT CHANGE UP (ref 4.8–10.8)
WBC # FLD AUTO: 5.09 K/UL — SIGNIFICANT CHANGE UP (ref 4.8–10.8)

## 2022-10-25 PROCEDURE — 93010 ELECTROCARDIOGRAM REPORT: CPT

## 2022-10-25 PROCEDURE — 99233 SBSQ HOSP IP/OBS HIGH 50: CPT

## 2022-10-25 RX ORDER — CLOPIDOGREL BISULFATE 75 MG/1
75 TABLET, FILM COATED ORAL DAILY
Refills: 0 | Status: DISCONTINUED | OUTPATIENT
Start: 2022-10-26 | End: 2022-10-27

## 2022-10-25 RX ORDER — HEPARIN SODIUM 5000 [USP'U]/ML
5000 INJECTION INTRAVENOUS; SUBCUTANEOUS EVERY 8 HOURS
Refills: 0 | Status: DISCONTINUED | OUTPATIENT
Start: 2022-10-26 | End: 2022-10-27

## 2022-10-25 RX ORDER — SODIUM CHLORIDE 9 MG/ML
1000 INJECTION INTRAMUSCULAR; INTRAVENOUS; SUBCUTANEOUS
Refills: 0 | Status: DISCONTINUED | OUTPATIENT
Start: 2022-10-25 | End: 2022-10-27

## 2022-10-25 RX ADMIN — Medication 100 MILLIGRAM(S): at 12:02

## 2022-10-25 RX ADMIN — CITALOPRAM 20 MILLIGRAM(S): 10 TABLET, FILM COATED ORAL at 12:02

## 2022-10-25 RX ADMIN — Medication 40 MILLIGRAM(S): at 05:22

## 2022-10-25 RX ADMIN — CARVEDILOL PHOSPHATE 25 MILLIGRAM(S): 80 CAPSULE, EXTENDED RELEASE ORAL at 05:21

## 2022-10-25 RX ADMIN — Medication 10 UNIT(S): at 17:04

## 2022-10-25 RX ADMIN — Medication 40 MILLIGRAM(S): at 17:03

## 2022-10-25 RX ADMIN — GABAPENTIN 300 MILLIGRAM(S): 400 CAPSULE ORAL at 05:22

## 2022-10-25 RX ADMIN — Medication 6: at 17:04

## 2022-10-25 RX ADMIN — CARVEDILOL PHOSPHATE 25 MILLIGRAM(S): 80 CAPSULE, EXTENDED RELEASE ORAL at 17:03

## 2022-10-25 RX ADMIN — ATORVASTATIN CALCIUM 80 MILLIGRAM(S): 80 TABLET, FILM COATED ORAL at 21:47

## 2022-10-25 RX ADMIN — HEPARIN SODIUM 1600 UNIT(S)/HR: 5000 INJECTION INTRAVENOUS; SUBCUTANEOUS at 05:11

## 2022-10-25 RX ADMIN — INSULIN GLARGINE 28 UNIT(S): 100 INJECTION, SOLUTION SUBCUTANEOUS at 21:47

## 2022-10-25 RX ADMIN — GABAPENTIN 300 MILLIGRAM(S): 400 CAPSULE ORAL at 17:03

## 2022-10-25 RX ADMIN — Medication 60 MILLIGRAM(S): at 05:22

## 2022-10-25 RX ADMIN — Medication 81 MILLIGRAM(S): at 06:51

## 2022-10-25 RX ADMIN — SODIUM CHLORIDE 50 MILLILITER(S): 9 INJECTION INTRAMUSCULAR; INTRAVENOUS; SUBCUTANEOUS at 10:23

## 2022-10-25 RX ADMIN — Medication 2 MILLIGRAM(S): at 21:47

## 2022-10-25 NOTE — PROGRESS NOTE ADULT - SUBJECTIVE AND OBJECTIVE BOX
CHIEF COMPLAINT:    Patient is a 89y old  Male who presents with a chief complaint of cp     INTERVAL HPI/OVERNIGHT EVENTS:    Patient seen and examined at bedside. No acute overnight events occurred.    ROS: Denies SOB, chest pain. All other systems are negative.    Medications:  Standing  allopurinol 100 milliGRAM(s) Oral daily  aspirin  chewable 81 milliGRAM(s) Oral daily  atorvastatin 80 milliGRAM(s) Oral at bedtime  carvedilol 25 milliGRAM(s) Oral every 12 hours  citalopram 20 milliGRAM(s) Oral daily  dextrose 5%. 1000 milliLiter(s) IV Continuous <Continuous>  dextrose 5%. 1000 milliLiter(s) IV Continuous <Continuous>  dextrose 50% Injectable 25 Gram(s) IV Push once  dextrose 50% Injectable 12.5 Gram(s) IV Push once  dextrose 50% Injectable 25 Gram(s) IV Push once  furosemide    Tablet 40 milliGRAM(s) Oral every 12 hours  gabapentin 300 milliGRAM(s) Oral two times a day  glucagon  Injectable 1 milliGRAM(s) IntraMuscular once  insulin glargine Injectable (LANTUS) 28 Unit(s) SubCutaneous at bedtime  insulin lispro (ADMELOG) corrective regimen sliding scale   SubCutaneous three times a day before meals  insulin lispro Injectable (ADMELOG) 10 Unit(s) SubCutaneous three times a day before meals  NIFEdipine XL 60 milliGRAM(s) Oral daily  sodium chloride 0.9%. 1000 milliLiter(s) IV Continuous <Continuous>  sodium chloride 0.9%. 1000 milliLiter(s) IV Continuous <Continuous>    PRN Meds  acetaminophen     Tablet .. 650 milliGRAM(s) Oral every 6 hours PRN  aluminum hydroxide/magnesium hydroxide/simethicone Suspension 30 milliLiter(s) Oral every 4 hours PRN  clonazePAM  Tablet 2 milliGRAM(s) Oral at bedtime PRN  dextrose Oral Gel 15 Gram(s) Oral once PRN  melatonin 3 milliGRAM(s) Oral at bedtime PRN  ondansetron Injectable 4 milliGRAM(s) IV Push every 8 hours PRN        Vital Signs:    T(F): 97.3 (10-25-22 @ 13:05), Max: 97.3 (10-25-22 @ 13:05)  HR: 60 (10-25-22 @ 13:05) (60 - 60)  BP: 122/58 (10-25-22 @ 13:05) (122/58 - 131/61)  RR: 18 (10-25-22 @ 13:05) (18 - 18)  SpO2: --  I&O's Summary    24 Oct 2022 07:01  -  25 Oct 2022 07:00  --------------------------------------------------------  IN: 420 mL / OUT: 0 mL / NET: 420 mL    25 Oct 2022 07:01  -  25 Oct 2022 14:07  --------------------------------------------------------  IN: 384 mL / OUT: 700 mL / NET: -316 mL      POCT Blood Glucose.: 186 mg/dL (25 Oct 2022 13:10)  POCT Blood Glucose.: 121 mg/dL (25 Oct 2022 12:04)  POCT Blood Glucose.: 111 mg/dL (25 Oct 2022 06:55)  POCT Blood Glucose.: 232 mg/dL (24 Oct 2022 16:35)      PHYSICAL EXAM:  GENERAL:  NAD  SKIN: No rashes or lesions  HEENT: Atraumatic. Normocephalic. Anicteric  NECK:  No JVD.   PULMONARY: Clear to ausculation bilaterally. No wheezing. No rales  CVS: Normal S1, S2. Regular rate and rhythm. No murmurs.  ABDOMEN/GI: Soft, Nontender, Nondistended; Bowel sounds are present  EXTREMITIES:  No edema B/L LE.  NEUROLOGIC:  No motor deficit.  PSYCH: Alert & oriented x 3, normal affect      LABS:    PTT - ( 24 Oct 2022 06:36 )  PTT:68.7 sec    RADIOLOGY & ADDITIONAL TESTS:  Imaging or report Personally Reviewed:  [ ] YES  [ ] NO -->no new images    Telemetry reviewed independently - NSR, no acute events  EKG reviewed independently -->no new EKGs    Consultant(s) Notes Reviewed:  [ ] YES  [ ] NO  Care Discussed with Consultants/Other Providers [ ] YES  [ ] NO    Case discussed with resident  Care discussed with pt

## 2022-10-25 NOTE — CHART NOTE - NSCHARTNOTEFT_GEN_A_CORE
PRE-OP DIAGNOSIS:    NSTEMI s/p cath on 10/21/22, deemed high risk for surgery  Planned PCI to LAD    PROCEDURE:   [x] Coronary Angiogram   [] LHC   [] RHC   [x] Intervention (see below)        PHYSICIAN: Dr. Amor   CARDIOLOGY FELLOW: Dr. Ramírez      PROCEDURE DESCRIPTION:     Consent:  [x] Patient   [] Family Member   []  Used     Anesthesia:   [] General   [x] Sedation   [x] Local     Access & Closure:   [] 6Fr right Radial Artery   [x] 6Fr right Femoral Artery --> Perclose  [] 6Fr right Femoral Vein   [] 6Fr right Brachial Vein       IV Contrast:       75 mL        Intervention:   - PCI to prox and mid LAD (AUC 8 for revascularization)  - Rotational atherectomy to prox and mid LAD    Implants:   - CHAYA 2.22p53np [place] to prox LAD  - CHAYA 2.5x18mm [place] to mid LAD     FINDINGS:     LM: Mild disease  LAD: 95% stenosis prox LAD, 99% stenosis mid LAD  Diag: Moderate diffuse disease  LCx: 100% stenosis  in distal segment  OM1: small, moderate diffuse disease    ESTIMATED BLOOD LOSS: < 10 mL      CONDITION:   [x] Good   [] Fair   [] Critical     SPECIMEN REMOVED: N/A     POST-OP DIAGNOSIS:    [] Normal Coronary Angiogram   [] Minor luminal irregularities  [] Mild Non-obstructive Coronary Artery Disease (< 50% stenosis)   [x] Significant 2-Vessel Coronary Artery Disease s/p PCI To prox and mid LAD with rotational atherectomy (AUC 8 for revascularization)     PLAN OF CARE:   [] D/C Home Today   [x] Return to In-patient bed   [] Admit for observation   [] Return for Staged Procedure   [] CT Surgery Consult   [x] Medications: Aspirin, plavix, statin, BB  [x] IV Fluids: NS at 50cc/hr for 8 hours

## 2022-10-25 NOTE — PROGRESS NOTE ADULT - ASSESSMENT
AP  89 yo M PMHx CAD s/p CABG, chronic HFpEF, HTN, dyslipidemia, DM II, anxiety presented for evaluation of mid line chest pain radiating to right side and right arm. Pt admitted to [facility] to CCU and was transferred north for cath.    NSTEMI  CAD s/p CABG  - Trop 0.11 on admission, repeat 2.13, trending down  - ECG w/o signs of ischemia  - c/w heparin  - TTE EF 55-60%, no wall abnormalities noted  - c/w ASA 81 mg qD, Lipitor 80 mg qD, Carvedilol 23 mg BID, Lasix 40 mg qD  - cath showed 2 v disease.   - staged PCI done today, 2 stents to LAD, f/u creatinine, possible dc tomorrow    DM II   - c/w Lantus   - FS uncontrolled, lispro increased    Gout   - c/w Allopurinol 100 mg qD    CKD III  - stable    HTN   - c/w Home Meds - Nifedipine 60 mg qD    Anxiety   - c/w Citalopram 20 mg qD

## 2022-10-25 NOTE — PROGRESS NOTE ADULT - ASSESSMENT
89 yo M PMHx CAD s/p CABG, chronic HFpEF, HTN, dyslipidemia, DM II, anxiety presented for evaluation of mid line chest pain radiating to right side and right arm. Pt admitted to [facility] to CCU and was transferred north for cath.    NSTEMI  CAD s/p CABG  - Trop 0.11 on admission, repeat 2.13, trending down  - ECG w/o signs of ischemia  - c/w heparin  - TTE EF 55-60%, no wall abnormalities noted  - c/w ASA 81 mg qD, Lipitor 80 mg qD, Carvedilol 23 mg BID, Lasix 40 mg qD  - cath showed 2 v disease.   - staged PCI done today, 2 stents to LAD    DM II   - c/w Lantus   - FS uncontrolled, lispro increased    Gout   - c/w Allopurinol 100 mg qD    CKD III  - stable    HTN   - c/w Home Meds - Nifedipine 60 mg qD    Anxiety   - c/w Citalopram 20 mg qD    #Progress Note Handoff:  Pending (specify):  Monitor SCr, hopeful dc in AM  Family discussion: spoke and family regarding cath results  Disposition: Home___/SNF___/Other________/Unknown at this time________

## 2022-10-25 NOTE — CHART NOTE - NSCHARTNOTEFT_GEN_A_CORE
As per nephrology:    patient at moderate risk for contrast induced nephropathy however, no IVF pre and post procedure given HF history

## 2022-10-25 NOTE — PROGRESS NOTE ADULT - SUBJECTIVE AND OBJECTIVE BOX
SUBJECTIVE / OVERNIGHT EVENTS  got staged PCI at cath lab today  Patient slept well overnight. No acute complaints this AM. Patient does not report fevers, chills, CP, SOB, or n/v/d    MEDICATIONS  allopurinol 100 milliGRAM(s) Oral daily  aspirin  chewable 81 milliGRAM(s) Oral daily  atorvastatin 80 milliGRAM(s) Oral at bedtime  carvedilol 25 milliGRAM(s) Oral every 12 hours  citalopram 20 milliGRAM(s) Oral daily  dextrose 5%. 1000 milliLiter(s) IV Continuous <Continuous>  dextrose 5%. 1000 milliLiter(s) IV Continuous <Continuous>  dextrose 50% Injectable 25 Gram(s) IV Push once  dextrose 50% Injectable 12.5 Gram(s) IV Push once  dextrose 50% Injectable 25 Gram(s) IV Push once  furosemide    Tablet 40 milliGRAM(s) Oral every 12 hours  gabapentin 300 milliGRAM(s) Oral two times a day  glucagon  Injectable 1 milliGRAM(s) IntraMuscular once  insulin glargine Injectable (LANTUS) 28 Unit(s) SubCutaneous at bedtime  insulin lispro (ADMELOG) corrective regimen sliding scale   SubCutaneous three times a day before meals  insulin lispro Injectable (ADMELOG) 10 Unit(s) SubCutaneous three times a day before meals  NIFEdipine XL 60 milliGRAM(s) Oral daily  sodium chloride 0.9%. 1000 milliLiter(s) IV Continuous <Continuous>  sodium chloride 0.9%. 1000 milliLiter(s) IV Continuous <Continuous>    acetaminophen     Tablet .. 650 milliGRAM(s) Oral every 6 hours PRN Temp greater or equal to 38C (100.4F), Mild Pain (1 - 3)  aluminum hydroxide/magnesium hydroxide/simethicone Suspension 30 milliLiter(s) Oral every 4 hours PRN Dyspepsia  clonazePAM  Tablet 2 milliGRAM(s) Oral at bedtime PRN anxiety  dextrose Oral Gel 15 Gram(s) Oral once PRN Blood Glucose LESS THAN 70 milliGRAM(s)/deciliter  melatonin 3 milliGRAM(s) Oral at bedtime PRN Insomnia  ondansetron Injectable 4 milliGRAM(s) IV Push every 8 hours PRN Nausea and/or Vomiting    VITALS /  EXAM    T(C): 36.3 (10-25-22 @ 13:05), Max: 36.3 (10-25-22 @ 13:05)  HR: 60 (10-25-22 @ 13:05) (60 - 60)  BP: 122/58 (10-25-22 @ 13:05) (122/58 - 131/61)  RR: 18 (10-25-22 @ 13:05) (18 - 18)  SpO2: --  POCT Blood Glucose.: 186 mg/dL (10-25-22 @ 13:10)  POCT Blood Glucose.: 121 mg/dL (10-25-22 @ 12:04)  POCT Blood Glucose.: 111 mg/dL (10-25-22 @ 06:55)  POCT Blood Glucose.: 232 mg/dL (10-24-22 @ 16:35)    GENERAL: NAD, well-developed  CHEST/LUNG: Clear to auscultation bilaterally; No wheezes, rales or rhonchi  HEART: Regular rate and rhythm; No murmurs, rubs, or gallops  ABDOMEN: Soft, Nontender, Nondistended; Bowel sounds present, no masses.  EXTREMITIES:  2+ Peripheral Pulses, No clubbing, cyanosis, or edema    I's & O's     10-24-22 @ 07:01  -  10-25-22 @ 07:00  --------------------------------------------------------  IN:    Oral Fluid: 420 mL  Total IN: 420 mL    OUT:  Total OUT: 0 mL    Total NET: 420 mL      10-25-22 @ 07:01  -  10-25-22 @ 16:26  --------------------------------------------------------  IN:    Oral Fluid: 384 mL    sodium chloride 0.9%: 250 mL  Total IN: 634 mL    OUT:    Voided (mL): 700 mL  Total OUT: 700 mL    Total NET: -66 mL        LABS          PTT - ( 10-24-22 @ 06:36 )  PTT:68.7 sec                    MICRO / IMAGING / CARDIOLOGY  Telemetry: Reviewed   EKG: Reviewed    CULTURES    IMAGING    CARDIOLOGY

## 2022-10-26 LAB
ALBUMIN SERPL ELPH-MCNC: 3.4 G/DL — LOW (ref 3.5–5.2)
ALBUMIN SERPL ELPH-MCNC: 3.4 G/DL — LOW (ref 3.5–5.2)
ALP SERPL-CCNC: 108 U/L — SIGNIFICANT CHANGE UP (ref 30–115)
ALP SERPL-CCNC: 113 U/L — SIGNIFICANT CHANGE UP (ref 30–115)
ALT FLD-CCNC: 17 U/L — SIGNIFICANT CHANGE UP (ref 0–41)
ALT FLD-CCNC: 19 U/L — SIGNIFICANT CHANGE UP (ref 0–41)
ANION GAP SERPL CALC-SCNC: 15 MMOL/L — HIGH (ref 7–14)
ANION GAP SERPL CALC-SCNC: 15 MMOL/L — HIGH (ref 7–14)
AST SERPL-CCNC: 13 U/L — SIGNIFICANT CHANGE UP (ref 0–41)
AST SERPL-CCNC: 15 U/L — SIGNIFICANT CHANGE UP (ref 0–41)
BASOPHILS # BLD AUTO: 0.04 K/UL — SIGNIFICANT CHANGE UP (ref 0–0.2)
BASOPHILS NFR BLD AUTO: 0.7 % — SIGNIFICANT CHANGE UP (ref 0–1)
BILIRUB SERPL-MCNC: 0.2 MG/DL — SIGNIFICANT CHANGE UP (ref 0.2–1.2)
BILIRUB SERPL-MCNC: 0.3 MG/DL — SIGNIFICANT CHANGE UP (ref 0.2–1.2)
BUN SERPL-MCNC: 57 MG/DL — HIGH (ref 10–20)
BUN SERPL-MCNC: 62 MG/DL — CRITICAL HIGH (ref 10–20)
CALCIUM SERPL-MCNC: 8.1 MG/DL — LOW (ref 8.4–10.5)
CALCIUM SERPL-MCNC: 8.4 MG/DL — SIGNIFICANT CHANGE UP (ref 8.4–10.5)
CHLORIDE SERPL-SCNC: 104 MMOL/L — SIGNIFICANT CHANGE UP (ref 98–110)
CHLORIDE SERPL-SCNC: 105 MMOL/L — SIGNIFICANT CHANGE UP (ref 98–110)
CO2 SERPL-SCNC: 16 MMOL/L — LOW (ref 17–32)
CO2 SERPL-SCNC: 19 MMOL/L — SIGNIFICANT CHANGE UP (ref 17–32)
CREAT SERPL-MCNC: 1.9 MG/DL — HIGH (ref 0.7–1.5)
CREAT SERPL-MCNC: 2 MG/DL — HIGH (ref 0.7–1.5)
EGFR: 31 ML/MIN/1.73M2 — LOW
EGFR: 33 ML/MIN/1.73M2 — LOW
EOSINOPHIL # BLD AUTO: 0.3 K/UL — SIGNIFICANT CHANGE UP (ref 0–0.7)
EOSINOPHIL NFR BLD AUTO: 5.3 % — SIGNIFICANT CHANGE UP (ref 0–8)
GLUCOSE BLDC GLUCOMTR-MCNC: 141 MG/DL — HIGH (ref 70–99)
GLUCOSE BLDC GLUCOMTR-MCNC: 196 MG/DL — HIGH (ref 70–99)
GLUCOSE BLDC GLUCOMTR-MCNC: 223 MG/DL — HIGH (ref 70–99)
GLUCOSE BLDC GLUCOMTR-MCNC: 89 MG/DL — SIGNIFICANT CHANGE UP (ref 70–99)
GLUCOSE SERPL-MCNC: 113 MG/DL — HIGH (ref 70–99)
GLUCOSE SERPL-MCNC: 71 MG/DL — SIGNIFICANT CHANGE UP (ref 70–99)
HCT VFR BLD CALC: 29.5 % — LOW (ref 42–52)
HGB BLD-MCNC: 9.8 G/DL — LOW (ref 14–18)
IMM GRANULOCYTES NFR BLD AUTO: 0.7 % — HIGH (ref 0.1–0.3)
LYMPHOCYTES # BLD AUTO: 0.93 K/UL — LOW (ref 1.2–3.4)
LYMPHOCYTES # BLD AUTO: 16.4 % — LOW (ref 20.5–51.1)
MAGNESIUM SERPL-MCNC: 1.9 MG/DL — SIGNIFICANT CHANGE UP (ref 1.8–2.4)
MAGNESIUM SERPL-MCNC: 1.9 MG/DL — SIGNIFICANT CHANGE UP (ref 1.8–2.4)
MCHC RBC-ENTMCNC: 28.9 PG — SIGNIFICANT CHANGE UP (ref 27–31)
MCHC RBC-ENTMCNC: 33.2 G/DL — SIGNIFICANT CHANGE UP (ref 32–37)
MCV RBC AUTO: 87 FL — SIGNIFICANT CHANGE UP (ref 80–94)
MONOCYTES # BLD AUTO: 0.47 K/UL — SIGNIFICANT CHANGE UP (ref 0.1–0.6)
MONOCYTES NFR BLD AUTO: 8.3 % — SIGNIFICANT CHANGE UP (ref 1.7–9.3)
NEUTROPHILS # BLD AUTO: 3.88 K/UL — SIGNIFICANT CHANGE UP (ref 1.4–6.5)
NEUTROPHILS NFR BLD AUTO: 68.6 % — SIGNIFICANT CHANGE UP (ref 42.2–75.2)
NRBC # BLD: 0 /100 WBCS — SIGNIFICANT CHANGE UP (ref 0–0)
PHOSPHATE SERPL-MCNC: 4.4 MG/DL — SIGNIFICANT CHANGE UP (ref 2.1–4.9)
PLATELET # BLD AUTO: 165 K/UL — SIGNIFICANT CHANGE UP (ref 130–400)
POTASSIUM SERPL-MCNC: 4 MMOL/L — SIGNIFICANT CHANGE UP (ref 3.5–5)
POTASSIUM SERPL-MCNC: 4.3 MMOL/L — SIGNIFICANT CHANGE UP (ref 3.5–5)
POTASSIUM SERPL-SCNC: 4 MMOL/L — SIGNIFICANT CHANGE UP (ref 3.5–5)
POTASSIUM SERPL-SCNC: 4.3 MMOL/L — SIGNIFICANT CHANGE UP (ref 3.5–5)
PROT SERPL-MCNC: 6 G/DL — SIGNIFICANT CHANGE UP (ref 6–8)
PROT SERPL-MCNC: 6 G/DL — SIGNIFICANT CHANGE UP (ref 6–8)
RBC # BLD: 3.39 M/UL — LOW (ref 4.7–6.1)
RBC # FLD: 14.7 % — HIGH (ref 11.5–14.5)
SODIUM SERPL-SCNC: 135 MMOL/L — SIGNIFICANT CHANGE UP (ref 135–146)
SODIUM SERPL-SCNC: 139 MMOL/L — SIGNIFICANT CHANGE UP (ref 135–146)
WBC # BLD: 5.66 K/UL — SIGNIFICANT CHANGE UP (ref 4.8–10.8)
WBC # FLD AUTO: 5.66 K/UL — SIGNIFICANT CHANGE UP (ref 4.8–10.8)

## 2022-10-26 PROCEDURE — 99233 SBSQ HOSP IP/OBS HIGH 50: CPT

## 2022-10-26 PROCEDURE — 93010 ELECTROCARDIOGRAM REPORT: CPT

## 2022-10-26 RX ORDER — CLOPIDOGREL BISULFATE 75 MG/1
1 TABLET, FILM COATED ORAL
Qty: 30 | Refills: 0
Start: 2022-10-26 | End: 2022-11-24

## 2022-10-26 RX ORDER — ATORVASTATIN CALCIUM 80 MG/1
1 TABLET, FILM COATED ORAL
Qty: 30 | Refills: 0
Start: 2022-10-26 | End: 2022-11-24

## 2022-10-26 RX ORDER — SIMVASTATIN 20 MG/1
1 TABLET, FILM COATED ORAL
Qty: 0 | Refills: 0 | DISCHARGE

## 2022-10-26 RX ADMIN — HEPARIN SODIUM 5000 UNIT(S): 5000 INJECTION INTRAVENOUS; SUBCUTANEOUS at 05:59

## 2022-10-26 RX ADMIN — Medication 81 MILLIGRAM(S): at 11:56

## 2022-10-26 RX ADMIN — Medication 60 MILLIGRAM(S): at 05:59

## 2022-10-26 RX ADMIN — Medication 100 MILLIGRAM(S): at 11:55

## 2022-10-26 RX ADMIN — Medication 10 UNIT(S): at 11:55

## 2022-10-26 RX ADMIN — CITALOPRAM 20 MILLIGRAM(S): 10 TABLET, FILM COATED ORAL at 11:56

## 2022-10-26 RX ADMIN — CARVEDILOL PHOSPHATE 25 MILLIGRAM(S): 80 CAPSULE, EXTENDED RELEASE ORAL at 05:58

## 2022-10-26 RX ADMIN — HEPARIN SODIUM 5000 UNIT(S): 5000 INJECTION INTRAVENOUS; SUBCUTANEOUS at 22:13

## 2022-10-26 RX ADMIN — INSULIN GLARGINE 28 UNIT(S): 100 INJECTION, SOLUTION SUBCUTANEOUS at 22:13

## 2022-10-26 RX ADMIN — Medication 10 UNIT(S): at 17:33

## 2022-10-26 RX ADMIN — Medication 40 MILLIGRAM(S): at 05:59

## 2022-10-26 RX ADMIN — GABAPENTIN 300 MILLIGRAM(S): 400 CAPSULE ORAL at 17:34

## 2022-10-26 RX ADMIN — ATORVASTATIN CALCIUM 80 MILLIGRAM(S): 80 TABLET, FILM COATED ORAL at 22:12

## 2022-10-26 RX ADMIN — CARVEDILOL PHOSPHATE 25 MILLIGRAM(S): 80 CAPSULE, EXTENDED RELEASE ORAL at 17:34

## 2022-10-26 RX ADMIN — CLOPIDOGREL BISULFATE 75 MILLIGRAM(S): 75 TABLET, FILM COATED ORAL at 11:56

## 2022-10-26 RX ADMIN — Medication 4: at 11:54

## 2022-10-26 RX ADMIN — HEPARIN SODIUM 5000 UNIT(S): 5000 INJECTION INTRAVENOUS; SUBCUTANEOUS at 14:39

## 2022-10-26 RX ADMIN — Medication 2: at 17:33

## 2022-10-26 RX ADMIN — GABAPENTIN 300 MILLIGRAM(S): 400 CAPSULE ORAL at 05:59

## 2022-10-26 NOTE — PROGRESS NOTE ADULT - ASSESSMENT
AP  89 yo M PMHx CAD s/p CABG, chronic HFpEF, HTN, dyslipidemia, DM II, anxiety presented for evaluation of mid line chest pain radiating to right side and right arm. Pt admitted to [facility] to CCU and was transferred north for cath.    NSTEMI  CAD s/p CABG  - Trop 0.11 on admission, repeat 2.13, trending down  - ECG w/o signs of ischemia  - c/w heparin  - TTE EF 55-60%, no wall abnormalities noted  - c/w ASA 81 mg qD, Lipitor 80 mg qD, Carvedilol 23 mg BID, Lasix 40 mg qD  - cath showed 2 v disease.   - staged PCI done yesterday, 2 stents to LAD, f/u creatinine, possible dc tomorrow    DM II   - c/w Lantus   - FS uncontrolled, lispro increased    Gout   - c/w Allopurinol 100 mg qD    CKD III  - stable    HTN   - c/w Home Meds - Nifedipine 60 mg qD    Anxiety   - c/w Citalopram 20 mg qD

## 2022-10-26 NOTE — PROGRESS NOTE ADULT - SUBJECTIVE AND OBJECTIVE BOX
AMANDA CASTORENA  89y  Male      Patient is a 89y old  Male who presents with a chief complaint of cp (26 Oct 2022 10:20)      INTERVAL HPI/OVERNIGHT EVENTS:  He feels ok, no chest pain.   Vital Signs Last 24 Hrs  T(C): 35.8 (26 Oct 2022 13:00), Max: 36.7 (25 Oct 2022 20:00)  T(F): 96.5 (26 Oct 2022 13:00), Max: 98.1 (25 Oct 2022 20:00)  HR: 60 (26 Oct 2022 13:00) (60 - 60)  BP: 121/57 (26 Oct 2022 13:00) (121/57 - 149/67)  BP(mean): --  RR: 18 (26 Oct 2022 13:00) (18 - 18)  SpO2: --          10-25-22 @ 07:01  -  10-26-22 @ 07:00  --------------------------------------------------------  IN: 784 mL / OUT: 850 mL / NET: -66 mL            Consultant(s) Notes Reviewed:  [x ] YES  [ ] NO          MEDICATIONS  (STANDING):  allopurinol 100 milliGRAM(s) Oral daily  aspirin  chewable 81 milliGRAM(s) Oral daily  atorvastatin 80 milliGRAM(s) Oral at bedtime  carvedilol 25 milliGRAM(s) Oral every 12 hours  citalopram 20 milliGRAM(s) Oral daily  clopidogrel Tablet 75 milliGRAM(s) Oral daily  dextrose 5%. 1000 milliLiter(s) (100 mL/Hr) IV Continuous <Continuous>  dextrose 5%. 1000 milliLiter(s) (50 mL/Hr) IV Continuous <Continuous>  dextrose 50% Injectable 25 Gram(s) IV Push once  dextrose 50% Injectable 12.5 Gram(s) IV Push once  dextrose 50% Injectable 25 Gram(s) IV Push once  gabapentin 300 milliGRAM(s) Oral two times a day  glucagon  Injectable 1 milliGRAM(s) IntraMuscular once  heparin   Injectable 5000 Unit(s) SubCutaneous every 8 hours  insulin glargine Injectable (LANTUS) 28 Unit(s) SubCutaneous at bedtime  insulin lispro (ADMELOG) corrective regimen sliding scale   SubCutaneous three times a day before meals  insulin lispro Injectable (ADMELOG) 10 Unit(s) SubCutaneous three times a day before meals  NIFEdipine XL 60 milliGRAM(s) Oral daily  sodium chloride 0.9%. 1000 milliLiter(s) (50 mL/Hr) IV Continuous <Continuous>  sodium chloride 0.9%. 1000 milliLiter(s) (50 mL/Hr) IV Continuous <Continuous>    MEDICATIONS  (PRN):  acetaminophen     Tablet .. 650 milliGRAM(s) Oral every 6 hours PRN Temp greater or equal to 38C (100.4F), Mild Pain (1 - 3)  aluminum hydroxide/magnesium hydroxide/simethicone Suspension 30 milliLiter(s) Oral every 4 hours PRN Dyspepsia  clonazePAM  Tablet 2 milliGRAM(s) Oral at bedtime PRN anxiety  dextrose Oral Gel 15 Gram(s) Oral once PRN Blood Glucose LESS THAN 70 milliGRAM(s)/deciliter  melatonin 3 milliGRAM(s) Oral at bedtime PRN Insomnia  ondansetron Injectable 4 milliGRAM(s) IV Push every 8 hours PRN Nausea and/or Vomiting      LABS                          9.8    5.66  )-----------( 165      ( 26 Oct 2022 06:07 )             29.5     10-26    139  |  105  |  57<H>  ----------------------------<  71  4.0   |  19  |  1.9<H>    Ca    8.4      26 Oct 2022 06:07  Phos  4.4     10-25  Mg     1.9     10-26    TPro  6.0  /  Alb  3.4<L>  /  TBili  0.3  /  DBili  x   /  AST  13  /  ALT  17  /  AlkPhos  108  10-26          Lactate Trend        CAPILLARY BLOOD GLUCOSE      POCT Blood Glucose.: 223 mg/dL (26 Oct 2022 11:40)        RADIOLOGY & ADDITIONAL TESTS:    Imaging Personally Reviewed:  [ ] YES  [ ] NO    HEALTH ISSUES - PROBLEM Dx:          PHYSICAL EXAM:  GENERAL: NAD, well-developed.  HEAD:  Atraumatic, Normocephalic.  EYES: EOMI, PERRLA, conjunctiva and sclera clear.  NECK: Supple, No JVD.  CHEST/LUNG: Clear to auscultation bilaterally; No wheeze.  HEART: Regular rate and rhythm; S1 S2.   ABDOMEN: Soft, Nontender, Nondistended; Bowel sounds present.  EXTREMITIES:  2+ Peripheral Pulses, No clubbing, cyanosis, or edema.  PSYCH: AAOx3.  NEUROLOGY: non-focal.  SKIN: No rashes or lesions.

## 2022-10-26 NOTE — PROGRESS NOTE ADULT - SUBJECTIVE AND OBJECTIVE BOX
Cardiology Follow up s/p PCI CHAYA    AMANDA CASTORENA   89y Male  PAST MEDICAL & SURGICAL HISTORY:    CHF (congestive heart failure)  DM (diabetes mellitus)  HTN (hypertension)  Prostate CA  in remission  Pacemaker  H/O total knee replacement, right         HPI:  89 y/o male  with a past medical HX of bypass, CAD, CHF , HTN, dyslipidemia, DM , anxiety c/o mid line chest pain radiating to right side and right arm. Pt states the pain started at 4 pm continuous pain 7/10. Pt denies sob , N/V dizziness, or visual change. (19 Oct 2022 01:49)    Allergies    No Known Allergies    Intolerances    Patient seen and examined at bedside. No acute events overnight.  Patient without complaints. Denies CP, SOB, palpitations, or dizziness  No events on telemetry overnight    Vital Signs Last 24 Hrs  T(C): 35.9 (26 Oct 2022 05:00), Max: 36.7 (25 Oct 2022 20:00)  T(F): 96.7 (26 Oct 2022 05:00), Max: 98.1 (25 Oct 2022 20:00)  HR: 60 (26 Oct 2022 05:00) (60 - 60)  BP: 135/62 (26 Oct 2022 05:00) (122/58 - 149/67)  BP(mean): --  RR: 18 (26 Oct 2022 05:00) (18 - 18)  SpO2: --        MEDICATIONS  (STANDING):  allopurinol 100 milliGRAM(s) Oral daily  aspirin  chewable 81 milliGRAM(s) Oral daily  atorvastatin 80 milliGRAM(s) Oral at bedtime  carvedilol 25 milliGRAM(s) Oral every 12 hours  citalopram 20 milliGRAM(s) Oral daily  clopidogrel Tablet 75 milliGRAM(s) Oral daily  dextrose 5%. 1000 milliLiter(s) (100 mL/Hr) IV Continuous <Continuous>  dextrose 5%. 1000 milliLiter(s) (50 mL/Hr) IV Continuous <Continuous>  dextrose 50% Injectable 25 Gram(s) IV Push once  dextrose 50% Injectable 12.5 Gram(s) IV Push once  dextrose 50% Injectable 25 Gram(s) IV Push once  gabapentin 300 milliGRAM(s) Oral two times a day  glucagon  Injectable 1 milliGRAM(s) IntraMuscular once  heparin   Injectable 5000 Unit(s) SubCutaneous every 8 hours  insulin glargine Injectable (LANTUS) 28 Unit(s) SubCutaneous at bedtime  insulin lispro (ADMELOG) corrective regimen sliding scale   SubCutaneous three times a day before meals  insulin lispro Injectable (ADMELOG) 10 Unit(s) SubCutaneous three times a day before meals  NIFEdipine XL 60 milliGRAM(s) Oral daily  sodium chloride 0.9%. 1000 milliLiter(s) (50 mL/Hr) IV Continuous <Continuous>  sodium chloride 0.9%. 1000 milliLiter(s) (50 mL/Hr) IV Continuous <Continuous>    MEDICATIONS  (PRN):  acetaminophen     Tablet .. 650 milliGRAM(s) Oral every 6 hours PRN Temp greater or equal to 38C (100.4F), Mild Pain (1 - 3)  aluminum hydroxide/magnesium hydroxide/simethicone Suspension 30 milliLiter(s) Oral every 4 hours PRN Dyspepsia  clonazePAM  Tablet 2 milliGRAM(s) Oral at bedtime PRN anxiety  dextrose Oral Gel 15 Gram(s) Oral once PRN Blood Glucose LESS THAN 70 milliGRAM(s)/deciliter  melatonin 3 milliGRAM(s) Oral at bedtime PRN Insomnia  ondansetron Injectable 4 milliGRAM(s) IV Push every 8 hours PRN Nausea and/or Vomiting      REVIEW OF SYSTEMS:          All negative except as mentioned in HPI    PHYSICAL EXAM:           CONSTITUTIONAL: Well-developed; well-nourished; in no acute distress  	SKIN: warm, dry  	HEAD: Normocephalic; atraumatic  	EYES: PERRL.  	ENT: No nasal discharge, airway clear, mucous membranes moist  	NECK: Supple; non tender.  	CARD: +S1, +S2, no murmurs, gallops, or rubs. Regular rate and rhythm    	RESP: No wheezes, rales or rhonchi. CTA B/L  	ABD: soft ntnd, + BS x 4 quadrants  	EXT: moves all extremities,  no clubbing, cyanosis or edema  	NEURO: Alert and oriented x3, no focal deficits          PSYCH: Cooperative, appropriate          VASCULAR:  + Rad / + PTs / +  DPs          EXTREMITY:             Right Groin: dressing removed, access site soft, no hematoma, no pain, + pulses, no sign of infection, no numbness  	           ECG:   < from: 12 Lead ECG (10.26.22 @ 07:30) >  Ventricular Rate 60 BPM    Atrial Rate 61 BPM    QRS Duration 172 ms    Q-T Interval 516 ms    QTC Calculation(Bazett) 516 ms    R Axis -79 degrees    T Axis 90 degrees    Diagnosis Line Ventricular-paced rhythm  Abnormal ECG    Confirmed by Grayson Velazquez ([id number]) on 10/26/2022 9:07:51 AM                                                                                                               2D ECHO:  < from: TTE Echo Complete w/o Contrast w/ Doppler (10.19.22 @ 14:20) >  Summary:   1. Technically difficult study.   2. Left ventricular ejection fraction, by visual estimation, is 55 to   60%.   3. Normal left ventricular internal cavity size.   4. Mildly enlarged left atrium.   5. Mild mitral annular calcification.   6. Mild aortic valve stenosis.    LABS:                        9.8    5.66  )-----------( 165      ( 26 Oct 2022 06:07 )             29.5     10-26    139  |  105  |  57<H>  ----------------------------<  71  4.0   |  19  |  1.9<H>    Ca    8.4      26 Oct 2022 06:07  Phos  4.4     10-25  Mg     1.9     10-26    TPro  6.0  /  Alb  3.4<L>  /  TBili  0.3  /  DBili  x   /  AST  13  /  ALT  17  /  AlkPhos  108  10-26    Magnesium, Serum: 1.9 mg/dL [1.8 - 2.4] (10-26-22 @ 06:07)  Magnesium, Serum: 1.9 mg/dL [1.8 - 2.4] (10-25-22 @ 22:41)  LIVER FUNCTIONS - ( 26 Oct 2022 06:07 )  Alb: 3.4 g/dL / Pro: 6.0 g/dL / ALK PHOS: 108 U/L / ALT: 17 U/L / AST: 13 U/L / GGT: x             A/P:  I discussed the case with Cardiologist Dr. Amor and recommend the following:    S/P PCI:   IV Contrast: 75 mL      Intervention:   - PCI to prox and mid LAD (AUC 8 for revascularization)  - Rotational atherectomy to prox and mid LAD    Implants:   - CHAYA [id number] Amadeo to prox LAD  - CHAYA 2.5x18mm [place] to mid LAD     FINDINGS:     LM: Mild disease  LAD: 95% stenosis prox LAD, 99% stenosis mid LAD  Diag: Moderate diffuse disease  LCx: 100% stenosis  in distal segment  OM1: small, moderate diffuse disease                     No ACEi/ARB due to elevated Cr                   HOLD LASIX s/p Cath as per Nephrology, outpatient Renal f/u                   f/u Cr tomorrow AM                       PT / Ambulate with assistance, OOB  	     Continue DAPT ( Aspirin 81 mg daily and Plavix 75 mg daily ), B-Blocker, Statin Therapy                   Patient given 30 day supply of ( Aspirin 81 mg daily and Plavix 75 mg daily ) to take at home                   Patient agreeing to take DAPT for at least one year or as directed by cardiologist                    Pt given instructions on importance of taking antiplatelet medication or risk acute stent thrombosis/death                   Post cath instructions, access site care and activity restrictions reviewed with patient                     Discussed with patient to return to hospital if experience chest pain, shortness breath, dizziness and site bleeding                   Aggressive risk factor modification, diet counseling, smoking cessation discussed with patient                       Benefits of Cardiac Rehab discussed with patient, All documents [facility]. Patient instructed to call and make first                               appointment after first f/u visit with Cardiologist                    Monitor on TELE Cardiology Follow up s/p PCI CHAYA    AMANDA CASTORENA   89y Male  PAST MEDICAL & SURGICAL HISTORY:    CHF (congestive heart failure)  DM (diabetes mellitus)  HTN (hypertension)  Prostate CA  in remission  Pacemaker  H/O total knee replacement, right         HPI:  89 y/o male  with a past medical HX of bypass, CAD, CHF , HTN, dyslipidemia, DM , anxiety c/o mid line chest pain radiating to right side and right arm. Pt states the pain started at 4 pm continuous pain 7/10. Pt denies sob , N/V dizziness, or visual change. (19 Oct 2022 01:49)    Allergies    No Known Allergies    Intolerances    Patient seen and examined at bedside. No acute events overnight.  Patient without complaints. Denies CP, SOB, palpitations, or dizziness  No events on telemetry overnight    Vital Signs Last 24 Hrs  T(C): 35.9 (26 Oct 2022 05:00), Max: 36.7 (25 Oct 2022 20:00)  T(F): 96.7 (26 Oct 2022 05:00), Max: 98.1 (25 Oct 2022 20:00)  HR: 60 (26 Oct 2022 05:00) (60 - 60)  BP: 135/62 (26 Oct 2022 05:00) (122/58 - 149/67)  BP(mean): --  RR: 18 (26 Oct 2022 05:00) (18 - 18)  SpO2: --        MEDICATIONS  (STANDING):  allopurinol 100 milliGRAM(s) Oral daily  aspirin  chewable 81 milliGRAM(s) Oral daily  atorvastatin 80 milliGRAM(s) Oral at bedtime  carvedilol 25 milliGRAM(s) Oral every 12 hours  citalopram 20 milliGRAM(s) Oral daily  clopidogrel Tablet 75 milliGRAM(s) Oral daily  dextrose 5%. 1000 milliLiter(s) (100 mL/Hr) IV Continuous <Continuous>  dextrose 5%. 1000 milliLiter(s) (50 mL/Hr) IV Continuous <Continuous>  dextrose 50% Injectable 25 Gram(s) IV Push once  dextrose 50% Injectable 12.5 Gram(s) IV Push once  dextrose 50% Injectable 25 Gram(s) IV Push once  gabapentin 300 milliGRAM(s) Oral two times a day  glucagon  Injectable 1 milliGRAM(s) IntraMuscular once  heparin   Injectable 5000 Unit(s) SubCutaneous every 8 hours  insulin glargine Injectable (LANTUS) 28 Unit(s) SubCutaneous at bedtime  insulin lispro (ADMELOG) corrective regimen sliding scale   SubCutaneous three times a day before meals  insulin lispro Injectable (ADMELOG) 10 Unit(s) SubCutaneous three times a day before meals  NIFEdipine XL 60 milliGRAM(s) Oral daily  sodium chloride 0.9%. 1000 milliLiter(s) (50 mL/Hr) IV Continuous <Continuous>  sodium chloride 0.9%. 1000 milliLiter(s) (50 mL/Hr) IV Continuous <Continuous>    MEDICATIONS  (PRN):  acetaminophen     Tablet .. 650 milliGRAM(s) Oral every 6 hours PRN Temp greater or equal to 38C (100.4F), Mild Pain (1 - 3)  aluminum hydroxide/magnesium hydroxide/simethicone Suspension 30 milliLiter(s) Oral every 4 hours PRN Dyspepsia  clonazePAM  Tablet 2 milliGRAM(s) Oral at bedtime PRN anxiety  dextrose Oral Gel 15 Gram(s) Oral once PRN Blood Glucose LESS THAN 70 milliGRAM(s)/deciliter  melatonin 3 milliGRAM(s) Oral at bedtime PRN Insomnia  ondansetron Injectable 4 milliGRAM(s) IV Push every 8 hours PRN Nausea and/or Vomiting      REVIEW OF SYSTEMS:          All negative except as mentioned in HPI    PHYSICAL EXAM:           CONSTITUTIONAL: Well-developed; well-nourished; in no acute distress  	SKIN: warm, dry  	HEAD: Normocephalic; atraumatic  	EYES: PERRL.  	ENT: No nasal discharge, airway clear, mucous membranes moist  	NECK: Supple; non tender.  	CARD: +S1, +S2, no murmurs, gallops, or rubs. Regular rate and rhythm    	RESP: No wheezes, rales or rhonchi. CTA B/L  	ABD: soft ntnd, + BS x 4 quadrants  	EXT: moves all extremities,  no clubbing, cyanosis or edema  	NEURO: Alert and oriented x3, no focal deficits          PSYCH: Cooperative, appropriate          VASCULAR:  + Rad / + PTs / +  DPs          EXTREMITY:             Right Groin: dressing removed, access site soft, no hematoma, no pain, + pulses, no sign of infection, no numbness  	           ECG:   < from: 12 Lead ECG (10.26.22 @ 07:30) >  Ventricular Rate 60 BPM    Atrial Rate 61 BPM    QRS Duration 172 ms    Q-T Interval 516 ms    QTC Calculation(Bazett) 516 ms    R Axis -79 degrees    T Axis 90 degrees    Diagnosis Line Ventricular-paced rhythm  Abnormal ECG    Confirmed by Grayson Velazquez ([id number]) on 10/26/2022 9:07:51 AM                                                                                                               2D ECHO:  < from: TTE Echo Complete w/o Contrast w/ Doppler (10.19.22 @ 14:20) >  Summary:   1. Technically difficult study.   2. Left ventricular ejection fraction, by visual estimation, is 55 to   60%.   3. Normal left ventricular internal cavity size.   4. Mildly enlarged left atrium.   5. Mild mitral annular calcification.   6. Mild aortic valve stenosis.    LABS:                        9.8    5.66  )-----------( 165      ( 26 Oct 2022 06:07 )             29.5     10-26    139  |  105  |  57<H>  ----------------------------<  71  4.0   |  19  |  1.9<H>    Ca    8.4      26 Oct 2022 06:07  Phos  4.4     10-25  Mg     1.9     10-26    TPro  6.0  /  Alb  3.4<L>  /  TBili  0.3  /  DBili  x   /  AST  13  /  ALT  17  /  AlkPhos  108  10-26    Magnesium, Serum: 1.9 mg/dL [1.8 - 2.4] (10-26-22 @ 06:07)  Magnesium, Serum: 1.9 mg/dL [1.8 - 2.4] (10-25-22 @ 22:41)  LIVER FUNCTIONS - ( 26 Oct 2022 06:07 )  Alb: 3.4 g/dL / Pro: 6.0 g/dL / ALK PHOS: 108 U/L / ALT: 17 U/L / AST: 13 U/L / GGT: x             A/P:  I discussed the case with Cardiologist Dr. Amor and recommend the following:    S/P PCI:   IV Contrast: 75 mL      Intervention:   - PCI to prox and mid LAD (AUC 8 for revascularization)  - Rotational atherectomy to prox and mid LAD    Implants:   - CHAYA [id number] [place]ade[place] to prox LAD  - CHAYA 2.5x18mm [place] to mid LAD     FINDINGS:     LM: Mild disease  LAD: 95% stenosis prox LAD, 99% stenosis mid LAD  Diag: Moderate diffuse disease  LCx: 100% stenosis  in distal segment  OM1: small, moderate diffuse disease                      No ACEI/ARB due to elevated Cr                   HOLD LASIX s/p Cath as per Nephrology, outpatient Renal f/u                   f/u Cr tomorrow AM                       PT / Ambulate with assistance, OOB  	     Continue DAPT ( Aspirin 81 mg daily and Plavix 75 mg daily ), B-Blocker, Statin Therapy                   Patient given 30 day supply of ( Aspirin 81 mg daily and Plavix 75 mg daily ) to take at home                   Patient agreeing to take DAPT for at least one year or as directed by cardiologist                    Pt given instructions on importance of taking antiplatelet medication or risk acute stent thrombosis/death                   Post cath instructions, access site care and activity restrictions reviewed with patient                     Discussed with patient to return to hospital if experience chest pain, shortness breath, dizziness and site bleeding                   Aggressive risk factor modification, diet counseling, smoking cessation discussed with patient                       Benefits of Cardiac Rehab discussed with patient, All documents sent to [facility]. Patient instructed to call and make first                               appointment after first f/u visit with Cardiologist                    Monitor on TELE

## 2022-10-26 NOTE — PROGRESS NOTE ADULT - SUBJECTIVE AND OBJECTIVE BOX
SUBJECTIVE / OVERNIGHT EVENTS  Patient slept well overnight. No acute complaints this AM. Patient does not report fevers, chills, CP, SOB, or n/v/d    MEDICATIONS  allopurinol 100 milliGRAM(s) Oral daily  aspirin  chewable 81 milliGRAM(s) Oral daily  atorvastatin 80 milliGRAM(s) Oral at bedtime  carvedilol 25 milliGRAM(s) Oral every 12 hours  citalopram 20 milliGRAM(s) Oral daily  clopidogrel Tablet 75 milliGRAM(s) Oral daily  dextrose 5%. 1000 milliLiter(s) IV Continuous <Continuous>  dextrose 5%. 1000 milliLiter(s) IV Continuous <Continuous>  dextrose 50% Injectable 25 Gram(s) IV Push once  dextrose 50% Injectable 12.5 Gram(s) IV Push once  dextrose 50% Injectable 25 Gram(s) IV Push once  gabapentin 300 milliGRAM(s) Oral two times a day  glucagon  Injectable 1 milliGRAM(s) IntraMuscular once  heparin   Injectable 5000 Unit(s) SubCutaneous every 8 hours  insulin glargine Injectable (LANTUS) 28 Unit(s) SubCutaneous at bedtime  insulin lispro (ADMELOG) corrective regimen sliding scale   SubCutaneous three times a day before meals  insulin lispro Injectable (ADMELOG) 10 Unit(s) SubCutaneous three times a day before meals  NIFEdipine XL 60 milliGRAM(s) Oral daily  sodium chloride 0.9%. 1000 milliLiter(s) IV Continuous <Continuous>  sodium chloride 0.9%. 1000 milliLiter(s) IV Continuous <Continuous>    acetaminophen     Tablet .. 650 milliGRAM(s) Oral every 6 hours PRN Temp greater or equal to 38C (100.4F), Mild Pain (1 - 3)  aluminum hydroxide/magnesium hydroxide/simethicone Suspension 30 milliLiter(s) Oral every 4 hours PRN Dyspepsia  clonazePAM  Tablet 2 milliGRAM(s) Oral at bedtime PRN anxiety  dextrose Oral Gel 15 Gram(s) Oral once PRN Blood Glucose LESS THAN 70 milliGRAM(s)/deciliter  melatonin 3 milliGRAM(s) Oral at bedtime PRN Insomnia  ondansetron Injectable 4 milliGRAM(s) IV Push every 8 hours PRN Nausea and/or Vomiting    VITALS /  EXAM    T(C): 35.8 (10-26-22 @ 13:00), Max: 36.7 (10-25-22 @ 20:00)  HR: 60 (10-26-22 @ 13:00) (60 - 60)  BP: 121/57 (10-26-22 @ 13:00) (121/57 - 149/67)  RR: 18 (10-26-22 @ 13:00) (18 - 18)  SpO2: --  POCT Blood Glucose.: 196 mg/dL (10-26-22 @ 16:56)  POCT Blood Glucose.: 223 mg/dL (10-26-22 @ 11:40)  POCT Blood Glucose.: 89 mg/dL (10-26-22 @ 07:29)  POCT Blood Glucose.: 140 mg/dL (10-25-22 @ 21:28)    GENERAL: NAD, well-developed  CHEST/LUNG: Clear to auscultation bilaterally; No wheezes, rales or rhonchi  HEART: Regular rate and rhythm; No murmurs, rubs, or gallops  ABDOMEN: Soft, Nontender, Nondistended; Bowel sounds present, no masses.  EXTREMITIES:  2+ Peripheral Pulses, No clubbing, cyanosis, or edema    I's & O's     10-25-22 @ 07:01  -  10-26-22 @ 07:00  --------------------------------------------------------  IN:    Oral Fluid: 384 mL    sodium chloride 0.9%: 400 mL  Total IN: 784 mL    OUT:    Voided (mL): 850 mL  Total OUT: 850 mL    Total NET: -66 mL        LABS             9.8    5.66  )-----------( 165      ( 10-26-22 @ 06:07 )             29.5     139  |  105  |  57  -------------------------<  71   10-26-22 @ 06:07  4.0  |  19  |  1.9    Ca      8.4     10-26-22 @ 06:07  Phos   4.4     10-25-22 @ 22:41  Mg     1.9     10-26-22 @ 06:07    TPro  6.0  /  Alb  3.4  /  TBili  0.3  /  DBili  x   /  AST  13  /  ALT  17  /  AlkPhos  108  /  GGT  x     10-26-22 @ 06:07                      MICRO / IMAGING / CARDIOLOGY  Telemetry: Reviewed   EKG: Reviewed    CULTURES    IMAGING    CARDIOLOGY

## 2022-10-26 NOTE — PROGRESS NOTE ADULT - ASSESSMENT
89 yo M PMHx CAD s/p CABG, chronic HFpEF, HTN, dyslipidemia, DM II, anxiety presented for evaluation of mid line chest pain radiating to right side and right arm. Pt admitted to [facility] to CCU and was transferred north for cath.    A/P:   NSTEMI  CAD s/p CABG  Troponin 0.11 peak 2.13,   EKG showed paced rhythm, RBBB,   Echo showed normal LVEF 55-60%.   Cardiac cath showed LAD: 95% stenosis prox LAD, 99% stenosis mid LAD, Diag: Moderate diffuse diseaseLCx: 100% stenosis  in distal segment  s/p staged PCI and CHAYA placement for proximal and mid LAD, 2 stents.   s/p Heparin drip  Continue ASA 81 mg daily, Lipitor 80 mg daily, Carvedilol 23 mg BID.      DM II   - c/w Lantus   - FS uncontrolled, lispro increased    Gout : continue Allopurinol 100 mg qD    CKD III  - stable    HTN   - c/w Home Meds - Nifedipine 60 mg qD    Anxiety   - c/w Citalopram 20 mg qD    #Progress Note Handoff:  Pending (specify):  Monitor Cr  Family discussion: spoke and family regarding cath results  Disposition: Home

## 2022-10-27 ENCOUNTER — TRANSCRIPTION ENCOUNTER (OUTPATIENT)
Age: 87
End: 2022-10-27

## 2022-10-27 VITALS
SYSTOLIC BLOOD PRESSURE: 142 MMHG | RESPIRATION RATE: 18 BRPM | DIASTOLIC BLOOD PRESSURE: 61 MMHG | HEART RATE: 59 BPM | TEMPERATURE: 96 F

## 2022-10-27 LAB
ALBUMIN SERPL ELPH-MCNC: 3.2 G/DL — LOW (ref 3.5–5.2)
ALP SERPL-CCNC: 105 U/L — SIGNIFICANT CHANGE UP (ref 30–115)
ALT FLD-CCNC: 14 U/L — SIGNIFICANT CHANGE UP (ref 0–41)
ANION GAP SERPL CALC-SCNC: 13 MMOL/L — SIGNIFICANT CHANGE UP (ref 7–14)
AST SERPL-CCNC: 13 U/L — SIGNIFICANT CHANGE UP (ref 0–41)
BASOPHILS # BLD AUTO: 0.03 K/UL — SIGNIFICANT CHANGE UP (ref 0–0.2)
BASOPHILS NFR BLD AUTO: 0.6 % — SIGNIFICANT CHANGE UP (ref 0–1)
BILIRUB SERPL-MCNC: 0.2 MG/DL — SIGNIFICANT CHANGE UP (ref 0.2–1.2)
BUN SERPL-MCNC: 60 MG/DL — HIGH (ref 10–20)
CALCIUM SERPL-MCNC: 7.9 MG/DL — LOW (ref 8.4–10.5)
CHLORIDE SERPL-SCNC: 105 MMOL/L — SIGNIFICANT CHANGE UP (ref 98–110)
CO2 SERPL-SCNC: 21 MMOL/L — SIGNIFICANT CHANGE UP (ref 17–32)
CREAT SERPL-MCNC: 2.1 MG/DL — HIGH (ref 0.7–1.5)
EGFR: 29 ML/MIN/1.73M2 — LOW
EOSINOPHIL # BLD AUTO: 0.3 K/UL — SIGNIFICANT CHANGE UP (ref 0–0.7)
EOSINOPHIL NFR BLD AUTO: 5.7 % — SIGNIFICANT CHANGE UP (ref 0–8)
GLUCOSE BLDC GLUCOMTR-MCNC: 194 MG/DL — HIGH (ref 70–99)
GLUCOSE BLDC GLUCOMTR-MCNC: 313 MG/DL — HIGH (ref 70–99)
GLUCOSE BLDC GLUCOMTR-MCNC: 77 MG/DL — SIGNIFICANT CHANGE UP (ref 70–99)
GLUCOSE SERPL-MCNC: 76 MG/DL — SIGNIFICANT CHANGE UP (ref 70–99)
HCT VFR BLD CALC: 27.8 % — LOW (ref 42–52)
HGB BLD-MCNC: 9.2 G/DL — LOW (ref 14–18)
IMM GRANULOCYTES NFR BLD AUTO: 1 % — HIGH (ref 0.1–0.3)
LYMPHOCYTES # BLD AUTO: 1.02 K/UL — LOW (ref 1.2–3.4)
LYMPHOCYTES # BLD AUTO: 19.5 % — LOW (ref 20.5–51.1)
MAGNESIUM SERPL-MCNC: 1.9 MG/DL — SIGNIFICANT CHANGE UP (ref 1.8–2.4)
MCHC RBC-ENTMCNC: 29.1 PG — SIGNIFICANT CHANGE UP (ref 27–31)
MCHC RBC-ENTMCNC: 33.1 G/DL — SIGNIFICANT CHANGE UP (ref 32–37)
MCV RBC AUTO: 88 FL — SIGNIFICANT CHANGE UP (ref 80–94)
MONOCYTES # BLD AUTO: 0.52 K/UL — SIGNIFICANT CHANGE UP (ref 0.1–0.6)
MONOCYTES NFR BLD AUTO: 9.9 % — HIGH (ref 1.7–9.3)
NEUTROPHILS # BLD AUTO: 3.31 K/UL — SIGNIFICANT CHANGE UP (ref 1.4–6.5)
NEUTROPHILS NFR BLD AUTO: 63.3 % — SIGNIFICANT CHANGE UP (ref 42.2–75.2)
NRBC # BLD: 0 /100 WBCS — SIGNIFICANT CHANGE UP (ref 0–0)
PLATELET # BLD AUTO: 160 K/UL — SIGNIFICANT CHANGE UP (ref 130–400)
POTASSIUM SERPL-MCNC: 4 MMOL/L — SIGNIFICANT CHANGE UP (ref 3.5–5)
POTASSIUM SERPL-SCNC: 4 MMOL/L — SIGNIFICANT CHANGE UP (ref 3.5–5)
PROT SERPL-MCNC: 5.6 G/DL — LOW (ref 6–8)
RBC # BLD: 3.16 M/UL — LOW (ref 4.7–6.1)
RBC # FLD: 14.8 % — HIGH (ref 11.5–14.5)
SODIUM SERPL-SCNC: 139 MMOL/L — SIGNIFICANT CHANGE UP (ref 135–146)
WBC # BLD: 5.23 K/UL — SIGNIFICANT CHANGE UP (ref 4.8–10.8)
WBC # FLD AUTO: 5.23 K/UL — SIGNIFICANT CHANGE UP (ref 4.8–10.8)

## 2022-10-27 PROCEDURE — 99239 HOSP IP/OBS DSCHRG MGMT >30: CPT

## 2022-10-27 RX ORDER — MAGNESIUM SULFATE 500 MG/ML
1 VIAL (ML) INJECTION ONCE
Refills: 0 | Status: COMPLETED | OUTPATIENT
Start: 2022-10-27 | End: 2022-10-27

## 2022-10-27 RX ADMIN — Medication 100 GRAM(S): at 10:14

## 2022-10-27 RX ADMIN — GABAPENTIN 300 MILLIGRAM(S): 400 CAPSULE ORAL at 17:00

## 2022-10-27 RX ADMIN — GABAPENTIN 300 MILLIGRAM(S): 400 CAPSULE ORAL at 06:07

## 2022-10-27 RX ADMIN — CARVEDILOL PHOSPHATE 25 MILLIGRAM(S): 80 CAPSULE, EXTENDED RELEASE ORAL at 06:07

## 2022-10-27 RX ADMIN — HEPARIN SODIUM 5000 UNIT(S): 5000 INJECTION INTRAVENOUS; SUBCUTANEOUS at 14:38

## 2022-10-27 RX ADMIN — HEPARIN SODIUM 5000 UNIT(S): 5000 INJECTION INTRAVENOUS; SUBCUTANEOUS at 06:08

## 2022-10-27 RX ADMIN — CARVEDILOL PHOSPHATE 25 MILLIGRAM(S): 80 CAPSULE, EXTENDED RELEASE ORAL at 17:00

## 2022-10-27 RX ADMIN — Medication 2: at 16:56

## 2022-10-27 RX ADMIN — Medication 60 MILLIGRAM(S): at 06:07

## 2022-10-27 NOTE — PROGRESS NOTE ADULT - SUBJECTIVE AND OBJECTIVE BOX
KELLTESSYNY  89y  Male      Patient is a 89y old  Male who presents with a chief complaint of cp (27 Oct 2022 11:52)      INTERVAL HPI/OVERNIGHT EVENTS:  He feels ok, no chest pain.   Vital Signs Last 24 Hrs  T(C): 35.6 (27 Oct 2022 14:28), Max: 36.3 (27 Oct 2022 04:30)  T(F): 96 (27 Oct 2022 14:28), Max: 97.3 (27 Oct 2022 04:30)  HR: 59 (27 Oct 2022 14:28) (59 - 60)  BP: 142/61 (27 Oct 2022 14:28) (134/78 - 145/62)  BP(mean): --  RR: 18 (27 Oct 2022 14:28) (18 - 18)  SpO2: 96% (26 Oct 2022 22:10) (96% - 96%)          10-26-22 @ 07:01  -  10-27-22 @ 07:00  --------------------------------------------------------  IN: 200 mL / OUT: 850 mL / NET: -650 mL    10-27-22 @ 07:01  -  10-27-22 @ 15:30  --------------------------------------------------------  IN: 400 mL / OUT: 0 mL / NET: 400 mL            Consultant(s) Notes Reviewed:  [x ] YES  [ ] NO          MEDICATIONS  (STANDING):  allopurinol 100 milliGRAM(s) Oral daily  aspirin  chewable 81 milliGRAM(s) Oral daily  atorvastatin 80 milliGRAM(s) Oral at bedtime  carvedilol 25 milliGRAM(s) Oral every 12 hours  citalopram 20 milliGRAM(s) Oral daily  clopidogrel Tablet 75 milliGRAM(s) Oral daily  dextrose 5%. 1000 milliLiter(s) (100 mL/Hr) IV Continuous <Continuous>  dextrose 5%. 1000 milliLiter(s) (50 mL/Hr) IV Continuous <Continuous>  dextrose 50% Injectable 25 Gram(s) IV Push once  dextrose 50% Injectable 12.5 Gram(s) IV Push once  dextrose 50% Injectable 25 Gram(s) IV Push once  gabapentin 300 milliGRAM(s) Oral two times a day  glucagon  Injectable 1 milliGRAM(s) IntraMuscular once  heparin   Injectable 5000 Unit(s) SubCutaneous every 8 hours  insulin glargine Injectable (LANTUS) 28 Unit(s) SubCutaneous at bedtime  insulin lispro (ADMELOG) corrective regimen sliding scale   SubCutaneous three times a day before meals  insulin lispro Injectable (ADMELOG) 10 Unit(s) SubCutaneous three times a day before meals  NIFEdipine XL 60 milliGRAM(s) Oral daily  sodium chloride 0.9%. 1000 milliLiter(s) (50 mL/Hr) IV Continuous <Continuous>  sodium chloride 0.9%. 1000 milliLiter(s) (50 mL/Hr) IV Continuous <Continuous>    MEDICATIONS  (PRN):  acetaminophen     Tablet .. 650 milliGRAM(s) Oral every 6 hours PRN Temp greater or equal to 38C (100.4F), Mild Pain (1 - 3)  aluminum hydroxide/magnesium hydroxide/simethicone Suspension 30 milliLiter(s) Oral every 4 hours PRN Dyspepsia  dextrose Oral Gel 15 Gram(s) Oral once PRN Blood Glucose LESS THAN 70 milliGRAM(s)/deciliter  melatonin 3 milliGRAM(s) Oral at bedtime PRN Insomnia  ondansetron Injectable 4 milliGRAM(s) IV Push every 8 hours PRN Nausea and/or Vomiting      LABS                          9.2    5.23  )-----------( 160      ( 27 Oct 2022 07:31 )             27.8     10-27    139  |  105  |  60<H>  ----------------------------<  76  4.0   |  21  |  2.1<H>    Ca    7.9<L>      27 Oct 2022 07:31  Phos  4.4     10-25  Mg     1.9     10-27    TPro  5.6<L>  /  Alb  3.2<L>  /  TBili  0.2  /  DBili  x   /  AST  13  /  ALT  14  /  AlkPhos  105  10-27          Lactate Trend        CAPILLARY BLOOD GLUCOSE      POCT Blood Glucose.: 313 mg/dL (27 Oct 2022 11:43)        RADIOLOGY & ADDITIONAL TESTS:    Imaging Personally Reviewed:  [ ] YES  [ ] NO    HEALTH ISSUES - PROBLEM Dx:          PHYSICAL EXAM:  GENERAL: NAD, well-developed.  HEAD:  Atraumatic, Normocephalic.  EYES: EOMI, PERRLA, conjunctiva and sclera clear.  NECK: Supple, No JVD.  CHEST/LUNG: Clear to auscultation bilaterally; No wheeze.  HEART: Regular rate and rhythm; S1 S2.   ABDOMEN: Soft, Nontender, Nondistended; Bowel sounds present.  EXTREMITIES:  2+ Peripheral Pulses, No clubbing, cyanosis, or edema.  PSYCH: AAOx3.  NEUROLOGY: non-focal.  SKIN: No rashes or lesions.

## 2022-10-27 NOTE — PROGRESS NOTE ADULT - ASSESSMENT
89 yo M PMHx CAD s/p CABG, chronic HFpEF, HTN, dyslipidemia, DM II, anxiety presented for evaluation of mid line chest pain radiating to right side and right arm. Pt admitted to [facility] to CCU and was transferred north for cath.    A/P:   NSTEMI  CAD s/p CABG  Troponin 0.11 peak 2.13,   EKG showed paced rhythm, RBBB,   Echo showed normal LVEF 55-60%.   Cardiac cath showed LAD: 95% stenosis prox LAD, 99% stenosis mid LAD, Diag: Moderate diffuse diseaseLCx: 100% stenosis  in distal segment  s/p staged PCI and CHAYA placement for proximal and mid LAD, 2 stents.   s/p Heparin drip  Continue ASA 81 mg daily, Lipitor 80 mg daily, Carvedilol 23 mg BID.    Acute Kidney Injury on CKD stage3:   Due to Contrast induced nephropathy.   Cr increased to 2.1 from 1.6 on admission.   Avoid nephrotoxic agents, s/p IV fluid.     DM type 2:   On Lantus and Lispro.     Gout : continue Allopurinol 100 mg qD    HTN: Continue Nifedipine 60 mg daily.   Anxiety: continue Citalopram 20 mg daily.     #Progress Note Handoff:  Pending (specify):    Family discussion: spoke and family regarding cath results  Disposition: Home today.

## 2022-10-27 NOTE — DISCHARGE NOTE PROVIDER - CARE PROVIDER_API CALL
Bell Amor)  Cardiovascular Disease; Interventional Cardiology  [address] [place] [address]  Phone: [phone]  Fax: [phone]  Follow Up Time: 1 week   Bell Amor)  Cardiovascular Disease; Interventional Cardiology  [address] [place] [address]  Phone: [phone]  Fax: [phone]  Follow Up Time: 1 week    Thomas Parker)  [address] [place], [place] [address] [place] [address]  Phone: [phone]  Fax: [phone]  Follow Up Time: 1 week

## 2022-10-27 NOTE — DISCHARGE NOTE PROVIDER - PROVIDER TOKENS
PROVIDER:[TOKEN:[[id number]],FOLLOWUP:[1 week]] PROVIDER:[TOKEN:[[id number]],FOLLOWUP:[1 week]],PROVIDER:[TOKEN:[[id number]],FOLLOWUP:[1 week]]

## 2022-10-27 NOTE — DISCHARGE NOTE PROVIDER - NSDCCPCAREPLAN_GEN_ALL_CORE_FT
PRINCIPAL DISCHARGE DIAGNOSIS  Diagnosis: Chest pain  Assessment and Plan of Treatment: A heart attack occurs when blood flow to the heart muscle is interrupted. This deprives the heart muscle of oxygen, causing tissue damage or tissue death. Common treatments include lifestyle changes, oxygen, medicines, and surgery. Steps to Take: Rest until your doctor says it is okay to return to work or other activities, Take all medicines as prescribed by your doctor. Beta-blockers, ACE inhibitors, and antiplatelet therapy are often recommended.   Diet:   Eat a heart-healthy diet, Limit your intake of fat, cholesterol, and sodium. Foods such as ice cream, cheese, baked goods, and red meat are not the best choices. Increase your intake of whole grains, fish, fruits, vegetables, and nuts. Consume alcohol in moderation: one to two drinks per day for men, one drink per day for women. Discuss supplements with your doctor.  Physical Activity:   The American Heart Association recommends at least 30 minutes of exercise daily, or at least 3-4 times per week, for patients who have had a heart attack. Your doctor will let you know when you are ready to begin regular exercise.   Medications:  The following medicines may be prescribed to prevent you from having another heart attack: Aspirin, which has been shown to decrease the risk of heart attacks. Certain painkillers, such as ibuprofen, when taken together with aspirin, may put you at high risk  for gastrointestinal bleeding and also reduce the effectiveness of aspirin. Clopidogrel or prasugrel. Avoid omeprazole or esomeprazole if you take clopidogrel. They may make clopidogrel not work.  Take your medicine as directed. Do not change the amount or the schedule. Do not stop taking them without talking to your doctor. Do not share them.   Have your cholesterol checked regularly. Get regular medical check-ups.Control your blood pressure. Eat a healthful diet, one that is low in saturated fat and rich in whole grains.      SECONDARY DISCHARGE DIAGNOSES  Diagnosis: CKD (chronic kidney disease)  Assessment and Plan of Treatment: You were noted to have a temporary insult to your kidney function either at the time that you arrived at the hospital or during your stay here. We have monitored your kidney function with blood work during your time here and you are at a level that no longer requires continued hospital level care, but we do recommend that you follow up to continually have your kidney function checked. You can follow up with your primary care doctor, or, if recommended in the discharge paperwork, you should follow up with a Kidney specialist called a Nephrologist.

## 2022-10-27 NOTE — DISCHARGE NOTE PROVIDER - CARE PROVIDERS DIRECT ADDRESSES
,DirectAddress_Unknown ,DirectAddress_Unknown,[email or web address]

## 2022-10-27 NOTE — PROGRESS NOTE ADULT - SUBJECTIVE AND OBJECTIVE BOX
Cardiology Follow up s/p PCI CHAYA    AMANDA CASTORENA   89y Male  PAST MEDICAL & SURGICAL HISTORY:  CHF (congestive heart failure)    DM (diabetes mellitus)    HTN (hypertension)    Prostate CA  in remission    Pacemaker    H/O total knee replacement, right           HPI:  89 y/o male  with a past medical HX of bypass, CAD, CHF , HTN, dyslipidemia, DM , anxiety c/o mid line chest pain radiating to right side and right arm. Pt states the pain started at 4 pm continuous pain 7/10. Pt denies sob , N/V dizziness, or visual change. (19 Oct 2022 01:49)    Allergies    No Known Allergies    Intolerances    Patient seen and examined at bedside. No acute events overnight.  Patient without complaints. Pt ambulated without issues/symptoms  Denies CP, SOB, palpitations, or dizziness  No events on telemetry overnight    Vital Signs Last 24 Hrs  T(C): 36.3 (27 Oct 2022 04:30), Max: 36.3 (27 Oct 2022 04:30)  T(F): 97.3 (27 Oct 2022 04:30), Max: 97.3 (27 Oct 2022 04:30)  HR: 60 (27 Oct 2022 04:30) (60 - 60)  BP: 145/62 (27 Oct 2022 04:30) (121/57 - 145/62)  BP(mean): --  RR: 18 (27 Oct 2022 04:30) (18 - 18)  SpO2: 96% (26 Oct 2022 22:10) (96% - 96%)      MEDICATIONS  (STANDING):  allopurinol 100 milliGRAM(s) Oral daily  aspirin  chewable 81 milliGRAM(s) Oral daily  atorvastatin 80 milliGRAM(s) Oral at bedtime  carvedilol 25 milliGRAM(s) Oral every 12 hours  citalopram 20 milliGRAM(s) Oral daily  clopidogrel Tablet 75 milliGRAM(s) Oral daily  dextrose 5%. 1000 milliLiter(s) (100 mL/Hr) IV Continuous <Continuous>  dextrose 5%. 1000 milliLiter(s) (50 mL/Hr) IV Continuous <Continuous>  dextrose 50% Injectable 25 Gram(s) IV Push once  dextrose 50% Injectable 12.5 Gram(s) IV Push once  dextrose 50% Injectable 25 Gram(s) IV Push once  gabapentin 300 milliGRAM(s) Oral two times a day  glucagon  Injectable 1 milliGRAM(s) IntraMuscular once  heparin   Injectable 5000 Unit(s) SubCutaneous every 8 hours  insulin glargine Injectable (LANTUS) 28 Unit(s) SubCutaneous at bedtime  insulin lispro (ADMELOG) corrective regimen sliding scale   SubCutaneous three times a day before meals  insulin lispro Injectable (ADMELOG) 10 Unit(s) SubCutaneous three times a day before meals  NIFEdipine XL 60 milliGRAM(s) Oral daily  sodium chloride 0.9%. 1000 milliLiter(s) (50 mL/Hr) IV Continuous <Continuous>  sodium chloride 0.9%. 1000 milliLiter(s) (50 mL/Hr) IV Continuous <Continuous>    MEDICATIONS  (PRN):  acetaminophen     Tablet .. 650 milliGRAM(s) Oral every 6 hours PRN Temp greater or equal to 38C (100.4F), Mild Pain (1 - 3)  aluminum hydroxide/magnesium hydroxide/simethicone Suspension 30 milliLiter(s) Oral every 4 hours PRN Dyspepsia  dextrose Oral Gel 15 Gram(s) Oral once PRN Blood Glucose LESS THAN 70 milliGRAM(s)/deciliter  melatonin 3 milliGRAM(s) Oral at bedtime PRN Insomnia  ondansetron Injectable 4 milliGRAM(s) IV Push every 8 hours PRN Nausea and/or Vomiting      REVIEW OF SYSTEMS:          All negative except as mentioned in HPI    PHYSICAL EXAM:           CONSTITUTIONAL: Well-developed; well-nourished; in no acute distress  	SKIN: warm, dry  	HEAD: Normocephalic; atraumatic  	EYES: PERRL.  	ENT: No nasal discharge, airway clear, mucous membranes moist  	NECK: Supple; non tender.  	CARD: +S1, +S2, no murmurs, gallops, or rubs. Regular rate and rhythm    	RESP: No wheezes, rales or rhonchi. CTA B/L  	ABD: soft ntnd, + BS x 4 quadrants  	EXT: moves all extremities,  no clubbing, cyanosis or edema  	NEURO: Alert and oriented x3, no focal deficits          PSYCH: Cooperative, appropriate          VASCULAR:  + Rad / + PTs / +  DPs          EXTREMITY:             Right Groin: access site soft, no hematoma, no pain, + pulses, no sign of infection, no numbness  	            ECG:   < from: 12 Lead ECG (10.26.22 @ 07:30) >  Ventricular Rate 60 BPM    Atrial Rate 61 BPM    QRS Duration 172 ms    Q-T Interval 516 ms    QTC Calculation(Bazett) 516 ms    R Axis -79 degrees    T Axis 90 degrees    Diagnosis Line Ventricular-paced rhythm  Abnormal ECG    Confirmed by Grayson Velazquez ([id number]) on 10/26/2022 9:07:51 AM                                                                                                                 2D ECHO:  < from: TTE Echo Complete w/o Contrast w/ Doppler (10.19.22 @ 14:20) >  Summary:   1. Technically difficult study.   2. Left ventricular ejection fraction, by visual estimation, is 55 to   60%.   3. Normal left ventricular internal cavity size.   4. Mildly enlarged left atrium.   5. Mild mitral annular calcification.   6. Mild aortic valve stenosis.    LABS:                        9.2    5.23  )-----------( 160      ( 27 Oct 2022 07:31 )             27.8     10-27    139  |  105  |  60<H>  ----------------------------<  76  4.0   |  21  |  2.1<H>    Ca    7.9<L>      27 Oct 2022 07:31  Phos  4.4     10-25  Mg     1.9     10-27    TPro  5.6<L>  /  Alb  3.2<L>  /  TBili  0.2  /  DBili  x   /  AST  13  /  ALT  14  /  AlkPhos  105  10-27    Magnesium, Serum: 1.9 mg/dL [1.8 - 2.4] (10-27-22 @ 07:31)  LIVER FUNCTIONS - ( 27 Oct 2022 07:31 )  Alb: 3.2 g/dL / Pro: 5.6 g/dL / ALK PHOS: 105 U/L / ALT: 14 U/L / AST: 13 U/L / GGT: x             A/P:  I discussed the case with Cardiologist Dr. Amor and recommend the following:    S/P PCI:   IV Contrast: 75 mL      Intervention:   - PCI to prox and mid LAD (AUC 8 for revascularization)  - Rotational atherectomy to prox and mid LAD    Implants:   - CHAYA [id number] Amadeo to prox LAD  - CHAYA 2.5x18mm [place] to mid LAD     FINDINGS:     LM: Mild disease  LAD: 95% stenosis prox LAD, 99% stenosis mid LAD  Diag: Moderate diffuse disease  LCx: 100% stenosis  in distal segment  OM1: small, moderate diffuse disease                     No ACEi/ARB due to elevated Cr                   Monitor Cr                    PT / Ambulate with assistance, OOB  	     Continue DAPT ( Aspirin 81 mg daily and Plavix 75 mg daily ), B-Blocker, Statin Therapy                   Patient given 30 day supply of ( Aspirin 81 mg daily and Plavix 75 mg daily ) to take at home                   Patient agreeing to take DAPT for at least one year or as directed by cardiologist                    Pt given instructions on importance of taking antiplatelet medication or risk acute stent thrombosis/death                   Post cath instructions, access site care and activity restrictions reviewed with patient                     Discussed with patient to return to hospital if experience chest pain, shortness breath, dizziness and site bleeding                   Aggressive risk factor modification, diet counseling, smoking cessation discussed with patient                       Benefits of Cardiac Rehab discussed with patient, All documents [facility] to [facility]. Patient instructed to call and make first                               appointment after first f/u visit with Cardiologist                    Follow up with Cardiology Dr. Amor regarding patient disposition.                                          Cardiology Follow up s/p PCI CHAYA    AMANDA CASTORENA   89y Male  PAST MEDICAL & SURGICAL HISTORY:  CHF (congestive heart failure)    DM (diabetes mellitus)    HTN (hypertension)    Prostate CA  in remission    Pacemaker    H/O total knee replacement, right           HPI:  89 y/o male  with a past medical HX of bypass, CAD, CHF , HTN, dyslipidemia, DM , anxiety c/o mid line chest pain radiating to right side and right arm. Pt states the pain started at 4 pm continuous pain 7/10. Pt denies sob , N/V dizziness, or visual change. (19 Oct 2022 01:49)    Allergies    No Known Allergies    Intolerances    Patient seen and examined at bedside. No acute events overnight.  Patient without complaints. Pt ambulated without issues/symptoms with walker and assistance   Denies CP, SOB, palpitations, or dizziness  No events on telemetry overnight    Vital Signs Last 24 Hrs  T(C): 36.3 (27 Oct 2022 04:30), Max: 36.3 (27 Oct 2022 04:30)  T(F): 97.3 (27 Oct 2022 04:30), Max: 97.3 (27 Oct 2022 04:30)  HR: 60 (27 Oct 2022 04:30) (60 - 60)  BP: 145/62 (27 Oct 2022 04:30) (121/57 - 145/62)  BP(mean): --  RR: 18 (27 Oct 2022 04:30) (18 - 18)  SpO2: 96% (26 Oct 2022 22:10) (96% - 96%)      MEDICATIONS  (STANDING):  allopurinol 100 milliGRAM(s) Oral daily  aspirin  chewable 81 milliGRAM(s) Oral daily  atorvastatin 80 milliGRAM(s) Oral at bedtime  carvedilol 25 milliGRAM(s) Oral every 12 hours  citalopram 20 milliGRAM(s) Oral daily  clopidogrel Tablet 75 milliGRAM(s) Oral daily  dextrose 5%. 1000 milliLiter(s) (100 mL/Hr) IV Continuous <Continuous>  dextrose 5%. 1000 milliLiter(s) (50 mL/Hr) IV Continuous <Continuous>  dextrose 50% Injectable 25 Gram(s) IV Push once  dextrose 50% Injectable 12.5 Gram(s) IV Push once  dextrose 50% Injectable 25 Gram(s) IV Push once  gabapentin 300 milliGRAM(s) Oral two times a day  glucagon  Injectable 1 milliGRAM(s) IntraMuscular once  heparin   Injectable 5000 Unit(s) SubCutaneous every 8 hours  insulin glargine Injectable (LANTUS) 28 Unit(s) SubCutaneous at bedtime  insulin lispro (ADMELOG) corrective regimen sliding scale   SubCutaneous three times a day before meals  insulin lispro Injectable (ADMELOG) 10 Unit(s) SubCutaneous three times a day before meals  NIFEdipine XL 60 milliGRAM(s) Oral daily  sodium chloride 0.9%. 1000 milliLiter(s) (50 mL/Hr) IV Continuous <Continuous>  sodium chloride 0.9%. 1000 milliLiter(s) (50 mL/Hr) IV Continuous <Continuous>    MEDICATIONS  (PRN):  acetaminophen     Tablet .. 650 milliGRAM(s) Oral every 6 hours PRN Temp greater or equal to 38C (100.4F), Mild Pain (1 - 3)  aluminum hydroxide/magnesium hydroxide/simethicone Suspension 30 milliLiter(s) Oral every 4 hours PRN Dyspepsia  dextrose Oral Gel 15 Gram(s) Oral once PRN Blood Glucose LESS THAN 70 milliGRAM(s)/deciliter  melatonin 3 milliGRAM(s) Oral at bedtime PRN Insomnia  ondansetron Injectable 4 milliGRAM(s) IV Push every 8 hours PRN Nausea and/or Vomiting      REVIEW OF SYSTEMS:          All negative except as mentioned in HPI    PHYSICAL EXAM:           CONSTITUTIONAL: Well-developed; well-nourished; in no acute distress  	SKIN: warm, dry  	HEAD: Normocephalic; atraumatic  	EYES: PERRL.  	ENT: No nasal discharge, airway clear, mucous membranes moist  	NECK: Supple; non tender.  	CARD: +S1, +S2, no murmurs, gallops, or rubs. Regular rate and rhythm    	RESP: No wheezes, rales or rhonchi. CTA B/L  	ABD: soft ntnd, + BS x 4 quadrants  	EXT: moves all extremities,  no clubbing, cyanosis or edema  	NEURO: Alert and oriented x3, no focal deficits          PSYCH: Cooperative, appropriate          VASCULAR:  + Rad / + PTs / +  DPs          EXTREMITY:             Right Groin: access site soft, no hematoma, no pain, + pulses, no sign of infection, no numbness  	            ECG:   < from: 12 Lead ECG (10.26.22 @ 07:30) >  Ventricular Rate 60 BPM    Atrial Rate 61 BPM    QRS Duration 172 ms    Q-T Interval 516 ms    QTC Calculation(Bazett) 516 ms    R Axis -79 degrees    T Axis 90 degrees    Diagnosis Line Ventricular-paced rhythm  Abnormal ECG    Confirmed by Grayson Velazquez ([id number]) on 10/26/2022 9:07:51 AM                                                                                                                 2D ECHO:  < from: TTE Echo Complete w/o Contrast w/ Doppler (10.19.22 @ 14:20) >  Summary:   1. Technically difficult study.   2. Left ventricular ejection fraction, by visual estimation, is 55 to   60%.   3. Normal left ventricular internal cavity size.   4. Mildly enlarged left atrium.   5. Mild mitral annular calcification.   6. Mild aortic valve stenosis.    LABS:                        9.2    5.23  )-----------( 160      ( 27 Oct 2022 07:31 )             27.8     10-27    139  |  105  |  60<H>  ----------------------------<  76  4.0   |  21  |  2.1<H>    Ca    7.9<L>      27 Oct 2022 07:31  Phos  4.4     10-25  Mg     1.9     10-27    TPro  5.6<L>  /  Alb  3.2<L>  /  TBili  0.2  /  DBili  x   /  AST  13  /  ALT  14  /  AlkPhos  105  10-27    Magnesium, Serum: 1.9 mg/dL [1.8 - 2.4] (10-27-22 @ 07:31)  LIVER FUNCTIONS - ( 27 Oct 2022 07:31 )  Alb: 3.2 g/dL / Pro: 5.6 g/dL / ALK PHOS: 105 U/L / ALT: 14 U/L / AST: 13 U/L / GGT: x             A/P:  I discussed the case with Cardiologist Dr. Amor and recommend the following:    S/P PCI:   IV Contrast: 75 mL      Intervention:   - PCI to prox and mid LAD (AUC 8 for revascularization)  - Rotational atherectomy to prox and mid LAD    Implants:   - CHAYA [id number] [place] to prox LAD  - CHAYA 2.5x18mm Amadeo to mid LAD     FINDINGS:     LM: Mild disease  LAD: 95% stenosis prox LAD, 99% stenosis mid LAD  Diag: Moderate diffuse disease  LCx: 100% stenosis  in distal segment  OM1: small, moderate diffuse disease                     No ACEi/ARB due to elevated Cr                   Monitor Cr                    PT / Ambulate with assistance, OOB                   Hold Lasix for 5 days   	     Continue DAPT ( Aspirin 81 mg daily and Plavix 75 mg daily ), B-Blocker, CCB, Statin Therapy                   Patient given 30 day supply of ( Aspirin 81 mg daily and Plavix 75 mg daily ) to take at home                   Patient agreeing to take DAPT for at least one year or as directed by cardiologist                    Pt given instructions on importance of taking antiplatelet medication or risk acute stent thrombosis/death                   Post cath instructions, access site care and activity restrictions reviewed with patient                     Discussed with patient to return to hospital if experience chest pain, shortness breath, dizziness and site bleeding                   Aggressive risk factor modification, diet counseling, smoking cessation discussed with patient                       Benefits of Cardiac Rehab discussed with patient, All documents sent to [facility]. Patient instructed to call and make first                               appointment after first f/u visit with Cardiologist                    Patient can be discharge home from interventional cardiology standpoint                    Follow up with Cardiology Dr. Amor in one weeks as OP. Patient advised to call and make follow up appointment.

## 2022-10-27 NOTE — PHYSICAL THERAPY INITIAL EVALUATION ADULT - NSACTIVITYREC_GEN_A_PT
Patient reported he owns a RW and has daily supervision by his daughter. Patient requested home PT to improve his balance so he can learn to walk without RW.

## 2022-10-27 NOTE — DISCHARGE NOTE PROVIDER - NSDCMRMEDTOKEN_GEN_ALL_CORE_FT
allopurinol 100 mg oral tablet: 1 tab(s) orally once a day  aspirin 81 mg oral tablet, chewable: 1 tab(s) orally once a day  atorvastatin 80 mg oral tablet: 1 tab(s) orally once a day (at bedtime)  carvedilol 25 mg oral tablet: 1 tab(s) orally every 12 hours  citalopram 20 mg oral tablet: 1 tab(s) orally once a day  clonazePAM 1 mg oral tablet: 2 tab(s) orally once a day (at bedtime), As Needed  clopidogrel 75 mg oral tablet: 1 tab(s) orally once a day  furosemide 40 mg oral tablet: 1 tab(s) orally every 12 hours starting 6 days after discharge.  gabapentin 300 mg oral capsule: 1 cap(s) orally 2 times a day  glimepiride 4 mg oral tablet: 1 tab(s) orally 2 times a day  Lantus 100 units/mL subcutaneous solution: 30 unit(s) subcutaneous once a day (at bedtime)  NIFEdipine 60 mg oral tablet, extended release: 1 tab(s) orally once a day

## 2022-10-27 NOTE — PROGRESS NOTE ADULT - PROVIDER SPECIALTY LIST ADULT
Hospitalist
Hospitalist
Internal Medicine
Hospitalist
Internal Medicine
Intervent Cardiology
Intervent Cardiology
Cardiology
Cardiology
Hospitalist
Internal Medicine
Cardiology
Internal Medicine
Internal Medicine
DISPLAY PLAN FREE TEXT

## 2022-10-27 NOTE — DISCHARGE NOTE PROVIDER - HOSPITAL COURSE
89 y/o male  with a past medical HX of bypass, CAD, CHF , HTN, dyslipidemia, DM , anxiety c/o mid line chest pain radiating to right side and right arm. Pt states the pain started at 4 pm 10/18 w continuous pain 7/10. Pt denies sob , N/V dizziness, or visual change.    10/21 AP  #NSTEMI  #H/O CAD s/p CABG  - Trop 0.11 on admission, repeat 2.13, trending down  - ECG w/o signs of ischemia  - Placed on Hep GGT, Monitor PTT, keep b/w 55-70  - Cardio consult appreciated, will go for cath today, NPO for now  - TTE EF 55-60%, no wall abnormalities noted  - c/w ASA 81 mg qD, Lipitor 80 mg qD, Carvedilol 23 mg BID, Lasix 40 mg qD    #T2DM - c/w Lantus 28 U qHS, Lispro 3 U qAC w/ SS, monitor FS  #Gout - c/w Allopurinol 100 mg qD  #HTN - c/w Home Meds - Nifedipine 60 mg qD    cath 10/21 showed two vessel disease, staged PCI 10/25 successful, patient doing well, stable for dc home with home PT, holding lasix 5 days due to creatinine bump post contrast.

## 2022-11-02 RX ORDER — FUROSEMIDE 40 MG
1 TABLET ORAL
Qty: 60 | Refills: 0
Start: 2022-11-02 | End: 2022-12-01

## 2022-11-10 DIAGNOSIS — Z83.3 FAMILY HISTORY OF DIABETES MELLITUS: ICD-10-CM

## 2022-11-10 DIAGNOSIS — R07.9 CHEST PAIN, UNSPECIFIED: ICD-10-CM

## 2022-11-10 DIAGNOSIS — F41.9 ANXIETY DISORDER, UNSPECIFIED: ICD-10-CM

## 2022-11-10 DIAGNOSIS — Z82.49 FAMILY HISTORY OF ISCHEMIC HEART DISEASE AND OTHER DISEASES OF THE CIRCULATORY SYSTEM: ICD-10-CM

## 2022-11-10 DIAGNOSIS — M10.9 GOUT, UNSPECIFIED: ICD-10-CM

## 2022-11-10 DIAGNOSIS — Z95.0 PRESENCE OF CARDIAC PACEMAKER: ICD-10-CM

## 2022-11-10 DIAGNOSIS — I25.84 CORONARY ATHEROSCLEROSIS DUE TO CALCIFIED CORONARY LESION: ICD-10-CM

## 2022-11-10 DIAGNOSIS — Z96.651 PRESENCE OF RIGHT ARTIFICIAL KNEE JOINT: ICD-10-CM

## 2022-11-10 DIAGNOSIS — E11.9 TYPE 2 DIABETES MELLITUS WITHOUT COMPLICATIONS: ICD-10-CM

## 2022-11-10 DIAGNOSIS — I34.81 NONRHEUMATIC MITRAL (VALVE) ANNULUS CALCIFICATION: ICD-10-CM

## 2022-11-10 DIAGNOSIS — N18.32 CHRONIC KIDNEY DISEASE, STAGE 3B: ICD-10-CM

## 2022-11-10 DIAGNOSIS — Z95.1 PRESENCE OF AORTOCORONARY BYPASS GRAFT: ICD-10-CM

## 2022-11-10 DIAGNOSIS — I67.9 CEREBROVASCULAR DISEASE, UNSPECIFIED: ICD-10-CM

## 2022-11-10 DIAGNOSIS — D64.9 ANEMIA, UNSPECIFIED: ICD-10-CM

## 2022-11-10 DIAGNOSIS — I50.32 CHRONIC DIASTOLIC (CONGESTIVE) HEART FAILURE: ICD-10-CM

## 2022-11-10 DIAGNOSIS — Z85.46 PERSONAL HISTORY OF MALIGNANT NEOPLASM OF PROSTATE: ICD-10-CM

## 2022-11-10 DIAGNOSIS — I13.0 HYPERTENSIVE HEART AND CHRONIC KIDNEY DISEASE WITH HEART FAILURE AND STAGE 1 THROUGH STAGE 4 CHRONIC KIDNEY DISEASE, OR UNSPECIFIED CHRONIC KIDNEY DISEASE: ICD-10-CM

## 2022-11-10 DIAGNOSIS — Z79.4 LONG TERM (CURRENT) USE OF INSULIN: ICD-10-CM

## 2022-11-10 DIAGNOSIS — I25.10 ATHEROSCLEROTIC HEART DISEASE OF NATIVE CORONARY ARTERY WITHOUT ANGINA PECTORIS: ICD-10-CM

## 2022-11-10 DIAGNOSIS — I21.4 NON-ST ELEVATION (NSTEMI) MYOCARDIAL INFARCTION: ICD-10-CM

## 2022-11-10 DIAGNOSIS — E78.00 PURE HYPERCHOLESTEROLEMIA, UNSPECIFIED: ICD-10-CM

## 2022-11-10 DIAGNOSIS — Z79.82 LONG TERM (CURRENT) USE OF ASPIRIN: ICD-10-CM

## 2022-11-10 DIAGNOSIS — Z87.891 PERSONAL HISTORY OF NICOTINE DEPENDENCE: ICD-10-CM

## 2022-11-10 DIAGNOSIS — I45.10 UNSPECIFIED RIGHT BUNDLE-BRANCH BLOCK: ICD-10-CM

## 2022-11-12 NOTE — ED PROVIDER NOTE - NSDCPRINTRESULTS_ED_ALL_ED
No adenopathy or splenomegaly. No cervical or inguinal lymphadenopathy.
Patient requests all Lab, Cardiology, and Radiology Results on their Discharge Instructions

## 2023-01-25 NOTE — ED ADULT TRIAGE NOTE - CHIEF COMPLAINT QUOTE
Pt BIB [facility] from home for SOB; HX COPD; 86% RA on scene; 100% on NRB; 12mg dexamethasone given IV by EMS. Detail Level: Detailed

## 2023-02-17 NOTE — ED ADULT NURSE NOTE - HISTORY OF COVID-19 VACCINATION
Canedlario Alba Patient Age: 52 year old  MESSAGE:   Received fax from dialysis with copy of patient insurance card.     WEIGHT AND HEIGHT:   Wt Readings from Last 1 Encounters:   12/07/22 58.1 kg (128 lb 1.4 oz)     Ht Readings from Last 1 Encounters:   12/07/22 5' 2.5\" (1.588 m)     BMI Readings from Last 1 Encounters:   12/07/22 23.05 kg/m²       ALLERGIES:  Allopurinol  Current Outpatient Medications   Medication Sig Dispense Refill   • atorvastatin (LIPITOR) 10 MG tablet TAKE ONE TABLET BY MOUTH DAILY 90 tablet 1   • Ferric Citrate 1  MG(Fe) Tab Take 2 tablets by mouth.     • INV - iron sucrose,VENOFER, (REPAIR-KEVIN) 200 mg.     • febuxostat (ULORIC) 40 MG Tab Take 1 tablet by mouth daily. 90 tablet 3   • Vitamin D, Ergocalciferol, 1.25 mg (50,000 units) capsule TAKE ONE CAPSULE BY MOUTH EVERY 30 DAYS 3 capsule 3   • sevelamer carbonate (RENVELA) 800 MG tablet TAKE ONE TABLET BY MOUTH THREE TIMES A DAY WITH MEALS 270 tablet 3   • calcitRIOL (ROCALTROL) 0.5 MCG capsule Take 1 capsule by mouth 3 days a week. 36 capsule 3   • Lancets Misc. Kit Test blood sugar once daily.  Metrix brand. 1 kit 0     No current facility-administered medications for this visit.     PHARMACY to use: [facility]           Pharmacy preference(s) on file:   [facility] [id number] - [place] [address] [place] [address] [address] [facility] [address]  Phone: [phone] Fax: [phone]      CALL BACK INFO: DO NOT LEAVE A MESSAGE - contact patient directly  ROUTING: Patient's physician/staff        PCP: Poonam Dbobins DO         INS: Payor: [facility] MEDICARE ADVANTAGE / Plan: [facility] MEDICARE ADVANTAGE HMOPOS / Product Type: MEDADV   PATIENT ADDRESS:  [address] [place] [id number]    
Spoke with Christine at [facility] [place].  Informed that patient does not need a referral to see Dr Ap Iyer until after 4- as the referrals have been waived until after that time.    Contact number for Dr Ap Iyer  [phone]  Will need to be seen at the [facility].    Per Talita at [facility] Dr Iyer and Sarthak Mar are both participating in his plan.    This message was faxed to Christine for review.    
Spoke with Christine at [facility] and patient needs a referral to see Dr Ap Iyer for evaluation of AVF to start HD    Patient has been on PD for 3 years and his bun is continued to be elevated so the PD is not being effective..    On review of epic the member number is the same as on the faxed card.  [id number]  [employer] medicare Advantage (O-POS)  Provider service   [phone]    To Ap Iyer  NPI [id number]  Tax ID [id number]    Dx code: N18.6    CPT 99204 x 1  CPT 99213 x  3    2- to 8-    Is Good Zaid in his plan?  NPI [id number]  Tax ID [id number]    From: Dr Anca Ely  NPI [id number]  Tax ID [id number]  
Spoke with Mikala at [facility]  [phone].  Given the information in the message.    She states that no referral is needed for patient to be seen by Dr Iyer until 4- as the referrals have been waived until that time due to Covid-19.    After 4- patient will need a referral to be seen.    She confirms that Dr Ap Iyer and [facility] are both participating in his plan.    Again confirmed that no referral is needed to see Dr Iyer until after 4-.    Reference number [id number]  ([id number])  
Vaccine status unknown

## 2023-10-30 ENCOUNTER — INPATIENT (INPATIENT)
Facility: HOSPITAL | Age: 88
LOS: 28 days | Discharge: HOME | DRG: 871 | End: 2023-11-28
Attending: STUDENT IN AN ORGANIZED HEALTH CARE EDUCATION/TRAINING PROGRAM | Admitting: INTERNAL MEDICINE
Payer: MEDICARE

## 2023-10-30 VITALS
HEIGHT: 68 IN | SYSTOLIC BLOOD PRESSURE: 121 MMHG | RESPIRATION RATE: 18 BRPM | DIASTOLIC BLOOD PRESSURE: 88 MMHG | HEART RATE: 62 BPM | TEMPERATURE: 99 F | OXYGEN SATURATION: 99 % | WEIGHT: 195.11 LBS

## 2023-10-30 DIAGNOSIS — N17.9 ACUTE KIDNEY FAILURE, UNSPECIFIED: ICD-10-CM

## 2023-10-30 DIAGNOSIS — Z96.651 PRESENCE OF RIGHT ARTIFICIAL KNEE JOINT: Chronic | ICD-10-CM

## 2023-10-30 LAB
ALBUMIN SERPL ELPH-MCNC: 3.8 G/DL — SIGNIFICANT CHANGE UP (ref 3.5–5.2)
ALBUMIN SERPL ELPH-MCNC: 3.8 G/DL — SIGNIFICANT CHANGE UP (ref 3.5–5.2)
ALP SERPL-CCNC: 100 U/L — SIGNIFICANT CHANGE UP (ref 30–115)
ALP SERPL-CCNC: 100 U/L — SIGNIFICANT CHANGE UP (ref 30–115)
ALT FLD-CCNC: 23 U/L — SIGNIFICANT CHANGE UP (ref 0–41)
ALT FLD-CCNC: 23 U/L — SIGNIFICANT CHANGE UP (ref 0–41)
ANION GAP SERPL CALC-SCNC: 24 MMOL/L — HIGH (ref 7–14)
ANION GAP SERPL CALC-SCNC: 24 MMOL/L — HIGH (ref 7–14)
AST SERPL-CCNC: 36 U/L — SIGNIFICANT CHANGE UP (ref 0–41)
AST SERPL-CCNC: 36 U/L — SIGNIFICANT CHANGE UP (ref 0–41)
B-OH-BUTYR SERPL-SCNC: 3 MMOL/L — HIGH
B-OH-BUTYR SERPL-SCNC: 3 MMOL/L — HIGH
BASE EXCESS BLDV CALC-SCNC: -11.1 MMOL/L — LOW (ref -2–3)
BASE EXCESS BLDV CALC-SCNC: -11.1 MMOL/L — LOW (ref -2–3)
BASOPHILS # BLD AUTO: 0.03 K/UL — SIGNIFICANT CHANGE UP (ref 0–0.2)
BASOPHILS # BLD AUTO: 0.03 K/UL — SIGNIFICANT CHANGE UP (ref 0–0.2)
BASOPHILS NFR BLD AUTO: 0.2 % — SIGNIFICANT CHANGE UP (ref 0–1)
BASOPHILS NFR BLD AUTO: 0.2 % — SIGNIFICANT CHANGE UP (ref 0–1)
BILIRUB SERPL-MCNC: 0.4 MG/DL — SIGNIFICANT CHANGE UP (ref 0.2–1.2)
BILIRUB SERPL-MCNC: 0.4 MG/DL — SIGNIFICANT CHANGE UP (ref 0.2–1.2)
BUN SERPL-MCNC: 95 MG/DL — CRITICAL HIGH (ref 10–20)
BUN SERPL-MCNC: 95 MG/DL — CRITICAL HIGH (ref 10–20)
CA-I SERPL-SCNC: 1.1 MMOL/L — LOW (ref 1.15–1.33)
CA-I SERPL-SCNC: 1.1 MMOL/L — LOW (ref 1.15–1.33)
CALCIUM SERPL-MCNC: 8.1 MG/DL — LOW (ref 8.4–10.5)
CALCIUM SERPL-MCNC: 8.1 MG/DL — LOW (ref 8.4–10.5)
CHLORIDE SERPL-SCNC: 90 MMOL/L — LOW (ref 98–110)
CHLORIDE SERPL-SCNC: 90 MMOL/L — LOW (ref 98–110)
CO2 SERPL-SCNC: 15 MMOL/L — LOW (ref 17–32)
CO2 SERPL-SCNC: 15 MMOL/L — LOW (ref 17–32)
CREAT SERPL-MCNC: 3.7 MG/DL — HIGH (ref 0.7–1.5)
CREAT SERPL-MCNC: 3.7 MG/DL — HIGH (ref 0.7–1.5)
EGFR: 15 ML/MIN/1.73M2 — LOW
EGFR: 15 ML/MIN/1.73M2 — LOW
EOSINOPHIL # BLD AUTO: 0 K/UL — SIGNIFICANT CHANGE UP (ref 0–0.7)
EOSINOPHIL # BLD AUTO: 0 K/UL — SIGNIFICANT CHANGE UP (ref 0–0.7)
EOSINOPHIL NFR BLD AUTO: 0 % — SIGNIFICANT CHANGE UP (ref 0–8)
EOSINOPHIL NFR BLD AUTO: 0 % — SIGNIFICANT CHANGE UP (ref 0–8)
FLUAV AG NPH QL: SIGNIFICANT CHANGE UP
FLUAV AG NPH QL: SIGNIFICANT CHANGE UP
FLUBV AG NPH QL: SIGNIFICANT CHANGE UP
FLUBV AG NPH QL: SIGNIFICANT CHANGE UP
GAS PNL BLDV: 124 MMOL/L — LOW (ref 136–145)
GAS PNL BLDV: 124 MMOL/L — LOW (ref 136–145)
GAS PNL BLDV: SIGNIFICANT CHANGE UP
GAS PNL BLDV: SIGNIFICANT CHANGE UP
GLUCOSE BLDC GLUCOMTR-MCNC: 419 MG/DL — HIGH (ref 70–99)
GLUCOSE BLDC GLUCOMTR-MCNC: 419 MG/DL — HIGH (ref 70–99)
GLUCOSE BLDC GLUCOMTR-MCNC: 429 MG/DL — HIGH (ref 70–99)
GLUCOSE BLDC GLUCOMTR-MCNC: 429 MG/DL — HIGH (ref 70–99)
GLUCOSE SERPL-MCNC: 477 MG/DL — CRITICAL HIGH (ref 70–99)
GLUCOSE SERPL-MCNC: 477 MG/DL — CRITICAL HIGH (ref 70–99)
HCO3 BLDV-SCNC: 16 MMOL/L — LOW (ref 22–29)
HCO3 BLDV-SCNC: 16 MMOL/L — LOW (ref 22–29)
HCT VFR BLD CALC: 28.6 % — LOW (ref 42–52)
HCT VFR BLD CALC: 28.6 % — LOW (ref 42–52)
HCT VFR BLDA CALC: 38 % — LOW (ref 39–51)
HCT VFR BLDA CALC: 38 % — LOW (ref 39–51)
HGB BLD CALC-MCNC: 12.6 G/DL — SIGNIFICANT CHANGE UP (ref 12.6–17.4)
HGB BLD CALC-MCNC: 12.6 G/DL — SIGNIFICANT CHANGE UP (ref 12.6–17.4)
HGB BLD-MCNC: 9.5 G/DL — LOW (ref 14–18)
HGB BLD-MCNC: 9.5 G/DL — LOW (ref 14–18)
IMM GRANULOCYTES NFR BLD AUTO: 0.6 % — HIGH (ref 0.1–0.3)
IMM GRANULOCYTES NFR BLD AUTO: 0.6 % — HIGH (ref 0.1–0.3)
LACTATE BLDV-MCNC: 3.2 MMOL/L — HIGH (ref 0.5–2)
LACTATE BLDV-MCNC: 3.2 MMOL/L — HIGH (ref 0.5–2)
LYMPHOCYTES # BLD AUTO: 0.51 K/UL — LOW (ref 1.2–3.4)
LYMPHOCYTES # BLD AUTO: 0.51 K/UL — LOW (ref 1.2–3.4)
LYMPHOCYTES # BLD AUTO: 4 % — LOW (ref 20.5–51.1)
LYMPHOCYTES # BLD AUTO: 4 % — LOW (ref 20.5–51.1)
MCHC RBC-ENTMCNC: 29.6 PG — SIGNIFICANT CHANGE UP (ref 27–31)
MCHC RBC-ENTMCNC: 29.6 PG — SIGNIFICANT CHANGE UP (ref 27–31)
MCHC RBC-ENTMCNC: 33.2 G/DL — SIGNIFICANT CHANGE UP (ref 32–37)
MCHC RBC-ENTMCNC: 33.2 G/DL — SIGNIFICANT CHANGE UP (ref 32–37)
MCV RBC AUTO: 89.1 FL — SIGNIFICANT CHANGE UP (ref 80–94)
MCV RBC AUTO: 89.1 FL — SIGNIFICANT CHANGE UP (ref 80–94)
MONOCYTES # BLD AUTO: 0.85 K/UL — HIGH (ref 0.1–0.6)
MONOCYTES # BLD AUTO: 0.85 K/UL — HIGH (ref 0.1–0.6)
MONOCYTES NFR BLD AUTO: 6.7 % — SIGNIFICANT CHANGE UP (ref 1.7–9.3)
MONOCYTES NFR BLD AUTO: 6.7 % — SIGNIFICANT CHANGE UP (ref 1.7–9.3)
NEUTROPHILS # BLD AUTO: 11.28 K/UL — HIGH (ref 1.4–6.5)
NEUTROPHILS # BLD AUTO: 11.28 K/UL — HIGH (ref 1.4–6.5)
NEUTROPHILS NFR BLD AUTO: 88.5 % — HIGH (ref 42.2–75.2)
NEUTROPHILS NFR BLD AUTO: 88.5 % — HIGH (ref 42.2–75.2)
NRBC # BLD: 0 /100 WBCS — SIGNIFICANT CHANGE UP (ref 0–0)
NRBC # BLD: 0 /100 WBCS — SIGNIFICANT CHANGE UP (ref 0–0)
NT-PROBNP SERPL-SCNC: HIGH PG/ML (ref 0–300)
NT-PROBNP SERPL-SCNC: HIGH PG/ML (ref 0–300)
PCO2 BLDV: 39 MMHG — LOW (ref 42–55)
PCO2 BLDV: 39 MMHG — LOW (ref 42–55)
PH BLDV: 7.22 — LOW (ref 7.32–7.43)
PH BLDV: 7.22 — LOW (ref 7.32–7.43)
PLATELET # BLD AUTO: 198 K/UL — SIGNIFICANT CHANGE UP (ref 130–400)
PLATELET # BLD AUTO: 198 K/UL — SIGNIFICANT CHANGE UP (ref 130–400)
PMV BLD: 10 FL — SIGNIFICANT CHANGE UP (ref 7.4–10.4)
PMV BLD: 10 FL — SIGNIFICANT CHANGE UP (ref 7.4–10.4)
PO2 BLDV: 24 MMHG — SIGNIFICANT CHANGE UP
PO2 BLDV: 24 MMHG — SIGNIFICANT CHANGE UP
POTASSIUM BLDV-SCNC: 5.4 MMOL/L — HIGH (ref 3.5–5.1)
POTASSIUM BLDV-SCNC: 5.4 MMOL/L — HIGH (ref 3.5–5.1)
POTASSIUM SERPL-MCNC: 5.2 MMOL/L — HIGH (ref 3.5–5)
POTASSIUM SERPL-MCNC: 5.2 MMOL/L — HIGH (ref 3.5–5)
POTASSIUM SERPL-SCNC: 5.2 MMOL/L — HIGH (ref 3.5–5)
POTASSIUM SERPL-SCNC: 5.2 MMOL/L — HIGH (ref 3.5–5)
PROT SERPL-MCNC: 6.6 G/DL — SIGNIFICANT CHANGE UP (ref 6–8)
PROT SERPL-MCNC: 6.6 G/DL — SIGNIFICANT CHANGE UP (ref 6–8)
RBC # BLD: 3.21 M/UL — LOW (ref 4.7–6.1)
RBC # BLD: 3.21 M/UL — LOW (ref 4.7–6.1)
RBC # FLD: 14.7 % — HIGH (ref 11.5–14.5)
RBC # FLD: 14.7 % — HIGH (ref 11.5–14.5)
RSV RNA NPH QL NAA+NON-PROBE: SIGNIFICANT CHANGE UP
RSV RNA NPH QL NAA+NON-PROBE: SIGNIFICANT CHANGE UP
SAO2 % BLDV: 28.4 % — SIGNIFICANT CHANGE UP
SAO2 % BLDV: 28.4 % — SIGNIFICANT CHANGE UP
SARS-COV-2 RNA SPEC QL NAA+PROBE: SIGNIFICANT CHANGE UP
SARS-COV-2 RNA SPEC QL NAA+PROBE: SIGNIFICANT CHANGE UP
SODIUM SERPL-SCNC: 129 MMOL/L — LOW (ref 135–146)
SODIUM SERPL-SCNC: 129 MMOL/L — LOW (ref 135–146)
TROPONIN T SERPL-MCNC: 0.24 NG/ML — CRITICAL HIGH
TROPONIN T SERPL-MCNC: 0.24 NG/ML — CRITICAL HIGH
WBC # BLD: 12.75 K/UL — HIGH (ref 4.8–10.8)
WBC # BLD: 12.75 K/UL — HIGH (ref 4.8–10.8)
WBC # FLD AUTO: 12.75 K/UL — HIGH (ref 4.8–10.8)
WBC # FLD AUTO: 12.75 K/UL — HIGH (ref 4.8–10.8)

## 2023-10-30 PROCEDURE — 82962 GLUCOSE BLOOD TEST: CPT

## 2023-10-30 PROCEDURE — 86705 HEP B CORE ANTIBODY IGM: CPT

## 2023-10-30 PROCEDURE — 84132 ASSAY OF SERUM POTASSIUM: CPT

## 2023-10-30 PROCEDURE — 97535 SELF CARE MNGMENT TRAINING: CPT | Mod: GO

## 2023-10-30 PROCEDURE — 97110 THERAPEUTIC EXERCISES: CPT | Mod: GO

## 2023-10-30 PROCEDURE — 81001 URINALYSIS AUTO W/SCOPE: CPT

## 2023-10-30 PROCEDURE — 83605 ASSAY OF LACTIC ACID: CPT

## 2023-10-30 PROCEDURE — 82570 ASSAY OF URINE CREATININE: CPT

## 2023-10-30 PROCEDURE — 92611 MOTION FLUOROSCOPY/SWALLOW: CPT | Mod: GN

## 2023-10-30 PROCEDURE — C9113: CPT

## 2023-10-30 PROCEDURE — 85610 PROTHROMBIN TIME: CPT

## 2023-10-30 PROCEDURE — 94660 CPAP INITIATION&MGMT: CPT

## 2023-10-30 PROCEDURE — 84100 ASSAY OF PHOSPHORUS: CPT

## 2023-10-30 PROCEDURE — 80053 COMPREHEN METABOLIC PANEL: CPT

## 2023-10-30 PROCEDURE — 84133 ASSAY OF URINE POTASSIUM: CPT

## 2023-10-30 PROCEDURE — 70450 CT HEAD/BRAIN W/O DYE: CPT | Mod: 26,MA

## 2023-10-30 PROCEDURE — 87641 MR-STAPH DNA AMP PROBE: CPT

## 2023-10-30 PROCEDURE — 84145 PROCALCITONIN (PCT): CPT

## 2023-10-30 PROCEDURE — 76770 US EXAM ABDO BACK WALL COMP: CPT

## 2023-10-30 PROCEDURE — 93005 ELECTROCARDIOGRAM TRACING: CPT

## 2023-10-30 PROCEDURE — 85027 COMPLETE CBC AUTOMATED: CPT

## 2023-10-30 PROCEDURE — 93306 TTE W/DOPPLER COMPLETE: CPT

## 2023-10-30 PROCEDURE — 71045 X-RAY EXAM CHEST 1 VIEW: CPT

## 2023-10-30 PROCEDURE — 99291 CRITICAL CARE FIRST HOUR: CPT

## 2023-10-30 PROCEDURE — 84540 ASSAY OF URINE/UREA-N: CPT

## 2023-10-30 PROCEDURE — 99291 CRITICAL CARE FIRST HOUR: CPT | Mod: GC

## 2023-10-30 PROCEDURE — 83036 HEMOGLOBIN GLYCOSYLATED A1C: CPT

## 2023-10-30 PROCEDURE — 90935 HEMODIALYSIS ONE EVALUATION: CPT

## 2023-10-30 PROCEDURE — 82330 ASSAY OF CALCIUM: CPT

## 2023-10-30 PROCEDURE — 82803 BLOOD GASES ANY COMBINATION: CPT

## 2023-10-30 PROCEDURE — 85014 HEMATOCRIT: CPT

## 2023-10-30 PROCEDURE — 97116 GAIT TRAINING THERAPY: CPT | Mod: GP

## 2023-10-30 PROCEDURE — 84295 ASSAY OF SERUM SODIUM: CPT

## 2023-10-30 PROCEDURE — 71045 X-RAY EXAM CHEST 1 VIEW: CPT | Mod: 26

## 2023-10-30 PROCEDURE — 83735 ASSAY OF MAGNESIUM: CPT

## 2023-10-30 PROCEDURE — 87040 BLOOD CULTURE FOR BACTERIA: CPT

## 2023-10-30 PROCEDURE — 97162 PT EVAL MOD COMPLEX 30 MIN: CPT | Mod: GP

## 2023-10-30 PROCEDURE — 87635 SARS-COV-2 COVID-19 AMP PRB: CPT

## 2023-10-30 PROCEDURE — 84134 ASSAY OF PREALBUMIN: CPT

## 2023-10-30 PROCEDURE — 72125 CT NECK SPINE W/O DYE: CPT | Mod: 26,MA

## 2023-10-30 PROCEDURE — 86706 HEP B SURFACE ANTIBODY: CPT

## 2023-10-30 PROCEDURE — 93010 ELECTROCARDIOGRAM REPORT: CPT | Mod: 76

## 2023-10-30 PROCEDURE — 74230 X-RAY XM SWLNG FUNCJ C+: CPT

## 2023-10-30 PROCEDURE — 80202 ASSAY OF VANCOMYCIN: CPT

## 2023-10-30 PROCEDURE — 36415 COLL VENOUS BLD VENIPUNCTURE: CPT

## 2023-10-30 PROCEDURE — 94640 AIRWAY INHALATION TREATMENT: CPT

## 2023-10-30 PROCEDURE — 85018 HEMOGLOBIN: CPT

## 2023-10-30 PROCEDURE — 85730 THROMBOPLASTIN TIME PARTIAL: CPT

## 2023-10-30 PROCEDURE — 95819 EEG AWAKE AND ASLEEP: CPT

## 2023-10-30 PROCEDURE — 97165 OT EVAL LOW COMPLEX 30 MIN: CPT | Mod: GO

## 2023-10-30 PROCEDURE — 82010 KETONE BODYS QUAN: CPT

## 2023-10-30 PROCEDURE — 83935 ASSAY OF URINE OSMOLALITY: CPT

## 2023-10-30 PROCEDURE — 70490 CT SOFT TISSUE NECK W/O DYE: CPT

## 2023-10-30 PROCEDURE — 94760 N-INVAS EAR/PLS OXIMETRY 1: CPT

## 2023-10-30 PROCEDURE — ZZZZZ: CPT

## 2023-10-30 PROCEDURE — 87340 HEPATITIS B SURFACE AG IA: CPT

## 2023-10-30 PROCEDURE — 92610 EVALUATE SWALLOWING FUNCTION: CPT | Mod: GN

## 2023-10-30 PROCEDURE — 84156 ASSAY OF PROTEIN URINE: CPT

## 2023-10-30 PROCEDURE — 80048 BASIC METABOLIC PNL TOTAL CA: CPT

## 2023-10-30 PROCEDURE — 85025 COMPLETE CBC W/AUTO DIFF WBC: CPT

## 2023-10-30 PROCEDURE — 87640 STAPH A DNA AMP PROBE: CPT

## 2023-10-30 PROCEDURE — 84300 ASSAY OF URINE SODIUM: CPT

## 2023-10-30 PROCEDURE — 80299 QUANTITATIVE ASSAY DRUG: CPT

## 2023-10-30 PROCEDURE — 86704 HEP B CORE ANTIBODY TOTAL: CPT

## 2023-10-30 PROCEDURE — 92526 ORAL FUNCTION THERAPY: CPT | Mod: GN

## 2023-10-30 RX ORDER — SODIUM CHLORIDE 9 MG/ML
1000 INJECTION INTRAMUSCULAR; INTRAVENOUS; SUBCUTANEOUS
Refills: 0 | Status: DISCONTINUED | OUTPATIENT
Start: 2023-10-30 | End: 2023-10-31

## 2023-10-30 RX ORDER — CLOPIDOGREL BISULFATE 75 MG/1
75 TABLET, FILM COATED ORAL DAILY
Refills: 0 | Status: DISCONTINUED | OUTPATIENT
Start: 2023-10-31 | End: 2023-11-28

## 2023-10-30 RX ORDER — ACETAMINOPHEN 500 MG
650 TABLET ORAL ONCE
Refills: 0 | Status: COMPLETED | OUTPATIENT
Start: 2023-10-30 | End: 2023-10-30

## 2023-10-30 RX ORDER — CITALOPRAM 10 MG/1
20 TABLET, FILM COATED ORAL DAILY
Refills: 0 | Status: DISCONTINUED | OUTPATIENT
Start: 2023-10-31 | End: 2023-11-28

## 2023-10-30 RX ORDER — CHLORHEXIDINE GLUCONATE 213 G/1000ML
1 SOLUTION TOPICAL
Refills: 0 | Status: DISCONTINUED | OUTPATIENT
Start: 2023-10-30 | End: 2023-11-28

## 2023-10-30 RX ORDER — ALLOPURINOL 300 MG
100 TABLET ORAL DAILY
Refills: 0 | Status: DISCONTINUED | OUTPATIENT
Start: 2023-10-31 | End: 2023-11-28

## 2023-10-30 RX ORDER — HEPARIN SODIUM 5000 [USP'U]/ML
5000 INJECTION INTRAVENOUS; SUBCUTANEOUS EVERY 8 HOURS
Refills: 0 | Status: DISCONTINUED | OUTPATIENT
Start: 2023-10-30 | End: 2023-11-21

## 2023-10-30 RX ORDER — CARVEDILOL PHOSPHATE 80 MG/1
25 CAPSULE, EXTENDED RELEASE ORAL EVERY 12 HOURS
Refills: 0 | Status: DISCONTINUED | OUTPATIENT
Start: 2023-10-31 | End: 2023-11-16

## 2023-10-30 RX ORDER — NIFEDIPINE 30 MG
60 TABLET, EXTENDED RELEASE 24 HR ORAL DAILY
Refills: 0 | Status: DISCONTINUED | OUTPATIENT
Start: 2023-10-31 | End: 2023-11-01

## 2023-10-30 RX ORDER — GABAPENTIN 400 MG/1
300 CAPSULE ORAL
Refills: 0 | Status: DISCONTINUED | OUTPATIENT
Start: 2023-10-31 | End: 2023-11-08

## 2023-10-30 RX ORDER — ASPIRIN/CALCIUM CARB/MAGNESIUM 324 MG
81 TABLET ORAL DAILY
Refills: 0 | Status: DISCONTINUED | OUTPATIENT
Start: 2023-10-30 | End: 2023-11-28

## 2023-10-30 RX ORDER — CLONAZEPAM 1 MG
2 TABLET ORAL AT BEDTIME
Refills: 0 | Status: DISCONTINUED | OUTPATIENT
Start: 2023-10-30 | End: 2023-10-30

## 2023-10-30 RX ORDER — PANTOPRAZOLE SODIUM 20 MG/1
40 TABLET, DELAYED RELEASE ORAL DAILY
Refills: 0 | Status: DISCONTINUED | OUTPATIENT
Start: 2023-10-30 | End: 2023-11-18

## 2023-10-30 RX ORDER — INSULIN HUMAN 100 [IU]/ML
5 INJECTION, SOLUTION SUBCUTANEOUS
Qty: 100 | Refills: 0 | Status: DISCONTINUED | OUTPATIENT
Start: 2023-10-30 | End: 2023-10-31

## 2023-10-30 RX ADMIN — Medication 650 MILLIGRAM(S): at 23:54

## 2023-10-30 RX ADMIN — SODIUM CHLORIDE 50 MILLILITER(S): 9 INJECTION INTRAMUSCULAR; INTRAVENOUS; SUBCUTANEOUS at 23:32

## 2023-10-30 RX ADMIN — INSULIN HUMAN 5 UNIT(S)/HR: 100 INJECTION, SOLUTION SUBCUTANEOUS at 21:43

## 2023-10-30 NOTE — ED ADULT TRIAGE NOTE - NS ED NURSE DIRECT TO ROOM YN
Pediatric Cardiology Visit    Patient:  Fortunato Meier MRN:  [id number]   YOB: 2009 Age:  7  year old 2  month old   Date of Visit:  Mar 16, 2017 PCP:  Duong Do MD     Dear Dr. Do:    We saw Fortunato Meier at the [facility] on Mar 16, 2017 in consultation for  outpatient post transplant visit now 1 year after heart transplant (performed 3/24/16).   He was seen in clinic with his mother and  today.  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 Initial post transplant course complicated by pulmonary hypertension and RV dysfunction, now with resolution of RV dysfunction and pulmonary hypertension.  He has done well post transplant with no rejection. His only issues have been recurrent episodes of nausea/vomiting of unclear etiology, although he has been asymptomatic since . Mom does also think he has had higher basal temperatures (ranges 37-37.5 where prior was 36-36.5) since , no overt infectious signs/symptoms and was seen by pmd with reassuring exam and labs.   His appetite is stable, he has good energy and is very active.  He is taking medications well with no concerns.  Comprehensive review of systems is otherwise negative.     Past medical history:    He was born at full term with a birth weight of 3560 g.  He suffered a clavicular fracture during delivery which was otherwise uncomplicated.  His apgar scores were 8/8.  He had normal growth until he was about 1 year old at which time they noted he was no longer gaining weight well.  He has continued to have normal development and parents report him meeting his developmental milestones.  He had a frequent cough and was hospitalized in  for  bronchiolitis and pneumonia.  In 2013, he was again hospitalized with respiratory symptoms at which time a CXR was performed as part of his evaluation.  The CXR revealed cardiomegaly. An echocardiogram was performed and a diagnosis of restrictive cardiomyopathy was made. He was started on medication at that time (torsamide, captopril, trimetazidine, and pentoxifylline).  He has remained on these medications since with adjustments made to the dosage.     Donor EBV-/cmv-, Recipient EBV+/cmv-,     Immunizations are up to date:  - BC2010. Most recent mantoux test on 6/15/15 was negative  - Tetanus, diphtheria, pertussis:  12, 10/12/10, 12/16/10, 11  - Polio: 5/4/10, 7/5/10, 8/26/10.  Oral Polio: 11, 11  - MMR: 11, 11/23/15  - Hep B: 09, 4/3/10, 7/5/10  - Varicella: 14, 2/19/15  - Hemophilic (H. Flu?): 8/26/10, 10/12/10, 11  - PCV-23: 14  - Influenza: 10/30/14, 10/21/15    His current medications include:  Prescription Medications as of 3/16/2017             enalapril (VASOTEC) 5 MG tablet Take 1 tablet (5 mg) by mouth 2 times daily    omeprazole (PRILOSEC) 20 MG capsule Take 1 capsule (20 mg) by mouth daily    ondansetron (ZOFRAN ODT) 4 MG disintegrating tablet Take 0.5 tablets (2 mg) by mouth every 8 hours as needed for nausea    tacrolimus (PROGRAF - GENERIC EQUIVALENT) 1 mg/mL suspension Take 3.5 mLs (3.5 mg) by mouth 2 times daily    mycophenolate (CELLCEPT - GENERIC EQUIVALENT) 250 MG capsule Take 2 capsules (500 mg) by mouth every 12 hours         Heis allergic to adhesive remover wipes [alcohol].    Family and social history:   Fortunato and his family moved to [place] from [place] on 12/8/15 for his dad's job (works for IT company). They are living in [place], [place].   He is being home schooled for now.  Parents report that in [place], Fortunato was classified as disabled and home schooling was encouraged.   Family history is significant for a  "cousin of dad's who had CHD and  during surgery for this around 4 years of age (unknown diagnosis).  There is no known history of sudden unexplained death, arrhythmias, or other congenital heart disease.  Mom has had a screening echo which is reported to be normal.  Sister has also had a normal echocardiogram.  Dad has not had an echo and denies cardiac symptoms.     Physical exam:  His height is 3' 9.55\" (115.7 cm) and weight is 45 lb 3.1 oz (20.5 kg). His axillary temperature is 97.8  F (36.6  C). His blood pressure is 102/67 and his pulse is 117. His respiration is 26 and oxygen saturation is 100%.   His body mass index is 15.31 kg/(m^2).  His body surface area is 0.81 meters squared.  Growth percentiles are 21% for weight and 13% for height.  Fortunato is alert, playful, in no distress. He has no cushingoid features anymore.  Lungs are clear with easy work of breathing. Heart is regular with normal S1, normal S2, no gallop, and no murmur.  Abdomen is soft with no hepatomegaly.   Extremities are warm and well-perfused with no lower extremity edema.  He has no rashes on exam.  His sternotomy and chest tube sites are well healed with no surrounding erythema.     Fortunato had evaluation today with imaging/echo/ecg. His labs will be drawn in cath tomorrow. His cxr showed clear lungs. HIs T&L spine films were normal. His hip and pelvis films showed no fractures, mild valgus deformity of hips.  His ecg today showed normal sinus rhythm, rate of 112, incomplete right bundle branch block, nonspecific st-twave changes (stable from prior). His echocardiogram today showed: normal post transplant anatomy, normal function, no narrowing at anastomotic sites. He had EBV And CMV PCR on 17 that were negative.  HIs last biopsy from 16 showed no rejection, grade 1R, no evidence of antibody mediated rejection with pAMR0, with negative C3d/C4d staining.   PRA history:  16: no donor specific antibodies  16: 1 donor " specific antibody to CW4 ()    In summary, Fortunato is a 7  year old 2  month old with restrictive cardiomyopathy and pulmonary hypertension, now 1 year after orthotopic heart transplant (3/24/16).  He currently looks very well with no current signs/symptoms of rejection.  He is scheduled to undergo invasive evaluation with right and left heart cath, coronary angiogram and a biopsy tomorrow. We will draw his labs at that time. We made no medication changes today, will discuss after seeing results tomorrow.  If everything looks good tomorrow, would plan for routine followup in 6 months in clinic.     Thank you for the opportunity to participate in Fortunato's care.  We plan to see him back for scheduled right heart cath and biopsy in December unless there are other concerns in the meantime.        Diagnoses:   1. 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 S/p orthotopic heart transplant (3/24/16)  2. Pulmonary hypertension   A. Improved after transplant  3. Failure to thrive, resolved      Most sincerely,      Amie Santamaria MD   Pediatric Cardiology    CC  ARYA TORRES    Copy to patient   RADHA SANDOVAL  [address] [facility] [id number]           Yes

## 2023-10-30 NOTE — ED ADULT NURSE NOTE - NSFALLHARMRISKINTERV_ED_ALL_ED

## 2023-10-30 NOTE — ED PROVIDER NOTE - PHYSICAL EXAMINATION
VITAL SIGNS: I have reviewed nursing notes and confirm.  CONSTITUTIONAL: In no acute distress.  CARD: S1, S2; Regular rate and rhythm.  RESP: CTAB.   ABD: Soft, non-tender.   NEURO: Alert, oriented. VITAL SIGNS: I have reviewed nursing notes and confirm.  CONSTITUTIONAL: In no acute distress.  CARD: S1, S2; Regular rate and rhythm.  RESP: CTAB.   ABD: Soft, non-tender.   EXT: B/L pedal edema.   NEURO: Alert, oriented.

## 2023-10-30 NOTE — ED PROVIDER NOTE - CRITICAL CARE ATTENDING CONTRIBUTION TO CARE
89 yo M, extensive medical history, here for assessment of dyspnea. Patient has mild, baseline dyspnea however over the last 3 days has had worse sx with associated bilateral LE edema, bilateral CP. Family at bedside also notes patient has fallen 2-3 times over the last week, lives alone so these falls are unwitnessed, patient cannot recall details.    EMS noted patient to be hypoxic to 80s, satting well on 15L NC, has diffuse rales, tachypnea, pitting edema to bilateral LE, RRR, slightly distended, non tender abdomen.     Will get CT imaging of head and neck given recent falls. Patient placed on BiPAP with rapid improvement in subjective sx. Labs pending.     Suspect CHF exacerbation vs worsening renal insufficiency.    Patient found to be in DKA, started on insulin drip. No fluid given as patient has acute on chronic renal insufficiency AND signs of fluid overload. Held off on lasix as to not worsen DKA/renal insufficiency AND patient improved significantly on BiPAP.    Case discussed with Dr. Escobar, to be admitted to ICU for monitoring, treatment.

## 2023-10-30 NOTE — H&P ADULT - HISTORY OF PRESENT ILLNESS
Patient is a 89y old  Male who presents with a chief complaint of sob      HPI: 89 yo male PMHx sig for DM, CAD-bypass, CHF, HTN, HLD presents w sob x few days, no cough fever or chills.  pt placed on NRBM by EMS and subsequently on BiPAP in ED, O2 sat now 99%.  Pt found to have elevated glucose w high AG and b-hydroxybutarate c/w DKA      PAST MEDICAL & SURGICAL HISTORY:  CHF (congestive heart failure)      DM (diabetes mellitus)      HTN (hypertension)      Prostate CA  in remission      Pacemaker      H/O total knee replacement, right        Allergies    No Known Allergies    Intolerances      FAMILY HISTORY:  FH: type 2 diabetes (Father)      Home Medications:  allopurinol 100 mg oral tablet: 1 tab(s) orally once a day (25 Oct 2021 22:30)  citalopram 20 mg oral tablet: 1 tab(s) orally once a day (25 Oct 2021 22:30)  clonazePAM 1 mg oral tablet: 2 tab(s) orally once a day (at bedtime), As Needed (25 Oct 2021 22:30)  gabapentin 300 mg oral capsule: 1 cap(s) orally 2 times a day (25 Oct 2021 22:30)  glimepiride 4 mg oral tablet: 1 tab(s) orally 2 times a day (25 Oct 2021 22:30)  Lantus 100 units/mL subcutaneous solution: 30 unit(s) subcutaneous once a day (at bedtime) (25 Oct 2021 22:30)  NIFEdipine 60 mg oral tablet, extended release: 1 tab(s) orally once a day (25 Oct 2021 22:30)    Occupation:  Alochol: Denied  Smoking: Denied  Drug Use: Denied  Marital Status:         ROS: as in HPI; All other systems reviewed are negative    ICU Vital Signs Last 24 Hrs  T(C): 37.2 (30 Oct 2023 18:39), Max: 37.2 (30 Oct 2023 18:39)  T(F): 98.9 (30 Oct 2023 18:39), Max: 98.9 (30 Oct 2023 18:39)  HR: 62 (30 Oct 2023 18:39) (62 - 62)  BP: 121/88 (30 Oct 2023 18:39) (121/88 - 121/88)  BP(mean): --  ABP: --  ABP(mean): --  RR: 18 (30 Oct 2023 18:39) (18 - 18)  SpO2: 99% (30 Oct 2023 18:39) (99% - 99%)    O2 Parameters below as of 30 Oct 2023 18:39  Patient On (Oxygen Delivery Method): mask, nonrebreather  O2 Flow (L/min): 15            Physical Examination:    General: No acute distress.  Alert, oriented, interactive, nonfocal    HEENT: Pupils equal, reactive to light.  Symmetric.    PULM: Clear to auscultation bilaterally, no significant sputum production    CVS: Regular rate and rhythm, no murmurs, rubs, or gallops    ABD: Soft, nondistended, nontender, normoactive bowel sounds, no masses    EXT: No edema, nontender    SKIN: Warm and well perfused, no rashes noted.              I&O's Detail        LABS:                        9.5    12.75 )-----------( 198      ( 30 Oct 2023 19:20 )             28.6     30 Oct 2023 19:20    129    |  90     |  95     ----------------------------<  477    5.2     |  15     |  3.7      Ca    8.1        30 Oct 2023 19:20    TPro  6.6    /  Alb  3.8    /  TBili  0.4    /  DBili  x      /  AST  36     /  ALT  23     /  AlkPhos  100    30 Oct 2023 19:20  Amylase x     lipase x          CARDIAC MARKERS ( 30 Oct 2023 19:20 )  x     / 0.24 ng/mL / x     / x     / x          CAPILLARY BLOOD GLUCOSE          Urinalysis Basic - ( 30 Oct 2023 19:20 )    Color: x / Appearance: x / SG: x / pH: x  Gluc: 477 mg/dL / Ketone: x  / Bili: x / Urobili: x   Blood: x / Protein: x / Nitrite: x   Leuk Esterase: x / RBC: x / WBC x   Sq Epi: x / Non Sq Epi: x / Bacteria: x      Culture        MEDICATIONS  (STANDING):  insulin regular Infusion 5 Unit(s)/Hr (5 mL/Hr) IV Continuous <Continuous>    MEDICATIONS  (PRN):        RADIOLOGY: ***     CXR:  ? congestion, awaiting official report          IMPRESSION/PLAN:  pt w sob and ?chf exacerbation in setting of worsening PARKER.  Likely exacerbated by Uncontrolled DM/DKA.  Continue insulin drip. FSBS q1hr, Serial metabolic panels. Cautious IVF hydration.  May try patient on HFNC.  Check TTE. Cardio eval. admit to ICU.    CNS:avoid sedation    HEENT:oral care    PULMONARY: Bipap, may switch to HFNC    CARDIOVASCULAR: Obtain TTE    GI: GI prophylaxis.  Feeding     RENAL: monitor UO    INFECTIOUS DISEASE:monitor off abx    HEMATOLOGICAL:  DVT prophylaxis.    ENDOCRINE:  Follow up FS.  Insulin protocol if needed.    MUSCULOSKELETAL: oob to chair        CRITICAL CARE TIME SPENT: 35 minutes

## 2023-10-30 NOTE — ED PROVIDER NOTE - OBJECTIVE STATEMENT
Patient is a 89y Male PMH DM2, HTN, HLD, CHF on Furosemide no O2 requirements, MI s/p stent placement 1 year prior, s/p Pacemaker implantation, on Plavix and Aspirin who presents to the ED with complaints of SOB at rest and intermittent chest pain that began three days ago and has since worsened. Patient who lives alone and ambulates independently at baseline reports falling twice over the past two days, both unwitnessed but patient can recall and denies LOC. Denies abdominal pain, nausea, vomiting.

## 2023-10-30 NOTE — ED PROVIDER NOTE - MDM PATIENT STATEMENT FOR ADDL TREATMENT
Subjective:       Patient ID: Courtney Knott is a 58 y.o. female.    Vitals:  temperature is 98.3 °F (36.8 °C). Her blood pressure is 116/74 and her pulse is 77. Her respiration is 16 and oxygen saturation is 95%.     Chief Complaint: Rash    Pt noticed red itchy bumps on elbows, face, back and forearms about 4 days ago that has got worsen over the past couple of days. Pt added that they were blistering. Treatments were Benadryl, Cortizone cream, and Allegra  was taken and symptoms are unchanged.   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 They report rash to bk elbows, bk upper arms, right cheek and back x 4 days. She c/o itching. She says she had a rash like this years ago but used alcohol and it went away. Denies any known allergens, soaps, detergents, medication.     Rash  This is a new problem. The current episode started in the past 7 days. The problem has been rapidly worsening since onset. The affected locations include the face, left arm and right shoulder. The rash is characterized by itchiness and blistering. She was exposed to nothing. Pertinent negatives include no anorexia, congestion, cough, diarrhea, eye pain, facial edema, fatigue, fever, joint pain, nail changes, rhinorrhea, shortness of breath, sore throat or vomiting. Treatments tried: Bendryl. The treatment provided no relief. There is no history of allergies, asthma, eczema or varicella.     Constitution: Negative for activity change, appetite change, chills, sweating, fatigue, fever, unexpected weight change and generalized weakness.   HENT:  Negative for congestion and sore throat.    Neck: Negative for neck pain and neck stiffness.   Cardiovascular:  Negative for chest pain, leg swelling, palpitations and  sob on exertion.   Eyes:  Negative for eye pain.   Respiratory:  Negative for cough and shortness of breath.    Gastrointestinal:  Negative for vomiting and diarrhea.   Skin:  Positive for rash. Negative for pale, wound, abrasion, laceration and lesion.   Allergic/Immunologic: Positive for itching.     Objective:      Physical Exam   Constitutional: She is oriented to person, place, and time. She appears well-developed.  Non-toxic appearance. She does not appear ill. No distress.      Comments:Daughter present.      HENT:   Head: Normocephalic and atraumatic.   Ears:   Right Ear: External ear normal.   Left Ear: External ear normal.   Nose: Nose normal.   Mouth/Throat: Oropharynx is clear and moist.   Eyes: Conjunctivae, EOM and lids are normal. Pupils are equal, round, and reactive to light.   Neck: Trachea normal and phonation normal. Neck supple.   Musculoskeletal: Normal range of motion.         General: Normal range of motion.   Neurological: She is alert and oriented to person, place, and time.   Skin: Skin is warm, dry, intact, not diaphoretic and rash (bk elbows, back, right cheek, erythematous welts.). Capillary refill takes less than 2 seconds.   Psychiatric: Her speech is normal and behavior is normal. Judgment and thought content normal.   Nursing note and vitals reviewed.      Assessment:       1. Rash          Plan:       Unsure cause, will try topical steroids, continue antihistamines, fu with pcp this week for further eval    Discussed results/diagnosis/plan with patient in clinic. Strict precautions given to patient to monitor for worsening signs and symptoms. Advised to follow up with PCP or specialist.    Explained side effects of medications prescribed with patient and informed him/her to discontinue use if he/she has any side effects and to inform UC or PCP if this occurs. All questions answered. Strict ED verses clinic return precautions stressed and given in depth. Advised if symptoms worsens  of fail to improve he/she should go to the Emergency Room. Discharge and follow-up instructions given verbally/printed with the patient who expressed understanding and willingness to comply with my recommendations. Patient voiced understanding and in agreement with current treatment plan. Patient exits the exam room in no acute distress. Conversant and engaged during discharge discussion, verbalized understanding.      Rash  -     hydrOXYzine HCL (ATARAX) 10 MG Tab; Take 1 tablet (10 mg total) by mouth 3 (three) times daily as needed (itching).  Dispense: 30 tablet; Refill: 0  -     triamcinolone acetonide 0.025% (KENALOG) 0.025 % cream; Apply topically 2 (two) times daily.  Dispense: 80 g; Refill: 0                 Patient Instructions   General Discharge Instructions   PLEASE READ YOUR DISCHARGE INSTRUCTIONS ENTIRELY AS IT CONTAINS IMPORTANT INFORMATION.  If you were prescribed a narcotic or controlled medication, do not drive or operate heavy equipment or machinery while taking these medications.  If you were prescribed antibiotics, please take them to completion.  You must understand that you've received an Urgent Care treatment only and that you may be released before all your medical problems are known or treated. You, the patient, will arrange for follow up care as instructed.    OVER THE COUNTER RECOMMENDATIONS/SUGGESTIONS.    Make sure to stay well hydrated.    Use Nasal Saline to mechanically move any post nasal drip from your eustachian tube or from the back of your throat.    Use warm salt water gargles to ease your throat pain. Warm salt water gargles as needed for sore throat- 1/2 tsp salt to 1 cup warm water, gargle as desired.    Use an antihistamine such as Claritin, Zyrtec or Allegra to dry you out.    Use pseudoephedrine (behind the counter) to decongest. Pseudoephedrine 30 mg up to 240 mg /day. It can raise your blood pressure and give you palpitations.    Use mucinex (guaifenesin) to break up  mucous up to 2400mg/day to loosen any mucous.    The mucinex DM pill has a cough suppressant that can be sedating. It can be used at night to stop the tickle at the back of your throat.    You can use Mucinex D (it has guaifenesin and a high dose of pseudoephedrine) in the mornings to help decongest.    Use Afrin in each nare for no longer than 3 days, as it is addictive. It can also dry out your mucous membranes and cause elevated blood pressure. This is especially useful if you are flying.    Use Flonase 1-2 sprays/nostril per day. It is a local acting steroid nasal spray, if you develop a bloody nose, stop using the medication immediately.    Sometimes Nyquil at night is beneficial to help you get some rest, however it is sedating and it does have an antihistamine, and tylenol.    Honey is a natural cough suppressant that can be used.    Tylenol up to 4,000 mg a day is safe for short periods and can be used for body aches, pain, and fever. However in high doses and prolonged use it can cause liver irritation.    Ibuprofen is a non-steroidal anti-inflammatory that can be used for body aches, pain, and fever.However it can also cause stomach irritation if over used.     Follow up with your PCP or specialty clinic as instructed in the next 2-3 days if not improved or as needed. You can call [phone] to schedule an appointment with appropriate provider.      If you condition worsens, we recommend that you receive another evaluation at the emergency room immediately or contact your primary medical clinic's after hours call service to discuss your concerns.      Please return here or go to the Emergency Department for any concerns or worsening condition.   You can also call [phone] to schedule an appointment with the appropriate provider.    Please return here or go to the Emergency Department for any concerns or worsening of condition.    Thank you for choosing [facility]!    Our goal in the  Urgent Care is to always provide outstanding medical care. You may receive a survey by mail or e-mail in the next week regarding your experience today. We would greatly appreciate you completing and returning the survey. Your feedback provides us with a way to recognize our staff who provide very good care, and it helps us learn how to improve when your experience was below our aspiration of excellence.      We appreciate you trusting us with your medical care. We hope you feel better soon. We will be happy to take care of you for all of your future medical needs.    Sincerely,    NARGIS Rubio                                             Dermatitis   If your condition worsens or fails to improve we recommend that you receive another evaluation at the ER immediately or contact your PCP to discuss your concerns or return here. You must understand that you've received an urgent care treatment only and that you may be released before all your medical problems are known or treated. You the patient will arrange for followup care as instructed.   Increase fluids and rest are important  Apply topical steroid cream as prescribed.  Do not take over 5 days.    Please take over the counter Pepcid daily as directed for the next 48-72 hours as needed.  Please take Claritin or Zyrtec or Allegra (24 hours) daily for the next 48-72 hrs.  You can add Benadryl  as needed for itching, however these may make you drowsy, so do not  drive or operate heavy equipment or machinery while taking these medications.    If you develop additional symptoms such as tongue swelling or trouble breathing go immediately to the ER.          Patient with one or more new problems requiring additional work-up/treatment.

## 2023-10-30 NOTE — ED PROVIDER NOTE - CARE PLAN
1 Principal Discharge DX:	DKA (diabetic ketoacidosis)  Secondary Diagnosis:	PARKER (acute kidney injury)  Secondary Diagnosis:	Acute on chronic systolic congestive heart failure

## 2023-10-30 NOTE — ED PROVIDER NOTE - NS ED ATTENDING STATEMENT MOD
This was a shared visit with the ELSY. I reviewed and verified the documentation and independently performed the documented: I have personally provided the amount of critical care time documented below excluding time spent on separate procedures.

## 2023-10-30 NOTE — ED PROVIDER NOTE - CLINICAL SUMMARY MEDICAL DECISION MAKING FREE TEXT BOX
Patient found to be in DKA, started on insulin drip. No fluid given as patient has acute on chronic renal insufficiency AND signs of fluid overload. Held off on lasix as to not worsen DKA/renal insufficiency AND patient improved significantly on BiPAP.    Case discussed with Dr. Escobar, to be admitted to ICU for monitoring, treatment.

## 2023-10-30 NOTE — ED PROVIDER NOTE - ATTENDING APP SHARED VISIT CONTRIBUTION OF CARE
89 yo M, extensive medical history, here for assessment of dyspnea. Patient has mild, baseline dyspnea however over the last 3 days has had worse sx with associated bilateral LE edema, bilateral CP. Family at bedside also notes patient has fallen 2-3 times over the last week, lives alone so these falls are unwitnessed, patient cannot recall details.    EMS noted patient to be hypoxic to 80s, satting well on 15L NC, has diffuse rales, tachypnea, pitting edema to bilateral LE, RRR, slightly distended, non tender abdomen.     Will get CT imaging of head and neck given recent falls. Patient placed on BiPAP with rapid improvement in subjective sx. Labs pending.     Suspect CHF exacerbation vs worsening renal insufficiency.

## 2023-10-31 LAB
ALBUMIN SERPL ELPH-MCNC: 3.4 G/DL — LOW (ref 3.5–5.2)
ALBUMIN SERPL ELPH-MCNC: 3.4 G/DL — LOW (ref 3.5–5.2)
ALBUMIN SERPL ELPH-MCNC: 3.5 G/DL — SIGNIFICANT CHANGE UP (ref 3.5–5.2)
ALBUMIN SERPL ELPH-MCNC: 3.5 G/DL — SIGNIFICANT CHANGE UP (ref 3.5–5.2)
ALP SERPL-CCNC: 81 U/L — SIGNIFICANT CHANGE UP (ref 30–115)
ALP SERPL-CCNC: 81 U/L — SIGNIFICANT CHANGE UP (ref 30–115)
ALP SERPL-CCNC: 90 U/L — SIGNIFICANT CHANGE UP (ref 30–115)
ALP SERPL-CCNC: 90 U/L — SIGNIFICANT CHANGE UP (ref 30–115)
ALT FLD-CCNC: 25 U/L — SIGNIFICANT CHANGE UP (ref 0–41)
ALT FLD-CCNC: 25 U/L — SIGNIFICANT CHANGE UP (ref 0–41)
ALT FLD-CCNC: 26 U/L — SIGNIFICANT CHANGE UP (ref 0–41)
ALT FLD-CCNC: 26 U/L — SIGNIFICANT CHANGE UP (ref 0–41)
ANION GAP SERPL CALC-SCNC: 15 MMOL/L — HIGH (ref 7–14)
ANION GAP SERPL CALC-SCNC: 15 MMOL/L — HIGH (ref 7–14)
ANION GAP SERPL CALC-SCNC: 16 MMOL/L — HIGH (ref 7–14)
ANION GAP SERPL CALC-SCNC: 16 MMOL/L — HIGH (ref 7–14)
ANION GAP SERPL CALC-SCNC: 17 MMOL/L — HIGH (ref 7–14)
ANION GAP SERPL CALC-SCNC: 17 MMOL/L — HIGH (ref 7–14)
ANION GAP SERPL CALC-SCNC: 20 MMOL/L — HIGH (ref 7–14)
ANION GAP SERPL CALC-SCNC: 20 MMOL/L — HIGH (ref 7–14)
ANION GAP SERPL CALC-SCNC: 22 MMOL/L — HIGH (ref 7–14)
ANION GAP SERPL CALC-SCNC: 22 MMOL/L — HIGH (ref 7–14)
APPEARANCE UR: ABNORMAL
AST SERPL-CCNC: 46 U/L — HIGH (ref 0–41)
BACTERIA # UR AUTO: ABNORMAL /HPF
BACTERIA # UR AUTO: ABNORMAL /HPF
BASOPHILS # BLD AUTO: 0.02 K/UL — SIGNIFICANT CHANGE UP (ref 0–0.2)
BASOPHILS # BLD AUTO: 0.02 K/UL — SIGNIFICANT CHANGE UP (ref 0–0.2)
BASOPHILS NFR BLD AUTO: 0.2 % — SIGNIFICANT CHANGE UP (ref 0–1)
BASOPHILS NFR BLD AUTO: 0.2 % — SIGNIFICANT CHANGE UP (ref 0–1)
BILIRUB SERPL-MCNC: 0.3 MG/DL — SIGNIFICANT CHANGE UP (ref 0.2–1.2)
BILIRUB SERPL-MCNC: 0.3 MG/DL — SIGNIFICANT CHANGE UP (ref 0.2–1.2)
BILIRUB SERPL-MCNC: 0.5 MG/DL — SIGNIFICANT CHANGE UP (ref 0.2–1.2)
BILIRUB SERPL-MCNC: 0.5 MG/DL — SIGNIFICANT CHANGE UP (ref 0.2–1.2)
BILIRUB UR-MCNC: NEGATIVE — SIGNIFICANT CHANGE UP
BUN SERPL-MCNC: 95 MG/DL — CRITICAL HIGH (ref 10–20)
BUN SERPL-MCNC: 95 MG/DL — CRITICAL HIGH (ref 10–20)
BUN SERPL-MCNC: 97 MG/DL — CRITICAL HIGH (ref 10–20)
CALCIUM SERPL-MCNC: 7.3 MG/DL — LOW (ref 8.4–10.5)
CALCIUM SERPL-MCNC: 7.3 MG/DL — LOW (ref 8.4–10.5)
CALCIUM SERPL-MCNC: 7.6 MG/DL — LOW (ref 8.4–10.5)
CALCIUM SERPL-MCNC: 7.6 MG/DL — LOW (ref 8.4–10.5)
CALCIUM SERPL-MCNC: 7.9 MG/DL — LOW (ref 8.4–10.5)
CALCIUM SERPL-MCNC: 7.9 MG/DL — LOW (ref 8.4–10.5)
CALCIUM SERPL-MCNC: 8 MG/DL — LOW (ref 8.4–10.5)
CALCIUM SERPL-MCNC: 8 MG/DL — LOW (ref 8.4–10.5)
CALCIUM SERPL-MCNC: 8.1 MG/DL — LOW (ref 8.4–10.5)
CALCIUM SERPL-MCNC: 8.1 MG/DL — LOW (ref 8.4–10.5)
CHLORIDE SERPL-SCNC: 100 MMOL/L — SIGNIFICANT CHANGE UP (ref 98–110)
CHLORIDE SERPL-SCNC: 92 MMOL/L — LOW (ref 98–110)
CHLORIDE SERPL-SCNC: 92 MMOL/L — LOW (ref 98–110)
CHLORIDE SERPL-SCNC: 95 MMOL/L — LOW (ref 98–110)
CO2 SERPL-SCNC: 15 MMOL/L — LOW (ref 17–32)
CO2 SERPL-SCNC: 16 MMOL/L — LOW (ref 17–32)
CO2 SERPL-SCNC: 16 MMOL/L — LOW (ref 17–32)
CO2 SERPL-SCNC: 17 MMOL/L — SIGNIFICANT CHANGE UP (ref 17–32)
COLOR SPEC: SIGNIFICANT CHANGE UP
COLOR SPEC: SIGNIFICANT CHANGE UP
COLOR SPEC: YELLOW — SIGNIFICANT CHANGE UP
COLOR SPEC: YELLOW — SIGNIFICANT CHANGE UP
CREAT ?TM UR-MCNC: 166 MG/DL — SIGNIFICANT CHANGE UP
CREAT ?TM UR-MCNC: 166 MG/DL — SIGNIFICANT CHANGE UP
CREAT SERPL-MCNC: 3.7 MG/DL — HIGH (ref 0.7–1.5)
CREAT SERPL-MCNC: 3.8 MG/DL — HIGH (ref 0.7–1.5)
CREAT SERPL-MCNC: 3.8 MG/DL — HIGH (ref 0.7–1.5)
CREAT SERPL-MCNC: 3.9 MG/DL — HIGH (ref 0.7–1.5)
CREAT SERPL-MCNC: 3.9 MG/DL — HIGH (ref 0.7–1.5)
CREAT SERPL-MCNC: 4 MG/DL — HIGH (ref 0.7–1.5)
CREAT SERPL-MCNC: 4 MG/DL — HIGH (ref 0.7–1.5)
DIFF PNL FLD: ABNORMAL
EGFR: 13 ML/MIN/1.73M2 — LOW
EGFR: 13 ML/MIN/1.73M2 — LOW
EGFR: 14 ML/MIN/1.73M2 — LOW
EGFR: 15 ML/MIN/1.73M2 — LOW
EOSINOPHIL # BLD AUTO: 0 K/UL — SIGNIFICANT CHANGE UP (ref 0–0.7)
EOSINOPHIL # BLD AUTO: 0 K/UL — SIGNIFICANT CHANGE UP (ref 0–0.7)
EOSINOPHIL NFR BLD AUTO: 0 % — SIGNIFICANT CHANGE UP (ref 0–8)
EOSINOPHIL NFR BLD AUTO: 0 % — SIGNIFICANT CHANGE UP (ref 0–8)
EPI CELLS # UR: PRESENT
EPI CELLS # UR: PRESENT
GAS PNL BLDA: SIGNIFICANT CHANGE UP
GAS PNL BLDA: SIGNIFICANT CHANGE UP
GLUCOSE BLDC GLUCOMTR-MCNC: 138 MG/DL — HIGH (ref 70–99)
GLUCOSE BLDC GLUCOMTR-MCNC: 138 MG/DL — HIGH (ref 70–99)
GLUCOSE BLDC GLUCOMTR-MCNC: 176 MG/DL — HIGH (ref 70–99)
GLUCOSE BLDC GLUCOMTR-MCNC: 176 MG/DL — HIGH (ref 70–99)
GLUCOSE BLDC GLUCOMTR-MCNC: 181 MG/DL — HIGH (ref 70–99)
GLUCOSE BLDC GLUCOMTR-MCNC: 181 MG/DL — HIGH (ref 70–99)
GLUCOSE BLDC GLUCOMTR-MCNC: 194 MG/DL — HIGH (ref 70–99)
GLUCOSE BLDC GLUCOMTR-MCNC: 198 MG/DL — HIGH (ref 70–99)
GLUCOSE BLDC GLUCOMTR-MCNC: 198 MG/DL — HIGH (ref 70–99)
GLUCOSE BLDC GLUCOMTR-MCNC: 200 MG/DL — HIGH (ref 70–99)
GLUCOSE BLDC GLUCOMTR-MCNC: 200 MG/DL — HIGH (ref 70–99)
GLUCOSE BLDC GLUCOMTR-MCNC: 205 MG/DL — HIGH (ref 70–99)
GLUCOSE BLDC GLUCOMTR-MCNC: 205 MG/DL — HIGH (ref 70–99)
GLUCOSE BLDC GLUCOMTR-MCNC: 207 MG/DL — HIGH (ref 70–99)
GLUCOSE BLDC GLUCOMTR-MCNC: 207 MG/DL — HIGH (ref 70–99)
GLUCOSE BLDC GLUCOMTR-MCNC: 210 MG/DL — HIGH (ref 70–99)
GLUCOSE BLDC GLUCOMTR-MCNC: 210 MG/DL — HIGH (ref 70–99)
GLUCOSE BLDC GLUCOMTR-MCNC: 212 MG/DL — HIGH (ref 70–99)
GLUCOSE BLDC GLUCOMTR-MCNC: 212 MG/DL — HIGH (ref 70–99)
GLUCOSE BLDC GLUCOMTR-MCNC: 221 MG/DL — HIGH (ref 70–99)
GLUCOSE BLDC GLUCOMTR-MCNC: 221 MG/DL — HIGH (ref 70–99)
GLUCOSE BLDC GLUCOMTR-MCNC: 234 MG/DL — HIGH (ref 70–99)
GLUCOSE BLDC GLUCOMTR-MCNC: 234 MG/DL — HIGH (ref 70–99)
GLUCOSE BLDC GLUCOMTR-MCNC: 242 MG/DL — HIGH (ref 70–99)
GLUCOSE BLDC GLUCOMTR-MCNC: 242 MG/DL — HIGH (ref 70–99)
GLUCOSE BLDC GLUCOMTR-MCNC: 250 MG/DL — HIGH (ref 70–99)
GLUCOSE BLDC GLUCOMTR-MCNC: 250 MG/DL — HIGH (ref 70–99)
GLUCOSE BLDC GLUCOMTR-MCNC: 258 MG/DL — HIGH (ref 70–99)
GLUCOSE BLDC GLUCOMTR-MCNC: 266 MG/DL — HIGH (ref 70–99)
GLUCOSE BLDC GLUCOMTR-MCNC: 266 MG/DL — HIGH (ref 70–99)
GLUCOSE BLDC GLUCOMTR-MCNC: 279 MG/DL — HIGH (ref 70–99)
GLUCOSE BLDC GLUCOMTR-MCNC: 279 MG/DL — HIGH (ref 70–99)
GLUCOSE BLDC GLUCOMTR-MCNC: 282 MG/DL — HIGH (ref 70–99)
GLUCOSE BLDC GLUCOMTR-MCNC: 282 MG/DL — HIGH (ref 70–99)
GLUCOSE BLDC GLUCOMTR-MCNC: 316 MG/DL — HIGH (ref 70–99)
GLUCOSE BLDC GLUCOMTR-MCNC: 316 MG/DL — HIGH (ref 70–99)
GLUCOSE BLDC GLUCOMTR-MCNC: 346 MG/DL — HIGH (ref 70–99)
GLUCOSE BLDC GLUCOMTR-MCNC: 346 MG/DL — HIGH (ref 70–99)
GLUCOSE SERPL-MCNC: 158 MG/DL — HIGH (ref 70–99)
GLUCOSE SERPL-MCNC: 158 MG/DL — HIGH (ref 70–99)
GLUCOSE SERPL-MCNC: 186 MG/DL — HIGH (ref 70–99)
GLUCOSE SERPL-MCNC: 186 MG/DL — HIGH (ref 70–99)
GLUCOSE SERPL-MCNC: 206 MG/DL — HIGH (ref 70–99)
GLUCOSE SERPL-MCNC: 206 MG/DL — HIGH (ref 70–99)
GLUCOSE SERPL-MCNC: 249 MG/DL — HIGH (ref 70–99)
GLUCOSE SERPL-MCNC: 249 MG/DL — HIGH (ref 70–99)
GLUCOSE SERPL-MCNC: 264 MG/DL — HIGH (ref 70–99)
GLUCOSE SERPL-MCNC: 264 MG/DL — HIGH (ref 70–99)
GLUCOSE UR QL: NEGATIVE MG/DL — SIGNIFICANT CHANGE UP
GRAN CASTS # UR COMP ASSIST: PRESENT
GRAN CASTS # UR COMP ASSIST: PRESENT
HCT VFR BLD CALC: 27.3 % — LOW (ref 42–52)
HCT VFR BLD CALC: 27.3 % — LOW (ref 42–52)
HGB BLD-MCNC: 9.2 G/DL — LOW (ref 14–18)
HGB BLD-MCNC: 9.2 G/DL — LOW (ref 14–18)
IMM GRANULOCYTES NFR BLD AUTO: 0.5 % — HIGH (ref 0.1–0.3)
IMM GRANULOCYTES NFR BLD AUTO: 0.5 % — HIGH (ref 0.1–0.3)
KETONES UR-MCNC: ABNORMAL MG/DL
LACTATE SERPL-SCNC: 3.2 MMOL/L — HIGH (ref 0.7–2)
LACTATE SERPL-SCNC: 3.2 MMOL/L — HIGH (ref 0.7–2)
LEUKOCYTE ESTERASE UR-ACNC: NEGATIVE — SIGNIFICANT CHANGE UP
LYMPHOCYTES # BLD AUTO: 0.73 K/UL — LOW (ref 1.2–3.4)
LYMPHOCYTES # BLD AUTO: 0.73 K/UL — LOW (ref 1.2–3.4)
LYMPHOCYTES # BLD AUTO: 5.6 % — LOW (ref 20.5–51.1)
LYMPHOCYTES # BLD AUTO: 5.6 % — LOW (ref 20.5–51.1)
MAGNESIUM SERPL-MCNC: 1.8 MG/DL — SIGNIFICANT CHANGE UP (ref 1.8–2.4)
MAGNESIUM SERPL-MCNC: 1.8 MG/DL — SIGNIFICANT CHANGE UP (ref 1.8–2.4)
MAGNESIUM SERPL-MCNC: 2.1 MG/DL — SIGNIFICANT CHANGE UP (ref 1.8–2.4)
MAGNESIUM SERPL-MCNC: 2.1 MG/DL — SIGNIFICANT CHANGE UP (ref 1.8–2.4)
MCHC RBC-ENTMCNC: 29.6 PG — SIGNIFICANT CHANGE UP (ref 27–31)
MCHC RBC-ENTMCNC: 29.6 PG — SIGNIFICANT CHANGE UP (ref 27–31)
MCHC RBC-ENTMCNC: 33.7 G/DL — SIGNIFICANT CHANGE UP (ref 32–37)
MCHC RBC-ENTMCNC: 33.7 G/DL — SIGNIFICANT CHANGE UP (ref 32–37)
MCV RBC AUTO: 87.8 FL — SIGNIFICANT CHANGE UP (ref 80–94)
MCV RBC AUTO: 87.8 FL — SIGNIFICANT CHANGE UP (ref 80–94)
MONOCYTES # BLD AUTO: 1.05 K/UL — HIGH (ref 0.1–0.6)
MONOCYTES # BLD AUTO: 1.05 K/UL — HIGH (ref 0.1–0.6)
MONOCYTES NFR BLD AUTO: 8.1 % — SIGNIFICANT CHANGE UP (ref 1.7–9.3)
MONOCYTES NFR BLD AUTO: 8.1 % — SIGNIFICANT CHANGE UP (ref 1.7–9.3)
MRSA PCR RESULT.: NEGATIVE — SIGNIFICANT CHANGE UP
MRSA PCR RESULT.: NEGATIVE — SIGNIFICANT CHANGE UP
NEUTROPHILS # BLD AUTO: 11.15 K/UL — HIGH (ref 1.4–6.5)
NEUTROPHILS # BLD AUTO: 11.15 K/UL — HIGH (ref 1.4–6.5)
NEUTROPHILS NFR BLD AUTO: 85.6 % — HIGH (ref 42.2–75.2)
NEUTROPHILS NFR BLD AUTO: 85.6 % — HIGH (ref 42.2–75.2)
NITRITE UR-MCNC: NEGATIVE — SIGNIFICANT CHANGE UP
NRBC # BLD: 0 /100 WBCS — SIGNIFICANT CHANGE UP (ref 0–0)
NRBC # BLD: 0 /100 WBCS — SIGNIFICANT CHANGE UP (ref 0–0)
OSMOLALITY UR: 398 MOS/KG — SIGNIFICANT CHANGE UP (ref 50–1200)
OSMOLALITY UR: 398 MOS/KG — SIGNIFICANT CHANGE UP (ref 50–1200)
PH UR: 5 — SIGNIFICANT CHANGE UP (ref 5–8)
PH UR: 5 — SIGNIFICANT CHANGE UP (ref 5–8)
PH UR: 5.5 — SIGNIFICANT CHANGE UP (ref 5–8)
PH UR: 5.5 — SIGNIFICANT CHANGE UP (ref 5–8)
PHOSPHATE SERPL-MCNC: 3.1 MG/DL — SIGNIFICANT CHANGE UP (ref 2.1–4.9)
PLATELET # BLD AUTO: 199 K/UL — SIGNIFICANT CHANGE UP (ref 130–400)
PLATELET # BLD AUTO: 199 K/UL — SIGNIFICANT CHANGE UP (ref 130–400)
PMV BLD: 10.9 FL — HIGH (ref 7.4–10.4)
PMV BLD: 10.9 FL — HIGH (ref 7.4–10.4)
POTASSIUM SERPL-MCNC: 4 MMOL/L — SIGNIFICANT CHANGE UP (ref 3.5–5)
POTASSIUM SERPL-MCNC: 4 MMOL/L — SIGNIFICANT CHANGE UP (ref 3.5–5)
POTASSIUM SERPL-MCNC: 4.2 MMOL/L — SIGNIFICANT CHANGE UP (ref 3.5–5)
POTASSIUM SERPL-MCNC: 4.2 MMOL/L — SIGNIFICANT CHANGE UP (ref 3.5–5)
POTASSIUM SERPL-MCNC: 4.3 MMOL/L — SIGNIFICANT CHANGE UP (ref 3.5–5)
POTASSIUM SERPL-MCNC: 4.3 MMOL/L — SIGNIFICANT CHANGE UP (ref 3.5–5)
POTASSIUM SERPL-MCNC: 4.5 MMOL/L — SIGNIFICANT CHANGE UP (ref 3.5–5)
POTASSIUM SERPL-MCNC: 4.5 MMOL/L — SIGNIFICANT CHANGE UP (ref 3.5–5)
POTASSIUM SERPL-MCNC: 4.7 MMOL/L — SIGNIFICANT CHANGE UP (ref 3.5–5)
POTASSIUM SERPL-MCNC: 4.7 MMOL/L — SIGNIFICANT CHANGE UP (ref 3.5–5)
POTASSIUM SERPL-SCNC: 4 MMOL/L — SIGNIFICANT CHANGE UP (ref 3.5–5)
POTASSIUM SERPL-SCNC: 4 MMOL/L — SIGNIFICANT CHANGE UP (ref 3.5–5)
POTASSIUM SERPL-SCNC: 4.2 MMOL/L — SIGNIFICANT CHANGE UP (ref 3.5–5)
POTASSIUM SERPL-SCNC: 4.2 MMOL/L — SIGNIFICANT CHANGE UP (ref 3.5–5)
POTASSIUM SERPL-SCNC: 4.3 MMOL/L — SIGNIFICANT CHANGE UP (ref 3.5–5)
POTASSIUM SERPL-SCNC: 4.3 MMOL/L — SIGNIFICANT CHANGE UP (ref 3.5–5)
POTASSIUM SERPL-SCNC: 4.5 MMOL/L — SIGNIFICANT CHANGE UP (ref 3.5–5)
POTASSIUM SERPL-SCNC: 4.5 MMOL/L — SIGNIFICANT CHANGE UP (ref 3.5–5)
POTASSIUM SERPL-SCNC: 4.7 MMOL/L — SIGNIFICANT CHANGE UP (ref 3.5–5)
POTASSIUM SERPL-SCNC: 4.7 MMOL/L — SIGNIFICANT CHANGE UP (ref 3.5–5)
POTASSIUM UR-SCNC: 49 MMOL/L — SIGNIFICANT CHANGE UP
POTASSIUM UR-SCNC: 49 MMOL/L — SIGNIFICANT CHANGE UP
PROT ?TM UR-MCNC: 165 MG/DLG/24H — SIGNIFICANT CHANGE UP
PROT ?TM UR-MCNC: 165 MG/DLG/24H — SIGNIFICANT CHANGE UP
PROT SERPL-MCNC: 6 G/DL — SIGNIFICANT CHANGE UP (ref 6–8)
PROT SERPL-MCNC: 6 G/DL — SIGNIFICANT CHANGE UP (ref 6–8)
PROT SERPL-MCNC: 6.6 G/DL — SIGNIFICANT CHANGE UP (ref 6–8)
PROT SERPL-MCNC: 6.6 G/DL — SIGNIFICANT CHANGE UP (ref 6–8)
PROT UR-MCNC: 300 MG/DL
PROT/CREAT UR-RTO: 1 RATIO — HIGH (ref 0–0.2)
PROT/CREAT UR-RTO: 1 RATIO — HIGH (ref 0–0.2)
RBC # BLD: 3.11 M/UL — LOW (ref 4.7–6.1)
RBC # BLD: 3.11 M/UL — LOW (ref 4.7–6.1)
RBC # FLD: 14.8 % — HIGH (ref 11.5–14.5)
RBC # FLD: 14.8 % — HIGH (ref 11.5–14.5)
RBC CASTS # UR COMP ASSIST: 18 /HPF — HIGH (ref 0–4)
RBC CASTS # UR COMP ASSIST: 18 /HPF — HIGH (ref 0–4)
SODIUM SERPL-SCNC: 129 MMOL/L — LOW (ref 135–146)
SODIUM SERPL-SCNC: 131 MMOL/L — LOW (ref 135–146)
SODIUM SERPL-SCNC: 132 MMOL/L — LOW (ref 135–146)
SODIUM SERPL-SCNC: 132 MMOL/L — LOW (ref 135–146)
SODIUM UR-SCNC: <20 MMOL/L — SIGNIFICANT CHANGE UP
SODIUM UR-SCNC: <20 MMOL/L — SIGNIFICANT CHANGE UP
SP GR SPEC: 1.02 — SIGNIFICANT CHANGE UP (ref 1–1.03)
UROBILINOGEN FLD QL: 0.2 MG/DL — SIGNIFICANT CHANGE UP (ref 0.2–1)
UROBILINOGEN FLD QL: 0.2 MG/DL — SIGNIFICANT CHANGE UP (ref 0.2–1)
UROBILINOGEN FLD QL: 1 MG/DL — SIGNIFICANT CHANGE UP (ref 0.2–1)
UROBILINOGEN FLD QL: 1 MG/DL — SIGNIFICANT CHANGE UP (ref 0.2–1)
UUN UR-MCNC: 573 MG/DL — SIGNIFICANT CHANGE UP
UUN UR-MCNC: 573 MG/DL — SIGNIFICANT CHANGE UP
WBC # BLD: 13.02 K/UL — HIGH (ref 4.8–10.8)
WBC # BLD: 13.02 K/UL — HIGH (ref 4.8–10.8)
WBC # FLD AUTO: 13.02 K/UL — HIGH (ref 4.8–10.8)
WBC # FLD AUTO: 13.02 K/UL — HIGH (ref 4.8–10.8)
WBC UR QL: 2 /HPF — SIGNIFICANT CHANGE UP (ref 0–5)
WBC UR QL: 2 /HPF — SIGNIFICANT CHANGE UP (ref 0–5)

## 2023-10-31 PROCEDURE — 99233 SBSQ HOSP IP/OBS HIGH 50: CPT

## 2023-10-31 PROCEDURE — 71045 X-RAY EXAM CHEST 1 VIEW: CPT | Mod: 26

## 2023-10-31 PROCEDURE — 93010 ELECTROCARDIOGRAM REPORT: CPT | Mod: 76

## 2023-10-31 PROCEDURE — 93306 TTE W/DOPPLER COMPLETE: CPT | Mod: 26

## 2023-10-31 PROCEDURE — 99223 1ST HOSP IP/OBS HIGH 75: CPT

## 2023-10-31 PROCEDURE — 76770 US EXAM ABDO BACK WALL COMP: CPT | Mod: 26

## 2023-10-31 RX ORDER — CEFEPIME 1 G/1
1000 INJECTION, POWDER, FOR SOLUTION INTRAMUSCULAR; INTRAVENOUS EVERY 24 HOURS
Refills: 0 | Status: DISCONTINUED | OUTPATIENT
Start: 2023-10-31 | End: 2023-10-31

## 2023-10-31 RX ORDER — SODIUM BICARBONATE 1 MEQ/ML
650 SYRINGE (ML) INTRAVENOUS EVERY 8 HOURS
Refills: 0 | Status: DISCONTINUED | OUTPATIENT
Start: 2023-10-31 | End: 2023-11-03

## 2023-10-31 RX ORDER — INSULIN HUMAN 100 [IU]/ML
2.5 INJECTION, SOLUTION SUBCUTANEOUS
Qty: 100 | Refills: 0 | Status: DISCONTINUED | OUTPATIENT
Start: 2023-10-31 | End: 2023-10-31

## 2023-10-31 RX ORDER — ACETAMINOPHEN 500 MG
1000 TABLET ORAL ONCE
Refills: 0 | Status: COMPLETED | OUTPATIENT
Start: 2023-10-31 | End: 2023-10-31

## 2023-10-31 RX ORDER — CEFEPIME 1 G/1
1000 INJECTION, POWDER, FOR SOLUTION INTRAMUSCULAR; INTRAVENOUS EVERY 24 HOURS
Refills: 0 | Status: DISCONTINUED | OUTPATIENT
Start: 2023-10-31 | End: 2023-11-07

## 2023-10-31 RX ORDER — SODIUM CHLORIDE 9 MG/ML
1000 INJECTION INTRAMUSCULAR; INTRAVENOUS; SUBCUTANEOUS ONCE
Refills: 0 | Status: COMPLETED | OUTPATIENT
Start: 2023-10-31 | End: 2023-10-31

## 2023-10-31 RX ORDER — SODIUM CHLORIDE 9 MG/ML
1000 INJECTION, SOLUTION INTRAVENOUS
Refills: 0 | Status: DISCONTINUED | OUTPATIENT
Start: 2023-10-31 | End: 2023-10-31

## 2023-10-31 RX ORDER — SODIUM CHLORIDE 9 MG/ML
1000 INJECTION, SOLUTION INTRAVENOUS
Refills: 0 | Status: DISCONTINUED | OUTPATIENT
Start: 2023-10-31 | End: 2023-11-01

## 2023-10-31 RX ORDER — VANCOMYCIN HCL 1 G
1250 VIAL (EA) INTRAVENOUS EVERY 24 HOURS
Refills: 0 | Status: DISCONTINUED | OUTPATIENT
Start: 2023-10-31 | End: 2023-10-31

## 2023-10-31 RX ORDER — FUROSEMIDE 40 MG
20 TABLET ORAL ONCE
Refills: 0 | Status: COMPLETED | OUTPATIENT
Start: 2023-10-31 | End: 2023-10-31

## 2023-10-31 RX ORDER — CARVEDILOL PHOSPHATE 80 MG/1
25 CAPSULE, EXTENDED RELEASE ORAL ONCE
Refills: 0 | Status: COMPLETED | OUTPATIENT
Start: 2023-10-31 | End: 2023-10-31

## 2023-10-31 RX ORDER — ALBUTEROL 90 UG/1
2 AEROSOL, METERED ORAL EVERY 4 HOURS
Refills: 0 | Status: DISCONTINUED | OUTPATIENT
Start: 2023-10-31 | End: 2023-11-28

## 2023-10-31 RX ORDER — VANCOMYCIN HCL 1 G
1250 VIAL (EA) INTRAVENOUS ONCE
Refills: 0 | Status: DISCONTINUED | OUTPATIENT
Start: 2023-10-31 | End: 2023-10-31

## 2023-10-31 RX ORDER — DEXTROSE 50 % IN WATER 50 %
50 SYRINGE (ML) INTRAVENOUS ONCE
Refills: 0 | Status: COMPLETED | OUTPATIENT
Start: 2023-10-31 | End: 2023-10-31

## 2023-10-31 RX ORDER — PIPERACILLIN AND TAZOBACTAM 4; .5 G/20ML; G/20ML
3.38 INJECTION, POWDER, LYOPHILIZED, FOR SOLUTION INTRAVENOUS ONCE
Refills: 0 | Status: COMPLETED | OUTPATIENT
Start: 2023-10-31 | End: 2023-10-31

## 2023-10-31 RX ORDER — INSULIN HUMAN 100 [IU]/ML
2 INJECTION, SOLUTION SUBCUTANEOUS
Qty: 100 | Refills: 0 | Status: DISCONTINUED | OUTPATIENT
Start: 2023-10-31 | End: 2023-10-31

## 2023-10-31 RX ORDER — CEFEPIME 1 G/1
1000 INJECTION, POWDER, FOR SOLUTION INTRAMUSCULAR; INTRAVENOUS EVERY 12 HOURS
Refills: 0 | Status: DISCONTINUED | OUTPATIENT
Start: 2023-10-31 | End: 2023-10-31

## 2023-10-31 RX ORDER — IPRATROPIUM/ALBUTEROL SULFATE 18-103MCG
3 AEROSOL WITH ADAPTER (GRAM) INHALATION ONCE
Refills: 0 | Status: COMPLETED | OUTPATIENT
Start: 2023-10-31 | End: 2023-10-31

## 2023-10-31 RX ORDER — INSULIN HUMAN 100 [IU]/ML
3 INJECTION, SOLUTION SUBCUTANEOUS
Qty: 100 | Refills: 0 | Status: DISCONTINUED | OUTPATIENT
Start: 2023-10-31 | End: 2023-11-02

## 2023-10-31 RX ORDER — INSULIN HUMAN 100 [IU]/ML
3 INJECTION, SOLUTION SUBCUTANEOUS
Qty: 100 | Refills: 0 | Status: DISCONTINUED | OUTPATIENT
Start: 2023-10-31 | End: 2023-10-31

## 2023-10-31 RX ORDER — IPRATROPIUM/ALBUTEROL SULFATE 18-103MCG
3 AEROSOL WITH ADAPTER (GRAM) INHALATION EVERY 6 HOURS
Refills: 0 | Status: DISCONTINUED | OUTPATIENT
Start: 2023-10-31 | End: 2023-11-28

## 2023-10-31 RX ORDER — PIPERACILLIN AND TAZOBACTAM 4; .5 G/20ML; G/20ML
3.38 INJECTION, POWDER, LYOPHILIZED, FOR SOLUTION INTRAVENOUS EVERY 12 HOURS
Refills: 0 | Status: DISCONTINUED | OUTPATIENT
Start: 2023-10-31 | End: 2023-10-31

## 2023-10-31 RX ORDER — DEXTROSE 50 % IN WATER 50 %
50 SYRINGE (ML) INTRAVENOUS
Refills: 0 | Status: DISCONTINUED | OUTPATIENT
Start: 2023-10-31 | End: 2023-11-28

## 2023-10-31 RX ADMIN — CARVEDILOL PHOSPHATE 25 MILLIGRAM(S): 80 CAPSULE, EXTENDED RELEASE ORAL at 13:27

## 2023-10-31 RX ADMIN — HEPARIN SODIUM 5000 UNIT(S): 5000 INJECTION INTRAVENOUS; SUBCUTANEOUS at 13:09

## 2023-10-31 RX ADMIN — CEFEPIME 100 MILLIGRAM(S): 1 INJECTION, POWDER, FOR SOLUTION INTRAMUSCULAR; INTRAVENOUS at 14:20

## 2023-10-31 RX ADMIN — SODIUM CHLORIDE 100 MILLILITER(S): 9 INJECTION, SOLUTION INTRAVENOUS at 09:19

## 2023-10-31 RX ADMIN — Medication 166.67 MILLIGRAM(S): at 12:17

## 2023-10-31 RX ADMIN — Medication 650 MILLIGRAM(S): at 01:08

## 2023-10-31 RX ADMIN — INSULIN HUMAN 2 UNIT(S)/HR: 100 INJECTION, SOLUTION SUBCUTANEOUS at 06:51

## 2023-10-31 RX ADMIN — Medication 20 MILLIGRAM(S): at 06:51

## 2023-10-31 RX ADMIN — SODIUM CHLORIDE 100 MILLILITER(S): 9 INJECTION, SOLUTION INTRAVENOUS at 20:34

## 2023-10-31 RX ADMIN — Medication 400 MILLIGRAM(S): at 06:43

## 2023-10-31 RX ADMIN — Medication 1000 MILLIGRAM(S): at 13:31

## 2023-10-31 RX ADMIN — HEPARIN SODIUM 5000 UNIT(S): 5000 INJECTION INTRAVENOUS; SUBCUTANEOUS at 21:19

## 2023-10-31 RX ADMIN — CLOPIDOGREL BISULFATE 75 MILLIGRAM(S): 75 TABLET, FILM COATED ORAL at 13:09

## 2023-10-31 RX ADMIN — Medication 1000 MILLIGRAM(S): at 07:48

## 2023-10-31 RX ADMIN — SODIUM CHLORIDE 75 MILLILITER(S): 9 INJECTION, SOLUTION INTRAVENOUS at 05:59

## 2023-10-31 RX ADMIN — PIPERACILLIN AND TAZOBACTAM 200 GRAM(S): 4; .5 INJECTION, POWDER, LYOPHILIZED, FOR SOLUTION INTRAVENOUS at 04:26

## 2023-10-31 RX ADMIN — PANTOPRAZOLE SODIUM 40 MILLIGRAM(S): 20 TABLET, DELAYED RELEASE ORAL at 13:09

## 2023-10-31 RX ADMIN — Medication 400 MILLIGRAM(S): at 13:07

## 2023-10-31 RX ADMIN — Medication 3 MILLILITER(S): at 07:59

## 2023-10-31 RX ADMIN — Medication 3 MILLILITER(S): at 13:28

## 2023-10-31 RX ADMIN — Medication 50 MILLILITER(S): at 06:51

## 2023-10-31 RX ADMIN — Medication 400 MILLIGRAM(S): at 21:18

## 2023-10-31 RX ADMIN — SODIUM CHLORIDE 1000 MILLILITER(S): 9 INJECTION INTRAMUSCULAR; INTRAVENOUS; SUBCUTANEOUS at 03:57

## 2023-10-31 RX ADMIN — INSULIN HUMAN 2 UNIT(S)/HR: 100 INJECTION, SOLUTION SUBCUTANEOUS at 20:34

## 2023-10-31 RX ADMIN — HEPARIN SODIUM 5000 UNIT(S): 5000 INJECTION INTRAVENOUS; SUBCUTANEOUS at 05:50

## 2023-10-31 RX ADMIN — INSULIN HUMAN 2.5 UNIT(S)/HR: 100 INJECTION, SOLUTION SUBCUTANEOUS at 06:00

## 2023-10-31 RX ADMIN — Medication 1000 MILLIGRAM(S): at 22:18

## 2023-10-31 RX ADMIN — CHLORHEXIDINE GLUCONATE 1 APPLICATION(S): 213 SOLUTION TOPICAL at 05:50

## 2023-10-31 NOTE — CONSULT NOTE ADULT - SUBJECTIVE AND OBJECTIVE BOX
Patient is a 89y old  Male who presents with a chief complaint of sob (30 Oct 2023 21:55)      HPI:    Patient is a 89y old  Male who presents with a chief complaint of sob      HPI: 89 yo male PMHx sig for DM, CAD-bypass, CHF, HTN, HLD presents w sob x few days, no cough fever or chills.  pt placed on NRBM by EMS and subsequently on BiPAP in ED, O2 sat now 99%.  Pt found to have elevated glucose w high AG and b-hydroxybutarate c/w DKA  admitted to icu on insulin drip, iv fluid   spiking temp         PAST MEDICAL & SURGICAL HISTORY:  CHF (congestive heart failure)      DM (diabetes mellitus)      HTN (hypertension)      Prostate CA  in remission      Pacemaker      H/O total knee replacement, right        Allergies    No Known Allergies    Intolerances      Family history : no cardiovscular family history   Home Medications:  allopurinol 100 mg oral tablet: 1 tab(s) orally once a day (25 Oct 2021 22:30)  citalopram 20 mg oral tablet: 1 tab(s) orally once a day (25 Oct 2021 22:30)  clonazePAM 1 mg oral tablet: 2 tab(s) orally once a day (at bedtime), As Needed (25 Oct 2021 22:30)  gabapentin 300 mg oral capsule: 1 cap(s) orally 2 times a day (25 Oct 2021 22:30)  glimepiride 4 mg oral tablet: 1 tab(s) orally 2 times a day (25 Oct 2021 22:30)  Lantus 100 units/mL subcutaneous solution: 30 unit(s) subcutaneous once a day (at bedtime) (25 Oct 2021 22:30)  NIFEdipine 60 mg oral tablet, extended release: 1 tab(s) orally once a day (25 Oct 2021 22:30)    Occupation:  Alochol: Denied  Smoking: Denied  Drug Use: Denied  Marital Status:         ROS: as in HPI; All other systems reviewed are negative    ICU Vital Signs Last 24 Hrs  T(C): 39.5 (31 Oct 2023 06:00), Max: 39.7 (31 Oct 2023 03:00)  T(F): 103.1 (31 Oct 2023 06:00), Max: 103.5 (31 Oct 2023 03:00)  HR: 112 (31 Oct 2023 06:03) (59 - 112)  BP: 113/83 (31 Oct 2023 06:03) (113/83 - 145/67)  BP(mean): 95 (31 Oct 2023 06:03) (92 - 112)  ABP: --  ABP(mean): --  RR: 38 (31 Oct 2023 06:03) (18 - 38)  SpO2: 93% (31 Oct 2023 06:03) (87% - 99%)    O2 Parameters below as of 31 Oct 2023 06:03  Patient On (Oxygen Delivery Method): nasal cannula  O2 Flow (L/min): 3            Physical Examination:    General:  on NC ill looking     HEENT: Pupils equal, reactive to light.  Symmetric.    PULM: Clear to auscultation bilaterally, no significant sputum production    CVS: Regular rate and rhythm, no murmurs, rubs, or gallops    ABD: Soft, nondistended, nontender, normoactive bowel sounds, no masses    EXT: No edema, nontender, no clubbing     SKIN: Warm and well perfused, no rashes noted.    Neurology : no motor or sensory deficit             ABG - ( 31 Oct 2023 06:48 )  pH, Arterial: 7.39  pH, Blood: x     /  pCO2: 26    /  pO2: 140   / HCO3: 16    / Base Excess: -8.1  /  SaO2: 99.9                I&O's Detail    30 Oct 2023 07:01  -  31 Oct 2023 07:00  --------------------------------------------------------  IN:    dextrose 5% + sodium chloride 0.9%: 150 mL    Insulin: 29.5 mL    IV PiggyBack: 100 mL    sodium chloride 0.9%: 250 mL  Total IN: 529.5 mL    OUT:    Incontinent per Collection Bag (mL): 30 mL  Total OUT: 30 mL    Total NET: 499.5 mL            LABS:                        9.2    13.02 )-----------( 199      ( 31 Oct 2023 05:57 )             27.3     31 Oct 2023 01:37    129    |  92     |  97     ----------------------------<  264    4.5     |  15     |  3.7      Ca    7.9        31 Oct 2023 01:37  Phos  3.1       31 Oct 2023 01:37  Mg     1.8       31 Oct 2023 01:37    TPro  6.6    /  Alb  3.8    /  TBili  0.4    /  DBili  x      /  AST  36     /  ALT  23     /  AlkPhos  100    30 Oct 2023 19:20  Amylase x     lipase x          CARDIAC MARKERS ( 30 Oct 2023 19:20 )  x     / 0.24 ng/mL / x     / x     / x          CAPILLARY BLOOD GLUCOSE      POCT Blood Glucose.: 181 mg/dL (31 Oct 2023 06:36)      Urinalysis Basic - ( 31 Oct 2023 01:37 )    Color: x / Appearance: x / SG: x / pH: x  Gluc: 264 mg/dL / Ketone: x  / Bili: x / Urobili: x   Blood: x / Protein: x / Nitrite: x   Leuk Esterase: x / RBC: x / WBC x   Sq Epi: x / Non Sq Epi: x / Bacteria: x      Culture    Lactate, Blood: 3.2 mmol/L (10-31-23 @ 05:57)      MEDICATIONS  (STANDING):  albuterol/ipratropium for Nebulization 3 milliLiter(s) Nebulizer every 6 hours  albuterol/ipratropium for Nebulization. 3 milliLiter(s) Nebulizer once  allopurinol 100 milliGRAM(s) Oral daily  aspirin  chewable 81 milliGRAM(s) Oral daily  carvedilol 25 milliGRAM(s) Oral every 12 hours  chlorhexidine 2% Cloths 1 Application(s) Topical <User Schedule>  citalopram 20 milliGRAM(s) Oral daily  clopidogrel Tablet 75 milliGRAM(s) Oral daily  dextrose 5% + sodium chloride 0.9%. 1000 milliLiter(s) (75 mL/Hr) IV Continuous <Continuous>  dextrose 50% Injectable 50 milliLiter(s) IV Push every 15 minutes  gabapentin 300 milliGRAM(s) Oral two times a day  heparin   Injectable 5000 Unit(s) SubCutaneous every 8 hours  insulin regular Infusion 2 Unit(s)/Hr (2 mL/Hr) IV Continuous <Continuous>  NIFEdipine XL 60 milliGRAM(s) Oral daily  pantoprazole  Injectable 40 milliGRAM(s) IV Push daily  piperacillin/tazobactam IVPB.. 3.375 Gram(s) IV Intermittent every 12 hours    MEDICATIONS  (PRN):  albuterol    90 MICROgram(s) HFA Inhaler 2 Puff(s) Inhalation every 4 hours PRN Shortness of Breath and/or Wheezing  clonazePAM  Tablet 2 milliGRAM(s) Oral at bedtime PRN axniety        RADIOLOGY: ***     CXR: rotated ?? left lower effusion opacity   TLC:  OG:  ET tube:        CAM ICU:  ECHO:

## 2023-10-31 NOTE — PATIENT PROFILE ADULT - FUNCTIONAL ASSESSMENT - BASIC MOBILITY 6.
2-calculated by average/Not able to assess (calculate score using [facility] averaging method)

## 2023-10-31 NOTE — CONSULT NOTE ADULT - SUBJECTIVE AND OBJECTIVE BOX
AMANDA CASTORENA  89y, Male  Allergy: No Known Allergies      CHIEF COMPLAINT: sob (31 Oct 2023 10:26)      HPI:    Patient is a 89y old  Male who presents with a chief complaint of sob      HPI: 89 yo male PMHx sig for DM, CAD-bypass, CHF, HTN, HLD presents w sob x few days, no cough fever or chills.  pt placed on NRBM by EMS and subsequently on BiPAP in ED, O2 sat now 99%.  Pt found to have elevated glucose w high AG and b-hydroxybutarate c/w DKA      PAST MEDICAL & SURGICAL HISTORY:  CHF (congestive heart failure)      DM (diabetes mellitus)      HTN (hypertension)      Prostate CA  in remission      Pacemaker      H/O total knee replacement, right        Allergies    No Known Allergies    Intolerances      FAMILY HISTORY:  FH: type 2 diabetes (Father)      Home Medications:  allopurinol 100 mg oral tablet: 1 tab(s) orally once a day (25 Oct 2021 22:30)  citalopram 20 mg oral tablet: 1 tab(s) orally once a day (25 Oct 2021 22:30)  clonazePAM 1 mg oral tablet: 2 tab(s) orally once a day (at bedtime), As Needed (25 Oct 2021 22:30)  gabapentin 300 mg oral capsule: 1 cap(s) orally 2 times a day (25 Oct 2021 22:30)  glimepiride 4 mg oral tablet: 1 tab(s) orally 2 times a day (25 Oct 2021 22:30)  Lantus 100 units/mL subcutaneous solution: 30 unit(s) subcutaneous once a day (at bedtime) (25 Oct 2021 22:30)  NIFEdipine 60 mg oral tablet, extended release: 1 tab(s) orally once a day (25 Oct 2021 22:30)    Occupation:  Alochol: Denied  Smoking: Denied  Drug Use: Denied  Marital Status:         ROS: as in HPI; All other systems reviewed are negative    ICU Vital Signs Last 24 Hrs  T(C): 37.2 (30 Oct 2023 18:39), Max: 37.2 (30 Oct 2023 18:39)  T(F): 98.9 (30 Oct 2023 18:39), Max: 98.9 (30 Oct 2023 18:39)  HR: 62 (30 Oct 2023 18:39) (62 - 62)  BP: 121/88 (30 Oct 2023 18:39) (121/88 - 121/88)  BP(mean): --  ABP: --  ABP(mean): --  RR: 18 (30 Oct 2023 18:39) (18 - 18)  SpO2: 99% (30 Oct 2023 18:39) (99% - 99%)    O2 Parameters below as of 30 Oct 2023 18:39  Patient On (Oxygen Delivery Method): mask, nonrebreather  O2 Flow (L/min): 15            Physical Examination:    General: No acute distress.  Alert, oriented, interactive, nonfocal    HEENT: Pupils equal, reactive to light.  Symmetric.    PULM: Clear to auscultation bilaterally, no significant sputum production    CVS: Regular rate and rhythm, no murmurs, rubs, or gallops    ABD: Soft, nondistended, nontender, normoactive bowel sounds, no masses    EXT: No edema, nontender    SKIN: Warm and well perfused, no rashes noted.              I&O's Detail        LABS:                        9.5    12.75 )-----------( 198      ( 30 Oct 2023 19:20 )             28.6     30 Oct 2023 19:20    129    |  90     |  95     ----------------------------<  477    5.2     |  15     |  3.7      Ca    8.1        30 Oct 2023 19:20    TPro  6.6    /  Alb  3.8    /  TBili  0.4    /  DBili  x      /  AST  36     /  ALT  23     /  AlkPhos  100    30 Oct 2023 19:20  Amylase x     lipase x          CARDIAC MARKERS ( 30 Oct 2023 19:20 )  x     / 0.24 ng/mL / x     / x     / x          CAPILLARY BLOOD GLUCOSE          Urinalysis Basic - ( 30 Oct 2023 19:20 )    Color: x / Appearance: x / SG: x / pH: x  Gluc: 477 mg/dL / Ketone: x  / Bili: x / Urobili: x   Blood: x / Protein: x / Nitrite: x   Leuk Esterase: x / RBC: x / WBC x   Sq Epi: x / Non Sq Epi: x / Bacteria: x      Culture        MEDICATIONS  (STANDING):  insulin regular Infusion 5 Unit(s)/Hr (5 mL/Hr) IV Continuous <Continuous>    MEDICATIONS  (PRN):        RADIOLOGY: ***     CXR:  ? congestion, awaiting official report          IMPRESSION/PLAN:  pt w sob and ?chf exacerbation in setting of worsening PARKER.  Likely exacerbated by Uncontrolled DM/DKA.  Continue insulin drip. FSBS q1hr, Serial metabolic panels. Cautious IVF hydration.  May try patient on HFNC.  Check TTE. Cardio eval. admit to ICU.    CNS:avoid sedation    HEENT:oral care    PULMONARY: Bipap, may switch to HFNC    CARDIOVASCULAR: Obtain TTE    GI: GI prophylaxis.  Feeding     RENAL: monitor UO    INFECTIOUS DISEASE:monitor off abx    HEMATOLOGICAL:  DVT prophylaxis.    ENDOCRINE:  Follow up FS.  Insulin protocol if needed.    MUSCULOSKELETAL: oob to chair        CRITICAL CARE TIME SPENT: 35 minutes   (30 Oct 2023 21:55)      INFECTIOUS DISEASE HISTORY:    FAMILY HISTORY  FH: type 2 diabetes (Father)        SOCIAL HISTORY  Social History:  pt denies smoking, drug or etoh. (19 Oct 2022 01:49)        ROS  General: Denies rigors, nightsweats  HEENT: Denies headache, rhinorrhea, sore throat, eye pain  CV: Denies CP, palpitations  PULM: Denies wheezing, hemoptysis  GI: Denies hematemesis, hematochezia, melena  : Denies discharge, hematuria  MSK: Denies arthralgias, myalgias  SKIN: Denies rash, lesions  NEURO: Denies paresthesias, weakness  PSYCH: Denies depression, anxiety    VITALS:  T(F): 101.1, Max: 103.5 (10-31-23 @ 03:00)  HR: 102  BP: 122/58  RR: 27Vital Signs Last 24 Hrs  T(C): 38.4 (31 Oct 2023 11:00), Max: 39.7 (31 Oct 2023 03:00)  T(F): 101.1 (31 Oct 2023 11:00), Max: 103.5 (31 Oct 2023 03:00)  HR: 102 (31 Oct 2023 09:00) (59 - 112)  BP: 122/58 (31 Oct 2023 09:00) (106/59 - 145/67)  BP(mean): 83 (31 Oct 2023 09:00) (78 - 112)  RR: 27 (31 Oct 2023 11:00) (18 - 38)  SpO2: 94% (31 Oct 2023 09:00) (87% - 99%)    Parameters below as of 31 Oct 2023 07:05  Patient On (Oxygen Delivery Method): nasal cannula  O2 Flow (L/min): 3      PHYSICAL EXAM:  Gen: NAD, resting in bed  HEENT: Normocephalic, atraumatic  Neck: supple, no lymphadenopathy  CV: Regular rate & regular rhythm  Lungs: decreased BS at bases, no fremitus  Abdomen: Soft, BS present  Ext: Warm, well perfused  Neuro: non focal, awake  Skin: no rash, no erythema  Lines: no phlebitis    TESTS & MEASUREMENTS:      RADIOLOGY & ADDITIONAL TESTS:  I have personally reviewed the last Chest xray  CXR  Xray Chest 1 View- PORTABLE-Urgent:   ACC: [id number] EXAM:  XR CHEST PORTABLE URGENT 1V   ORDERED BY: ROYAL KNIGHT     PROCEDURE DATE:  10/30/2023          INTERPRETATION:  Clinical History / Reason for exam: Chest pain    Comparison : Chest radiograph 10/18/2022.    Technique/Positioning: Frontal chest radiograph.    Findings:    Support devices: Left chest pacemaker.    Cardiac/mediastinum/hilum: Aortic calcification.    Lung parenchyma/Pleura: Right basilar linear opacities, likely   atelectasis. No pneumothorax. Prominent pulmonary vessels.    Skeleton/soft tissues: Degenerative changes.    Impression:    Prominent pulmonary vessels, may be seen with congestion.        --- End of Report ---            DERRELL BONE MD; Attending Radiologist  This document has been electronically signed. Oct 31 2023 10:42AM (10-30-23 @ 19:35)      CT        MEDICATIONS  albuterol/ipratropium for Nebulization 3 Nebulizer every 6 hours  albuterol/ipratropium for Nebulization. 3 Nebulizer once  allopurinol 100 Oral daily  aspirin  chewable 81 Oral daily  carvedilol 25 Oral every 12 hours  cefepime   IVPB 1000 IV Intermittent every 12 hours  chlorhexidine 2% Cloths 1 Topical <User Schedule>  citalopram 20 Oral daily  clopidogrel Tablet 75 Oral daily  dextrose 5% + sodium chloride 0.9% 1000 IV Continuous <Continuous>  dextrose 5% + sodium chloride 0.9%. 1000 IV Continuous <Continuous>  dextrose 50% Injectable 50 IV Push every 15 minutes  gabapentin 300 Oral two times a day  heparin   Injectable 5000 SubCutaneous every 8 hours  insulin regular Infusion 2 IV Continuous <Continuous>  NIFEdipine XL 60 Oral daily  pantoprazole  Injectable 40 IV Push daily  sodium bicarbonate 650 Oral every 8 hours  vancomycin  IVPB 1250 IV Intermittent every 24 hours      Weight  Weight (kg): 88.5 (10-30-23 @ 18:39)    ANTIBIOTICS:  cefepime   IVPB 1000 milliGRAM(s) IV Intermittent every 12 hours  vancomycin  IVPB 1250 milliGRAM(s) IV Intermittent every 24 hours      ALLERGIES:  No Known Allergies   AMANDA CASTORENA  89y, Male  Allergy: No Known Allergies      CHIEF COMPLAINT: sob (31 Oct 2023 10:26)      HPI:    Patient is a 89y old  Male who presents with a chief complaint of sob      HPI: 89 yo male PMHx sig for DM, CAD-bypass, CHF, HTN, HLD presents w sob x few days, no cough fever or chills.  pt placed on NRBM by EMS and subsequently on BiPAP in ED, O2 sat now 99%.  Pt found to have elevated glucose w high AG and b-hydroxybutarate c/w DKA      PAST MEDICAL & SURGICAL HISTORY:  CHF (congestive heart failure)      DM (diabetes mellitus)      HTN (hypertension)      Prostate CA  in remission      Pacemaker      H/O total knee replacement, right        Allergies    No Known Allergies    Intolerances      FAMILY HISTORY:  FH: type 2 diabetes (Father)      Home Medications:  allopurinol 100 mg oral tablet: 1 tab(s) orally once a day (25 Oct 2021 22:30)  citalopram 20 mg oral tablet: 1 tab(s) orally once a day (25 Oct 2021 22:30)  clonazePAM 1 mg oral tablet: 2 tab(s) orally once a day (at bedtime), As Needed (25 Oct 2021 22:30)  gabapentin 300 mg oral capsule: 1 cap(s) orally 2 times a day (25 Oct 2021 22:30)  glimepiride 4 mg oral tablet: 1 tab(s) orally 2 times a day (25 Oct 2021 22:30)  Lantus 100 units/mL subcutaneous solution: 30 unit(s) subcutaneous once a day (at bedtime) (25 Oct 2021 22:30)  NIFEdipine 60 mg oral tablet, extended release: 1 tab(s) orally once a day (25 Oct 2021 22:30)    Occupation:  Alochol: Denied  Smoking: Denied  Drug Use: Denied  Marital Status:         ROS: as in HPI; All other systems reviewed are negative      LABS:                        9.5    12.75 )-----------( 198      ( 30 Oct 2023 19:20 )             28.6     30 Oct 2023 19:20    129    |  90     |  95     ----------------------------<  477    5.2     |  15     |  3.7      Ca    8.1        30 Oct 2023 19:20    TPro  6.6    /  Alb  3.8    /  TBili  0.4    /  DBili  x      /  AST  36     /  ALT  23     /  AlkPhos  100    30 Oct 2023 19:20  Amylase x     lipase x          CARDIAC MARKERS ( 30 Oct 2023 19:20 )  x     / 0.24 ng/mL / x     / x     / x          CAPILLARY BLOOD GLUCOSE          Urinalysis Basic - ( 30 Oct 2023 19:20 )    Color: x / Appearance: x / SG: x / pH: x  Gluc: 477 mg/dL / Ketone: x  / Bili: x / Urobili: x   Blood: x / Protein: x / Nitrite: x   Leuk Esterase: x / RBC: x / WBC x   Sq Epi: x / Non Sq Epi: x / Bacteria: x      Culture        MEDICATIONS  (STANDING):  insulin regular Infusion 5 Unit(s)/Hr (5 mL/Hr) IV Continuous <Continuous>    MEDICATIONS  (PRN):    ROS  General: Denies rigors, night sweats  HEENT: Denies headache, rhinorrhea, sore throat, eye pain  CV: Denies CP, palpitations  PULM: Denies wheezing, hemoptysis  GI: Denies hematemesis, hematochezia, melena  : Denies discharge, hematuria  MSK: Denies arthralgias, myalgias  SKIN: Denies rash, lesions  NEURO: Denies paresthesias, weakness  PSYCH: Denies depression, anxiety    VITALS:  T(F): 101.1, Max: 103.5 (10-31-23 @ 03:00)  HR: 102  BP: 122/58  RR: 27Vital Signs Last 24 Hrs  T(C): 38.4 (31 Oct 2023 11:00), Max: 39.7 (31 Oct 2023 03:00)  T(F): 101.1 (31 Oct 2023 11:00), Max: 103.5 (31 Oct 2023 03:00)  HR: 102 (31 Oct 2023 09:00) (59 - 112)  BP: 122/58 (31 Oct 2023 09:00) (106/59 - 145/67)  BP(mean): 83 (31 Oct 2023 09:00) (78 - 112)  RR: 27 (31 Oct 2023 11:00) (18 - 38)  SpO2: 94% (31 Oct 2023 09:00) (87% - 99%)    Parameters below as of 31 Oct 2023 07:05  Patient On (Oxygen Delivery Method): nasal cannula  O2 Flow (L/min): 3      PHYSICAL EXAM:  Gen: NAD, resting in bed, Elderly male.   HEENT: Normocephalic, atraumatic, Poor dentition.   Neck: supple, no lymphadenopathy  CV: Regular rate & regular rhythm  Lungs: decreased BS at bases, no fremitus  Abdomen: Soft, BS present  Ext: Warm, well perfused  Neuro: non focal, awake  Skin: no rash, no erythema  Lines: no phlebitis    TESTS & MEASUREMENTS:      RADIOLOGY & ADDITIONAL TESTS:  I have personally reviewed the last Chest xray  CXR  Xray Chest 1 View- PORTABLE-Urgent:   ACC: [id number] EXAM:  XR CHEST PORTABLE URGENT 1V   ORDERED BY: ROYAL KNIGHT     PROCEDURE DATE:  10/30/2023          INTERPRETATION:  Clinical History / Reason for exam: Chest pain    Comparison : Chest radiograph 10/18/2022.    Technique/Positioning: Frontal chest radiograph.    Findings:    Support devices: Left chest pacemaker.    Cardiac/mediastinum/hilum: Aortic calcification.    Lung parenchyma/Pleura: Right basilar linear opacities, likely   atelectasis. No pneumothorax. Prominent pulmonary vessels.    Skeleton/soft tissues: Degenerative changes.    Impression:    Prominent pulmonary vessels, may be seen with congestion.        --- End of Report ---      DERRELL BONE MD; Attending Radiologist  This document has been electronically signed. Oct 31 2023 10:42AM (10-30-23 @ 19:35)      CT  < from: US Kidney and Bladder (10.31.23 @ 11:33) >  IMPRESSION:    Echogenic kidneys consistent with medical renal disease. No   hydronephrosis or nephrolithiasis.    < end of copied text >  < from: CT Cervical Spine No Cont (10.30.23 @ 19:57) >  IMPRESSION:    CT HEAD:  No acute intracranial findings.    Stable mild-moderate chronic microvascular ischemic changes.    CTCERVICAL SPINE:  No acute cervical fracture or facet subluxation.    Redemonstrated nonunionized odontoid fracture with unchanged mild   posterior angulation compared to the prior cervical CT from 9/3/2021.    < end of copied text >  < from: CT Head No Cont (10.30.23 @ 19:56) >    CT HEAD:  No acute intracranial findings.    Stable mild-moderate chronic microvascular ischemic changes.    CTCERVICAL SPINE:  No acute cervical fracture or facet subluxation.    Redemonstrated nonunionized odontoid fracture with unchanged mild   posterior angulation compared to the prior cervical CT from 9/3/2021.    < end of copied text >  < from: TTE Echo Complete w/o Contrast w/ Doppler (10.31.23 @ 09:39) >  Summary:   1. Patient with significant PVCs during study.   2. Normal global left ventricular systolic function.   3. LV Ejection Fraction by Adkins's Method with a biplane EF of 61 %.   4. The left ventricular diastolic function could not be assessed in this   study.   5. Normal right ventricular size and function.   6. Normal left atrial size.   7. Normal right atrial size.   8. Mild aortic valve stenosis.   9. Moderate to severe mitral annular calcification.  10. Moderate thickening and calcification of the anterior and posterior   mitral valve leaflets.  11. Mild mitral stenosis.  12. No evidence of mitral valve regurgitation.  13. Estimated pulmonary artery systolic pressure is 67.1 mmHg assuming a   right atrial pressure of 15 mmHg, which is consistent with severe   pulmonary hypertension.  14. There is no evidence of pericardial effusion.    < end of copied text >        MEDICATIONS  albuterol/ipratropium for Nebulization 3 Nebulizer every 6 hours  albuterol/ipratropium for Nebulization. 3 Nebulizer once  allopurinol 100 Oral daily  aspirin  chewable 81 Oral daily  carvedilol 25 Oral every 12 hours  cefepime   IVPB 1000 IV Intermittent every 12 hours  chlorhexidine 2% Cloths 1 Topical <User Schedule>  citalopram 20 Oral daily  clopidogrel Tablet 75 Oral daily  dextrose 5% + sodium chloride 0.9% 1000 IV Continuous <Continuous>  dextrose 5% + sodium chloride 0.9%. 1000 IV Continuous <Continuous>  dextrose 50% Injectable 50 IV Push every 15 minutes  gabapentin 300 Oral two times a day  heparin   Injectable 5000 SubCutaneous every 8 hours  insulin regular Infusion 2 IV Continuous <Continuous>  NIFEdipine XL 60 Oral daily  pantoprazole  Injectable 40 IV Push daily  sodium bicarbonate 650 Oral every 8 hours  vancomycin  IVPB 1250 IV Intermittent every 24 hours      Weight  Weight (kg): 88.5 (10-30-23 @ 18:39)    ANTIBIOTICS:  cefepime   IVPB 1000 milliGRAM(s) IV Intermittent every 12 hours  vancomycin  IVPB 1250 milliGRAM(s) IV Intermittent every 24 hours      ALLERGIES:  No Known Allergies

## 2023-10-31 NOTE — PROGRESS NOTE ADULT - SUBJECTIVE AND OBJECTIVE BOX
Patient is a 89y old  Male who presents with a chief complaint of sob (31 Oct 2023 11:58)      T(F): 102.6 (10-31-23 @ 20:00), Max: 103.5 (10-31-23 @ 03:00)  HR: 97 (10-31-23 @ 21:00)  BP: 102/75 (10-31-23 @ 21:00)  RR: 33 (10-31-23 @ 21:00)  SpO2: 100% (10-31-23 @ 21:00) (87% - 100%)    PHYSICAL EXAM:  GENERAL: NAD  HEAD:  Atraumatic, Normocephalic  EYES: EOMI, PERRLA, conjunctiva and sclera clear  NERVOUS SYSTEM:  , no focal deficits   CHEST/LUNG:  bilateral rales  HEART: Regular rate and rhythm; No murmurs, rubs, or gallops  ABDOMEN: Soft, Nontender, Nondistended; Bowel sounds present  EXTREMITIES:  2+ Peripheral Pulses, No clubbing, cyanosis, or edema    LABS  10-31    131<L>  |  100  |  95<HH>  ----------------------------<  186<H>  4.7   |  15<L>  |  3.8<H>    Ca    7.6<L>      31 Oct 2023 18:45  Phos  3.1     10-31  Mg     2.1     10-31    TPro  6.0  /  Alb  3.4<L>  /  TBili  0.3  /  DBili  x   /  AST  46<H>  /  ALT  26  /  AlkPhos  81  10-31                          9.2    13.02 )-----------( 199      ( 31 Oct 2023 05:57 )             27.3         CARDIAC ENZYMES      Troponin T, Serum: 0.24 ng/mL (10-30-23 @ 19:20)      RADIOLOGY  < from: Xray Chest 1 View- PORTABLE-Urgent (Xray Chest 1 View- PORTABLE-Urgent .) (10.31.23 @ 07:21) >    Impression:    Decrease in pulmonary vascular prominence.      < end of copied text >  < from: US Kidney and Bladder (10.31.23 @ 11:33) >    IMPRESSION:    Echogenic kidneys consistent with medical renal disease. No   hydronephrosis or nephrolithiasis.    < end of copied text >  < from: CT Head No Cont (10.30.23 @ 19:56) >    IMPRESSION:    CT HEAD:  No acute intracranial findings.    Stable mild-moderate chronic microvascular ischemic changes.    CTCERVICAL SPINE:  No acute cervical fracture or facet subluxation.    < end of copied text >    MEDICATIONS  (STANDING):  acetaminophen   IVPB .. 1000 milliGRAM(s) IV Intermittent once  albuterol/ipratropium for Nebulization 3 milliLiter(s) Nebulizer every 6 hours  albuterol/ipratropium for Nebulization. 3 milliLiter(s) Nebulizer once  allopurinol 100 milliGRAM(s) Oral daily  aspirin  chewable 81 milliGRAM(s) Oral daily  carvedilol 25 milliGRAM(s) Oral every 12 hours  cefepime   IVPB 1000 milliGRAM(s) IV Intermittent every 24 hours  chlorhexidine 2% Cloths 1 Application(s) Topical <User Schedule>  citalopram 20 milliGRAM(s) Oral daily  clopidogrel Tablet 75 milliGRAM(s) Oral daily  dextrose 5% + sodium chloride 0.9% 1000 milliLiter(s) (100 mL/Hr) IV Continuous <Continuous>  dextrose 50% Injectable 50 milliLiter(s) IV Push every 15 minutes  gabapentin 300 milliGRAM(s) Oral two times a day  heparin   Injectable 5000 Unit(s) SubCutaneous every 8 hours  insulin regular Infusion 2 Unit(s)/Hr (2 mL/Hr) IV Continuous <Continuous>  NIFEdipine XL 60 milliGRAM(s) Oral daily  pantoprazole  Injectable 40 milliGRAM(s) IV Push daily  sodium bicarbonate 650 milliGRAM(s) Oral every 8 hours    MEDICATIONS  (PRN):  albuterol    90 MICROgram(s) HFA Inhaler 2 Puff(s) Inhalation every 4 hours PRN Shortness of Breath and/or Wheezing  clonazePAM  Tablet 2 milliGRAM(s) Oral at bedtime PRN axniety

## 2023-10-31 NOTE — CONSULT NOTE ADULT - ASSESSMENT
PARKER on CKD 3b / pre-renal vs. ATN iso sepsis/DKA   DKA  Sepsis / ?source    ?oliguric  creatinine up-trending    Recommendations:  - bowden / strict i/o  - renal ultrasound reviewed, no hydronephrosis  - avoid nephrotoxins, hypotension-- d/c nifedipine  - sepsis w/u, f/u cultures  - d/c standing vancomycin order.  Dose by level  - change cefepime to once daily dosing  - d/c iv fluids if pt remains oliguric  - dose meds for eGFR < 10 while creat is acutely rising  - insulin drip per ICU protocol  - no urgent indication for RRT  - poor prognosis / GOC

## 2023-10-31 NOTE — PROGRESS NOTE ADULT - SUBJECTIVE AND OBJECTIVE BOX
HPI:    Patient is a 89y old  Male who presents with a chief complaint of sob    HPI: 89 yo male PMHx sig for DM, CAD-bypass, CHF, HTN, HLD presents w sob x few days, no cough fever or chills.  pt placed on NRBM by EMS and subsequently on BiPAP in ED, O2 sat now 99%.  Pt found to have elevated glucose w high AG and b-hydroxybutarate c/w DKA    PAST MEDICAL & SURGICAL HISTORY:  CHF (congestive heart failure)  DM (diabetes mellitus)  HTN (hypertension)  Prostate CA in remissioN  Pacemaker  H/O total knee replacement, right    Allergies  No Known Allergies    FAMILY HISTORY:  FH: type 2 diabetes (Father)    Home Medications:  allopurinol 100 mg oral tablet: 1 tab(s) orally once a day (25 Oct 2021 22:30)  citalopram 20 mg oral tablet: 1 tab(s) orally once a day (25 Oct 2021 22:30)  clonazePAM 1 mg oral tablet: 2 tab(s) orally once a day (at bedtime), As Needed (25 Oct 2021 22:30)  gabapentin 300 mg oral capsule: 1 cap(s) orally 2 times a day (25 Oct 2021 22:30)  glimepiride 4 mg oral tablet: 1 tab(s) orally 2 times a day (25 Oct 2021 22:30)  Lantus 100 units/mL subcutaneous solution: 30 unit(s) subcutaneous once a day (at bedtime) (25 Oct 2021 22:30)  NIFEdipine 60 mg oral tablet, extended release: 1 tab(s) orally once a day (25 Oct 2021 22:30)    Occupation:  Alochol: Denied  Smoking: Denied  Drug Use: Denied  Marital Status:     ROS: as in HPI; All other systems reviewed are negative    ICU Vital Signs Last 24 Hrs  T(C): 37.2 (30 Oct 2023 18:39), Max: 37.2 (30 Oct 2023 18:39)  T(F): 98.9 (30 Oct 2023 18:39), Max: 98.9 (30 Oct 2023 18:39)  HR: 62 (30 Oct 2023 18:39) (62 - 62)  BP: 121/88 (30 Oct 2023 18:39) (121/88 - 121/88)  BP(mean): --  ABP: --  ABP(mean): --  RR: 18 (30 Oct 2023 18:39) (18 - 18)  SpO2: 99% (30 Oct 2023 18:39) (99% - 99%)    O2 Parameters below as of 30 Oct 2023 18:39  Patient On (Oxygen Delivery Method): mask, nonrebreather  O2 Flow (L/min): 15      Physical Examination:    General: on NC, ill looking     HEENT: Pupils equal, reactive to light.  Symmetric.    PULM: Clear to auscultation bilaterally, no significant sputum production    CVS: Regular rate and rhythm, no murmurs, rubs, or gallops    ABD: Soft, nondistended, nontender, normoactive bowel sounds, no masses    EXT: No edema, nontender    SKIN: Warm and well perfused, no rashes noted.    NEURO: No motor or sensory deficit     I&O's Detail    31 Oct 2023 07:01  -  31 Oct 2023 10:32  --------------------------------------------------------  IN:    dextrose 5% + sodium chloride 0.9%: 150 mL    Insulin: 2 mL    Insulin: 4 mL  Total IN: 156 mL    OUT:    Incontinent per Collection Bag (mL): 0 mL  Total OUT: 0 mL    Total NET: 156 mL      LABS:                        9.5    12.75 )-----------( 198      ( 30 Oct 2023 19:20 )             28.6     30 Oct 2023 19:20    129    |  90     |  95     ----------------------------<  477    5.2     |  15     |  3.7      Ca    8.1        30 Oct 2023 19:20    TPro  6.6    /  Alb  3.8    /  TBili  0.4    /  DBili  x      /  AST  36     /  ALT  23     /  AlkPhos  100    30 Oct 2023 19:20  Amylase x     lipase x          CARDIAC MARKERS ( 30 Oct 2023 19:20 )  x     / 0.24 ng/mL / x     / x     / x          CAPILLARY BLOOD GLUCOSE          Urinalysis Basic - ( 30 Oct 2023 19:20 )    Color: x / Appearance: x / SG: x / pH: x  Gluc: 477 mg/dL / Ketone: x  / Bili: x / Urobili: x   Blood: x / Protein: x / Nitrite: x   Leuk Esterase: x / RBC: x / WBC x   Sq Epi: x / Non Sq Epi: x / Bacteria: x      Culture      MEDICATIONS  (STANDING):  albuterol/ipratropium for Nebulization 3 milliLiter(s) Nebulizer every 6 hours  albuterol/ipratropium for Nebulization. 3 milliLiter(s) Nebulizer once  allopurinol 100 milliGRAM(s) Oral daily  aspirin  chewable 81 milliGRAM(s) Oral daily  carvedilol 25 milliGRAM(s) Oral every 12 hours  cefepime   IVPB 1000 milliGRAM(s) IV Intermittent every 12 hours  chlorhexidine 2% Cloths 1 Application(s) Topical <User Schedule>  citalopram 20 milliGRAM(s) Oral daily  clopidogrel Tablet 75 milliGRAM(s) Oral daily  dextrose 5% + sodium chloride 0.9% 1000 milliLiter(s) (100 mL/Hr) IV Continuous <Continuous>  dextrose 5% + sodium chloride 0.9%. 1000 milliLiter(s) (100 mL/Hr) IV Continuous <Continuous>  dextrose 50% Injectable 50 milliLiter(s) IV Push every 15 minutes  gabapentin 300 milliGRAM(s) Oral two times a day  heparin   Injectable 5000 Unit(s) SubCutaneous every 8 hours  insulin regular Infusion 2 Unit(s)/Hr (2 mL/Hr) IV Continuous <Continuous>  NIFEdipine XL 60 milliGRAM(s) Oral daily  pantoprazole  Injectable 40 milliGRAM(s) IV Push daily  sodium bicarbonate 650 milliGRAM(s) Oral every 8 hours  vancomycin  IVPB 1250 milliGRAM(s) IV Intermittent every 24 hours    MEDICATIONS  (PRN):  albuterol    90 MICROgram(s) HFA Inhaler 2 Puff(s) Inhalation every 4 hours PRN Shortness of Breath and/or Wheezing  clonazePAM  Tablet 2 milliGRAM(s) Oral at bedtime PRN axniety      RADIOLOGY: ***     CXR: left lower effusion opacity HPI:    Patient is a 89y old  Male who presents with a chief complaint of sob    HPI: 89 yo male PMHx significant for DM, CAD-bypass, CHF, HTN, HLD who presents w sob x few days, no cough fever or chills. Pt was placed on NRBM by EMS and subsequently on BiPAP in ED, O2 sat now 99%.  Pt found to have elevated glucose w high AG and b-hydroxybutarate c/w DKA    PAST MEDICAL & SURGICAL HISTORY:  CHF (congestive heart failure)  DM (diabetes mellitus)  HTN (hypertension)  Prostate CA in remissioN  Pacemaker  H/O total knee replacement, right    Allergies  No Known Allergies    FAMILY HISTORY:  FH: type 2 diabetes (Father)    Home Medications:  allopurinol 100 mg oral tablet: 1 tab(s) orally once a day (25 Oct 2021 22:30)  citalopram 20 mg oral tablet: 1 tab(s) orally once a day (25 Oct 2021 22:30)  clonazePAM 1 mg oral tablet: 2 tab(s) orally once a day (at bedtime), As Needed (25 Oct 2021 22:30)  gabapentin 300 mg oral capsule: 1 cap(s) orally 2 times a day (25 Oct 2021 22:30)  glimepiride 4 mg oral tablet: 1 tab(s) orally 2 times a day (25 Oct 2021 22:30)  Lantus 100 units/mL subcutaneous solution: 30 unit(s) subcutaneous once a day (at bedtime) (25 Oct 2021 22:30)  NIFEdipine 60 mg oral tablet, extended release: 1 tab(s) orally once a day (25 Oct 2021 22:30)    Occupation:  Alochol: Denied  Smoking: Denied  Drug Use: Denied  Marital Status:     ROS: as in HPI; All other systems reviewed are negative    ICU Vital Signs Last 24 Hrs  T(C): 37.2 (30 Oct 2023 18:39), Max: 37.2 (30 Oct 2023 18:39)  T(F): 98.9 (30 Oct 2023 18:39), Max: 98.9 (30 Oct 2023 18:39)  HR: 62 (30 Oct 2023 18:39) (62 - 62)  BP: 121/88 (30 Oct 2023 18:39) (121/88 - 121/88)  BP(mean): --  ABP: --  ABP(mean): --  RR: 18 (30 Oct 2023 18:39) (18 - 18)  SpO2: 99% (30 Oct 2023 18:39) (99% - 99%)    O2 Parameters below as of 30 Oct 2023 18:39  Patient On (Oxygen Delivery Method): mask, nonrebreather  O2 Flow (L/min): 15      Physical Examination:    General: on NC, ill looking   HEENT: Pupils equal, reactive to light.  Symmetric.  PULM: Clear to auscultation bilaterally, no significant sputum production  CVS: Regular rate and rhythm, no murmurs, rubs, or gallops  ABD: Soft, nondistended, nontender, normoactive bowel sounds, no masses  EXT: No edema, nontender  SKIN: Warm and well perfused, no rashes noted.  NEURO: No motor or sensory deficit     I&O's Detail    31 Oct 2023 07:01  -  31 Oct 2023 10:32  --------------------------------------------------------  IN:    dextrose 5% + sodium chloride 0.9%: 150 mL    Insulin: 2 mL    Insulin: 4 mL  Total IN: 156 mL    OUT:    Incontinent per Collection Bag (mL): 0 mL  Total OUT: 0 mL    Total NET: 156 mL      LABS:                        9.5    12.75 )-----------( 198      ( 30 Oct 2023 19:20 )             28.6     30 Oct 2023 19:20    129    |  90     |  95     ----------------------------<  477    5.2     |  15     |  3.7      Ca    8.1        30 Oct 2023 19:20    TPro  6.6    /  Alb  3.8    /  TBili  0.4    /  DBili  x      /  AST  36     /  ALT  23     /  AlkPhos  100    30 Oct 2023 19:20  Amylase x     lipase x        CARDIAC MARKERS ( 30 Oct 2023 19:20 )  x     / 0.24 ng/mL / x     / x     / x        CAPILLARY BLOOD GLUCOSE    Urinalysis Basic - ( 30 Oct 2023 19:20 )    Color: x / Appearance: x / SG: x / pH: x  Gluc: 477 mg/dL / Ketone: x  / Bili: x / Urobili: x   Blood: x / Protein: x / Nitrite: x   Leuk Esterase: x / RBC: x / WBC x   Sq Epi: x / Non Sq Epi: x / Bacteria: x    Culture    MEDICATIONS  (STANDING):  albuterol/ipratropium for Nebulization 3 milliLiter(s) Nebulizer every 6 hours  albuterol/ipratropium for Nebulization. 3 milliLiter(s) Nebulizer once  allopurinol 100 milliGRAM(s) Oral daily  aspirin  chewable 81 milliGRAM(s) Oral daily  carvedilol 25 milliGRAM(s) Oral every 12 hours  cefepime   IVPB 1000 milliGRAM(s) IV Intermittent every 12 hours  chlorhexidine 2% Cloths 1 Application(s) Topical <User Schedule>  citalopram 20 milliGRAM(s) Oral daily  clopidogrel Tablet 75 milliGRAM(s) Oral daily  dextrose 5% + sodium chloride 0.9% 1000 milliLiter(s) (100 mL/Hr) IV Continuous <Continuous>  dextrose 5% + sodium chloride 0.9%. 1000 milliLiter(s) (100 mL/Hr) IV Continuous <Continuous>  dextrose 50% Injectable 50 milliLiter(s) IV Push every 15 minutes  gabapentin 300 milliGRAM(s) Oral two times a day  heparin   Injectable 5000 Unit(s) SubCutaneous every 8 hours  insulin regular Infusion 2 Unit(s)/Hr (2 mL/Hr) IV Continuous <Continuous>  NIFEdipine XL 60 milliGRAM(s) Oral daily  pantoprazole  Injectable 40 milliGRAM(s) IV Push daily  sodium bicarbonate 650 milliGRAM(s) Oral every 8 hours  vancomycin  IVPB 1250 milliGRAM(s) IV Intermittent every 24 hours    MEDICATIONS  (PRN):  albuterol    90 MICROgram(s) HFA Inhaler 2 Puff(s) Inhalation every 4 hours PRN Shortness of Breath and/or Wheezing  clonazePAM  Tablet 2 milliGRAM(s) Oral at bedtime PRN axniety      RADIOLOGY: ***   CXR: left lower effusion opacity

## 2023-10-31 NOTE — CONSULT NOTE ADULT - ATTENDING COMMENTS
patient seen and examined agree above note   cefepime vanco   d/c zosyn  follow cx   ID eval   renal and bladder US

## 2023-10-31 NOTE — CONSULT NOTE ADULT - ASSESSMENT
89M with CAD s/p CABG, HTN, HLD, DM who presents with SOB found to have DKA with possible PNA.  89M with CAD s/p CABG, HTN, HLD, DM who presents with SOB found to have DKA with possible PNA.     SOB likely multifactorial. Given DKA, pt appears volume depleted with some pulmonary edema.   -Would cautiously increase IVF.   -Frequent lung checks.   -Would deep suction for upper airway secretions with respiratory.   -Check TTE.   -Continue with antibiotics for fevers and sepsis.   -Continue with home meds.     Discussed with patient and ICU.

## 2023-10-31 NOTE — PROGRESS NOTE ADULT - ASSESSMENT
89 yo male PMHx sig for DM, CAD-bypass, CHF, HTN, HLD presents w sob x few days, no cough fever or chills.  pt placed on NRBM by EMS and subsequently on BiPAP in ED, O2 sat now 99%.  Pt found to have elevated glucose w high AG and b-hydroxybutarate c/w     DKA     - IV insulin   - IV antibiotics   - endocrine consult   - DVT prophylaxis

## 2023-10-31 NOTE — PATIENT PROFILE ADULT - FALL HARM RISK - HARM RISK INTERVENTIONS
Assistance OOB with selected safe patient handling equipment/Communicate Risk of Fall with Harm to all staff/Discuss with provider need for PT consult/Monitor gait and stability/Provide patient with walking aids - walker, cane, crutches/Reinforce activity limits and safety measures with patient and family/Tailored Fall Risk Interventions/Visual Cue: Yellow wristband and red socks/Bed in lowest position, wheels locked, appropriate side rails in place/Call bell, personal items and telephone in reach/Instruct patient to call for assistance before getting out of bed or chair/Non-slip footwear when patient is out of bed/[place] to call system/Physically safe environment - no spills, clutter or unnecessary equipment/Purposeful Proactive Rounding/Room/bathroom lighting operational, light cord in reach Rituxan Counseling:  I discussed with the patient the risks of Rituxan infusions. Side effects can include infusion reactions, severe drug rashes including mucocutaneous reactions, reactivation of latent hepatitis and other infections and rarely progressive multifocal leukoencephalopathy.  All of the patient's questions and concerns were addressed.

## 2023-10-31 NOTE — CONSULT NOTE ADULT - SUBJECTIVE AND OBJECTIVE BOX
Nephrology Consultation Note    AMANDA CASTORENA  MRN-[id number]  89y  Male    S:  Patient is seen and examined, events over the last 24h noted.    O:  Allergies:  No Known Allergies    Hospital Medications:   MEDICATIONS  (STANDING):  albuterol/ipratropium for Nebulization 3 milliLiter(s) Nebulizer every 6 hours  albuterol/ipratropium for Nebulization. 3 milliLiter(s) Nebulizer once  allopurinol 100 milliGRAM(s) Oral daily  aspirin  chewable 81 milliGRAM(s) Oral daily  carvedilol 25 milliGRAM(s) Oral every 12 hours  cefepime   IVPB 1000 milliGRAM(s) IV Intermittent every 12 hours  chlorhexidine 2% Cloths 1 Application(s) Topical <User Schedule>  citalopram 20 milliGRAM(s) Oral daily  clopidogrel Tablet 75 milliGRAM(s) Oral daily  dextrose 5% + sodium chloride 0.9% 1000 milliLiter(s) (100 mL/Hr) IV Continuous <Continuous>  dextrose 5% + sodium chloride 0.9%. 1000 milliLiter(s) (100 mL/Hr) IV Continuous <Continuous>  dextrose 50% Injectable 50 milliLiter(s) IV Push every 15 minutes  gabapentin 300 milliGRAM(s) Oral two times a day  heparin   Injectable 5000 Unit(s) SubCutaneous every 8 hours  insulin regular Infusion 2 Unit(s)/Hr (2 mL/Hr) IV Continuous <Continuous>  NIFEdipine XL 60 milliGRAM(s) Oral daily  pantoprazole  Injectable 40 milliGRAM(s) IV Push daily  sodium bicarbonate 650 milliGRAM(s) Oral every 8 hours  vancomycin  IVPB 1250 milliGRAM(s) IV Intermittent every 24 hours    MEDICATIONS  (PRN):  albuterol    90 MICROgram(s) HFA Inhaler 2 Puff(s) Inhalation every 4 hours PRN Shortness of Breath and/or Wheezing  clonazePAM  Tablet 2 milliGRAM(s) Oral at bedtime PRN axniety    Home Medications:  allopurinol 100 mg oral tablet: 1 tab(s) orally once a day (25 Oct 2021 22:30)  citalopram 20 mg oral tablet: 1 tab(s) orally once a day (25 Oct 2021 22:30)  clonazePAM 1 mg oral tablet: 2 tab(s) orally once a day (at bedtime), As Needed (25 Oct 2021 22:30)  gabapentin 300 mg oral capsule: 1 cap(s) orally 2 times a day (25 Oct 2021 22:30)  glimepiride 4 mg oral tablet: 1 tab(s) orally 2 times a day (25 Oct 2021 22:30)  Lantus 100 units/mL subcutaneous solution: 30 unit(s) subcutaneous once a day (at bedtime) (25 Oct 2021 22:30)  NIFEdipine 60 mg oral tablet, extended release: 1 tab(s) orally once a day (25 Oct 2021 22:30)      VITALS:  Daily Height in cm: 172.72 (30 Oct 2023 23:05)    Daily Weight in k.3 (31 Oct 2023 07:05)  T(F): 102 (10-31-23 @ 08:00), Max: 103.5 (10-31-23 @ 03:00)  HR: 95 (10-31-23 @ 08:00)  BP: 117/63 (10-31-23 @ 08:00)  RR: 30 (10-31-23 @ 08:00)  SpO2: 99% (10-31-23 @ 08:00)  Wt(kg): --  I&O's Detail    30 Oct 2023 07:  -  31 Oct 2023 07:00  --------------------------------------------------------  IN:    dextrose 5% + sodium chloride 0.9%: 150 mL    Insulin: 29.5 mL    IV PiggyBack: 100 mL    sodium chloride 0.9%: 250 mL  Total IN: 529.5 mL    OUT:    Incontinent per Collection Bag (mL): 30 mL  Total OUT: 30 mL    Total NET: 499.5 mL      31 Oct 2023 07:  -  31 Oct 2023 10:00  --------------------------------------------------------  IN:    dextrose 5% + sodium chloride 0.9%: 150 mL    Insulin: 2 mL    Insulin: 4 mL  Total IN: 156 mL    OUT:    Incontinent per Collection Bag (mL): 0 mL  Total OUT: 0 mL    Total NET: 156 mL        I&O's Summary    30 Oct 2023 07:  -  31 Oct 2023 07:00  --------------------------------------------------------  IN: 529.5 mL / OUT: 30 mL / NET: 499.5 mL    31 Oct 2023 07:  -  31 Oct 2023 10:00  --------------------------------------------------------  IN: 156 mL / OUT: 0 mL / NET: 156 mL      Height (cm): 172.7 (10-30 @ 23:05)  Weight (kg): 88.5 (10-30 @ 18:39)  BMI (kg/m2): 29.7 (10-30 @ 23:05)  BSA (m2): 2.02 (10-30 @ 23:05)    PHYSICAL EXAM:  Gen: NAD  Chest: b/l breath sounds  Abd: soft  Extremities: no edema  Vascular access:       LABS:  ABG - ( 31 Oct 2023 06:48 )  pH, Arterial: 7.39  pH, Blood: x     /  pCO2: 26    /  pO2: 140   / HCO3: 16    / Base Excess: -8.1  /  SaO2: 99.9              Blood Gas Arterial - Sodium: 127 mmol/L (10-31-23 @ 06:48)  Blood Gas Arterial - Calcium, Ionized: 0.97 mmol/L (10-31-23 @ 06:48)  Blood Gas Profile w/Lytes - Venous: Performed in Lab (10-30-23 @ 19:37)    10-31    132<L>  |  95<L>  |  97<HH>  ----------------------------<  158<H>  4.2   |  17  |  3.9<H>    Ca    8.1<L>      31 Oct 2023 05:57  Phos  3.1     10-31  Mg     2.1     10-31    TPro  6.6  /  Alb  3.5  /  TBili  0.5  /  DBili      /  AST  46<H>  /  ALT  25  /  AlkPhos  90  10-31    eGFR: 14 mL/min/1.73m2 (10-31-23 @ 05:57)  eGFR: 15 mL/min/1.73m2 (10-31-23 @ 01:37)    Phosphorus: 3.1 mg/dL (10-31-23 @ 05:57)  Phosphorus: 3.1 mg/dL (10-31-23 @ 01:37)                            9.2    13.02 )-----------( 199      ( 31 Oct 2023 05:57 )             27.3     Mean Cell Volume: 87.8 fL (10-31-23 @ 05:57)    Iron Total, Serum: 59 ug/dL (10-26-21 @ 04:30)  % Saturation, Iron: 23 % (10-26-21 @ 04:30)        Urine Studies:  Protein, Urine: 300 mg/dL (10-31-23 @ 07:40)  Protein, Urine: Trace (10-27-21 @ 11:00)  Protein, Urine: Trace (10-25-21 @ 18:49)  Protein, Urine: 100 mg/dL (04-15-19 @ 14:09)  White Blood Cell - Urine: Negative (04-15-19 @ 14:09)  Red Blood Cell - Urine: 3-5 /HPF (04-15-19 @ 14:09)      Urea Nitrogen,  Random Urine: 540 mg/dL (10-27-21 @ 11:00)    Sodium, Random Urine: 47.0 mmoL/L (10-27 @ 11:00)  Creatinine, Random Urine: 127 mg/dL (10-27 @ 11:00)    Creatinine trend:  Creatinine: 3.9 mg/dL (10-31-23 @ 05:57)  Creatinine: 3.7 mg/dL (10-31-23 @ 01:37)  Creatinine: 3.7 mg/dL (10-30-23 @ 19:20)  Creatinine, Serum: 2.1 mg/dL (10-27-22 @ 07:31)  Creatinine, Serum: 1.9 mg/dL (10-26-22 @ 06:07)  Creatinine, Serum: 2.0 mg/dL (10-25-22 @ 22:41)  Creatinine, Serum: 1.9 mg/dL (10-22-22 @ 07:10)  Creatinine, Serum: 1.7 mg/dL (10-21-22 @ 05:59)  Creatinine, Serum: 1.8 mg/dL (10-20-22 @ 06:03)  Creatinine, Serum: 1.6 mg/dL (10-19-22 @ 06:12)  Creatinine, Serum: 1.6 mg/dL (10-18-22 @ 22:50)  Creatinine, Serum: 2.0 mg/dL (10-28-21 @ 06:30)  Creatinine, Serum: 2.0 mg/dL (10-27-21 @ 06:20)  Creatinine, Serum: 2.1 mg/dL (10-26-21 @ 20:44)  Creatinine, Serum: 1.9 mg/dL (10-26-21 @ 04:30)  Creatinine, Serum: 1.8 mg/dL (10-25-21 @ 21:37)  Creatinine, Serum: 1.9 mg/dL (10-25-21 @ 17:47)  Creatinine, Serum: 1.8 mg/dL (10-14-21 @ 06:34)  Creatinine, Serum: 1.5 mg/dL (10-13-21 @ 11:28)  Creatinine, Serum: 1.5 mg/dL (10-13-21 @ 03:00)    Hemoglobin A1C, Whole Blood: 8.1 % (19 @ 06:47)  Hemoglobin A1C, Whole Blood: 8.1 % (04-15-19 @ 07:17)         Nephrology Consultation Note    AMANDA CASTORENA  MRN-[id number]  89y  Male    CC: 89 yo man presented with SOB    89 yo male PMHx sig for DM, CAD-bypass, CHF, HTN, HLD presents w sob x few days, no cough fever or chills.  pt placed on NRBM by EMS and subsequently on BiPAP in ED, O2 sat now 99%.  Pt found to have elevated glucose w high AG and b-hydroxybutarate c/w DKA.   Patient's creatinine level elevated from baseline ~2.     ROS: unable to obtain d/t poor mentation      Allergies:  No Known Allergies    Hospital Medications:   MEDICATIONS  (STANDING):  albuterol/ipratropium for Nebulization 3 milliLiter(s) Nebulizer every 6 hours  albuterol/ipratropium for Nebulization. 3 milliLiter(s) Nebulizer once  allopurinol 100 milliGRAM(s) Oral daily  aspirin  chewable 81 milliGRAM(s) Oral daily  carvedilol 25 milliGRAM(s) Oral every 12 hours  cefepime   IVPB 1000 milliGRAM(s) IV Intermittent every 12 hours  chlorhexidine 2% Cloths 1 Application(s) Topical <User Schedule>  citalopram 20 milliGRAM(s) Oral daily  clopidogrel Tablet 75 milliGRAM(s) Oral daily  dextrose 5% + sodium chloride 0.9% 1000 milliLiter(s) (100 mL/Hr) IV Continuous <Continuous>  dextrose 5% + sodium chloride 0.9%. 1000 milliLiter(s) (100 mL/Hr) IV Continuous <Continuous>  dextrose 50% Injectable 50 milliLiter(s) IV Push every 15 minutes  gabapentin 300 milliGRAM(s) Oral two times a day  heparin   Injectable 5000 Unit(s) SubCutaneous every 8 hours  insulin regular Infusion 2 Unit(s)/Hr (2 mL/Hr) IV Continuous <Continuous>  NIFEdipine XL 60 milliGRAM(s) Oral daily  pantoprazole  Injectable 40 milliGRAM(s) IV Push daily  sodium bicarbonate 650 milliGRAM(s) Oral every 8 hours  vancomycin  IVPB 1250 milliGRAM(s) IV Intermittent every 24 hours    MEDICATIONS  (PRN):  albuterol    90 MICROgram(s) HFA Inhaler 2 Puff(s) Inhalation every 4 hours PRN Shortness of Breath and/or Wheezing  clonazePAM  Tablet 2 milliGRAM(s) Oral at bedtime PRN axniety    Home Medications:  allopurinol 100 mg oral tablet: 1 tab(s) orally once a day (25 Oct 2021 22:30)  citalopram 20 mg oral tablet: 1 tab(s) orally once a day (25 Oct 2021 22:30)  clonazePAM 1 mg oral tablet: 2 tab(s) orally once a day (at bedtime), As Needed (25 Oct 2021 22:30)  gabapentin 300 mg oral capsule: 1 cap(s) orally 2 times a day (25 Oct 2021 22:30)  glimepiride 4 mg oral tablet: 1 tab(s) orally 2 times a day (25 Oct 2021 22:30)  Lantus 100 units/mL subcutaneous solution: 30 unit(s) subcutaneous once a day (at bedtime) (25 Oct 2021 22:30)  NIFEdipine 60 mg oral tablet, extended release: 1 tab(s) orally once a day (25 Oct 2021 22:30)      VITALS:  Daily Height in cm: 172.72 (30 Oct 2023 23:05)    Daily Weight in k.3 (31 Oct 2023 07:05)  T(F): 102 (10-31-23 @ 08:00), Max: 103.5 (10-31-23 @ 03:00)  HR: 95 (10-31-23 @ 08:00)  BP: 117/63 (10-31-23 @ 08:00)  RR: 30 (10-31-23 @ 08:00)  SpO2: 99% (10-31-23 @ 08:00)  Wt(kg): --  I&O's Detail    30 Oct 2023 07:01  -  31 Oct 2023 07:00  --------------------------------------------------------  IN:    dextrose 5% + sodium chloride 0.9%: 150 mL    Insulin: 29.5 mL    IV PiggyBack: 100 mL    sodium chloride 0.9%: 250 mL  Total IN: 529.5 mL    OUT:    Incontinent per Collection Bag (mL): 30 mL  Total OUT: 30 mL    Total NET: 499.5 mL      31 Oct 2023 07:01  -  31 Oct 2023 10:00  --------------------------------------------------------  IN:    dextrose 5% + sodium chloride 0.9%: 150 mL    Insulin: 2 mL    Insulin: 4 mL  Total IN: 156 mL    OUT:    Incontinent per Collection Bag (mL): 0 mL  Total OUT: 0 mL    Total NET: 156 mL        I&O's Summary    30 Oct 2023 07:  -  31 Oct 2023 07:00  --------------------------------------------------------  IN: 529.5 mL / OUT: 30 mL / NET: 499.5 mL    31 Oct 2023 07:  -  31 Oct 2023 10:00  --------------------------------------------------------  IN: 156 mL / OUT: 0 mL / NET: 156 mL      Height (cm): 172.7 (10-30 @ 23:05)  Weight (kg): 88.5 (10-30 @ 18:39)  BMI (kg/m2): 29.7 (10-30 @ 23:05)  BSA (m2): 2.02 (10-30 @ 23:05)    PHYSICAL EXAM:  Gen: confused, in NAD  Chest: b/l breath sounds  Abd: soft, distended  Extremities: no edema      LABS:  ABG - ( 31 Oct 2023 06:48 )  pH, Arterial: 7.39  pH, Blood: x     /  pCO2: 26    /  pO2: 140   / HCO3: 16    / Base Excess: -8.1  /  SaO2: 99.9      Blood Gas Arterial - Sodium: 127 mmol/L (10-31-23 @ 06:48)  Blood Gas Arterial - Calcium, Ionized: 0.97 mmol/L (10-31-23 @ 06:48)  Blood Gas Profile w/Lytes - Venous: Performed in Lab (10-30-23 @ 19:37)    10-31    132<L>  |  95<L>  |  97<HH>  ----------------------------<  158<H>  4.2   |  17  |  3.9<H>    Ca    8.1<L>      31 Oct 2023 05:57  Phos  3.1     10-31  Mg     2.1     10-31    TPro  6.6  /  Alb  3.5  /  TBili  0.5  /  DBili      /  AST  46<H>  /  ALT  25  /  AlkPhos  90  10-31    Phosphorus: 3.1 mg/dL (10-31-23 @ 05:57)                          9.2    13.02 )-----------( 199      ( 31 Oct 2023 05:57 )             27.3     Mean Cell Volume: 87.8 fL (10-31-23 @ 05:57)    Iron Total, Serum: 59 ug/dL (10-26-21 @ 04:30)  % Saturation, Iron: 23 % (10-26-21 @ 04:30)        Urine Studies:  Protein, Urine: 300 mg/dL (10-31-23 @ 07:40)    Creatinine trend:  Creatinine: 3.9 mg/dL (10-31-23 @ 05:57)  Creatinine: 3.7 mg/dL (10-31-23 @ 01:37)  Creatinine: 3.7 mg/dL (10-30-23 @ 19:20)  Creatinine, Serum: 2.1 mg/dL (10-27-22 @ 07:31)  Creatinine, Serum: 1.9 mg/dL (10-26-22 @ 06:07)  Creatinine, Serum: 2.0 mg/dL (10-25-22 @ 22:41)    < from: US Kidney and Bladder (10.31.23 @ 11:33) >    ACC: [id number] EXAM:  US KIDNEYS AND BLADDER   ORDERED BY: JOSEFA SARAVIA     PROCEDURE DATE:  10/31/2023          INTERPRETATION:  CLINICAL INFORMATION: Worsening renal function.    COMPARISON: 2021 ultrasound.    TECHNIQUE: Sonography of the kidneys and bladder.    FINDINGS:    Right kidney: 12.0 cm. Echogenic in appearance without hydronephrosis or   nephrolithiasis.    Left kidney:  11.6 cm. Echogenic in appearance without hydronephrosis or   nephrolithiasis.    Urinary bladder: Patient has a condom catheter.    IMPRESSION:    Echogenic kidneys consistent with medical renal disease. No   hydronephrosis or nephrolithiasis.        --- End of Report ---            JOSÉ GREENBERG MD; Attending Interventional Radiologist  This document has been electronically signed. Oct 31 2023 11:41AM    < end of copied text >

## 2023-10-31 NOTE — CONSULT NOTE ADULT - ASSESSMENT
ASSESSMENT  89y M admitted with Acute renal failure      CHF (congestive heart failure)    DM (diabetes mellitus)    HTN (hypertension)    Prostate CA    Pacemaker        IMPRESSION  #  #Severe Sepsis on admission  #Lactic acidosis  #Hyponatremia   #Obesity BMI (kg/m2): 29.7  #DM     RECOMMENDATIONS  This is an incomplete consult note. All final recommendations to follow after interview and examination of the patient. Please follow recommendations noted below.    If any questions, please send a message or call on [employer] Teams  Please continue to update ID with any pertinent new laboratory or radiographic findings.    Freedom Chen M.D  Infectious Diseases Attending  [facility]    ASSESSMENT  89 y/o male  with a past medical HX of bypass, CAD, CHF , HTN, dyslipidemia, DM , anxiety   brought in for SOB, DKA.     IMPRESSION  #Metabolic encephalopathy   #DKA  #SOB- Volume overload?   #PARKER over CKD  #Sepsis   #Lactic acidosis  #Obesity BMI (kg/m2): 29.7  #DM   #HTN, CHF, Pacemaker   RECOMMENDATIONS  -Follow up with the blood cultures. Worried about occult bacteremia given high fevers.   -So far exam is not revealing for any source of infection.   -Would dose Vancomycin IV by levels.   -Change cefepime to 1000 mg Q 24 hours for now.   -Monitor Cr and LFTs.   -Aspiration precautions. Off loading measures to avoid pressure injuries.     If any questions, please send a message or call on [employer] Teams  Please continue to update ID with any pertinent new laboratory or radiographic findings.    Freedom Chen M.D  Infectious Diseases Attending  [facility]

## 2023-10-31 NOTE — CONSULT NOTE ADULT - ASSESSMENT
IMPRESSION:  DKA   alter mental status   sepsis fever   PARKER   metabolc acidosis     PLAN:    CNS: neurology eval   EEG     HEENT: oral care     PULMONARY: keep pox>92%   NIV at night and as needed     CARDIOVASCULAR: CE   echo   continue IV fluid 100cc/ hr     GI: GI prophylaxis.      RENAL: renal and bladder US   renal consult   follow bun, cr   bicarb   serial bmp     INFECTIOUS DISEASE:  cefepime and vanco   bld cx   nasal mrsa   ID eval       HEMATOLOGICAL:  DVT prophylaxis.on insulin drip   follow AG , FS   endo eval   AG elevated because of DKA and renal failure     ENDOCRINE:  Follow up FS.  Insulin protocol if needed.    MUSCULOSKELETAL:        CRITICAL CARE TIME SPENT: *** IMPRESSION:  DKA   alter mental status   sepsis fever   PARKER   metabolc acidosis     PLAN:    CNS: neurology eval   EEG     HEENT: oral care     PULMONARY: keep pox>92%   NIV at night and as needed     CARDIOVASCULAR: CE   echo   continue IV fluid 100cc/ hr     GI:   GI prophylaxis.    NPO    RENAL:   renal and bladder US   urine lytes urea creatinine  renal consult   follow bun, cr   po bicarb 650 TID  serial bmp     INFECTIOUS DISEASE:    cefepime and vanco   bld cx   nasal mrsa   ID eval       HEMATOLOGICAL:  DVT prophylaxis.     ENDOCRINE:  Follow up FS.  Insulin protocol if needed.  on insulin drip   follow AG , FS   endo eval   AG elevated because of DKA and renal failure     MUSCULOSKELETAL: ambulate as tolerated    FULL CODE  Line: PIV  Dispo MICU

## 2023-10-31 NOTE — CONSULT NOTE ADULT - SUBJECTIVE AND OBJECTIVE BOX
HPI:    Patient is a 89y old  Male who presents with a chief complaint of sob      HPI: 89 yo male PMHx sig for DM, CAD-bypass, CHF, HTN, HLD presents w sob x few days, no cough fever or chills.  pt placed on NRBM by EMS and subsequently on BiPAP in ED, O2 sat now 99%.  Pt found to have elevated glucose w high AG and b-hydroxybutarate c/w DKA      PAST MEDICAL & SURGICAL HISTORY:  CHF (congestive heart failure)      DM (diabetes mellitus)      HTN (hypertension)      Prostate CA  in remission      Pacemaker      H/O total knee replacement, right        Allergies    No Known Allergies    Intolerances      FAMILY HISTORY:  FH: type 2 diabetes (Father)      Home Medications:  allopurinol 100 mg oral tablet: 1 tab(s) orally once a day (25 Oct 2021 22:30)  citalopram 20 mg oral tablet: 1 tab(s) orally once a day (25 Oct 2021 22:30)  clonazePAM 1 mg oral tablet: 2 tab(s) orally once a day (at bedtime), As Needed (25 Oct 2021 22:30)  gabapentin 300 mg oral capsule: 1 cap(s) orally 2 times a day (25 Oct 2021 22:30)  glimepiride 4 mg oral tablet: 1 tab(s) orally 2 times a day (25 Oct 2021 22:30)  Lantus 100 units/mL subcutaneous solution: 30 unit(s) subcutaneous once a day (at bedtime) (25 Oct 2021 22:30)  NIFEdipine 60 mg oral tablet, extended release: 1 tab(s) orally once a day (25 Oct 2021 22:30)    Occupation:  Alochol: Denied  Smoking: Denied  Drug Use: Denied  Marital Status:         ROS: as in HPI; All other systems reviewed are negative    ICU Vital Signs Last 24 Hrs  T(C): 37.2 (30 Oct 2023 18:39), Max: 37.2 (30 Oct 2023 18:39)  T(F): 98.9 (30 Oct 2023 18:39), Max: 98.9 (30 Oct 2023 18:39)  HR: 62 (30 Oct 2023 18:39) (62 - 62)  BP: 121/88 (30 Oct 2023 18:39) (121/88 - 121/88)  BP(mean): --  ABP: --  ABP(mean): --  RR: 18 (30 Oct 2023 18:39) (18 - 18)  SpO2: 99% (30 Oct 2023 18:39) (99% - 99%)    O2 Parameters below as of 30 Oct 2023 18:39  Patient On (Oxygen Delivery Method): mask, nonrebreather  O2 Flow (L/min): 15            Physical Examination:    General: No acute distress.  Alert, oriented, interactive, nonfocal    HEENT: Pupils equal, reactive to light.  Symmetric.    PULM: Clear to auscultation bilaterally, no significant sputum production    CVS: Regular rate and rhythm, no murmurs, rubs, or gallops    ABD: Soft, nondistended, nontender, normoactive bowel sounds, no masses    EXT: No edema, nontender    SKIN: Warm and well perfused, no rashes noted.              I&O's Detail        LABS:                        9.5    12.75 )-----------( 198      ( 30 Oct 2023 19:20 )             28.6     30 Oct 2023 19:20    129    |  90     |  95     ----------------------------<  477    5.2     |  15     |  3.7      Ca    8.1        30 Oct 2023 19:20    TPro  6.6    /  Alb  3.8    /  TBili  0.4    /  DBili  x      /  AST  36     /  ALT  23     /  AlkPhos  100    30 Oct 2023 19:20  Amylase x     lipase x          CARDIAC MARKERS ( 30 Oct 2023 19:20 )  x     / 0.24 ng/mL / x     / x     / x          CAPILLARY BLOOD GLUCOSE          Urinalysis Basic - ( 30 Oct 2023 19:20 )    Color: x / Appearance: x / SG: x / pH: x  Gluc: 477 mg/dL / Ketone: x  / Bili: x / Urobili: x   Blood: x / Protein: x / Nitrite: x   Leuk Esterase: x / RBC: x / WBC x   Sq Epi: x / Non Sq Epi: x / Bacteria: x      Culture        MEDICATIONS  (STANDING):  insulin regular Infusion 5 Unit(s)/Hr (5 mL/Hr) IV Continuous <Continuous>    MEDICATIONS  (PRN):        RADIOLOGY: ***     CXR:  ? congestion, awaiting official report          IMPRESSION/PLAN:  pt w sob and ?chf exacerbation in setting of worsening PARKER.  Likely exacerbated by Uncontrolled DM/DKA.  Continue insulin drip. FSBS q1hr, Serial metabolic panels. Cautious IVF hydration.  May try patient on HFNC.  Check TTE. Cardio eval. admit to ICU.    CNS:avoid sedation    HEENT:oral care    PULMONARY: Bipap, may switch to HFNC    CARDIOVASCULAR: Obtain TTE    GI: GI prophylaxis.  Feeding     RENAL: monitor UO    INFECTIOUS DISEASE:monitor off abx    HEMATOLOGICAL:  DVT prophylaxis.    ENDOCRINE:  Follow up FS.  Insulin protocol if needed.    MUSCULOSKELETAL: oob to chair        CRITICAL CARE TIME SPENT: 35 minutes   (30 Oct 2023 21:55)      ROS: A 10-point review of systems was otherwise negative.    PAST MEDICAL & SURGICAL HISTORY:  CHF (congestive heart failure)      DM (diabetes mellitus)      HTN (hypertension)      Prostate CA  in remission      Pacemaker      H/O total knee replacement, right          SOCIAL HISTORY:  FAMILY HISTORY:  FH: type 2 diabetes (Father)        ALLERGIES: 	  No Known Allergies            MEDICATIONS:  albuterol    90 MICROgram(s) HFA Inhaler 2 Puff(s) Inhalation every 4 hours PRN  albuterol/ipratropium for Nebulization 3 milliLiter(s) Nebulizer every 6 hours  albuterol/ipratropium for Nebulization. 3 milliLiter(s) Nebulizer once  allopurinol 100 milliGRAM(s) Oral daily  aspirin  chewable 81 milliGRAM(s) Oral daily  carvedilol 25 milliGRAM(s) Oral every 12 hours  cefepime   IVPB 1000 milliGRAM(s) IV Intermittent every 12 hours  chlorhexidine 2% Cloths 1 Application(s) Topical <User Schedule>  citalopram 20 milliGRAM(s) Oral daily  clonazePAM  Tablet 2 milliGRAM(s) Oral at bedtime PRN  clopidogrel Tablet 75 milliGRAM(s) Oral daily  dextrose 5% + sodium chloride 0.9% 1000 milliLiter(s) IV Continuous <Continuous>  dextrose 5% + sodium chloride 0.9%. 1000 milliLiter(s) IV Continuous <Continuous>  dextrose 50% Injectable 50 milliLiter(s) IV Push every 15 minutes  gabapentin 300 milliGRAM(s) Oral two times a day  heparin   Injectable 5000 Unit(s) SubCutaneous every 8 hours  insulin regular Infusion 2 Unit(s)/Hr IV Continuous <Continuous>  NIFEdipine XL 60 milliGRAM(s) Oral daily  pantoprazole  Injectable 40 milliGRAM(s) IV Push daily  sodium bicarbonate 650 milliGRAM(s) Oral every 8 hours  vancomycin  IVPB 1250 milliGRAM(s) IV Intermittent every 24 hours      PHYSICAL EXAM:  T(C): 38.9 (10-31-23 @ 08:00), Max: 39.7 (10-31-23 @ 03:00)  HR: 95 (10-31-23 @ 08:00) (59 - 112)  BP: 117/63 (10-31-23 @ 08:00) (106/59 - 145/67)  RR: 30 (10-31-23 @ 08:00) (18 - 38)  SpO2: 99% (10-31-23 @ 08:00) (87% - 99%)  Wt(kg): --    GEN: Awake, comfortable. NAD.   HEENT: NCAT, PERRL, EOMI. Mucosa moist. No JVD.   RESP: CTA b/l  CV: RRR, normal s1/s2. No m/r/g.  ABD: Soft, NTND. BS+  EXT: Warm. No edema, clubbing, or cyanosis.   NEURO: AAOx3. No focal deficits.    I&O's Summary    30 Oct 2023 07:01  -  31 Oct 2023 07:00  --------------------------------------------------------  IN: 529.5 mL / OUT: 30 mL / NET: 499.5 mL    31 Oct 2023 07:01  -  31 Oct 2023 10:16  --------------------------------------------------------  IN: 156 mL / OUT: 0 mL / NET: 156 mL      Height (cm): 172.7 (10-30 @ 23:05)  Weight (kg): 88.5 (10-30 @ 18:39)  BMI (kg/m2): 29.7 (10-30 @ 23:05)  BSA (m2): 2.02 (10-30 @ 23:05)  	  LABS:	 	    CARDIAC MARKERS:                                  9.2    13.02 )-----------( 199      ( 31 Oct 2023 05:57 )             27.3     10-31    132<L>  |  95<L>  |  97<HH>  ----------------------------<  158<H>  4.2   |  17  |  3.9<H>    Ca    8.1<L>      31 Oct 2023 05:57  Phos  3.1     10-31  Mg     2.1     10-31    TPro  6.6  /  Alb  3.5  /  TBili  0.5  /  DBili  x   /  AST  46<H>  /  ALT  25  /  AlkPhos  90  10-31    proBNP:   Lipid Profile:   HgA1c:   TSH:     TELEMETRY: 	    ECG:  	  RADIOLOGY:   ECHO:  STRESS:  CATH:   HPI:    Patient is a 89y old  Male who presents with a chief complaint of sob      HPI: 89 yo male PMHx sig for DM, CAD-bypass, CHF, HTN, HLD presents w sob x few days, no cough fever or chills.  pt placed on NRBM by EMS and subsequently on BiPAP in ED, O2 sat now 99%.  Pt found to have elevated glucose w high AG and b-hydroxybutarate c/w DKA.     Patient seen at bedside. He feels his breathing is somewhat improved. Upper airway gurgling heard with breathing. He denies CP or palpitations.   Pt without hx of CHF. Echo with normal EF.         PAST MEDICAL & SURGICAL HISTORY:  CHF (congestive heart failure)      DM (diabetes mellitus)      HTN (hypertension)      Prostate CA  in remission      Pacemaker      H/O total knee replacement, right        Allergies    No Known Allergies    Intolerances      FAMILY HISTORY:  FH: type 2 diabetes (Father)      Home Medications:  allopurinol 100 mg oral tablet: 1 tab(s) orally once a day (25 Oct 2021 22:30)  citalopram 20 mg oral tablet: 1 tab(s) orally once a day (25 Oct 2021 22:30)  clonazePAM 1 mg oral tablet: 2 tab(s) orally once a day (at bedtime), As Needed (25 Oct 2021 22:30)  gabapentin 300 mg oral capsule: 1 cap(s) orally 2 times a day (25 Oct 2021 22:30)  glimepiride 4 mg oral tablet: 1 tab(s) orally 2 times a day (25 Oct 2021 22:30)  Lantus 100 units/mL subcutaneous solution: 30 unit(s) subcutaneous once a day (at bedtime) (25 Oct 2021 22:30)  NIFEdipine 60 mg oral tablet, extended release: 1 tab(s) orally once a day (25 Oct 2021 22:30)    Occupation:  Alochol: Denied  Smoking: Denied  Drug Use: Denied  Marital Status:         ROS: as in HPI; All other systems reviewed are negative    ICU Vital Signs Last 24 Hrs  T(C): 37.2 (30 Oct 2023 18:39), Max: 37.2 (30 Oct 2023 18:39)  T(F): 98.9 (30 Oct 2023 18:39), Max: 98.9 (30 Oct 2023 18:39)  HR: 62 (30 Oct 2023 18:39) (62 - 62)  BP: 121/88 (30 Oct 2023 18:39) (121/88 - 121/88)  BP(mean): --  ABP: --  ABP(mean): --  RR: 18 (30 Oct 2023 18:39) (18 - 18)  SpO2: 99% (30 Oct 2023 18:39) (99% - 99%)    O2 Parameters below as of 30 Oct 2023 18:39  Patient On (Oxygen Delivery Method): mask, nonrebreather  O2 Flow (L/min): 15          PAST MEDICAL & SURGICAL HISTORY:  CHF (congestive heart failure)      DM (diabetes mellitus)      HTN (hypertension)      Prostate CA  in remission      Pacemaker      H/O total knee replacement, right          SOCIAL HISTORY:  FAMILY HISTORY:  FH: type 2 diabetes (Father)        ALLERGIES: 	  No Known Allergies            MEDICATIONS:  albuterol    90 MICROgram(s) HFA Inhaler 2 Puff(s) Inhalation every 4 hours PRN  albuterol/ipratropium for Nebulization 3 milliLiter(s) Nebulizer every 6 hours  albuterol/ipratropium for Nebulization. 3 milliLiter(s) Nebulizer once  allopurinol 100 milliGRAM(s) Oral daily  aspirin  chewable 81 milliGRAM(s) Oral daily  carvedilol 25 milliGRAM(s) Oral every 12 hours  cefepime   IVPB 1000 milliGRAM(s) IV Intermittent every 12 hours  chlorhexidine 2% Cloths 1 Application(s) Topical <User Schedule>  citalopram 20 milliGRAM(s) Oral daily  clonazePAM  Tablet 2 milliGRAM(s) Oral at bedtime PRN  clopidogrel Tablet 75 milliGRAM(s) Oral daily  dextrose 5% + sodium chloride 0.9% 1000 milliLiter(s) IV Continuous <Continuous>  dextrose 5% + sodium chloride 0.9%. 1000 milliLiter(s) IV Continuous <Continuous>  dextrose 50% Injectable 50 milliLiter(s) IV Push every 15 minutes  gabapentin 300 milliGRAM(s) Oral two times a day  heparin   Injectable 5000 Unit(s) SubCutaneous every 8 hours  insulin regular Infusion 2 Unit(s)/Hr IV Continuous <Continuous>  NIFEdipine XL 60 milliGRAM(s) Oral daily  pantoprazole  Injectable 40 milliGRAM(s) IV Push daily  sodium bicarbonate 650 milliGRAM(s) Oral every 8 hours  vancomycin  IVPB 1250 milliGRAM(s) IV Intermittent every 24 hours      PHYSICAL EXAM:  T(C): 38.9 (10-31-23 @ 08:00), Max: 39.7 (10-31-23 @ 03:00)  HR: 95 (10-31-23 @ 08:00) (59 - 112)  BP: 117/63 (10-31-23 @ 08:00) (106/59 - 145/67)  RR: 30 (10-31-23 @ 08:00) (18 - 38)  SpO2: 99% (10-31-23 @ 08:00) (87% - 99%)  Wt(kg): --    GEN: Awake, comfortable. NAD.   HEENT: NCAT, PERRL, EOMI. Mucosa moist. No JVD.   RESP: Upper airway secretions. Crackles at B/L bases.   CV: RRR, normal s1/s2. No m/r/g.  ABD: Soft, NTND. BS+  EXT: Warm. No edema, clubbing, or cyanosis.   NEURO: AAOx3. No focal deficits.    I&O's Summary    30 Oct 2023 07:01  -  31 Oct 2023 07:00  --------------------------------------------------------  IN: 529.5 mL / OUT: 30 mL / NET: 499.5 mL    31 Oct 2023 07:01  -  31 Oct 2023 10:16  --------------------------------------------------------  IN: 156 mL / OUT: 0 mL / NET: 156 mL      Height (cm): 172.7 (10-30 @ 23:05)  Weight (kg): 88.5 (10-30 @ 18:39)  BMI (kg/m2): 29.7 (10-30 @ 23:05)  BSA (m2): 2.02 (10-30 @ 23:05)  	  LABS:	 	    CARDIAC MARKERS:  CARDIAC MARKERS ( 30 Oct 2023 19:20 )  x     / 0.24 ng/mL / x     / x     / x                                        9.2    13.02 )-----------( 199      ( 31 Oct 2023 05:57 )             27.3     10-31    132<L>  |  95<L>  |  97<HH>  ----------------------------<  158<H>  4.2   |  17  |  3.9<H>    Ca    8.1<L>      31 Oct 2023 05:57  Phos  3.1     10-31  Mg     2.1     10-31    TPro  6.6  /  Alb  3.5  /  TBili  0.5  /  DBili  x   /  AST  46<H>  /  ALT  25  /  AlkPhos  90  10-31    proBNP:   Lipid Profile:   HgA1c:   TSH:     TELEMETRY: NSR 	    ECG: < from: 12 Lead ECG (10.30.23 @ 20:09) >  Sinus rhythm with marked sinus arrhythmia with frequent ventricular-paced  complexes  Right bundle branch block  Left posterior fascicular block  *** Bifascicular block ***  T wave abnormality, consider inferolateral ischemia  Abnormal ECG    < end of copied text >   	  RADIOLOGY:   ECHO: < from: TTE Echo Complete w/o Contrast w/ Doppler (10.19.22 @ 14:20) >    Summary:   1. Technically difficult study.   2. Left ventricular ejection fraction, by visual estimation, is 55 to   60%.   3. Normal left ventricular internal cavity size.   4. Mildly enlarged left atrium.   5. Mild mitral annular calcification.   6. Mild aortic valve stenosis.    < end of copied text >    STRESS:  CATH:

## 2023-10-31 NOTE — PROGRESS NOTE ADULT - ASSESSMENT
89M with CAD s/p CABG, HTN, HLD, DM who presents with SOB found to have DKA with possible PNA.       DKA  - Continue IV fluid 100cc/hr w/ potassium   - Serial BMP q4h  - Continue insulin drip  - Endocrine evaluation     Altered mental status  - Neurology evaluation   - EEG    Sepsis fever   - Cefepime and vancomycin  - F/u on blood cultures   - ID evaluation    PARKER   - Renal and bladder US  - PO bicarb 650 TID  - Follow urine electrolytes, BUN, Cr  - Renal consult     DVT prophylaxis   Oral care   Ambulate as tolerated     FULL CODE  Line: PIV  Dispo MICU   89M with CAD s/p CABG, HTN, HLD, DM who presents with SOB found to have DKA with possible PNA.       DKA  - Continue IV fluid 100cc/hr w/ potassium   - Serial BMP q4h  - Continue insulin drip  - Endocrine evaluation     Altered mental status  - Neurology evaluation   - EEG    Sepsis fever   - Cefepime and vancomycin  - F/u on blood cultures   - ID evaluation    PARKER   - Renal and bladder US  - PO bicarb 650 TID  - Follow urine electrolytes, BUN, Cr  - Renal consult     Keep SpO2 >92%; NIV at night as neeeded  Oral care  GI prophylaxis; NPO  DVT prophylaxis   Ambulate as tolerated     FULL CODE  Line: PIV  Dispo MICU

## 2023-11-01 LAB
ALBUMIN SERPL ELPH-MCNC: 2.9 G/DL — LOW (ref 3.5–5.2)
ALBUMIN SERPL ELPH-MCNC: 2.9 G/DL — LOW (ref 3.5–5.2)
ALP SERPL-CCNC: 65 U/L — SIGNIFICANT CHANGE UP (ref 30–115)
ALP SERPL-CCNC: 65 U/L — SIGNIFICANT CHANGE UP (ref 30–115)
ALT FLD-CCNC: 30 U/L — SIGNIFICANT CHANGE UP (ref 0–41)
ALT FLD-CCNC: 30 U/L — SIGNIFICANT CHANGE UP (ref 0–41)
ANION GAP SERPL CALC-SCNC: 10 MMOL/L — SIGNIFICANT CHANGE UP (ref 7–14)
ANION GAP SERPL CALC-SCNC: 10 MMOL/L — SIGNIFICANT CHANGE UP (ref 7–14)
ANION GAP SERPL CALC-SCNC: 14 MMOL/L — SIGNIFICANT CHANGE UP (ref 7–14)
ANION GAP SERPL CALC-SCNC: 16 MMOL/L — HIGH (ref 7–14)
ANION GAP SERPL CALC-SCNC: 16 MMOL/L — HIGH (ref 7–14)
ANISOCYTOSIS BLD QL: SLIGHT — SIGNIFICANT CHANGE UP
ANISOCYTOSIS BLD QL: SLIGHT — SIGNIFICANT CHANGE UP
AST SERPL-CCNC: 47 U/L — HIGH (ref 0–41)
AST SERPL-CCNC: 47 U/L — HIGH (ref 0–41)
B-OH-BUTYR SERPL-SCNC: <0.2 MMOL/L — SIGNIFICANT CHANGE UP
B-OH-BUTYR SERPL-SCNC: <0.2 MMOL/L — SIGNIFICANT CHANGE UP
BASOPHILS # BLD AUTO: 0 K/UL — SIGNIFICANT CHANGE UP (ref 0–0.2)
BASOPHILS # BLD AUTO: 0 K/UL — SIGNIFICANT CHANGE UP (ref 0–0.2)
BASOPHILS NFR BLD AUTO: 0 % — SIGNIFICANT CHANGE UP (ref 0–1)
BASOPHILS NFR BLD AUTO: 0 % — SIGNIFICANT CHANGE UP (ref 0–1)
BILIRUB SERPL-MCNC: 0.2 MG/DL — SIGNIFICANT CHANGE UP (ref 0.2–1.2)
BILIRUB SERPL-MCNC: 0.2 MG/DL — SIGNIFICANT CHANGE UP (ref 0.2–1.2)
BUN SERPL-MCNC: 86 MG/DL — CRITICAL HIGH (ref 10–20)
BUN SERPL-MCNC: 86 MG/DL — CRITICAL HIGH (ref 10–20)
BUN SERPL-MCNC: 96 MG/DL — CRITICAL HIGH (ref 10–20)
BUN SERPL-MCNC: 96 MG/DL — CRITICAL HIGH (ref 10–20)
BUN SERPL-MCNC: 97 MG/DL — CRITICAL HIGH (ref 10–20)
BUN SERPL-MCNC: 97 MG/DL — CRITICAL HIGH (ref 10–20)
BUN SERPL-MCNC: 98 MG/DL — CRITICAL HIGH (ref 10–20)
BUN SERPL-MCNC: 98 MG/DL — CRITICAL HIGH (ref 10–20)
CALCIUM SERPL-MCNC: 7.2 MG/DL — LOW (ref 8.4–10.5)
CALCIUM SERPL-MCNC: 7.2 MG/DL — LOW (ref 8.4–10.5)
CALCIUM SERPL-MCNC: 7.7 MG/DL — LOW (ref 8.4–10.5)
CALCIUM SERPL-MCNC: 7.7 MG/DL — LOW (ref 8.4–10.5)
CALCIUM SERPL-MCNC: 7.9 MG/DL — LOW (ref 8.4–10.5)
CALCIUM SERPL-MCNC: 7.9 MG/DL — LOW (ref 8.4–10.5)
CALCIUM SERPL-MCNC: 8.5 MG/DL — SIGNIFICANT CHANGE UP (ref 8.4–10.5)
CALCIUM SERPL-MCNC: 8.5 MG/DL — SIGNIFICANT CHANGE UP (ref 8.4–10.5)
CHLORIDE SERPL-SCNC: 101 MMOL/L — SIGNIFICANT CHANGE UP (ref 98–110)
CHLORIDE SERPL-SCNC: 101 MMOL/L — SIGNIFICANT CHANGE UP (ref 98–110)
CHLORIDE SERPL-SCNC: 104 MMOL/L — SIGNIFICANT CHANGE UP (ref 98–110)
CHLORIDE SERPL-SCNC: 108 MMOL/L — SIGNIFICANT CHANGE UP (ref 98–110)
CHLORIDE SERPL-SCNC: 108 MMOL/L — SIGNIFICANT CHANGE UP (ref 98–110)
CO2 SERPL-SCNC: 15 MMOL/L — LOW (ref 17–32)
CO2 SERPL-SCNC: 15 MMOL/L — LOW (ref 17–32)
CO2 SERPL-SCNC: 16 MMOL/L — LOW (ref 17–32)
CO2 SERPL-SCNC: 16 MMOL/L — LOW (ref 17–32)
CO2 SERPL-SCNC: 17 MMOL/L — SIGNIFICANT CHANGE UP (ref 17–32)
CREAT SERPL-MCNC: 3.5 MG/DL — HIGH (ref 0.7–1.5)
CREAT SERPL-MCNC: 3.5 MG/DL — HIGH (ref 0.7–1.5)
CREAT SERPL-MCNC: 3.7 MG/DL — HIGH (ref 0.7–1.5)
CREAT SERPL-MCNC: 3.7 MG/DL — HIGH (ref 0.7–1.5)
CREAT SERPL-MCNC: 3.9 MG/DL — HIGH (ref 0.7–1.5)
DACRYOCYTES BLD QL SMEAR: SLIGHT — SIGNIFICANT CHANGE UP
DACRYOCYTES BLD QL SMEAR: SLIGHT — SIGNIFICANT CHANGE UP
EGFR: 14 ML/MIN/1.73M2 — LOW
EGFR: 15 ML/MIN/1.73M2 — LOW
EGFR: 15 ML/MIN/1.73M2 — LOW
EGFR: 16 ML/MIN/1.73M2 — LOW
EGFR: 16 ML/MIN/1.73M2 — LOW
EOSINOPHIL NFR BLD AUTO: 0 % — SIGNIFICANT CHANGE UP (ref 0–8)
EOSINOPHIL NFR BLD AUTO: 0 % — SIGNIFICANT CHANGE UP (ref 0–8)
GLUCOSE BLDC GLUCOMTR-MCNC: 129 MG/DL — HIGH (ref 70–99)
GLUCOSE BLDC GLUCOMTR-MCNC: 129 MG/DL — HIGH (ref 70–99)
GLUCOSE BLDC GLUCOMTR-MCNC: 143 MG/DL — HIGH (ref 70–99)
GLUCOSE BLDC GLUCOMTR-MCNC: 143 MG/DL — HIGH (ref 70–99)
GLUCOSE BLDC GLUCOMTR-MCNC: 172 MG/DL — HIGH (ref 70–99)
GLUCOSE BLDC GLUCOMTR-MCNC: 172 MG/DL — HIGH (ref 70–99)
GLUCOSE BLDC GLUCOMTR-MCNC: 173 MG/DL — HIGH (ref 70–99)
GLUCOSE BLDC GLUCOMTR-MCNC: 173 MG/DL — HIGH (ref 70–99)
GLUCOSE BLDC GLUCOMTR-MCNC: 177 MG/DL — HIGH (ref 70–99)
GLUCOSE BLDC GLUCOMTR-MCNC: 177 MG/DL — HIGH (ref 70–99)
GLUCOSE BLDC GLUCOMTR-MCNC: 181 MG/DL — HIGH (ref 70–99)
GLUCOSE BLDC GLUCOMTR-MCNC: 181 MG/DL — HIGH (ref 70–99)
GLUCOSE BLDC GLUCOMTR-MCNC: 188 MG/DL — HIGH (ref 70–99)
GLUCOSE BLDC GLUCOMTR-MCNC: 188 MG/DL — HIGH (ref 70–99)
GLUCOSE BLDC GLUCOMTR-MCNC: 194 MG/DL — HIGH (ref 70–99)
GLUCOSE BLDC GLUCOMTR-MCNC: 194 MG/DL — HIGH (ref 70–99)
GLUCOSE BLDC GLUCOMTR-MCNC: 197 MG/DL — HIGH (ref 70–99)
GLUCOSE BLDC GLUCOMTR-MCNC: 197 MG/DL — HIGH (ref 70–99)
GLUCOSE BLDC GLUCOMTR-MCNC: 201 MG/DL — HIGH (ref 70–99)
GLUCOSE BLDC GLUCOMTR-MCNC: 201 MG/DL — HIGH (ref 70–99)
GLUCOSE BLDC GLUCOMTR-MCNC: 211 MG/DL — HIGH (ref 70–99)
GLUCOSE BLDC GLUCOMTR-MCNC: 211 MG/DL — HIGH (ref 70–99)
GLUCOSE BLDC GLUCOMTR-MCNC: 216 MG/DL — HIGH (ref 70–99)
GLUCOSE BLDC GLUCOMTR-MCNC: 216 MG/DL — HIGH (ref 70–99)
GLUCOSE BLDC GLUCOMTR-MCNC: 217 MG/DL — HIGH (ref 70–99)
GLUCOSE BLDC GLUCOMTR-MCNC: 217 MG/DL — HIGH (ref 70–99)
GLUCOSE BLDC GLUCOMTR-MCNC: 223 MG/DL — HIGH (ref 70–99)
GLUCOSE BLDC GLUCOMTR-MCNC: 238 MG/DL — HIGH (ref 70–99)
GLUCOSE BLDC GLUCOMTR-MCNC: 238 MG/DL — HIGH (ref 70–99)
GLUCOSE BLDC GLUCOMTR-MCNC: 244 MG/DL — HIGH (ref 70–99)
GLUCOSE BLDC GLUCOMTR-MCNC: 244 MG/DL — HIGH (ref 70–99)
GLUCOSE SERPL-MCNC: 133 MG/DL — HIGH (ref 70–99)
GLUCOSE SERPL-MCNC: 133 MG/DL — HIGH (ref 70–99)
GLUCOSE SERPL-MCNC: 139 MG/DL — HIGH (ref 70–99)
GLUCOSE SERPL-MCNC: 139 MG/DL — HIGH (ref 70–99)
GLUCOSE SERPL-MCNC: 239 MG/DL — HIGH (ref 70–99)
GLUCOSE SERPL-MCNC: 239 MG/DL — HIGH (ref 70–99)
GLUCOSE SERPL-MCNC: 545 MG/DL — CRITICAL HIGH (ref 70–99)
GLUCOSE SERPL-MCNC: 545 MG/DL — CRITICAL HIGH (ref 70–99)
HCT VFR BLD CALC: 23.3 % — LOW (ref 42–52)
HCT VFR BLD CALC: 23.3 % — LOW (ref 42–52)
HGB BLD-MCNC: 7.8 G/DL — LOW (ref 14–18)
HGB BLD-MCNC: 7.8 G/DL — LOW (ref 14–18)
HYPOCHROMIA BLD QL: SLIGHT — SIGNIFICANT CHANGE UP
HYPOCHROMIA BLD QL: SLIGHT — SIGNIFICANT CHANGE UP
LYMPHOCYTES # BLD AUTO: 0.1 K/UL — LOW (ref 1.2–3.4)
LYMPHOCYTES # BLD AUTO: 0.1 K/UL — LOW (ref 1.2–3.4)
LYMPHOCYTES # BLD AUTO: 1 % — LOW (ref 20.5–51.1)
LYMPHOCYTES # BLD AUTO: 1 % — LOW (ref 20.5–51.1)
MAGNESIUM SERPL-MCNC: 1.7 MG/DL — LOW (ref 1.8–2.4)
MAGNESIUM SERPL-MCNC: 1.7 MG/DL — LOW (ref 1.8–2.4)
MAGNESIUM SERPL-MCNC: 1.8 MG/DL — SIGNIFICANT CHANGE UP (ref 1.8–2.4)
MAGNESIUM SERPL-MCNC: 1.8 MG/DL — SIGNIFICANT CHANGE UP (ref 1.8–2.4)
MANUAL SMEAR VERIFICATION: SIGNIFICANT CHANGE UP
MANUAL SMEAR VERIFICATION: SIGNIFICANT CHANGE UP
MCHC RBC-ENTMCNC: 29.7 PG — SIGNIFICANT CHANGE UP (ref 27–31)
MCHC RBC-ENTMCNC: 29.7 PG — SIGNIFICANT CHANGE UP (ref 27–31)
MCHC RBC-ENTMCNC: 33.5 G/DL — SIGNIFICANT CHANGE UP (ref 32–37)
MCHC RBC-ENTMCNC: 33.5 G/DL — SIGNIFICANT CHANGE UP (ref 32–37)
MCV RBC AUTO: 88.6 FL — SIGNIFICANT CHANGE UP (ref 80–94)
MCV RBC AUTO: 88.6 FL — SIGNIFICANT CHANGE UP (ref 80–94)
MONOCYTES # BLD AUTO: 0.48 K/UL — SIGNIFICANT CHANGE UP (ref 0.1–0.6)
MONOCYTES # BLD AUTO: 0.48 K/UL — SIGNIFICANT CHANGE UP (ref 0.1–0.6)
MONOCYTES NFR BLD AUTO: 5 % — SIGNIFICANT CHANGE UP (ref 1.7–9.3)
MONOCYTES NFR BLD AUTO: 5 % — SIGNIFICANT CHANGE UP (ref 1.7–9.3)
NEUTROPHILS # BLD AUTO: 8.95 K/UL — HIGH (ref 1.4–6.5)
NEUTROPHILS # BLD AUTO: 8.95 K/UL — HIGH (ref 1.4–6.5)
NEUTROPHILS NFR BLD AUTO: 94 % — HIGH (ref 42.2–75.2)
NEUTROPHILS NFR BLD AUTO: 94 % — HIGH (ref 42.2–75.2)
NRBC # BLD: 0 /100 — SIGNIFICANT CHANGE UP (ref 0–0)
NRBC # BLD: 0 /100 — SIGNIFICANT CHANGE UP (ref 0–0)
NRBC # BLD: SIGNIFICANT CHANGE UP /100 WBCS (ref 0–0)
NRBC # BLD: SIGNIFICANT CHANGE UP /100 WBCS (ref 0–0)
OVALOCYTES BLD QL SMEAR: SLIGHT — SIGNIFICANT CHANGE UP
OVALOCYTES BLD QL SMEAR: SLIGHT — SIGNIFICANT CHANGE UP
PLAT MORPH BLD: NORMAL — SIGNIFICANT CHANGE UP
PLAT MORPH BLD: NORMAL — SIGNIFICANT CHANGE UP
PLATELET # BLD AUTO: 155 K/UL — SIGNIFICANT CHANGE UP (ref 130–400)
PLATELET # BLD AUTO: 155 K/UL — SIGNIFICANT CHANGE UP (ref 130–400)
PLATELET COUNT - ESTIMATE: NORMAL — SIGNIFICANT CHANGE UP
PLATELET COUNT - ESTIMATE: NORMAL — SIGNIFICANT CHANGE UP
PMV BLD: 10.8 FL — HIGH (ref 7.4–10.4)
PMV BLD: 10.8 FL — HIGH (ref 7.4–10.4)
POTASSIUM SERPL-MCNC: 4.7 MMOL/L — SIGNIFICANT CHANGE UP (ref 3.5–5)
POTASSIUM SERPL-MCNC: 4.7 MMOL/L — SIGNIFICANT CHANGE UP (ref 3.5–5)
POTASSIUM SERPL-MCNC: 4.9 MMOL/L — SIGNIFICANT CHANGE UP (ref 3.5–5)
POTASSIUM SERPL-MCNC: 4.9 MMOL/L — SIGNIFICANT CHANGE UP (ref 3.5–5)
POTASSIUM SERPL-MCNC: 5.1 MMOL/L — HIGH (ref 3.5–5)
POTASSIUM SERPL-MCNC: 5.1 MMOL/L — HIGH (ref 3.5–5)
POTASSIUM SERPL-MCNC: 6.4 MMOL/L — CRITICAL HIGH (ref 3.5–5)
POTASSIUM SERPL-MCNC: 6.4 MMOL/L — CRITICAL HIGH (ref 3.5–5)
POTASSIUM SERPL-SCNC: 4.7 MMOL/L — SIGNIFICANT CHANGE UP (ref 3.5–5)
POTASSIUM SERPL-SCNC: 4.7 MMOL/L — SIGNIFICANT CHANGE UP (ref 3.5–5)
POTASSIUM SERPL-SCNC: 4.9 MMOL/L — SIGNIFICANT CHANGE UP (ref 3.5–5)
POTASSIUM SERPL-SCNC: 4.9 MMOL/L — SIGNIFICANT CHANGE UP (ref 3.5–5)
POTASSIUM SERPL-SCNC: 5.1 MMOL/L — HIGH (ref 3.5–5)
POTASSIUM SERPL-SCNC: 5.1 MMOL/L — HIGH (ref 3.5–5)
POTASSIUM SERPL-SCNC: 6.4 MMOL/L — CRITICAL HIGH (ref 3.5–5)
POTASSIUM SERPL-SCNC: 6.4 MMOL/L — CRITICAL HIGH (ref 3.5–5)
PROT SERPL-MCNC: 5.2 G/DL — LOW (ref 6–8)
PROT SERPL-MCNC: 5.2 G/DL — LOW (ref 6–8)
RBC # BLD: 2.63 M/UL — LOW (ref 4.7–6.1)
RBC # BLD: 2.63 M/UL — LOW (ref 4.7–6.1)
RBC # FLD: 15 % — HIGH (ref 11.5–14.5)
RBC # FLD: 15 % — HIGH (ref 11.5–14.5)
RBC BLD AUTO: ABNORMAL
RBC BLD AUTO: ABNORMAL
SODIUM SERPL-SCNC: 131 MMOL/L — LOW (ref 135–146)
SODIUM SERPL-SCNC: 131 MMOL/L — LOW (ref 135–146)
SODIUM SERPL-SCNC: 135 MMOL/L — SIGNIFICANT CHANGE UP (ref 135–146)
VANCOMYCIN FLD-MCNC: 11.5 UG/ML — HIGH (ref 5–10)
VANCOMYCIN FLD-MCNC: 11.5 UG/ML — HIGH (ref 5–10)
WBC # BLD: 9.52 K/UL — SIGNIFICANT CHANGE UP (ref 4.8–10.8)
WBC # BLD: 9.52 K/UL — SIGNIFICANT CHANGE UP (ref 4.8–10.8)
WBC # FLD AUTO: 9.52 K/UL — SIGNIFICANT CHANGE UP (ref 4.8–10.8)
WBC # FLD AUTO: 9.52 K/UL — SIGNIFICANT CHANGE UP (ref 4.8–10.8)

## 2023-11-01 PROCEDURE — 71045 X-RAY EXAM CHEST 1 VIEW: CPT | Mod: 26

## 2023-11-01 PROCEDURE — 99233 SBSQ HOSP IP/OBS HIGH 50: CPT

## 2023-11-01 PROCEDURE — 99222 1ST HOSP IP/OBS MODERATE 55: CPT

## 2023-11-01 RX ORDER — ACETAMINOPHEN 500 MG
1000 TABLET ORAL ONCE
Refills: 0 | Status: DISCONTINUED | OUTPATIENT
Start: 2023-11-01 | End: 2023-11-03

## 2023-11-01 RX ORDER — SODIUM ZIRCONIUM CYCLOSILICATE 10 G/10G
10 POWDER, FOR SUSPENSION ORAL ONCE
Refills: 0 | Status: COMPLETED | OUTPATIENT
Start: 2023-11-01 | End: 2023-11-01

## 2023-11-01 RX ORDER — SODIUM CHLORIDE 9 MG/ML
1000 INJECTION, SOLUTION INTRAVENOUS
Refills: 0 | Status: DISCONTINUED | OUTPATIENT
Start: 2023-11-01 | End: 2023-11-02

## 2023-11-01 RX ORDER — BUMETANIDE 0.25 MG/ML
2 INJECTION INTRAMUSCULAR; INTRAVENOUS ONCE
Refills: 0 | Status: COMPLETED | OUTPATIENT
Start: 2023-11-01 | End: 2023-11-01

## 2023-11-01 RX ORDER — ACETAMINOPHEN 500 MG
650 TABLET ORAL EVERY 4 HOURS
Refills: 0 | Status: DISCONTINUED | OUTPATIENT
Start: 2023-11-01 | End: 2023-11-03

## 2023-11-01 RX ORDER — MAGNESIUM SULFATE 500 MG/ML
2 VIAL (ML) INJECTION ONCE
Refills: 0 | Status: COMPLETED | OUTPATIENT
Start: 2023-11-01 | End: 2023-11-01

## 2023-11-01 RX ORDER — ACETAMINOPHEN 500 MG
650 TABLET ORAL EVERY 6 HOURS
Refills: 0 | Status: DISCONTINUED | OUTPATIENT
Start: 2023-11-01 | End: 2023-11-01

## 2023-11-01 RX ORDER — INSULIN HUMAN 100 [IU]/ML
5 INJECTION, SOLUTION SUBCUTANEOUS ONCE
Refills: 0 | Status: COMPLETED | OUTPATIENT
Start: 2023-11-01 | End: 2023-11-01

## 2023-11-01 RX ORDER — CALCIUM GLUCONATE 100 MG/ML
2 VIAL (ML) INTRAVENOUS ONCE
Refills: 0 | Status: COMPLETED | OUTPATIENT
Start: 2023-11-01 | End: 2023-11-01

## 2023-11-01 RX ORDER — DEXTROSE 50 % IN WATER 50 %
50 SYRINGE (ML) INTRAVENOUS ONCE
Refills: 0 | Status: COMPLETED | OUTPATIENT
Start: 2023-11-01 | End: 2023-11-01

## 2023-11-01 RX ADMIN — Medication 650 MILLIGRAM(S): at 10:25

## 2023-11-01 RX ADMIN — Medication 650 MILLIGRAM(S): at 15:48

## 2023-11-01 RX ADMIN — Medication 3 MILLILITER(S): at 19:41

## 2023-11-01 RX ADMIN — HEPARIN SODIUM 5000 UNIT(S): 5000 INJECTION INTRAVENOUS; SUBCUTANEOUS at 22:00

## 2023-11-01 RX ADMIN — Medication 650 MILLIGRAM(S): at 14:47

## 2023-11-01 RX ADMIN — SODIUM CHLORIDE 100 MILLILITER(S): 9 INJECTION, SOLUTION INTRAVENOUS at 06:28

## 2023-11-01 RX ADMIN — Medication 650 MILLIGRAM(S): at 04:47

## 2023-11-01 RX ADMIN — Medication 3 MILLILITER(S): at 14:12

## 2023-11-01 RX ADMIN — Medication 50 MILLILITER(S): at 10:25

## 2023-11-01 RX ADMIN — SODIUM CHLORIDE 50 MILLILITER(S): 9 INJECTION, SOLUTION INTRAVENOUS at 09:33

## 2023-11-01 RX ADMIN — HEPARIN SODIUM 5000 UNIT(S): 5000 INJECTION INTRAVENOUS; SUBCUTANEOUS at 13:19

## 2023-11-01 RX ADMIN — Medication 650 MILLIGRAM(S): at 05:47

## 2023-11-01 RX ADMIN — Medication 650 MILLIGRAM(S): at 18:46

## 2023-11-01 RX ADMIN — BUMETANIDE 2 MILLIGRAM(S): 0.25 INJECTION INTRAMUSCULAR; INTRAVENOUS at 08:31

## 2023-11-01 RX ADMIN — INSULIN HUMAN 2 UNIT(S)/HR: 100 INJECTION, SOLUTION SUBCUTANEOUS at 08:33

## 2023-11-01 RX ADMIN — Medication 25 GRAM(S): at 09:33

## 2023-11-01 RX ADMIN — CHLORHEXIDINE GLUCONATE 1 APPLICATION(S): 213 SOLUTION TOPICAL at 05:22

## 2023-11-01 RX ADMIN — HEPARIN SODIUM 5000 UNIT(S): 5000 INJECTION INTRAVENOUS; SUBCUTANEOUS at 05:22

## 2023-11-01 RX ADMIN — CEFEPIME 100 MILLIGRAM(S): 1 INJECTION, POWDER, FOR SOLUTION INTRAMUSCULAR; INTRAVENOUS at 15:17

## 2023-11-01 RX ADMIN — INSULIN HUMAN 5 UNIT(S): 100 INJECTION, SOLUTION SUBCUTANEOUS at 10:24

## 2023-11-01 RX ADMIN — Medication 200 GRAM(S): at 11:23

## 2023-11-01 RX ADMIN — Medication 3 MILLILITER(S): at 08:00

## 2023-11-01 RX ADMIN — PANTOPRAZOLE SODIUM 40 MILLIGRAM(S): 20 TABLET, DELAYED RELEASE ORAL at 11:24

## 2023-11-01 RX ADMIN — Medication 650 MILLIGRAM(S): at 11:38

## 2023-11-01 NOTE — PROGRESS NOTE ADULT - SUBJECTIVE AND OBJECTIVE BOX
Patient is a 89y old  Male who presents with a chief complaint of sob (01 Nov 2023 13:20)      T(F): 100.9 (11-01-23 @ 15:02), Max: 102.7 (10-31-23 @ 22:00)  HR: 105 (11-01-23 @ 18:00)  BP: 123/57 (11-01-23 @ 18:00)  RR: 31 (11-01-23 @ 18:00)  SpO2: 98% (11-01-23 @ 18:00) (92% - 100%)    PHYSICAL EXAM:  GENERAL: NAD  HEAD:  Atraumatic, Normocephalic  EYES: EOMI, PERRLA, conjunctiva and sclera clear  NERVOUS SYSTEM:  Alert & Oriented X3, no focal deficits   CHEST/LUNG: Clear to percussion bilaterally; No rales, rhonchi, wheezing, or rubs  HEART: Regular rate and rhythm; No murmurs, rubs, or gallops  ABDOMEN: Soft, Nontender, Nondistended; Bowel sounds present  EXTREMITIES:  2+ Peripheral Pulses, No clubbing, cyanosis, or edema    LABS  11-01    135  |  104  |  97<HH>  ----------------------------<  133<H>  5.1<H>   |  15<L>  |  3.9<H>    Ca    8.5      01 Nov 2023 15:22  Phos  3.1     10-31  Mg     1.7     11-01    TPro  5.2<L>  /  Alb  2.9<L>  /  TBili  0.2  /  DBili  x   /  AST  47<H>  /  ALT  30  /  AlkPhos  65  11-01                          7.8    9.52  )-----------( 155      ( 01 Nov 2023 08:08 )             23.3         CARDIAC ENZYMES      Troponin T, Serum: 0.24 ng/mL (10-30-23 @ 19:20)    < from: TTE Echo Complete w/o Contrast w/ Doppler (10.31.23 @ 09:39) >  Summary:   1. Patient with significant PVCs during study.   2. Normal global left ventricular systolic function.   3. LV Ejection Fraction by Adkins's Method with a biplane EF of 61 %.   4. The left ventricular diastolic function could not be assessed in this   study.   5. Normal right ventricular size and function.   6. Normal left atrial size.   7. Normal right atrial size.   8. Mild aortic valve stenosis.   9. Moderate to severe mitral annular calcification.  10. Moderate thickening and calcification of the anterior and posterior   mitral valve leaflets.  11. Mild mitral stenosis.  12. No evidence of mitral valve regurgitation.  13. Estimated pulmonary artery systolic pressure is 67.1 mmHg assuming a   right atrial pressure of 15 mmHg, which is consistent with severe   pulmonary hypertension.  14. There is no evidence of pericardial effusion.    < end of copied text >      RADIOLOGY    MEDICATIONS  (STANDING):  acetaminophen   IVPB .. 1000 milliGRAM(s) IV Intermittent once  albuterol/ipratropium for Nebulization 3 milliLiter(s) Nebulizer every 6 hours  albuterol/ipratropium for Nebulization. 3 milliLiter(s) Nebulizer once  allopurinol 100 milliGRAM(s) Oral daily  aspirin  chewable 81 milliGRAM(s) Oral daily  carvedilol 25 milliGRAM(s) Oral every 12 hours  cefepime   IVPB 1000 milliGRAM(s) IV Intermittent every 24 hours  chlorhexidine 2% Cloths 1 Application(s) Topical <User Schedule>  citalopram 20 milliGRAM(s) Oral daily  clopidogrel Tablet 75 milliGRAM(s) Oral daily  dextrose 5% + lactated ringers. 1000 milliLiter(s) (50 mL/Hr) IV Continuous <Continuous>  dextrose 50% Injectable 50 milliLiter(s) IV Push every 15 minutes  gabapentin 300 milliGRAM(s) Oral two times a day  heparin   Injectable 5000 Unit(s) SubCutaneous every 8 hours  insulin regular Infusion 1 Unit(s)/Hr (1 mL/Hr) IV Continuous <Continuous>  pantoprazole  Injectable 40 milliGRAM(s) IV Push daily  sodium bicarbonate 650 milliGRAM(s) Oral every 8 hours    MEDICATIONS  (PRN):  acetaminophen  Suppository .. 650 milliGRAM(s) Rectal every 4 hours PRN Temp greater or equal to 38C (100.4F)  albuterol    90 MICROgram(s) HFA Inhaler 2 Puff(s) Inhalation every 4 hours PRN Shortness of Breath and/or Wheezing  clonazePAM  Tablet 2 milliGRAM(s) Oral at bedtime PRN axniety      Patient seen and evaluated this more awake today      T(F): 100.9 (11-01-23 @ 15:02), Max: 102.7 (10-31-23 @ 22:00)  HR: 105 (11-01-23 @ 18:00)  BP: 123/57 (11-01-23 @ 18:00)  RR: 31 (11-01-23 @ 18:00)  SpO2: 98% (11-01-23 @ 18:00) (92% - 100%)    PHYSICAL EXAM:  GENERAL: NAD  HEAD:  Atraumatic, Normocephalic  EYES: EOMI, PERRLA, conjunctiva and sclera clear  NERVOUS SYSTEM:  no focal deficits   CHEST/LUNG:  bilateral rhonchi  HEART: Regular rate and rhythm; No murmurs, rubs, or gallops  ABDOMEN: Soft, Nontender, Nondistended; Bowel sounds present  EXTREMITIES:  2+ Peripheral Pulses, No clubbing, cyanosis, or edema    LABS  11-01    135  |  104  |  97<HH>  ----------------------------<  133<H>  5.1<H>   |  15<L>  |  3.9<H>    Ca    8.5      01 Nov 2023 15:22  Phos  3.1     10-31  Mg     1.7     11-01    TPro  5.2<L>  /  Alb  2.9<L>  /  TBili  0.2  /  DBili  x   /  AST  47<H>  /  ALT  30  /  AlkPhos  65  11-01                          7.8    9.52  )-----------( 155      ( 01 Nov 2023 08:08 )             23.3     CARDIAC ENZYMES    Troponin T, Serum: 0.24 ng/mL (10-30-23 @ 19:20)    < from: TTE Echo Complete w/o Contrast w/ Doppler (10.31.23 @ 09:39) >  Summary:   1. Patient with significant PVCs during study.   2. Normal global left ventricular systolic function.   3. LV Ejection Fraction by Adkins's Method with a biplane EF of 61 %.   4. The left ventricular diastolic function could not be assessed in this   study.   5. Normal right ventricular size and function.   6. Normal left atrial size.   7. Normal right atrial size.   8. Mild aortic valve stenosis.   9. Moderate to severe mitral annular calcification.  10. Moderate thickening and calcification of the anterior and posterior   mitral valve leaflets.  11. Mild mitral stenosis.  12. No evidence of mitral valve regurgitation.  13. Estimated pulmonary artery systolic pressure is 67.1 mmHg assuming a   right atrial pressure of 15 mmHg, which is consistent with severe   pulmonary hypertension.  14. There is no evidence of pericardial effusion.    < end of copied text >      RADIOLOGY  < from: Xray Chest 1 View-PORTABLE IMMEDIATE (Xray Chest 1 View-PORTABLE IMMEDIATE .) (11.01.23 @ 09:41) >    Impression:    Questionable right base opacity        < end of copied text >  < from: US Kidney and Bladder (10.31.23 @ 11:33) >    IMPRESSION:    Echogenic kidneys consistent with medical renal disease. No   hydronephrosis or nephrolithiasis.    < end of copied text >      MEDICATIONS  (STANDING):  acetaminophen   IVPB .. 1000 milliGRAM(s) IV Intermittent once  albuterol/ipratropium for Nebulization 3 milliLiter(s) Nebulizer every 6 hours  albuterol/ipratropium for Nebulization. 3 milliLiter(s) Nebulizer once  allopurinol 100 milliGRAM(s) Oral daily  aspirin  chewable 81 milliGRAM(s) Oral daily  carvedilol 25 milliGRAM(s) Oral every 12 hours  cefepime   IVPB 1000 milliGRAM(s) IV Intermittent every 24 hours  chlorhexidine 2% Cloths 1 Application(s) Topical <User Schedule>  citalopram 20 milliGRAM(s) Oral daily  clopidogrel Tablet 75 milliGRAM(s) Oral daily  dextrose 5% + lactated ringers. 1000 milliLiter(s) (50 mL/Hr) IV Continuous <Continuous>  dextrose 50% Injectable 50 milliLiter(s) IV Push every 15 minutes  gabapentin 300 milliGRAM(s) Oral two times a day  heparin   Injectable 5000 Unit(s) SubCutaneous every 8 hours  insulin regular Infusion 1 Unit(s)/Hr (1 mL/Hr) IV Continuous <Continuous>  pantoprazole  Injectable 40 milliGRAM(s) IV Push daily  sodium bicarbonate 650 milliGRAM(s) Oral every 8 hours    MEDICATIONS  (PRN):  acetaminophen  Suppository .. 650 milliGRAM(s) Rectal every 4 hours PRN Temp greater or equal to 38C (100.4F)  albuterol    90 MICROgram(s) HFA Inhaler 2 Puff(s) Inhalation every 4 hours PRN Shortness of Breath and/or Wheezing  clonazePAM  Tablet 2 milliGRAM(s) Oral at bedtime PRN axniety

## 2023-11-01 NOTE — PROGRESS NOTE ADULT - SUBJECTIVE AND OBJECTIVE BOX
HPI:    Patient is a 89y old  Male who presents with a chief complaint of sob    HPI: 89 yo male PMHx sig for DM, CAD-bypass, CHF, HTN, HLD presents w sob x few days, no cough fever or chills.  pt placed on NRBM by EMS and subsequently on BiPAP in ED, O2 sat now 99%.  Pt found to have elevated glucose w high AG and b-hydroxybutarate c/w DKA. No acute overnight events.     PAST MEDICAL & SURGICAL HISTORY:  CHF (congestive heart failure)  DM (diabetes mellitus)  HTN (hypertension)  Prostate CA in remission  Pacemaker  H/O total knee replacement, right    Allergies  No Known Allergies    FAMILY HISTORY:  FH: type 2 diabetes (Father)    Home Medications:  allopurinol 100 mg oral tablet: 1 tab(s) orally once a day (25 Oct 2021 22:30)  citalopram 20 mg oral tablet: 1 tab(s) orally once a day (25 Oct 2021 22:30)  clonazePAM 1 mg oral tablet: 2 tab(s) orally once a day (at bedtime), As Needed (25 Oct 2021 22:30)  gabapentin 300 mg oral capsule: 1 cap(s) orally 2 times a day (25 Oct 2021 22:30)  glimepiride 4 mg oral tablet: 1 tab(s) orally 2 times a day (25 Oct 2021 22:30)  Lantus 100 units/mL subcutaneous solution: 30 unit(s) subcutaneous once a day (at bedtime) (25 Oct 2021 22:30)  NIFEdipine 60 mg oral tablet, extended release: 1 tab(s) orally once a day (25 Oct 2021 22:30)    Occupation:  Alochol: Denied  Smoking: Denied  Drug Use: Denied  Marital Status:     ROS: as in HPI; All other systems reviewed are negative    ICU Vital Signs Last 24 Hrs  T(C): 38.9 (01 Nov 2023 07:05), Max: 39.6 (31 Oct 2023 15:30)  T(F): 102 (01 Nov 2023 07:05), Max: 103.3 (31 Oct 2023 15:30)  HR: 95 (01 Nov 2023 08:35) (65 - 135)  BP: 120/86 (01 Nov 2023 06:00) (95/51 - 141/103)  BP(mean): 97 (01 Nov 2023 06:00) (69 - 119)  ABP: --  ABP(mean): --  RR: 34 (01 Nov 2023 07:05) (21 - 53)  SpO2: 99% (01 Nov 2023 08:35) (90% - 100%)    O2 Parameters below as of 01 Nov 2023 07:47  Patient On (Oxygen Delivery Method): nasal cannula  O2 Flow (L/min): 4    Physical Examination:  General: No acute distress.  Alert, oriented, interactive, nonfocal  HEENT: Pupils equal, reactive to light.  Symmetric.  PULM: Clear to auscultation bilaterally, no significant sputum production  CVS: Regular rate and rhythm, no murmurs, rubs, or gallops  ABD: Soft, nondistended, nontender, normoactive bowel sounds, no masses  EXT: No edema, nontender  SKIN: Warm and well perfused, no rashes noted.    I&O's Detail    31 Oct 2023 07:01  -  01 Nov 2023 07:00  --------------------------------------------------------  IN:    dextrose 5% + sodium chloride 0.9%: 150 mL    dextrose 5% + sodium chloride 0.9% w/ Additives: 2200 mL    Insulin: 2 mL    Insulin: 55 mL    Insulin: 24 mL    IV PiggyBack: 300 mL  Total IN: 2731 mL    OUT:    Incontinent per Collection Bag (mL): 580 mL    Oral Fluid: 0 mL    Voided (mL): 100 mL  Total OUT: 680 mL    Total NET: 2051 mL        LABS:             11-01    135  |  108  |  86<HH>  ----------------------------<  545<HH>  6.4<HH>   |  17  |  3.5<H>    Ca    7.2<L>      01 Nov 2023 08:08  Phos  3.1     10-31  Mg     1.7     11-01    TPro  5.2<L>  /  Alb  2.9<L>  /  TBili  0.2  /  DBili  x   /  AST  47<H>  /  ALT  30  /  AlkPhos  65  11-01               9.5    12.75 )-----------( 198      ( 30 Oct 2023 19:20 )             28.6     30 Oct 2023 19:20    129    |  90     |  95     ----------------------------<  477    5.2     |  15     |  3.7      Ca    8.1        30 Oct 2023 19:20    TPro  6.6    /  Alb  3.8    /  TBili  0.4    /  DBili  x      /  AST  36     /  ALT  23     /  AlkPhos  100    30 Oct 2023 19:20  Amylase x     lipase x          CARDIAC MARKERS ( 30 Oct 2023 19:20 )  x     / 0.24 ng/mL / x     / x     / x          CAPILLARY BLOOD GLUCOSE          Urinalysis Basic - ( 30 Oct 2023 19:20 )    Color: x / Appearance: x / SG: x / pH: x  Gluc: 477 mg/dL / Ketone: x  / Bili: x / Urobili: x   Blood: x / Protein: x / Nitrite: x   Leuk Esterase: x / RBC: x / WBC x   Sq Epi: x / Non Sq Epi: x / Bacteria: x      Culture        MEDICATIONS  (STANDING):  insulin regular Infusion 5 Unit(s)/Hr (5 mL/Hr) IV Continuous <Continuous>    MEDICATIONS  (PRN):        RADIOLOGY: ***     CXR:  ? congestion, awaiting official report          IMPRESSION/PLAN:  pt w sob and ?chf exacerbation in setting of worsening PARKER.  Likely exacerbated by Uncontrolled DM/DKA.  Continue insulin drip. FSBS q1hr, Serial metabolic panels. Cautious IVF hydration.  May try patient on HFNC.  Check TTE. Cardio eval. admit to ICU.    CNS:avoid sedation    HEENT:oral care    PULMONARY: Bipap, may switch to HFNC    CARDIOVASCULAR: Obtain TTE    GI: GI prophylaxis.  Feeding     RENAL: monitor UO    INFECTIOUS DISEASE:monitor off abx    HEMATOLOGICAL:  DVT prophylaxis.    ENDOCRINE:  Follow up FS.  Insulin protocol if needed.    MUSCULOSKELETAL: oob to chair        CRITICAL CARE TIME SPENT: 35 minutes   (30 Oct 2023 21:55)        Primary diagnosis of DKA (diabetic ketoacidosis)        24H events:    Today is hospital day 2d. This morning patient was seen and examined at bedside, resting comfortably in bed.    No acute or major events overnight.    Code Status:    Family communication:  Contact date:  Name of person contacted:  Relationship to patient:  Communication details:  What matters most:    PAST MEDICAL & SURGICAL HISTORY  CHF (congestive heart failure)    DM (diabetes mellitus)    HTN (hypertension)    Prostate CA  in remission    Pacemaker    H/O total knee replacement, right      SOCIAL HISTORY:  Social History:      ALLERGIES:  No Known Allergies    MEDICATIONS:  STANDING MEDICATIONS  albuterol/ipratropium for Nebulization 3 milliLiter(s) Nebulizer every 6 hours  albuterol/ipratropium for Nebulization. 3 milliLiter(s) Nebulizer once  allopurinol 100 milliGRAM(s) Oral daily  aspirin  chewable 81 milliGRAM(s) Oral daily  calcium gluconate IVPB 2 Gram(s) IV Intermittent once  carvedilol 25 milliGRAM(s) Oral every 12 hours  cefepime   IVPB 1000 milliGRAM(s) IV Intermittent every 24 hours  chlorhexidine 2% Cloths 1 Application(s) Topical <User Schedule>  citalopram 20 milliGRAM(s) Oral daily  clopidogrel Tablet 75 milliGRAM(s) Oral daily  dextrose 5% + lactated ringers. 1000 milliLiter(s) IV Continuous <Continuous>  dextrose 50% Injectable 50 milliLiter(s) IV Push every 15 minutes  gabapentin 300 milliGRAM(s) Oral two times a day  heparin   Injectable 5000 Unit(s) SubCutaneous every 8 hours  insulin regular Infusion 2 Unit(s)/Hr IV Continuous <Continuous>  pantoprazole  Injectable 40 milliGRAM(s) IV Push daily  sodium bicarbonate 650 milliGRAM(s) Oral every 8 hours  sodium zirconium cyclosilicate 10 Gram(s) Oral once    PRN MEDICATIONS  acetaminophen  Suppository .. 650 milliGRAM(s) Rectal every 4 hours PRN  albuterol    90 MICROgram(s) HFA Inhaler 2 Puff(s) Inhalation every 4 hours PRN  clonazePAM  Tablet 2 milliGRAM(s) Oral at bedtime PRN    VITALS:   T(F): 102  HR: 95  BP: 120/86  RR: 34  SpO2: 99%    PHYSICAL EXAM:  GENERAL:   (  ) NAD, lying in bed comfortably     (  ) obtunded     (  ) lethargic     (  ) somnolent    HEAD:   (  ) Atraumatic     (  ) hematoma     (  ) laceration (specify location:       )     NECK:  (  ) Supple     (  ) neck stiffness     (  ) nuchal rigidity     (  )  no JVD     (  ) JVD present ( -- cm)    HEART:  Rate -->     (  ) normal rate     (  ) bradycardic     (  ) tachycardic  Rhythm -->     (  ) regular     (  ) regularly irregular     (  ) irregularly irregular  Murmurs -->     (  ) normal s1s2     (  ) systolic murmur     (  ) diastolic murmur     (  ) continuous murmur      (  ) S3 present     (  ) S4 present    LUNGS:   (  )Unlabored respirations     (  ) tachypnea  (  ) B/L air entry     (  ) decreased breath sounds in:  (location     )    (  ) no adventitious sound     (  ) crackles     (  ) wheezing      (  ) rhonchi      (specify location:       )  (  ) chest wall tenderness (specify location:       )    ABDOMEN:   (  ) Soft     (  ) tense   |   (  ) nondistended     (  ) distended   |   (  ) +BS     (  ) hypoactive bowel sounds     (  ) hyperactive bowel sounds  (  ) nontender     (  ) RUQ tenderness     (  ) RLQ tenderness     (  ) LLQ tenderness     (  ) epigastric tenderness     (  ) diffuse tenderness  (  ) Splenomegaly      (  ) Hepatomegaly      (  ) Jaundice     (  ) ecchymosis     EXTREMITIES:  (  ) Normal     (  ) Rash     (  ) ecchymosis     (  ) varicose veins      (  ) pitting edema     (  ) non-pitting edema   (  ) ulceration     (  ) gangrene:     (location:     )    NERVOUS SYSTEM:    (  ) A&Ox3     (  ) confused     (  ) lethargic  CN II-XII:     (  ) Intact     (  ) deficits found     (Specify:     )   Upper extremities:     (  ) no sensorimotor deficits     (  ) weakness     (  ) loss of proprioception/vibration     (  ) loss of touch/temperature (specify:    )  Lower extremities:     (  ) no sensorimotor deficits     (  ) weakness     (  ) loss of proprioception/vibration     (  ) loss of touch/temperature (specify:    )    SKIN:   (  ) No rashes or lesions     (  ) maculopapular rash     (  ) pustules     (  ) vesicles     (  ) ulcer     (  ) ecchymosis     (specify location:     )    AMPAC score:    (  ) Indwelling Wen Catheter:   Date insterted:    Reason (  ) Critical illness     (  ) urinary retention    (  ) Accurate Ins/Outs Monitoring     (  ) CMO patient    (  ) Central Line:   Date inserted:  Location: (  ) Right IJ     (  ) Left IJ     (  ) Right Fem     (  ) Left Fem    (  ) SPC        (  ) pigtail       (  ) PEG tube       (  ) colostomy       (  ) jejunostomy  (  ) U-Dall    LABS:                        7.8    9.52  )-----------( 155      ( 01 Nov 2023 08:08 )             23.3     11-01    135  |  108  |  86<HH>  ----------------------------<  545<HH>  6.4<HH>   |  17  |  3.5<H>    Ca    7.2<L>      01 Nov 2023 08:08  Phos  3.1     10-31  Mg     1.7     11-01    TPro  5.2<L>  /  Alb  2.9<L>  /  TBili  0.2  /  DBili  x   /  AST  47<H>  /  ALT  30  /  AlkPhos  65  11-01      Urinalysis Basic - ( 01 Nov 2023 08:08 )    Color: x / Appearance: x / SG: x / pH: x  Gluc: 545 mg/dL / Ketone: x  / Bili: x / Urobili: x   Blood: x / Protein: x / Nitrite: x   Leuk Esterase: x / RBC: x / WBC x   Sq Epi: x / Non Sq Epi: x / Bacteria: x      ABG - ( 31 Oct 2023 06:48 )  pH, Arterial: 7.39  pH, Blood: x     /  pCO2: 26    /  pO2: 140   / HCO3: 16    / Base Excess: -8.1  /  SaO2: 99.9                  CARDIAC MARKERS ( 30 Oct 2023 19:20 )  x     / 0.24 ng/mL / x     / x     / x          RADIOLOGY:           HPI:    Patient is a 89y old  Male who presents with a chief complaint of sob    HPI: 89 yo male PMHx sig for DM, CAD-bypass, CHF, HTN, HLD presents w sob x few days, no cough fever or chills.  pt placed on NRBM by EMS and subsequently on BiPAP in ED, O2 sat now 99%.  Pt found to have elevated glucose w high AG and b-hydroxybutarate c/w DKA.     No acute overnight events.     PAST MEDICAL & SURGICAL HISTORY:  CHF (congestive heart failure)  DM (diabetes mellitus)  HTN (hypertension)  Prostate CA in remission  Pacemaker  H/O total knee replacement, right    Allergies  No Known Allergies    FAMILY HISTORY:  FH: type 2 diabetes (Father)    Home Medications:  allopurinol 100 mg oral tablet: 1 tab(s) orally once a day (25 Oct 2021 22:30)  citalopram 20 mg oral tablet: 1 tab(s) orally once a day (25 Oct 2021 22:30)  clonazePAM 1 mg oral tablet: 2 tab(s) orally once a day (at bedtime), As Needed (25 Oct 2021 22:30)  gabapentin 300 mg oral capsule: 1 cap(s) orally 2 times a day (25 Oct 2021 22:30)  glimepiride 4 mg oral tablet: 1 tab(s) orally 2 times a day (25 Oct 2021 22:30)  Lantus 100 units/mL subcutaneous solution: 30 unit(s) subcutaneous once a day (at bedtime) (25 Oct 2021 22:30)  NIFEdipine 60 mg oral tablet, extended release: 1 tab(s) orally once a day (25 Oct 2021 22:30)    Occupation:  Alochol: Denied  Smoking: Denied  Drug Use: Denied  Marital Status:     ROS: as in HPI; All other systems reviewed are negative    ICU Vital Signs Last 24 Hrs  T(C): 38.9 (01 Nov 2023 07:05), Max: 39.6 (31 Oct 2023 15:30)  T(F): 102 (01 Nov 2023 07:05), Max: 103.3 (31 Oct 2023 15:30)  HR: 95 (01 Nov 2023 08:35) (65 - 135)  BP: 120/86 (01 Nov 2023 06:00) (95/51 - 141/103)  BP(mean): 97 (01 Nov 2023 06:00) (69 - 119)  ABP: --  ABP(mean): --  RR: 34 (01 Nov 2023 07:05) (21 - 53)  SpO2: 99% (01 Nov 2023 08:35) (90% - 100%)    O2 Parameters below as of 01 Nov 2023 07:47  Patient On (Oxygen Delivery Method): nasal cannula  O2 Flow (L/min): 4    Physical Examination:  General: No acute distress.  Alert, oriented, interactive, nonfocal  HEENT: Pupils equal, reactive to light.  Symmetric.  PULM: Clear to auscultation bilaterally, no significant sputum production  CVS: Regular rate and rhythm, no murmurs, rubs, or gallops  ABD: Soft, nondistended, nontender, normoactive bowel sounds, no masses  EXT: No edema, nontender  SKIN: Warm and well perfused, no rashes noted.    I&O's Detail    31 Oct 2023 07:01  -  01 Nov 2023 07:00  --------------------------------------------------------  IN:    dextrose 5% + sodium chloride 0.9%: 150 mL    dextrose 5% + sodium chloride 0.9% w/ Additives: 2200 mL    Insulin: 2 mL    Insulin: 55 mL    Insulin: 24 mL    IV PiggyBack: 300 mL  Total IN: 2731 mL    OUT:    Incontinent per Collection Bag (mL): 580 mL    Oral Fluid: 0 mL    Voided (mL): 100 mL  Total OUT: 680 mL    Total NET: 2051 mL    LABS:             11-01    135  |  108  |  86<HH>  ----------------------------<  545<HH>  6.4<HH>   |  17  |  3.5<H>    Ca    7.2<L>      01 Nov 2023 08:08  Phos  3.1     10-31  Mg     1.7     11-01    TPro  5.2<L>  /  Alb  2.9<L>  /  TBili  0.2  /  DBili  x   /  AST  47<H>  /  ALT  30  /  AlkPhos  65  11-01                          7.8    9.52  )-----------( 155      ( 01 Nov 2023 08:08 )             23.3     CARDIAC MARKERS ( 30 Oct 2023 19:20 )  x     / 0.24 ng/mL / x     / x     / x        ABG - ( 31 Oct 2023 06:48 )  pH, Arterial: 7.39  pH, Blood: x     /  pCO2: 26    /  pO2: 140   / HCO3: 16    / Base Excess: -8.1  /  SaO2: 99.9      Urinalysis Basic - ( 01 Nov 2023 08:08 )    Color: x / Appearance: x / SG: x / pH: x  Gluc: 545 mg/dL / Ketone: x  / Bili: x / Urobili: x   Blood: x / Protein: x / Nitrite: x   Leuk Esterase: x / RBC: x / WBC x   Sq Epi: x / Non Sq Epi: x / Bacteria: x    RADIOLOGY: ***

## 2023-11-01 NOTE — CONSULT NOTE ADULT - ASSESSMENT
89M with hx of DM, CAD-bypass, CHF, HTN, HLD presents w sob x few days, placed on NRB by EMS and subsequently on BiPAP in ED, found to be in DKA. Neurology consulted for altered mental status. CTH on presentation negative for acute findings, there are chronic ischemic changes. Patient remains septic, elevated temp, HR, RR, unknown source.    # ams 89M with hx of DM, CAD-bypass, CHF, HTN, HLD presents w sob x few days, placed on NRB by EMS and subsequently on BiPAP in ED, found to be in DKA. Neurology consulted for altered mental status. CTH on presentation negative for acute findings, there are chronic ischemic changes. Patient remains septic, elevated temp, HR, RR, unknown source.  Today he is signficantly better and was able to answer all questions.  He is tremulous and has myoclonic jerks in UE very little in LE.  Findings suggestive of TME  1. Continue medical management  2. REEG  3. Hold Gabapentin with current kidney disease

## 2023-11-01 NOTE — CONSULT NOTE ADULT - SUBJECTIVE AND OBJECTIVE BOX
GI/ NUTRITION -  CONSULT NOTE   Patient is a 89y old  Male who presents with a chief complaint of sob  HPI: 89 yo male PMHx sig for DM, CAD-bypass, CHF, HTN, HLD presents w sob x few days, no cough fever or chills.  pt placed on NRBM by EMS and subsequently on BiPAP in ED, O2 sat now 99%.  Pt found to have elevated glucose w high AG and b-hydroxybutarate c/w DKA  PAST MEDICAL & SURGICAL HISTORY:  CHF (congestive heart failure)  DM (diabetes mellitus)  HTN (hypertension)  Prostate CA  in remission  Pacemaker  H/O total knee replacement, right  Allergies  No Known Allergies  Intolerances  FAMILY HISTORY:  FH: type 2 diabetes (Father)  Home Medications:  allopurinol 100 mg oral tablet: 1 tab(s) orally once a day (25 Oct 2021 22:30)  citalopram 20 mg oral tablet: 1 tab(s) orally once a day (25 Oct 2021 22:30)  clonazePAM 1 mg oral tablet: 2 tab(s) orally once a day (at bedtime), As Needed (25 Oct 2021 22:30)  gabapentin 300 mg oral capsule: 1 cap(s) orally 2 times a day (25 Oct 2021 22:30)  glimepiride 4 mg oral tablet: 1 tab(s) orally 2 times a day (25 Oct 2021 22:30)  Lantus 100 units/mL subcutaneous solution: 30 unit(s) subcutaneous once a day (at bedtime) (25 Oct 2021 22:30)  NIFEdipine 60 mg oral tablet, extended release: 1 tab(s) orally once a day (25 Oct 2021 22:30)    Occupation:  Alochol: Denied  Smoking: Denied  Drug Use: Denied  Marital Status:   ROS: as in HPI; All other systems reviewed are negative  ICU Vital Signs Last 24 Hrs  T(C): 37.2 (30 Oct 2023 18:39), Max: 37.2 (30 Oct 2023 18:39)  T(F): 98.9 (30 Oct 2023 18:39), Max: 98.9 (30 Oct 2023 18:39)  HR: 62 (30 Oct 2023 18:39) (62 - 62)  BP: 121/88 (30 Oct 2023 18:39) (121/88 - 121/88)  RR: 18 (30 Oct 2023 18:39) (18 - 18)  SpO2: 99% (30 Oct 2023 18:39) (99% - 99%)  O2 Parameters below as of 30 Oct 2023 18:39  Patient On (Oxygen Delivery Method): mask, nonrebreather  O2 Flow (L/min): 15  Physical Examination:  General: No acute distress.  Alert, oriented, interactive, nonfocal  HEENT: Pupils equal, reactive to light.  Symmetric.  PULM: Clear to auscultation bilaterally, no significant sputum production  CVS: Regular rate and rhythm, no murmurs, rubs, or gallops  ABD: Soft, nondistended, nontender, normoactive bowel sounds, no masses  EXT: No edema, nontender    SKIN: Warm and well perfused, no rashes noted.    LABS:                        9.5    12.75 )-----------( 198      ( 30 Oct 2023 19:20 )             28.6     30 Oct 2023 19:20    129    |  90     |  95     ----------------------------<  477    5.2     |  15     |  3.7      Ca    8.1        30 Oct 2023 19:20    TPro  6.6    /  Alb  3.8    /  TBili  0.4    /  DBili  x      /  AST  36     /  ALT  23     /  AlkPhos  100    30 Oct 2023 19:20  Amylase x     lipase x      CARDIAC MARKERS ( 30 Oct 2023 19:20 )  x     / 0.24 ng/mL / x     / x     / x      Color: x / Appearance: x / SG: x / pH: x  Gluc: 477 mg/dL / Ketone: x  / Bili: x / Urobili: x   Blood: x / Protein: x / Nitrite: x   Leuk Esterase: x / RBC: x / WBC x   Sq Epi: x / Non Sq Epi: x / Bacteria: x  MEDICATIONS  (STANDING):  insulin regular Infusion 5 Unit(s)/Hr (5 mL/Hr) IV Continuous <Continuous>  RADIOLOGY: ***   CXR:  ? congestion, awaiting official report  IMPRESSION/PLAN:  pt w sob and ?chf exacerbation in setting of worsening PARKER.  Likely exacerbated by Uncontrolled DM/DKA.  Continue insulin drip. FSBS q1hr, Serial metabolic panels. Cautious IVF hydration.  May try patient on HFNC.  Check TTE. Cardio eval. admit to ICU.  CNS:avoid sedation  HEENT:oral care  PULMONARY: Bipap, may switch to HFNC  CARDIOVASCULAR: Obtain TTE  GI: GI prophylaxis.  Feeding   RENAL: monitor UO  INFECTIOUS DISEASE:monitor off abx  HEMATOLOGICAL:  DVT prophylaxis.  ENDOCRINE:  Follow up FS.  Insulin protocol if needed.  MUSCULOSKELETAL: oob to chair  CRITICAL CARE TIME SPENT: 35 minutes   (30 Oct 2023 21:55)      GI NUTRITION SUPPORT NOTE:    NPO    REVIEW OF SYSTEMS:  Negative except as noted above.     PAST MEDICAL/SURGICAL HISTORY:   CHF (congestive heart failure)  DM (diabetes mellitus)  HTN (hypertension)  Prostate CA  in remission  Pacemaker  H/O total knee replacement, right    ALLERGIES:  No Known Allergies      VITALS:  T(F): 100.9 (11-01 @ 11:01), Max: 102 (11-01 @ 06:00)  HR: 101 (11-01 @ 13:00) (94 - 102)  BP: 125/84 (11-01 @ 13:00) (113/77 - 136/62)  RR: 26 (11-01 @ 13:00) (21 - 53)  SpO2: 98% (11-01 @ 13:00) (97% - 100%)    HEIGHT/WEIGHT/BMI:   Height (cm): 172.7 (10-30)  Weight (kg): 88.5 (10-30)  BMI (kg/m2): 29.7 (10-30)    PHYSICAL EXAM:   GENERAL: NAD,  HEENT: Moist mucous membranes,   ABDOMEN: Soft, N/T N/D  EXTREMITIES:  No edema  SKIN: warm no lesions  IV ACCESS:   ENTERAL ACCESS: no  NPO    10-31-23 @ 07:01  -  11-01-23 @ 07:00  --------------------------------------------------------  IN:    dextrose 5% + sodium chloride 0.9%: 150 mL    dextrose 5% + sodium chloride 0.9% w/ Additives: 2200 mL    Insulin: 2 mL    Insulin: 55 mL    Insulin: 26 mL    IV PiggyBack: 300 mL  Total IN: 2733 mL    OUT:    Incontinent per Collection Bag (mL): 580 mL    Oral Fluid: 0 mL    Voided (mL): 100 mL  Total OUT: 680 mL    Total NET: 2053 mL        STANDING MEDICATIONS:   albuterol/ipratropium for Nebulization 3 milliLiter(s) Nebulizer every 6 hours  albuterol/ipratropium for Nebulization. 3 milliLiter(s) Nebulizer once  allopurinol 100 milliGRAM(s) Oral daily  aspirin  chewable 81 milliGRAM(s) Oral daily  carvedilol 25 milliGRAM(s) Oral every 12 hours  cefepime   IVPB 1000 milliGRAM(s) IV Intermittent every 24 hours  chlorhexidine 2% Cloths 1 Application(s) Topical <User Schedule>  citalopram 20 milliGRAM(s) Oral daily  clopidogrel Tablet 75 milliGRAM(s) Oral daily  dextrose 5% + lactated ringers. 1000 milliLiter(s) IV Continuous <Continuous>  dextrose 50% Injectable 50 milliLiter(s) IV Push every 15 minutes  gabapentin 300 milliGRAM(s) Oral two times a day  heparin   Injectable 5000 Unit(s) SubCutaneous every 8 hours  insulin regular Infusion 1 Unit(s)/Hr IV Continuous <Continuous>  pantoprazole  Injectable 40 milliGRAM(s) IV Push daily  sodium bicarbonate 650 milliGRAM(s) Oral every 8 hours      LABS:                         7.8    9.52  )-----------( 155      ( 01 Nov 2023 08:08 )             23.3     135  |  104  |  98<HH>  ----------------------------<  139<H>          (11-01-23 @ 11:24)  4.7   |  17  |  3.9<H>    Ca    7.9<L>          (11-01-23 @ 11:24)  Phos  3.1         (10-31-23 @ 05:57)  Mg     1.7         (11-01-23 @ 08:08)    TPro  5.2<L>  /  Alb  2.9<L>  /  TBili  0.2  /  DBili  x   /  AST  47<H>  /  ALT  30  /  AlkPhos  65       11-01-23 @ 08:08  A1C with Estimated Average Glucose Result: 9.7: Method: Immunoassay   Blood Glucose (Past 24 hours):  143 mg/dL (11-01 @ 11:40)  172 mg/dL (11-01 @ 10:05)  181 mg/dL (11-01 @ 09:25)  194 mg/dL (11-01 @ 08:15)  DIET:   Diet, NPO:   Except Medications     Special Instructions for Nursing:  Except Medications (10-30-23 @ 23:03) [Active]  RADIOLOGY:   < from: Xray Chest 1 View-PORTABLE IMMEDIATE (Xray Chest 1 View-PORTABLE IMMEDIATE .) (11.01.23 @ 09:41) >  Impression:  Questionable right base opacity  < from: US Kidney and Bladder (10.31.23 @ 11:33) >  IMPRESSION:  Echogenic kidneys consistent with medical renal disease. No   hydronephrosis or nephrolithiasis.

## 2023-11-01 NOTE — PROGRESS NOTE ADULT - ASSESSMENT
IMPRESSION/PLAN:  pt w sob and?chf exacerbation in setting of worsening PARKER.  Likely exacerbated by Uncontrolled DM/DKA.     DKA  - repeat hydroxybutyrate   - Lower fluid to 50cc D5Lr  - Hold nifedipine   - Serial BMP q4h  - Continue insulin drip  - Endocrine evaluation     Altered mental status  - Neurology evaluation   - EEG    Sepsis fever   - Cefepime and vancomycin  - F/u on blood cultures   - ID evaluation    PARKER   - Bumex 2mg once   - Renal and bladder US reviewed  - PO bicarb 650 TID  - Follow urine electrolytes, BUN, Cr  - Renal follow up    Keep SpO2 >92%; Continue BPAP; speech and swallow will reassess   Oral care  GI prophylaxis; NPO  DVT prophylaxis   Ambulate as tolerated     FULL CODE  Line: PIV  Dispo MICU

## 2023-11-01 NOTE — PROGRESS NOTE ADULT - ASSESSMENT
1)  Severe PARKER likely due to sepsis.  Of note, pt has Screat up to ~ 2.0 as far back as June 2021, no available lab data for 1 year, therefore raising possibility of natural progression of his CKD as opposed to PARKER on CKD    2)  Hyperkalemia, given IV CaGluc and Lokelma, plus Bumex IV as above    3)  Urine chemistries c/w pre-renal state, w/ FeNa 0.3%, FeUrea 14%.  Likely due to true intravascular volume depletion as opposed to ATN    4)  Metabolic acidosis but AG normalized at 14, indicating resolution of DKA    Recommendations:    1)  Today, no absolute indication for RRT, although hyperkalemia concerning    2)  Continue PO NaHCO3    3)  No further diuresis.  Continue IVF's, especially while not taking PO    4)  Discussed w/ pt and grandson possible need for RRT, so they are aware.  Will reevaluate for HD in AM

## 2023-11-01 NOTE — PROGRESS NOTE ADULT - SUBJECTIVE AND OBJECTIVE BOX
[facility] FOLLOW UP NOTE  --------------------------------------------------------------------------------  Chief Complaint:    24 hour events/subjective:  Events over last 24hrs noted.  Pt still w/ SOB, on BiPAP.  Given Bumex 2mg IV this AM.  Hemodynamically stable.  Remains febrile        PAST HISTORY  --------------------------------------------------------------------------------  No significant changes to PMH, PSH, FHx, SHx, unless otherwise noted    ALLERGIES & MEDICATIONS  --------------------------------------------------------------------------------  Allergies    No Known Allergies    Intolerances      Standing Inpatient Medications  albuterol/ipratropium for Nebulization 3 milliLiter(s) Nebulizer every 6 hours  albuterol/ipratropium for Nebulization. 3 milliLiter(s) Nebulizer once  allopurinol 100 milliGRAM(s) Oral daily  aspirin  chewable 81 milliGRAM(s) Oral daily  carvedilol 25 milliGRAM(s) Oral every 12 hours  cefepime   IVPB 1000 milliGRAM(s) IV Intermittent every 24 hours  chlorhexidine 2% Cloths 1 Application(s) Topical <User Schedule>  citalopram 20 milliGRAM(s) Oral daily  clopidogrel Tablet 75 milliGRAM(s) Oral daily  dextrose 5% + lactated ringers. 1000 milliLiter(s) IV Continuous <Continuous>  dextrose 50% Injectable 50 milliLiter(s) IV Push every 15 minutes  gabapentin 300 milliGRAM(s) Oral two times a day  heparin   Injectable 5000 Unit(s) SubCutaneous every 8 hours  insulin regular Infusion 1 Unit(s)/Hr IV Continuous <Continuous>  pantoprazole  Injectable 40 milliGRAM(s) IV Push daily  sodium bicarbonate 650 milliGRAM(s) Oral every 8 hours    PRN Inpatient Medications  acetaminophen  Suppository .. 650 milliGRAM(s) Rectal every 4 hours PRN  albuterol    90 MICROgram(s) HFA Inhaler 2 Puff(s) Inhalation every 4 hours PRN  clonazePAM  Tablet 2 milliGRAM(s) Oral at bedtime PRN      REVIEW OF SYSTEMS  --------------------------------------------------------------------------------  Gen: No weight changes, fatigue, fevers/chills, weakness  Skin: No rashes  Head/Eyes/Ears/Mouth: No headache; Normal hearing; Normal vision w/o blurriness; No sinus pain/discomfort, sore throat  Respiratory: No dyspnea, cough, wheezing, hemoptysis  CV: No chest pain, PND, orthopnea  GI: No abdominal pain, diarrhea, constipation, nausea, vomiting, melena, hematochezia  : No increased frequency, dysuria, hematuria, nocturia  MSK: No joint pain/swelling; no back pain; no edema  Neuro: No dizziness/lightheadedness, weakness, seizures, numbness, tingling  Heme: No easy bruising or bleeding  Endo: No heat/cold intolerance  Psych: No significant nervousness, anxiety, stress, depression    All other systems were reviewed and are negative, except as noted.    VITALS/PHYSICAL EXAM  --------------------------------------------------------------------------------  T(C): 38.3 (11-01-23 @ 11:01), Max: 39.6 (10-31-23 @ 15:30)  HR: 101 (11-01-23 @ 12:00) (74 - 115)  BP: 136/62 (11-01-23 @ 12:00) (95/51 - 136/62)  RR: 27 (11-01-23 @ 12:00) (21 - 53)  SpO2: 99% (11-01-23 @ 12:00) (90% - 100%)  Wt(kg): --  Height (cm): 172.7 (10-30-23 @ 23:05)  Weight (kg): 88.5 (10-30-23 @ 18:39)  BMI (kg/m2): 29.7 (10-30-23 @ 23:05)  BSA (m2): 2.02 (10-30-23 @ 23:05)      10-31-23 @ 07:01  -  11-01-23 @ 07:00  --------------------------------------------------------  IN: 2733 mL / OUT: 680 mL / NET: 2053 mL    11-01-23 @ 07:01  -  11-01-23 @ 13:03  --------------------------------------------------------  IN: 309 mL / OUT: 2 mL / NET: 307 mL      Physical Exam:  	Gen: mild distress, tachypneic  	HEENT:  anicteric  	Pulm: B/L coarse rales  	CV: RRR, S1S2; no rub  	UE: Warm, FROM, no edema  	LE: Warm, FROM,  no edema  	Neuro: No focal deficits  	Psych: Normal affect and mood  	Skin: Warm, without rashes  	Vascular access:    LABS/STUDIES  --------------------------------------------------------------------------------              7.8    9.52  >-----------<  155      [11-01-23 @ 08:08]              23.3     135  |  104  |  98  ----------------------------<  139      [11-01-23 @ 11:24]  4.7   |  17  |  3.9        Ca     7.9     [11-01-23 @ 11:24]      Mg     1.7     [11-01-23 @ 08:08]      Phos  3.1     [10-31-23 @ 05:57]    TPro  5.2  /  Alb  2.9  /  TBili  0.2  /  DBili  x   /  AST  47  /  ALT  30  /  AlkPhos  65  [11-01-23 @ 08:08]        Troponin 0.24      [10-30-23 @ 19:20]    Creatinine Trend:  SCr 3.9 [11-01 @ 11:24]  SCr 3.5 [11-01 @ 08:08]  SCr 3.7 [11-01 @ 01:33]  SCr 3.8 [10-31 @ 18:45]  SCr 3.7 [10-31 @ 15:20]    Urinalysis - [11-01-23 @ 11:24]      Color  / Appearance  / SG  / pH       Gluc 139 / Ketone   / Bili  / Urobili        Blood  / Protein  / Leuk Est  / Nitrite       RBC  / WBC  / Hyaline  / Gran  / Sq Epi  / Non Sq Epi  / Bacteria     Urine Creatinine 166      [10-31-23 @ 13:24]  Urine Protein 165      [10-31-23 @ 13:24]  Urine Sodium <20.0      [10-31-23 @ 13:24]  Urine Urea Nitrogen 573      [10-31-23 @ 13:24]  Urine Potassium 49      [10-31-23 @ 13:24]  Urine Osmolality 398      [10-31-23 @ 13:24]    HbA1c 8.1      [04-16-19 @ 06:47]

## 2023-11-01 NOTE — CONSULT NOTE ADULT - SUBJECTIVE AND OBJECTIVE BOX
Reason for Endocrinology Consult: Diabetes    HPI: 89y Male    D  Outpatient Endocrinologist?    PAST MEDICAL & SURGICAL HISTORY:  CHF (congestive heart failure)      DM (diabetes mellitus)      HTN (hypertension)      Prostate CA  in remission      Pacemaker      H/O total knee replacement, right        FAMILY HISTORY:  FH: type 2 diabetes (Father)        SH:  Smoking  Etoh:  Recreational Drugs:    Home Medications:  allopurinol 100 mg oral tablet: 1 tab(s) orally once a day (25 Oct 2021 22:30)  citalopram 20 mg oral tablet: 1 tab(s) orally once a day (25 Oct 2021 22:30)  clonazePAM 1 mg oral tablet: 2 tab(s) orally once a day (at bedtime), As Needed (25 Oct 2021 22:30)  gabapentin 300 mg oral capsule: 1 cap(s) orally 2 times a day (25 Oct 2021 22:30)  glimepiride 4 mg oral tablet: 1 tab(s) orally 2 times a day (25 Oct 2021 22:30)  Lantus 100 units/mL subcutaneous solution: 30 unit(s) subcutaneous once a day (at bedtime) (25 Oct 2021 22:30)  NIFEdipine 60 mg oral tablet, extended release: 1 tab(s) orally once a day (25 Oct 2021 22:30)      Current (Non-Endocrine) Meds:  acetaminophen   IVPB .. 1000 milliGRAM(s) IV Intermittent once  acetaminophen  Suppository .. 650 milliGRAM(s) Rectal every 4 hours PRN  albuterol    90 MICROgram(s) HFA Inhaler 2 Puff(s) Inhalation every 4 hours PRN  albuterol/ipratropium for Nebulization 3 milliLiter(s) Nebulizer every 6 hours  albuterol/ipratropium for Nebulization. 3 milliLiter(s) Nebulizer once  aspirin  chewable 81 milliGRAM(s) Oral daily  carvedilol 25 milliGRAM(s) Oral every 12 hours  cefepime   IVPB 1000 milliGRAM(s) IV Intermittent every 24 hours  chlorhexidine 2% Cloths 1 Application(s) Topical <User Schedule>  citalopram 20 milliGRAM(s) Oral daily  clonazePAM  Tablet 2 milliGRAM(s) Oral at bedtime PRN  clopidogrel Tablet 75 milliGRAM(s) Oral daily  dextrose 5% + lactated ringers. 1000 milliLiter(s) IV Continuous <Continuous>  gabapentin 300 milliGRAM(s) Oral two times a day  heparin   Injectable 5000 Unit(s) SubCutaneous every 8 hours  pantoprazole  Injectable 40 milliGRAM(s) IV Push daily  sodium bicarbonate 650 milliGRAM(s) Oral every 8 hours      Current Endocrine Meds:   allopurinol 100 milliGRAM(s) Oral daily  dextrose 50% Injectable 50 milliLiter(s) IV Push every 15 minutes  insulin regular Infusion 1 Unit(s)/Hr IV Continuous <Continuous>      Allergies:  No Known Allergies      ROS:  Denies the following except as indicated.    General: weight loss/weight gain, decreased appetite, fatigue, fever  Eyes: blurry vision, double vision  ENT: neck swelling, dysphagia, voice changes   CV: palpitations, SOB, chest pain, cough  GI: nausea, vomiting, diarrhea, constipation, abdominal pain  : nocturia,  polyuria, dysuria  Endo: decreased libido, heat/cold intolerance, jitteriness  MSK: arthralgias, myalgias  Skin: rash, dryness, diaphoresis  Neuro: pedal numbness,pedal paresthesias, pedal pain    Height (cm): 172.7 (10-30 @ 23:05)  Weight (kg): 88.5 (10-30 @ 18:39)  BMI (kg/m2): 29.7 (10-30 @ 23:05)    Vital Signs Last 24 Hrs  T(C): 38.3 (01 Nov 2023 15:02), Max: 39.3 (31 Oct 2023 22:00)  T(F): 100.9 (01 Nov 2023 15:02), Max: 102.7 (31 Oct 2023 22:00)  HR: 105 (01 Nov 2023 18:00) (86 - 105)  BP: 123/57 (01 Nov 2023 18:00) (95/51 - 136/62)  BP(mean): 82 (01 Nov 2023 18:00) (70 - 100)  RR: 31 (01 Nov 2023 18:00) (21 - 53)  SpO2: 98% (01 Nov 2023 18:00) (92% - 100%)    Parameters below as of 01 Nov 2023 17:00  Patient On (Oxygen Delivery Method): BiPAP/CPAP    O2 Concentration (%): 50  Constitutional: WN/WD in NAD.   Neck: no thyromegaly or palpable thyroid nodules   Respiratory: lungs CTAB.  Cardiovascular: regular rate and rhythm, normal S1 and S2, no audible murmurs  GI: soft, NT/ND, no masses/HSM appreciated.  Ext: no edema, no ulcers, pedal pulses palpable bilaterally  Neurology: no tremor, monofilament sensation intact in feet  Psychiatric: A&O x 3, normal affect/mood.        LABS:                        7.8    9.52  )-----------( 155      ( 01 Nov 2023 08:08 )             23.3     11-01    135  |  104  |  97<HH>  ----------------------------<  133<H>  5.1<H>   |  15<L>  |  3.9<H>    Ca    8.5      01 Nov 2023 15:22  Phos  3.1     10-31  Mg     1.7     11-01    TPro  5.2<L>  /  Alb  2.9<L>  /  TBili  0.2  /  DBili  x   /  AST  47<H>  /  ALT  30  /  AlkPhos  65  11-01      Urinalysis Basic - ( 01 Nov 2023 15:22 )    Color: x / Appearance: x / SG: x / pH: x  Gluc: 133 mg/dL / Ketone: x  / Bili: x / Urobili: x   Blood: x / Protein: x / Nitrite: x   Leuk Esterase: x / RBC: x / WBC x   Sq Epi: x / Non Sq Epi: x / Bacteria: x                                     RADIOLOGY & ADDITIONAL STUDIES:    A/P:89yMale    1

## 2023-11-01 NOTE — CHART NOTE - NSCHARTNOTEFT_GEN_A_CORE
Pt encountered this AM on BIPAP, not appropriate for PO. RN attempted wean from BIPAP however pt did not tolerate. Now back on BIPAP. Rec continued NPO. SLP to f/u as respiratory status improves.

## 2023-11-01 NOTE — CONSULT NOTE ADULT - ASSESSMENT
ASSESSMENT  DKA   alter mental status   sepsis fever   PARKER   metabolc acidosis   hypomagnesemia    PLAN:  Swallow evaluation noted   npo with alternative mean of nutrition   ASSESSMENT  DKA   alter mental status   sepsis fever   PARKER   metabolc acidosis   hypomagnesemia    PLAN:  Swallow evaluation noted   npo with alternative mean of nutrition  check bmp/phos/mg and correct lytes  GOC?  will f/u

## 2023-11-01 NOTE — PROGRESS NOTE ADULT - ASSESSMENT
IMPRESSION:  DKA   alter mental status   sepsis fever   PARKER   metabolc acidosis     PLAN:    CNS: neurology eval   EEG     HEENT: oral care     PULMONARY: keep pox>92%   NIV at night and as needed   cxr today     CARDIOVASCULAR: CE   echo   lower fluid to 50cc D5lr while npo    old nifedipine   GI: speech and swallow eval if failed NG feed   GI prophylaxis.        RENAL:   renal and bladder US reviewed   urine lytes urea creatinine  renal follow up   follow bun, cr   po bicarb 650 TID  serial bmp     INFECTIOUS DISEASE:    cefepime and vanco   bld cx   nasal mrsa   ID eval       HEMATOLOGICAL:  DVT prophylaxis.     ENDOCRINE:  Follow up FS.  Insulin protocol   follow AG , FS   endo eval   AG elevated because of DKA and renal failure     MUSCULOSKELETAL: ambulate as tolerated    FULL CODE  Line: PIV  Dispo MICU IMPRESSION:  DKA   alter mental status   sepsis fever   PARKER   metabolc acidosis     PLAN:    CNS: neurology eval   EEG     HEENT: oral care     PULMONARY: keep pox>92%   NIV at night and as needed   cxr today     CARDIOVASCULAR: CE   echo   lower fluid to 50cc D5lr while npo    old nifedipine   GI: speech and swallow eval if failed NG feed   GI prophylaxis.        RENAL:  bumex 2mg once   renal and bladder US reviewed   urine lytes urea creatinine  renal follow up   follow bun, cr   po bicarb 650 TID  serial bmp     INFECTIOUS DISEASE:    cefepime and vanco   bld cx   nasal mrsa   ID eval       HEMATOLOGICAL:  DVT prophylaxis.     ENDOCRINE:  Follow up FS.  Insulin  drip for now   repeat hydroxybutyrate   follow AG , FS   endo eval   AG elevated because of DKA and renal failure     MUSCULOSKELETAL: ambulate as tolerated    FULL CODE  Line: PIV  Dispo MICU

## 2023-11-01 NOTE — CONSULT NOTE ADULT - ASSESSMENT
need calorie reduction to help lose weight, fin roldan lantus when he goes home, and switch to toujeo max solostar 30 units daily, add novolog flex pen 10 units before meals, stop sulfonylureas. here when stable switch to 16 units glargine twice daily, and lispro 8 units before meals, assuming he's eating.

## 2023-11-01 NOTE — CONSULT NOTE ADULT - SUBJECTIVE AND OBJECTIVE BOX
AMANDA CASTORENA     89y     Male    MRN-[id number]                                                           CC:Patient is a 89y old  Male who presents with a chief complaint of sob (01 Nov 2023 09:38)      HPI: 89 yo male PMHx sig for DM, CAD-bypass, CHF, HTN, HLD presents w sob x few days, no cough fever or chills.  pt placed on NRBM by EMS and subsequently on BiPAP in ED, O2 sat now 99%.  Pt found to have elevated glucose w high AG and b-hydroxybutarate c/w DKA      ROS:  Constitutional, Neurological, Psychiatric, Eyes, ENT, Cardiovascular, Respiratory, Gastrointestinal, Genitourinary, Musculoskeletal, Integumentary, Endocrine and Heme/Lymph are otherwise negative.     Social History: No smoking, No drinking, No drug use    FAMILY HISTORY:  FH: type 2 diabetes (Father)        HEALTH ISSUES - PROBLEM Dx:  DM, CAD-bypass, CHF, HTN, HLD        Vital Signs Last 24 Hrs  T(C): 38.3 (01 Nov 2023 11:01), Max: 39.6 (31 Oct 2023 15:30)  T(F): 100.9 (01 Nov 2023 11:01), Max: 103.3 (31 Oct 2023 15:30)  HR: 100 (01 Nov 2023 11:00) (74 - 135)  BP: 122/65 (01 Nov 2023 11:00) (95/51 - 141/103)  BP(mean): 88 (01 Nov 2023 11:00) (69 - 119)  RR: 27 (01 Nov 2023 11:01) (21 - 53)  SpO2: 99% (01 Nov 2023 11:00) (90% - 100%)    Parameters below as of 01 Nov 2023 07:47  Patient On (Oxygen Delivery Method): BiPAP/CPAP    O2 Concentration (%): 50      Neuro Exam:  Orientation: oriented to person, oriented to place and oriented to time.   Attention: normal concentrating ability and visual attention was not decreased.   Language: no difficulty naming common objects, no difficulty repeating a phrase, no difficulty writing a sentence, fluency intact, comprehension intact and reading intact.   Fund of knowledge: displays adequate knowledge of personal past history.   Cranial Nerves: visual acuity intact bilaterally, visual fields full to confrontation, pupils equal round and reactive to light, extraocular motion intact, facial sensation intact symmetrically, face symmetrical, hearing was intact bilaterally, tongue and palate midline, head turning and shoulder shrug symmetric and there was no tongue deviation with protrusion.   Motor: muscle tone was normal in all four extremities, muscle strength was normal in all four extremities and normal bulk in all four extremities.   Sensory exam: light touch was intact.   Coordination:. normal gait. balance was intact. there was no past-pointing. no tremor present.   Deep tendon reflexes:   Biceps right 2+. Biceps left 2+.    Triceps right 2+. Triceps left 2+.    Brachioradialis right 2+. Brachioradialis left 2+.    Patella right 2+. Patella left 2+.    Ankle jerk right 2+. Ankle jerk left 2+.   Plantar responses normal on the right, normal on the left.      NIHSS:     Allergies    No Known Allergies           Home Medications:  allopurinol 100 mg oral tablet: 1 tab(s) orally once a day (25 Oct 2021 22:30)  citalopram 20 mg oral tablet: 1 tab(s) orally once a day (25 Oct 2021 22:30)  clonazePAM 1 mg oral tablet: 2 tab(s) orally once a day (at bedtime), As Needed (25 Oct 2021 22:30)  gabapentin 300 mg oral capsule: 1 cap(s) orally 2 times a day (25 Oct 2021 22:30)  glimepiride 4 mg oral tablet: 1 tab(s) orally 2 times a day (25 Oct 2021 22:30)  Lantus 100 units/mL subcutaneous solution: 30 unit(s) subcutaneous once a day (at bedtime) (25 Oct 2021 22:30)  NIFEdipine 60 mg oral tablet, extended release: 1 tab(s) orally once a day (25 Oct 2021 22:30)      MEDICATIONS  (STANDING):  albuterol/ipratropium for Nebulization 3 milliLiter(s) Nebulizer every 6 hours  albuterol/ipratropium for Nebulization. 3 milliLiter(s) Nebulizer once  allopurinol 100 milliGRAM(s) Oral daily  aspirin  chewable 81 milliGRAM(s) Oral daily  carvedilol 25 milliGRAM(s) Oral every 12 hours  cefepime   IVPB 1000 milliGRAM(s) IV Intermittent every 24 hours  chlorhexidine 2% Cloths 1 Application(s) Topical <User Schedule>  citalopram 20 milliGRAM(s) Oral daily  clopidogrel Tablet 75 milliGRAM(s) Oral daily  dextrose 5% + lactated ringers. 1000 milliLiter(s) (50 mL/Hr) IV Continuous <Continuous>  dextrose 50% Injectable 50 milliLiter(s) IV Push every 15 minutes  gabapentin 300 milliGRAM(s) Oral two times a day  heparin   Injectable 5000 Unit(s) SubCutaneous every 8 hours  insulin regular Infusion 2 Unit(s)/Hr (2 mL/Hr) IV Continuous <Continuous>  pantoprazole  Injectable 40 milliGRAM(s) IV Push daily  sodium bicarbonate 650 milliGRAM(s) Oral every 8 hours    MEDICATIONS  (PRN):  acetaminophen  Suppository .. 650 milliGRAM(s) Rectal every 4 hours PRN Temp greater or equal to 38C (100.4F)  albuterol    90 MICROgram(s) HFA Inhaler 2 Puff(s) Inhalation every 4 hours PRN Shortness of Breath and/or Wheezing  clonazePAM  Tablet 2 milliGRAM(s) Oral at bedtime PRN axniety      LABS:                        7.8    9.52  )-----------( 155      ( 01 Nov 2023 08:08 )             23.3     11-01    135  |  108  |  86<HH>  ----------------------------<  545<HH>  6.4<HH>   |  17  |  3.5<H>    Ca    7.2<L>      01 Nov 2023 08:08  Phos  3.1     10-31  Mg     1.7     11-01    TPro  5.2<L>  /  Alb  2.9<L>  /  TBili  0.2  /  DBili  x   /  AST  47<H>  /  ALT  30  /  AlkPhos  65  11-01      CT Head No Cont (10.30.23 @ 19:56)   CT HEAD:  No acute intracranial findings.    Stable mild-moderate chronic microvascular ischemic changes.    CTCERVICAL SPINE:  No acute cervical fracture or facet subluxation.    Redemonstrated nonunionized odontoid fracture with unchanged mild   posterior angulation compared to the prior cervical CT from 9/3/2021.

## 2023-11-01 NOTE — PROGRESS NOTE ADULT - SUBJECTIVE AND OBJECTIVE BOX
Patient is a 89y old  Male who presents with a chief complaint of sob (31 Oct 2023 21:15)      Over Night Events:  Patient seen and examined.   awake   off insulin drip     ROS:  See HPI    PHYSICAL EXAM    ICU Vital Signs Last 24 Hrs  T(C): 38.9 (01 Nov 2023 07:05), Max: 39.6 (31 Oct 2023 15:30)  T(F): 102 (01 Nov 2023 07:05), Max: 103.3 (31 Oct 2023 15:30)  HR: 100 (01 Nov 2023 06:00) (65 - 135)  BP: 120/86 (01 Nov 2023 06:00) (95/51 - 141/103)  BP(mean): 97 (01 Nov 2023 06:00) (69 - 119)  ABP: --  ABP(mean): --  RR: 34 (01 Nov 2023 07:05) (21 - 53)  SpO2: 98% (01 Nov 2023 06:00) (90% - 100%)    O2 Parameters below as of 01 Nov 2023 06:00  Patient On (Oxygen Delivery Method): BiPAP/CPAP            General:awake   HEENT:     augie           Lymph Nodes: NO cervical LN   Lungs: Bilateral BS  Cardiovascular: Regular   Abdomen: Soft, Positive BS  Extremities: No clubbing   Skin: warm   Neurological: no focal   Musculoskeletal: move all ext     I&O's Detail    31 Oct 2023 07:01  -  01 Nov 2023 07:00  --------------------------------------------------------  IN:    dextrose 5% + sodium chloride 0.9%: 150 mL    dextrose 5% + sodium chloride 0.9% w/ Additives: 2200 mL    Insulin: 2 mL    Insulin: 55 mL    Insulin: 24 mL    IV PiggyBack: 300 mL  Total IN: 2731 mL    OUT:    Incontinent per Collection Bag (mL): 580 mL    Oral Fluid: 0 mL    Voided (mL): 100 mL  Total OUT: 680 mL    Total NET: 2051 mL          LABS:                          9.2    13.02 )-----------( 199      ( 31 Oct 2023 05:57 )             27.3         01 Nov 2023 01:33    131    |  101    |  96     ----------------------------<  239    4.9     |  16     |  3.7      Ca    7.7        01 Nov 2023 01:33  Phos  3.1       31 Oct 2023 05:57  Mg     1.8       01 Nov 2023 01:33    TPro  6.0    /  Alb  3.4    /  TBili  0.3    /  DBili  x      /  AST  46     /  ALT  26     /  AlkPhos  81     31 Oct 2023 11:00  Amylase x     lipase x                                                                                        Urinalysis Basic - ( 01 Nov 2023 01:33 )    Color: x / Appearance: x / SG: x / pH: x  Gluc: 239 mg/dL / Ketone: x  / Bili: x / Urobili: x   Blood: x / Protein: x / Nitrite: x   Leuk Esterase: x / RBC: x / WBC x   Sq Epi: x / Non Sq Epi: x / Bacteria: x        Lactate, Blood: 3.2 mmol/L (10-31-23 @ 05:57)    CARDIAC MARKERS ( 30 Oct 2023 19:20 )  x     / 0.24 ng/mL / x     / x     / x                                                                                                                                             ABG - ( 31 Oct 2023 06:48 )  pH, Arterial: 7.39  pH, Blood: x     /  pCO2: 26    /  pO2: 140   / HCO3: 16    / Base Excess: -8.1  /  SaO2: 99.9                MEDICATIONS  (STANDING):  albuterol/ipratropium for Nebulization 3 milliLiter(s) Nebulizer every 6 hours  albuterol/ipratropium for Nebulization. 3 milliLiter(s) Nebulizer once  allopurinol 100 milliGRAM(s) Oral daily  aspirin  chewable 81 milliGRAM(s) Oral daily  carvedilol 25 milliGRAM(s) Oral every 12 hours  cefepime   IVPB 1000 milliGRAM(s) IV Intermittent every 24 hours  chlorhexidine 2% Cloths 1 Application(s) Topical <User Schedule>  citalopram 20 milliGRAM(s) Oral daily  clopidogrel Tablet 75 milliGRAM(s) Oral daily  dextrose 5% + sodium chloride 0.9% 1000 milliLiter(s) (100 mL/Hr) IV Continuous <Continuous>  dextrose 50% Injectable 50 milliLiter(s) IV Push every 15 minutes  gabapentin 300 milliGRAM(s) Oral two times a day  heparin   Injectable 5000 Unit(s) SubCutaneous every 8 hours  insulin regular Infusion 2 Unit(s)/Hr (2 mL/Hr) IV Continuous <Continuous>  NIFEdipine XL 60 milliGRAM(s) Oral daily  pantoprazole  Injectable 40 milliGRAM(s) IV Push daily  sodium bicarbonate 650 milliGRAM(s) Oral every 8 hours    MEDICATIONS  (PRN):  acetaminophen  Suppository .. 650 milliGRAM(s) Rectal every 4 hours PRN Temp greater or equal to 38C (100.4F)  albuterol    90 MICROgram(s) HFA Inhaler 2 Puff(s) Inhalation every 4 hours PRN Shortness of Breath and/or Wheezing  clonazePAM  Tablet 2 milliGRAM(s) Oral at bedtime PRN axniety          Xrays:  TLC:  OG:  ET tube:                                                                                       ECHO:  [facility] ICU:

## 2023-11-02 LAB
ALBUMIN SERPL ELPH-MCNC: 3 G/DL — LOW (ref 3.5–5.2)
ALBUMIN SERPL ELPH-MCNC: 3 G/DL — LOW (ref 3.5–5.2)
ALP SERPL-CCNC: 80 U/L — SIGNIFICANT CHANGE UP (ref 30–115)
ALP SERPL-CCNC: 80 U/L — SIGNIFICANT CHANGE UP (ref 30–115)
ALT FLD-CCNC: 33 U/L — SIGNIFICANT CHANGE UP (ref 0–41)
ALT FLD-CCNC: 33 U/L — SIGNIFICANT CHANGE UP (ref 0–41)
ANION GAP SERPL CALC-SCNC: 15 MMOL/L — HIGH (ref 7–14)
ANION GAP SERPL CALC-SCNC: 17 MMOL/L — HIGH (ref 7–14)
ANION GAP SERPL CALC-SCNC: 20 MMOL/L — HIGH (ref 7–14)
ANION GAP SERPL CALC-SCNC: 20 MMOL/L — HIGH (ref 7–14)
AST SERPL-CCNC: 41 U/L — SIGNIFICANT CHANGE UP (ref 0–41)
AST SERPL-CCNC: 41 U/L — SIGNIFICANT CHANGE UP (ref 0–41)
B-OH-BUTYR SERPL-SCNC: 0.6 MMOL/L — HIGH
B-OH-BUTYR SERPL-SCNC: 0.6 MMOL/L — HIGH
BASOPHILS # BLD AUTO: 0.01 K/UL — SIGNIFICANT CHANGE UP (ref 0–0.2)
BASOPHILS # BLD AUTO: 0.01 K/UL — SIGNIFICANT CHANGE UP (ref 0–0.2)
BASOPHILS NFR BLD AUTO: 0.1 % — SIGNIFICANT CHANGE UP (ref 0–1)
BASOPHILS NFR BLD AUTO: 0.1 % — SIGNIFICANT CHANGE UP (ref 0–1)
BILIRUB SERPL-MCNC: 0.2 MG/DL — SIGNIFICANT CHANGE UP (ref 0.2–1.2)
BILIRUB SERPL-MCNC: 0.2 MG/DL — SIGNIFICANT CHANGE UP (ref 0.2–1.2)
BUN SERPL-MCNC: 103 MG/DL — CRITICAL HIGH (ref 10–20)
BUN SERPL-MCNC: 103 MG/DL — CRITICAL HIGH (ref 10–20)
BUN SERPL-MCNC: 104 MG/DL — CRITICAL HIGH (ref 10–20)
BUN SERPL-MCNC: 104 MG/DL — CRITICAL HIGH (ref 10–20)
BUN SERPL-MCNC: 105 MG/DL — CRITICAL HIGH (ref 10–20)
BUN SERPL-MCNC: 105 MG/DL — CRITICAL HIGH (ref 10–20)
BUN SERPL-MCNC: 109 MG/DL — CRITICAL HIGH (ref 10–20)
BUN SERPL-MCNC: 109 MG/DL — CRITICAL HIGH (ref 10–20)
BUN SERPL-MCNC: 111 MG/DL — CRITICAL HIGH (ref 10–20)
BUN SERPL-MCNC: 111 MG/DL — CRITICAL HIGH (ref 10–20)
CALCIUM SERPL-MCNC: 8.1 MG/DL — LOW (ref 8.4–10.5)
CALCIUM SERPL-MCNC: 8.1 MG/DL — LOW (ref 8.4–10.5)
CALCIUM SERPL-MCNC: 8.2 MG/DL — LOW (ref 8.4–10.5)
CALCIUM SERPL-MCNC: 8.3 MG/DL — LOW (ref 8.4–10.5)
CALCIUM SERPL-MCNC: 8.3 MG/DL — LOW (ref 8.4–10.5)
CALCIUM SERPL-MCNC: 8.6 MG/DL — SIGNIFICANT CHANGE UP (ref 8.4–10.5)
CALCIUM SERPL-MCNC: 8.6 MG/DL — SIGNIFICANT CHANGE UP (ref 8.4–10.5)
CHLORIDE SERPL-SCNC: 105 MMOL/L — SIGNIFICANT CHANGE UP (ref 98–110)
CHLORIDE SERPL-SCNC: 107 MMOL/L — SIGNIFICANT CHANGE UP (ref 98–110)
CHLORIDE SERPL-SCNC: 107 MMOL/L — SIGNIFICANT CHANGE UP (ref 98–110)
CHLORIDE SERPL-SCNC: 108 MMOL/L — SIGNIFICANT CHANGE UP (ref 98–110)
CO2 SERPL-SCNC: 14 MMOL/L — LOW (ref 17–32)
CO2 SERPL-SCNC: 14 MMOL/L — LOW (ref 17–32)
CO2 SERPL-SCNC: 15 MMOL/L — LOW (ref 17–32)
CO2 SERPL-SCNC: 15 MMOL/L — LOW (ref 17–32)
CO2 SERPL-SCNC: 16 MMOL/L — LOW (ref 17–32)
CO2 SERPL-SCNC: 16 MMOL/L — LOW (ref 17–32)
CO2 SERPL-SCNC: 17 MMOL/L — SIGNIFICANT CHANGE UP (ref 17–32)
CREAT SERPL-MCNC: 4.1 MG/DL — CRITICAL HIGH (ref 0.7–1.5)
CREAT SERPL-MCNC: 4.2 MG/DL — CRITICAL HIGH (ref 0.7–1.5)
EGFR: 13 ML/MIN/1.73M2 — LOW
EOSINOPHIL # BLD AUTO: 0 K/UL — SIGNIFICANT CHANGE UP (ref 0–0.7)
EOSINOPHIL # BLD AUTO: 0 K/UL — SIGNIFICANT CHANGE UP (ref 0–0.7)
EOSINOPHIL NFR BLD AUTO: 0 % — SIGNIFICANT CHANGE UP (ref 0–8)
EOSINOPHIL NFR BLD AUTO: 0 % — SIGNIFICANT CHANGE UP (ref 0–8)
GLUCOSE BLDC GLUCOMTR-MCNC: 164 MG/DL — HIGH (ref 70–99)
GLUCOSE BLDC GLUCOMTR-MCNC: 164 MG/DL — HIGH (ref 70–99)
GLUCOSE BLDC GLUCOMTR-MCNC: 167 MG/DL — HIGH (ref 70–99)
GLUCOSE BLDC GLUCOMTR-MCNC: 167 MG/DL — HIGH (ref 70–99)
GLUCOSE BLDC GLUCOMTR-MCNC: 190 MG/DL — HIGH (ref 70–99)
GLUCOSE BLDC GLUCOMTR-MCNC: 190 MG/DL — HIGH (ref 70–99)
GLUCOSE BLDC GLUCOMTR-MCNC: 192 MG/DL — HIGH (ref 70–99)
GLUCOSE BLDC GLUCOMTR-MCNC: 192 MG/DL — HIGH (ref 70–99)
GLUCOSE BLDC GLUCOMTR-MCNC: 239 MG/DL — HIGH (ref 70–99)
GLUCOSE BLDC GLUCOMTR-MCNC: 239 MG/DL — HIGH (ref 70–99)
GLUCOSE BLDC GLUCOMTR-MCNC: 257 MG/DL — HIGH (ref 70–99)
GLUCOSE BLDC GLUCOMTR-MCNC: 257 MG/DL — HIGH (ref 70–99)
GLUCOSE BLDC GLUCOMTR-MCNC: 258 MG/DL — HIGH (ref 70–99)
GLUCOSE BLDC GLUCOMTR-MCNC: 258 MG/DL — HIGH (ref 70–99)
GLUCOSE BLDC GLUCOMTR-MCNC: 268 MG/DL — HIGH (ref 70–99)
GLUCOSE BLDC GLUCOMTR-MCNC: 268 MG/DL — HIGH (ref 70–99)
GLUCOSE BLDC GLUCOMTR-MCNC: 276 MG/DL — HIGH (ref 70–99)
GLUCOSE BLDC GLUCOMTR-MCNC: 276 MG/DL — HIGH (ref 70–99)
GLUCOSE BLDC GLUCOMTR-MCNC: 283 MG/DL — HIGH (ref 70–99)
GLUCOSE BLDC GLUCOMTR-MCNC: 283 MG/DL — HIGH (ref 70–99)
GLUCOSE BLDC GLUCOMTR-MCNC: 287 MG/DL — HIGH (ref 70–99)
GLUCOSE BLDC GLUCOMTR-MCNC: 287 MG/DL — HIGH (ref 70–99)
GLUCOSE SERPL-MCNC: 185 MG/DL — HIGH (ref 70–99)
GLUCOSE SERPL-MCNC: 185 MG/DL — HIGH (ref 70–99)
GLUCOSE SERPL-MCNC: 187 MG/DL — HIGH (ref 70–99)
GLUCOSE SERPL-MCNC: 187 MG/DL — HIGH (ref 70–99)
GLUCOSE SERPL-MCNC: 228 MG/DL — HIGH (ref 70–99)
GLUCOSE SERPL-MCNC: 228 MG/DL — HIGH (ref 70–99)
GLUCOSE SERPL-MCNC: 269 MG/DL — HIGH (ref 70–99)
GLUCOSE SERPL-MCNC: 269 MG/DL — HIGH (ref 70–99)
GLUCOSE SERPL-MCNC: 286 MG/DL — HIGH (ref 70–99)
GLUCOSE SERPL-MCNC: 286 MG/DL — HIGH (ref 70–99)
HCT VFR BLD CALC: 24.9 % — LOW (ref 42–52)
HCT VFR BLD CALC: 24.9 % — LOW (ref 42–52)
HGB BLD-MCNC: 8.5 G/DL — LOW (ref 14–18)
HGB BLD-MCNC: 8.5 G/DL — LOW (ref 14–18)
IMM GRANULOCYTES NFR BLD AUTO: 0.8 % — HIGH (ref 0.1–0.3)
IMM GRANULOCYTES NFR BLD AUTO: 0.8 % — HIGH (ref 0.1–0.3)
LYMPHOCYTES # BLD AUTO: 0.27 K/UL — LOW (ref 1.2–3.4)
LYMPHOCYTES # BLD AUTO: 0.27 K/UL — LOW (ref 1.2–3.4)
LYMPHOCYTES # BLD AUTO: 2.7 % — LOW (ref 20.5–51.1)
LYMPHOCYTES # BLD AUTO: 2.7 % — LOW (ref 20.5–51.1)
MAGNESIUM SERPL-MCNC: 2.3 MG/DL — SIGNIFICANT CHANGE UP (ref 1.8–2.4)
MAGNESIUM SERPL-MCNC: 2.3 MG/DL — SIGNIFICANT CHANGE UP (ref 1.8–2.4)
MCHC RBC-ENTMCNC: 29 PG — SIGNIFICANT CHANGE UP (ref 27–31)
MCHC RBC-ENTMCNC: 29 PG — SIGNIFICANT CHANGE UP (ref 27–31)
MCHC RBC-ENTMCNC: 34.1 G/DL — SIGNIFICANT CHANGE UP (ref 32–37)
MCHC RBC-ENTMCNC: 34.1 G/DL — SIGNIFICANT CHANGE UP (ref 32–37)
MCV RBC AUTO: 85 FL — SIGNIFICANT CHANGE UP (ref 80–94)
MCV RBC AUTO: 85 FL — SIGNIFICANT CHANGE UP (ref 80–94)
MONOCYTES # BLD AUTO: 0.23 K/UL — SIGNIFICANT CHANGE UP (ref 0.1–0.6)
MONOCYTES # BLD AUTO: 0.23 K/UL — SIGNIFICANT CHANGE UP (ref 0.1–0.6)
MONOCYTES NFR BLD AUTO: 2.3 % — SIGNIFICANT CHANGE UP (ref 1.7–9.3)
MONOCYTES NFR BLD AUTO: 2.3 % — SIGNIFICANT CHANGE UP (ref 1.7–9.3)
NEUTROPHILS # BLD AUTO: 9.55 K/UL — HIGH (ref 1.4–6.5)
NEUTROPHILS # BLD AUTO: 9.55 K/UL — HIGH (ref 1.4–6.5)
NEUTROPHILS NFR BLD AUTO: 94.1 % — HIGH (ref 42.2–75.2)
NEUTROPHILS NFR BLD AUTO: 94.1 % — HIGH (ref 42.2–75.2)
NRBC # BLD: 0 /100 WBCS — SIGNIFICANT CHANGE UP (ref 0–0)
NRBC # BLD: 0 /100 WBCS — SIGNIFICANT CHANGE UP (ref 0–0)
PHOSPHATE SERPL-MCNC: 4.3 MG/DL — SIGNIFICANT CHANGE UP (ref 2.1–4.9)
PHOSPHATE SERPL-MCNC: 4.3 MG/DL — SIGNIFICANT CHANGE UP (ref 2.1–4.9)
PLATELET # BLD AUTO: 190 K/UL — SIGNIFICANT CHANGE UP (ref 130–400)
PLATELET # BLD AUTO: 190 K/UL — SIGNIFICANT CHANGE UP (ref 130–400)
PMV BLD: 11.4 FL — HIGH (ref 7.4–10.4)
PMV BLD: 11.4 FL — HIGH (ref 7.4–10.4)
POTASSIUM SERPL-MCNC: 4.7 MMOL/L — SIGNIFICANT CHANGE UP (ref 3.5–5)
POTASSIUM SERPL-MCNC: 4.7 MMOL/L — SIGNIFICANT CHANGE UP (ref 3.5–5)
POTASSIUM SERPL-MCNC: 4.9 MMOL/L — SIGNIFICANT CHANGE UP (ref 3.5–5)
POTASSIUM SERPL-SCNC: 4.7 MMOL/L — SIGNIFICANT CHANGE UP (ref 3.5–5)
POTASSIUM SERPL-SCNC: 4.7 MMOL/L — SIGNIFICANT CHANGE UP (ref 3.5–5)
POTASSIUM SERPL-SCNC: 4.9 MMOL/L — SIGNIFICANT CHANGE UP (ref 3.5–5)
PROT SERPL-MCNC: 5.8 G/DL — LOW (ref 6–8)
PROT SERPL-MCNC: 5.8 G/DL — LOW (ref 6–8)
RBC # BLD: 2.93 M/UL — LOW (ref 4.7–6.1)
RBC # BLD: 2.93 M/UL — LOW (ref 4.7–6.1)
RBC # FLD: 15.4 % — HIGH (ref 11.5–14.5)
RBC # FLD: 15.4 % — HIGH (ref 11.5–14.5)
SODIUM SERPL-SCNC: 137 MMOL/L — SIGNIFICANT CHANGE UP (ref 135–146)
SODIUM SERPL-SCNC: 137 MMOL/L — SIGNIFICANT CHANGE UP (ref 135–146)
SODIUM SERPL-SCNC: 139 MMOL/L — SIGNIFICANT CHANGE UP (ref 135–146)
SODIUM SERPL-SCNC: 139 MMOL/L — SIGNIFICANT CHANGE UP (ref 135–146)
SODIUM SERPL-SCNC: 140 MMOL/L — SIGNIFICANT CHANGE UP (ref 135–146)
WBC # BLD: 10.14 K/UL — SIGNIFICANT CHANGE UP (ref 4.8–10.8)
WBC # BLD: 10.14 K/UL — SIGNIFICANT CHANGE UP (ref 4.8–10.8)
WBC # FLD AUTO: 10.14 K/UL — SIGNIFICANT CHANGE UP (ref 4.8–10.8)
WBC # FLD AUTO: 10.14 K/UL — SIGNIFICANT CHANGE UP (ref 4.8–10.8)

## 2023-11-02 PROCEDURE — 99233 SBSQ HOSP IP/OBS HIGH 50: CPT

## 2023-11-02 PROCEDURE — 95816 EEG AWAKE AND DROWSY: CPT | Mod: 26

## 2023-11-02 PROCEDURE — 71045 X-RAY EXAM CHEST 1 VIEW: CPT | Mod: 26

## 2023-11-02 RX ORDER — BUMETANIDE 0.25 MG/ML
2 INJECTION INTRAMUSCULAR; INTRAVENOUS EVERY 12 HOURS
Refills: 0 | Status: DISCONTINUED | OUTPATIENT
Start: 2023-11-02 | End: 2023-11-02

## 2023-11-02 RX ORDER — INSULIN GLARGINE 100 [IU]/ML
10 INJECTION, SOLUTION SUBCUTANEOUS AT BEDTIME
Refills: 0 | Status: DISCONTINUED | OUTPATIENT
Start: 2023-11-02 | End: 2023-11-02

## 2023-11-02 RX ORDER — INSULIN LISPRO 100/ML
VIAL (ML) SUBCUTANEOUS
Refills: 0 | Status: DISCONTINUED | OUTPATIENT
Start: 2023-11-02 | End: 2023-11-02

## 2023-11-02 RX ORDER — BUMETANIDE 0.25 MG/ML
2 INJECTION INTRAMUSCULAR; INTRAVENOUS
Refills: 0 | Status: DISCONTINUED | OUTPATIENT
Start: 2023-11-02 | End: 2023-11-05

## 2023-11-02 RX ORDER — BUMETANIDE 0.25 MG/ML
2 INJECTION INTRAMUSCULAR; INTRAVENOUS EVERY 8 HOURS
Refills: 0 | Status: DISCONTINUED | OUTPATIENT
Start: 2023-11-02 | End: 2023-11-02

## 2023-11-02 RX ORDER — INSULIN HUMAN 100 [IU]/ML
7 INJECTION, SOLUTION SUBCUTANEOUS
Qty: 100 | Refills: 0 | Status: DISCONTINUED | OUTPATIENT
Start: 2023-11-02 | End: 2023-11-04

## 2023-11-02 RX ADMIN — PANTOPRAZOLE SODIUM 40 MILLIGRAM(S): 20 TABLET, DELAYED RELEASE ORAL at 13:20

## 2023-11-02 RX ADMIN — HEPARIN SODIUM 5000 UNIT(S): 5000 INJECTION INTRAVENOUS; SUBCUTANEOUS at 13:21

## 2023-11-02 RX ADMIN — Medication 3 MILLILITER(S): at 20:50

## 2023-11-02 RX ADMIN — HEPARIN SODIUM 5000 UNIT(S): 5000 INJECTION INTRAVENOUS; SUBCUTANEOUS at 05:35

## 2023-11-02 RX ADMIN — Medication 3 MILLILITER(S): at 07:40

## 2023-11-02 RX ADMIN — BUMETANIDE 2 MILLIGRAM(S): 0.25 INJECTION INTRAMUSCULAR; INTRAVENOUS at 13:28

## 2023-11-02 RX ADMIN — Medication 3 MILLILITER(S): at 01:12

## 2023-11-02 RX ADMIN — Medication 3 MILLILITER(S): at 14:03

## 2023-11-02 RX ADMIN — BUMETANIDE 2 MILLIGRAM(S): 0.25 INJECTION INTRAMUSCULAR; INTRAVENOUS at 18:49

## 2023-11-02 RX ADMIN — CEFEPIME 100 MILLIGRAM(S): 1 INJECTION, POWDER, FOR SOLUTION INTRAMUSCULAR; INTRAVENOUS at 14:39

## 2023-11-02 RX ADMIN — CHLORHEXIDINE GLUCONATE 1 APPLICATION(S): 213 SOLUTION TOPICAL at 05:34

## 2023-11-02 NOTE — PROGRESS NOTE ADULT - SUBJECTIVE AND OBJECTIVE BOX
Patient is a 89y old  Male who presents with a chief complaint of sob (01 Nov 2023 20:02)      Over Night Events:  Patient seen and examined.   spiking temp   on NIV   of inssulin     ROS:  See HPI    PHYSICAL EXAM    ICU Vital Signs Last 24 Hrs  T(C): 36.8 (02 Nov 2023 07:05), Max: 38.4 (01 Nov 2023 19:02)  T(F): 98.2 (02 Nov 2023 07:05), Max: 101.1 (01 Nov 2023 19:02)  HR: 100 (02 Nov 2023 05:00) (81 - 105)  BP: 121/58 (02 Nov 2023 05:00) (108/53 - 136/62)  BP(mean): 83 (02 Nov 2023 05:00) (77 - 100)  ABP: --  ABP(mean): --  RR: 35 (02 Nov 2023 07:05) (23 - 35)  SpO2: 96% (02 Nov 2023 05:00) (92% - 100%)    O2 Parameters below as of 01 Nov 2023 23:00  Patient On (Oxygen Delivery Method): BiPAP/CPAP            General: awake   HEENT: face mask           Lymph Nodes: NO cervical LN   Lungs: Bilateral BS  Cardiovascular: Regular   Abdomen: Soft, Positive BS  Extremities: No clubbing   Skin: warm   Neurological: no focal   Musculoskeletal: move all ext     I&O's Detail    01 Nov 2023 07:01  -  02 Nov 2023 07:00  --------------------------------------------------------  IN:    dextrose 5% + lactated ringers: 1050 mL    dextrose 5% + sodium chloride 0.9% w/ Additives: 100 mL    Insulin: 8 mL    Insulin: 23 mL  Total IN: 1181 mL    OUT:    Incontinent per Collection Bag (mL): 602 mL    Voided (mL): 50 mL  Total OUT: 652 mL    Total NET: 529 mL          LABS:                          8.5    10.14 )-----------( 190      ( 02 Nov 2023 06:21 )             24.9         01 Nov 2023 15:22    135    |  104    |  97     ----------------------------<  133    5.1     |  15     |  3.9      Ca    8.5        01 Nov 2023 15:22  Mg     1.7       01 Nov 2023 08:08    TPro  5.2    /  Alb  2.9    /  TBili  0.2    /  DBili  x      /  AST  47     /  ALT  30     /  AlkPhos  65     01 Nov 2023 08:08  Amylase x     lipase x                                                                                        Urinalysis Basic - ( 01 Nov 2023 15:22 )    Color: x / Appearance: x / SG: x / pH: x  Gluc: 133 mg/dL / Ketone: x  / Bili: x / Urobili: x   Blood: x / Protein: x / Nitrite: x   Leuk Esterase: x / RBC: x / WBC x   Sq Epi: x / Non Sq Epi: x / Bacteria: x        Lactate, Blood: 3.2 mmol/L (10-31-23 @ 05:57)                                                          Culture - Blood (collected 31 Oct 2023 02:26)  Source: .Blood Blood-Peripheral  Preliminary Report (01 Nov 2023 23:01):    No growth at 24 hours    Culture - Blood (collected 31 Oct 2023 02:26)  Source: .Blood Blood-Peripheral  Preliminary Report (01 Nov 2023 23:01):    No growth at 24 hours                                                                                           MEDICATIONS  (STANDING):  acetaminophen   IVPB .. 1000 milliGRAM(s) IV Intermittent once  albuterol/ipratropium for Nebulization 3 milliLiter(s) Nebulizer every 6 hours  albuterol/ipratropium for Nebulization. 3 milliLiter(s) Nebulizer once  allopurinol 100 milliGRAM(s) Oral daily  aspirin  chewable 81 milliGRAM(s) Oral daily  carvedilol 25 milliGRAM(s) Oral every 12 hours  cefepime   IVPB 1000 milliGRAM(s) IV Intermittent every 24 hours  chlorhexidine 2% Cloths 1 Application(s) Topical <User Schedule>  citalopram 20 milliGRAM(s) Oral daily  clopidogrel Tablet 75 milliGRAM(s) Oral daily  dextrose 5% + lactated ringers. 1000 milliLiter(s) (50 mL/Hr) IV Continuous <Continuous>  dextrose 50% Injectable 50 milliLiter(s) IV Push every 15 minutes  gabapentin 300 milliGRAM(s) Oral two times a day  heparin   Injectable 5000 Unit(s) SubCutaneous every 8 hours  insulin regular Infusion 1 Unit(s)/Hr (1 mL/Hr) IV Continuous <Continuous>  pantoprazole  Injectable 40 milliGRAM(s) IV Push daily  sodium bicarbonate 650 milliGRAM(s) Oral every 8 hours    MEDICATIONS  (PRN):  acetaminophen  Suppository .. 650 milliGRAM(s) Rectal every 4 hours PRN Temp greater or equal to 38C (100.4F)  albuterol    90 MICROgram(s) HFA Inhaler 2 Puff(s) Inhalation every 4 hours PRN Shortness of Breath and/or Wheezing  clonazePAM  Tablet 2 milliGRAM(s) Oral at bedtime PRN axniety          Xrays:  TLC:  OG:  ET tube:                                                                                       ECHO:  [facility] ICU:

## 2023-11-02 NOTE — CHART NOTE - NSCHARTNOTEFT_GEN_A_CORE
Evening lab work reviewed.   Presence of repeat elevated anion gap to 20.     Patient previously with DKA.     Will restart insulin infusion.   Concern for Hypervolemia.   CXR this AM shows pulmonary edema + patient has required Bipap for the past 24 hours.   Started on Bumex Pushes today with improvement in urine output.     Will restart Insulin infusion D50 PRN for hypoglycemia.   Q1 hr FS DKA protocol     Case Discussed with Intensivist on Duty     Bert STORY

## 2023-11-02 NOTE — PROGRESS NOTE ADULT - ASSESSMENT
IMPRESSION:  DKA   alter mental status   sepsis fever   PARKER   metabolc acidosis     PLAN:    CNS: neurology eval   EEG     HEENT: oral care     PULMONARY: keep pox>92%   NIV alternate with high flow at night and as needed   cxr today     CARDIOVASCULAR: CE   echo    fluid to 50cc D5lr while npo    hold nifedipine   GI: speech and swallow eval if failed NG feed   GI prophylaxis.        RENAL:   follow renal and mornign BMP ??need for HD   renal and bladder US reviewed   urine lytes urea creatinine  renal follow up   follow bun, cr   po bicarb 650 TID  serial bmp     INFECTIOUS DISEASE:    cefepime and vanco   bld cx   nasal mrsa   ID eval       HEMATOLOGICAL:  DVT prophylaxis.     ENDOCRINE:  Follow up FS.  Insulin  drip for now switch to lantus when start feed   follow AG , FS   endo eval   AG elevated because of DKA and renal failure     MUSCULOSKELETAL: ambulate as tolerated    FULL CODE  Line: PIV  Dispo MICU  palliative care eval  IMPRESSION:  DKA   alter mental status   sepsis fever   PARKER   metabolc acidosis     PLAN:    CNS: neurology eval   EEG     HEENT: oral care     PULMONARY: keep pox>92%   NIV alternate with high flow at night and as needed   cxr today     CARDIOVASCULAR: CE   echo    fluid to 50cc D5lr while npo    hold nifedipine   GI: speech and swallow eval if failed NG feed   GI prophylaxis.        RENAL:   follow renal and mornign BMP ??need for HD   renal and bladder US reviewed   urine lytes urea creatinine  renal follow up   follow bun, cr   po bicarb 650 TID  serial bmp     INFECTIOUS DISEASE:    cefepime d/c  vanco   bld cx   nasal mrsa   ID eval       HEMATOLOGICAL:  DVT prophylaxis.     ENDOCRINE:  Follow up FS.  Insulin  drip for now switch to lantus when start feed   follow AG , FS   endo eval   AG elevated because of DKA and renal failure     MUSCULOSKELETAL: ambulate as tolerated    FULL CODE  Line: PIV  Dispo MICU  palliative care eval

## 2023-11-02 NOTE — PROGRESS NOTE ADULT - ASSESSMENT
89 yo male PMHx sig for DM, CAD-bypass, CHF, HTN, HLD presents w sob x few days, no cough fever or chills.  pt placed on NRBM by EMS and subsequently on BiPAP in ED, O2 sat now 99%.  Pt found to have elevated glucose w high AG and b-hydroxybutarate c/w     DKA / sepsis / acute respiratory failure     - IV insulin -> sq insulin   - IV antibiotics   - bumex   - DVT prophylaxis

## 2023-11-02 NOTE — EEG REPORT - NS EEG TEXT BOX
[place] Department of Neurology  Inpatient Routine-EEG Report      Patient Name:	AMANDA CASTORENA    :	1931  MRN:	[id number]  Study Date/Time:	2023, 1:30:51 PM  Referred by:	-    Brief Clinical History:  AMANDA CASTORENA is a 89 year old Male; study performed to investigate for seizures or markers of epilepsy.   Diagnosis Code: R40.4 Transient alteration of awareness    Patient Medication:  KLONOPIN    PLAVIX    INSULIN    ALBUTEROL    VELEXA    COREG    BOMEX    ZYLOPRIM      Acquisition Details:  Electroencephalography was acquired using a minimum of 21 channels on an [employer] system v 9.3.1 with electrode placement according to the standard International 10-20 system following ACNS (American Clinical Neurophysiology Society) guidelines.  Anterior temporal T1 and T2 electrodes were utilized whenever possible.   The [employer] automated spike & seizure detections were all reviewed in detail, in addition to the entire raw EEG.    Findings:  Background:  continuous.   Voltage:  Normal (20uV)  Organization:  Appropriate anterior-posterior gradient  Posterior Dominant Rhythm:  7-8 Hz symmetric, well-organized, and well-modulated  Variability:  Yes	Reactivity:  Yes  Sleep:  Absent.  Focal abnormalities:  No persistent asymmetries of voltage or frequency.  Interictal Activity:  None  Focal Slowing:  Left Frontocentral  Generalized Slowing:  No  Events:  1)	No electrographic seizures or significant clinical events.  Provocations:  1)	Hyperventilation: was not performed.  2)	Photic stimulation: was not performed.  Impression:  Abnormal due to focal slowing as above    Clinical Correlation:  Focal electrocerebral dysfunction secondary to nonspecific etiology    Cortez Tapia MD  Attending Neurologist, Division of Epilepsy

## 2023-11-02 NOTE — PROGRESS NOTE ADULT - SUBJECTIVE AND OBJECTIVE BOX
Patient seen and evaluated this am, on NIPPV      T(F): 97.9 (11-02-23 @ 15:01), Max: 101.1 (11-01-23 @ 19:02)  HR: 108 (11-02-23 @ 18:00)  BP: 137/64 (11-02-23 @ 18:00)  RR: 22  SpO2: 98% (11-02-23 @ 18:00) (95% - 99%)    PHYSICAL EXAM:  GENERAL: NAD  HEAD:  Atraumatic, Normocephalic  EYES: EOMI, PERRLA, conjunctiva and sclera clear  NERVOUS SYSTEM:   no focal deficits   CHEST/LUNG:  bilateral rhonchi  HEART: Regular rate and rhythm; No murmurs, rubs, or gallops  ABDOMEN: Soft, Nontender, Nondistended; Bowel sounds present  EXTREMITIES:  2+ Peripheral Pulses, No clubbing, cyanosis, or edema    LABS  11-02    140  |  108  |  104<HH>  ----------------------------<  185<H>  4.7   |  17  |  4.1<HH>    Ca    8.1<L>      02 Nov 2023 15:13  Phos  4.3     11-02  Mg     2.3     11-02    TPro  5.8<L>  /  Alb  3.0<L>  /  TBili  0.2  /  DBili  x   /  AST  41  /  ALT  33  /  AlkPhos  80  11-02                          8.5    10.14 )-----------( 190      ( 02 Nov 2023 06:21 )             24.9       CARDIAC ENZYMES    Troponin T, Serum: 0.24 ng/mL (10-30-23 @ 19:20)    < from: 12 Lead ECG (10.31.23 @ 15:28) >  Diagnosis Line Sinus rhythm with marked sinus arrhythmia with occasional ventricular-paced  complexes  Right bundle branch block  Abnormal ECG    < end of copied text >  < from: TTE Echo Complete w/o Contrast w/ Doppler (10.31.23 @ 09:39) >  Summary:   1. Patient with significant PVCs during study.   2. Normal global left ventricular systolic function.   3. LV Ejection Fraction by Adkins's Method with a biplane EF of 61 %.   4. The left ventricular diastolic function could not be assessed in this   study.   5. Normal right ventricular size and function.   6. Normal left atrial size.   7. Normal right atrial size.   8. Mild aortic valve stenosis.   9. Moderate to severe mitral annular calcification.  10. Moderate thickening and calcification of the anterior and posterior   mitral valve leaflets.  11. Mild mitral stenosis.  12. No evidence of mitral valve regurgitation.  13. Estimated pulmonary artery systolic pressure is 67.1 mmHg assuming a   right atrial pressure of 15 mmHg, which is consistent with severe   pulmonary hypertension.  14. There is no evidence of pericardial effusion.      < end of copied text >    Culture Results:   No growth at 24 hours (10-31-23)  Culture Results:   No growth at 24 hours (10-31-23)    RADIOLOGY  < from: Xray Chest 1 View-PORTABLE IMMEDIATE (Xray Chest 1 View-PORTABLE IMMEDIATE .) (11.02.23 @ 08:17) >  Impression:    Slight increased bibasilar opacities, pleural effusions. No pneumothorax.    < end of copied text >    MEDICATIONS  (STANDING):  acetaminophen   IVPB .. 1000 milliGRAM(s) IV Intermittent once  albuterol/ipratropium for Nebulization 3 milliLiter(s) Nebulizer every 6 hours  albuterol/ipratropium for Nebulization. 3 milliLiter(s) Nebulizer once  allopurinol 100 milliGRAM(s) Oral daily  aspirin  chewable 81 milliGRAM(s) Oral daily  buMETAnide Injectable 2 milliGRAM(s) IV Push <User Schedule>  carvedilol 25 milliGRAM(s) Oral every 12 hours  cefepime   IVPB 1000 milliGRAM(s) IV Intermittent every 24 hours  chlorhexidine 2% Cloths 1 Application(s) Topical <User Schedule>  citalopram 20 milliGRAM(s) Oral daily  clopidogrel Tablet 75 milliGRAM(s) Oral daily  gabapentin 300 milliGRAM(s) Oral two times a day  heparin   Injectable 5000 Unit(s) SubCutaneous every 8 hours  insulin glargine Injectable (LANTUS) 10 Unit(s) SubCutaneous at bedtime  insulin lispro (ADMELOG) corrective regimen sliding scale   SubCutaneous three times a day before meals  pantoprazole  Injectable 40 milliGRAM(s) IV Push daily  sodium bicarbonate 650 milliGRAM(s) Oral every 8 hours    MEDICATIONS  (PRN):  acetaminophen  Suppository .. 650 milliGRAM(s) Rectal every 4 hours PRN Temp greater or equal to 38C (100.4F)  albuterol    90 MICROgram(s) HFA Inhaler 2 Puff(s) Inhalation every 4 hours PRN Shortness of Breath and/or Wheezing  clonazePAM  Tablet 2 milliGRAM(s) Oral at bedtime PRN axniety

## 2023-11-02 NOTE — PROGRESS NOTE ADULT - ASSESSMENT
89 y/o male  with a past medical HX of bypass, CAD, CHF , HTN, dyslipidemia, DM , anxiety   brought in for SOB, DKA.     IMPRESSION  #Metabolic encephalopathy   #DKA  #SOB- Volume overload?   #PARKER over CKD  #Sepsis   #Lactic acidosis  #Obesity BMI (kg/m2): 29.7  #DM   #HTN, CHF, Pacemaker   RECOMMENDATIONS  -So far exam is not revealing for any source of infection.   -Would dose Vancomycin IV by levels.   -Change cefepime to 1000 mg Q 24 hours for now.   -Monitor Cr and LFTs.   -Aspiration precautions. Off loading measures to avoid pressure injuries.     If any questions, please send a message or call on [employer] Teams  Please continue to update ID with any pertinent new laboratory or radiographic findings.    Freedom Chen M.D  Infectious Diseases Attending  [facility]    89 y/o male  with a past medical HX of bypass, CAD, CHF , HTN, dyslipidemia, DM , anxiety   brought in for SOB, DKA.     IMPRESSION  #Metabolic encephalopathy   #DKA  #SOB- Volume overload vs Pneumonia  #PARKER over CKD  #Sepsis   #Lactic acidosis  #Obesity BMI (kg/m2): 29.7  #DM   #HTN, CHF, Pacemaker   #MRSA nares negative    RECOMMENDATIONS  -So far exam remains un revealing for any source of infection.   -For now okay to continue with cefepime 1 gram q 24 hours.  -Will try to obtain cefepime levels to ensure, not causing cefepime toxicitiy.   -Monitor Cr and LFTs.   -Aspiration precautions. Off loading measures to avoid pressure injuries.     If any questions, please send a message or call on [employer] Teams  Please continue to update ID with any pertinent new laboratory or radiographic findings.    Freedom Chen M.D  Infectious Diseases Attending  [facility]

## 2023-11-02 NOTE — PROGRESS NOTE ADULT - ASSESSMENT
# Severe PARKER likely due to sepsis. Pt has Screat up to ~ 2.0 as far back as June 2021  # Hyperkalemia, given IV CaGluc and Lokelma, plus Bumex IV resolved  # Urine chemistries c/w pre-renal state / CRS w/ FeNa 0.3%, FeUrea 14%  # Metabolic acidosis d/t renal failure  # DKA resolved    Recommendations:  - d/c standing iv fluids  - start bumex 2mg iv q8h / [place], strict i/o   - today, no absolute indication for RRT, although volume status is concerning; monitor urine output with diuretic  - continue PO NaHCO3  - d/w daughter at bedside;  according to yesterday note "discussed w/ pt and grandson possible need for RRT, so they are aware"

## 2023-11-02 NOTE — PROGRESS NOTE ADULT - SUBJECTIVE AND OBJECTIVE BOX
KELLAMANDA  89y, Male    LOS  3d    INTERVAL EVENTS/HPI  - No acute events overnight  - T(F): , Max: 101.1 (11-01-23 @ 19:02)  - Denies any worsening symptoms  - Tolerating medication  - WBC Count: 10.14 (11-02-23 @ 06:21)  WBC Count: 9.52 (11-01-23 @ 08:08)  - Creatinine: 4.1 (11-02-23 @ 11:23)  Creatinine: 4.2 (11-02-23 @ 06:21)     REVIEW OF SYSTEMS:  CONSTITUTIONAL: No fever or chills  HEENT: No sore throat  RESPIRATORY: No cough, no shortness of breath  CARDIOVASCULAR: No chest pain or palpitations  GASTROINTESTINAL: No abdominal or epigastric pain  GENITOURINARY: No dysuria  NEUROLOGICAL: No headache/dizziness  MSK: No joint pain, erythema, or swelling; no back pain  SKIN: No itching, rashes  All other ROS negative except noted above    Prior hospital charts reviewed [Yes]  Primary team notes reviewed [Yes]  Other consultant notes reviewed [Yes]    ANTIMICROBIALS:   cefepime   IVPB 1000 every 24 hours    OTHER MEDS: MEDICATIONS  (STANDING):  acetaminophen   IVPB .. 1000 once  acetaminophen  Suppository .. 650 every 4 hours PRN  albuterol    90 MICROgram(s) HFA Inhaler 2 every 4 hours PRN  albuterol/ipratropium for Nebulization 3 every 6 hours  albuterol/ipratropium for Nebulization. 3 once  allopurinol 100 daily  aspirin  chewable 81 daily  buMETAnide Injectable 2 every 8 hours  carvedilol 25 every 12 hours  citalopram 20 daily  clonazePAM  Tablet 2 at bedtime PRN  clopidogrel Tablet 75 daily  dextrose 50% Injectable 50 every 15 minutes  gabapentin 300 two times a day  heparin   Injectable 5000 every 8 hours  insulin regular Infusion 3 <Continuous>  pantoprazole  Injectable 40 daily      Vital Signs Last 24 Hrs  T(F): 97.3 (11-02-23 @ 11:00), Max: 103.5 (10-31-23 @ 03:00)    Vital Signs Last 24 Hrs  HR: 107 (11-02-23 @ 12:00) (81 - 107)  BP: 142/63 (11-02-23 @ 12:00) (108/53 - 142/63)  RR: 29 (11-02-23 @ 12:00)  SpO2: 96% (11-02-23 @ 12:00) (92% - 100%)  Wt(kg): --    EXAM:  GENERAL: NAD, on Bipap   EYES: Conjunctiva pink and cornea white  ENT: Normal external ears and nose  NECK: Supple, nontender to palpation  CHEST/LUNG: Shallow breath sounds.   HEART: S1 S2  ABDOMEN: Soft, nontender.   EXTREMITIES: No clubbing, cyanosis, or petal edema  NERVOUS SYSTEM: Alert and oriented to person  MSK: No joint erythema, swelling or pain  SKIN: No rashes or lesions, no superficial thrombophlebitis    Labs:                        8.5    10.14 )-----------( 190      ( 02 Nov 2023 06:21 )             24.9     11-02    140  |  108  |  105<HH>  ----------------------------<  187<H>  4.9   |  17  |  4.1<HH>    Ca    8.2<L>      02 Nov 2023 11:23  Phos  4.3     11-02  Mg     2.3     11-02    TPro  5.8<L>  /  Alb  3.0<L>  /  TBili  0.2  /  DBili  x   /  AST  41  /  ALT  33  /  AlkPhos  80  11-02      WBC Trend:  WBC Count: 10.14 (11-02-23 @ 06:21)  WBC Count: 9.52 (11-01-23 @ 08:08)  WBC Count: 13.02 (10-31-23 @ 05:57)  WBC Count: 12.75 (10-30-23 @ 19:20)      Creatine Trend:  Creatinine: 4.1 (11-02)  Creatinine: 4.2 (11-02)  Creatinine: 3.9 (11-01)  Creatinine: 3.9 (11-01)      Liver Biochemical Testing Trend:  Alanine Aminotransferase (ALT/SGPT): 33 (11-02)  Alanine Aminotransferase (ALT/SGPT): 30 (11-01)  Alanine Aminotransferase (ALT/SGPT): 26 (10-31)  Alanine Aminotransferase (ALT/SGPT): 25 (10-31)  Alanine Aminotransferase (ALT/SGPT): 23 (10-30)  Aspartate Aminotransferase (AST/SGOT): 41 (11-02-23 @ 06:21)  Aspartate Aminotransferase (AST/SGOT): 47 (11-01-23 @ 08:08)  Aspartate Aminotransferase (AST/SGOT): 46 (10-31-23 @ 11:00)  Aspartate Aminotransferase (AST/SGOT): 46 (10-31-23 @ 05:57)  Aspartate Aminotransferase (AST/SGOT): 36 (10-30-23 @ 19:20)  Bilirubin Total: 0.2 (11-02)  Bilirubin Total: 0.2 (11-01)  Bilirubin Total: 0.3 (10-31)  Bilirubin Total: 0.5 (10-31)  Bilirubin Total: 0.4 (10-30)      Trend LDH      Urinalysis Basic - ( 02 Nov 2023 11:23 )    Color: x / Appearance: x / SG: x / pH: x  Gluc: 187 mg/dL / Ketone: x  / Bili: x / Urobili: x   Blood: x / Protein: x / Nitrite: x   Leuk Esterase: x / RBC: x / WBC x   Sq Epi: x / Non Sq Epi: x / Bacteria: x        MICROBIOLOGY:  Vancomycin Level, Random: 11.5 (11-01 @ 08:08)    Male    Culture - Blood (collected 31 Oct 2023 02:26)  Source: .Blood Blood-Peripheral  Preliminary Report:    No growth at 24 hours    Culture - Blood (collected 31 Oct 2023 02:26)  Source: .Blood Blood-Peripheral  Preliminary Report:    No growth at 24 hours        Blood Gas Arterial, Lactate: 1.60 (10-31 @ 06:48)  Lactate, Blood: 3.2 (10-31 @ 05:57)  Blood Gas Venous - Lactate: 3.20 (10-30 @ 19:37)        RADIOLOGY & ADDITIONAL TESTS:  I have personally reviewed the relevant images.   CXR  Xray Chest 1 View- PORTABLE-Urgent:   ACC: [id number] EXAM:  XR CHEST PORTABLE URGENT 1V   ORDERED BY: NAGI CONNELL     PROCEDURE DATE:  10/31/2023          INTERPRETATION:  Clinical History / Reason for exam: Respiratory distress    Comparison : Chest radiograph 10/30/2023.    Technique/Positioning: Total chest radiograph.    Findings:    Support devices: Left chest pacemaker    Cardiac/mediastinum/hilum: Unchanged.    Lung parenchyma/Pleura: Stable pulmonary vascular prominence. No   pneumothorax. Elevation of the left hemidiaphragm    Skeleton/soft tissues: Unchanged.    Impression:    Decrease in pulmonary vascular prominence.        --- End of Report ---            NASSIER HARFOUCH MD; Attending Radiologist  This document has been electronically signed. Oct 31 2023  1:16PM (10-31-23 @ 07:21)      CT      < from: Xray Chest 1 View-PORTABLE IMMEDIATE (Xray Chest 1 View-PORTABLE IMMEDIATE .) (11.02.23 @ 08:17) >    Impression:    Slight increased bibasilar opacities, pleural effusions. No pneumothorax.      < end of copied text >  < from: TTE Echo Complete w/o Contrast w/ Doppler (10.31.23 @ 09:39) >  Summary:   1. Patient with significant PVCs during study.   2. Normal global left ventricular systolic function.   3. LV Ejection Fraction by Adkins's Method with a biplane EF of 61 %.   4. The left ventricular diastolic function could not be assessed in this   study.   5. Normal right ventricular size and function.   6. Normal left atrial size.   7. Normal right atrial size.   8. Mild aortic valve stenosis.   9. Moderate to severe mitral annular calcification.  10. Moderate thickening and calcification of the anterior and posterior   mitral valve leaflets.  11. Mild mitral stenosis.  12. No evidence of mitral valve regurgitation.  13. Estimated pulmonary artery systolic pressure is 67.1 mmHg assuming a   right atrial pressure of 15 mmHg, which is consistent with severe   pulmonary hypertension.  14. There is no evidence of pericardial effusion.    < end of copied text >    WEIGHT  Weight (kg): 88.5 (10-30-23 @ 18:39)  Creatinine: 4.1 mg/dL (11-02-23 @ 11:23)  Creatinine: 4.2 mg/dL (11-02-23 @ 06:21)  Creatinine: 3.9 mg/dL (11-01-23 @ 15:22)      All available historical records have been reviewed

## 2023-11-02 NOTE — PROGRESS NOTE ADULT - SUBJECTIVE AND OBJECTIVE BOX
Nephrology Progress Note    AMANDA CASTORENA  MRN-[id number]  89y  Male    S:  Patient is seen and examined, events over the last 24h noted.    O:  Allergies:  No Known Allergies    Hospital Medications:   MEDICATIONS  (STANDING):  acetaminophen   IVPB .. 1000 milliGRAM(s) IV Intermittent once  albuterol/ipratropium for Nebulization 3 milliLiter(s) Nebulizer every 6 hours  albuterol/ipratropium for Nebulization. 3 milliLiter(s) Nebulizer once  allopurinol 100 milliGRAM(s) Oral daily  aspirin  chewable 81 milliGRAM(s) Oral daily  carvedilol 25 milliGRAM(s) Oral every 12 hours  cefepime   IVPB 1000 milliGRAM(s) IV Intermittent every 24 hours  chlorhexidine 2% Cloths 1 Application(s) Topical <User Schedule>  citalopram 20 milliGRAM(s) Oral daily  clopidogrel Tablet 75 milliGRAM(s) Oral daily  dextrose 5% + lactated ringers. 1000 milliLiter(s) (50 mL/Hr) IV Continuous <Continuous>  dextrose 50% Injectable 50 milliLiter(s) IV Push every 15 minutes  gabapentin 300 milliGRAM(s) Oral two times a day  heparin   Injectable 5000 Unit(s) SubCutaneous every 8 hours  insulin regular Infusion 4 Unit(s)/Hr (4 mL/Hr) IV Continuous <Continuous>  pantoprazole  Injectable 40 milliGRAM(s) IV Push daily  sodium bicarbonate 650 milliGRAM(s) Oral every 8 hours    MEDICATIONS  (PRN):  acetaminophen  Suppository .. 650 milliGRAM(s) Rectal every 4 hours PRN Temp greater or equal to 38C (100.4F)  albuterol    90 MICROgram(s) HFA Inhaler 2 Puff(s) Inhalation every 4 hours PRN Shortness of Breath and/or Wheezing  clonazePAM  Tablet 2 milliGRAM(s) Oral at bedtime PRN axniety    Home Medications:  allopurinol 100 mg oral tablet: 1 tab(s) orally once a day (25 Oct 2021 22:30)  citalopram 20 mg oral tablet: 1 tab(s) orally once a day (25 Oct 2021 22:30)  clonazePAM 1 mg oral tablet: 2 tab(s) orally once a day (at bedtime), As Needed (25 Oct 2021 22:30)  gabapentin 300 mg oral capsule: 1 cap(s) orally 2 times a day (25 Oct 2021 22:30)  glimepiride 4 mg oral tablet: 1 tab(s) orally 2 times a day (25 Oct 2021 22:30)  Lantus 100 units/mL subcutaneous solution: 30 unit(s) subcutaneous once a day (at bedtime) (25 Oct 2021 22:30)  NIFEdipine 60 mg oral tablet, extended release: 1 tab(s) orally once a day (25 Oct 2021 22:30)      VITALS:  Daily     Daily Weight in k.5 (2023 07:05)  T(F): 98.2 (23 @ 07:05), Max: 101.1 (23 @ 19:02)  HR: 105 (23 @ 09:00)  BP: 112/57 (23 @ 09:00)  RR: 33 (23 @ 09:00)  SpO2: 97% (23 @ 09:00)  Wt(kg): --  I&O's Detail    2023 07:01  -  2023 07:00  --------------------------------------------------------  IN:    dextrose 5% + lactated ringers: 1050 mL    dextrose 5% + sodium chloride 0.9% w/ Additives: 100 mL    Insulin: 8 mL    Insulin: 23 mL  Total IN: 1181 mL    OUT:    Incontinent per Collection Bag (mL): 602 mL    Voided (mL): 50 mL  Total OUT: 652 mL    Total NET: 529 mL      2023 07:01  -  2023 09:29  --------------------------------------------------------  IN:    dextrose 5% + lactated ringers: 50 mL    Insulin: 2 mL  Total IN: 52 mL    OUT:  Total OUT: 0 mL    Total NET: 52 mL        I&O's Summary    2023 07: 07:00  --------------------------------------------------------  IN: 1181 mL / OUT: 652 mL / NET: 529 mL    2023 07:01  -  2023 09:29  --------------------------------------------------------  IN: 52 mL / OUT: 0 mL / NET: 52 mL          PHYSICAL EXAM:  Gen: NAD  Chest: b/l breath sounds  Abd: soft  Extremities: no edema      LABS:        137  |  105  |  103<HH>  ----------------------------<  269<H>  4.9   |  15<L>  |  4.2<HH>    Ca    8.6      2023 06:21  Phos  4.3       Mg     2.3         TPro  5.8<L>  /  Alb  3.0<L>  /  TBili  0.2  /  DBili      /  AST  41  /  ALT  33  /  AlkPhos  80      Phosphorus: 4.3 mg/dL (23 @ 06:21)  Phosphorus: 3.1 mg/dL (10-31-23 @ 05:57)                            8.5    10.14 )-----------( 190      ( 2023 06:21 )             24.9     Mean Cell Volume: 85.0 fL (23 @ 06:21)    Iron Total, Serum: 59 ug/dL (10-26-21 @ 04:30)  % Saturation, Iron: 23 % (10-26-21 @ 04:30)        Urine Studies:  Protein, Urine: 300 mg/dL (10-31-23 @ 13:24)  White Blood Cell - Urine: 2 /HPF (10-31-23 @ 13:24)  Red Blood Cell - Urine: 18 /HPF (10-31-23 @ 13:24)  Granular Cast: Present (10-31-23 @ 13:24)  Protein, Urine: 300 mg/dL (10-31-23 @ 07:40)  White Blood Cell - Urine: 0 /HPF (10-31-23 @ 07:40)  Red Blood Cell - Urine: 20 /HPF (10-31-23 @ 07:40)  Protein, Urine: Trace (10-27-21 @ 11:00)  Protein, Urine: Trace (10-25-21 @ 18:49)  Protein, Urine: 100 mg/dL (04-15-19 @ 14:09)  White Blood Cell - Urine: Negative (04-15-19 @ 14:09)  Red Blood Cell - Urine: 3-5 /HPF (04-15-19 @ 14:09)      Urea Nitrogen,  Random Urine: 573 mg/dL (10-31-23 @ 13:24)    Sodium, Random Urine: <20.0 mmoL/L (10-31 @ 13:24)  Creatinine, Random Urine: 166 mg/dL (10-31 @ 13:24)    Creatinine trend:  Creatinine: 4.2 mg/dL (23 @ 06:21)  Creatinine: 3.9 mg/dL (23 @ 15:22)  Creatinine: 3.9 mg/dL (23 @ 11:24)  Creatinine: 3.5 mg/dL (23 @ 08:08)  Creatinine: 3.7 mg/dL (23 @ 01:33)  Creatinine: 3.8 mg/dL (10-31-23 @ 18:45)  Creatinine: 3.7 mg/dL (10-31-23 @ 15:20)  Creatinine: 4.0 mg/dL (10-31-23 @ 11:00)  Creatinine: 3.9 mg/dL (10-31-23 @ 05:57)  Creatinine: 3.7 mg/dL (10-31-23 @ 01:37)  Creatinine: 3.7 mg/dL (10-30-23 @ 19:20)  Creatinine, Serum: 2.1 mg/dL (10-27-22 @ 07:31)    US Kidney and Bladder:   ACC: [id number] EXAM:  US KIDNEYS AND BLADDER   ORDERED BY: JOSEFA SARAVIA     PROCEDURE DATE:  10/31/2023          INTERPRETATION:  CLINICAL INFORMATION: Worsening renal function.    COMPARISON: 2021 ultrasound.    TECHNIQUE: Sonography of the kidneys and bladder.    FINDINGS:    Right kidney: 12.0 cm. Echogenic in appearance without hydronephrosis or   nephrolithiasis.    Left kidney:  11.6 cm. Echogenic in appearance without hydronephrosis or   nephrolithiasis.    Urinary bladder: Patient has a condom catheter.    IMPRESSION:    Echogenic kidneys consistent with medical renal disease. No   hydronephrosis or nephrolithiasis.        --- End of Report ---            JOSÉ GREENBERG MD; Attending Interventional Radiologist  This document has been electronically signed. Oct 31 2023 11:41AM (10-31-23 @ 11:33)     Nephrology Progress Note    AMANDA CASTORENA  MRN-[id number]  89y  Male    S:  Patient is seen and examined, events over the last 24h noted.    O:  Allergies:  No Known Allergies    Hospital Medications:   MEDICATIONS  (STANDING):  acetaminophen   IVPB .. 1000 milliGRAM(s) IV Intermittent once  albuterol/ipratropium for Nebulization 3 milliLiter(s) Nebulizer every 6 hours  albuterol/ipratropium for Nebulization. 3 milliLiter(s) Nebulizer once  allopurinol 100 milliGRAM(s) Oral daily  aspirin  chewable 81 milliGRAM(s) Oral daily  carvedilol 25 milliGRAM(s) Oral every 12 hours  cefepime   IVPB 1000 milliGRAM(s) IV Intermittent every 24 hours  chlorhexidine 2% Cloths 1 Application(s) Topical <User Schedule>  citalopram 20 milliGRAM(s) Oral daily  clopidogrel Tablet 75 milliGRAM(s) Oral daily  dextrose 5% + lactated ringers. 1000 milliLiter(s) (50 mL/Hr) IV Continuous <Continuous>  dextrose 50% Injectable 50 milliLiter(s) IV Push every 15 minutes  gabapentin 300 milliGRAM(s) Oral two times a day  heparin   Injectable 5000 Unit(s) SubCutaneous every 8 hours  insulin regular Infusion 4 Unit(s)/Hr (4 mL/Hr) IV Continuous <Continuous>  pantoprazole  Injectable 40 milliGRAM(s) IV Push daily  sodium bicarbonate 650 milliGRAM(s) Oral every 8 hours    MEDICATIONS  (PRN):  acetaminophen  Suppository .. 650 milliGRAM(s) Rectal every 4 hours PRN Temp greater or equal to 38C (100.4F)  albuterol    90 MICROgram(s) HFA Inhaler 2 Puff(s) Inhalation every 4 hours PRN Shortness of Breath and/or Wheezing  clonazePAM  Tablet 2 milliGRAM(s) Oral at bedtime PRN axniety    Home Medications:  allopurinol 100 mg oral tablet: 1 tab(s) orally once a day (25 Oct 2021 22:30)  citalopram 20 mg oral tablet: 1 tab(s) orally once a day (25 Oct 2021 22:30)  clonazePAM 1 mg oral tablet: 2 tab(s) orally once a day (at bedtime), As Needed (25 Oct 2021 22:30)  gabapentin 300 mg oral capsule: 1 cap(s) orally 2 times a day (25 Oct 2021 22:30)  glimepiride 4 mg oral tablet: 1 tab(s) orally 2 times a day (25 Oct 2021 22:30)  Lantus 100 units/mL subcutaneous solution: 30 unit(s) subcutaneous once a day (at bedtime) (25 Oct 2021 22:30)  NIFEdipine 60 mg oral tablet, extended release: 1 tab(s) orally once a day (25 Oct 2021 22:30)      VITALS:  Daily Weight in k.5 (2023 07:05)  T(F): 98.2 (23 @ 07:05), Max: 101.1 (23 @ 19:02)  HR: 105 (23 @ 09:00)  BP: 112/57 (23 @ 09:00)  RR: 33 (23 @ 09:00)  SpO2: 97% (23 @ 09:00)  Wt(kg): --  I&O's Detail    2023 07:01  -  2023 07:00  --------------------------------------------------------  IN:    dextrose 5% + lactated ringers: 1050 mL    dextrose 5% + sodium chloride 0.9% w/ Additives: 100 mL    Insulin: 8 mL    Insulin: 23 mL  Total IN: 1181 mL    OUT:    Incontinent per Collection Bag (mL): 602 mL    Voided (mL): 50 mL  Total OUT: 652 mL    Total NET: 529 mL      2023 07:01  -  2023 09:29  --------------------------------------------------------  IN:    dextrose 5% + lactated ringers: 50 mL    Insulin: 2 mL  Total IN: 52 mL    OUT:  Total OUT: 0 mL    Total NET: 52 mL        I&O's Summary    2023 07:01  -  2023 07:00  --------------------------------------------------------  IN: 1181 mL / OUT: 652 mL / NET: 529 mL    2023 07:01  -  2023 09:29  --------------------------------------------------------  IN: 52 mL / OUT: 0 mL / NET: 52 mL          PHYSICAL EXAM:  Gen: NAD on NIV  Chest: b/l breath sounds  Abd: distended  Extremities: no edema      LABS:        137  |  105  |  103<HH>  ----------------------------<  269<H>  4.9   |  15<L>  |  4.2<HH>    Ca    8.6      2023 06:21  Phos  4.3       Mg     2.3         TPro  5.8<L>  /  Alb  3.0<L>  /  TBili  0.2  /  DBili      /  AST  41  /  ALT  33  /  AlkPhos  80      Phosphorus: 4.3 mg/dL (23 @ 06:21)  Phosphorus: 3.1 mg/dL (10-31-23 @ 05:57)                            8.5    10.14 )-----------( 190      ( 2023 06:21 )             24.9     Mean Cell Volume: 85.0 fL (23 @ 06:21)    Iron Total, Serum: 59 ug/dL (10-26-21 @ 04:30)  % Saturation, Iron: 23 % (10-26-21 @ 04:30)        Urine Studies:  Protein, Urine: 300 mg/dL (10-31-23 @ 13:24)  White Blood Cell - Urine: 2 /HPF (10-31-23 @ 13:24)  Red Blood Cell - Urine: 18 /HPF (10-31-23 @ 13:24)  Granular Cast: Present (10-31-23 @ 13:24)  Protein, Urine: 300 mg/dL (10-31-23 @ 07:40)  White Blood Cell - Urine: 0 /HPF (10-31-23 @ 07:40)  Red Blood Cell - Urine: 20 /HPF (10-31-23 @ 07:40)      Urea Nitrogen,  Random Urine: 573 mg/dL (10-31-23 @ 13:24)    Sodium, Random Urine: <20.0 mmoL/L (10-31 @ :24)  Creatinine, Random Urine: 166 mg/dL (10-31 @ 13:24)    Creatinine trend:  Creatinine: 4.2 mg/dL (23 @ 06:21)  Creatinine: 3.9 mg/dL (23 @ 15:22)  Creatinine: 3.9 mg/dL (23 @ 11:24)  Creatinine: 3.5 mg/dL (23 @ 08:08)  Creatinine: 3.7 mg/dL (23 @ 01:33)

## 2023-11-03 LAB
A1C WITH ESTIMATED AVERAGE GLUCOSE RESULT: 9.1 % — HIGH (ref 4–5.6)
A1C WITH ESTIMATED AVERAGE GLUCOSE RESULT: 9.1 % — HIGH (ref 4–5.6)
ALBUMIN SERPL ELPH-MCNC: 2.7 G/DL — LOW (ref 3.5–5.2)
ALBUMIN SERPL ELPH-MCNC: 2.7 G/DL — LOW (ref 3.5–5.2)
ALP SERPL-CCNC: 94 U/L — SIGNIFICANT CHANGE UP (ref 30–115)
ALP SERPL-CCNC: 94 U/L — SIGNIFICANT CHANGE UP (ref 30–115)
ALT FLD-CCNC: 32 U/L — SIGNIFICANT CHANGE UP (ref 0–41)
ALT FLD-CCNC: 32 U/L — SIGNIFICANT CHANGE UP (ref 0–41)
ANION GAP SERPL CALC-SCNC: 14 MMOL/L — SIGNIFICANT CHANGE UP (ref 7–14)
ANION GAP SERPL CALC-SCNC: 14 MMOL/L — SIGNIFICANT CHANGE UP (ref 7–14)
ANION GAP SERPL CALC-SCNC: 16 MMOL/L — HIGH (ref 7–14)
ANION GAP SERPL CALC-SCNC: 20 MMOL/L — HIGH (ref 7–14)
ANION GAP SERPL CALC-SCNC: 20 MMOL/L — HIGH (ref 7–14)
APTT BLD: 31.4 SEC — SIGNIFICANT CHANGE UP (ref 27–39.2)
APTT BLD: 31.4 SEC — SIGNIFICANT CHANGE UP (ref 27–39.2)
APTT BLD: 33.3 SEC — SIGNIFICANT CHANGE UP (ref 27–39.2)
APTT BLD: 33.3 SEC — SIGNIFICANT CHANGE UP (ref 27–39.2)
AST SERPL-CCNC: 41 U/L — SIGNIFICANT CHANGE UP (ref 0–41)
AST SERPL-CCNC: 41 U/L — SIGNIFICANT CHANGE UP (ref 0–41)
BASOPHILS # BLD AUTO: 0.01 K/UL — SIGNIFICANT CHANGE UP (ref 0–0.2)
BASOPHILS # BLD AUTO: 0.01 K/UL — SIGNIFICANT CHANGE UP (ref 0–0.2)
BASOPHILS NFR BLD AUTO: 0.1 % — SIGNIFICANT CHANGE UP (ref 0–1)
BASOPHILS NFR BLD AUTO: 0.1 % — SIGNIFICANT CHANGE UP (ref 0–1)
BILIRUB SERPL-MCNC: 0.2 MG/DL — SIGNIFICANT CHANGE UP (ref 0.2–1.2)
BILIRUB SERPL-MCNC: 0.2 MG/DL — SIGNIFICANT CHANGE UP (ref 0.2–1.2)
BUN SERPL-MCNC: 121 MG/DL — CRITICAL HIGH (ref 10–20)
BUN SERPL-MCNC: 121 MG/DL — CRITICAL HIGH (ref 10–20)
BUN SERPL-MCNC: 124 MG/DL — CRITICAL HIGH (ref 10–20)
BUN SERPL-MCNC: 98 MG/DL — CRITICAL HIGH (ref 10–20)
BUN SERPL-MCNC: 98 MG/DL — CRITICAL HIGH (ref 10–20)
CALCIUM SERPL-MCNC: 8.1 MG/DL — LOW (ref 8.4–10.5)
CALCIUM SERPL-MCNC: 8.1 MG/DL — LOW (ref 8.4–10.5)
CALCIUM SERPL-MCNC: 8.3 MG/DL — LOW (ref 8.4–10.5)
CALCIUM SERPL-MCNC: 8.3 MG/DL — LOW (ref 8.4–10.5)
CALCIUM SERPL-MCNC: 8.4 MG/DL — SIGNIFICANT CHANGE UP (ref 8.4–10.5)
CALCIUM SERPL-MCNC: 8.4 MG/DL — SIGNIFICANT CHANGE UP (ref 8.4–10.5)
CALCIUM SERPL-MCNC: 8.5 MG/DL — SIGNIFICANT CHANGE UP (ref 8.4–10.5)
CALCIUM SERPL-MCNC: 8.5 MG/DL — SIGNIFICANT CHANGE UP (ref 8.4–10.5)
CHLORIDE SERPL-SCNC: 107 MMOL/L — SIGNIFICANT CHANGE UP (ref 98–110)
CHLORIDE SERPL-SCNC: 108 MMOL/L — SIGNIFICANT CHANGE UP (ref 98–110)
CHLORIDE SERPL-SCNC: 108 MMOL/L — SIGNIFICANT CHANGE UP (ref 98–110)
CHLORIDE SERPL-SCNC: 111 MMOL/L — HIGH (ref 98–110)
CHLORIDE SERPL-SCNC: 111 MMOL/L — HIGH (ref 98–110)
CO2 SERPL-SCNC: 15 MMOL/L — LOW (ref 17–32)
CO2 SERPL-SCNC: 15 MMOL/L — LOW (ref 17–32)
CO2 SERPL-SCNC: 17 MMOL/L — SIGNIFICANT CHANGE UP (ref 17–32)
CO2 SERPL-SCNC: 21 MMOL/L — SIGNIFICANT CHANGE UP (ref 17–32)
CO2 SERPL-SCNC: 21 MMOL/L — SIGNIFICANT CHANGE UP (ref 17–32)
CREAT SERPL-MCNC: 3.6 MG/DL — HIGH (ref 0.7–1.5)
CREAT SERPL-MCNC: 3.6 MG/DL — HIGH (ref 0.7–1.5)
CREAT SERPL-MCNC: 4.3 MG/DL — CRITICAL HIGH (ref 0.7–1.5)
CREAT SERPL-MCNC: 4.6 MG/DL — CRITICAL HIGH (ref 0.7–1.5)
CREAT SERPL-MCNC: 4.6 MG/DL — CRITICAL HIGH (ref 0.7–1.5)
EGFR: 11 ML/MIN/1.73M2 — LOW
EGFR: 11 ML/MIN/1.73M2 — LOW
EGFR: 12 ML/MIN/1.73M2 — LOW
EGFR: 15 ML/MIN/1.73M2 — LOW
EGFR: 15 ML/MIN/1.73M2 — LOW
EOSINOPHIL # BLD AUTO: 0 K/UL — SIGNIFICANT CHANGE UP (ref 0–0.7)
EOSINOPHIL # BLD AUTO: 0 K/UL — SIGNIFICANT CHANGE UP (ref 0–0.7)
EOSINOPHIL NFR BLD AUTO: 0 % — SIGNIFICANT CHANGE UP (ref 0–8)
EOSINOPHIL NFR BLD AUTO: 0 % — SIGNIFICANT CHANGE UP (ref 0–8)
ESTIMATED AVERAGE GLUCOSE: 214 MG/DL — HIGH (ref 68–114)
ESTIMATED AVERAGE GLUCOSE: 214 MG/DL — HIGH (ref 68–114)
GLUCOSE BLDC GLUCOMTR-MCNC: 108 MG/DL — HIGH (ref 70–99)
GLUCOSE BLDC GLUCOMTR-MCNC: 108 MG/DL — HIGH (ref 70–99)
GLUCOSE BLDC GLUCOMTR-MCNC: 119 MG/DL — HIGH (ref 70–99)
GLUCOSE BLDC GLUCOMTR-MCNC: 119 MG/DL — HIGH (ref 70–99)
GLUCOSE BLDC GLUCOMTR-MCNC: 124 MG/DL — HIGH (ref 70–99)
GLUCOSE BLDC GLUCOMTR-MCNC: 124 MG/DL — HIGH (ref 70–99)
GLUCOSE BLDC GLUCOMTR-MCNC: 144 MG/DL — HIGH (ref 70–99)
GLUCOSE BLDC GLUCOMTR-MCNC: 144 MG/DL — HIGH (ref 70–99)
GLUCOSE BLDC GLUCOMTR-MCNC: 161 MG/DL — HIGH (ref 70–99)
GLUCOSE BLDC GLUCOMTR-MCNC: 161 MG/DL — HIGH (ref 70–99)
GLUCOSE BLDC GLUCOMTR-MCNC: 191 MG/DL — HIGH (ref 70–99)
GLUCOSE BLDC GLUCOMTR-MCNC: 191 MG/DL — HIGH (ref 70–99)
GLUCOSE BLDC GLUCOMTR-MCNC: 241 MG/DL — HIGH (ref 70–99)
GLUCOSE BLDC GLUCOMTR-MCNC: 241 MG/DL — HIGH (ref 70–99)
GLUCOSE BLDC GLUCOMTR-MCNC: 253 MG/DL — HIGH (ref 70–99)
GLUCOSE BLDC GLUCOMTR-MCNC: 253 MG/DL — HIGH (ref 70–99)
GLUCOSE BLDC GLUCOMTR-MCNC: 285 MG/DL — HIGH (ref 70–99)
GLUCOSE BLDC GLUCOMTR-MCNC: 285 MG/DL — HIGH (ref 70–99)
GLUCOSE BLDC GLUCOMTR-MCNC: 307 MG/DL — HIGH (ref 70–99)
GLUCOSE BLDC GLUCOMTR-MCNC: 307 MG/DL — HIGH (ref 70–99)
GLUCOSE BLDC GLUCOMTR-MCNC: 334 MG/DL — HIGH (ref 70–99)
GLUCOSE BLDC GLUCOMTR-MCNC: 334 MG/DL — HIGH (ref 70–99)
GLUCOSE BLDC GLUCOMTR-MCNC: 335 MG/DL — HIGH (ref 70–99)
GLUCOSE BLDC GLUCOMTR-MCNC: 335 MG/DL — HIGH (ref 70–99)
GLUCOSE BLDC GLUCOMTR-MCNC: 338 MG/DL — HIGH (ref 70–99)
GLUCOSE BLDC GLUCOMTR-MCNC: 338 MG/DL — HIGH (ref 70–99)
GLUCOSE BLDC GLUCOMTR-MCNC: 356 MG/DL — HIGH (ref 70–99)
GLUCOSE BLDC GLUCOMTR-MCNC: 356 MG/DL — HIGH (ref 70–99)
GLUCOSE BLDC GLUCOMTR-MCNC: 357 MG/DL — HIGH (ref 70–99)
GLUCOSE BLDC GLUCOMTR-MCNC: 357 MG/DL — HIGH (ref 70–99)
GLUCOSE BLDC GLUCOMTR-MCNC: 366 MG/DL — HIGH (ref 70–99)
GLUCOSE BLDC GLUCOMTR-MCNC: 366 MG/DL — HIGH (ref 70–99)
GLUCOSE BLDC GLUCOMTR-MCNC: 376 MG/DL — HIGH (ref 70–99)
GLUCOSE BLDC GLUCOMTR-MCNC: 376 MG/DL — HIGH (ref 70–99)
GLUCOSE BLDC GLUCOMTR-MCNC: 85 MG/DL — SIGNIFICANT CHANGE UP (ref 70–99)
GLUCOSE BLDC GLUCOMTR-MCNC: 85 MG/DL — SIGNIFICANT CHANGE UP (ref 70–99)
GLUCOSE BLDC GLUCOMTR-MCNC: 94 MG/DL — SIGNIFICANT CHANGE UP (ref 70–99)
GLUCOSE BLDC GLUCOMTR-MCNC: 94 MG/DL — SIGNIFICANT CHANGE UP (ref 70–99)
GLUCOSE SERPL-MCNC: 113 MG/DL — HIGH (ref 70–99)
GLUCOSE SERPL-MCNC: 113 MG/DL — HIGH (ref 70–99)
GLUCOSE SERPL-MCNC: 138 MG/DL — HIGH (ref 70–99)
GLUCOSE SERPL-MCNC: 138 MG/DL — HIGH (ref 70–99)
GLUCOSE SERPL-MCNC: 263 MG/DL — HIGH (ref 70–99)
GLUCOSE SERPL-MCNC: 263 MG/DL — HIGH (ref 70–99)
GLUCOSE SERPL-MCNC: 360 MG/DL — HIGH (ref 70–99)
GLUCOSE SERPL-MCNC: 360 MG/DL — HIGH (ref 70–99)
HCT VFR BLD CALC: 27 % — LOW (ref 42–52)
HCT VFR BLD CALC: 27 % — LOW (ref 42–52)
HGB BLD-MCNC: 8.8 G/DL — LOW (ref 14–18)
HGB BLD-MCNC: 8.8 G/DL — LOW (ref 14–18)
IMM GRANULOCYTES NFR BLD AUTO: 1.2 % — HIGH (ref 0.1–0.3)
IMM GRANULOCYTES NFR BLD AUTO: 1.2 % — HIGH (ref 0.1–0.3)
INR BLD: 2.5 RATIO — HIGH (ref 0.65–1.3)
INR BLD: 2.5 RATIO — HIGH (ref 0.65–1.3)
INR BLD: 2.59 RATIO — HIGH (ref 0.65–1.3)
INR BLD: 2.59 RATIO — HIGH (ref 0.65–1.3)
LYMPHOCYTES # BLD AUTO: 0.29 K/UL — LOW (ref 1.2–3.4)
LYMPHOCYTES # BLD AUTO: 0.29 K/UL — LOW (ref 1.2–3.4)
LYMPHOCYTES # BLD AUTO: 3.1 % — LOW (ref 20.5–51.1)
LYMPHOCYTES # BLD AUTO: 3.1 % — LOW (ref 20.5–51.1)
MAGNESIUM SERPL-MCNC: 2.5 MG/DL — HIGH (ref 1.8–2.4)
MAGNESIUM SERPL-MCNC: 2.5 MG/DL — HIGH (ref 1.8–2.4)
MCHC RBC-ENTMCNC: 29.4 PG — SIGNIFICANT CHANGE UP (ref 27–31)
MCHC RBC-ENTMCNC: 29.4 PG — SIGNIFICANT CHANGE UP (ref 27–31)
MCHC RBC-ENTMCNC: 32.6 G/DL — SIGNIFICANT CHANGE UP (ref 32–37)
MCHC RBC-ENTMCNC: 32.6 G/DL — SIGNIFICANT CHANGE UP (ref 32–37)
MCV RBC AUTO: 90.3 FL — SIGNIFICANT CHANGE UP (ref 80–94)
MCV RBC AUTO: 90.3 FL — SIGNIFICANT CHANGE UP (ref 80–94)
MONOCYTES # BLD AUTO: 0.24 K/UL — SIGNIFICANT CHANGE UP (ref 0.1–0.6)
MONOCYTES # BLD AUTO: 0.24 K/UL — SIGNIFICANT CHANGE UP (ref 0.1–0.6)
MONOCYTES NFR BLD AUTO: 2.6 % — SIGNIFICANT CHANGE UP (ref 1.7–9.3)
MONOCYTES NFR BLD AUTO: 2.6 % — SIGNIFICANT CHANGE UP (ref 1.7–9.3)
NEUTROPHILS # BLD AUTO: 8.58 K/UL — HIGH (ref 1.4–6.5)
NEUTROPHILS # BLD AUTO: 8.58 K/UL — HIGH (ref 1.4–6.5)
NEUTROPHILS NFR BLD AUTO: 93 % — HIGH (ref 42.2–75.2)
NEUTROPHILS NFR BLD AUTO: 93 % — HIGH (ref 42.2–75.2)
NRBC # BLD: 0 /100 WBCS — SIGNIFICANT CHANGE UP (ref 0–0)
NRBC # BLD: 0 /100 WBCS — SIGNIFICANT CHANGE UP (ref 0–0)
PHOSPHATE SERPL-MCNC: 4.9 MG/DL — SIGNIFICANT CHANGE UP (ref 2.1–4.9)
PHOSPHATE SERPL-MCNC: 4.9 MG/DL — SIGNIFICANT CHANGE UP (ref 2.1–4.9)
PLATELET # BLD AUTO: 211 K/UL — SIGNIFICANT CHANGE UP (ref 130–400)
PLATELET # BLD AUTO: 211 K/UL — SIGNIFICANT CHANGE UP (ref 130–400)
PMV BLD: 11.3 FL — HIGH (ref 7.4–10.4)
PMV BLD: 11.3 FL — HIGH (ref 7.4–10.4)
POTASSIUM SERPL-MCNC: 4.2 MMOL/L — SIGNIFICANT CHANGE UP (ref 3.5–5)
POTASSIUM SERPL-MCNC: 4.3 MMOL/L — SIGNIFICANT CHANGE UP (ref 3.5–5)
POTASSIUM SERPL-MCNC: 4.3 MMOL/L — SIGNIFICANT CHANGE UP (ref 3.5–5)
POTASSIUM SERPL-MCNC: 4.7 MMOL/L — SIGNIFICANT CHANGE UP (ref 3.5–5)
POTASSIUM SERPL-MCNC: 4.7 MMOL/L — SIGNIFICANT CHANGE UP (ref 3.5–5)
POTASSIUM SERPL-SCNC: 4.2 MMOL/L — SIGNIFICANT CHANGE UP (ref 3.5–5)
POTASSIUM SERPL-SCNC: 4.3 MMOL/L — SIGNIFICANT CHANGE UP (ref 3.5–5)
POTASSIUM SERPL-SCNC: 4.3 MMOL/L — SIGNIFICANT CHANGE UP (ref 3.5–5)
POTASSIUM SERPL-SCNC: 4.7 MMOL/L — SIGNIFICANT CHANGE UP (ref 3.5–5)
POTASSIUM SERPL-SCNC: 4.7 MMOL/L — SIGNIFICANT CHANGE UP (ref 3.5–5)
PROCALCITONIN SERPL-MCNC: >400 NG/ML — HIGH (ref 0.02–0.1)
PROCALCITONIN SERPL-MCNC: >400 NG/ML — HIGH (ref 0.02–0.1)
PROT SERPL-MCNC: 5.7 G/DL — LOW (ref 6–8)
PROT SERPL-MCNC: 5.7 G/DL — LOW (ref 6–8)
PROTHROM AB SERPL-ACNC: 28.8 SEC — HIGH (ref 9.95–12.87)
PROTHROM AB SERPL-ACNC: 28.8 SEC — HIGH (ref 9.95–12.87)
PROTHROM AB SERPL-ACNC: 29.8 SEC — HIGH (ref 9.95–12.87)
PROTHROM AB SERPL-ACNC: 29.8 SEC — HIGH (ref 9.95–12.87)
RBC # BLD: 2.99 M/UL — LOW (ref 4.7–6.1)
RBC # BLD: 2.99 M/UL — LOW (ref 4.7–6.1)
RBC # FLD: 15.9 % — HIGH (ref 11.5–14.5)
RBC # FLD: 15.9 % — HIGH (ref 11.5–14.5)
SODIUM SERPL-SCNC: 141 MMOL/L — SIGNIFICANT CHANGE UP (ref 135–146)
SODIUM SERPL-SCNC: 141 MMOL/L — SIGNIFICANT CHANGE UP (ref 135–146)
SODIUM SERPL-SCNC: 142 MMOL/L — SIGNIFICANT CHANGE UP (ref 135–146)
SODIUM SERPL-SCNC: 144 MMOL/L — SIGNIFICANT CHANGE UP (ref 135–146)
SODIUM SERPL-SCNC: 144 MMOL/L — SIGNIFICANT CHANGE UP (ref 135–146)
VANCOMYCIN FLD-MCNC: 6.1 UG/ML — SIGNIFICANT CHANGE UP (ref 5–10)
VANCOMYCIN FLD-MCNC: 6.1 UG/ML — SIGNIFICANT CHANGE UP (ref 5–10)
WBC # BLD: 9.23 K/UL — SIGNIFICANT CHANGE UP (ref 4.8–10.8)
WBC # BLD: 9.23 K/UL — SIGNIFICANT CHANGE UP (ref 4.8–10.8)
WBC # FLD AUTO: 9.23 K/UL — SIGNIFICANT CHANGE UP (ref 4.8–10.8)
WBC # FLD AUTO: 9.23 K/UL — SIGNIFICANT CHANGE UP (ref 4.8–10.8)

## 2023-11-03 PROCEDURE — 71045 X-RAY EXAM CHEST 1 VIEW: CPT | Mod: 26,76

## 2023-11-03 PROCEDURE — 71045 X-RAY EXAM CHEST 1 VIEW: CPT | Mod: 26,77

## 2023-11-03 PROCEDURE — 99233 SBSQ HOSP IP/OBS HIGH 50: CPT

## 2023-11-03 RX ORDER — ACETAMINOPHEN 500 MG
650 TABLET ORAL EVERY 6 HOURS
Refills: 0 | Status: DISCONTINUED | OUTPATIENT
Start: 2023-11-03 | End: 2023-11-03

## 2023-11-03 RX ORDER — ACETAMINOPHEN 500 MG
650 TABLET ORAL EVERY 6 HOURS
Refills: 0 | Status: DISCONTINUED | OUTPATIENT
Start: 2023-11-03 | End: 2023-11-12

## 2023-11-03 RX ADMIN — Medication 3 MILLILITER(S): at 14:51

## 2023-11-03 RX ADMIN — Medication 650 MILLIGRAM(S): at 18:54

## 2023-11-03 RX ADMIN — PANTOPRAZOLE SODIUM 40 MILLIGRAM(S): 20 TABLET, DELAYED RELEASE ORAL at 14:21

## 2023-11-03 RX ADMIN — Medication 650 MILLIGRAM(S): at 23:19

## 2023-11-03 RX ADMIN — INSULIN HUMAN 2 UNIT(S)/HR: 100 INJECTION, SOLUTION SUBCUTANEOUS at 01:32

## 2023-11-03 RX ADMIN — CARVEDILOL PHOSPHATE 25 MILLIGRAM(S): 80 CAPSULE, EXTENDED RELEASE ORAL at 17:47

## 2023-11-03 RX ADMIN — BUMETANIDE 2 MILLIGRAM(S): 0.25 INJECTION INTRAMUSCULAR; INTRAVENOUS at 23:15

## 2023-11-03 RX ADMIN — BUMETANIDE 2 MILLIGRAM(S): 0.25 INJECTION INTRAMUSCULAR; INTRAVENOUS at 00:41

## 2023-11-03 RX ADMIN — CITALOPRAM 20 MILLIGRAM(S): 10 TABLET, FILM COATED ORAL at 14:20

## 2023-11-03 RX ADMIN — CHLORHEXIDINE GLUCONATE 1 APPLICATION(S): 213 SOLUTION TOPICAL at 05:27

## 2023-11-03 RX ADMIN — Medication 81 MILLIGRAM(S): at 14:19

## 2023-11-03 RX ADMIN — CLOPIDOGREL BISULFATE 75 MILLIGRAM(S): 75 TABLET, FILM COATED ORAL at 14:19

## 2023-11-03 RX ADMIN — HEPARIN SODIUM 5000 UNIT(S): 5000 INJECTION INTRAVENOUS; SUBCUTANEOUS at 00:45

## 2023-11-03 RX ADMIN — HEPARIN SODIUM 5000 UNIT(S): 5000 INJECTION INTRAVENOUS; SUBCUTANEOUS at 14:19

## 2023-11-03 RX ADMIN — HEPARIN SODIUM 5000 UNIT(S): 5000 INJECTION INTRAVENOUS; SUBCUTANEOUS at 05:27

## 2023-11-03 RX ADMIN — GABAPENTIN 300 MILLIGRAM(S): 400 CAPSULE ORAL at 17:47

## 2023-11-03 RX ADMIN — Medication 650 MILLIGRAM(S): at 05:24

## 2023-11-03 RX ADMIN — Medication 100 MILLIGRAM(S): at 14:20

## 2023-11-03 RX ADMIN — Medication 650 MILLIGRAM(S): at 17:54

## 2023-11-03 RX ADMIN — Medication 650 MILLIGRAM(S): at 22:49

## 2023-11-03 RX ADMIN — HEPARIN SODIUM 5000 UNIT(S): 5000 INJECTION INTRAVENOUS; SUBCUTANEOUS at 22:10

## 2023-11-03 RX ADMIN — BUMETANIDE 2 MILLIGRAM(S): 0.25 INJECTION INTRAMUSCULAR; INTRAVENOUS at 14:19

## 2023-11-03 RX ADMIN — BUMETANIDE 2 MILLIGRAM(S): 0.25 INJECTION INTRAMUSCULAR; INTRAVENOUS at 17:48

## 2023-11-03 RX ADMIN — Medication 3 MILLILITER(S): at 09:47

## 2023-11-03 RX ADMIN — BUMETANIDE 2 MILLIGRAM(S): 0.25 INJECTION INTRAMUSCULAR; INTRAVENOUS at 05:26

## 2023-11-03 RX ADMIN — Medication 3 MILLILITER(S): at 19:28

## 2023-11-03 RX ADMIN — CEFEPIME 100 MILLIGRAM(S): 1 INJECTION, POWDER, FOR SOLUTION INTRAMUSCULAR; INTRAVENOUS at 14:20

## 2023-11-03 NOTE — PROGRESS NOTE ADULT - SUBJECTIVE AND OBJECTIVE BOX
Patient is a 89y old  Male who presents with a chief complaint of sob (02 Nov 2023 19:00)      Over Night Events:  Patient seen and examined.   on NIV did not tolerate high flow yesterday     ROS:  See HPI    PHYSICAL EXAM    ICU Vital Signs Last 24 Hrs  T(C): 36.9 (03 Nov 2023 07:05), Max: 36.9 (03 Nov 2023 03:00)  T(F): 98.4 (03 Nov 2023 07:05), Max: 98.4 (03 Nov 2023 03:00)  HR: 105 (03 Nov 2023 05:00) (97 - 133)  BP: 120/68 (03 Nov 2023 05:00) (109/55 - 142/63)  BP(mean): 89 (03 Nov 2023 05:00) (75 - 95)  ABP: --  ABP(mean): --  RR: 23 (03 Nov 2023 07:05) (23 - 39)  SpO2: 99% (03 Nov 2023 05:00) (96% - 99%)    O2 Parameters below as of 02 Nov 2023 19:00  Patient On (Oxygen Delivery Method): BiPAP/CPAP            General: awake   HEENT:   augie             Lymph Nodes: NO cervical LN   Lungs: Bilateral BS  Cardiovascular: Regular   Abdomen: Soft, Positive BS  Extremities: No clubbing   Skin: warm   Neurological: no focal     I&O's Detail    02 Nov 2023 07:01  -  03 Nov 2023 07:00  --------------------------------------------------------  IN:    dextrose 5% + lactated ringers: 450 mL    Insulin: 2 mL    Insulin: 8 mL    Insulin: 11 mL    Insulin: 14 mL  Total IN: 485 mL    OUT:    Incontinent per Collection Bag (mL): 625 mL  Total OUT: 625 mL    Total NET: -140 mL          LABS:                          8.5    10.14 )-----------( 190      ( 02 Nov 2023 06:21 )             24.9         02 Nov 2023 21:31    139    |  105    |  111    ----------------------------<  286    4.9     |  14     |  4.2      Ca    8.2        02 Nov 2023 21:31  Phos  4.3       02 Nov 2023 06:21  Mg     2.3       02 Nov 2023 06:21                                                                                      Urinalysis Basic - ( 02 Nov 2023 21:31 )    Color: x / Appearance: x / SG: x / pH: x  Gluc: 286 mg/dL / Ketone: x  / Bili: x / Urobili: x   Blood: x / Protein: x / Nitrite: x   Leuk Esterase: x / RBC: x / WBC x   Sq Epi: x / Non Sq Epi: x / Bacteria: x                                                                                                                                                     MEDICATIONS  (STANDING):  acetaminophen   IVPB .. 1000 milliGRAM(s) IV Intermittent once  albuterol/ipratropium for Nebulization 3 milliLiter(s) Nebulizer every 6 hours  albuterol/ipratropium for Nebulization. 3 milliLiter(s) Nebulizer once  allopurinol 100 milliGRAM(s) Oral daily  aspirin  chewable 81 milliGRAM(s) Oral daily  buMETAnide Injectable 2 milliGRAM(s) IV Push <User Schedule>  carvedilol 25 milliGRAM(s) Oral every 12 hours  cefepime   IVPB 1000 milliGRAM(s) IV Intermittent every 24 hours  chlorhexidine 2% Cloths 1 Application(s) Topical <User Schedule>  citalopram 20 milliGRAM(s) Oral daily  clopidogrel Tablet 75 milliGRAM(s) Oral daily  dextrose 50% Injectable 50 milliLiter(s) IV Push every 15 minutes  gabapentin 300 milliGRAM(s) Oral two times a day  heparin   Injectable 5000 Unit(s) SubCutaneous every 8 hours  insulin regular Infusion 2 Unit(s)/Hr (2 mL/Hr) IV Continuous <Continuous>  pantoprazole  Injectable 40 milliGRAM(s) IV Push daily  sodium bicarbonate 650 milliGRAM(s) Oral every 8 hours    MEDICATIONS  (PRN):  acetaminophen  Suppository .. 650 milliGRAM(s) Rectal every 4 hours PRN Temp greater or equal to 38C (100.4F)  albuterol    90 MICROgram(s) HFA Inhaler 2 Puff(s) Inhalation every 4 hours PRN Shortness of Breath and/or Wheezing  clonazePAM  Tablet 2 milliGRAM(s) Oral at bedtime PRN axniety          Xrays:  TLC:  OG:  ET tube:                                                                                    b/l opacity / effusion   R>L   ECHO:  [facility] ICU:

## 2023-11-03 NOTE — PROGRESS NOTE ADULT - SUBJECTIVE AND OBJECTIVE BOX
HPI:    Patient is a 89y old  Male who presents with a chief complaint of sob      HPI: 89 yo male PMHx sig for DM, CAD-bypass, CHF, HTN, HLD presents w sob x few days, no cough fever or chills. Pt placed on NRBM by EMS and subsequently on BiPAP in ED, O2 sat now 99%.  Pt found to have elevated glucose w high AG and b-hydroxybutarate c/w DKA    Today is hospital day 4d. This morning patient was seen and examined at bedside, resting comfortably in bed. Pt on NIV, did not tolerate high flow yesterday    PAST MEDICAL & SURGICAL HISTORY:  CHF (congestive heart failure)  DM (diabetes mellitus)  HTN (hypertension)  Prostate CA in remission  Pacemaker  H/O total knee replacement, right    Allergies  No Known Allergies    FAMILY HISTORY:  FH: type 2 diabetes (Father)    Home Medications:  allopurinol 100 mg oral tablet: 1 tab(s) orally once a day (25 Oct 2021 22:30)  citalopram 20 mg oral tablet: 1 tab(s) orally once a day (25 Oct 2021 22:30)  clonazePAM 1 mg oral tablet: 2 tab(s) orally once a day (at bedtime), As Needed (25 Oct 2021 22:30)  gabapentin 300 mg oral capsule: 1 cap(s) orally 2 times a day (25 Oct 2021 22:30)  glimepiride 4 mg oral tablet: 1 tab(s) orally 2 times a day (25 Oct 2021 22:30)  Lantus 100 units/mL subcutaneous solution: 30 unit(s) subcutaneous once a day (at bedtime) (25 Oct 2021 22:30)  NIFEdipine 60 mg oral tablet, extended release: 1 tab(s) orally once a day (25 Oct 2021 22:30)    Occupation:  Alochol: Denied  Smoking: Denied  Drug Use: Denied  Marital Status:     ROS: as in HPI; All other systems reviewed are negative    ICU Vital Signs Last 24 Hrs  T(C): 36.9 (03 Nov 2023 07:05), Max: 36.9 (03 Nov 2023 03:00)  T(F): 98.4 (03 Nov 2023 07:05), Max: 98.4 (03 Nov 2023 03:00)  HR: 105 (03 Nov 2023 05:00) (97 - 133)  BP: 120/68 (03 Nov 2023 05:00) (109/55 - 142/63)  BP(mean): 89 (03 Nov 2023 05:00) (76 - 95)  ABP: --  ABP(mean): --  RR: 23 (03 Nov 2023 07:05) (23 - 39)  SpO2: 99% (03 Nov 2023 05:00) (96% - 99%)    O2 Parameters below as of 02 Nov 2023 19:00  Patient On (Oxygen Delivery Method): BiPAP/CPAP    MEDICATIONS  (STANDING):  acetaminophen   IVPB .. 1000 milliGRAM(s) IV Intermittent once  albuterol/ipratropium for Nebulization 3 milliLiter(s) Nebulizer every 6 hours  albuterol/ipratropium for Nebulization. 3 milliLiter(s) Nebulizer once  allopurinol 100 milliGRAM(s) Oral daily  aspirin  chewable 81 milliGRAM(s) Oral daily  buMETAnide Injectable 2 milliGRAM(s) IV Push <User Schedule>  carvedilol 25 milliGRAM(s) Oral every 12 hours  cefepime   IVPB 1000 milliGRAM(s) IV Intermittent every 24 hours  chlorhexidine 2% Cloths 1 Application(s) Topical <User Schedule>  citalopram 20 milliGRAM(s) Oral daily  clopidogrel Tablet 75 milliGRAM(s) Oral daily  dextrose 50% Injectable 50 milliLiter(s) IV Push every 15 minutes  gabapentin 300 milliGRAM(s) Oral two times a day  heparin   Injectable 5000 Unit(s) SubCutaneous every 8 hours  insulin regular Infusion 2 Unit(s)/Hr (2 mL/Hr) IV Continuous <Continuous>  pantoprazole  Injectable 40 milliGRAM(s) IV Push daily  sodium bicarbonate 650 milliGRAM(s) Oral every 8 hours    MEDICATIONS  (PRN):  acetaminophen  Suppository .. 650 milliGRAM(s) Rectal every 4 hours PRN Temp greater or equal to 38C (100.4F)  albuterol    90 MICROgram(s) HFA Inhaler 2 Puff(s) Inhalation every 4 hours PRN Shortness of Breath and/or Wheezing  clonazePAM  Tablet 2 milliGRAM(s) Oral at bedtime PRN axniety    Physical Examination:  General: No acute distress.  Alert, oriented, interactive, nonfocal  HEENT: Pupils equal, reactive to light.  Symmetric.  PULM: Clear to auscultation bilaterally, no significant sputum production  CVS: Regular rate and rhythm, no murmurs, rubs, or gallops  ABD: Soft, nondistended, nontender, normoactive bowel sounds, no masses  EXT: No edema, nontender  SKIN: Warm and well perfused, no rashes noted.    I&O's Detail    02 Nov 2023 07:01  -  03 Nov 2023 07:00  --------------------------------------------------------  IN:    dextrose 5% + lactated ringers: 450 mL    Insulin: 2 mL    Insulin: 8 mL    Insulin: 11 mL    Insulin: 14 mL  Total IN: 485 mL    OUT:    Incontinent per Collection Bag (mL): 625 mL  Total OUT: 625 mL    Total NET: -140 mL    11-03    142  |  107  |  124<HH>  ----------------------------<  360<H>  4.7   |  15<L>  |  4.6<HH>    Ca    8.3<L>      03 Nov 2023 05:43  Phos  4.9     11-03  Mg     2.5     11-03    TPro  5.7<L>  /  Alb  2.7<L>  /  TBili  0.2  /  DBili  x   /  AST  41  /  ALT  32  /  AlkPhos  94  11-03                          8.8    9.23  )-----------( 211      ( 03 Nov 2023 05:43 )             27.0       CARDIAC MARKERS ( 30 Oct 2023 19:20 )  x     / 0.24 ng/mL / x     / x     / x        Urinalysis Basic - ( 03 Nov 2023 05:43 )    Color: x / Appearance: x / SG: x / pH: x  Gluc: 360 mg/dL / Ketone: x  / Bili: x / Urobili: x   Blood: x / Protein: x / Nitrite: x   Leuk Esterase: x / RBC: x / WBC x   Sq Epi: x / Non Sq Epi: x / Bacteria: x

## 2023-11-03 NOTE — PROGRESS NOTE ADULT - ASSESSMENT
IMPRESSION:  DKA   alter mental status   sepsis fever   PARKER   metabolc acidosis     PLAN:    CNS: neurology follow   EEG reviewed     HEENT: oral care     PULMONARY: keep pox>92% will try again high flow   NIV alternate with high flow at night and as needed       CARDIOVASCULAR: keep is < os   switch fluid on D5w with 3 amp bicarb 50cc/ hr if not able to tolerate off NIv and place NG tube   hold nifedipine   GI: speech and swallow eval if failed place ND tube   GI prophylaxis.    ?? need for tpn or ppn if not able to tolerate of NIV      RENAL:   follow renal and mornign BMP ??need for HD   not able to give bicarb po start iv bicarb as above   renal and bladder US reviewed   urine lytes urea creatinine  renal follow up   follow bun, cr   po bicarb 650 TID if NG tube placed now on NIV   serial bmp     INFECTIOUS DISEASE:    cefepime    bld cx   nasal mrsa   ID eval       HEMATOLOGICAL:  DVT prophylaxis.     ENDOCRINE:  Follow up FS.  Insulin  drip for now switch to lantus when start feed   follow AG , FS   AG elevated because of DKA and renal failure     MUSCULOSKELETAL: ambulate as tolerated    FULL CODE  Line: PIV  Dispo MICU  palliative care eval   DNR/DNI  IMPRESSION:  ARF secondary to pulmonary edema fluid overload   ?? asp PNA   DKA   alter mental status   sepsis fever   PARKER   metabolc acidosis     PLAN:    CNS: neurology follow   EEG reviewed     HEENT: oral care     PULMONARY: keep pox>92% will try again high flow   NIV alternate with high flow at night and as needed       CARDIOVASCULAR: keep is < os   switch fluid on D5w with 3 amp bicarb 50cc/ hr if not able to tolerate off NIv and place NG tube   hold nifedipine   GI: speech and swallow eval if failed place ND tube   GI prophylaxis.    ?? need for tpn or ppn if not able to tolerate of NIV      RENAL:   follow renal and mornign BMP ??need for HD   not able to give bicarb po start iv bicarb as above   renal and bladder US reviewed   urine lytes urea creatinine  renal follow up   follow bun, cr   po bicarb 650 TID if NG tube placed now on NIV   serial bmp     INFECTIOUS DISEASE:  repeat procal   cefepime    bld cx   nasal mrsa   ID eval       HEMATOLOGICAL:  DVT prophylaxis.     ENDOCRINE:  Follow up FS.  Insulin  drip for now switch to lantus when start feed   follow AG , FS   AG elevated because of DKA and renal failure     MUSCULOSKELETAL: ambulate as tolerated    FULL CODE  Line: PIV  Dispo MICU  palliative care eval   DNR/DNI

## 2023-11-03 NOTE — PROGRESS NOTE ADULT - SUBJECTIVE AND OBJECTIVE BOX
Patient is a 89y old  Male who presents with a chief complaint of sob (03 Nov 2023 10:27)      T(F): 99.1 (11-03-23 @ 11:00), Max: 99.1 (11-03-23 @ 11:00)  HR: 105 (11-03-23 @ 05:00)  BP: 120/68 (11-03-23 @ 05:00)  RR: 32 (11-03-23 @ 11:00)  SpO2: 99% (11-03-23 @ 05:00) (96% - 99%)    PHYSICAL EXAM:  GENERAL: NAD  HEAD:  Atraumatic, Normocephalic  EYES: EOMI, PERRLA, conjunctiva and sclera clear  NERVOUS SYSTEM:  Alert & Oriented X3, no focal deficits   CHEST/LUNG:  bilateral rhonchi  HEART: Regular rate and rhythm; No murmurs, rubs, or gallops  ABDOMEN: Soft, Nontender, Nondistended; Bowel sounds present  EXTREMITIES:  2+ Peripheral Pulses, No clubbing, cyanosis, or edema    LABS  11-03    142  |  107  |  124<HH>  ----------------------------<  360<H>  4.7   |  15<L>  |  4.6<HH>    Ca    8.3<L>      03 Nov 2023 05:43  Phos  4.9     11-03  Mg     2.5     11-03    TPro  5.7<L>  /  Alb  2.7<L>  /  TBili  0.2  /  DBili  x   /  AST  41  /  ALT  32  /  AlkPhos  94  11-03                          8.8    9.23  )-----------( 211      ( 03 Nov 2023 05:43 )             27.0       < from: TTE Echo Complete w/o Contrast w/ Doppler (10.31.23 @ 09:39) >  Summary:   1. Patient with significant PVCs during study.   2. Normal global left ventricular systolic function.   3. LV Ejection Fraction by Adkins's Method with a biplane EF of 61 %.   4. The left ventricular diastolic function could not be assessed in this   study.   5. Normal right ventricular size and function.   6. Normal left atrial size.   7. Normal right atrial size.   8. Mild aortic valve stenosis.   9. Moderate to severe mitral annular calcification.  10. Moderate thickening and calcification of the anterior and posterior   mitral valve leaflets.  11. Mild mitral stenosis.  12. No evidence of mitral valve regurgitation.  13. Estimated pulmonary artery systolic pressure is 67.1 mmHg assuming a   right atrial pressure of 15 mmHg, which is consistent with severe   pulmonary hypertension.  14. There is no evidence of pericardial effusion.    < end of copied text >    Culture Results:   No growth at 48 Hours (10-31-23)  Culture Results:   No growth at 48 Hours (10-31-23)    RADIOLOGY    MEDICATIONS  (STANDING):  acetaminophen   IVPB .. 1000 milliGRAM(s) IV Intermittent once  albuterol/ipratropium for Nebulization 3 milliLiter(s) Nebulizer every 6 hours  albuterol/ipratropium for Nebulization. 3 milliLiter(s) Nebulizer once  allopurinol 100 milliGRAM(s) Oral daily  aspirin  chewable 81 milliGRAM(s) Oral daily  buMETAnide Injectable 2 milliGRAM(s) IV Push <User Schedule>  carvedilol 25 milliGRAM(s) Oral every 12 hours  cefepime   IVPB 1000 milliGRAM(s) IV Intermittent every 24 hours  chlorhexidine 2% Cloths 1 Application(s) Topical <User Schedule>  citalopram 20 milliGRAM(s) Oral daily  clopidogrel Tablet 75 milliGRAM(s) Oral daily  dextrose 50% Injectable 50 milliLiter(s) IV Push every 15 minutes  gabapentin 300 milliGRAM(s) Oral two times a day  heparin   Injectable 5000 Unit(s) SubCutaneous every 8 hours  insulin regular Infusion 7 Unit(s)/Hr (7 mL/Hr) IV Continuous <Continuous>  pantoprazole  Injectable 40 milliGRAM(s) IV Push daily  sodium bicarbonate 650 milliGRAM(s) Oral every 8 hours    MEDICATIONS  (PRN):  acetaminophen  Suppository .. 650 milliGRAM(s) Rectal every 4 hours PRN Temp greater or equal to 38C (100.4F)  albuterol    90 MICROgram(s) HFA Inhaler 2 Puff(s) Inhalation every 4 hours PRN Shortness of Breath and/or Wheezing  clonazePAM  Tablet 2 milliGRAM(s) Oral at bedtime PRN axniety      Patient seen and evaluated this am, remains on NIPPV      T(F): 99.1 (11-03-23 @ 11:00), Max: 99.1 (11-03-23 @ 11:00)  HR: 105 (11-03-23 @ 05:00)  BP: 120/68 (11-03-23 @ 05:00)  RR: 22  SpO2: 99% (11-03-23 @ 05:00) (96% - 99%)    PHYSICAL EXAM:  GENERAL: NAD  HEAD:  Atraumatic, Normocephalic  EYES: EOMI, PERRLA, conjunctiva and sclera clear  NERVOUS SYSTEM:  no focal deficits   CHEST/LUNG:  bilateral rhonchi  HEART: Regular rate and rhythm; No murmurs, rubs, or gallops  ABDOMEN: Soft, Nontender, Nondistended; Bowel sounds present  EXTREMITIES:  2+ Peripheral Pulses, No clubbing, cyanosis, or edema    LABS  11-03    142  |  107  |  124<HH>  ----------------------------<  360<H>  4.7   |  15<L>  |  4.6<HH>    Ca    8.3<L>      03 Nov 2023 05:43  Phos  4.9     11-03  Mg     2.5     11-03    TPro  5.7<L>  /  Alb  2.7<L>  /  TBili  0.2  /  DBili  x   /  AST  41  /  ALT  32  /  AlkPhos  94  11-03                          8.8    9.23  )-----------( 211      ( 03 Nov 2023 05:43 )             27.0       < from: TTE Echo Complete w/o Contrast w/ Doppler (10.31.23 @ 09:39) >  Summary:   1. Patient with significant PVCs during study.   2. Normal global left ventricular systolic function.   3. LV Ejection Fraction by Adkins's Method with a biplane EF of 61 %.   4. The left ventricular diastolic function could not be assessed in this   study.   5. Normal right ventricular size and function.   6. Normal left atrial size.   7. Normal right atrial size.   8. Mild aortic valve stenosis.   9. Moderate to severe mitral annular calcification.  10. Moderate thickening and calcification of the anterior and posterior   mitral valve leaflets.  11. Mild mitral stenosis.  12. No evidence of mitral valve regurgitation.  13. Estimated pulmonary artery systolic pressure is 67.1 mmHg assuming a   right atrial pressure of 15 mmHg, which is consistent with severe   pulmonary hypertension.  14. There is no evidence of pericardial effusion.    < end of copied text >    Culture Results:   No growth at 48 Hours (10-31-23)  Culture Results:   No growth at 48 Hours (10-31-23)    RADIOLOGY  < from: Xray Chest 1 View- PORTABLE-Routine (11.03.23 @ 07:22) >  Impression:    Stable bilateral opacities/effusions, greater on the right.    < end of copied text >    MEDICATIONS  (STANDING):  acetaminophen   IVPB .. 1000 milliGRAM(s) IV Intermittent once  albuterol/ipratropium for Nebulization 3 milliLiter(s) Nebulizer every 6 hours  albuterol/ipratropium for Nebulization. 3 milliLiter(s) Nebulizer once  allopurinol 100 milliGRAM(s) Oral daily  aspirin  chewable 81 milliGRAM(s) Oral daily  buMETAnide Injectable 2 milliGRAM(s) IV Push <User Schedule>  carvedilol 25 milliGRAM(s) Oral every 12 hours  cefepime   IVPB 1000 milliGRAM(s) IV Intermittent every 24 hours  chlorhexidine 2% Cloths 1 Application(s) Topical <User Schedule>  citalopram 20 milliGRAM(s) Oral daily  clopidogrel Tablet 75 milliGRAM(s) Oral daily  gabapentin 300 milliGRAM(s) Oral two times a day  heparin   Injectable 5000 Unit(s) SubCutaneous every 8 hours  insulin regular Infusion 7 Unit(s)/Hr (7 mL/Hr) IV Continuous <Continuous>  pantoprazole  Injectable 40 milliGRAM(s) IV Push daily  sodium bicarbonate 650 milliGRAM(s) Oral every 8 hours    MEDICATIONS  (PRN):  acetaminophen  Suppository .. 650 milliGRAM(s) Rectal every 4 hours PRN Temp greater or equal to 38C (100.4F)  albuterol    90 MICROgram(s) HFA Inhaler 2 Puff(s) Inhalation every 4 hours PRN Shortness of Breath and/or Wheezing  clonazePAM  Tablet 2 milliGRAM(s) Oral at bedtime PRN axniety

## 2023-11-03 NOTE — PROGRESS NOTE ADULT - ASSESSMENT
Impression:  ARF secondary to pulmonary edema fluid overload   ?? Aspiration PNA    DKA  Sepsis fever  PARKER  Metabolic Acidosis     Aspiration PNA  - Keep SpO2 >92%; Will trial high flow again   - Alternate NIV with high floow at night PRN     DKA  - Switch fluid on D51 with bicarb 50cc/hr if not able to tolerate off NIV  - If failed speech and swallow assessment, place NG tube   - Serial BMP q4h  - Continue insulin drip, switch to Lantus when starting feeds   - Endocrine evaluation     Altered mental status  - Neurology evaluation   - EEG    Sepsis fever   - Cefepime   - F/u on blood cultures   - ID evaluation  - Nasal mrsa    PARKER   - Renal and bladder US reviewed  - If not able to give PO bicarb, start IV bicarb   - PO bicarb 650 TID if NG tube placed   - Follow urine electrolytes, BUN, Cr  - Renal follow up     Oral care  GI prophylaxis  DVT prophylaxis   Ambulate as tolerated     FULL CODE  Line: OLEKSANDR  Dispo MICU

## 2023-11-03 NOTE — PROCEDURE NOTE - NSANTIMICROB_VASC_A_CORE
Yes Patient Reported Weight (Optional - Include Units): 150lbs Initial Beta Hcg (Optional): urine negative Detail Level: Zone Ipledge Number (Optional): [id number]

## 2023-11-03 NOTE — PROGRESS NOTE ADULT - ASSESSMENT
# Severe PARKER likely due to sepsis. Pt has Screat up to ~ 2.0 as far back as June 2021  # Hyperkalemia, given IV CaGluc and Lokelma, plus Bumex IV resolved  # Urine chemistries c/w pre-renal state / CRS w/ FeNa 0.3%, FeUrea 14%  # HAGMA d/t DKA / renal failure    Recommendations:  - mild improvement in urine output on iv bumex  - d/w pt's daughter Kristina, gave consent for HD  - pending udally catheter placement by ICU team will have HD today 2hrs, low flows, 3K+ bath, 1L UF as tolerated  - anticipate second HD session tomorrow  - can cont iv bumex as long as pt non oliguric to maximize urine output  - bowden, strict i/o   - d/c po sodium bicarb  - complete sepsis w/u, abx per ID recs, dose for iHD  - insulin drip as needed per ICU protocol

## 2023-11-03 NOTE — PROGRESS NOTE ADULT - SUBJECTIVE AND OBJECTIVE BOX
Nephrology Progress Note    AMANDA CASTORENA  MRN-[id number]  89y  Male    S:  Patient is seen and examined, events over the last 24h noted.    O:  Allergies:  No Known Allergies    Hospital Medications:   MEDICATIONS  (STANDING):  acetaminophen   IVPB .. 1000 milliGRAM(s) IV Intermittent once  albuterol/ipratropium for Nebulization 3 milliLiter(s) Nebulizer every 6 hours  albuterol/ipratropium for Nebulization. 3 milliLiter(s) Nebulizer once  allopurinol 100 milliGRAM(s) Oral daily  aspirin  chewable 81 milliGRAM(s) Oral daily  buMETAnide Injectable 2 milliGRAM(s) IV Push <User Schedule>  carvedilol 25 milliGRAM(s) Oral every 12 hours  cefepime   IVPB 1000 milliGRAM(s) IV Intermittent every 24 hours  chlorhexidine 2% Cloths 1 Application(s) Topical <User Schedule>  citalopram 20 milliGRAM(s) Oral daily  clopidogrel Tablet 75 milliGRAM(s) Oral daily  dextrose 50% Injectable 50 milliLiter(s) IV Push every 15 minutes  gabapentin 300 milliGRAM(s) Oral two times a day  heparin   Injectable 5000 Unit(s) SubCutaneous every 8 hours  insulin regular Infusion 7 Unit(s)/Hr (7 mL/Hr) IV Continuous <Continuous>  pantoprazole  Injectable 40 milliGRAM(s) IV Push daily  sodium bicarbonate 650 milliGRAM(s) Oral every 8 hours    MEDICATIONS  (PRN):  acetaminophen  Suppository .. 650 milliGRAM(s) Rectal every 4 hours PRN Temp greater or equal to 38C (100.4F)  albuterol    90 MICROgram(s) HFA Inhaler 2 Puff(s) Inhalation every 4 hours PRN Shortness of Breath and/or Wheezing  clonazePAM  Tablet 2 milliGRAM(s) Oral at bedtime PRN axniety    Home Medications:  allopurinol 100 mg oral tablet: 1 tab(s) orally once a day (25 Oct 2021 22:30)  citalopram 20 mg oral tablet: 1 tab(s) orally once a day (25 Oct 2021 22:30)  clonazePAM 1 mg oral tablet: 2 tab(s) orally once a day (at bedtime), As Needed (25 Oct 2021 22:30)  gabapentin 300 mg oral capsule: 1 cap(s) orally 2 times a day (25 Oct 2021 22:30)  glimepiride 4 mg oral tablet: 1 tab(s) orally 2 times a day (25 Oct 2021 22:30)  Lantus 100 units/mL subcutaneous solution: 30 unit(s) subcutaneous once a day (at bedtime) (25 Oct 2021 22:30)  NIFEdipine 60 mg oral tablet, extended release: 1 tab(s) orally once a day (25 Oct 2021 22:30)      VITALS:  Daily     Daily Weight in k (2023 07:05)  T(F): 99.1 (23 @ 11:00), Max: 99.1 (23 @ 11:00)  HR: 105 (23 @ 05:00)  BP: 120/68 (23 @ 05:00)  RR: 32 (23 @ 11:00)  SpO2: 99% (23 @ 05:00)  Wt(kg): --  I&O's Detail    2023 07:01  -  2023 07:00  --------------------------------------------------------  IN:    dextrose 5% + lactated ringers: 450 mL    Insulin: 2 mL    Insulin: 8 mL    Insulin: 11 mL    Insulin: 14 mL  Total IN: 485 mL    OUT:    Incontinent per Collection Bag (mL): 625 mL  Total OUT: 625 mL    Total NET: -140 mL        I&O's Summary    2023 07:01  -  2023 07:00  --------------------------------------------------------  IN: 485 mL / OUT: 625 mL / NET: -140 mL          PHYSICAL EXAM:  Gen: NAD on HFNC  Chest: difffuse ronchi  Abd: distended      LABS:        142  |  107  |  124<HH>  ----------------------------<  360<H>  4.7   |  15<L>  |  4.6<HH>    Ca    8.3<L>      2023 05:43  Phos  4.9       Mg     2.5         TPro  5.7<L>  /  Alb  2.7<L>  /  TBili  0.2  /  DBili      /  AST  41  /  ALT  32  /  AlkPhos  94      Phosphorus: 4.9 mg/dL (23 @ 05:43)  Phosphorus: 4.3 mg/dL (23 @ 06:21)                            8.8    9.23  )-----------( 211      ( 2023 05:43 )             27.0     Mean Cell Volume: 90.3 fL (23 @ 05:43)      Creatinine trend:  Creatinine: 4.6 mg/dL (23 @ 05:43)  Creatinine: 4.2 mg/dL (23 @ 21:31)  Creatinine: 4.2 mg/dL (23 @ 18:20)  Creatinine: 4.1 mg/dL (23 @ 15:13)  Creatinine: 4.1 mg/dL (23 @ 11:23)

## 2023-11-03 NOTE — PROGRESS NOTE ADULT - ASSESSMENT
89 yo male PMHx sig for DM, CAD-bypass, CHF, HTN, HLD presents w sob x few days, no cough fever or chills.  pt placed on NRBM by EMS and subsequently on BiPAP in ED, O2 sat now 99%.  Pt found to have elevated glucose w high AG and b-hydroxybutarate c/w     DKA / sepsis / acute respiratory failure     - IV insulin -> sq insulin   - IV antibiotics   - initiation of HD today   - DVT prophylaxis    DNR / DNI

## 2023-11-03 NOTE — SWALLOW BEDSIDE ASSESSMENT ADULT - MUCOSAL QUALITY
congested breath sounds. Oropharyngeal suctioning without much return.
congested breath sounds. Oropharyngeal suctioning without much return.

## 2023-11-03 NOTE — PROGRESS NOTE ADULT - ASSESSMENT
Dr. Keyshawn Paiz reviewed discharge instructions with the patient. The patient verbalized understanding. All questions and concerns were addressed. The patient declined a wheelchair and is discharged ambulatory in the care of family members with instructions and prescriptions in hand. Pt is alert and oriented x 4. Respirations are clear and unlabored. 89 y/o male  with a past medical HX of bypass, CAD, CHF , HTN, dyslipidemia, DM , anxiety   brought in for SOB, DKA.     IMPRESSION  #Metabolic encephalopathy   #DKA  #SOB- Volume overload vs Pneumonia  #PARKER over CKD  #Sepsis   #Lactic acidosis  #Obesity BMI (kg/m2): 29.7  #DM   #HTN, CHF, Pacemaker   #MRSA nares negative    RECOMMENDATIONS  -Continue with cefepime 1 gram q 24 hours.  -Tentative plan for 7 days of antibiotics. ED 11/6.  -Monitor Cr and LFTs.   -Aspiration precautions. Off loading measures to avoid pressure injuries.     If any questions, please send a message or call on [employer] Teams  Please continue to update ID with any pertinent new laboratory or radiographic findings.    Freedom Chen M.D  Infectious Diseases Attending  [facility]    89 y/o male  with a past medical HX of bypass, CAD, CHF , HTN, dyslipidemia, DM , anxiety   brought in for SOB, DKA.     IMPRESSION  #Metabolic encephalopathy   #DKA  #SOB- Volume overload vs Pneumonia  #PARKER over CKD  #Sepsis   #Lactic acidosis  #Obesity BMI (kg/m2): 29.7  #DM   #HTN, CHF, Pacemaker   #MRSA nares negative    RECOMMENDATIONS  -Continue with cefepime 1 gram q 24 hours.  -Tentative plan for 7 days of antibiotics. ED 11/6.  -Monitor Cr and LFTs.   -Aspiration precautions. Off loading measures to avoid pressure injuries.   -Prognosis guarded.     If any questions, please send a message or call on [employer] Teams  Please continue to update ID with any pertinent new laboratory or radiographic findings.    Freedom Chen M.D  Infectious Diseases Attending  [facility]

## 2023-11-03 NOTE — PROGRESS NOTE ADULT - SUBJECTIVE AND OBJECTIVE BOX
AMANDA CASTORENA  89y, Male    LOS  4d    INTERVAL EVENTS/HPI  - No acute events overnight  - T(F): , Max: 98.4 (11-03-23 @ 03:00)  - Tolerating medication  - WBC Count: 10.14 (11-02-23 @ 06:21)  WBC Count: 9.52 (11-01-23 @ 08:08)  - Creatinine: 4.2 (11-02-23 @ 21:31)  Creatinine: 4.2 (11-02-23 @ 18:20)     REVIEW OF SYSTEMS:  CONSTITUTIONAL: No fever or chills  HEENT: No sore throat  RESPIRATORY: No cough, no shortness of breath  CARDIOVASCULAR: No chest pain or palpitations  GASTROINTESTINAL: No abdominal or epigastric pain  GENITOURINARY: No dysuria  NEUROLOGICAL: No headache/dizziness  MSK: No joint pain, erythema, or swelling; no back pain  SKIN: No itching, rashes  All other ROS negative except noted above    Prior hospital charts reviewed [Yes]  Primary team notes reviewed [Yes]  Other consultant notes reviewed [Yes]    ANTIMICROBIALS:   cefepime   IVPB 1000 every 24 hours  MEDICATIONS  (STANDING):  cefepime   IVPB   100 mL/Hr IV Intermittent (11-02-23 @ 14:39)   100 mL/Hr IV Intermittent (11-01-23 @ 15:17)   100 mL/Hr IV Intermittent (10-31-23 @ 14:20)    piperacillin/tazobactam IVPB.   200 mL/Hr IV Intermittent (10-31-23 @ 04:26)    vancomycin  IVPB   166.67 mL/Hr IV Intermittent (10-31-23 @ 12:17)        OTHER MEDS: MEDICATIONS  (STANDING):  acetaminophen   IVPB .. 1000 once  acetaminophen  Suppository .. 650 every 4 hours PRN  albuterol    90 MICROgram(s) HFA Inhaler 2 every 4 hours PRN  albuterol/ipratropium for Nebulization 3 every 6 hours  albuterol/ipratropium for Nebulization. 3 once  allopurinol 100 daily  aspirin  chewable 81 daily  buMETAnide Injectable 2 <User Schedule>  carvedilol 25 every 12 hours  citalopram 20 daily  clonazePAM  Tablet 2 at bedtime PRN  clopidogrel Tablet 75 daily  dextrose 50% Injectable 50 every 15 minutes  gabapentin 300 two times a day  heparin   Injectable 5000 every 8 hours  insulin regular Infusion 2 <Continuous>  pantoprazole  Injectable 40 daily      Vital Signs Last 24 Hrs  T(F): 98.4 (11-03-23 @ 07:05), Max: 103.5 (10-31-23 @ 03:00)    Vital Signs Last 24 Hrs  HR: 105 (11-03-23 @ 05:00) (97 - 133)  BP: 120/68 (11-03-23 @ 05:00) (109/55 - 142/63)  RR: 23 (11-03-23 @ 07:05)  SpO2: 99% (11-03-23 @ 05:00) (96% - 99%)  Wt(kg): --    EXAM:  GENERAL: NAD, On bipap.   EYES: Conjunctiva pink and cornea white  ENT: Normal external ears and nose  NECK: Supple, nontender to palpation  CHEST/LUNG: Shallow breath sounds.   HEART: S1 S2  ABDOMEN: Soft, nontender.   EXTREMITIES: No clubbing, cyanosis, or petal edema  NERVOUS SYSTEM: Alert and oriented to person  MSK: No joint erythema, swelling or pain  SKIN: No rashes or lesions, no superficial thrombophlebitis    Labs:                        8.5    10.14 )-----------( 190      ( 02 Nov 2023 06:21 )             24.9     11-02    139  |  105  |  111<HH>  ----------------------------<  286<H>  4.9   |  14<L>  |  4.2<HH>    Ca    8.2<L>      02 Nov 2023 21:31  Phos  4.3     11-02  Mg     2.3     11-02    TPro  5.8<L>  /  Alb  3.0<L>  /  TBili  0.2  /  DBili  x   /  AST  41  /  ALT  33  /  AlkPhos  80  11-02      WBC Trend:  WBC Count: 10.14 (11-02-23 @ 06:21)  WBC Count: 9.52 (11-01-23 @ 08:08)  WBC Count: 13.02 (10-31-23 @ 05:57)  WBC Count: 12.75 (10-30-23 @ 19:20)      Creatine Trend:  Creatinine: 4.2 (11-02)  Creatinine: 4.2 (11-02)  Creatinine: 4.1 (11-02)  Creatinine: 4.1 (11-02)      Liver Biochemical Testing Trend:  Alanine Aminotransferase (ALT/SGPT): 33 (11-02)  Alanine Aminotransferase (ALT/SGPT): 30 (11-01)  Alanine Aminotransferase (ALT/SGPT): 26 (10-31)  Alanine Aminotransferase (ALT/SGPT): 25 (10-31)  Alanine Aminotransferase (ALT/SGPT): 23 (10-30)  Aspartate Aminotransferase (AST/SGOT): 41 (11-02-23 @ 06:21)  Aspartate Aminotransferase (AST/SGOT): 47 (11-01-23 @ 08:08)  Aspartate Aminotransferase (AST/SGOT): 46 (10-31-23 @ 11:00)  Aspartate Aminotransferase (AST/SGOT): 46 (10-31-23 @ 05:57)  Aspartate Aminotransferase (AST/SGOT): 36 (10-30-23 @ 19:20)  Bilirubin Total: 0.2 (11-02)  Bilirubin Total: 0.2 (11-01)  Bilirubin Total: 0.3 (10-31)  Bilirubin Total: 0.5 (10-31)  Bilirubin Total: 0.4 (10-30)      Trend LDH      Urinalysis Basic - ( 02 Nov 2023 21:31 )    Color: x / Appearance: x / SG: x / pH: x  Gluc: 286 mg/dL / Ketone: x  / Bili: x / Urobili: x   Blood: x / Protein: x / Nitrite: x   Leuk Esterase: x / RBC: x / WBC x   Sq Epi: x / Non Sq Epi: x / Bacteria: x        MICROBIOLOGY:  Vancomycin Level, Random: 11.5 (11-01 @ 08:08)    Male    Culture - Blood (collected 31 Oct 2023 02:26)  Source: .Blood Blood-Peripheral  Preliminary Report:    No growth at 48 Hours    Culture - Blood (collected 31 Oct 2023 02:26)  Source: .Blood Blood-Peripheral  Preliminary Report:    No growth at 48 Hours    Procalcitonin, Serum: >400.00 (11-02)    RADIOLOGY & ADDITIONAL TESTS:  I have personally reviewed the relevant images.   CXR      CT    < from: Xray Chest 1 View-PORTABLE IMMEDIATE (Xray Chest 1 View-PORTABLE IMMEDIATE .) (11.02.23 @ 08:17) >  Skeleton/soft tissues: Unchanged.    Impression:    Slight increased bibasilar opacities, pleural effusions. No pneumothorax.    < end of copied text >      WEIGHT  Weight (kg): 88.5 (10-30-23 @ 18:39)  Creatinine: 4.2 mg/dL (11-02-23 @ 21:31)  Creatinine: 4.2 mg/dL (11-02-23 @ 18:20)  Creatinine: 4.1 mg/dL (11-02-23 @ 15:13)  Creatinine: 4.1 mg/dL (11-02-23 @ 11:23)      All available historical records have been reviewed       AMANDA CASTORENA  89y, Male    LOS  4d    INTERVAL EVENTS/HPI  - No acute events overnight  - T(F): , Max: 98.4 (11-03-23 @ 03:00)  - Tolerating medication  - WBC Count: 10.14 (11-02-23 @ 06:21)  WBC Count: 9.52 (11-01-23 @ 08:08)  - Creatinine: 4.2 (11-02-23 @ 21:31)  Creatinine: 4.2 (11-02-23 @ 18:20)     REVIEW OF SYSTEMS:  CONSTITUTIONAL: No fever or chills  HEENT: No sore throat  RESPIRATORY: No cough, no shortness of breath  CARDIOVASCULAR: No chest pain or palpitations  GASTROINTESTINAL: No abdominal or epigastric pain  GENITOURINARY: No dysuria  NEUROLOGICAL: No headache/dizziness  MSK: No joint pain, erythema, or swelling; no back pain  SKIN: No itching, rashes  All other ROS negative except noted above    Prior hospital charts reviewed [Yes]  Primary team notes reviewed [Yes]  Other consultant notes reviewed [Yes]    ANTIMICROBIALS:   cefepime   IVPB 1000 every 24 hours  MEDICATIONS  (STANDING):  cefepime   IVPB   100 mL/Hr IV Intermittent (11-02-23 @ 14:39)   100 mL/Hr IV Intermittent (11-01-23 @ 15:17)   100 mL/Hr IV Intermittent (10-31-23 @ 14:20)    piperacillin/tazobactam IVPB.   200 mL/Hr IV Intermittent (10-31-23 @ 04:26)    vancomycin  IVPB   166.67 mL/Hr IV Intermittent (10-31-23 @ 12:17)        OTHER MEDS: MEDICATIONS  (STANDING):  acetaminophen   IVPB .. 1000 once  acetaminophen  Suppository .. 650 every 4 hours PRN  albuterol    90 MICROgram(s) HFA Inhaler 2 every 4 hours PRN  albuterol/ipratropium for Nebulization 3 every 6 hours  albuterol/ipratropium for Nebulization. 3 once  allopurinol 100 daily  aspirin  chewable 81 daily  buMETAnide Injectable 2 <User Schedule>  carvedilol 25 every 12 hours  citalopram 20 daily  clonazePAM  Tablet 2 at bedtime PRN  clopidogrel Tablet 75 daily  dextrose 50% Injectable 50 every 15 minutes  gabapentin 300 two times a day  heparin   Injectable 5000 every 8 hours  insulin regular Infusion 2 <Continuous>  pantoprazole  Injectable 40 daily      Vital Signs Last 24 Hrs  T(F): 98.4 (11-03-23 @ 07:05), Max: 103.5 (10-31-23 @ 03:00)    Vital Signs Last 24 Hrs  HR: 105 (11-03-23 @ 05:00) (97 - 133)  BP: 120/68 (11-03-23 @ 05:00) (109/55 - 142/63)  RR: 23 (11-03-23 @ 07:05)  SpO2: 99% (11-03-23 @ 05:00) (96% - 99%)  Wt(kg): --    EXAM:  GENERAL: NAD, On HFNC  EYES: Conjunctiva pink and cornea white  ENT: Normal external ears and nose  NECK: Supple, nontender to palpation  CHEST/LUNG: Shallow breath sounds. Diffuse Rhonchi   HEART: S1 S2  ABDOMEN: Soft, nontender.   EXTREMITIES: No clubbing, cyanosis, or petal edema  NERVOUS SYSTEM: Alert and oriented to person  MSK: No joint erythema, swelling or pain  SKIN: No rashes or lesions, no superficial thrombophlebitis    Labs:                        8.5    10.14 )-----------( 190      ( 02 Nov 2023 06:21 )             24.9     11-02    139  |  105  |  111<HH>  ----------------------------<  286<H>  4.9   |  14<L>  |  4.2<HH>    Ca    8.2<L>      02 Nov 2023 21:31  Phos  4.3     11-02  Mg     2.3     11-02    TPro  5.8<L>  /  Alb  3.0<L>  /  TBili  0.2  /  DBili  x   /  AST  41  /  ALT  33  /  AlkPhos  80  11-02      WBC Trend:  WBC Count: 10.14 (11-02-23 @ 06:21)  WBC Count: 9.52 (11-01-23 @ 08:08)  WBC Count: 13.02 (10-31-23 @ 05:57)  WBC Count: 12.75 (10-30-23 @ 19:20)      Creatine Trend:  Creatinine: 4.2 (11-02)  Creatinine: 4.2 (11-02)  Creatinine: 4.1 (11-02)  Creatinine: 4.1 (11-02)      Liver Biochemical Testing Trend:  Alanine Aminotransferase (ALT/SGPT): 33 (11-02)  Alanine Aminotransferase (ALT/SGPT): 30 (11-01)  Alanine Aminotransferase (ALT/SGPT): 26 (10-31)  Alanine Aminotransferase (ALT/SGPT): 25 (10-31)  Alanine Aminotransferase (ALT/SGPT): 23 (10-30)  Aspartate Aminotransferase (AST/SGOT): 41 (11-02-23 @ 06:21)  Aspartate Aminotransferase (AST/SGOT): 47 (11-01-23 @ 08:08)  Aspartate Aminotransferase (AST/SGOT): 46 (10-31-23 @ 11:00)  Aspartate Aminotransferase (AST/SGOT): 46 (10-31-23 @ 05:57)  Aspartate Aminotransferase (AST/SGOT): 36 (10-30-23 @ 19:20)  Bilirubin Total: 0.2 (11-02)  Bilirubin Total: 0.2 (11-01)  Bilirubin Total: 0.3 (10-31)  Bilirubin Total: 0.5 (10-31)  Bilirubin Total: 0.4 (10-30)      Trend LDH      Urinalysis Basic - ( 02 Nov 2023 21:31 )    Color: x / Appearance: x / SG: x / pH: x  Gluc: 286 mg/dL / Ketone: x  / Bili: x / Urobili: x   Blood: x / Protein: x / Nitrite: x   Leuk Esterase: x / RBC: x / WBC x   Sq Epi: x / Non Sq Epi: x / Bacteria: x        MICROBIOLOGY:  Vancomycin Level, Random: 11.5 (11-01 @ 08:08)    Male    Culture - Blood (collected 31 Oct 2023 02:26)  Source: .Blood Blood-Peripheral  Preliminary Report:    No growth at 48 Hours    Culture - Blood (collected 31 Oct 2023 02:26)  Source: .Blood Blood-Peripheral  Preliminary Report:    No growth at 48 Hours    Procalcitonin, Serum: >400.00 (11-02)    RADIOLOGY & ADDITIONAL TESTS:  I have personally reviewed the relevant images.   CXR      CT    < from: Xray Chest 1 View-PORTABLE IMMEDIATE (Xray Chest 1 View-PORTABLE IMMEDIATE .) (11.02.23 @ 08:17) >  Skeleton/soft tissues: Unchanged.    Impression:    Slight increased bibasilar opacities, pleural effusions. No pneumothorax.    < end of copied text >    < from: Xray Chest 1 View- PORTABLE-Routine (11.03.23 @ 07:22) >    Impression:    Stable bilateral opacities/effusions, greater on the right.        < end of copied text >      WEIGHT  Weight (kg): 88.5 (10-30-23 @ 18:39)  Creatinine: 4.2 mg/dL (11-02-23 @ 21:31)  Creatinine: 4.2 mg/dL (11-02-23 @ 18:20)  Creatinine: 4.1 mg/dL (11-02-23 @ 15:13)  Creatinine: 4.1 mg/dL (11-02-23 @ 11:23)      All available historical records have been reviewed

## 2023-11-04 LAB
ALBUMIN SERPL ELPH-MCNC: 2.6 G/DL — LOW (ref 3.5–5.2)
ALBUMIN SERPL ELPH-MCNC: 2.6 G/DL — LOW (ref 3.5–5.2)
ALP SERPL-CCNC: 87 U/L — SIGNIFICANT CHANGE UP (ref 30–115)
ALP SERPL-CCNC: 87 U/L — SIGNIFICANT CHANGE UP (ref 30–115)
ALT FLD-CCNC: 35 U/L — SIGNIFICANT CHANGE UP (ref 0–41)
ALT FLD-CCNC: 35 U/L — SIGNIFICANT CHANGE UP (ref 0–41)
ANION GAP SERPL CALC-SCNC: 15 MMOL/L — HIGH (ref 7–14)
ANION GAP SERPL CALC-SCNC: 17 MMOL/L — HIGH (ref 7–14)
ANION GAP SERPL CALC-SCNC: 17 MMOL/L — HIGH (ref 7–14)
APPEARANCE UR: ABNORMAL
APPEARANCE UR: ABNORMAL
AST SERPL-CCNC: 59 U/L — HIGH (ref 0–41)
AST SERPL-CCNC: 59 U/L — HIGH (ref 0–41)
BACTERIA # UR AUTO: ABNORMAL /HPF
BACTERIA # UR AUTO: ABNORMAL /HPF
BASOPHILS # BLD AUTO: 0.01 K/UL — SIGNIFICANT CHANGE UP (ref 0–0.2)
BASOPHILS # BLD AUTO: 0.01 K/UL — SIGNIFICANT CHANGE UP (ref 0–0.2)
BASOPHILS NFR BLD AUTO: 0.1 % — SIGNIFICANT CHANGE UP (ref 0–1)
BASOPHILS NFR BLD AUTO: 0.1 % — SIGNIFICANT CHANGE UP (ref 0–1)
BILIRUB SERPL-MCNC: 0.2 MG/DL — SIGNIFICANT CHANGE UP (ref 0.2–1.2)
BILIRUB SERPL-MCNC: 0.2 MG/DL — SIGNIFICANT CHANGE UP (ref 0.2–1.2)
BILIRUB UR-MCNC: NEGATIVE — SIGNIFICANT CHANGE UP
BILIRUB UR-MCNC: NEGATIVE — SIGNIFICANT CHANGE UP
BUN SERPL-MCNC: 109 MG/DL — CRITICAL HIGH (ref 10–20)
BUN SERPL-MCNC: 109 MG/DL — CRITICAL HIGH (ref 10–20)
BUN SERPL-MCNC: 64 MG/DL — CRITICAL HIGH (ref 10–20)
BUN SERPL-MCNC: 64 MG/DL — CRITICAL HIGH (ref 10–20)
BUN SERPL-MCNC: 71 MG/DL — CRITICAL HIGH (ref 10–20)
BUN SERPL-MCNC: 71 MG/DL — CRITICAL HIGH (ref 10–20)
CALCIUM SERPL-MCNC: 7.8 MG/DL — LOW (ref 8.4–10.5)
CALCIUM SERPL-MCNC: 8 MG/DL — LOW (ref 8.4–10.5)
CALCIUM SERPL-MCNC: 8 MG/DL — LOW (ref 8.4–10.5)
CHLORIDE SERPL-SCNC: 104 MMOL/L — SIGNIFICANT CHANGE UP (ref 98–110)
CHLORIDE SERPL-SCNC: 104 MMOL/L — SIGNIFICANT CHANGE UP (ref 98–110)
CHLORIDE SERPL-SCNC: 108 MMOL/L — SIGNIFICANT CHANGE UP (ref 98–110)
CO2 SERPL-SCNC: 21 MMOL/L — SIGNIFICANT CHANGE UP (ref 17–32)
CO2 SERPL-SCNC: 24 MMOL/L — SIGNIFICANT CHANGE UP (ref 17–32)
CO2 SERPL-SCNC: 24 MMOL/L — SIGNIFICANT CHANGE UP (ref 17–32)
COLOR SPEC: YELLOW — SIGNIFICANT CHANGE UP
COLOR SPEC: YELLOW — SIGNIFICANT CHANGE UP
CREAT SERPL-MCNC: 2.7 MG/DL — HIGH (ref 0.7–1.5)
CREAT SERPL-MCNC: 2.7 MG/DL — HIGH (ref 0.7–1.5)
CREAT SERPL-MCNC: 2.9 MG/DL — HIGH (ref 0.7–1.5)
CREAT SERPL-MCNC: 2.9 MG/DL — HIGH (ref 0.7–1.5)
CREAT SERPL-MCNC: 4 MG/DL — HIGH (ref 0.7–1.5)
CREAT SERPL-MCNC: 4 MG/DL — HIGH (ref 0.7–1.5)
DIFF PNL FLD: ABNORMAL
DIFF PNL FLD: ABNORMAL
EGFR: 13 ML/MIN/1.73M2 — LOW
EGFR: 13 ML/MIN/1.73M2 — LOW
EGFR: 20 ML/MIN/1.73M2 — LOW
EGFR: 20 ML/MIN/1.73M2 — LOW
EGFR: 21 ML/MIN/1.73M2 — LOW
EGFR: 21 ML/MIN/1.73M2 — LOW
EOSINOPHIL # BLD AUTO: 0 K/UL — SIGNIFICANT CHANGE UP (ref 0–0.7)
EOSINOPHIL # BLD AUTO: 0 K/UL — SIGNIFICANT CHANGE UP (ref 0–0.7)
EOSINOPHIL NFR BLD AUTO: 0 % — SIGNIFICANT CHANGE UP (ref 0–8)
EOSINOPHIL NFR BLD AUTO: 0 % — SIGNIFICANT CHANGE UP (ref 0–8)
EPI CELLS # UR: PRESENT
EPI CELLS # UR: PRESENT
GLUCOSE BLDC GLUCOMTR-MCNC: 120 MG/DL — HIGH (ref 70–99)
GLUCOSE BLDC GLUCOMTR-MCNC: 120 MG/DL — HIGH (ref 70–99)
GLUCOSE BLDC GLUCOMTR-MCNC: 139 MG/DL — HIGH (ref 70–99)
GLUCOSE BLDC GLUCOMTR-MCNC: 139 MG/DL — HIGH (ref 70–99)
GLUCOSE BLDC GLUCOMTR-MCNC: 144 MG/DL — HIGH (ref 70–99)
GLUCOSE BLDC GLUCOMTR-MCNC: 144 MG/DL — HIGH (ref 70–99)
GLUCOSE BLDC GLUCOMTR-MCNC: 145 MG/DL — HIGH (ref 70–99)
GLUCOSE BLDC GLUCOMTR-MCNC: 145 MG/DL — HIGH (ref 70–99)
GLUCOSE BLDC GLUCOMTR-MCNC: 163 MG/DL — HIGH (ref 70–99)
GLUCOSE BLDC GLUCOMTR-MCNC: 163 MG/DL — HIGH (ref 70–99)
GLUCOSE BLDC GLUCOMTR-MCNC: 169 MG/DL — HIGH (ref 70–99)
GLUCOSE BLDC GLUCOMTR-MCNC: 169 MG/DL — HIGH (ref 70–99)
GLUCOSE BLDC GLUCOMTR-MCNC: 170 MG/DL — HIGH (ref 70–99)
GLUCOSE BLDC GLUCOMTR-MCNC: 170 MG/DL — HIGH (ref 70–99)
GLUCOSE BLDC GLUCOMTR-MCNC: 208 MG/DL — HIGH (ref 70–99)
GLUCOSE BLDC GLUCOMTR-MCNC: 208 MG/DL — HIGH (ref 70–99)
GLUCOSE BLDC GLUCOMTR-MCNC: 221 MG/DL — HIGH (ref 70–99)
GLUCOSE BLDC GLUCOMTR-MCNC: 221 MG/DL — HIGH (ref 70–99)
GLUCOSE BLDC GLUCOMTR-MCNC: 223 MG/DL — HIGH (ref 70–99)
GLUCOSE BLDC GLUCOMTR-MCNC: 226 MG/DL — HIGH (ref 70–99)
GLUCOSE BLDC GLUCOMTR-MCNC: 226 MG/DL — HIGH (ref 70–99)
GLUCOSE SERPL-MCNC: 157 MG/DL — HIGH (ref 70–99)
GLUCOSE SERPL-MCNC: 157 MG/DL — HIGH (ref 70–99)
GLUCOSE SERPL-MCNC: 206 MG/DL — HIGH (ref 70–99)
GLUCOSE SERPL-MCNC: 206 MG/DL — HIGH (ref 70–99)
GLUCOSE SERPL-MCNC: 242 MG/DL — HIGH (ref 70–99)
GLUCOSE SERPL-MCNC: 242 MG/DL — HIGH (ref 70–99)
GLUCOSE UR QL: NEGATIVE MG/DL — SIGNIFICANT CHANGE UP
GLUCOSE UR QL: NEGATIVE MG/DL — SIGNIFICANT CHANGE UP
HBV CORE AB SER-ACNC: SIGNIFICANT CHANGE UP
HBV CORE AB SER-ACNC: SIGNIFICANT CHANGE UP
HBV CORE IGM SER-ACNC: SIGNIFICANT CHANGE UP
HBV CORE IGM SER-ACNC: SIGNIFICANT CHANGE UP
HBV SURFACE AB SER-ACNC: SIGNIFICANT CHANGE UP
HBV SURFACE AB SER-ACNC: SIGNIFICANT CHANGE UP
HBV SURFACE AG SER-ACNC: SIGNIFICANT CHANGE UP
HBV SURFACE AG SER-ACNC: SIGNIFICANT CHANGE UP
HCT VFR BLD CALC: 24.4 % — LOW (ref 42–52)
HCT VFR BLD CALC: 24.4 % — LOW (ref 42–52)
HGB BLD-MCNC: 8.4 G/DL — LOW (ref 14–18)
HGB BLD-MCNC: 8.4 G/DL — LOW (ref 14–18)
IMM GRANULOCYTES NFR BLD AUTO: 1.3 % — HIGH (ref 0.1–0.3)
IMM GRANULOCYTES NFR BLD AUTO: 1.3 % — HIGH (ref 0.1–0.3)
KETONES UR-MCNC: NEGATIVE MG/DL — SIGNIFICANT CHANGE UP
KETONES UR-MCNC: NEGATIVE MG/DL — SIGNIFICANT CHANGE UP
LEUKOCYTE ESTERASE UR-ACNC: NEGATIVE — SIGNIFICANT CHANGE UP
LEUKOCYTE ESTERASE UR-ACNC: NEGATIVE — SIGNIFICANT CHANGE UP
LYMPHOCYTES # BLD AUTO: 0.62 K/UL — LOW (ref 1.2–3.4)
LYMPHOCYTES # BLD AUTO: 0.62 K/UL — LOW (ref 1.2–3.4)
LYMPHOCYTES # BLD AUTO: 7.6 % — LOW (ref 20.5–51.1)
LYMPHOCYTES # BLD AUTO: 7.6 % — LOW (ref 20.5–51.1)
MAGNESIUM SERPL-MCNC: 2.3 MG/DL — SIGNIFICANT CHANGE UP (ref 1.8–2.4)
MAGNESIUM SERPL-MCNC: 2.3 MG/DL — SIGNIFICANT CHANGE UP (ref 1.8–2.4)
MCHC RBC-ENTMCNC: 28.8 PG — SIGNIFICANT CHANGE UP (ref 27–31)
MCHC RBC-ENTMCNC: 28.8 PG — SIGNIFICANT CHANGE UP (ref 27–31)
MCHC RBC-ENTMCNC: 34.4 G/DL — SIGNIFICANT CHANGE UP (ref 32–37)
MCHC RBC-ENTMCNC: 34.4 G/DL — SIGNIFICANT CHANGE UP (ref 32–37)
MCV RBC AUTO: 83.6 FL — SIGNIFICANT CHANGE UP (ref 80–94)
MCV RBC AUTO: 83.6 FL — SIGNIFICANT CHANGE UP (ref 80–94)
MONOCYTES # BLD AUTO: 0.32 K/UL — SIGNIFICANT CHANGE UP (ref 0.1–0.6)
MONOCYTES # BLD AUTO: 0.32 K/UL — SIGNIFICANT CHANGE UP (ref 0.1–0.6)
MONOCYTES NFR BLD AUTO: 3.9 % — SIGNIFICANT CHANGE UP (ref 1.7–9.3)
MONOCYTES NFR BLD AUTO: 3.9 % — SIGNIFICANT CHANGE UP (ref 1.7–9.3)
NEUTROPHILS # BLD AUTO: 7.14 K/UL — HIGH (ref 1.4–6.5)
NEUTROPHILS # BLD AUTO: 7.14 K/UL — HIGH (ref 1.4–6.5)
NEUTROPHILS NFR BLD AUTO: 87.1 % — HIGH (ref 42.2–75.2)
NEUTROPHILS NFR BLD AUTO: 87.1 % — HIGH (ref 42.2–75.2)
NITRITE UR-MCNC: NEGATIVE — SIGNIFICANT CHANGE UP
NITRITE UR-MCNC: NEGATIVE — SIGNIFICANT CHANGE UP
NRBC # BLD: 0 /100 WBCS — SIGNIFICANT CHANGE UP (ref 0–0)
NRBC # BLD: 0 /100 WBCS — SIGNIFICANT CHANGE UP (ref 0–0)
PH UR: 6.5 — SIGNIFICANT CHANGE UP (ref 5–8)
PH UR: 6.5 — SIGNIFICANT CHANGE UP (ref 5–8)
PHOSPHATE SERPL-MCNC: 4 MG/DL — SIGNIFICANT CHANGE UP (ref 2.1–4.9)
PHOSPHATE SERPL-MCNC: 4 MG/DL — SIGNIFICANT CHANGE UP (ref 2.1–4.9)
PLATELET # BLD AUTO: 216 K/UL — SIGNIFICANT CHANGE UP (ref 130–400)
PLATELET # BLD AUTO: 216 K/UL — SIGNIFICANT CHANGE UP (ref 130–400)
PMV BLD: 11.3 FL — HIGH (ref 7.4–10.4)
PMV BLD: 11.3 FL — HIGH (ref 7.4–10.4)
POTASSIUM SERPL-MCNC: 4 MMOL/L — SIGNIFICANT CHANGE UP (ref 3.5–5)
POTASSIUM SERPL-MCNC: 4.1 MMOL/L — SIGNIFICANT CHANGE UP (ref 3.5–5)
POTASSIUM SERPL-MCNC: 4.1 MMOL/L — SIGNIFICANT CHANGE UP (ref 3.5–5)
POTASSIUM SERPL-SCNC: 4 MMOL/L — SIGNIFICANT CHANGE UP (ref 3.5–5)
POTASSIUM SERPL-SCNC: 4.1 MMOL/L — SIGNIFICANT CHANGE UP (ref 3.5–5)
POTASSIUM SERPL-SCNC: 4.1 MMOL/L — SIGNIFICANT CHANGE UP (ref 3.5–5)
PROT SERPL-MCNC: 5.3 G/DL — LOW (ref 6–8)
PROT SERPL-MCNC: 5.3 G/DL — LOW (ref 6–8)
PROT UR-MCNC: 30 MG/DL
PROT UR-MCNC: 30 MG/DL
RBC # BLD: 2.92 M/UL — LOW (ref 4.7–6.1)
RBC # BLD: 2.92 M/UL — LOW (ref 4.7–6.1)
RBC # FLD: 15.7 % — HIGH (ref 11.5–14.5)
RBC # FLD: 15.7 % — HIGH (ref 11.5–14.5)
RBC CASTS # UR COMP ASSIST: 5 /HPF — HIGH (ref 0–4)
RBC CASTS # UR COMP ASSIST: 5 /HPF — HIGH (ref 0–4)
SODIUM SERPL-SCNC: 142 MMOL/L — SIGNIFICANT CHANGE UP (ref 135–146)
SODIUM SERPL-SCNC: 142 MMOL/L — SIGNIFICANT CHANGE UP (ref 135–146)
SODIUM SERPL-SCNC: 144 MMOL/L — SIGNIFICANT CHANGE UP (ref 135–146)
SODIUM SERPL-SCNC: 144 MMOL/L — SIGNIFICANT CHANGE UP (ref 135–146)
SODIUM SERPL-SCNC: 147 MMOL/L — HIGH (ref 135–146)
SODIUM SERPL-SCNC: 147 MMOL/L — HIGH (ref 135–146)
SP GR SPEC: 1.01 — SIGNIFICANT CHANGE UP (ref 1–1.03)
SP GR SPEC: 1.01 — SIGNIFICANT CHANGE UP (ref 1–1.03)
SQUAMOUS # UR AUTO: 10 /HPF — HIGH (ref 0–5)
SQUAMOUS # UR AUTO: 10 /HPF — HIGH (ref 0–5)
UROBILINOGEN FLD QL: 0.2 MG/DL — SIGNIFICANT CHANGE UP (ref 0.2–1)
UROBILINOGEN FLD QL: 0.2 MG/DL — SIGNIFICANT CHANGE UP (ref 0.2–1)
WBC # BLD: 8.2 K/UL — SIGNIFICANT CHANGE UP (ref 4.8–10.8)
WBC # BLD: 8.2 K/UL — SIGNIFICANT CHANGE UP (ref 4.8–10.8)
WBC # FLD AUTO: 8.2 K/UL — SIGNIFICANT CHANGE UP (ref 4.8–10.8)
WBC # FLD AUTO: 8.2 K/UL — SIGNIFICANT CHANGE UP (ref 4.8–10.8)
WBC UR QL: 2 /HPF — SIGNIFICANT CHANGE UP (ref 0–5)
WBC UR QL: 2 /HPF — SIGNIFICANT CHANGE UP (ref 0–5)

## 2023-11-04 PROCEDURE — 71045 X-RAY EXAM CHEST 1 VIEW: CPT | Mod: 26

## 2023-11-04 PROCEDURE — 99233 SBSQ HOSP IP/OBS HIGH 50: CPT

## 2023-11-04 PROCEDURE — 99291 CRITICAL CARE FIRST HOUR: CPT

## 2023-11-04 RX ORDER — POLYETHYLENE GLYCOL 3350 17 G/17G
17 POWDER, FOR SOLUTION ORAL DAILY
Refills: 0 | Status: DISCONTINUED | OUTPATIENT
Start: 2023-11-04 | End: 2023-11-28

## 2023-11-04 RX ORDER — INSULIN HUMAN 100 [IU]/ML
2 INJECTION, SOLUTION SUBCUTANEOUS
Qty: 100 | Refills: 0 | Status: DISCONTINUED | OUTPATIENT
Start: 2023-11-04 | End: 2023-11-05

## 2023-11-04 RX ORDER — INSULIN HUMAN 100 [IU]/ML
2 INJECTION, SOLUTION SUBCUTANEOUS
Qty: 100 | Refills: 0 | Status: DISCONTINUED | OUTPATIENT
Start: 2023-11-04 | End: 2023-11-04

## 2023-11-04 RX ORDER — SENNA PLUS 8.6 MG/1
2 TABLET ORAL AT BEDTIME
Refills: 0 | Status: DISCONTINUED | OUTPATIENT
Start: 2023-11-04 | End: 2023-11-28

## 2023-11-04 RX ORDER — NOREPINEPHRINE BITARTRATE/D5W 8 MG/250ML
0.05 PLASTIC BAG, INJECTION (ML) INTRAVENOUS
Qty: 8 | Refills: 0 | Status: DISCONTINUED | OUTPATIENT
Start: 2023-11-04 | End: 2023-11-06

## 2023-11-04 RX ORDER — VANCOMYCIN HCL 1 G
1250 VIAL (EA) INTRAVENOUS ONCE
Refills: 0 | Status: COMPLETED | OUTPATIENT
Start: 2023-11-04 | End: 2023-11-04

## 2023-11-04 RX ADMIN — Medication 166.67 MILLIGRAM(S): at 18:27

## 2023-11-04 RX ADMIN — PANTOPRAZOLE SODIUM 40 MILLIGRAM(S): 20 TABLET, DELAYED RELEASE ORAL at 12:13

## 2023-11-04 RX ADMIN — INSULIN HUMAN 7 UNIT(S)/HR: 100 INJECTION, SOLUTION SUBCUTANEOUS at 02:30

## 2023-11-04 RX ADMIN — BUMETANIDE 2 MILLIGRAM(S): 0.25 INJECTION INTRAMUSCULAR; INTRAVENOUS at 05:17

## 2023-11-04 RX ADMIN — BUMETANIDE 2 MILLIGRAM(S): 0.25 INJECTION INTRAMUSCULAR; INTRAVENOUS at 18:10

## 2023-11-04 RX ADMIN — Medication 8.3 MICROGRAM(S)/KG/MIN: at 09:24

## 2023-11-04 RX ADMIN — BUMETANIDE 2 MILLIGRAM(S): 0.25 INJECTION INTRAMUSCULAR; INTRAVENOUS at 12:13

## 2023-11-04 RX ADMIN — CITALOPRAM 20 MILLIGRAM(S): 10 TABLET, FILM COATED ORAL at 12:37

## 2023-11-04 RX ADMIN — GABAPENTIN 300 MILLIGRAM(S): 400 CAPSULE ORAL at 18:10

## 2023-11-04 RX ADMIN — Medication 650 MILLIGRAM(S): at 05:18

## 2023-11-04 RX ADMIN — Medication 650 MILLIGRAM(S): at 05:48

## 2023-11-04 RX ADMIN — Medication 81 MILLIGRAM(S): at 12:37

## 2023-11-04 RX ADMIN — SENNA PLUS 2 TABLET(S): 8.6 TABLET ORAL at 22:37

## 2023-11-04 RX ADMIN — GABAPENTIN 300 MILLIGRAM(S): 400 CAPSULE ORAL at 12:37

## 2023-11-04 RX ADMIN — Medication 3 MILLILITER(S): at 08:01

## 2023-11-04 RX ADMIN — CHLORHEXIDINE GLUCONATE 1 APPLICATION(S): 213 SOLUTION TOPICAL at 10:00

## 2023-11-04 RX ADMIN — CLOPIDOGREL BISULFATE 75 MILLIGRAM(S): 75 TABLET, FILM COATED ORAL at 12:37

## 2023-11-04 RX ADMIN — INSULIN HUMAN 2 UNIT(S)/HR: 100 INJECTION, SOLUTION SUBCUTANEOUS at 02:58

## 2023-11-04 RX ADMIN — CEFEPIME 100 MILLIGRAM(S): 1 INJECTION, POWDER, FOR SOLUTION INTRAMUSCULAR; INTRAVENOUS at 16:55

## 2023-11-04 RX ADMIN — BUMETANIDE 2 MILLIGRAM(S): 0.25 INJECTION INTRAMUSCULAR; INTRAVENOUS at 23:29

## 2023-11-04 RX ADMIN — HEPARIN SODIUM 5000 UNIT(S): 5000 INJECTION INTRAVENOUS; SUBCUTANEOUS at 05:19

## 2023-11-04 RX ADMIN — Medication 3 MILLILITER(S): at 19:02

## 2023-11-04 RX ADMIN — HEPARIN SODIUM 5000 UNIT(S): 5000 INJECTION INTRAVENOUS; SUBCUTANEOUS at 21:50

## 2023-11-04 RX ADMIN — INSULIN HUMAN 3 UNIT(S)/HR: 100 INJECTION, SOLUTION SUBCUTANEOUS at 06:45

## 2023-11-04 RX ADMIN — HEPARIN SODIUM 5000 UNIT(S): 5000 INJECTION INTRAVENOUS; SUBCUTANEOUS at 15:30

## 2023-11-04 RX ADMIN — Medication 100 MILLIGRAM(S): at 12:38

## 2023-11-04 NOTE — PROGRESS NOTE ADULT - ASSESSMENT
# Severe PARKER likely due to sepsis. Pt has Screat up to ~ 2.0 as far back as June 2021  # Hyperkalemia, given IV CaGluc and Lokelma, plus Bumex IV resolved  # Urine chemistries c/w pre-renal state / CRS w/ FeNa 0.3%, FeUrea 14%  # HAGMA d/t DKA / renal failure    Recommendations:  - mild improvement in urine output on iv bumex  - d/w pt's daughter Kristina, gave consent for HD  -  HD #1 yesterday 11/3   today HD#2 for 3hrs, low flows, 3K+ bath, 1L UF as tolerated  CXR - b/l opacities, CHF, BP is low - start Midodrine 10 mg q8 to allow for some UF  - can cont iv bumex as long as pt non oliguric to maximize urine output  - bowden, strict i/o     - complete sepsis w/u, abx per ID recs, dose for iHD  - insulin drip as needed per ICU protocol # Severe PARKER likely due to sepsis. Pt has Screat up to ~ 2.0 as far back as June 2021  # Hyperkalemia, given IV CaGluc and Lokelma, plus Bumex IV resolved  # Urine chemistries c/w pre-renal state / CRS w/ FeNa 0.3%, FeUrea 14%  # HAGMA d/t DKA / renal failure    Recommendations:  - mild improvement in urine output on iv bumex  -  daughter Kristina, gave consent for HD  -  HD #1 yesterday 11/3   today HD#2 for 3hrs, low flows, 3K+ bath, 1L UF as tolerated  CXR - b/l opacities, CHF, BP is low - start Midodrine 10 mg q8 to allow for some UF  - can cont iv bumex as long as pt non oliguric to maximize urine output  - bowden, strict i/o     - complete sepsis w/u, abx per ID recs, dose for iHD

## 2023-11-04 NOTE — DIETITIAN INITIAL EVALUATION ADULT - NSFNSPHYEXAMSKINFT_GEN_A_CORE
Pressure Injury 1: sacral spine, Stage I  Pressure Injury 2: none, none  Pressure Injury 3: none, none  Pressure Injury 4: none, none  Pressure Injury 5: none, none  Pressure Injury 6: none, none  Pressure Injury 7: none, none  Pressure Injury 8: none, none  Pressure Injury 9: none, none  Pressure Injury 10: none, none  Pressure Injury 11: none, none Pressure Injury 1: sacral spine, Stage I

## 2023-11-04 NOTE — PROGRESS NOTE ADULT - SUBJECTIVE AND OBJECTIVE BOX
SUBJECTIVE:    Patient is a 89y old Male who presents with a chief complaint of sob (04 Nov 2023 08:39)    Currently admitted to medicine with the primary diagnosis of DKA (diabetic ketoacidosis)    Today is hospital day 5d. This morning he is resting in bed and reports no new issues or overnight events.       PAST MEDICAL & SURGICAL HISTORY  CHF (congestive heart failure)    DM (diabetes mellitus)    HTN (hypertension)    Prostate CA  in remission    Pacemaker    H/O total knee replacement, right      SOCIAL HISTORY:  Negative for smoking/alcohol/drug use.     ALLERGIES:  No Known Allergies    MEDICATIONS:  STANDING MEDICATIONS  albuterol/ipratropium for Nebulization 3 milliLiter(s) Nebulizer every 6 hours  albuterol/ipratropium for Nebulization. 3 milliLiter(s) Nebulizer once  allopurinol 100 milliGRAM(s) Oral daily  aspirin  chewable 81 milliGRAM(s) Oral daily  buMETAnide Injectable 2 milliGRAM(s) IV Push <User Schedule>  carvedilol 25 milliGRAM(s) Oral every 12 hours  cefepime   IVPB 1000 milliGRAM(s) IV Intermittent every 24 hours  chlorhexidine 2% Cloths 1 Application(s) Topical <User Schedule>  citalopram 20 milliGRAM(s) Oral daily  clopidogrel Tablet 75 milliGRAM(s) Oral daily  dextrose 50% Injectable 50 milliLiter(s) IV Push every 15 minutes  gabapentin 300 milliGRAM(s) Oral two times a day  heparin   Injectable 5000 Unit(s) SubCutaneous every 8 hours  insulin regular Infusion 3 Unit(s)/Hr IV Continuous <Continuous>  norepinephrine Infusion 0.05 MICROgram(s)/kG/Min IV Continuous <Continuous>  pantoprazole  Injectable 40 milliGRAM(s) IV Push daily    PRN MEDICATIONS  acetaminophen  Suppository .. 650 milliGRAM(s) Rectal every 6 hours PRN  albuterol    90 MICROgram(s) HFA Inhaler 2 Puff(s) Inhalation every 4 hours PRN  clonazePAM  Tablet 2 milliGRAM(s) Oral at bedtime PRN    VITALS:   T(F): 99.3  HR: 75  BP: 105/53  RR: 25  SpO2: 97%    LABS:                        8.4    8.20  )-----------( 216      ( 04 Nov 2023 06:15 )             24.4     11-04    144  |  108  |  109<HH>  ----------------------------<  206<H>  4.0   |  21  |  4.0<H>    Ca    7.8<L>      04 Nov 2023 06:15  Phos  4.0     11-04  Mg     2.3     11-04    TPro  5.3<L>  /  Alb  2.6<L>  /  TBili  0.2  /  DBili  x   /  AST  59<H>  /  ALT  35  /  AlkPhos  87  11-04    PT/INR - ( 03 Nov 2023 12:06 )   PT: 29.80 sec;   INR: 2.59 ratio         PTT - ( 03 Nov 2023 12:06 )  PTT:33.3 sec  Urinalysis Basic - ( 04 Nov 2023 06:15 )    Color: x / Appearance: x / SG: x / pH: x  Gluc: 206 mg/dL / Ketone: x  / Bili: x / Urobili: x   Blood: x / Protein: x / Nitrite: x   Leuk Esterase: x / RBC: x / WBC x   Sq Epi: x / Non Sq Epi: x / Bacteria: x      RADIOLOGY:  Echo Complete w/o Contrast w/ Doppler (10.31.23):   1. Patient with significant PVCs during study.   2. Normal global left ventricular systolic function.   3. LV Ejection Fraction by Adkins's Method with a biplane EF of 61 %.   4. The left ventricular diastolic function could not be assessed in this study.   5. Normal right ventricular size and function.   6. Normal left atrial size.   7. Normal right atrial size.   8. Mild aortic valve stenosis.   9. Moderate to severe mitral annular calcification.  10. Moderate thickening and calcification of the anterior and posterior mitral valve leaflets.  11. Mild mitral stenosis.  12. No evidence of mitral valve regurgitation.  13. Estimated pulmonary artery systolic pressure is 67.1 mmHg assuming a right atrial pressure of 15 mmHg, which is consistent with severe pulmonary hypertension.  14. There is no evidence of pericardial effusion        PHYSICAL EXAM:  GEN: No acute distress  LUNGS: Clear to auscultation bilaterally   HEART: S1/S2 present. RRR.   ABD: Soft, non-tender, non-distended. Bowel sounds present  EXT: NC/NC/NE/2+PP/AG  NEURO: AAOX3  SKIN: intact      SUBJECTIVE:    Patient is a 89y old Male who presents with a chief complaint of sob (04 Nov 2023 08:39)    Currently admitted to medicine with the primary diagnosis of DKA (diabetic ketoacidosis)    Today is hospital day 5d. This morning he is resting in bed and reports no new issues or overnight events.       PAST MEDICAL & SURGICAL HISTORY  CHF (congestive heart failure)    DM (diabetes mellitus)    HTN (hypertension)    Prostate CA  in remission    Pacemaker    H/O total knee replacement, right      SOCIAL HISTORY:  Negative for smoking/alcohol/drug use.     ALLERGIES:  No Known Allergies    MEDICATIONS:  STANDING MEDICATIONS  albuterol/ipratropium for Nebulization 3 milliLiter(s) Nebulizer every 6 hours  albuterol/ipratropium for Nebulization. 3 milliLiter(s) Nebulizer once  allopurinol 100 milliGRAM(s) Oral daily  aspirin  chewable 81 milliGRAM(s) Oral daily  buMETAnide Injectable 2 milliGRAM(s) IV Push <User Schedule>  carvedilol 25 milliGRAM(s) Oral every 12 hours  cefepime   IVPB 1000 milliGRAM(s) IV Intermittent every 24 hours  chlorhexidine 2% Cloths 1 Application(s) Topical <User Schedule>  citalopram 20 milliGRAM(s) Oral daily  clopidogrel Tablet 75 milliGRAM(s) Oral daily  dextrose 50% Injectable 50 milliLiter(s) IV Push every 15 minutes  gabapentin 300 milliGRAM(s) Oral two times a day  heparin   Injectable 5000 Unit(s) SubCutaneous every 8 hours  insulin regular Infusion 3 Unit(s)/Hr IV Continuous <Continuous>  norepinephrine Infusion 0.05 MICROgram(s)/kG/Min IV Continuous <Continuous>  pantoprazole  Injectable 40 milliGRAM(s) IV Push daily    PRN MEDICATIONS  acetaminophen  Suppository .. 650 milliGRAM(s) Rectal every 6 hours PRN  albuterol    90 MICROgram(s) HFA Inhaler 2 Puff(s) Inhalation every 4 hours PRN  clonazePAM  Tablet 2 milliGRAM(s) Oral at bedtime PRN    VITALS:   T(F): 99.3  HR: 75  BP: 105/53  RR: 25  SpO2: 97%    LABS:                        8.4    8.20  )-----------( 216      ( 04 Nov 2023 06:15 )             24.4     11-04    144  |  108  |  109<HH>  ----------------------------<  206<H>  4.0   |  21  |  4.0<H>    Ca    7.8<L>      04 Nov 2023 06:15  Phos  4.0     11-04  Mg     2.3     11-04    TPro  5.3<L>  /  Alb  2.6<L>  /  TBili  0.2  /  DBili  x   /  AST  59<H>  /  ALT  35  /  AlkPhos  87  11-04    PT/INR - ( 03 Nov 2023 12:06 )   PT: 29.80 sec;   INR: 2.59 ratio         PTT - ( 03 Nov 2023 12:06 )  PTT:33.3 sec  Urinalysis Basic - ( 04 Nov 2023 06:15 )    Color: x / Appearance: x / SG: x / pH: x  Gluc: 206 mg/dL / Ketone: x  / Bili: x / Urobili: x   Blood: x / Protein: x / Nitrite: x   Leuk Esterase: x / RBC: x / WBC x   Sq Epi: x / Non Sq Epi: x / Bacteria: x      RADIOLOGY:  Echo Complete w/o Contrast w/ Doppler (10.31.23):   1. Patient with significant PVCs during study.   2. Normal global left ventricular systolic function.   3. LV Ejection Fraction by Adkins's Method with a biplane EF of 61 %.   4. The left ventricular diastolic function could not be assessed in this study.   5. Normal right ventricular size and function.   6. Normal left atrial size.   7. Normal right atrial size.   8. Mild aortic valve stenosis.   9. Moderate to severe mitral annular calcification.  10. Moderate thickening and calcification of the anterior and posterior mitral valve leaflets.  11. Mild mitral stenosis.  12. No evidence of mitral valve regurgitation.  13. Estimated pulmonary artery systolic pressure is 67.1 mmHg assuming a right atrial pressure of 15 mmHg, which is consistent with severe pulmonary hypertension.  14. There is no evidence of pericardial effusion        PHYSICAL EXAM:  GEN: No acute distress, on bipap   LUNGS: Clear to auscultation bilaterally   HEART: S1/S2 present. irregular  ABD: Soft, non-tender, non-distended. Bowel sounds present  EXT: NC/NC/NE/2+PP/AG  NEURO: AAOX3  SKIN: intact   (+) bowden (+) Right IJ udal

## 2023-11-04 NOTE — DIETITIAN INITIAL EVALUATION ADULT - ENTERAL
if pt is capable of maintaining adequate respiratory fxn on High flow recommend EN of diabetisource at 20 ml/hr increase as tolerated to goal rate of 55 ml/hr x 24 hrs with beneprotien 2 pkts q 12 hrs (mix 2 pkts with 50 ml H20) will provide pt with 1650+100 kcals, 83+24g protein, 1320+100 ml total volume meeting >80% of estimated nutrient needs

## 2023-11-04 NOTE — DIETITIAN INITIAL EVALUATION ADULT - NS FNS DIET ORDER
Diet, NPO:   Except Medications     Special Instructions for Nursing:  Except Medications (10-30-23 @ 23:03) [Active]

## 2023-11-04 NOTE — PROGRESS NOTE ADULT - SUBJECTIVE AND OBJECTIVE BOX
Patient is a 89y old  Male who presents with a chief complaint of sob (03 Nov 2023 12:38)        Over Night Events:    Febrile overnight   Tolerating BIPAP         ROS:  See HPI    PHYSICAL EXAM    ICU Vital Signs Last 24 Hrs  T(C): 38 (04 Nov 2023 04:01), Max: 38.7 (03 Nov 2023 23:00)  T(F): 100.4 (04 Nov 2023 04:01), Max: 101.7 (03 Nov 2023 23:00)  HR: 83 (04 Nov 2023 03:00) (78 - 105)  BP: 100/69 (04 Nov 2023 03:00) (82/50 - 148/64)  BP(mean): 78 (04 Nov 2023 03:00) (62 - 92)  ABP: --  ABP(mean): --  RR: 29 (04 Nov 2023 03:00) (21 - 37)  SpO2: 97% (04 Nov 2023 03:00) (91% - 100%)    O2 Parameters below as of 04 Nov 2023 04:00  Patient On (Oxygen Delivery Method): BiPAP/CPAP    O2 Concentration (%): 50        General: NAD, lethargic   HEENT: BRANDI             Lymphatic system: No cervical LN   Lungs: Bilateral BS, clear anteriorly   Cardiovascular: Regular   Gastrointestinal: Soft, Positive BS  Extremities: No clubbing.  Moves extremities.  Full Range of motion   Skin: Warm, intact  Neurological: No motor or sensory deficit       11-02-23 @ 07:01  -  11-03-23 @ 07:00  --------------------------------------------------------  IN:    dextrose 5% + lactated ringers: 450 mL    Insulin: 2 mL    Insulin: 8 mL    Insulin: 11 mL    Insulin: 16 mL  Total IN: 487 mL    OUT:    Incontinent per Collection Bag (mL): 625 mL  Total OUT: 625 mL    Total NET: -138 mL      11-03-23 @ 07:01  -  11-04-23 @ 05:43  --------------------------------------------------------  IN:    Insulin: 10 mL    Insulin: 42 mL    Insulin: 4 mL    IV PiggyBack: 50 mL  Total IN: 106 mL    OUT:    Incontinent per Collection Bag (mL): 1105 mL    Other (mL): 500 mL  Total OUT: 1605 mL    Total NET: -1499 mL          LABS:                            8.8    9.23  )-----------( 211      ( 03 Nov 2023 05:43 )             27.0                                               11-03    142  |  107  |  98<HH>  ----------------------------<  138<H>  4.2   |  21  |  3.6<H>    Ca    8.5      03 Nov 2023 21:42  Phos  4.9     11-03  Mg     2.5     11-03    TPro  5.7<L>  /  Alb  2.7<L>  /  TBili  0.2  /  DBili  x   /  AST  41  /  ALT  32  /  AlkPhos  94  11-03      PT/INR - ( 03 Nov 2023 12:06 )   PT: 29.80 sec;   INR: 2.59 ratio         PTT - ( 03 Nov 2023 12:06 )  PTT:33.3 sec                                       Urinalysis Basic - ( 03 Nov 2023 21:42 )    Color: x / Appearance: x / SG: x / pH: x  Gluc: 138 mg/dL / Ketone: x  / Bili: x / Urobili: x   Blood: x / Protein: x / Nitrite: x   Leuk Esterase: x / RBC: x / WBC x   Sq Epi: x / Non Sq Epi: x / Bacteria: x                                                  LIVER FUNCTIONS - ( 03 Nov 2023 05:43 )  Alb: 2.7 g/dL / Pro: 5.7 g/dL / ALK PHOS: 94 U/L / ALT: 32 U/L / AST: 41 U/L / GGT: x                                                                                                                                       MEDICATIONS  (STANDING):  albuterol/ipratropium for Nebulization 3 milliLiter(s) Nebulizer every 6 hours  albuterol/ipratropium for Nebulization. 3 milliLiter(s) Nebulizer once  allopurinol 100 milliGRAM(s) Oral daily  aspirin  chewable 81 milliGRAM(s) Oral daily  buMETAnide Injectable 2 milliGRAM(s) IV Push <User Schedule>  carvedilol 25 milliGRAM(s) Oral every 12 hours  cefepime   IVPB 1000 milliGRAM(s) IV Intermittent every 24 hours  chlorhexidine 2% Cloths 1 Application(s) Topical <User Schedule>  citalopram 20 milliGRAM(s) Oral daily  clopidogrel Tablet 75 milliGRAM(s) Oral daily  dextrose 50% Injectable 50 milliLiter(s) IV Push every 15 minutes  gabapentin 300 milliGRAM(s) Oral two times a day  heparin   Injectable 5000 Unit(s) SubCutaneous every 8 hours  insulin regular Infusion 2 Unit(s)/Hr (2 mL/Hr) IV Continuous <Continuous>  pantoprazole  Injectable 40 milliGRAM(s) IV Push daily    MEDICATIONS  (PRN):  acetaminophen  Suppository .. 650 milliGRAM(s) Rectal every 6 hours PRN Temp greater or equal to 38C (100.4F)  albuterol    90 MICROgram(s) HFA Inhaler 2 Puff(s) Inhalation every 4 hours PRN Shortness of Breath and/or Wheezing  clonazePAM  Tablet 2 milliGRAM(s) Oral at bedtime PRN axniety      Xrays: Right opacity and effusion                                                                                    ECHO

## 2023-11-04 NOTE — DIETITIAN INITIAL EVALUATION ADULT - REASON INDICATOR FOR ASSESSMENT
NST 2/2 wounds NST 2/2 wounds    **Pt also followed by GI-Nutrition Team please refer to their note from 11/2/23

## 2023-11-04 NOTE — DIETITIAN INITIAL EVALUATION ADULT - ORAL INTAKE PTA/DIET HISTORY
as per family at bedside pt would try to consume a CHO Consistent diet, good appetite, NKFA or intolerances. no significant weight changes. pt would regularly consume [employer].  as per family at bedside pt would try to consume a CHO Consistent diet, good appetite, NKFA or intolerances. no significant weight changes. As per EMR weight stable since 2021. pt would regularly consume [employer] chicken.     presently NPO day 6, today is his first time off bipap, presently tolerating high flow O2

## 2023-11-04 NOTE — PROGRESS NOTE ADULT - SUBJECTIVE AND OBJECTIVE BOX
Nephrology progress note    Patient is seen and examined, events over the last 24 h noted .    Allergies:  No Known Allergies    Hospital Medications:   MEDICATIONS  (STANDING):  albuterol/ipratropium for Nebulization 3 milliLiter(s) Nebulizer every 6 hours  albuterol/ipratropium for Nebulization. 3 milliLiter(s) Nebulizer once  allopurinol 100 milliGRAM(s) Oral daily  aspirin  chewable 81 milliGRAM(s) Oral daily  buMETAnide Injectable 2 milliGRAM(s) IV Push <User Schedule>  carvedilol 25 milliGRAM(s) Oral every 12 hours  cefepime   IVPB 1000 milliGRAM(s) IV Intermittent every 24 hours  chlorhexidine 2% Cloths 1 Application(s) Topical <User Schedule>  citalopram 20 milliGRAM(s) Oral daily  clopidogrel Tablet 75 milliGRAM(s) Oral daily  dextrose 50% Injectable 50 milliLiter(s) IV Push every 15 minutes  gabapentin 300 milliGRAM(s) Oral two times a day  heparin   Injectable 5000 Unit(s) SubCutaneous every 8 hours  insulin regular Infusion 3 Unit(s)/Hr (3 mL/Hr) IV Continuous <Continuous>  pantoprazole  Injectable 40 milliGRAM(s) IV Push daily        VITALS:  T(F): 99.3 (11-04-23 @ 07:01), Max: 101.7 (11-03-23 @ 23:00)  HR: 74 (11-04-23 @ 08:00)  BP: 102/52 (11-04-23 @ 08:00)  RR: 26 (11-04-23 @ 08:00)  SpO2: 98% (11-04-23 @ 08:00)  Wt(kg): --    11-02 @ 07:01  -  11-03 @ 07:00  --------------------------------------------------------  IN: 487 mL / OUT: 625 mL / NET: -138 mL    11-03 @ 07:01  -  11-04 @ 07:00  --------------------------------------------------------  IN: 112 mL / OUT: 1605 mL / NET: -1493 mL      Height (cm): 175 (11-04 @ 06:37)    PHYSICAL EXAM:  Constitutional: NAD  HEENT: anicteric sclera, oropharynx clear, MMM  Neck: No JVD  Respiratory: CTAB, no wheezes, rales or rhonchi  Cardiovascular: S1, S2, RRR  Gastrointestinal: BS+, soft, NT/ND  Extremities: No cyanosis or clubbing. No peripheral edema  Neurological: A/O x 3, no focal deficits  : No CVA tenderness. No bowden.   Skin: No rashes  Vascular Access:    LABS:  11-04    144  |  108  |  109<HH>  ----------------------------<  206<H>  4.0   |  21  |  4.0<H>    Ca    7.8<L>      04 Nov 2023 06:15  Phos  4.0     11-04  Mg     2.3     11-04    TPro  5.3<L>  /  Alb  2.6<L>  /  TBili  0.2  /  DBili      /  AST  59<H>  /  ALT  35  /  AlkPhos  87  11-04                          8.4    8.20  )-----------( 216      ( 04 Nov 2023 06:15 )             24.4       Urine Studies:  Urinalysis Basic - ( 04 Nov 2023 06:15 )    Color:  / Appearance:  / SG:  / pH:   Gluc: 206 mg/dL / Ketone:   / Bili:  / Urobili:    Blood:  / Protein:  / Nitrite:    Leuk Esterase:  / RBC:  / WBC    Sq Epi:  / Non Sq Epi:  / Bacteria:       Sodium, Random Urine: <20.0 mmoL/L (10-31 @ 13:24)  Creatinine, Random Urine: 166 mg/dL (10-31 @ 13:24)  Protein/Creatinine Ratio Calculation: 1.0 Ratio (10-31 @ 13:24)  Osmolality, Random Urine: 398 mos/kg (10-31 @ 13:24)  Potassium, Random Urine: 49 mmol/L (10-31 @ 13:24)    RADIOLOGY & ADDITIONAL STUDIES:   Nephrology progress note    Patient is seen and examined, events over the last 24 h noted .  On BiPAP, Low BP -on Levo  Undergoing HD this am  Allergies:  No Known Allergies    Hospital Medications:   MEDICATIONS  (STANDING):  albuterol/ipratropium for Nebulization 3 milliLiter(s) Nebulizer every 6 hours  albuterol/ipratropium for Nebulization. 3 milliLiter(s) Nebulizer once  allopurinol 100 milliGRAM(s) Oral daily  aspirin  chewable 81 milliGRAM(s) Oral daily  buMETAnide Injectable 2 milliGRAM(s) IV Push <User Schedule>  carvedilol 25 milliGRAM(s) Oral every 12 hours  cefepime   IVPB 1000 milliGRAM(s) IV Intermittent every 24 hours  chlorhexidine 2% Cloths 1 Application(s) Topical <User Schedule>  citalopram 20 milliGRAM(s) Oral daily  clopidogrel Tablet 75 milliGRAM(s) Oral daily  dextrose 50% Injectable 50 milliLiter(s) IV Push every 15 minutes  gabapentin 300 milliGRAM(s) Oral two times a day  heparin   Injectable 5000 Unit(s) SubCutaneous every 8 hours  insulin regular Infusion 3 Unit(s)/Hr (3 mL/Hr) IV Continuous <Continuous>  pantoprazole  Injectable 40 milliGRAM(s) IV Push daily        VITALS:  T(F): 99.3 (11-04-23 @ 07:01), Max: 101.7 (11-03-23 @ 23:00)  HR: 74 (11-04-23 @ 08:00)  BP: 102/52 (11-04-23 @ 08:00)  RR: 26 (11-04-23 @ 08:00)  SpO2: 98% (11-04-23 @ 08:00)  Wt(kg): --    11-02 @ 07:01  -  11-03 @ 07:00  --------------------------------------------------------  IN: 487 mL / OUT: 625 mL / NET: -138 mL    11-03 @ 07:01  -  11-04 @ 07:00  --------------------------------------------------------  IN: 112 mL / OUT: 1605 mL / NET: -1493 mL      Height (cm): 175 (11-04 @ 06:37)    PHYSICAL EXAM:  Constitutional: In resp distress, BiPAP  Respiratory: BS b/l+  Cardiovascular: S1, S2, RRR  Gastrointestinal: BS+, soft, NT/ND  Extremities: No peripheral edema  Neurological: Lethargic, opens eyes to name  : + bowden.   Skin: No rashes  Vascular Access: [place]    LABS:  11-04    144  |  108  |  109<HH>  ----------------------------<  206<H>  4.0   |  21  |  4.0<H>    Ca    7.8<L>      04 Nov 2023 06:15  Phos  4.0     11-04  Mg     2.3     11-04    TPro  5.3<L>  /  Alb  2.6<L>  /  TBili  0.2  /  DBili      /  AST  59<H>  /  ALT  35  /  AlkPhos  87  11-04                          8.4    8.20  )-----------( 216      ( 04 Nov 2023 06:15 )             24.4       Urine Studies:  Urinalysis Basic - ( 04 Nov 2023 06:15 )    Color:  / Appearance:  / SG:  / pH:   Gluc: 206 mg/dL / Ketone:   / Bili:  / Urobili:    Blood:  / Protein:  / Nitrite:    Leuk Esterase:  / RBC:  / WBC    Sq Epi:  / Non Sq Epi:  / Bacteria:       Sodium, Random Urine: <20.0 mmoL/L (10-31 @ 13:24)  Creatinine, Random Urine: 166 mg/dL (10-31 @ 13:24)  Protein/Creatinine Ratio Calculation: 1.0 Ratio (10-31 @ 13:24)  Osmolality, Random Urine: 398 mos/kg (10-31 @ 13:24)  Potassium, Random Urine: 49 mmol/L (10-31 @ 13:24)    RADIOLOGY & ADDITIONAL STUDIES:

## 2023-11-04 NOTE — DIETITIAN INITIAL EVALUATION ADULT - PERTINENT LABORATORY DATA
11-04    144  |  108  |  109<HH>  ----------------------------<  206<H>  4.0   |  21  |  4.0<H>    Ca    7.8<L>      04 Nov 2023 06:15  Phos  4.0     11-04  Mg     2.3     11-04    TPro  5.3<L>  /  Alb  2.6<L>  /  TBili  0.2  /  DBili  x   /  AST  59<H>  /  ALT  35  /  AlkPhos  87  11-04  POCT Blood Glucose.: 120 mg/dL (11-04-23 @ 12:56)  A1C with Estimated Average Glucose Result: 9.1 % (11-03-23 @ 05:43)   11-04    144  |  108  |  109<HH>  ----------------------------<  206<H>  4.0   |  21  |  4.0<H>    Ca    7.8<L>      04 Nov 2023 06:15  Phos  4.0     11-04  Mg     2.3     11-04    TPro  5.3<L>  /  Alb  2.6<L>  /  TBili  0.2  /  DBili  x   /  AST  59<H>  /  ALT  35  /  AlkPhos  87  11-04  POCT Blood Glucose.: 120 mg/dL (11-04-23 @ 12:56)  A1C with Estimated Average Glucose Result: 9.1 % (11-03-23 @ 05:43)                          8.4    8.20  )-----------( 216      ( 04 Nov 2023 06:15 )             24.4

## 2023-11-04 NOTE — DIETITIAN INITIAL EVALUATION ADULT - ADD RECOMMEND
-above discussed with resident and nursing. If pt requires continuous bipap, nasoduodenal tube to be placed with above EN recommendation  -pt to begin bowel regimen, no BM noted for last 6 days  -RD to monitor respiratory fxn-ability to tolerate EN, labs/meds, NFPF and f/u as needed within 3 days  high risk

## 2023-11-04 NOTE — PROGRESS NOTE ADULT - ASSESSMENT
IMPRESSION:    ARF secondary to pulmonary edema fluid overload   Likely right aspiration pneumonia    DKA   alter mental status   sepsis fever   PARKER   metabolc acidosis     PLAN:    CNS: neurology follow. No seizure on EEG.     HEENT: oral care     PULMONARY: Aletrnate BIPAP PRN and overnight with daytime HFNC. Fio2 40%. Avoid hyperoxia. CXR noted.     CARDIOVASCULAR: Keep negative balance as tolerated. BNP markedly elevated. HFPEF on echo.     RENAL: PO bicarb TID. HD per nephrolgoy. Keep negative balance as tolerated.     INFECTIOUS DISEASE:  Febrile overnight. On cefepime per ID; IF continue to spike temperature then switch to Zosyn. Check nasal MRSA; give 1 dose of vancomycin. Send UA, blood culture.     HEMATOLOGICAL:  DVT prophylaxis. Keep hg more thna 7.     ENDOCRINE:  Continue insulin infusion for now as he remains NPO. May need NGT for feeding. Lispro lantus when starts oral diet.     MUSCULOSKELETAL: PT OT/     CODE STATUS: DNI DNR. Pall care follow up.

## 2023-11-04 NOTE — DIETITIAN INITIAL EVALUATION ADULT - OTHER INFO
pt is 89 year old male with hx of DM, CAD,-bypass, CHF, HTN, presents with SOB bipap warranted. pt found to be in DKA (presently resolved), septic with PARKER.  11/2 IVF d/c'd, pt restarted on insulin infusion. pulm edema fluid overload and aspiration PNA noted s/p bumex. pt couldn't tolerate Hi FLow.   11/3 SLP eval recommend NPO with alternate means of nutrition 2/2 concern for pharyngeal dysphagia. s/p udall placement HD 11/3 &  today.   NGT placed today, pt presently on high low tolerating thus far.

## 2023-11-04 NOTE — DIETITIAN INITIAL EVALUATION ADULT - PERTINENT MEDS FT
MEDICATIONS  (STANDING):  albuterol/ipratropium for Nebulization 3 milliLiter(s) Nebulizer every 6 hours  albuterol/ipratropium for Nebulization. 3 milliLiter(s) Nebulizer once  allopurinol 100 milliGRAM(s) Oral daily  aspirin  chewable 81 milliGRAM(s) Oral daily  buMETAnide Injectable 2 milliGRAM(s) IV Push <User Schedule>  carvedilol 25 milliGRAM(s) Oral every 12 hours  cefepime   IVPB 1000 milliGRAM(s) IV Intermittent every 24 hours  chlorhexidine 2% Cloths 1 Application(s) Topical <User Schedule>  citalopram 20 milliGRAM(s) Oral daily  clopidogrel Tablet 75 milliGRAM(s) Oral daily  dextrose 50% Injectable 50 milliLiter(s) IV Push every 15 minutes  gabapentin 300 milliGRAM(s) Oral two times a day  heparin   Injectable 5000 Unit(s) SubCutaneous every 8 hours  insulin regular Infusion 3 Unit(s)/Hr (3 mL/Hr) IV Continuous <Continuous>  norepinephrine Infusion 0.05 MICROgram(s)/kG/Min (8.3 mL/Hr) IV Continuous <Continuous>  pantoprazole  Injectable 40 milliGRAM(s) IV Push daily  polyethylene glycol 3350 17 Gram(s) Oral daily  senna 2 Tablet(s) Oral at bedtime    MEDICATIONS  (PRN):  acetaminophen  Suppository .. 650 milliGRAM(s) Rectal every 6 hours PRN Temp greater or equal to 38C (100.4F)  albuterol    90 MICROgram(s) HFA Inhaler 2 Puff(s) Inhalation every 4 hours PRN Shortness of Breath and/or Wheezing  clonazePAM  Tablet 2 milliGRAM(s) Oral at bedtime PRN axniety

## 2023-11-04 NOTE — PROGRESS NOTE ADULT - ASSESSMENT
89 yr old male with hx of DM, CAD-bypass, CHF, HTN, HLD presents w sob x few days, no cough fever or chills.  pt placed on NRBM by EMS and subsequently on BiPAP in ED, O2 sat now 99%.  Pt found to have elevated glucose w high AG and b-hydroxybutyrate c/w    # ARF secondary to suspeced Aspiration PNA    # Sepsis on Admission  # DKA  # PARKER  # Toxic Metabolic Encephalopathy  # Metabolic Acidosis  # CAD  - Tmax 100.8 today, on Bipap  - s/p HD yesterday --> plan for HD today   - BCx: neg x2  - Nasal mrsa negative  - CXR: Stable bilateral opacities/effusions, greater on the right  - ECHO (11/03/23): EF 61%; normal LVF; severe pul HTN  - NPO for now  - NG in place  - c/w DuoNeb  - c/w cefepime  - c/w Bumex  - c/w ASA, Plavix carvedilol  - insulin drip as needed per ICU protocol  - ID follow up     GI prophylaxis  DVT prophylaxis    FULL CODE 89 yr old male with hx of DM, CAD-bypass, CHF, HTN, HLD presents w sob x few days, no cough fever or chills.  pt placed on NRBM by EMS and subsequently on BiPAP in ED, O2 sat now 99%.  Pt found to have elevated glucose w high AG and b-hydroxybutyrate    # ARF secondary to suspected Aspiration PNA    # Sepsis on Admission  # DKA  # PARKER  # Toxic Metabolic Encephalopathy  # Metabolic Acidosis  # CAD  - Tmax 100.8 today, on Bipap  - s/p HD yesterday --> plan for HD today   - BCx: neg x2  - Nasal mrsa negative  - CXR: Stable bilateral opacities/effusions, greater on the right  - ECHO (11/03/23): EF 61%; normal LVF; severe pul HTN  - NPO for now  - NG in place  - c/w DuoNeb  - c/w cefepime  - c/w Bumex  - c/w ASA, Plavix carvedilol  - insulin drip as needed per ICU protocol  - ID follow up     GI prophylaxis  DVT prophylaxis    FULL CODE 89 yr old male with hx of DM, CAD-bypass, CHF, HTN, HLD presents w sob x few days, no cough fever or chills.  pt placed on NRBM by EMS and subsequently on BiPAP in ED, O2 sat now 99%.  Pt found to have elevated glucose w high AG and b-hydroxybutyrate    # ARF secondary to suspected Aspiration PNA    # Sepsis on Admission  # DKA  # PARKER  # Toxic Metabolic Encephalopathy  # Metabolic Acidosis  # CAD  - Tmax 100.8 today, on Bipap  - s/p HD yesterday --> plan for HD today   - BCx: neg x2  - prior Nasal mrsa negative  - CXR: Stable bilateral opacities/effusions, greater on the right  - ECHO (11/03/23): EF 61%; normal LVF; severe pul HTN  - NPO for now  - c/w DuoNeb  - c/w cefepime; give 1 dose of vancomycin.   - c/w Bumex  - c/w ASA, Plavix carvedilol  - insulin drip as needed per ICU protocol  - recheck nasal MRSA  - send UA, blood culture  - ID follow up     GI prophylaxis  DVT prophylaxis    FULL CODE 89 yr old male with hx of DM, CAD-bypass, CHF, HTN, HLD presents w sob x few days, no cough fever or chills.  pt placed on NRBM by EMS and subsequently on BiPAP in ED, O2 sat now 99%.  Pt found to have elevated glucose w high AG and b-hydroxybutyrate    # ARF secondary to suspected Aspiration PNA    # Sepsis on Admission  # DKA  # PARKER  # Toxic Metabolic Encephalopathy  # Metabolic Acidosis  # CAD  - Tmax 100.8 today, on Bipap  - s/p HD yesterday --> HD today (1L removed)   - BCx: neg x2  - prior Nasal mrsa negative  - CXR: Stable bilateral opacities/effusions, greater on the right  - ECHO (11/03/23): EF 61%; normal LVF; severe pul HTN  - NPO for now  - c/w DuoNeb  - c/w cefepime; give 1 dose of vancomycin.   - c/w Bumex  - c/w ASA, Plavix carvedilol  - insulin drip as needed per ICU protocol  - recheck nasal MRSA  - send UA, blood culture  - ID follow up     GI prophylaxis  DVT prophylaxis    DNR / DNI

## 2023-11-05 LAB
ALBUMIN SERPL ELPH-MCNC: 2.4 G/DL — LOW (ref 3.5–5.2)
ALBUMIN SERPL ELPH-MCNC: 2.4 G/DL — LOW (ref 3.5–5.2)
ALBUMIN SERPL ELPH-MCNC: 2.5 G/DL — LOW (ref 3.5–5.2)
ALBUMIN SERPL ELPH-MCNC: 2.5 G/DL — LOW (ref 3.5–5.2)
ALBUMIN SERPL ELPH-MCNC: 2.7 G/DL — LOW (ref 3.5–5.2)
ALBUMIN SERPL ELPH-MCNC: 2.7 G/DL — LOW (ref 3.5–5.2)
ALP SERPL-CCNC: 87 U/L — SIGNIFICANT CHANGE UP (ref 30–115)
ALP SERPL-CCNC: 87 U/L — SIGNIFICANT CHANGE UP (ref 30–115)
ALP SERPL-CCNC: 88 U/L — SIGNIFICANT CHANGE UP (ref 30–115)
ALP SERPL-CCNC: 88 U/L — SIGNIFICANT CHANGE UP (ref 30–115)
ALP SERPL-CCNC: 94 U/L — SIGNIFICANT CHANGE UP (ref 30–115)
ALP SERPL-CCNC: 94 U/L — SIGNIFICANT CHANGE UP (ref 30–115)
ALT FLD-CCNC: 16 U/L — SIGNIFICANT CHANGE UP (ref 0–41)
ALT FLD-CCNC: 16 U/L — SIGNIFICANT CHANGE UP (ref 0–41)
ALT FLD-CCNC: 32 U/L — SIGNIFICANT CHANGE UP (ref 0–41)
ALT FLD-CCNC: 32 U/L — SIGNIFICANT CHANGE UP (ref 0–41)
ALT FLD-CCNC: 34 U/L — SIGNIFICANT CHANGE UP (ref 0–41)
ALT FLD-CCNC: 34 U/L — SIGNIFICANT CHANGE UP (ref 0–41)
ANION GAP SERPL CALC-SCNC: 13 MMOL/L — SIGNIFICANT CHANGE UP (ref 7–14)
ANION GAP SERPL CALC-SCNC: 13 MMOL/L — SIGNIFICANT CHANGE UP (ref 7–14)
ANION GAP SERPL CALC-SCNC: 14 MMOL/L — SIGNIFICANT CHANGE UP (ref 7–14)
ANION GAP SERPL CALC-SCNC: 14 MMOL/L — SIGNIFICANT CHANGE UP (ref 7–14)
ANION GAP SERPL CALC-SCNC: 15 MMOL/L — HIGH (ref 7–14)
ANION GAP SERPL CALC-SCNC: 15 MMOL/L — HIGH (ref 7–14)
AST SERPL-CCNC: 40 U/L — SIGNIFICANT CHANGE UP (ref 0–41)
AST SERPL-CCNC: 40 U/L — SIGNIFICANT CHANGE UP (ref 0–41)
AST SERPL-CCNC: 46 U/L — HIGH (ref 0–41)
AST SERPL-CCNC: 46 U/L — HIGH (ref 0–41)
AST SERPL-CCNC: 49 U/L — HIGH (ref 0–41)
AST SERPL-CCNC: 49 U/L — HIGH (ref 0–41)
BASOPHILS # BLD AUTO: 0.02 K/UL — SIGNIFICANT CHANGE UP (ref 0–0.2)
BASOPHILS # BLD AUTO: 0.02 K/UL — SIGNIFICANT CHANGE UP (ref 0–0.2)
BASOPHILS NFR BLD AUTO: 0.3 % — SIGNIFICANT CHANGE UP (ref 0–1)
BASOPHILS NFR BLD AUTO: 0.3 % — SIGNIFICANT CHANGE UP (ref 0–1)
BILIRUB SERPL-MCNC: 0.3 MG/DL — SIGNIFICANT CHANGE UP (ref 0.2–1.2)
BUN SERPL-MCNC: 81 MG/DL — CRITICAL HIGH (ref 10–20)
BUN SERPL-MCNC: 81 MG/DL — CRITICAL HIGH (ref 10–20)
BUN SERPL-MCNC: 82 MG/DL — CRITICAL HIGH (ref 10–20)
BUN SERPL-MCNC: 82 MG/DL — CRITICAL HIGH (ref 10–20)
BUN SERPL-MCNC: 94 MG/DL — CRITICAL HIGH (ref 10–20)
BUN SERPL-MCNC: 94 MG/DL — CRITICAL HIGH (ref 10–20)
CALCIUM SERPL-MCNC: 7.3 MG/DL — LOW (ref 8.4–10.5)
CALCIUM SERPL-MCNC: 7.3 MG/DL — LOW (ref 8.4–10.5)
CALCIUM SERPL-MCNC: 7.8 MG/DL — LOW (ref 8.4–10.5)
CHLORIDE SERPL-SCNC: 107 MMOL/L — SIGNIFICANT CHANGE UP (ref 98–110)
CHLORIDE SERPL-SCNC: 107 MMOL/L — SIGNIFICANT CHANGE UP (ref 98–110)
CHLORIDE SERPL-SCNC: 108 MMOL/L — SIGNIFICANT CHANGE UP (ref 98–110)
CO2 SERPL-SCNC: 22 MMOL/L — SIGNIFICANT CHANGE UP (ref 17–32)
CO2 SERPL-SCNC: 22 MMOL/L — SIGNIFICANT CHANGE UP (ref 17–32)
CO2 SERPL-SCNC: 24 MMOL/L — SIGNIFICANT CHANGE UP (ref 17–32)
CO2 SERPL-SCNC: 24 MMOL/L — SIGNIFICANT CHANGE UP (ref 17–32)
CO2 SERPL-SCNC: 25 MMOL/L — SIGNIFICANT CHANGE UP (ref 17–32)
CO2 SERPL-SCNC: 25 MMOL/L — SIGNIFICANT CHANGE UP (ref 17–32)
CREAT SERPL-MCNC: 3 MG/DL — HIGH (ref 0.7–1.5)
CREAT SERPL-MCNC: 3 MG/DL — HIGH (ref 0.7–1.5)
CREAT SERPL-MCNC: 3.1 MG/DL — HIGH (ref 0.7–1.5)
CULTURE RESULTS: SIGNIFICANT CHANGE UP
EGFR: 18 ML/MIN/1.73M2 — LOW
EGFR: 19 ML/MIN/1.73M2 — LOW
EGFR: 19 ML/MIN/1.73M2 — LOW
EOSINOPHIL # BLD AUTO: 0.03 K/UL — SIGNIFICANT CHANGE UP (ref 0–0.7)
EOSINOPHIL # BLD AUTO: 0.03 K/UL — SIGNIFICANT CHANGE UP (ref 0–0.7)
EOSINOPHIL NFR BLD AUTO: 0.4 % — SIGNIFICANT CHANGE UP (ref 0–8)
EOSINOPHIL NFR BLD AUTO: 0.4 % — SIGNIFICANT CHANGE UP (ref 0–8)
GLUCOSE BLDC GLUCOMTR-MCNC: 136 MG/DL — HIGH (ref 70–99)
GLUCOSE BLDC GLUCOMTR-MCNC: 136 MG/DL — HIGH (ref 70–99)
GLUCOSE BLDC GLUCOMTR-MCNC: 138 MG/DL — HIGH (ref 70–99)
GLUCOSE BLDC GLUCOMTR-MCNC: 138 MG/DL — HIGH (ref 70–99)
GLUCOSE BLDC GLUCOMTR-MCNC: 145 MG/DL — HIGH (ref 70–99)
GLUCOSE BLDC GLUCOMTR-MCNC: 145 MG/DL — HIGH (ref 70–99)
GLUCOSE BLDC GLUCOMTR-MCNC: 167 MG/DL — HIGH (ref 70–99)
GLUCOSE BLDC GLUCOMTR-MCNC: 167 MG/DL — HIGH (ref 70–99)
GLUCOSE BLDC GLUCOMTR-MCNC: 171 MG/DL — HIGH (ref 70–99)
GLUCOSE BLDC GLUCOMTR-MCNC: 171 MG/DL — HIGH (ref 70–99)
GLUCOSE BLDC GLUCOMTR-MCNC: 173 MG/DL — HIGH (ref 70–99)
GLUCOSE BLDC GLUCOMTR-MCNC: 173 MG/DL — HIGH (ref 70–99)
GLUCOSE BLDC GLUCOMTR-MCNC: 196 MG/DL — HIGH (ref 70–99)
GLUCOSE BLDC GLUCOMTR-MCNC: 196 MG/DL — HIGH (ref 70–99)
GLUCOSE BLDC GLUCOMTR-MCNC: 197 MG/DL — HIGH (ref 70–99)
GLUCOSE BLDC GLUCOMTR-MCNC: 197 MG/DL — HIGH (ref 70–99)
GLUCOSE BLDC GLUCOMTR-MCNC: 289 MG/DL — HIGH (ref 70–99)
GLUCOSE BLDC GLUCOMTR-MCNC: 289 MG/DL — HIGH (ref 70–99)
GLUCOSE SERPL-MCNC: 149 MG/DL — HIGH (ref 70–99)
GLUCOSE SERPL-MCNC: 149 MG/DL — HIGH (ref 70–99)
GLUCOSE SERPL-MCNC: 160 MG/DL — HIGH (ref 70–99)
GLUCOSE SERPL-MCNC: 160 MG/DL — HIGH (ref 70–99)
GLUCOSE SERPL-MCNC: 251 MG/DL — HIGH (ref 70–99)
GLUCOSE SERPL-MCNC: 251 MG/DL — HIGH (ref 70–99)
HCT VFR BLD CALC: 27 % — LOW (ref 42–52)
HCT VFR BLD CALC: 27 % — LOW (ref 42–52)
HGB BLD-MCNC: 9 G/DL — LOW (ref 14–18)
HGB BLD-MCNC: 9 G/DL — LOW (ref 14–18)
IMM GRANULOCYTES NFR BLD AUTO: 3.8 % — HIGH (ref 0.1–0.3)
IMM GRANULOCYTES NFR BLD AUTO: 3.8 % — HIGH (ref 0.1–0.3)
LYMPHOCYTES # BLD AUTO: 0.59 K/UL — LOW (ref 1.2–3.4)
LYMPHOCYTES # BLD AUTO: 0.59 K/UL — LOW (ref 1.2–3.4)
LYMPHOCYTES # BLD AUTO: 8.5 % — LOW (ref 20.5–51.1)
LYMPHOCYTES # BLD AUTO: 8.5 % — LOW (ref 20.5–51.1)
MAGNESIUM SERPL-MCNC: 2.1 MG/DL — SIGNIFICANT CHANGE UP (ref 1.8–2.4)
MAGNESIUM SERPL-MCNC: 2.1 MG/DL — SIGNIFICANT CHANGE UP (ref 1.8–2.4)
MCHC RBC-ENTMCNC: 28.4 PG — SIGNIFICANT CHANGE UP (ref 27–31)
MCHC RBC-ENTMCNC: 28.4 PG — SIGNIFICANT CHANGE UP (ref 27–31)
MCHC RBC-ENTMCNC: 33.3 G/DL — SIGNIFICANT CHANGE UP (ref 32–37)
MCHC RBC-ENTMCNC: 33.3 G/DL — SIGNIFICANT CHANGE UP (ref 32–37)
MCV RBC AUTO: 85.2 FL — SIGNIFICANT CHANGE UP (ref 80–94)
MCV RBC AUTO: 85.2 FL — SIGNIFICANT CHANGE UP (ref 80–94)
MONOCYTES # BLD AUTO: 0.35 K/UL — SIGNIFICANT CHANGE UP (ref 0.1–0.6)
MONOCYTES # BLD AUTO: 0.35 K/UL — SIGNIFICANT CHANGE UP (ref 0.1–0.6)
MONOCYTES NFR BLD AUTO: 5.1 % — SIGNIFICANT CHANGE UP (ref 1.7–9.3)
MONOCYTES NFR BLD AUTO: 5.1 % — SIGNIFICANT CHANGE UP (ref 1.7–9.3)
NEUTROPHILS # BLD AUTO: 5.67 K/UL — SIGNIFICANT CHANGE UP (ref 1.4–6.5)
NEUTROPHILS # BLD AUTO: 5.67 K/UL — SIGNIFICANT CHANGE UP (ref 1.4–6.5)
NEUTROPHILS NFR BLD AUTO: 81.9 % — HIGH (ref 42.2–75.2)
NEUTROPHILS NFR BLD AUTO: 81.9 % — HIGH (ref 42.2–75.2)
NRBC # BLD: 0 /100 WBCS — SIGNIFICANT CHANGE UP (ref 0–0)
NRBC # BLD: 0 /100 WBCS — SIGNIFICANT CHANGE UP (ref 0–0)
PHOSPHATE SERPL-MCNC: 3.4 MG/DL — SIGNIFICANT CHANGE UP (ref 2.1–4.9)
PHOSPHATE SERPL-MCNC: 3.4 MG/DL — SIGNIFICANT CHANGE UP (ref 2.1–4.9)
PLATELET # BLD AUTO: 240 K/UL — SIGNIFICANT CHANGE UP (ref 130–400)
PLATELET # BLD AUTO: 240 K/UL — SIGNIFICANT CHANGE UP (ref 130–400)
PMV BLD: 11.1 FL — HIGH (ref 7.4–10.4)
PMV BLD: 11.1 FL — HIGH (ref 7.4–10.4)
POTASSIUM SERPL-MCNC: 3.8 MMOL/L — SIGNIFICANT CHANGE UP (ref 3.5–5)
POTASSIUM SERPL-MCNC: 3.8 MMOL/L — SIGNIFICANT CHANGE UP (ref 3.5–5)
POTASSIUM SERPL-MCNC: 4.2 MMOL/L — SIGNIFICANT CHANGE UP (ref 3.5–5)
POTASSIUM SERPL-MCNC: 4.2 MMOL/L — SIGNIFICANT CHANGE UP (ref 3.5–5)
POTASSIUM SERPL-MCNC: 4.6 MMOL/L — SIGNIFICANT CHANGE UP (ref 3.5–5)
POTASSIUM SERPL-MCNC: 4.6 MMOL/L — SIGNIFICANT CHANGE UP (ref 3.5–5)
POTASSIUM SERPL-SCNC: 3.8 MMOL/L — SIGNIFICANT CHANGE UP (ref 3.5–5)
POTASSIUM SERPL-SCNC: 3.8 MMOL/L — SIGNIFICANT CHANGE UP (ref 3.5–5)
POTASSIUM SERPL-SCNC: 4.2 MMOL/L — SIGNIFICANT CHANGE UP (ref 3.5–5)
POTASSIUM SERPL-SCNC: 4.2 MMOL/L — SIGNIFICANT CHANGE UP (ref 3.5–5)
POTASSIUM SERPL-SCNC: 4.6 MMOL/L — SIGNIFICANT CHANGE UP (ref 3.5–5)
POTASSIUM SERPL-SCNC: 4.6 MMOL/L — SIGNIFICANT CHANGE UP (ref 3.5–5)
PROT SERPL-MCNC: 5.2 G/DL — LOW (ref 6–8)
PROT SERPL-MCNC: 5.2 G/DL — LOW (ref 6–8)
PROT SERPL-MCNC: 5.4 G/DL — LOW (ref 6–8)
PROT SERPL-MCNC: 5.4 G/DL — LOW (ref 6–8)
PROT SERPL-MCNC: 5.6 G/DL — LOW (ref 6–8)
PROT SERPL-MCNC: 5.6 G/DL — LOW (ref 6–8)
RBC # BLD: 3.17 M/UL — LOW (ref 4.7–6.1)
RBC # BLD: 3.17 M/UL — LOW (ref 4.7–6.1)
RBC # FLD: 15.6 % — HIGH (ref 11.5–14.5)
RBC # FLD: 15.6 % — HIGH (ref 11.5–14.5)
SODIUM SERPL-SCNC: 144 MMOL/L — SIGNIFICANT CHANGE UP (ref 135–146)
SODIUM SERPL-SCNC: 144 MMOL/L — SIGNIFICANT CHANGE UP (ref 135–146)
SODIUM SERPL-SCNC: 146 MMOL/L — SIGNIFICANT CHANGE UP (ref 135–146)
SPECIMEN SOURCE: SIGNIFICANT CHANGE UP
WBC # BLD: 6.92 K/UL — SIGNIFICANT CHANGE UP (ref 4.8–10.8)
WBC # BLD: 6.92 K/UL — SIGNIFICANT CHANGE UP (ref 4.8–10.8)
WBC # FLD AUTO: 6.92 K/UL — SIGNIFICANT CHANGE UP (ref 4.8–10.8)
WBC # FLD AUTO: 6.92 K/UL — SIGNIFICANT CHANGE UP (ref 4.8–10.8)

## 2023-11-05 PROCEDURE — 99291 CRITICAL CARE FIRST HOUR: CPT

## 2023-11-05 PROCEDURE — 99232 SBSQ HOSP IP/OBS MODERATE 35: CPT

## 2023-11-05 RX ORDER — SODIUM CHLORIDE 9 MG/ML
1000 INJECTION, SOLUTION INTRAVENOUS
Refills: 0 | Status: DISCONTINUED | OUTPATIENT
Start: 2023-11-05 | End: 2023-11-28

## 2023-11-05 RX ORDER — INSULIN LISPRO 100/ML
10 VIAL (ML) SUBCUTANEOUS ONCE
Refills: 0 | Status: COMPLETED | OUTPATIENT
Start: 2023-11-05 | End: 2023-11-05

## 2023-11-05 RX ORDER — MIDODRINE HYDROCHLORIDE 2.5 MG/1
10 TABLET ORAL EVERY 8 HOURS
Refills: 0 | Status: DISCONTINUED | OUTPATIENT
Start: 2023-11-05 | End: 2023-11-16

## 2023-11-05 RX ORDER — BUMETANIDE 0.25 MG/ML
2 INJECTION INTRAMUSCULAR; INTRAVENOUS DAILY
Refills: 0 | Status: DISCONTINUED | OUTPATIENT
Start: 2023-11-06 | End: 2023-11-07

## 2023-11-05 RX ORDER — INSULIN HUMAN 100 [IU]/ML
1 INJECTION, SOLUTION SUBCUTANEOUS
Qty: 100 | Refills: 0 | Status: DISCONTINUED | OUTPATIENT
Start: 2023-11-05 | End: 2023-11-09

## 2023-11-05 RX ORDER — DEXTROSE 50 % IN WATER 50 %
15 SYRINGE (ML) INTRAVENOUS ONCE
Refills: 0 | Status: DISCONTINUED | OUTPATIENT
Start: 2023-11-05 | End: 2023-11-28

## 2023-11-05 RX ORDER — GLUCAGON INJECTION, SOLUTION 0.5 MG/.1ML
1 INJECTION, SOLUTION SUBCUTANEOUS ONCE
Refills: 0 | Status: DISCONTINUED | OUTPATIENT
Start: 2023-11-05 | End: 2023-11-28

## 2023-11-05 RX ORDER — INSULIN LISPRO 100/ML
7 VIAL (ML) SUBCUTANEOUS
Refills: 0 | Status: DISCONTINUED | OUTPATIENT
Start: 2023-11-05 | End: 2023-11-05

## 2023-11-05 RX ORDER — POTASSIUM CHLORIDE 20 MEQ
20 PACKET (EA) ORAL ONCE
Refills: 0 | Status: DISCONTINUED | OUTPATIENT
Start: 2023-11-05 | End: 2023-11-05

## 2023-11-05 RX ORDER — SODIUM BICARBONATE 1 MEQ/ML
650 SYRINGE (ML) INTRAVENOUS EVERY 8 HOURS
Refills: 0 | Status: DISCONTINUED | OUTPATIENT
Start: 2023-11-05 | End: 2023-11-06

## 2023-11-05 RX ORDER — INSULIN LISPRO 100/ML
VIAL (ML) SUBCUTANEOUS
Refills: 0 | Status: DISCONTINUED | OUTPATIENT
Start: 2023-11-05 | End: 2023-11-09

## 2023-11-05 RX ORDER — INSULIN GLARGINE 100 [IU]/ML
20 INJECTION, SOLUTION SUBCUTANEOUS EVERY MORNING
Refills: 0 | Status: DISCONTINUED | OUTPATIENT
Start: 2023-11-05 | End: 2023-11-09

## 2023-11-05 RX ADMIN — Medication 650 MILLIGRAM(S): at 06:09

## 2023-11-05 RX ADMIN — Medication 81 MILLIGRAM(S): at 12:44

## 2023-11-05 RX ADMIN — INSULIN GLARGINE 20 UNIT(S): 100 INJECTION, SOLUTION SUBCUTANEOUS at 15:03

## 2023-11-05 RX ADMIN — INSULIN HUMAN 1 UNIT(S)/HR: 100 INJECTION, SOLUTION SUBCUTANEOUS at 09:00

## 2023-11-05 RX ADMIN — CARVEDILOL PHOSPHATE 25 MILLIGRAM(S): 80 CAPSULE, EXTENDED RELEASE ORAL at 05:33

## 2023-11-05 RX ADMIN — GABAPENTIN 300 MILLIGRAM(S): 400 CAPSULE ORAL at 17:21

## 2023-11-05 RX ADMIN — HEPARIN SODIUM 5000 UNIT(S): 5000 INJECTION INTRAVENOUS; SUBCUTANEOUS at 05:34

## 2023-11-05 RX ADMIN — HEPARIN SODIUM 5000 UNIT(S): 5000 INJECTION INTRAVENOUS; SUBCUTANEOUS at 21:24

## 2023-11-05 RX ADMIN — Medication 650 MILLIGRAM(S): at 17:01

## 2023-11-05 RX ADMIN — Medication 10 UNIT(S): at 23:14

## 2023-11-05 RX ADMIN — Medication 3 MILLILITER(S): at 19:41

## 2023-11-05 RX ADMIN — BUMETANIDE 2 MILLIGRAM(S): 0.25 INJECTION INTRAMUSCULAR; INTRAVENOUS at 05:34

## 2023-11-05 RX ADMIN — PANTOPRAZOLE SODIUM 40 MILLIGRAM(S): 20 TABLET, DELAYED RELEASE ORAL at 12:31

## 2023-11-05 RX ADMIN — MIDODRINE HYDROCHLORIDE 10 MILLIGRAM(S): 2.5 TABLET ORAL at 21:24

## 2023-11-05 RX ADMIN — Medication 3 MILLILITER(S): at 07:42

## 2023-11-05 RX ADMIN — MIDODRINE HYDROCHLORIDE 10 MILLIGRAM(S): 2.5 TABLET ORAL at 08:35

## 2023-11-05 RX ADMIN — Medication 100 MILLIGRAM(S): at 12:29

## 2023-11-05 RX ADMIN — Medication 2: at 17:22

## 2023-11-05 RX ADMIN — MIDODRINE HYDROCHLORIDE 10 MILLIGRAM(S): 2.5 TABLET ORAL at 14:36

## 2023-11-05 RX ADMIN — CHLORHEXIDINE GLUCONATE 1 APPLICATION(S): 213 SOLUTION TOPICAL at 05:35

## 2023-11-05 RX ADMIN — Medication 650 MILLIGRAM(S): at 21:25

## 2023-11-05 RX ADMIN — CEFEPIME 100 MILLIGRAM(S): 1 INJECTION, POWDER, FOR SOLUTION INTRAMUSCULAR; INTRAVENOUS at 17:00

## 2023-11-05 RX ADMIN — GABAPENTIN 300 MILLIGRAM(S): 400 CAPSULE ORAL at 05:34

## 2023-11-05 RX ADMIN — Medication 650 MILLIGRAM(S): at 05:07

## 2023-11-05 RX ADMIN — Medication 3 MILLILITER(S): at 14:31

## 2023-11-05 RX ADMIN — CITALOPRAM 20 MILLIGRAM(S): 10 TABLET, FILM COATED ORAL at 12:29

## 2023-11-05 RX ADMIN — HEPARIN SODIUM 5000 UNIT(S): 5000 INJECTION INTRAVENOUS; SUBCUTANEOUS at 14:37

## 2023-11-05 RX ADMIN — CLOPIDOGREL BISULFATE 75 MILLIGRAM(S): 75 TABLET, FILM COATED ORAL at 12:29

## 2023-11-05 NOTE — PROGRESS NOTE ADULT - ASSESSMENT
89 yo male PMHx sig for DM, CAD-bypass, CHF, HTN, HLD presents w sob x few days, no cough fever or chills.  pt placed on NRBM by EMS and subsequently on BiPAP in ED, O2 sat now 99%.  Pt found to have elevated glucose w high AG and b-hydroxybutarate c/w     DKA / sepsis / acute respiratory failure     - IV insulin -> sq insulin   - IV antibiotics   -  HD    - DVT prophylaxis    DNR / DNI

## 2023-11-05 NOTE — PROGRESS NOTE ADULT - SUBJECTIVE AND OBJECTIVE BOX
Nephrology progress note    Patient was seen and examined, events over the last 24 h noted .  HD fri and Sat    Allergies:  No Known Allergies    Hospital Medications:   MEDICATIONS  (STANDING):  albuterol/ipratropium for Nebulization 3 milliLiter(s) Nebulizer every 6 hours  albuterol/ipratropium for Nebulization. 3 milliLiter(s) Nebulizer once  allopurinol 100 milliGRAM(s) Oral daily  aspirin  chewable 81 milliGRAM(s) Oral daily  buMETAnide Injectable 2 milliGRAM(s) IV Push <User Schedule>  carvedilol 25 milliGRAM(s) Oral every 12 hours  cefepime   IVPB 1000 milliGRAM(s) IV Intermittent every 24 hours  chlorhexidine 2% Cloths 1 Application(s) Topical <User Schedule>  citalopram 20 milliGRAM(s) Oral daily  clopidogrel Tablet 75 milliGRAM(s) Oral daily  dextrose 50% Injectable 50 milliLiter(s) IV Push every 15 minutes  gabapentin 300 milliGRAM(s) Oral two times a day  heparin   Injectable 5000 Unit(s) SubCutaneous every 8 hours  insulin regular Infusion 1 Unit(s)/Hr (1 mL/Hr) IV Continuous <Continuous>  midodrine 10 milliGRAM(s) Oral every 8 hours  norepinephrine Infusion 0.05 MICROgram(s)/kG/Min (8.3 mL/Hr) IV Continuous <Continuous>  pantoprazole  Injectable 40 milliGRAM(s) IV Push daily  polyethylene glycol 3350 17 Gram(s) Oral daily  senna 2 Tablet(s) Oral at bedtime  sodium bicarbonate 650 milliGRAM(s) Oral every 8 hours        VITALS:  T(F): 99.5 (11-05-23 @ 11:00), Max: 100.4 (11-05-23 @ 03:01)  HR: 63 (11-05-23 @ 09:00)  BP: 107/53 (11-05-23 @ 09:00)  RR: 24 (11-05-23 @ 11:00)  SpO2: 98% (11-05-23 @ 09:00)  Wt(kg): --    11-03 @ 07:01  -  11-04 @ 07:00  --------------------------------------------------------  IN: 112 mL / OUT: 1605 mL / NET: -1493 mL    11-04 @ 08:01 - 11-05 @ 07:00  --------------------------------------------------------  IN: 399.5 mL / OUT: 1825 mL / NET: -1425.5 mL    11-05 @ 07:01 - 11-05 @ 11:09  --------------------------------------------------------  IN: 63 mL / OUT: 0 mL / NET: 63 mL          PHYSICAL EXAM:  Constitutional: In resp distress, BiPAP  Respiratory: BS b/l+  Cardiovascular: S1, S2, RRR  Gastrointestinal: BS+, soft, NT/ND  Extremities: No peripheral edema  Neurological: Lethargic, opens eyes to name  : + bowden.   Skin: No rashes  Vascular Access: [place]    LABS:  11-05    146  |  107  |  82<HH>  ----------------------------<  160<H>  4.2   |  24  |  3.1<H>    Ca    7.8<L>      05 Nov 2023 08:00  Phos  3.4     11-05  Mg     2.1     11-05    TPro  5.6<L>  /  Alb  2.7<L>  /  TBili  0.3  /  DBili      /  AST  49<H>  /  ALT  34  /  AlkPhos  94  11-05                          9.0    6.92  )-----------( 240      ( 05 Nov 2023 08:00 )             27.0       Urine Studies:  Urinalysis Basic - ( 05 Nov 2023 08:00 )    Color:  / Appearance:  / SG:  / pH:   Gluc: 160 mg/dL / Ketone:   / Bili:  / Urobili:    Blood:  / Protein:  / Nitrite:    Leuk Esterase:  / RBC:  / WBC    Sq Epi:  / Non Sq Epi:  / Bacteria:       Sodium, Random Urine: <20.0 mmoL/L (10-31 @ 13:24)  Creatinine, Random Urine: 166 mg/dL (10-31 @ 13:24)  Protein/Creatinine Ratio Calculation: 1.0 Ratio (10-31 @ 13:24)  Osmolality, Random Urine: 398 mos/kg (10-31 @ 13:24)  Potassium, Random Urine: 49 mmol/L (10-31 @ 13:24)    RADIOLOGY & ADDITIONAL STUDIES:

## 2023-11-05 NOTE — PROGRESS NOTE ADULT - ASSESSMENT
IMPRESSION:    ARF secondary to pulmonary edema fluid overload   Likely right aspiration pneumonia    DKA   alter mental status   sepsis fever   PARKER   metabolc acidosis     PLAN:    CNS: neurology follow. No seizure on EEG.     HEENT: oral care     PULMONARY: Aletrnate BIPAP PRN and overnight with daytime HFNC. Wean down Fio2. Chest PT and frequent suctioning. BEdside US for the right opacity/effusion. If persistent consider bedside thoracentesis to imrove respiratory status.     CARDIOVASCULAR: Keep negative balance as tolerated. BNP markedly elevated. HFPEF on echo.     GI: GI ppx. NGT feeding as tolerated Speech and swallow re-evaluation.     RENAL: Start PO bicarb 650 TID. HD per nephrology Keep negative balance as tolerated.     INFECTIOUS DISEASE:   On cefepime per ID.     HEMATOLOGICAL:  DVT prophylaxis. Keep hg more than 7.     ENDOCRINE:  Isnulin SQ protocol. NGT for feeding. Lispro lantus when starts oral diet.     MUSCULOSKELETAL: PT OT.     CODE STATUS: DNI DNR. Pall care follow up.     Keep in ICU for now.

## 2023-11-05 NOTE — PROGRESS NOTE ADULT - SUBJECTIVE AND OBJECTIVE BOX
Patient seen and evaluated, remains on NIPPV      T(F): 99.5 (11-05-23 @ 11:00), Max: 100.4 (11-05-23 @ 03:01)  HR: 63 (11-05-23 @ 09:00)  BP: 107/53 (11-05-23 @ 09:00)  RR: 24 (11-05-23 @ 11:00)  SpO2: 98% (11-05-23 @ 09:00) (97% - 100%)    PHYSICAL EXAM:  GENERAL: NAD  HEAD:  Atraumatic, Normocephalic  EYES: EOMI, PERRLA, conjunctiva and sclera clear  NERVOUS SYSTEM:  no focal deficits   CHEST/LUNG:  bilateral rhonchi  HEART: Regular rate and rhythm; No murmurs, rubs, or gallops  ABDOMEN: Soft, Nontender, Nondistended; Bowel sounds present  EXTREMITIES:  2+ Peripheral Pulses, No clubbing, cyanosis, or edema    LABS  11-05    146  |  107  |  82<HH>  ----------------------------<  160<H>  4.2   |  24  |  3.1<H>    Ca    7.8<L>      05 Nov 2023 08:00  Phos  3.4     11-05  Mg     2.1     11-05    TPro  5.6<L>  /  Alb  2.7<L>  /  TBili  0.3  /  DBili  x   /  AST  49<H>  /  ALT  34  /  AlkPhos  94  11-05                          9.0    6.92  )-----------( 240      ( 05 Nov 2023 08:00 )             27.0       Culture Results:   No growth at 4 days (10-31-23)  Culture Results:   No growth at 4 days (10-31-23)    RADIOLOGY  < from: Xray Chest 1 View- PORTABLE-Routine (Xray Chest 1 View- PORTABLE-Routine in AM.) (11.04.23 @ 07:40) >  Impression:    Low lung volume.    Bilateral opacities, unchanged.    < end of copied text >    MEDICATIONS  (STANDING):  albuterol/ipratropium for Nebulization 3 milliLiter(s) Nebulizer every 6 hours  albuterol/ipratropium for Nebulization. 3 milliLiter(s) Nebulizer once  allopurinol 100 milliGRAM(s) Oral daily  aspirin  chewable 81 milliGRAM(s) Oral daily  buMETAnide Injectable 2 milliGRAM(s) IV Push <User Schedule>  carvedilol 25 milliGRAM(s) Oral every 12 hours  cefepime   IVPB 1000 milliGRAM(s) IV Intermittent every 24 hours  chlorhexidine 2% Cloths 1 Application(s) Topical <User Schedule>  citalopram 20 milliGRAM(s) Oral daily  clopidogrel Tablet 75 milliGRAM(s) Oral daily  dextrose 50% Injectable 50 milliLiter(s) IV Push every 15 minutes  gabapentin 300 milliGRAM(s) Oral two times a day  heparin   Injectable 5000 Unit(s) SubCutaneous every 8 hours  insulin regular Infusion 1 Unit(s)/Hr (1 mL/Hr) IV Continuous <Continuous>  midodrine 10 milliGRAM(s) Oral every 8 hours  norepinephrine Infusion 0.05 MICROgram(s)/kG/Min (8.3 mL/Hr) IV Continuous <Continuous>  pantoprazole  Injectable 40 milliGRAM(s) IV Push daily  polyethylene glycol 3350 17 Gram(s) Oral daily  senna 2 Tablet(s) Oral at bedtime  sodium bicarbonate 650 milliGRAM(s) Oral every 8 hours    MEDICATIONS  (PRN):  acetaminophen  Suppository .. 650 milliGRAM(s) Rectal every 6 hours PRN Temp greater or equal to 38C (100.4F)  albuterol    90 MICROgram(s) HFA Inhaler 2 Puff(s) Inhalation every 4 hours PRN Shortness of Breath and/or Wheezing  clonazePAM  Tablet 2 milliGRAM(s) Oral at bedtime PRN axniety

## 2023-11-05 NOTE — PROGRESS NOTE ADULT - ASSESSMENT
# Severe PARKER likely due to sepsis. Pt has Screat up to ~ 2.0 as far back as June 2021  # Hyperkalemia, given IV CaGluc and Lokelma, plus Bumex IV resolved  # Urine chemistries c/w pre-renal state / CRS w/ FeNa 0.3%, FeUrea 14%  # HAGMA d/t DKA / renal failure    Recommendations:  - mild improvement in urine output on iv bumex  -  daughter Kristina, gave consent for HD  -  HD #1 11/3 , 2nd tx yesterday, will reassess tomorrow   CXR - b/l opacities, CHF, BP is low - started Midodrine 10 mg q8 to allow for some UF  - can cont iv bumex as long as pt non oliguric to maximize urine output  - bowden, strict i/o     - complete sepsis w/u, abx per ID recs, dose for iHD

## 2023-11-05 NOTE — PROGRESS NOTE ADULT - SUBJECTIVE AND OBJECTIVE BOX
Patient is a 89y old  Male who presents with a chief complaint of Acute renal failure     (04 Nov 2023 13:03)        Over Night Events:    No events   BIPAP overnight   Low grade temp noted   HFNC in the day         ROS:  See HPI    PHYSICAL EXAM    ICU Vital Signs Last 24 Hrs  T(C): 38 (05 Nov 2023 03:01), Max: 38 (05 Nov 2023 03:01)  T(F): 100.4 (05 Nov 2023 03:01), Max: 100.4 (05 Nov 2023 03:01)  HR: 64 (05 Nov 2023 03:01) (64 - 83)  BP: 117/59 (05 Nov 2023 03:01) (96/53 - 122/62)  BP(mean): 85 (05 Nov 2023 03:01) (71 - 85)  ABP: --  ABP(mean): --  RR: 17 (05 Nov 2023 03:01) (17 - 30)  SpO2: 97% (05 Nov 2023 03:01) (96% - 100%)    O2 Parameters below as of 05 Nov 2023 03:01  Patient On (Oxygen Delivery Method): BiPAP/CPAP            General: NAD, alert   HEENT: BRANDI             Lymphatic system: No cervical LN   Lungs: Bilateral BS, coarse, wet secretions   Cardiovascular: Regular   Gastrointestinal: Soft, Positive BS  Extremities: No clubbing.  Moves extremities.  Full Range of motion   Skin: Warm, intact  Neurological: No motor or sensory deficit       11-03-23 @ 07:01  -  11-04-23 @ 07:00  --------------------------------------------------------  IN:    Insulin: 10 mL    Insulin: 42 mL    Insulin: 10 mL    IV PiggyBack: 50 mL  Total IN: 112 mL    OUT:    Incontinent per Collection Bag (mL): 1105 mL    Other (mL): 500 mL  Total OUT: 1605 mL    Total NET: -1493 mL      11-04-23 @ 08:01  -  11-05-23 @ 05:28  --------------------------------------------------------  IN:    Insulin: 20 mL    Insulin: 12 mL    IV PiggyBack: 300 mL    Norepinephrine: 13.5 mL    Oral Fluid: 50 mL  Total IN: 395.5 mL    OUT:    Incontinent per Collection Bag (mL): 600 mL    Other (mL): 1000 mL  Total OUT: 1600 mL    Total NET: -1204.5 mL          LABS:                            8.4    8.20  )-----------( 216      ( 04 Nov 2023 06:15 )             24.4                                               11-04    142  |  104  |  71<HH>  ----------------------------<  242<H>  4.0   |  21  |  2.9<H>    Ca    7.8<L>      04 Nov 2023 21:39  Phos  4.0     11-04  Mg     2.3     11-04    TPro  5.3<L>  /  Alb  2.6<L>  /  TBili  0.2  /  DBili  x   /  AST  59<H>  /  ALT  35  /  AlkPhos  87  11-04      PT/INR - ( 03 Nov 2023 12:06 )   PT: 29.80 sec;   INR: 2.59 ratio         PTT - ( 03 Nov 2023 12:06 )  PTT:33.3 sec                                       Urinalysis Basic - ( 04 Nov 2023 21:39 )    Color: x / Appearance: x / SG: x / pH: x  Gluc: 242 mg/dL / Ketone: x  / Bili: x / Urobili: x   Blood: x / Protein: x / Nitrite: x   Leuk Esterase: x / RBC: x / WBC x   Sq Epi: x / Non Sq Epi: x / Bacteria: x                                                  LIVER FUNCTIONS - ( 04 Nov 2023 06:15 )  Alb: 2.6 g/dL / Pro: 5.3 g/dL / ALK PHOS: 87 U/L / ALT: 35 U/L / AST: 59 U/L / GGT: x                                                                                                                                       MEDICATIONS  (STANDING):  albuterol/ipratropium for Nebulization 3 milliLiter(s) Nebulizer every 6 hours  albuterol/ipratropium for Nebulization. 3 milliLiter(s) Nebulizer once  allopurinol 100 milliGRAM(s) Oral daily  aspirin  chewable 81 milliGRAM(s) Oral daily  buMETAnide Injectable 2 milliGRAM(s) IV Push <User Schedule>  carvedilol 25 milliGRAM(s) Oral every 12 hours  cefepime   IVPB 1000 milliGRAM(s) IV Intermittent every 24 hours  chlorhexidine 2% Cloths 1 Application(s) Topical <User Schedule>  citalopram 20 milliGRAM(s) Oral daily  clopidogrel Tablet 75 milliGRAM(s) Oral daily  dextrose 50% Injectable 50 milliLiter(s) IV Push every 15 minutes  gabapentin 300 milliGRAM(s) Oral two times a day  heparin   Injectable 5000 Unit(s) SubCutaneous every 8 hours  insulin regular Infusion 2 Unit(s)/Hr (2 mL/Hr) IV Continuous <Continuous>  norepinephrine Infusion 0.05 MICROgram(s)/kG/Min (8.3 mL/Hr) IV Continuous <Continuous>  pantoprazole  Injectable 40 milliGRAM(s) IV Push daily  polyethylene glycol 3350 17 Gram(s) Oral daily  senna 2 Tablet(s) Oral at bedtime    MEDICATIONS  (PRN):  acetaminophen  Suppository .. 650 milliGRAM(s) Rectal every 6 hours PRN Temp greater or equal to 38C (100.4F)  albuterol    90 MICROgram(s) HFA Inhaler 2 Puff(s) Inhalation every 4 hours PRN Shortness of Breath and/or Wheezing  clonazePAM  Tablet 2 milliGRAM(s) Oral at bedtime PRN axniety      Xrays: Right opacity/effusion

## 2023-11-06 LAB
ALBUMIN SERPL ELPH-MCNC: 2.7 G/DL — LOW (ref 3.5–5.2)
ALBUMIN SERPL ELPH-MCNC: 2.7 G/DL — LOW (ref 3.5–5.2)
ALBUMIN SERPL ELPH-MCNC: 3 G/DL — LOW (ref 3.5–5.2)
ALBUMIN SERPL ELPH-MCNC: 3 G/DL — LOW (ref 3.5–5.2)
ALP SERPL-CCNC: 113 U/L — SIGNIFICANT CHANGE UP (ref 30–115)
ALP SERPL-CCNC: 113 U/L — SIGNIFICANT CHANGE UP (ref 30–115)
ALP SERPL-CCNC: 99 U/L — SIGNIFICANT CHANGE UP (ref 30–115)
ALP SERPL-CCNC: 99 U/L — SIGNIFICANT CHANGE UP (ref 30–115)
ALT FLD-CCNC: 30 U/L — SIGNIFICANT CHANGE UP (ref 0–41)
ANION GAP SERPL CALC-SCNC: 16 MMOL/L — HIGH (ref 7–14)
ANION GAP SERPL CALC-SCNC: 16 MMOL/L — HIGH (ref 7–14)
ANION GAP SERPL CALC-SCNC: 23 MMOL/L — HIGH (ref 7–14)
ANION GAP SERPL CALC-SCNC: 23 MMOL/L — HIGH (ref 7–14)
AST SERPL-CCNC: 34 U/L — SIGNIFICANT CHANGE UP (ref 0–41)
AST SERPL-CCNC: 34 U/L — SIGNIFICANT CHANGE UP (ref 0–41)
AST SERPL-CCNC: 35 U/L — SIGNIFICANT CHANGE UP (ref 0–41)
AST SERPL-CCNC: 35 U/L — SIGNIFICANT CHANGE UP (ref 0–41)
BASOPHILS # BLD AUTO: 0.05 K/UL — SIGNIFICANT CHANGE UP (ref 0–0.2)
BASOPHILS # BLD AUTO: 0.05 K/UL — SIGNIFICANT CHANGE UP (ref 0–0.2)
BASOPHILS NFR BLD AUTO: 0.7 % — SIGNIFICANT CHANGE UP (ref 0–1)
BASOPHILS NFR BLD AUTO: 0.7 % — SIGNIFICANT CHANGE UP (ref 0–1)
BILIRUB SERPL-MCNC: 0.3 MG/DL — SIGNIFICANT CHANGE UP (ref 0.2–1.2)
BILIRUB SERPL-MCNC: 0.3 MG/DL — SIGNIFICANT CHANGE UP (ref 0.2–1.2)
BILIRUB SERPL-MCNC: 0.4 MG/DL — SIGNIFICANT CHANGE UP (ref 0.2–1.2)
BILIRUB SERPL-MCNC: 0.4 MG/DL — SIGNIFICANT CHANGE UP (ref 0.2–1.2)
BUN SERPL-MCNC: 102 MG/DL — CRITICAL HIGH (ref 10–20)
BUN SERPL-MCNC: 102 MG/DL — CRITICAL HIGH (ref 10–20)
BUN SERPL-MCNC: 61 MG/DL — CRITICAL HIGH (ref 10–20)
BUN SERPL-MCNC: 61 MG/DL — CRITICAL HIGH (ref 10–20)
CALCIUM SERPL-MCNC: 7.8 MG/DL — LOW (ref 8.4–10.5)
CALCIUM SERPL-MCNC: 7.8 MG/DL — LOW (ref 8.4–10.5)
CALCIUM SERPL-MCNC: 8.4 MG/DL — SIGNIFICANT CHANGE UP (ref 8.4–10.5)
CALCIUM SERPL-MCNC: 8.4 MG/DL — SIGNIFICANT CHANGE UP (ref 8.4–10.5)
CHLORIDE SERPL-SCNC: 105 MMOL/L — SIGNIFICANT CHANGE UP (ref 98–110)
CHLORIDE SERPL-SCNC: 105 MMOL/L — SIGNIFICANT CHANGE UP (ref 98–110)
CHLORIDE SERPL-SCNC: 108 MMOL/L — SIGNIFICANT CHANGE UP (ref 98–110)
CHLORIDE SERPL-SCNC: 108 MMOL/L — SIGNIFICANT CHANGE UP (ref 98–110)
CO2 SERPL-SCNC: 19 MMOL/L — SIGNIFICANT CHANGE UP (ref 17–32)
CO2 SERPL-SCNC: 19 MMOL/L — SIGNIFICANT CHANGE UP (ref 17–32)
CO2 SERPL-SCNC: 24 MMOL/L — SIGNIFICANT CHANGE UP (ref 17–32)
CO2 SERPL-SCNC: 24 MMOL/L — SIGNIFICANT CHANGE UP (ref 17–32)
CREAT SERPL-MCNC: 2.3 MG/DL — HIGH (ref 0.7–1.5)
CREAT SERPL-MCNC: 2.3 MG/DL — HIGH (ref 0.7–1.5)
CREAT SERPL-MCNC: 3.4 MG/DL — HIGH (ref 0.7–1.5)
CREAT SERPL-MCNC: 3.4 MG/DL — HIGH (ref 0.7–1.5)
EGFR: 16 ML/MIN/1.73M2 — LOW
EGFR: 16 ML/MIN/1.73M2 — LOW
EGFR: 26 ML/MIN/1.73M2 — LOW
EGFR: 26 ML/MIN/1.73M2 — LOW
EOSINOPHIL # BLD AUTO: 0.09 K/UL — SIGNIFICANT CHANGE UP (ref 0–0.7)
EOSINOPHIL # BLD AUTO: 0.09 K/UL — SIGNIFICANT CHANGE UP (ref 0–0.7)
EOSINOPHIL NFR BLD AUTO: 1.3 % — SIGNIFICANT CHANGE UP (ref 0–8)
EOSINOPHIL NFR BLD AUTO: 1.3 % — SIGNIFICANT CHANGE UP (ref 0–8)
GLUCOSE BLDC GLUCOMTR-MCNC: 153 MG/DL — HIGH (ref 70–99)
GLUCOSE BLDC GLUCOMTR-MCNC: 153 MG/DL — HIGH (ref 70–99)
GLUCOSE BLDC GLUCOMTR-MCNC: 247 MG/DL — HIGH (ref 70–99)
GLUCOSE BLDC GLUCOMTR-MCNC: 247 MG/DL — HIGH (ref 70–99)
GLUCOSE BLDC GLUCOMTR-MCNC: 256 MG/DL — HIGH (ref 70–99)
GLUCOSE BLDC GLUCOMTR-MCNC: 256 MG/DL — HIGH (ref 70–99)
GLUCOSE SERPL-MCNC: 180 MG/DL — HIGH (ref 70–99)
GLUCOSE SERPL-MCNC: 180 MG/DL — HIGH (ref 70–99)
GLUCOSE SERPL-MCNC: 255 MG/DL — HIGH (ref 70–99)
GLUCOSE SERPL-MCNC: 255 MG/DL — HIGH (ref 70–99)
HCT VFR BLD CALC: 30.6 % — LOW (ref 42–52)
HCT VFR BLD CALC: 30.6 % — LOW (ref 42–52)
HGB BLD-MCNC: 9.9 G/DL — LOW (ref 14–18)
HGB BLD-MCNC: 9.9 G/DL — LOW (ref 14–18)
IMM GRANULOCYTES NFR BLD AUTO: 4.3 % — HIGH (ref 0.1–0.3)
IMM GRANULOCYTES NFR BLD AUTO: 4.3 % — HIGH (ref 0.1–0.3)
LYMPHOCYTES # BLD AUTO: 0.76 K/UL — LOW (ref 1.2–3.4)
LYMPHOCYTES # BLD AUTO: 0.76 K/UL — LOW (ref 1.2–3.4)
LYMPHOCYTES # BLD AUTO: 10.9 % — LOW (ref 20.5–51.1)
LYMPHOCYTES # BLD AUTO: 10.9 % — LOW (ref 20.5–51.1)
MAGNESIUM SERPL-MCNC: 2.2 MG/DL — SIGNIFICANT CHANGE UP (ref 1.8–2.4)
MCHC RBC-ENTMCNC: 27.7 PG — SIGNIFICANT CHANGE UP (ref 27–31)
MCHC RBC-ENTMCNC: 27.7 PG — SIGNIFICANT CHANGE UP (ref 27–31)
MCHC RBC-ENTMCNC: 32.4 G/DL — SIGNIFICANT CHANGE UP (ref 32–37)
MCHC RBC-ENTMCNC: 32.4 G/DL — SIGNIFICANT CHANGE UP (ref 32–37)
MCV RBC AUTO: 85.7 FL — SIGNIFICANT CHANGE UP (ref 80–94)
MCV RBC AUTO: 85.7 FL — SIGNIFICANT CHANGE UP (ref 80–94)
MONOCYTES # BLD AUTO: 0.36 K/UL — SIGNIFICANT CHANGE UP (ref 0.1–0.6)
MONOCYTES # BLD AUTO: 0.36 K/UL — SIGNIFICANT CHANGE UP (ref 0.1–0.6)
MONOCYTES NFR BLD AUTO: 5.2 % — SIGNIFICANT CHANGE UP (ref 1.7–9.3)
MONOCYTES NFR BLD AUTO: 5.2 % — SIGNIFICANT CHANGE UP (ref 1.7–9.3)
MRSA PCR RESULT.: NEGATIVE — SIGNIFICANT CHANGE UP
MRSA PCR RESULT.: NEGATIVE — SIGNIFICANT CHANGE UP
NEUTROPHILS # BLD AUTO: 5.41 K/UL — SIGNIFICANT CHANGE UP (ref 1.4–6.5)
NEUTROPHILS # BLD AUTO: 5.41 K/UL — SIGNIFICANT CHANGE UP (ref 1.4–6.5)
NEUTROPHILS NFR BLD AUTO: 77.6 % — HIGH (ref 42.2–75.2)
NEUTROPHILS NFR BLD AUTO: 77.6 % — HIGH (ref 42.2–75.2)
NRBC # BLD: 0 /100 WBCS — SIGNIFICANT CHANGE UP (ref 0–0)
NRBC # BLD: 0 /100 WBCS — SIGNIFICANT CHANGE UP (ref 0–0)
PHOSPHATE SERPL-MCNC: 3.2 MG/DL — SIGNIFICANT CHANGE UP (ref 2.1–4.9)
PHOSPHATE SERPL-MCNC: 3.2 MG/DL — SIGNIFICANT CHANGE UP (ref 2.1–4.9)
PHOSPHATE SERPL-MCNC: 4 MG/DL — SIGNIFICANT CHANGE UP (ref 2.1–4.9)
PHOSPHATE SERPL-MCNC: 4 MG/DL — SIGNIFICANT CHANGE UP (ref 2.1–4.9)
PLATELET # BLD AUTO: 273 K/UL — SIGNIFICANT CHANGE UP (ref 130–400)
PLATELET # BLD AUTO: 273 K/UL — SIGNIFICANT CHANGE UP (ref 130–400)
PMV BLD: 11.2 FL — HIGH (ref 7.4–10.4)
PMV BLD: 11.2 FL — HIGH (ref 7.4–10.4)
POTASSIUM SERPL-MCNC: 4.2 MMOL/L — SIGNIFICANT CHANGE UP (ref 3.5–5)
POTASSIUM SERPL-MCNC: 4.2 MMOL/L — SIGNIFICANT CHANGE UP (ref 3.5–5)
POTASSIUM SERPL-MCNC: 5 MMOL/L — SIGNIFICANT CHANGE UP (ref 3.5–5)
POTASSIUM SERPL-MCNC: 5 MMOL/L — SIGNIFICANT CHANGE UP (ref 3.5–5)
POTASSIUM SERPL-SCNC: 4.2 MMOL/L — SIGNIFICANT CHANGE UP (ref 3.5–5)
POTASSIUM SERPL-SCNC: 4.2 MMOL/L — SIGNIFICANT CHANGE UP (ref 3.5–5)
POTASSIUM SERPL-SCNC: 5 MMOL/L — SIGNIFICANT CHANGE UP (ref 3.5–5)
POTASSIUM SERPL-SCNC: 5 MMOL/L — SIGNIFICANT CHANGE UP (ref 3.5–5)
PROCALCITONIN SERPL-MCNC: 13.7 NG/ML — HIGH (ref 0.02–0.1)
PROCALCITONIN SERPL-MCNC: 13.7 NG/ML — HIGH (ref 0.02–0.1)
PROT SERPL-MCNC: 5.8 G/DL — LOW (ref 6–8)
PROT SERPL-MCNC: 5.8 G/DL — LOW (ref 6–8)
PROT SERPL-MCNC: 6.6 G/DL — SIGNIFICANT CHANGE UP (ref 6–8)
PROT SERPL-MCNC: 6.6 G/DL — SIGNIFICANT CHANGE UP (ref 6–8)
RBC # BLD: 3.57 M/UL — LOW (ref 4.7–6.1)
RBC # BLD: 3.57 M/UL — LOW (ref 4.7–6.1)
RBC # FLD: 15.8 % — HIGH (ref 11.5–14.5)
RBC # FLD: 15.8 % — HIGH (ref 11.5–14.5)
SODIUM SERPL-SCNC: 147 MMOL/L — HIGH (ref 135–146)
SODIUM SERPL-SCNC: 147 MMOL/L — HIGH (ref 135–146)
SODIUM SERPL-SCNC: 148 MMOL/L — HIGH (ref 135–146)
SODIUM SERPL-SCNC: 148 MMOL/L — HIGH (ref 135–146)
WBC # BLD: 6.97 K/UL — SIGNIFICANT CHANGE UP (ref 4.8–10.8)
WBC # BLD: 6.97 K/UL — SIGNIFICANT CHANGE UP (ref 4.8–10.8)
WBC # FLD AUTO: 6.97 K/UL — SIGNIFICANT CHANGE UP (ref 4.8–10.8)
WBC # FLD AUTO: 6.97 K/UL — SIGNIFICANT CHANGE UP (ref 4.8–10.8)

## 2023-11-06 PROCEDURE — 99291 CRITICAL CARE FIRST HOUR: CPT

## 2023-11-06 PROCEDURE — 99233 SBSQ HOSP IP/OBS HIGH 50: CPT

## 2023-11-06 PROCEDURE — 99221 1ST HOSP IP/OBS SF/LOW 40: CPT

## 2023-11-06 PROCEDURE — 71045 X-RAY EXAM CHEST 1 VIEW: CPT | Mod: 26

## 2023-11-06 RX ADMIN — INSULIN GLARGINE 20 UNIT(S): 100 INJECTION, SOLUTION SUBCUTANEOUS at 10:20

## 2023-11-06 RX ADMIN — CEFEPIME 100 MILLIGRAM(S): 1 INJECTION, POWDER, FOR SOLUTION INTRAMUSCULAR; INTRAVENOUS at 16:13

## 2023-11-06 RX ADMIN — BUMETANIDE 2 MILLIGRAM(S): 0.25 INJECTION INTRAMUSCULAR; INTRAVENOUS at 05:42

## 2023-11-06 RX ADMIN — Medication 3 MILLILITER(S): at 20:05

## 2023-11-06 RX ADMIN — Medication 6: at 08:39

## 2023-11-06 RX ADMIN — Medication 3 MILLILITER(S): at 07:56

## 2023-11-06 RX ADMIN — HEPARIN SODIUM 5000 UNIT(S): 5000 INJECTION INTRAVENOUS; SUBCUTANEOUS at 15:28

## 2023-11-06 RX ADMIN — Medication 6: at 11:29

## 2023-11-06 RX ADMIN — PANTOPRAZOLE SODIUM 40 MILLIGRAM(S): 20 TABLET, DELAYED RELEASE ORAL at 11:28

## 2023-11-06 RX ADMIN — Medication 3 MILLILITER(S): at 14:02

## 2023-11-06 RX ADMIN — HEPARIN SODIUM 5000 UNIT(S): 5000 INJECTION INTRAVENOUS; SUBCUTANEOUS at 21:36

## 2023-11-06 RX ADMIN — HEPARIN SODIUM 5000 UNIT(S): 5000 INJECTION INTRAVENOUS; SUBCUTANEOUS at 05:42

## 2023-11-06 NOTE — PROGRESS NOTE ADULT - ASSESSMENT
# Severe PARKER likely due to sepsis. Pt has Screat up to ~ 2.0 as far back as June 2021  # Hyperkalemia, given IV CaGluc and Lokelma, plus Bumex IV resolved  # Urine chemistries c/w pre-renal state / CRS w/ FeNa 0.3%, FeUrea 14%  # HAGMA d/t DKA / renal failure    - start HD on 11/3  - non oliguric  - creat level up-trending between HD sessions    Recommendations:  - HD today, 3hrs, 3K+ bath, 2L UF as tolerated  - BP noted;  cont midodrine pre-HD to allow for UF  - can cont iv bumex as long as pt non oliguric to maximize urine output  - bowden, strict i/o   - complete sepsis w/u, abx per ID recs- on cefepime, dose for iHD   # Severe PARKER likely due to sepsis. Pt has Screat up to ~ 2.0 as far back as June 2021  # Hyperkalemia, given IV CaGluc and Lokelma, plus Bumex IV resolved  # Urine chemistries c/w pre-renal state / CRS w/ FeNa 0.3%, FeUrea 14%  # HAGMA d/t DKA / renal failure    - start HD on 11/3  - non oliguric  - creat level up-trending between HD sessions    Recommendations:  - HD today, 3hrs, 3K+ bath, 2L UF as tolerated  - BP noted;  cont midodrine pre-HD to allow for UF  - increase free water enterally   - cont iv bumex- increase to bid dosing.  Pt is making nice amount of urine  - bowden, strict i/o   - complete sepsis w/u, abx per ID recs- on cefepime, dose for iHD  - d/c po sodium bicarb  - phos level noted at goal  - decrease gabapentin to once daily dosing

## 2023-11-06 NOTE — PROGRESS NOTE ADULT - ASSESSMENT
IMPRESSION:    ARF secondary to pulmonary edema fluid overload   Likely right aspiration pneumonia    DKA   alter mental status   sepsis fever   PARKER   metabolc acidosis     PLAN:    CNS: neurology follow. No seizure on EEG.     HEENT: oral care     PULMONARY: Aletrnate BIPAP PRN and overnight with daytime HFNC. Wean down Fio2. Chest PT and frequent suctioning.      CARDIOVASCULAR: Keep negative balance as tolerated. BNP markedly elevated. HFPEF on echo.     GI: GI ppx. NGT feeding as tolerated Speech and swallow re-evaluation.     RENAL: Start PO bicarb 650 TID. HD per nephrology Keep negative balance as tolerated.     INFECTIOUS DISEASE:   On cefepime per ID. repeat MRSA , Procal     HEMATOLOGICAL:  DVT prophylaxis. Keep hg more than 7.     ENDOCRINE:  Isnulin SQ protocol. NGT for feeding. Lispro lantus when starts oral diet.     MUSCULOSKELETAL: PT OT.     CODE STATUS: DNI DNR. Pall care follow up.     Keep in ICU for now.  IMPRESSION:    ARF secondary to pulmonary edema fluid overload   Likely right aspiration pneumonia    DKA   alter mental status   sepsis fever   PARKER   metabolc acidosis     PLAN:    CNS: neurology follow. No seizure on EEG.     HEENT: oral care     PULMONARY: Aletrnate BIPAP PRN and overnight with daytime HFNC. Wean down Fio2. Chest PT and frequent suctioning.      CARDIOVASCULAR: Keep negative balance as tolerated. BNP markedly elevated. HFPEF on echo.     GI: GI ppx. NGT feeding as tolerated Speech and swallow re-evaluation.     RENAL: Start PO bicarb 650 TID. HD per nephrology Keep negative balance as tolerated.     INFECTIOUS DISEASE:   On cefepime per ID. repeat MRSA , Procal     HEMATOLOGICAL:  DVT prophylaxis. Keep hg more than 7.     ENDOCRINE:  Insulin SQ protocol. NGT for feeding. Lispro Lantus when starts oral diet.     MUSCULOSKELETAL: PT OT.     CODE STATUS: DNI DNR. Pall care follow up.     Keep in ICU

## 2023-11-06 NOTE — PROGRESS NOTE ADULT - ASSESSMENT
89 yo male PMHx of  DM, CAD-bypass, chronic HFpEF, HTN, HLD, CKD3 presents w sob x few days, no cough fever or chills.  pt placed on NRBM by EMS and subsequently on BiPAP in ED, O2 sat now 99%.  Pt found to have elevated glucose w high AG and b-hydroxybutarate c/w DKA  Hospital course complicated by respiratory failure secondary to suspected aspiration pneumonia, PARKER and initiation of HD     DKA / sepsis - possible aspiration pneumonia / acute respiratory failure / PARKER on CKD3     - continue  insulin,  IV antibiotics and HD   - pt remains dependant on NIPPV   - ativan prn   - poor prognosis     DNR / DNI

## 2023-11-06 NOTE — PROGRESS NOTE ADULT - ASSESSMENT
89y Male  s/p     NEURO:  #Acute pain    -APAP prn    -Gabapentin 300mg BID (renally dose)  #Hx anxiety  Continue home medications:  -citalopram 20mg QD  -clonazepam 2mg prn at bedtime     RESP:   #DNI  #Oxygenation  Alternate BIPAP PRN and overnight with daytime HFNC. Wean down Fio2. Chest PT and frequent suctioning.  repeat mrsa pending      CARDS:   #Hx HFPEF  -Keep negative balance as tolerated. BNP markedly elevated. HFPEF on echo. LVEF 61%  -continue bumex  -replete lytes  -on midodrine 10 q8 started by nephro 11/5      GI/NUTR:   #failed S/S--Feeds via NG tube  -reconsulting Nutrition, last saw on 11/1  #GI Prophylaxis   -continue pantoprazole  #Bowel regimen    -continue senna    /RENAL:   # Severe PARKER likely due to sepsis. Pt has Screat up to ~ 2.0 as far back as June 2021  # Hyperkalemia, given IV CaGluc and Lokelma, plus Bumex IV resolved  # Urine chemistries c/w pre-renal state / CRS w/ FeNa 0.3%, FeUrea 14%  -Nephro following R serena in place, last dialyzed 11/4    Labs          Electrolytes-Na 148 // K 4.2 // Mg 2.2 //  Phos 4.0 (11-06 @ 05:18)         HEME/ONC:   #DVT prophylaxis     -SCDs     -hep subQ    - continue home ASN, Plavix       Labs: Hb/Hct:  9.0/27.0  (11-05 @ 08:00)  -->,  9.9/30.6  (11-06 @ 05:18)  -->                      Plts:  240  -->,  273  -->                 PTT/INR:           ID:  WBC- 8.20  --->>,  6.92  --->>,  6.97  --->>  Temp trend- 24hrs T(F): 99 (11-06 @ 09:44), Max: 99.7 (11-06 @ 01:00)  Current antibiotics-cefepime   IVPB 1000 every 24 hours, per ID  last day today    ENDO:  #Hx DM  #DKA  -on insulin sliding scale,   -FSG q6 if NPO or Tube feeds    -Glucose goal 140-180. if above 180 start ISS    ADVANCED DIRECTIVES: DNR/DNI  -recall palliative today          DISPO:   ICU

## 2023-11-06 NOTE — PROGRESS NOTE ADULT - SUBJECTIVE AND OBJECTIVE BOX
Patient is a 89y old  Male who presents with a chief complaint of sob (05 Nov 2023 13:04)        Over Night Events:   remains critically ill, Remains dependatn on BIPAP. Does not follow commands          ROS:     All ROS are negative except HPI         PHYSICAL EXAM    ICU Vital Signs Last 24 Hrs  T(C): 37.3 (06 Nov 2023 07:27), Max: 37.7 (05 Nov 2023 08:00)  T(F): 99.1 (06 Nov 2023 07:27), Max: 99.9 (05 Nov 2023 08:00)  HR: 71 (06 Nov 2023 07:27) (60 - 79)  BP: 147/65 (06 Nov 2023 07:27) (89/47 - 147/65)  BP(mean): 95 (06 Nov 2023 05:26) (63 - 101)  ABP: --  ABP(mean): --  RR: 19 (06 Nov 2023 07:27) (15 - 38)  SpO2: 85% (06 Nov 2023 05:26) (80% - 99%)    O2 Parameters below as of 06 Nov 2023 07:27      O2 Concentration (%): 100        CONSTITUTIONAL:  ill appearing      ENT:   Airway patent,   Mouth with normal mucosa.    On NIV     EYES:   Pupils equal,   Round and reactive to light.    CARDIAC:   Normal rate,   Regular rhythm.                 RESPIRATORY:   decreased air entry     GASTROINTESTINAL:  Abdomen soft,   Non-tender,   No guarding,   + BS       NEUROLOGICAL:   Follows basic  commands      SKIN:   Skin normal color for race,   Warm and dry and intact.   No evidence of rash.       11-05-23 @ 07:01  -  11-06-23 @ 07:00  --------------------------------------------------------  IN:    Glucerna: 140 mL    Insulin: 3 mL    IV PiggyBack: 50 mL    Oral Fluid: 60 mL  Total IN: 253 mL    OUT:    Incontinent per Collection Bag (mL): 1175 mL  Total OUT: 1175 mL    Total NET: -922 mL          LABS:                            9.9    6.97  )-----------( 273      ( 06 Nov 2023 05:18 )             30.6                                               11-05    144  |  108  |  94<HH>  ----------------------------<  251<H>  4.6   |  22  |  3.1<H>    Ca    7.3<L>      05 Nov 2023 19:45  Phos  3.4     11-05  Mg     2.1     11-05    TPro  5.4<L>  /  Alb  2.5<L>  /  TBili  0.3  /  DBili  x   /  AST  46<H>  /  ALT  16  /  AlkPhos  88  11-05                                             Urinalysis Basic - ( 05 Nov 2023 19:45 )    Color: x / Appearance: x / SG: x / pH: x  Gluc: 251 mg/dL / Ketone: x  / Bili: x / Urobili: x   Blood: x / Protein: x / Nitrite: x   Leuk Esterase: x / RBC: x / WBC x   Sq Epi: x / Non Sq Epi: x / Bacteria: x                                                  LIVER FUNCTIONS - ( 05 Nov 2023 19:45 )  Alb: 2.5 g/dL / Pro: 5.4 g/dL / ALK PHOS: 88 U/L / ALT: 16 U/L / AST: 46 U/L / GGT: x                                                  Culture - Blood (collected 04 Nov 2023 15:54)  Source: .Blood None  Preliminary Report (06 Nov 2023 01:02):    No growth at 24 hours                                                                                           MEDICATIONS  (STANDING):  albuterol/ipratropium for Nebulization 3 milliLiter(s) Nebulizer every 6 hours  albuterol/ipratropium for Nebulization. 3 milliLiter(s) Nebulizer once  allopurinol 100 milliGRAM(s) Oral daily  aspirin  chewable 81 milliGRAM(s) Oral daily  buMETAnide Injectable 2 milliGRAM(s) IV Push daily  carvedilol 25 milliGRAM(s) Oral every 12 hours  cefepime   IVPB 1000 milliGRAM(s) IV Intermittent every 24 hours  chlorhexidine 2% Cloths 1 Application(s) Topical <User Schedule>  citalopram 20 milliGRAM(s) Oral daily  clopidogrel Tablet 75 milliGRAM(s) Oral daily  dextrose 5%. 1000 milliLiter(s) (100 mL/Hr) IV Continuous <Continuous>  dextrose 5%. 1000 milliLiter(s) (50 mL/Hr) IV Continuous <Continuous>  dextrose 50% Injectable 50 milliLiter(s) IV Push every 15 minutes  gabapentin 300 milliGRAM(s) Oral two times a day  glucagon  Injectable 1 milliGRAM(s) IntraMuscular once  heparin   Injectable 5000 Unit(s) SubCutaneous every 8 hours  insulin glargine Injectable (LANTUS) 20 Unit(s) SubCutaneous every morning  insulin lispro (ADMELOG) corrective regimen sliding scale   SubCutaneous three times a day before meals  insulin regular Infusion 1 Unit(s)/Hr (1 mL/Hr) IV Continuous <Continuous>  midodrine 10 milliGRAM(s) Oral every 8 hours  norepinephrine Infusion 0.05 MICROgram(s)/kG/Min (8.3 mL/Hr) IV Continuous <Continuous>  pantoprazole  Injectable 40 milliGRAM(s) IV Push daily  polyethylene glycol 3350 17 Gram(s) Oral daily  senna 2 Tablet(s) Oral at bedtime  sodium bicarbonate 650 milliGRAM(s) Oral every 8 hours    MEDICATIONS  (PRN):  acetaminophen  Suppository .. 650 milliGRAM(s) Rectal every 6 hours PRN Temp greater or equal to 38C (100.4F)  albuterol    90 MICROgram(s) HFA Inhaler 2 Puff(s) Inhalation every 4 hours PRN Shortness of Breath and/or Wheezing  clonazePAM  Tablet 2 milliGRAM(s) Oral at bedtime PRN axniety  dextrose Oral Gel 15 Gram(s) Oral once PRN Blood Glucose LESS THAN 70 milliGRAM(s)/deciliter         CXR interpreted by me: B/l opacities R>L

## 2023-11-06 NOTE — PROGRESS NOTE ADULT - SUBJECTIVE AND OBJECTIVE BOX
Patient seen and evaluated this am, remains on NIPPV      T(F): 98.6 (11-06-23 @ 15:30), Max: 99.7 (11-06-23 @ 01:00)  HR: 66 (11-06-23 @ 17:00)  BP: 113/55 (11-06-23 @ 17:00)  RR: 19 (11-06-23 @ 17:00)  SpO2: 100% (11-06-23 @ 17:00) (80% - 100%)    PHYSICAL EXAM:  GENERAL: NAD  HEAD:  Atraumatic, Normocephalic  EYES: EOMI, PERRLA, conjunctiva and sclera clear  NERVOUS SYSTEM:   no focal deficits   CHEST/LUNG:  bilateral rales  HEART: Regular rate and rhythm; No murmurs, rubs, or gallops  ABDOMEN: Soft, Nontender, Nondistended; Bowel sounds present  EXTREMITIES:  2+ Peripheral Pulses, No clubbing, cyanosis, or edema    LABS  11-06    147<H>  |  105  |  61<HH>  ----------------------------<  180<H>  5.0   |  19  |  2.3<H>    Ca    8.4      06 Nov 2023 15:38  Phos  3.2     11-06  Mg     2.2     11-06    TPro  6.6  /  Alb  3.0<L>  /  TBili  0.4  /  DBili  x   /  AST  35  /  ALT  30  /  AlkPhos  113  11-06                          9.9    6.97  )-----------( 273      ( 06 Nov 2023 05:18 )             30.6       Culture Results:   No growth at 24 hours (11-04-23)  Culture Results:   No growth at 5 days (10-31-23)  Culture Results:   No growth at 5 days (10-31-23)    RADIOLOGY  < from: Xray Chest 1 View- PORTABLE-Routine (Xray Chest 1 View- PORTABLE-Routine in AM.) (11.06.23 @ 07:21) >  Impression:    Stable bilateral opacities/effusions.      < end of copied text >    MEDICATIONS  (STANDING):  albuterol/ipratropium for Nebulization 3 milliLiter(s) Nebulizer every 6 hours  albuterol/ipratropium for Nebulization. 3 milliLiter(s) Nebulizer once  allopurinol 100 milliGRAM(s) Oral daily  aspirin  chewable 81 milliGRAM(s) Oral daily  buMETAnide Injectable 2 milliGRAM(s) IV Push daily  carvedilol 25 milliGRAM(s) Oral every 12 hours  cefepime   IVPB 1000 milliGRAM(s) IV Intermittent every 24 hours  chlorhexidine 2% Cloths 1 Application(s) Topical <User Schedule>  citalopram 20 milliGRAM(s) Oral daily  clopidogrel Tablet 75 milliGRAM(s) Oral daily  gabapentin 300 milliGRAM(s) Oral two times a day  heparin   Injectable 5000 Unit(s) SubCutaneous every 8 hours  insulin glargine Injectable (LANTUS) 20 Unit(s) SubCutaneous every morning  insulin lispro (ADMELOG) corrective regimen sliding scale   SubCutaneous three times a day before meals  insulin regular Infusion 1 Unit(s)/Hr (1 mL/Hr) IV Continuous <Continuous>  midodrine 10 milliGRAM(s) Oral every 8 hours  pantoprazole  Injectable 40 milliGRAM(s) IV Push daily  polyethylene glycol 3350 17 Gram(s) Oral daily  senna 2 Tablet(s) Oral at bedtime    MEDICATIONS  (PRN):  acetaminophen  Suppository .. 650 milliGRAM(s) Rectal every 6 hours PRN Temp greater or equal to 38C (100.4F)  albuterol    90 MICROgram(s) HFA Inhaler 2 Puff(s) Inhalation every 4 hours PRN Shortness of Breath and/or Wheezing  clonazePAM  Tablet 2 milliGRAM(s) Oral at bedtime PRN axniety  dextrose Oral Gel 15 Gram(s) Oral once PRN Blood Glucose LESS THAN 70 milliGRAM(s)/deciliter

## 2023-11-06 NOTE — PROGRESS NOTE ADULT - SUBJECTIVE AND OBJECTIVE BOX
Nephrology Progress Note    AMANDA CASTORENA  MRN-[id number]  89y  Male    S:  Patient is seen and examined, events over the last 24h noted.    O:  Allergies:  No Known Allergies    Hospital Medications:   MEDICATIONS  (STANDING):  albuterol/ipratropium for Nebulization 3 milliLiter(s) Nebulizer every 6 hours  albuterol/ipratropium for Nebulization. 3 milliLiter(s) Nebulizer once  allopurinol 100 milliGRAM(s) Oral daily  aspirin  chewable 81 milliGRAM(s) Oral daily  buMETAnide Injectable 2 milliGRAM(s) IV Push daily  carvedilol 25 milliGRAM(s) Oral every 12 hours  cefepime   IVPB 1000 milliGRAM(s) IV Intermittent every 24 hours  chlorhexidine 2% Cloths 1 Application(s) Topical <User Schedule>  citalopram 20 milliGRAM(s) Oral daily  clopidogrel Tablet 75 milliGRAM(s) Oral daily  dextrose 5%. 1000 milliLiter(s) (50 mL/Hr) IV Continuous <Continuous>  dextrose 5%. 1000 milliLiter(s) (100 mL/Hr) IV Continuous <Continuous>  dextrose 50% Injectable 50 milliLiter(s) IV Push every 15 minutes  gabapentin 300 milliGRAM(s) Oral two times a day  glucagon  Injectable 1 milliGRAM(s) IntraMuscular once  heparin   Injectable 5000 Unit(s) SubCutaneous every 8 hours  insulin glargine Injectable (LANTUS) 20 Unit(s) SubCutaneous every morning  insulin lispro (ADMELOG) corrective regimen sliding scale   SubCutaneous three times a day before meals  insulin regular Infusion 1 Unit(s)/Hr (1 mL/Hr) IV Continuous <Continuous>  midodrine 10 milliGRAM(s) Oral every 8 hours  pantoprazole  Injectable 40 milliGRAM(s) IV Push daily  polyethylene glycol 3350 17 Gram(s) Oral daily  senna 2 Tablet(s) Oral at bedtime  sodium bicarbonate 650 milliGRAM(s) Oral every 8 hours    MEDICATIONS  (PRN):  acetaminophen  Suppository .. 650 milliGRAM(s) Rectal every 6 hours PRN Temp greater or equal to 38C (100.4F)  albuterol    90 MICROgram(s) HFA Inhaler 2 Puff(s) Inhalation every 4 hours PRN Shortness of Breath and/or Wheezing  clonazePAM  Tablet 2 milliGRAM(s) Oral at bedtime PRN axniety  dextrose Oral Gel 15 Gram(s) Oral once PRN Blood Glucose LESS THAN 70 milliGRAM(s)/deciliter    Home Medications:  allopurinol 100 mg oral tablet: 1 tab(s) orally once a day (25 Oct 2021 22:30)  citalopram 20 mg oral tablet: 1 tab(s) orally once a day (25 Oct 2021 22:30)  clonazePAM 1 mg oral tablet: 2 tab(s) orally once a day (at bedtime), As Needed (25 Oct 2021 22:30)  gabapentin 300 mg oral capsule: 1 cap(s) orally 2 times a day (25 Oct 2021 22:30)  glimepiride 4 mg oral tablet: 1 tab(s) orally 2 times a day (25 Oct 2021 22:30)  Lantus 100 units/mL subcutaneous solution: 30 unit(s) subcutaneous once a day (at bedtime) (25 Oct 2021 22:30)  NIFEdipine 60 mg oral tablet, extended release: 1 tab(s) orally once a day (25 Oct 2021 22:30)      VITALS:  Daily     Daily   T(F): 99 (11-06-23 @ 11:48), Max: 99.7 (11-06-23 @ 01:00)  HR: 70 (11-06-23 @ 11:48)  BP: 133/63 (11-06-23 @ 11:48)  RR: 25 (11-06-23 @ 11:48)  SpO2: 100% (11-06-23 @ 11:48)  Wt(kg): --  I&O's Detail    05 Nov 2023 07:01  -  06 Nov 2023 07:00  --------------------------------------------------------  IN:    Glucerna: 140 mL    Insulin: 3 mL    IV PiggyBack: 50 mL    Oral Fluid: 60 mL  Total IN: 253 mL    OUT:    Incontinent per Collection Bag (mL): 1175 mL  Total OUT: 1175 mL    Total NET: -922 mL      06 Nov 2023 07:01  -  06 Nov 2023 11:56  --------------------------------------------------------  IN:  Total IN: 0 mL    OUT:    Voided (mL): 405 mL  Total OUT: 405 mL    Total NET: -405 mL        I&O's Summary    05 Nov 2023 07:01  -  06 Nov 2023 07:00  --------------------------------------------------------  IN: 253 mL / OUT: 1175 mL / NET: -922 mL    06 Nov 2023 07:01  -  06 Nov 2023 11:56  --------------------------------------------------------  IN: 0 mL / OUT: 405 mL / NET: -405 mL          PHYSICAL EXAM:  Gen: NAD  Chest: b/l breath sounds  Abd: soft  Extremities: no edema  Vascular access: udall      LABS:    11-06    148<H>  |  108  |  102<HH>  ----------------------------<  255<H>  4.2   |  24  |  3.4<H>    Ca    7.8<L>      06 Nov 2023 05:18  Phos  4.0     11-06  Mg     2.2     11-06    TPro  5.8<L>  /  Alb  2.7<L>  /  TBili  0.3  /  DBili      /  AST  34  /  ALT  30  /  AlkPhos  99  11-06    Phosphorus: 4.0 mg/dL (11-06-23 @ 05:18)  Phosphorus: 3.4 mg/dL (11-05-23 @ 08:00)                            9.9    6.97  )-----------( 273      ( 06 Nov 2023 05:18 )             30.6     Mean Cell Volume: 85.7 fL (11-06-23 @ 05:18)    Urine Studies:  Protein, Urine: 30 mg/dL (11-04-23 @ 14:00)  White Blood Cell - Urine: 2 /HPF (11-04-23 @ 14:00)  Red Blood Cell - Urine: 5 /HPF (11-04-23 @ 14:00)    Culture Results:   No growth at 24 hours (11-04 @ 15:54)  Sodium, Random Urine: <20.0 mmoL/L (10-31 @ 13:24)    Creatinine trend:  Creatinine: 3.4 mg/dL (11-06-23 @ 05:18)  Creatinine: 3.1 mg/dL (11-05-23 @ 19:45)  Creatinine: 3.0 mg/dL (11-05-23 @ 11:00)  Creatinine: 3.1 mg/dL (11-05-23 @ 08:00)  Creatinine: 2.9 mg/dL (11-04-23 @ 21:39)    US Kidney and Bladder:   ACC: [id number] EXAM:  US KIDNEYS AND BLADDER   ORDERED BY: JOSEFA SARAVIA     PROCEDURE DATE:  10/31/2023          INTERPRETATION:  CLINICAL INFORMATION: Worsening renal function.    COMPARISON: October 26, 2021 ultrasound.    TECHNIQUE: Sonography of the kidneys and bladder.    FINDINGS:    Right kidney: 12.0 cm. Echogenic in appearance without hydronephrosis or   nephrolithiasis.    Left kidney:  11.6 cm. Echogenic in appearance without hydronephrosis or   nephrolithiasis.    Urinary bladder: Patient has a condom catheter.    IMPRESSION:    Echogenic kidneys consistent with medical renal disease. No   hydronephrosis or nephrolithiasis.        --- End of Report ---            JOSÉ GREENBERG MD; Attending Interventional Radiologist  This document has been electronically signed. Oct 31 2023 11:41AM (10-31-23 @ 11:33)     Nephrology Progress Note    AMANDA CASTORENA  MRN-[id number]  89y  Male    S:  Patient is seen and examined, events over the last 24h noted.    O:  Allergies:  No Known Allergies    Hospital Medications:   MEDICATIONS  (STANDING):  albuterol/ipratropium for Nebulization 3 milliLiter(s) Nebulizer every 6 hours  albuterol/ipratropium for Nebulization. 3 milliLiter(s) Nebulizer once  allopurinol 100 milliGRAM(s) Oral daily  aspirin  chewable 81 milliGRAM(s) Oral daily  buMETAnide Injectable 2 milliGRAM(s) IV Push daily  carvedilol 25 milliGRAM(s) Oral every 12 hours  cefepime   IVPB 1000 milliGRAM(s) IV Intermittent every 24 hours  chlorhexidine 2% Cloths 1 Application(s) Topical <User Schedule>  citalopram 20 milliGRAM(s) Oral daily  clopidogrel Tablet 75 milliGRAM(s) Oral daily  gabapentin 300 milliGRAM(s) Oral two times a day  heparin   Injectable 5000 Unit(s) SubCutaneous every 8 hours  insulin glargine Injectable (LANTUS) 20 Unit(s) SubCutaneous every morning  insulin lispro (ADMELOG) corrective regimen sliding scale   SubCutaneous three times a day before meals  insulin regular Infusion 1 Unit(s)/Hr (1 mL/Hr) IV Continuous <Continuous>  midodrine 10 milliGRAM(s) Oral every 8 hours  pantoprazole  Injectable 40 milliGRAM(s) IV Push daily  polyethylene glycol 3350 17 Gram(s) Oral daily  senna 2 Tablet(s) Oral at bedtime  sodium bicarbonate 650 milliGRAM(s) Oral every 8 hours    MEDICATIONS  (PRN):  acetaminophen  Suppository .. 650 milliGRAM(s) Rectal every 6 hours PRN Temp greater or equal to 38C (100.4F)  albuterol    90 MICROgram(s) HFA Inhaler 2 Puff(s) Inhalation every 4 hours PRN Shortness of Breath and/or Wheezing  clonazePAM  Tablet 2 milliGRAM(s) Oral at bedtime PRN axniety  dextrose Oral Gel 15 Gram(s) Oral once PRN Blood Glucose LESS THAN 70 milliGRAM(s)/deciliter    Home Medications:  allopurinol 100 mg oral tablet: 1 tab(s) orally once a day (25 Oct 2021 22:30)  citalopram 20 mg oral tablet: 1 tab(s) orally once a day (25 Oct 2021 22:30)  clonazePAM 1 mg oral tablet: 2 tab(s) orally once a day (at bedtime), As Needed (25 Oct 2021 22:30)  gabapentin 300 mg oral capsule: 1 cap(s) orally 2 times a day (25 Oct 2021 22:30)  glimepiride 4 mg oral tablet: 1 tab(s) orally 2 times a day (25 Oct 2021 22:30)  Lantus 100 units/mL subcutaneous solution: 30 unit(s) subcutaneous once a day (at bedtime) (25 Oct 2021 22:30)  NIFEdipine 60 mg oral tablet, extended release: 1 tab(s) orally once a day (25 Oct 2021 22:30)      VITALS:  Daily     Daily   T(F): 99 (11-06-23 @ 11:48), Max: 99.7 (11-06-23 @ 01:00)  HR: 70 (11-06-23 @ 11:48)  BP: 133/63 (11-06-23 @ 11:48)  RR: 25 (11-06-23 @ 11:48)  SpO2: 100% (11-06-23 @ 11:48)  Wt(kg): --  I&O's Detail    05 Nov 2023 07:01  -  06 Nov 2023 07:00  --------------------------------------------------------  IN:    Glucerna: 140 mL    Insulin: 3 mL    IV PiggyBack: 50 mL    Oral Fluid: 60 mL  Total IN: 253 mL    OUT:    Incontinent per Collection Bag (mL): 1175 mL  Total OUT: 1175 mL    Total NET: -922 mL      06 Nov 2023 07:01  -  06 Nov 2023 11:56  --------------------------------------------------------  IN:  Total IN: 0 mL    OUT:    Voided (mL): 405 mL  Total OUT: 405 mL    Total NET: -405 mL        I&O's Summary    05 Nov 2023 07:01  -  06 Nov 2023 07:00  --------------------------------------------------------  IN: 253 mL / OUT: 1175 mL / NET: -922 mL    06 Nov 2023 07:01  -  06 Nov 2023 11:56  --------------------------------------------------------  IN: 0 mL / OUT: 405 mL / NET: -405 mL          PHYSICAL EXAM:  Gen: lethargic, on bipap, +NGT  Chest: decreased bs on the left  Abd: soft  Extremities: no edema  Vascular access: udall      LABS:    11-06    148<H>  |  108  |  102<HH>  ----------------------------<  255<H>  4.2   |  24  |  3.4<H>    Ca    7.8<L>      06 Nov 2023 05:18  Phos  4.0     11-06  Mg     2.2     11-06    TPro  5.8<L>  /  Alb  2.7<L>  /  TBili  0.3  /  DBili      /  AST  34  /  ALT  30  /  AlkPhos  99  11-06    Phosphorus: 4.0 mg/dL (11-06-23 @ 05:18)  Phosphorus: 3.4 mg/dL (11-05-23 @ 08:00)                            9.9    6.97  )-----------( 273      ( 06 Nov 2023 05:18 )             30.6     Mean Cell Volume: 85.7 fL (11-06-23 @ 05:18)    Urine Studies:  Protein, Urine: 30 mg/dL (11-04-23 @ 14:00)  White Blood Cell - Urine: 2 /HPF (11-04-23 @ 14:00)  Red Blood Cell - Urine: 5 /HPF (11-04-23 @ 14:00)    Culture Results:   No growth at 24 hours (11-04 @ 15:54)  Sodium, Random Urine: <20.0 mmoL/L (10-31 @ 13:24)    Creatinine trend:  Creatinine: 3.4 mg/dL (11-06-23 @ 05:18)  Creatinine: 3.1 mg/dL (11-05-23 @ 19:45)  Creatinine: 3.0 mg/dL (11-05-23 @ 11:00)  Creatinine: 3.1 mg/dL (11-05-23 @ 08:00)  Creatinine: 2.9 mg/dL (11-04-23 @ 21:39)    US Kidney and Bladder:   ACC: [id number] EXAM:  US KIDNEYS AND BLADDER   ORDERED BY: JOSEFA SARAVIA     PROCEDURE DATE:  10/31/2023          INTERPRETATION:  CLINICAL INFORMATION: Worsening renal function.    COMPARISON: October 26, 2021 ultrasound.    TECHNIQUE: Sonography of the kidneys and bladder.    FINDINGS:    Right kidney: 12.0 cm. Echogenic in appearance without hydronephrosis or   nephrolithiasis.    Left kidney:  11.6 cm. Echogenic in appearance without hydronephrosis or   nephrolithiasis.    Urinary bladder: Patient has a condom catheter.    IMPRESSION:    Echogenic kidneys consistent with medical renal disease. No   hydronephrosis or nephrolithiasis.        --- End of Report ---            JOSÉ GREENBERG MD; Attending Interventional Radiologist  This document has been electronically signed. Oct 31 2023 11:41AM (10-31-23 @ 11:33)

## 2023-11-06 NOTE — PROGRESS NOTE ADULT - ASSESSMENT
89 y/o male  with a past medical HX of bypass, CAD, CHF , HTN, dyslipidemia, DM , anxiety   brought in for SOB, DKA.     IMPRESSION  #Metabolic encephalopathy   #DKA  #SOB- Volume overload vs Pneumonia  #PARKER over CKD  #Sepsis   #Lactic acidosis  #Obesity BMI (kg/m2): 29.7  #DM   #HTN, CHF, Pacemaker   #MRSA nares negative    RECOMMENDATIONS  -Kept on 7 days of antibiotics. ED 11/6.  -Since so far cultures have been negative, will aim for discontinuation of antimicrobials and monitor.   -Initiated on HD on 11/3.   -Aspiration precautions. Off loading measures to avoid pressure injuries.   -Prognosis guarded.     If any questions, please send a message or call on [employer] Teams  Please continue to update ID with any pertinent new laboratory or radiographic findings.    Freedom Chen M.D  Infectious Diseases Attending  [facility]

## 2023-11-06 NOTE — PROGRESS NOTE ADULT - SUBJECTIVE AND OBJECTIVE BOX
AMANDA CASTORENA   [id number]   [birth year]  89yM    Admit Date: 10-30-23  ============================  HPI   89 yo male PMHx sig for DM, CAD-bypass, CHF, HTN, HLD presents w sob x few days, no cough fever or chills.  pt placed on NRBM by EMS and subsequently on BiPAP in ED, O2 sat now 99%.  Pt found to have elevated glucose w high AG and b-hydroxybutarate c/w DKA     24 Hour Events  -no overnight events  -still on BIPAP      =========================================================================================================================================      PMH  PAST MEDICAL & SURGICAL HISTORY:  CHF (congestive heart failure)      DM (diabetes mellitus)      HTN (hypertension)      Prostate CA  in remission      Pacemaker      H/O total knee replacement, right        Home Meds:  Home Medications:  allopurinol 100 mg oral tablet: 1 tab(s) orally once a day (25 Oct 2021 22:30)  citalopram 20 mg oral tablet: 1 tab(s) orally once a day (25 Oct 2021 22:30)  clonazePAM 1 mg oral tablet: 2 tab(s) orally once a day (at bedtime), As Needed (25 Oct 2021 22:30)  gabapentin 300 mg oral capsule: 1 cap(s) orally 2 times a day (25 Oct 2021 22:30)  glimepiride 4 mg oral tablet: 1 tab(s) orally 2 times a day (25 Oct 2021 22:30)  Lantus 100 units/mL subcutaneous solution: 30 unit(s) subcutaneous once a day (at bedtime) (25 Oct 2021 22:30)  NIFEdipine 60 mg oral tablet, extended release: 1 tab(s) orally once a day (25 Oct 2021 22:30)     Allergies  Allergies    No Known Allergies    Intolerances       Current Medications:  acetaminophen  Suppository .. 650 milliGRAM(s) Rectal every 6 hours PRN Temp greater or equal to 38C (100.4F)  albuterol    90 MICROgram(s) HFA Inhaler 2 Puff(s) Inhalation every 4 hours PRN Shortness of Breath and/or Wheezing  albuterol/ipratropium for Nebulization 3 milliLiter(s) Nebulizer every 6 hours  albuterol/ipratropium for Nebulization. 3 milliLiter(s) Nebulizer once  allopurinol 100 milliGRAM(s) Oral daily  aspirin  chewable 81 milliGRAM(s) Oral daily  buMETAnide Injectable 2 milliGRAM(s) IV Push daily  carvedilol 25 milliGRAM(s) Oral every 12 hours  cefepime   IVPB 1000 milliGRAM(s) IV Intermittent every 24 hours  chlorhexidine 2% Cloths 1 Application(s) Topical <User Schedule>  citalopram 20 milliGRAM(s) Oral daily  clonazePAM  Tablet 2 milliGRAM(s) Oral at bedtime PRN axniety  clopidogrel Tablet 75 milliGRAM(s) Oral daily  dextrose 5%. 1000 milliLiter(s) (100 mL/Hr) IV Continuous <Continuous>  dextrose 5%. 1000 milliLiter(s) (50 mL/Hr) IV Continuous <Continuous>  dextrose 50% Injectable 50 milliLiter(s) IV Push every 15 minutes  dextrose Oral Gel 15 Gram(s) Oral once PRN Blood Glucose LESS THAN 70 milliGRAM(s)/deciliter  gabapentin 300 milliGRAM(s) Oral two times a day  glucagon  Injectable 1 milliGRAM(s) IntraMuscular once  heparin   Injectable 5000 Unit(s) SubCutaneous every 8 hours  insulin glargine Injectable (LANTUS) 20 Unit(s) SubCutaneous every morning  insulin lispro (ADMELOG) corrective regimen sliding scale   SubCutaneous three times a day before meals  insulin regular Infusion 1 Unit(s)/Hr (1 mL/Hr) IV Continuous <Continuous>  midodrine 10 milliGRAM(s) Oral every 8 hours  pantoprazole  Injectable 40 milliGRAM(s) IV Push daily  polyethylene glycol 3350 17 Gram(s) Oral daily  senna 2 Tablet(s) Oral at bedtime  sodium bicarbonate 650 milliGRAM(s) Oral every 8 hours      Vital Sings, Intake/Output (Last 24Hours)  ICU Vital Signs Last 24 Hrs  T(C): 37.2 (06 Nov 2023 09:44), Max: 37.6 (06 Nov 2023 01:00)  T(F): 99 (06 Nov 2023 09:44), Max: 99.7 (06 Nov 2023 01:00)  HR: 75 (06 Nov 2023 09:44) (60 - 79)  BP: 119/74 (06 Nov 2023 09:44) (105/69 - 173/68)  BP(mean): 93 (06 Nov 2023 09:00) (63 - 101)  ABP: --  ABP(mean): --  RR: 34 (06 Nov 2023 09:44) (15 - 38)  SpO2: 98% (06 Nov 2023 09:44) (80% - 100%)    O2 Parameters below as of 06 Nov 2023 08:00  Patient On (Oxygen Delivery Method): high frequency ventilator          I&O's Summary    05 Nov 2023 07:01  -  06 Nov 2023 07:00  --------------------------------------------------------  IN: 253 mL / OUT: 1175 mL / NET: -922 mL    06 Nov 2023 07:01  -  06 Nov 2023 10:23  --------------------------------------------------------  IN: 0 mL / OUT: 180 mL / NET: -180 mL        Physical Exam:  ----------------------------------------------------------------------------------------------------------    PHYSICAL EXAM: on BIPAP  GENERAL: NAD  HEAD:  Atraumatic, Normocephalic  EYES: EOMI, PERRLA, conjunctiva and sclera clear  ENNT: + NG tube, R [place] in place  NERVOUS SYSTEM:  no focal deficits   CHEST/LUNG:  bilateral rhonchi  HEART: Regular rate and rhythm; No murmurs, rubs, or gallops  ABDOMEN: Soft, Nontender, Nondistended; Bowel sounds present  EXTREMITIES:  2+ Peripheral Pulses, No clubbing, cyanosis, or edema    ----------------------------------------------------------------------------------------------------------

## 2023-11-06 NOTE — CONSULT NOTE ADULT - ASSESSMENT
Skin assessed- B/l buttock moisture associated dermatitis with intact dark red  linear striations possibly due to friction                                              B/L heel dry and intact                                              High risk for pressure injury development or progression         Plan:  Wound and skin  care recs.  Clean B/L buttock with soap and  water,  pat dry   then  apply  triad hydrophilic dressing   Pressure  injury  preventive  measures  skin  and incontinence care    Assess wound and inform primary provider of any changes   Case discussed with primary Rn  Wound/ ostomy specialist  to f/u as needed     Offloading: [x ] Frequent position changes [ ] Devices/Equipment  Cleansing: [ ] Saline [x ] Soap/Water [ ] Other: ______  Topicals: [ x] Barrier Cream [ ] Antimicrobial [ ] Enzymatic Wound Debridement  Dressings: [ ] Dry, sterile [ ] Allevyn  Foam [ ] Absorbant Pads [ ] Collagenase    Other Recs.     Per primary team    Total time for bedside assessment , review of medical records  and  discussion of plan of care with primary team greater than 35 min

## 2023-11-06 NOTE — PROGRESS NOTE ADULT - SUBJECTIVE AND OBJECTIVE BOX
AMANDA CASTORENA  89y, Male    LOS  7d    INTERVAL EVENTS/HPI  - No acute events overnight  - T(F): , Max: 99.7 (11-06-23 @ 01:00)  - Tolerating medication  - WBC Count: 6.97 (11-06-23 @ 05:18)  WBC Count: 6.92 (11-05-23 @ 08:00)  - Creatinine: 3.4 (11-06-23 @ 05:18)  Creatinine: 3.1 (11-05-23 @ 19:45)     REVIEW OF SYSTEMS: cannot obtain further history from the patient secondary to altered mental status or sedation      Prior hospital charts reviewed [Yes]  Primary team notes reviewed [Yes]  Other consultant notes reviewed [Yes]    ANTIMICROBIALS:   cefepime   IVPB 1000 every 24 hours      OTHER MEDS: MEDICATIONS  (STANDING):  acetaminophen  Suppository .. 650 every 6 hours PRN  albuterol    90 MICROgram(s) HFA Inhaler 2 every 4 hours PRN  albuterol/ipratropium for Nebulization 3 every 6 hours  albuterol/ipratropium for Nebulization. 3 once  allopurinol 100 daily  aspirin  chewable 81 daily  buMETAnide Injectable 2 daily  carvedilol 25 every 12 hours  citalopram 20 daily  clonazePAM  Tablet 2 at bedtime PRN  clopidogrel Tablet 75 daily  dextrose 50% Injectable 50 every 15 minutes  dextrose Oral Gel 15 once PRN  gabapentin 300 two times a day  glucagon  Injectable 1 once  heparin   Injectable 5000 every 8 hours  insulin glargine Injectable (LANTUS) 20 every morning  insulin lispro (ADMELOG) corrective regimen sliding scale  three times a day before meals  insulin regular Infusion 1 <Continuous>  midodrine 10 every 8 hours  pantoprazole  Injectable 40 daily  polyethylene glycol 3350 17 daily  senna 2 at bedtime      Vital Signs Last 24 Hrs  T(F): 98.6 (11-06-23 @ 15:30), Max: 103.5 (10-31-23 @ 03:00)    Vital Signs Last 24 Hrs  HR: 66 (11-06-23 @ 16:00) (60 - 663)  BP: 104/52 (11-06-23 @ 16:00) (102/56 - 173/68)  RR: 22 (11-06-23 @ 16:00)  SpO2: 80% (11-06-23 @ 16:00) (80% - 100%)  Wt(kg): --    EXAM:  GENERAL: NAD, On Bipap   EYES: Conjunctiva pink and cornea white  ENT: Normal external ears and nose  NECK: Supple, nontender to palpation  CHEST/LUNG: Shallow breath sounds. Diffuse Rhonchi   HEART: S1 S2  ABDOMEN: Soft, nontender.   EXTREMITIES: No clubbing, cyanosis, or petal edema  NERVOUS SYSTEM: Alert and oriented to person  MSK: No joint erythema, swelling or pain  SKIN: No rashes or lesions, no superficial thrombophlebitis  Labs:                        9.9    6.97  )-----------( 273      ( 06 Nov 2023 05:18 )             30.6     11-06    148<H>  |  108  |  102<HH>  ----------------------------<  255<H>  4.2   |  24  |  3.4<H>    Ca    7.8<L>      06 Nov 2023 05:18  Phos  4.0     11-06  Mg     2.2     11-06    TPro  5.8<L>  /  Alb  2.7<L>  /  TBili  0.3  /  DBili  x   /  AST  34  /  ALT  30  /  AlkPhos  99  11-06      WBC Trend:  WBC Count: 6.97 (11-06-23 @ 05:18)  WBC Count: 6.92 (11-05-23 @ 08:00)  WBC Count: 8.20 (11-04-23 @ 06:15)  WBC Count: 9.23 (11-03-23 @ 05:43)      Creatine Trend:  Creatinine: 3.4 (11-06)  Creatinine: 3.1 (11-05)  Creatinine: 3.0 (11-05)  Creatinine: 3.1 (11-05)      Liver Biochemical Testing Trend:  Alanine Aminotransferase (ALT/SGPT): 30 (11-06)  Alanine Aminotransferase (ALT/SGPT): 16 (11-05)  Alanine Aminotransferase (ALT/SGPT): 32 (11-05)  Alanine Aminotransferase (ALT/SGPT): 34 (11-05)  Alanine Aminotransferase (ALT/SGPT): 35 (11-04)  Aspartate Aminotransferase (AST/SGOT): 34 (11-06-23 @ 05:18)  Aspartate Aminotransferase (AST/SGOT): 46 (11-05-23 @ 19:45)  Aspartate Aminotransferase (AST/SGOT): 40 (11-05-23 @ 11:00)  Aspartate Aminotransferase (AST/SGOT): 49 (11-05-23 @ 08:00)  Aspartate Aminotransferase (AST/SGOT): 59 (11-04-23 @ 06:15)  Bilirubin Total: 0.3 (11-06)  Bilirubin Total: 0.3 (11-05)  Bilirubin Total: 0.3 (11-05)  Bilirubin Total: 0.3 (11-05)  Bilirubin Total: 0.2 (11-04)      Trend LDH      Urinalysis Basic - ( 06 Nov 2023 05:18 )    Color: x / Appearance: x / SG: x / pH: x  Gluc: 255 mg/dL / Ketone: x  / Bili: x / Urobili: x   Blood: x / Protein: x / Nitrite: x   Leuk Esterase: x / RBC: x / WBC x   Sq Epi: x / Non Sq Epi: x / Bacteria: x        MICROBIOLOGY:  Vancomycin Level, Random: 6.1 (11-03 @ 07:07)  Vancomycin Level, Random: 11.5 (11-01 @ 08:08)    Male    Culture - Blood (collected 04 Nov 2023 15:54)  Source: .Blood None  Preliminary Report:    No growth at 24 hours    Culture - Blood (collected 31 Oct 2023 02:26)  Source: .Blood Blood-Peripheral  Final Report:    No growth at 5 days    Culture - Blood (collected 31 Oct 2023 02:26)  Source: .Blood Blood-Peripheral  Final Report:    No growth at 5 days        Procalcitonin, Serum: >400.00 (11-02)    RADIOLOGY & ADDITIONAL TESTS:  I have personally reviewed the relevant images.   CXR      CT  < from: Xray Chest 1 View- PORTABLE-Routine (Xray Chest 1 View- PORTABLE-Routine in AM.) (11.06.23 @ 07:21) >  Impression:    Stable bilateral opacities/effusions.    < end of copied text >        WEIGHT  Weight (kg): 88.5 (10-30-23 @ 18:39)  Creatinine: 3.4 mg/dL (11-06-23 @ 05:18)  Creatinine: 3.1 mg/dL (11-05-23 @ 19:45)      All available historical records have been reviewed

## 2023-11-06 NOTE — CONSULT NOTE ADULT - SUBJECTIVE AND OBJECTIVE BOX
HPI:  Patient is a  89 yo male PMHx sig for DM, CAD-bypass, CHF, HTN, HLD presents w sob x few days, no cough fever or chills.  pt placed on NRBM by EMS and subsequently on BiPAP in ED, O2 sat now 99%.  Pt found to have elevated glucose w high AG and b-hydroxybutarate c/w DKA      PAST MEDICAL & SURGICAL HISTORY:  CHF (congestive heart failure)  DM (diabetes mellitus)  HTN (hypertension)  Prostate CA  in remission  Pacemaker  H/O total knee replacement, right        Allergies  No Known Allergies  Intolerances      FAMILY HISTORY:  FH: type 2 diabetes (Father)      Home Medications:  allopurinol 100 mg oral tablet: 1 tab(s) orally once a day (25 Oct 2021 22:30)  citalopram 20 mg oral tablet: 1 tab(s) orally once a day (25 Oct 2021 22:30)  clonazePAM 1 mg oral tablet: 2 tab(s) orally once a day (at bedtime), As Needed (25 Oct 2021 22:30)  gabapentin 300 mg oral capsule: 1 cap(s) orally 2 times a day (25 Oct 2021 22:30)  glimepiride 4 mg oral tablet: 1 tab(s) orally 2 times a day (25 Oct 2021 22:30)  Lantus 100 units/mL subcutaneous solution: 30 unit(s) subcutaneous once a day (at bedtime) (25 Oct 2021 22:30)  NIFEdipine 60 mg oral tablet, extended release: 1 tab(s) orally once a day (25 Oct 2021 22:30)    Occupation:  Alochol: Denied  Smoking: Denied  Drug Use: Denied  Marital Status:       ROS: as in HPI; All other systems reviewed are negative    ICU Vital Signs Last 24 Hrs  T(C): 37.2 (30 Oct 2023 18:39), Max: 37.2 (30 Oct 2023 18:39)  T(F): 98.9 (30 Oct 2023 18:39), Max: 98.9 (30 Oct 2023 18:39)  HR: 62 (30 Oct 2023 18:39) (62 - 62)  BP: 121/88 (30 Oct 2023 18:39) (121/88 - 121/88)  BP(mean): --  ABP: --  ABP(mean): --  RR: 18 (30 Oct 2023 18:39) (18 - 18)  SpO2: 99% (30 Oct 2023 18:39) (99% - 99%)    O2 Parameters below as of 30 Oct 2023 18:39  Patient On (Oxygen Delivery Method): mask, nonrebreather  O2 Flow (L/min): 15            Physical Examination:    General: No acute distress.  Alert, oriented, interactive, nonfocal    HEENT: Pupils equal, reactive to light.  Symmetric.    PULM: Clear to auscultation bilaterally, no significant sputum production    CVS: Regular rate and rhythm, no murmurs, rubs, or gallops    ABD: Soft, nondistended, nontender, normoactive bowel sounds, no masses    EXT: No edema, nontender    SKIN: Warm and well perfused, no rashes noted.              I&O's Detail        LABS:                        9.5    12.75 )-----------( 198      ( 30 Oct 2023 19:20 )             28.6     30 Oct 2023 19:20    129    |  90     |  95     ----------------------------<  477    5.2     |  15     |  3.7      Ca    8.1        30 Oct 2023 19:20    TPro  6.6    /  Alb  3.8    /  TBili  0.4    /  DBili  x      /  AST  36     /  ALT  23     /  AlkPhos  100    30 Oct 2023 19:20  Amylase x     lipase x          CARDIAC MARKERS ( 30 Oct 2023 19:20 )  x     / 0.24 ng/mL / x     / x     / x          CAPILLARY BLOOD GLUCOSE          Urinalysis Basic - ( 30 Oct 2023 19:20 )    Color: x / Appearance: x / SG: x / pH: x  Gluc: 477 mg/dL / Ketone: x  / Bili: x / Urobili: x   Blood: x / Protein: x / Nitrite: x   Leuk Esterase: x / RBC: x / WBC x   Sq Epi: x / Non Sq Epi: x / Bacteria: x      Culture        MEDICATIONS  (STANDING):  insulin regular Infusion 5 Unit(s)/Hr (5 mL/Hr) IV Continuous <Continuous>    MEDICATIONS  (PRN):        RADIOLOGY: ***     CXR:  ? congestion, awaiting official report          IMPRESSION/PLAN:  pt w sob and ?chf exacerbation in setting of worsening PARKER.  Likely exacerbated by Uncontrolled DM/DKA.  Continue insulin drip. FSBS q1hr, Serial metabolic panels. Cautious IVF hydration.  May try patient on HFNC.  Check TTE. Cardio eval. admit to ICU.    CNS:avoid sedation    HEENT:oral care    PULMONARY: Bipap, may switch to HFNC    CARDIOVASCULAR: Obtain TTE    GI: GI prophylaxis.  Feeding     RENAL: monitor UO    INFECTIOUS DISEASE:monitor off abx    HEMATOLOGICAL:  DVT prophylaxis.    ENDOCRINE:  Follow up FS.  Insulin protocol if needed.    MUSCULOSKELETAL: oob to chair        CRITICAL CARE TIME SPENT: 35 minutes                   PAST MEDICAL & SURGICAL HISTORY:  CHF (congestive heart failure)  DM (diabetes mellitus)  HTN (hypertension)  Prostate CA  in remission  Pacemaker  H/O total knee replacement, right      REVIEW OF SYSTEMS: Pt unable to offer    MEDICATIONS  (STANDING):  albuterol/ipratropium for Nebulization 3 milliLiter(s) Nebulizer every 6 hours  albuterol/ipratropium for Nebulization. 3 milliLiter(s) Nebulizer once  allopurinol 100 milliGRAM(s) Oral daily  aspirin  chewable 81 milliGRAM(s) Oral daily  buMETAnide Injectable 2 milliGRAM(s) IV Push daily  carvedilol 25 milliGRAM(s) Oral every 12 hours  cefepime   IVPB 1000 milliGRAM(s) IV Intermittent every 24 hours  chlorhexidine 2% Cloths 1 Application(s) Topical <User Schedule>  citalopram 20 milliGRAM(s) Oral daily  clopidogrel Tablet 75 milliGRAM(s) Oral daily  dextrose 5%. 1000 milliLiter(s) (50 mL/Hr) IV Continuous <Continuous>  dextrose 5%. 1000 milliLiter(s) (100 mL/Hr) IV Continuous <Continuous>  dextrose 50% Injectable 50 milliLiter(s) IV Push every 15 minutes  gabapentin 300 milliGRAM(s) Oral two times a day  glucagon  Injectable 1 milliGRAM(s) IntraMuscular once  heparin   Injectable 5000 Unit(s) SubCutaneous every 8 hours  insulin glargine Injectable (LANTUS) 20 Unit(s) SubCutaneous every morning  insulin lispro (ADMELOG) corrective regimen sliding scale   SubCutaneous three times a day before meals  insulin regular Infusion 1 Unit(s)/Hr (1 mL/Hr) IV Continuous <Continuous>  midodrine 10 milliGRAM(s) Oral every 8 hours  pantoprazole  Injectable 40 milliGRAM(s) IV Push daily  polyethylene glycol 3350 17 Gram(s) Oral daily  senna 2 Tablet(s) Oral at bedtime  sodium bicarbonate 650 milliGRAM(s) Oral every 8 hours    MEDICATIONS  (PRN):  acetaminophen  Suppository .. 650 milliGRAM(s) Rectal every 6 hours PRN Temp greater or equal to 38C (100.4F)  albuterol    90 MICROgram(s) HFA Inhaler 2 Puff(s) Inhalation every 4 hours PRN Shortness of Breath and/or Wheezing  clonazePAM  Tablet 2 milliGRAM(s) Oral at bedtime PRN axniety  dextrose Oral Gel 15 Gram(s) Oral once PRN Blood Glucose LESS THAN 70 milliGRAM(s)/deciliter      Allergies  No Known Allergies  Intolerances        SOCIAL HISTORY:   Lives alone     FAMILY HISTORY:  FH: type 2 diabetes (Father)       PHYSICAL EXAM:  Vital Signs Last 24 Hrs  T(C): 37.2 (06 Nov 2023 11:48), Max: 37.6 (06 Nov 2023 01:00)  T(F): 99 (06 Nov 2023 11:48), Max: 99.7 (06 Nov 2023 01:00)  HR: 70 (06 Nov 2023 11:48) (60 - 79)  BP: 133/63 (06 Nov 2023 11:48) (105/69 - 173/68)  BP(mean): 87 (06 Nov 2023 10:00) (63 - 101)  RR: 25 (06 Nov 2023 11:48) (15 - 38)  SpO2: 100% (06 Nov 2023 11:48) (80% - 100%)    Parameters below as of 06 Nov 2023 08:00  Patient On (Oxygen Delivery Method): high frequency ventilator         General : NAD    HEENT:  NC/AT, PERRL, EOMI, sclera clear, mucosa moist, throat clear, trachea midline, neck supple  Cardiovascular: RRR (+)m  Respiratory: Equal chest rise  Gastrointestinal:  Soft NT/ND (+)BS,  Incontinence   Neurology:  Weakened strength & sensation  Psych: Calm  Musculoskeletal:  Limited   Vascular: B/ L LE equally warm/  Skin:  Moist w/ good turgor    LABS/ CULTURES/ RADIOLOGY:                        9.9    6.97  )-----------( 273      ( 06 Nov 2023 05:18 )             30.6       148  |  108  |  102  ----------------------------<  255      [11-06-23 @ 05:18]  4.2   |  24  |  3.4        Ca     7.8     [11-06-23 @ 05:18]      Mg     2.2     [11-06-23 @ 05:18]      Phos  4.0     [11-06-23 @ 05:18]    TPro  5.8  /  Alb  2.7  /  TBili  0.3  /  DBili  x   /  AST  34  /  ALT  30  /  AlkPhos  99  [11-06-23 @ 05:18]              Culture - Blood (collected 11-04-23 @ 15:54)  Source: .Blood None  Preliminary Report (11-06-23 @ 01:02):    No growth at 24 hours    Culture - Blood (collected 10-31-23 @ 02:26)  Source: .Blood Blood-Peripheral  Final Report (11-05-23 @ 23:01):    No growth at 5 days    Culture - Blood (collected 10-31-23 @ 02:26)  Source: .Blood Blood-Peripheral  Final Report (11-05-23 @ 23:01):    No growth at 5 days

## 2023-11-07 DIAGNOSIS — E11.10 TYPE 2 DIABETES MELLITUS WITH KETOACIDOSIS WITHOUT COMA: ICD-10-CM

## 2023-11-07 DIAGNOSIS — Z51.5 ENCOUNTER FOR PALLIATIVE CARE: ICD-10-CM

## 2023-11-07 DIAGNOSIS — N17.9 ACUTE KIDNEY FAILURE, UNSPECIFIED: ICD-10-CM

## 2023-11-07 DIAGNOSIS — R41.82 ALTERED MENTAL STATUS, UNSPECIFIED: ICD-10-CM

## 2023-11-07 DIAGNOSIS — J96.01 ACUTE RESPIRATORY FAILURE WITH HYPOXIA: ICD-10-CM

## 2023-11-07 DIAGNOSIS — A41.9 SEPSIS, UNSPECIFIED ORGANISM: ICD-10-CM

## 2023-11-07 LAB
ALBUMIN SERPL ELPH-MCNC: 2.9 G/DL — LOW (ref 3.5–5.2)
ALBUMIN SERPL ELPH-MCNC: 2.9 G/DL — LOW (ref 3.5–5.2)
ALP SERPL-CCNC: 91 U/L — SIGNIFICANT CHANGE UP (ref 30–115)
ALP SERPL-CCNC: 91 U/L — SIGNIFICANT CHANGE UP (ref 30–115)
ALT FLD-CCNC: 20 U/L — SIGNIFICANT CHANGE UP (ref 0–41)
ALT FLD-CCNC: 20 U/L — SIGNIFICANT CHANGE UP (ref 0–41)
ANION GAP SERPL CALC-SCNC: 18 MMOL/L — HIGH (ref 7–14)
ANION GAP SERPL CALC-SCNC: 18 MMOL/L — HIGH (ref 7–14)
ANION GAP SERPL CALC-SCNC: 19 MMOL/L — HIGH (ref 7–14)
ANION GAP SERPL CALC-SCNC: 19 MMOL/L — HIGH (ref 7–14)
AST SERPL-CCNC: 15 U/L — SIGNIFICANT CHANGE UP (ref 0–41)
AST SERPL-CCNC: 15 U/L — SIGNIFICANT CHANGE UP (ref 0–41)
BASOPHILS # BLD AUTO: 0.07 K/UL — SIGNIFICANT CHANGE UP (ref 0–0.2)
BASOPHILS # BLD AUTO: 0.07 K/UL — SIGNIFICANT CHANGE UP (ref 0–0.2)
BASOPHILS NFR BLD AUTO: 0.9 % — SIGNIFICANT CHANGE UP (ref 0–1)
BASOPHILS NFR BLD AUTO: 0.9 % — SIGNIFICANT CHANGE UP (ref 0–1)
BILIRUB SERPL-MCNC: 0.3 MG/DL — SIGNIFICANT CHANGE UP (ref 0.2–1.2)
BILIRUB SERPL-MCNC: 0.3 MG/DL — SIGNIFICANT CHANGE UP (ref 0.2–1.2)
BUN SERPL-MCNC: 86 MG/DL — CRITICAL HIGH (ref 10–20)
BUN SERPL-MCNC: 86 MG/DL — CRITICAL HIGH (ref 10–20)
BUN SERPL-MCNC: 90 MG/DL — CRITICAL HIGH (ref 10–20)
BUN SERPL-MCNC: 90 MG/DL — CRITICAL HIGH (ref 10–20)
CALCIUM SERPL-MCNC: 7.8 MG/DL — LOW (ref 8.4–10.5)
CEFEPIME LEVEL RESULT: SIGNIFICANT CHANGE UP
CEFEPIME LEVEL RESULT: SIGNIFICANT CHANGE UP
CHLORIDE SERPL-SCNC: 103 MMOL/L — SIGNIFICANT CHANGE UP (ref 98–110)
CHLORIDE SERPL-SCNC: 103 MMOL/L — SIGNIFICANT CHANGE UP (ref 98–110)
CHLORIDE SERPL-SCNC: 105 MMOL/L — SIGNIFICANT CHANGE UP (ref 98–110)
CHLORIDE SERPL-SCNC: 105 MMOL/L — SIGNIFICANT CHANGE UP (ref 98–110)
CO2 SERPL-SCNC: 22 MMOL/L — SIGNIFICANT CHANGE UP (ref 17–32)
CO2 SERPL-SCNC: 22 MMOL/L — SIGNIFICANT CHANGE UP (ref 17–32)
CO2 SERPL-SCNC: 23 MMOL/L — SIGNIFICANT CHANGE UP (ref 17–32)
CO2 SERPL-SCNC: 23 MMOL/L — SIGNIFICANT CHANGE UP (ref 17–32)
CREAT SERPL-MCNC: 3.4 MG/DL — HIGH (ref 0.7–1.5)
CREAT SERPL-MCNC: 3.4 MG/DL — HIGH (ref 0.7–1.5)
CREAT SERPL-MCNC: 3.5 MG/DL — HIGH (ref 0.7–1.5)
CREAT SERPL-MCNC: 3.5 MG/DL — HIGH (ref 0.7–1.5)
EGFR: 16 ML/MIN/1.73M2 — LOW
EOSINOPHIL # BLD AUTO: 0.06 K/UL — SIGNIFICANT CHANGE UP (ref 0–0.7)
EOSINOPHIL # BLD AUTO: 0.06 K/UL — SIGNIFICANT CHANGE UP (ref 0–0.7)
EOSINOPHIL NFR BLD AUTO: 0.8 % — SIGNIFICANT CHANGE UP (ref 0–8)
EOSINOPHIL NFR BLD AUTO: 0.8 % — SIGNIFICANT CHANGE UP (ref 0–8)
GAS PNL BLDA: SIGNIFICANT CHANGE UP
GAS PNL BLDA: SIGNIFICANT CHANGE UP
GLUCOSE BLDC GLUCOMTR-MCNC: 215 MG/DL — HIGH (ref 70–99)
GLUCOSE BLDC GLUCOMTR-MCNC: 215 MG/DL — HIGH (ref 70–99)
GLUCOSE BLDC GLUCOMTR-MCNC: 216 MG/DL — HIGH (ref 70–99)
GLUCOSE BLDC GLUCOMTR-MCNC: 216 MG/DL — HIGH (ref 70–99)
GLUCOSE BLDC GLUCOMTR-MCNC: 224 MG/DL — HIGH (ref 70–99)
GLUCOSE BLDC GLUCOMTR-MCNC: 224 MG/DL — HIGH (ref 70–99)
GLUCOSE BLDC GLUCOMTR-MCNC: 242 MG/DL — HIGH (ref 70–99)
GLUCOSE BLDC GLUCOMTR-MCNC: 242 MG/DL — HIGH (ref 70–99)
GLUCOSE BLDC GLUCOMTR-MCNC: 263 MG/DL — HIGH (ref 70–99)
GLUCOSE BLDC GLUCOMTR-MCNC: 263 MG/DL — HIGH (ref 70–99)
GLUCOSE SERPL-MCNC: 217 MG/DL — HIGH (ref 70–99)
GLUCOSE SERPL-MCNC: 217 MG/DL — HIGH (ref 70–99)
GLUCOSE SERPL-MCNC: 266 MG/DL — HIGH (ref 70–99)
GLUCOSE SERPL-MCNC: 266 MG/DL — HIGH (ref 70–99)
HCT VFR BLD CALC: 32.2 % — LOW (ref 42–52)
HCT VFR BLD CALC: 32.2 % — LOW (ref 42–52)
HGB BLD-MCNC: 10.2 G/DL — LOW (ref 14–18)
HGB BLD-MCNC: 10.2 G/DL — LOW (ref 14–18)
IMM GRANULOCYTES NFR BLD AUTO: 6.3 % — HIGH (ref 0.1–0.3)
IMM GRANULOCYTES NFR BLD AUTO: 6.3 % — HIGH (ref 0.1–0.3)
LYMPHOCYTES # BLD AUTO: 0.93 K/UL — LOW (ref 1.2–3.4)
LYMPHOCYTES # BLD AUTO: 0.93 K/UL — LOW (ref 1.2–3.4)
LYMPHOCYTES # BLD AUTO: 12.5 % — LOW (ref 20.5–51.1)
LYMPHOCYTES # BLD AUTO: 12.5 % — LOW (ref 20.5–51.1)
MAGNESIUM SERPL-MCNC: 2.3 MG/DL — SIGNIFICANT CHANGE UP (ref 1.8–2.4)
MCHC RBC-ENTMCNC: 28 PG — SIGNIFICANT CHANGE UP (ref 27–31)
MCHC RBC-ENTMCNC: 28 PG — SIGNIFICANT CHANGE UP (ref 27–31)
MCHC RBC-ENTMCNC: 31.7 G/DL — LOW (ref 32–37)
MCHC RBC-ENTMCNC: 31.7 G/DL — LOW (ref 32–37)
MCV RBC AUTO: 88.5 FL — SIGNIFICANT CHANGE UP (ref 80–94)
MCV RBC AUTO: 88.5 FL — SIGNIFICANT CHANGE UP (ref 80–94)
MONOCYTES # BLD AUTO: 0.48 K/UL — SIGNIFICANT CHANGE UP (ref 0.1–0.6)
MONOCYTES # BLD AUTO: 0.48 K/UL — SIGNIFICANT CHANGE UP (ref 0.1–0.6)
MONOCYTES NFR BLD AUTO: 6.5 % — SIGNIFICANT CHANGE UP (ref 1.7–9.3)
MONOCYTES NFR BLD AUTO: 6.5 % — SIGNIFICANT CHANGE UP (ref 1.7–9.3)
NEUTROPHILS # BLD AUTO: 5.42 K/UL — SIGNIFICANT CHANGE UP (ref 1.4–6.5)
NEUTROPHILS # BLD AUTO: 5.42 K/UL — SIGNIFICANT CHANGE UP (ref 1.4–6.5)
NEUTROPHILS NFR BLD AUTO: 73 % — SIGNIFICANT CHANGE UP (ref 42.2–75.2)
NEUTROPHILS NFR BLD AUTO: 73 % — SIGNIFICANT CHANGE UP (ref 42.2–75.2)
NRBC # BLD: 0 /100 WBCS — SIGNIFICANT CHANGE UP (ref 0–0)
NRBC # BLD: 0 /100 WBCS — SIGNIFICANT CHANGE UP (ref 0–0)
PLATELET # BLD AUTO: 251 K/UL — SIGNIFICANT CHANGE UP (ref 130–400)
PLATELET # BLD AUTO: 251 K/UL — SIGNIFICANT CHANGE UP (ref 130–400)
PMV BLD: 11.1 FL — HIGH (ref 7.4–10.4)
PMV BLD: 11.1 FL — HIGH (ref 7.4–10.4)
POTASSIUM SERPL-MCNC: 4.1 MMOL/L — SIGNIFICANT CHANGE UP (ref 3.5–5)
POTASSIUM SERPL-MCNC: 4.1 MMOL/L — SIGNIFICANT CHANGE UP (ref 3.5–5)
POTASSIUM SERPL-MCNC: 4.5 MMOL/L — SIGNIFICANT CHANGE UP (ref 3.5–5)
POTASSIUM SERPL-MCNC: 4.5 MMOL/L — SIGNIFICANT CHANGE UP (ref 3.5–5)
POTASSIUM SERPL-SCNC: 4.1 MMOL/L — SIGNIFICANT CHANGE UP (ref 3.5–5)
POTASSIUM SERPL-SCNC: 4.1 MMOL/L — SIGNIFICANT CHANGE UP (ref 3.5–5)
POTASSIUM SERPL-SCNC: 4.5 MMOL/L — SIGNIFICANT CHANGE UP (ref 3.5–5)
POTASSIUM SERPL-SCNC: 4.5 MMOL/L — SIGNIFICANT CHANGE UP (ref 3.5–5)
PROT SERPL-MCNC: 6.1 G/DL — SIGNIFICANT CHANGE UP (ref 6–8)
PROT SERPL-MCNC: 6.1 G/DL — SIGNIFICANT CHANGE UP (ref 6–8)
RBC # BLD: 3.64 M/UL — LOW (ref 4.7–6.1)
RBC # BLD: 3.64 M/UL — LOW (ref 4.7–6.1)
RBC # FLD: 15.9 % — HIGH (ref 11.5–14.5)
RBC # FLD: 15.9 % — HIGH (ref 11.5–14.5)
SODIUM SERPL-SCNC: 144 MMOL/L — SIGNIFICANT CHANGE UP (ref 135–146)
SODIUM SERPL-SCNC: 144 MMOL/L — SIGNIFICANT CHANGE UP (ref 135–146)
SODIUM SERPL-SCNC: 146 MMOL/L — SIGNIFICANT CHANGE UP (ref 135–146)
SODIUM SERPL-SCNC: 146 MMOL/L — SIGNIFICANT CHANGE UP (ref 135–146)
WBC # BLD: 7.43 K/UL — SIGNIFICANT CHANGE UP (ref 4.8–10.8)
WBC # BLD: 7.43 K/UL — SIGNIFICANT CHANGE UP (ref 4.8–10.8)
WBC # FLD AUTO: 7.43 K/UL — SIGNIFICANT CHANGE UP (ref 4.8–10.8)
WBC # FLD AUTO: 7.43 K/UL — SIGNIFICANT CHANGE UP (ref 4.8–10.8)

## 2023-11-07 PROCEDURE — 99233 SBSQ HOSP IP/OBS HIGH 50: CPT

## 2023-11-07 PROCEDURE — 99223 1ST HOSP IP/OBS HIGH 75: CPT | Mod: 95

## 2023-11-07 RX ADMIN — Medication 3 MILLILITER(S): at 14:52

## 2023-11-07 RX ADMIN — POLYETHYLENE GLYCOL 3350 17 GRAM(S): 17 POWDER, FOR SOLUTION ORAL at 12:32

## 2023-11-07 RX ADMIN — INSULIN GLARGINE 20 UNIT(S): 100 INJECTION, SOLUTION SUBCUTANEOUS at 09:06

## 2023-11-07 RX ADMIN — SENNA PLUS 2 TABLET(S): 8.6 TABLET ORAL at 21:18

## 2023-11-07 RX ADMIN — CARVEDILOL PHOSPHATE 25 MILLIGRAM(S): 80 CAPSULE, EXTENDED RELEASE ORAL at 17:16

## 2023-11-07 RX ADMIN — MIDODRINE HYDROCHLORIDE 10 MILLIGRAM(S): 2.5 TABLET ORAL at 21:19

## 2023-11-07 RX ADMIN — GABAPENTIN 300 MILLIGRAM(S): 400 CAPSULE ORAL at 17:16

## 2023-11-07 RX ADMIN — CITALOPRAM 20 MILLIGRAM(S): 10 TABLET, FILM COATED ORAL at 12:32

## 2023-11-07 RX ADMIN — BUMETANIDE 2 MILLIGRAM(S): 0.25 INJECTION INTRAMUSCULAR; INTRAVENOUS at 05:01

## 2023-11-07 RX ADMIN — HEPARIN SODIUM 5000 UNIT(S): 5000 INJECTION INTRAVENOUS; SUBCUTANEOUS at 15:23

## 2023-11-07 RX ADMIN — Medication 3 MILLILITER(S): at 19:46

## 2023-11-07 RX ADMIN — PANTOPRAZOLE SODIUM 40 MILLIGRAM(S): 20 TABLET, DELAYED RELEASE ORAL at 12:32

## 2023-11-07 RX ADMIN — Medication 3 MILLILITER(S): at 08:27

## 2023-11-07 RX ADMIN — MIDODRINE HYDROCHLORIDE 10 MILLIGRAM(S): 2.5 TABLET ORAL at 15:22

## 2023-11-07 RX ADMIN — Medication 6: at 12:31

## 2023-11-07 RX ADMIN — CLOPIDOGREL BISULFATE 75 MILLIGRAM(S): 75 TABLET, FILM COATED ORAL at 12:32

## 2023-11-07 RX ADMIN — Medication 4: at 17:14

## 2023-11-07 RX ADMIN — HEPARIN SODIUM 5000 UNIT(S): 5000 INJECTION INTRAVENOUS; SUBCUTANEOUS at 05:01

## 2023-11-07 RX ADMIN — Medication 81 MILLIGRAM(S): at 12:31

## 2023-11-07 RX ADMIN — Medication 100 MILLIGRAM(S): at 12:31

## 2023-11-07 RX ADMIN — HEPARIN SODIUM 5000 UNIT(S): 5000 INJECTION INTRAVENOUS; SUBCUTANEOUS at 21:18

## 2023-11-07 RX ADMIN — CHLORHEXIDINE GLUCONATE 1 APPLICATION(S): 213 SOLUTION TOPICAL at 05:36

## 2023-11-07 NOTE — PHARMACOTHERAPY INTERVENTION NOTE - COMMENTS
89 yo male PMHx of  DM, CAD-bypass, chronic HFpEF, HTN, HLD, CKD3 presents w sob x few days, no cough fever or chills.  pt placed on NRBM by EMS and subsequently on BiPAP in ED, O2 sat now 99%.  Pt found to have elevated glucose w high AG and b-hydroxybutarate c/w DKA  Hospital course complicated by respiratory failure secondary to suspected aspiration pneumonia, PARKER and initiation of HD.    Patient admission medication reconciliation reviewed over 15 minutes     Patient is currently on:  - Aspirin and Plavix for Hx of CAD   - Albuterol inhalers for wheezing   - Allopurinol 100 - unclear indication  - Carvedilol for Hx of HFpEF   - Citalopram and Gabapentin - unclear indication  - Lantus for Hx of DM/DKA - consider discharge on home insulin   - Midodrine 10 - unclear indication - consider weaning off (in context of active Carvedilol)     Consider restarting atorvastatin for Hx of CAD     89 yo male PMHx of  DM, CAD-bypass, chronic HFpEF, HTN, HLD, CKD3 presents w sob x few days, no cough fever or chills.  pt placed on NRBM by EMS and subsequently on BiPAP in ED, O2 sat now 99%.  Pt found to have elevated glucose w high AG and b-hydroxybutarate c/w DKA  Hospital course complicated by respiratory failure secondary to suspected aspiration pneumonia, PARKER and initiation of HD.    Patient admission medication reconciliation reviewed over 15 minutes     Patient is currently on:  - Aspirin and Plavix for Hx of CAD - Last PCI >1 y ago in Oct 22 - consider d/cing Plavix or f/u with cardiology   - Albuterol inhalers for wheezing   - Allopurinol 100 - unclear indication  - Carvedilol for Hx of HFpEF   - Citalopram and Gabapentin - unclear indication - consider c/w with renal clearance dosing/HD if indicated   - Lantus for Hx of DM/DKA - consider discharge on home insulin   - Midodrine 10 - unclear indication - consider weaning off (in context of active Carvedilol)     Consider restarting atorvastatin for Hx of CAD

## 2023-11-07 NOTE — CONSULT NOTE ADULT - SUBJECTIVE AND OBJECTIVE BOX
CC:     HPI:    Patient is a 89y old  Male who presents with a chief complaint of sob      HPI: 89 yo male PMHx sig for DM, CAD-bypass, CHF, HTN, HLD presents w sob x few days, no cough fever or chills.  pt placed on NRBM by EMS and subsequently on BiPAP in ED, O2 sat now 99%.  Pt found to have elevated glucose w high AG and b-hydroxybutarate c/w DKA     (30 Oct 2023 21:55)    PERTINENT PM/SXH:   CHF (congestive heart failure)    DM (diabetes mellitus)    HTN (hypertension)    Prostate CA    Pacemaker      H/O total knee replacement, right      FAMILY HISTORY:  FH: type 2 diabetes (Father)      ITEMS NOT CHECKED ARE NOT PRESENT    SOCIAL HISTORY:   Significant other/partner[ ]  Children[ ]  Restorationism/Spirituality:  Substance hx:  [ ]   Tobacco hx:  [ ]   Alcohol hx: [ ]   Living Situation: [x]Home  [ ]Long term care  [ ]Rehab [ ]Other  Home Services: [ ] HHA [ ] Visting RN [ ] Hospice  Occupation:  Home Opioid hx:  [ ] Y [ ] N [x ] I-Stop Reference No:    Reference #: [id number]      Practitioner Count: 1  Pharmacy Count: 1  Current Opioid Prescriptions: 0  Current Benzodiazepine Prescriptions: 0  Current Stimulant Prescriptions: 0      Patient Demographic Information (PDI)       PDI	First Name	Last Name	Birth Date	Gender	Street Address	[facility]  A	Glne Underwood	08/27/1931	Male	[address]	[id number]  B	Glen Underwood	08/27/1931	Male	[address]	[id number]    Prescription Information      PDI Filter:    PDI	Current Rx	Drug Type	Rx Written	Rx Dispensed	Drug	Quantity	Days Supply	Prescriber Name	Prescriber AUDIE #	Payment Method	Dispenser  A	N	B	03/31/2023	04/04/2023	clonazepam 1 mg tablet	90	30	Thomas Parker MD	[id number]	Medicare	[facility]  A	N	B	02/14/2023	02/15/2023	clonazepam 1 mg tablet	90	30	Thomas Parker MD	[id number]	Medicare	[facility] [employer].  A	N	B	01/03/2023	01/06/2023	clonazepam 1 mg tablet	90	30	Thomas Parker MD	[id number]	Medicare	[facility]  A	N	B	11/21/2022	11/23/2022	clonazepam 1 mg tablet	90	30	Thomas Parker MD	[id number]	Medicare	[facility].  B	N	B	09/14/2023	09/16/2023	clonazepam 1 mg tablet	90	30	Ernie Kim	[id number]	Medicare	[id number]  B	N	B	07/31/2023	08/01/2023	clonazepam 1 mg tablet	90	30	Thomas Parker MD	[id number]	Medicare	[id number]  B	N	B	06/06/2023	06/07/2023	clonazepam 1 mg tablet	90	30	Thomas Parker MD	[id number]	Medicare	[id number]     ADVANCE DIRECTIVES:     [ ] Full Code [x ] DNR  MOLST  [ ]  Living Will  [ ]   DECISION MAKER(s):  [ ] Health Care Proxy(s)  [x ] Surrogate(s)  [ ] Guardian           Name(s): Phone Number(s):  Dylon Noel and Kristina      BASELINE (I)ADL(s) (prior to admission):    [place]: [ ]Total  [ ] Moderate [ ]Dependent  Palliative Performance Status Version 2:         %    http://npcrc.org/files/news/palliative_performance_scale_ppsv2.pdf    Allergies    No Known Allergies    Intolerances    MEDICATIONS  (STANDING):  albuterol/ipratropium for Nebulization 3 milliLiter(s) Nebulizer every 6 hours  albuterol/ipratropium for Nebulization. 3 milliLiter(s) Nebulizer once  allopurinol 100 milliGRAM(s) Oral daily  aspirin  chewable 81 milliGRAM(s) Oral daily  carvedilol 25 milliGRAM(s) Oral every 12 hours  chlorhexidine 2% Cloths 1 Application(s) Topical <User Schedule>  citalopram 20 milliGRAM(s) Oral daily  clopidogrel Tablet 75 milliGRAM(s) Oral daily  dextrose 5%. 1000 milliLiter(s) (50 mL/Hr) IV Continuous <Continuous>  dextrose 5%. 1000 milliLiter(s) (100 mL/Hr) IV Continuous <Continuous>  dextrose 50% Injectable 50 milliLiter(s) IV Push every 15 minutes  gabapentin 300 milliGRAM(s) Oral two times a day  glucagon  Injectable 1 milliGRAM(s) IntraMuscular once  heparin   Injectable 5000 Unit(s) SubCutaneous every 8 hours  insulin glargine Injectable (LANTUS) 20 Unit(s) SubCutaneous every morning  insulin lispro (ADMELOG) corrective regimen sliding scale   SubCutaneous three times a day before meals  insulin regular Infusion 1 Unit(s)/Hr (1 mL/Hr) IV Continuous <Continuous>  midodrine 10 milliGRAM(s) Oral every 8 hours  pantoprazole  Injectable 40 milliGRAM(s) IV Push daily  polyethylene glycol 3350 17 Gram(s) Oral daily  senna 2 Tablet(s) Oral at bedtime    MEDICATIONS  (PRN):  acetaminophen  Suppository .. 650 milliGRAM(s) Rectal every 6 hours PRN Temp greater or equal to 38C (100.4F)  albuterol    90 MICROgram(s) HFA Inhaler 2 Puff(s) Inhalation every 4 hours PRN Shortness of Breath and/or Wheezing  dextrose Oral Gel 15 Gram(s) Oral once PRN Blood Glucose LESS THAN 70 milliGRAM(s)/deciliter    PRESENT SYMPTOMS: [x ]Unable to obtain due to poor mentation   Source if other than patient:  [ ]Family   [ ]Team     Pain: [ ]yes [ ]no  QOL impact -   Location -                    Aggravating factors -  Quality -  Radiation -  Timing-  Severity (0-10 scale):  Minimal acceptable level (0-10 scale):     CPOT:  1  https://www.sccm.org/getattachment/gmx18z65-5w7l-7x4r-6m8v-8092e3597a5q/Critical-Care-Pain-Observation-Tool-(CPOT)    PAIN AD Score:   http://[email or web address]/painad.pdf (press ctrl +  left click to view)    Dyspnea:                           [ ]None[ ]Mild [ ]Moderate [ ]Severe     Respiratory Distress Observation Scale (RDOS): 1  A score of 0 to 2 signifies little or no respiratory distress, 3 signifies mild distress, scores 4 to 6 indicate moderate distress, and scores greater than 7 signify severe distress  [email or web address]-augolfonpnm-tfkfgucg-yyrgvrvlpqc-lpqsh.pdf    Anxiety:                             [ ]None[ ]Mild [ ]Moderate [ ]Severe   Fatigue:                             [ ]None[ ]Mild [ ]Moderate [ ]Severe   Nausea:                             [ ]None[ ]Mild [ ]Moderate [ ]Severe   Loss of appetite:              [ ]None[ ]Mild [ ]Moderate [ ]Severe   Constipation:                    [ ]None[ ]Mild [ ]Moderate [ ]Severe    Other Symptoms:  [ ]All other review of systems negative     Palliative Performance Status Version 2:        20 %    [email or web address]/files/news/palliative_performance_scale_[email or web address].pdf    PHYSICAL EXAM:  Vital Signs Last 24 Hrs  T(C): 37.6 (07 Nov 2023 11:00), Max: 37.9 (07 Nov 2023 03:01)  T(F): 99.7 (07 Nov 2023 11:00), Max: 100.2 (07 Nov 2023 03:01)  HR: 75 (07 Nov 2023 14:00) (63 - 75)  BP: 122/59 (07 Nov 2023 13:00) (99/50 - 152/80)  BP(mean): 85 (07 Nov 2023 13:00) (72 - 110)  RR: 18 (07 Nov 2023 14:00) (13 - 31)  SpO2: 100% (07 Nov 2023 14:00) (80% - 100%)    Parameters below as of 07 Nov 2023 13:00  Patient On (Oxygen Delivery Method): nasal cannula, high flow  O2 Flow (L/min): 60  O2 Concentration (%): 60 I&O's Summary    06 Nov 2023 07:01  -  07 Nov 2023 07:00  --------------------------------------------------------  IN: 50 mL / OUT: 2405 mL / NET: -2355 mL    07 Nov 2023 07:01  -  07 Nov 2023 14:44  --------------------------------------------------------  IN: 10 mL / OUT: 85 mL / NET: -75 mL        GENERAL:  [x ] No acute distress [ ]Lethargic  [ ]Unarousable  [ ]Verbal  [x ]Non-Verbal [ ]Cachexia    BEHAVIORAL/PSYCH:  [x ]Alert and Oriented x0-1  [ ] Anxiety [ ] Delirium [ ] Agitation [x ] Calm   EYES: [x ] No scleral icterus [ ] Scleral icterus [ ] Closed  ENMT:  [ ]Dry mouth  [x ]No external oral lesions [ ] No external ear or nose lesions  CARDIOVASCULAR:  [ ]Regular [ ]Irregular [ ]Tachy [ ]Not Tachy  [ ]Duogn [ ] Edema [ ] No edema  PULMONARY:  [ ]Tachypnea  [ ]Audible excessive secretions [ x] No labored breathing [ ] labored breathing  GASTROINTESTINAL: [ ]Soft  [ ]Distended  [x ]Not distended [ ]Non tender [ ]Tender  MUSCULOSKELETAL: [ ]No clubbing [ ] clubbing  [x ] No cyanosis [ ] cyanosis  NEUROLOGIC: [ ]No focal deficits  [ ]Follows commands  [ ]Does not follow commands  [x ]Cognitive impairment  [ ]Dysphagia  [ ]Dysarthria  [ ]Paresis   SKIN: [ ] Jaundiced [ x] Non-jaundiced [ ]Rash [ ]No Rash [ ] Warm [ ] Dry  MISC/LINES: [ ] ET tube [ ] Trach [ ]NGT/OGT [ ]PEG [ ]Wen    LABS: reviewed by me                        10.2   7.43  )-----------( 251      ( 07 Nov 2023 11:28 )             32.2   11-07    144  |  103  |  86<HH>  ----------------------------<  266<H>  4.5   |  23  |  3.4<H>    Ca    7.8<L>      07 Nov 2023 11:28  Phos  3.2     11-06  Mg     2.3     11-07    TPro  6.1  /  Alb  2.9<L>  /  TBili  0.3  /  DBili  x   /  AST  15  /  ALT  20  /  AlkPhos  91  11-07      Urinalysis Basic - ( 07 Nov 2023 11:28 )    Color: x / Appearance: x / SG: x / pH: x  Gluc: 266 mg/dL / Ketone: x  / Bili: x / Urobili: x   Blood: x / Protein: x / Nitrite: x   Leuk Esterase: x / RBC: x / WBC x   Sq Epi: x / Non Sq Epi: x / Bacteria: x      RADIOLOGY & ADDITIONAL STUDIES: reviewed by me    < from: Xray Chest 1 View- PORTABLE-Routine (Xray Chest 1 View- PORTABLE-Routine in AM.) (11.06.23 @ 07:21) >  Impression:    Stable bilateral opacities/effusions.    < end of copied text >      EKG: reviewed by me    < from: 12 Lead ECG (10.31.23 @ 15:28) >  Ventricular Rate 87 BPM    Atrial Rate 97 BPM    P-R Interval 176 ms    QRS Duration 122 ms    Q-T Interval 416 ms    QTC Calculation(Bazett) 500 ms    P Axis 79 degrees    R Axis 90 degrees    T Axis 21 degrees    Diagnosis Line Sinus rhythm with marked sinus arrhythmia with occasional ventricular-paced  complexes  Right bundle branch block  Abnormal ECG    < end of copied text >      PROTEIN CALORIE MALNUTRITION PRESENT: [ ]mild [ ]moderate [ ]severe [ ]underweight [ ]morbid obesity  https://www.andeal.org/vault/2440/web/files/ONC/Table_Clinical%20Characteristics%20to%20Document%20Malnutrition-White%20JV%20et%20al%651459.pdf    Height (cm): 175 (11-04-23 @ 06:37)  Weight (kg): 88.5 (10-30-23 @ 18:39)  BMI (kg/m2): 28.9 (11-04-23 @ 06:37)  [ ]PPSV2 < or = to 30% [ ]significant weight loss  [ ]poor nutritional intake  [ ]anasarca      [ ]Artificial Nutrition      Palliative Care Spiritual/Emotional Screening Tool Question  Severity (0-4):                    OR                    [ x] Unable to determine. Will assess at later time if appropriate.  Score of 2 or greater indicates recommendation of Chaplaincy and/or SW referral  Chaplaincy Referral: [ ] Yes [ ] Refused [ ] Following     Caregiver [place]:  [ ] Yes [ ] No    OR    [x ] Unable to determine. Will assess at later time if appropriate.  Social Work Referral [ ]  Patient and Family Centered Care Referral [ ]    Anticipatory Grief Present: [ ] Yes [ ] No    OR     [ x] Unable to determine. Will assess at later time if appropriate.  Social Work Referral [ ]  Patient and Family Centered Care Referral [ ]    Patient discussed with primary medical team MD  Palliative care education provided to patient and/or family

## 2023-11-07 NOTE — PROGRESS NOTE ADULT - SUBJECTIVE AND OBJECTIVE BOX
SUBJECTIVE:    Patient is a 89y old Male who presents with a chief complaint of sob (07 Nov 2023 07:58)    Currently admitted to medicine with the primary diagnosis of DKA (diabetic ketoacidosis)       Today is hospital day 8d. This morning he is resting comfortably in bed with son at bedside    ROS:   CONSTITUTIONAL: No weakness, fevers or chills   EYES/ENT: No visual changes; No vertigo or throat pain   NECK: No pain or stiffness   RESPIRATORY: No cough, wheezing, hemoptysis; No shortness of breath   CARDIOVASCULAR: No chest pain or palpitations   GASTROINTESTINAL: No abdominal or epigastric pain. No nausea, vomiting, or hematemesis; No diarrhea or constipation. No melena or hematochezia.  GENITOURINARY: No dysuria, frequency or hematuria  NEUROLOGICAL: No numbness or weakness        PAST MEDICAL & SURGICAL HISTORY  CHF (congestive heart failure)    DM (diabetes mellitus)    HTN (hypertension)    Prostate CA  in remission    Pacemaker    H/O total knee replacement, right      SOCIAL HISTORY:    ALLERGIES:  No Known Allergies    MEDICATIONS:  STANDING MEDICATIONS  albuterol/ipratropium for Nebulization 3 milliLiter(s) Nebulizer every 6 hours  albuterol/ipratropium for Nebulization. 3 milliLiter(s) Nebulizer once  allopurinol 100 milliGRAM(s) Oral daily  aspirin  chewable 81 milliGRAM(s) Oral daily  carvedilol 25 milliGRAM(s) Oral every 12 hours  chlorhexidine 2% Cloths 1 Application(s) Topical <User Schedule>  citalopram 20 milliGRAM(s) Oral daily  clopidogrel Tablet 75 milliGRAM(s) Oral daily  dextrose 5%. 1000 milliLiter(s) IV Continuous <Continuous>  dextrose 5%. 1000 milliLiter(s) IV Continuous <Continuous>  dextrose 50% Injectable 50 milliLiter(s) IV Push every 15 minutes  gabapentin 300 milliGRAM(s) Oral two times a day  glucagon  Injectable 1 milliGRAM(s) IntraMuscular once  heparin   Injectable 5000 Unit(s) SubCutaneous every 8 hours  insulin glargine Injectable (LANTUS) 20 Unit(s) SubCutaneous every morning  insulin lispro (ADMELOG) corrective regimen sliding scale   SubCutaneous three times a day before meals  insulin regular Infusion 1 Unit(s)/Hr IV Continuous <Continuous>  midodrine 10 milliGRAM(s) Oral every 8 hours  pantoprazole  Injectable 40 milliGRAM(s) IV Push daily  polyethylene glycol 3350 17 Gram(s) Oral daily  senna 2 Tablet(s) Oral at bedtime    PRN MEDICATIONS  acetaminophen  Suppository .. 650 milliGRAM(s) Rectal every 6 hours PRN  albuterol    90 MICROgram(s) HFA Inhaler 2 Puff(s) Inhalation every 4 hours PRN  dextrose Oral Gel 15 Gram(s) Oral once PRN    VITALS:   T(F): 99.9  HR: 68  BP: 129/59  RR: 24  SpO2: 100%    LABS:                          9.9    6.97  )-----------( 273      ( 06 Nov 2023 05:18 )             30.6     11-06    147<H>  |  105  |  61<HH>  ----------------------------<  180<H>  5.0   |  19  |  2.3<H>    Ca    8.4      06 Nov 2023 15:38  Phos  3.2     11-06  Mg     2.2     11-06    TPro  6.6  /  Alb  3.0<L>  /  TBili  0.4  /  DBili  x   /  AST  35  /  ALT  30  /  AlkPhos  113  11-06      Urinalysis Basic - ( 06 Nov 2023 15:38 )    Color: x / Appearance: x / SG: x / pH: x  Gluc: 180 mg/dL / Ketone: x  / Bili: x / Urobili: x   Blood: x / Protein: x / Nitrite: x   Leuk Esterase: x / RBC: x / WBC x   Sq Epi: x / Non Sq Epi: x / Bacteria: x      ABG - ( 07 Nov 2023 08:33 )  pH, Arterial: 7.46  pH, Blood: x     /  pCO2: 33    /  pO2: 317   / HCO3: 24    / Base Excess: 0.1   /  SaO2: 100.0       Culture - Blood (collected 04 Nov 2023 15:54)  Source: .Blood None  Preliminary Report (07 Nov 2023 01:02):    No growth at 48 Hours          RADIOLOGY:    PHYSICAL EXAM:  GEN: No acute distress  HEENT: normocephalic, atraumatic, aniceteric  LUNGS: bl breath sounds   HEART: S1/S2 present. RRR, no murmurs  ABD: Soft, non-tender, non-distended. Bowel sounds present  EXT: warm   NEURO: non focal     ASSESSMENT AND PLAN:     89 yo male PMHx of  DM, CAD-bypass, chronic HFpEF, HTN, HLD, CKD3 presents w sob x few days, no cough fever or chills.  pt placed on NRBM by EMS and subsequently on BiPAP in ED, O2 sat now 99%.  Pt found to have elevated glucose w high AG and b-hydroxybutarate c/w DKA  Hospital course complicated by respiratory failure secondary to suspected aspiration pneumonia, PARKER and initiation of HD    # Acute hypoxic respiratory failure - improving   # Sepsis POA   # R/O aspiration PNA   # DKA - resolved    # Metabolic encephalopathy - improved   # PARKER on CKD3 , now on HD   # H/O CAD sp bypass  # H/O HFpEF    PLAN:  - now on HFNC , off bipap  - monitor intake and output , daily weight ; off bumex now   - CTH neg/ eeg neg   - ABX per ID . sp Cefepime (blood clx neg/ urine clx neg/ mrsa neg) , procalcitonin noted 11/6 elevated  - Palliative eval   - speech and swallow eval   - Nephro fu , on HD now ? permanent   - DVT ppx     discussed with cc team   son at bedside -all questions answered and concerns addressed

## 2023-11-07 NOTE — CONSULT NOTE ADULT - ASSESSMENT
89 yo male PMHx of  DM, CAD-bypass, chronic HFpEF, HTN, HLD, CKD3 presents w sob x few days.  Hospital course complicated by DKA now resolved, PARKER on HD now requiring HD, sepsis, AMS. Palliative care consulted for [facility].     Spoke with son Bert over the phone. He was at bedside. Palliative care introduced.  HE was able to provide a medical history and hospital course. We discussed the patient's functional and cognitive status prior to hospitalization.  He noted he had help, but lived alone and could do a lot of functional activities of daily living himself.      We noted that he had spoke with the primary team earlier in the day about need for a possible alternative to NGT with TPN or PEG given his recent BIPAP need. The patient is currently off BIPAP, but may need it if it he is BIPAP again.  He noted the patient noted he would never want to be on dialysis or a feeding term long term even though he has both temporarily now, so it's something he would have to consider and discuss with his sister.   All questions answered.    MEDD (morphine equivalent daily dose):    Education about palliative care provided to patient/family.  See Recs below.    Please call [phone] with questions or concerns 24/7.   We will continue to follow.

## 2023-11-07 NOTE — PROGRESS NOTE ADULT - ASSESSMENT
# Severe PARKER likely due to sepsis. Pt has Screat up to ~ 2.0 as far back as June 2021  # Hyperkalemia, given IV CaGluc and Lokelma, plus Bumex IV resolved  # Urine chemistries c/w pre-renal state / CRS w/ FeNa 0.3%, FeUrea 14%  # HAGMA d/t DKA / renal failure    - started HD on 11/3  - non oliguric    Recommendations:  - s/p HD yesterday  - no need for HD today  - will assess for HD tomorrow  - continue monitoring urine output and daily creatinine level  - increase free water enterally to normalize Na level  - start bumex 2mg iv q12h to maximize urine output  - strict i/o / daily bladder scan  - BG control per ICU protocol  - completed course of cefepime  - last phos level noted at goal  - decrease gabapentin to once daily dosing

## 2023-11-07 NOTE — PROGRESS NOTE ADULT - SUBJECTIVE AND OBJECTIVE BOX
Nephrology Progress Note    AMANDA CASOTRENA  MRN-[id number]  89y  Male    S:  Patient is seen and examined, events over the last 24h noted.    O:  Allergies:  No Known Allergies    Hospital Medications:   MEDICATIONS  (STANDING):  albuterol/ipratropium for Nebulization 3 milliLiter(s) Nebulizer every 6 hours  albuterol/ipratropium for Nebulization. 3 milliLiter(s) Nebulizer once  allopurinol 100 milliGRAM(s) Oral daily  aspirin  chewable 81 milliGRAM(s) Oral daily  carvedilol 25 milliGRAM(s) Oral every 12 hours  chlorhexidine 2% Cloths 1 Application(s) Topical <User Schedule>  citalopram 20 milliGRAM(s) Oral daily  clopidogrel Tablet 75 milliGRAM(s) Oral daily  dextrose 5%. 1000 milliLiter(s) (50 mL/Hr) IV Continuous <Continuous>  dextrose 5%. 1000 milliLiter(s) (100 mL/Hr) IV Continuous <Continuous>  dextrose 50% Injectable 50 milliLiter(s) IV Push every 15 minutes  gabapentin 300 milliGRAM(s) Oral two times a day  glucagon  Injectable 1 milliGRAM(s) IntraMuscular once  heparin   Injectable 5000 Unit(s) SubCutaneous every 8 hours  insulin glargine Injectable (LANTUS) 20 Unit(s) SubCutaneous every morning  insulin lispro (ADMELOG) corrective regimen sliding scale   SubCutaneous three times a day before meals  insulin regular Infusion 1 Unit(s)/Hr (1 mL/Hr) IV Continuous <Continuous>  midodrine 10 milliGRAM(s) Oral every 8 hours  pantoprazole  Injectable 40 milliGRAM(s) IV Push daily  polyethylene glycol 3350 17 Gram(s) Oral daily  senna 2 Tablet(s) Oral at bedtime    MEDICATIONS  (PRN):  acetaminophen  Suppository .. 650 milliGRAM(s) Rectal every 6 hours PRN Temp greater or equal to 38C (100.4F)  albuterol    90 MICROgram(s) HFA Inhaler 2 Puff(s) Inhalation every 4 hours PRN Shortness of Breath and/or Wheezing  dextrose Oral Gel 15 Gram(s) Oral once PRN Blood Glucose LESS THAN 70 milliGRAM(s)/deciliter    Home Medications:  allopurinol 100 mg oral tablet: 1 tab(s) orally once a day (25 Oct 2021 22:30)  citalopram 20 mg oral tablet: 1 tab(s) orally once a day (25 Oct 2021 22:30)  clonazePAM 1 mg oral tablet: 2 tab(s) orally once a day (at bedtime), As Needed (25 Oct 2021 22:30)  gabapentin 300 mg oral capsule: 1 cap(s) orally 2 times a day (25 Oct 2021 22:30)  glimepiride 4 mg oral tablet: 1 tab(s) orally 2 times a day (25 Oct 2021 22:30)  Lantus 100 units/mL subcutaneous solution: 30 unit(s) subcutaneous once a day (at bedtime) (25 Oct 2021 22:30)  NIFEdipine 60 mg oral tablet, extended release: 1 tab(s) orally once a day (25 Oct 2021 22:30)      VITALS:  Daily     Daily Weight in k.4 (2023 07:00)  T(F): 99.7 (23 @ 11:00), Max: 100.2 (23 @ 03:01)  HR: 71 (23 @ 12:00)  BP: 133/63 (23 @ 12:00)  RR: 13 (23 @ 12:00)  SpO2: 100% (23 @ 12:00)  Wt(kg): --  I&O's Detail    2023 07:01  -  2023 07:00  --------------------------------------------------------  IN:    IV PiggyBack: 50 mL  Total IN: 50 mL    OUT:    Glucerna: 0 mL    Incontinent per Collection Bag (mL): 0 mL    Insulin: 0 mL    Other (mL): 2000 mL    Voided (mL): 405 mL  Total OUT: 2405 mL    Total NET: -2355 mL      2023 07:01  -  2023 13:46  --------------------------------------------------------  IN:    Glucerna: 10 mL  Total IN: 10 mL    OUT:  Total OUT: 0 mL    Total NET: 10 mL        I&O's Summary    2023 07:01  -  2023 07:00  --------------------------------------------------------  IN: 50 mL / OUT: 2405 mL / NET: -2355 mL    2023 07:01  -  2023 13:46  --------------------------------------------------------  IN: 10 mL / OUT: 0 mL / NET: 10 mL          PHYSICAL EXAM:  Gen: NAD on HFNC  Chest: b/l breath sounds, decreased on the left  Abd: soft  Extremities: no edema  Vascular access: R IJ udall      LABS:  ABG - ( 2023 08:33 )  pH, Arterial: 7.46  pH, Blood: x     /  pCO2: 33    /  pO2: 317   / HCO3: 24    / Base Excess: 0.1   /  SaO2: 100.0           147<H>  |  105  |  61<HH>  ----------------------------<  180<H>  5.0   |  19  |  2.3<H>    Ca    8.4      2023 15:38  Phos  3.2       Mg     2.2         TPro  6.6  /  Alb  3.0<L>  /  TBili  0.4  /  DBili      /  AST  35  /  ALT  30  /  AlkPhos  113      Phosphorus: 3.2 mg/dL (23 @ 15:38)  Phosphorus: 4.0 mg/dL (23 @ 05:18)                            10.2   7.43  )-----------( 251      ( 2023 11:28 )             32.2     Mean Cell Volume: 88.5 fL (23 @ 11:28)    Iron Total, Serum: 59 ug/dL (10-26-21 @ 04:30)  % Saturation, Iron: 23 % (10-26-21 @ 04:30)        Creatinine trend:  Creatinine: 2.3 mg/dL (23 @ 15:38)  Creatinine: 3.4 mg/dL (23 @ 05:18)  Creatinine: 3.1 mg/dL (23 @ 19:45)  Creatinine: 3.0 mg/dL (23 @ 11:00)  Creatinine: 3.1 mg/dL (23 @ 08:00)  Creatinine: 2.9 mg/dL (23 @ 21:39)

## 2023-11-07 NOTE — CONSULT NOTE ADULT - ASSESSMENT
89 yo male PMHx of  DM, CAD-bypass, chronic HFpEF, HTN, HLD, CKD3 presents w sob x few days, no cough fever or chills.  pt placed on NRBM by EMS and subsequently on BiPAP in ED, O2 sat now 99%.  Pt found to have elevated glucose w high AG and b-hydroxybutarate c/w DKA  Hospital course complicated by respiratory failure secondary to suspected aspiration pneumonia, PARKER and initiation of HD    # Acute hypoxic respiratory failure - improving   # Sepsis POA   # R/O aspiration PNA   # DKA - resolved    # Metabolic encephalopathy - improved   # PARKER on CKD3 , now on HD     SUGGEST:  - team to d/w nephrology regarding need for long term HD  - family needs to decide GOC; reportedly plan d/w Palliative today  - can feed via NG by intermittent gravity while pt on HFO2 (but not BiPAP)     - Glucerna 1.2 (or Diabetisource if the former unavailable) 360 ml over 40 min x 4 feeds/d     - would not feed by pump drip into stomach (i.e. NG) in pt without protected airway  - if pt seems to require BiPAP more than high-flow, should place small bore long (109cm) naso-enteric tube and advance till tube tip in mid to distal duodenum      - then feed Glucerna 1.2 at 60 ml /h by pump (and once tolerated increase to 80 ml x 18h/d)      - discussed placement methods with ICU team this morning

## 2023-11-07 NOTE — PROGRESS NOTE ADULT - ASSESSMENT
89y Male  s/p     NEURO:  #Acute pain    -APAP prn    -Gabapentin 300mg BID (renally dose)  #Hx anxiety  Continue home medications:  -citalopram 20mg QD  -clonazepam 2mg prn at bedtime     RESP:   #DNI  #Oxygenation  Alternate BIPAP PRN and overnight with daytime HFNC. Wean down Fio2. Chest PT and frequent suctioning.  repeat mrsa pending      CARDS:   #Hx HFPEF  -Keep negative balance as tolerated. BNP markedly elevated. HFPEF on echo. LVEF 61%  -continue bumex  -replete lytes  -on midodrine 10 q8 started by nephro 11/5      GI/NUTR:   #failed S/S--Feeds via NG tube  -reconsulting Nutrition, last saw on 11/1  #GI Prophylaxis   -continue pantoprazole  #Bowel regimen    -continue senna    /RENAL:   # Severe PARKER likely due to sepsis. Pt has Screat up to ~ 2.0 as far back as June 2021  # Hyperkalemia, given IV CaGluc and Lokelma, plus Bumex IV resolved  # Urine chemistries c/w pre-renal state / CRS w/ FeNa 0.3%, FeUrea 14%  -Nephro following R serena in place, last dialyzed 11/6    Labs:          BUN/Cr- 61/2.3  -->,  86/3.4  -->          Electrolytes-Na 144 // K 4.5 // Mg 2.3 //  Phos -- (11-07 @ 11:28)      Home Rx:          HEME/ONC:   #DVT prophylaxis     -SCDs     -hep subQ    - continue home ASN, Plavix       Labs: Hb/Hct:  9.0/27.0  (11-05 @ 08:00)  -->,  9.9/30.6  (11-06 @ 05:18)  -->                      Plts:  240  -->,  273  -->                 PTT/INR:           ID:  WBC- 8.20  --->>,  6.92  --->>,  6.97  --->>  Temp trend- 24hrs T(F): 99 (11-06 @ 09:44), Max: 99.7 (11-06 @ 01:00)  Current antibiotics-cefepime   IVPB 1000 every 24 hours, per ID  last day today    ENDO:  #Hx DM  #DKA  -on insulin sliding scale,   -FSG q6 if NPO or Tube feeds    -Glucose goal 140-180. if above 180 start ISS    ADVANCED DIRECTIVES: DNR/DNI  -recall palliative today          DISPO:   ICU

## 2023-11-07 NOTE — CONSULT NOTE ADULT - PROBLEM SELECTOR RECOMMENDATION 4
-Patient unable to participate fully in exam  -discussed need for PEG/artificial nutrition today  -ongoing GOC with family

## 2023-11-07 NOTE — PROGRESS NOTE ADULT - ASSESSMENT
IMPRESSION:    ARF secondary to pulmonary edema fluid overload   Likely right aspiration pneumonia    DKA   alter mental status   sepsis fever   PARKER   metabolc acidosis     PLAN:    CNS: neurology follow. No seizure on EEG.     HEENT: oral care     PULMONARY: Aletrnate BIPAP PRN and overnight with daytime HFNC. Wean down Fio2. Chest PT and frequent suctioning.      CARDIOVASCULAR: Hold diuresis. BNP markedly elevated. HFPEF on echo.     GI: GI ppx. NGT feeding as tolerated Speech and swallow re-evaluation.     RENAL: Start PO bicarb 650 TID. HD per nephrology Keep negative balance as tolerated.     INFECTIOUS DISEASE:   On cefepime per ID. repeat MRSA , Procal     HEMATOLOGICAL:  DVT prophylaxis. Keep hg more than 7.     ENDOCRINE:  Insulin SQ protocol. NGT for feeding. Lispro Lantus when starts oral diet.     MUSCULOSKELETAL: PT OT.     CODE STATUS: DNI DNR. Pall care follow up.     Keep in ICU

## 2023-11-07 NOTE — CONSULT NOTE ADULT - PROBLEM SELECTOR RECOMMENDATION 9
Patient requiring new dialysis this hospitalization. High risk  -HD per renal  -follow up UOP, lytes, creatinine  -Patient's son indicated patient would not want long term dialysis - trial OK  -ongoing [facility]

## 2023-11-07 NOTE — CONSULT NOTE ADULT - SUBJECTIVE AND OBJECTIVE BOX
NUTRITION SUPPORT TEAM  -  CONSULT NOTE       Patient is a 89y old  Male who presents with a chief complaint of sob  HPI: 89 yo male PMHx sig for DM, CAD-bypass, CHF, HTN, HLD presents w sob x few days, no cough fever or chills.  pt placed on NRBM by EMS and subsequently on BiPAP in ED, O2 sat now 99%.  Pt found to have elevated glucose w high AG and b-hydroxybutarate c/w DKA    Home Medications:  allopurinol 100 mg oral tablet: 1 tab(s) orally once a day (25 Oct 2021 22:30)  citalopram 20 mg oral tablet: 1 tab(s) orally once a day (25 Oct 2021 22:30)  clonazePAM 1 mg oral tablet: 2 tab(s) orally once a day (at bedtime), As Needed (25 Oct 2021 22:30)  gabapentin 300 mg oral capsule: 1 cap(s) orally 2 times a day (25 Oct 2021 22:30)  glimepiride 4 mg oral tablet: 1 tab(s) orally 2 times a day (25 Oct 2021 22:30)  Lantus 100 units/mL subcutaneous solution: 30 unit(s) subcutaneous once a day (at bedtime) (25 Oct 2021 22:30)  NIFEdipine 60 mg oral tablet, extended release: 1 tab(s) orally once a day (25 Oct 2021 22:30)    IMPRESSION/PLAN: on admission:  pt w sob and ?chf exacerbation in setting of worsening PARKER.  Likely exacerbated by Uncontrolled DM/DKA.  Continue insulin drip. FSBS q1hr, Serial metabolic panels. Cautious IVF hydration.  May try patient on HFNC.  Check TTE. Cardio eval. admit to ICU.   (30 Oct 2023 21:55)    pt found to be in DKA, with PARKER/ATN; he required HD starting 11/3; RIJ Clifton placed  possible aspiration contributing to hypoxic resp distress - pt has been on BiPAP much of the past 9 days since admission.    NUTRITION SUPPORT NOTE:  Pt admitted 10/30, NPO until 11/5, fed by NG drip on 11/5 until ? am 11/6, feeds off since.  I was called today as pt on BiPAP and thus not fed NG  d/w med resident and ICU nurse and hospitalist in ICU  d/w pt and son at bedside    REVIEW OF SYSTEMS:  Negative except as noted above.     PAST MEDICAL/SURGICAL HISTORY:   CHF (congestive heart failure)  DM (diabetes mellitus)  HTN (hypertension)  Prostate CA in remission  Pacemaker  H/O total knee replacement, right    ALLERGIES:  No Known Allergies      VITALS:  T(F): 99.7 (11-07 @ 11:00), Max: 100.2 (11-07 @ 03:01)  HR: 68 (11-07 @ 08:25) (63 - 70)  BP: 114/55 (11-07 @ 11:00) (99/50 - 152/80)  RR: 17 (11-07 @ 11:00) (17 - 29)  SpO2: 100% (11-07 @ 08:41) (100% - 100%)    HEIGHT/WEIGHT/BMI:   Height (cm): 175 (11-04)  Weight (kg): 88.5 (10-30)  BMI (kg/m2): 28.9 (11-04)    I/Os:     11-06-23 @ 07:01  -  11-07-23 @ 07:00  --------------------------------------------------------  IN:    Glucerna: 0 mL    IV PiggyBack: 50 mL  Total IN: 50 mL  OUT:    Incontinent per Collection Bag (mL): 0 mL    Insulin: 0 mL    Other (mL): 2000 mL    Voided (mL): 405 mL  Total OUT: 2405 mL  Total NET: -2355 mL    PHYSICAL EXAM:   GENERAL: NAD, weak but alert and verbal when spoken to, per son at bedside he is more "with it" today  HEENT: Moist mucous membranes, anicteric sclerae pn high flow O2 when seen this morning  ABDOMEN: Soft, Nontender, Nondistended  EXTREMITIES:  No clubbing, cyanosis, or edema; + skin turgor poor, + ecchymoses on arms  SKIN: warm and well perfused; moves all extrem, follows commands but he's weak  IV ACCESS: R IJ Clifton  ENTERAL ACCESS: NG  + Wen, + urine    STANDING MEDICATIONS:   albuterol/ipratropium for Nebulization 3 milliLiter(s) Nebulizer every 6 hours  albuterol/ipratropium for Nebulization. 3 milliLiter(s) Nebulizer once  allopurinol 100 milliGRAM(s) Oral daily  aspirin  chewable 81 milliGRAM(s) Oral daily  carvedilol 25 milliGRAM(s) Oral every 12 hours  chlorhexidine 2% Cloths 1 Application(s) Topical <User Schedule>  citalopram 20 milliGRAM(s) Oral daily  clopidogrel Tablet 75 milliGRAM(s) Oral daily  gabapentin 300 milliGRAM(s) Oral two times a day  heparin   Injectable 5000 Unit(s) SubCutaneous every 8 hours  insulin glargine Injectable (LANTUS) 20 Unit(s) SubCutaneous every morning  insulin lispro (ADMELOG) corrective regimen sliding scale   SubCutaneous three times a day before meals  insulin regular Infusion 1 Unit(s)/Hr IV Continuous <Continuous>  midodrine 10 milliGRAM(s) Oral every 8 hours  pantoprazole  Injectable 40 milliGRAM(s) IV Push daily  polyethylene glycol 3350 17 Gram(s) Oral daily  senna 2 Tablet(s) Oral at bedtime    ABG - ( 07 Nov 2023 08:33 )  pH, Arterial: 7.46  pH, Blood: x     /  pCO2: 33    /  pO2: 317   / HCO3: 24    / Base Excess: 0.1   /  SaO2: 100.0     LABS:                         9.9    6.97  )-----------( 273      ( 06 Nov 2023 05:18 )             30.6     147<H>  |  105  |  61<HH>  ----------------------------<  180<H>          (11-06-23 @ 15:38)  5.0   |  19  |  2.3<H>    Ca    8.4          (11-06-23 @ 15:38)  Phos  3.2         (11-06-23 @ 15:38)  Mg     2.2         (11-06-23 @ 15:38)    TPro  6.6  /  Alb  3.0<L>  /  TBili  0.4  /  DBili  x   /  AST  35  /  ALT  30  /  AlkPhos  113       11-06-23 @ 15:38    A1c: 9.1 % (11-03-23 @ 05:43)    Blood Glucose (Past 24 hours):  242 mg/dL (11-07 @ 08:22)  216 mg/dL (11-07 @ 05:24)  247 mg/dL (11-06 @ 22:44)  153 mg/dL (11-06 @ 16:32)      DIET: ----------  feeds off  Diet, NPO with Tube Feed:   Tube Feeding Modality: Nasogastric  Glucerna 1.2 Giovany  Total Volume for 24 Hours (mL): 1320  Continuous  Starting Tube Feed Rate mL per Hour: 20  Increase Tube Feed Rate by (mL): 20     Every hour  Until Goal Tube Feed Rate (mL per Hour): 55  Tube Feed Duration (in Hours): 24  Tube Feed Start Time: 09:00  Free Water Flush  Bolus   Total Volume per Flush (mL): 150   Frequency: Every 6 Hours  Beneprotein ([facility] Only)     Qty per Day:  2 packets q12 hours (11-05-23 @ 08:24) [Active]    RADIOLOGY:   < from: Xray Chest 1 View- PORTABLE-Routine (Xray Chest 1 View- PORTABLE-Routine in AM.) (11.06.23 @ 07:21) >  Support devices: Left chest pacemaker. Stable right central venous   catheter. Enteric tube terminating below the left hemidiaphragm.    Cardiac/mediastinum/hilum: Unchanged.    Lung parenchyma/Pleura: Stable bilateral opacities/effusions, greater on   the right. No pneumothorax.    < end of copied text >

## 2023-11-07 NOTE — CHART NOTE - NSCHARTNOTEFT_GEN_A_CORE
PALLIATIVE MEDICINE INTERDISCIPLINARY TEAM NOTE    Provider:                                            Met with: [  x ] Patient  [  x ] Family  [   ] Other:    Primary Language: [   x] English [   ] Other*:                      *Interpretation provided by:    SUPPORT DIAGNOSES            (Check all that apply)    [   ] EOL issues  [  x ] Advanced Illness  [   ] Cultural / spiritual concerns  [   ] Pain / suffering  [   ] Dementia / AMS  [   ] Other:  [   ] AD issues  [   ] Grief / loss / sadness  [   ] Discharge issues  [ x  ] Distress / coping    PSYCHOSOCIAL ASSESSMENT OF PATIENT         (Check all that apply)    [ x  ] Initial Assessment            [   ] Reassessment          [   ] Not Applicable this visit    Pain/suffering acuity:  [  x ] None to mild (0-3)           [   ] Moderate (4-6)        [   ] High (7-10)    Mental Status:  [x   ] Alert/oriented (x2)          [   ] Confused/Altered(x2/x1)         [   ] Non-resp    Functional status:  [   ] Independent w ADLs      [ x  ] Needs Assistance             [   ] Bedbound/Full Care    Coping:  [   ] Coping well                     [ x  ] Coping w/difficulty            [   ] Poor coping    Support system:  [ x  ] Strong                              [   ] Adequate                        [   ] Inadequate      Past history and medications for:     [ ] Anxiety       [ ] Depression    [ ] Sleep disorders       SERVICE PROVIDED  [   ]Discharge support / facilitation  [   ]AD / goals of care counseling                                  [   ]EOL / death / bereavement counseling  [ x  ]Counseling / support                                                [   ] Family meeting  [   ]Prayer / sacrament / ritual                                      [   ] Referral   [   ]Other                                                                       NOTE and Plan of Care (PoC):    patient is a 89 y/o M with pmhx DM, CAD, HFpEF, HTN, HLD, CKD3, presenting with SOB. visited patient earlier today. patient on HFNC. he denied any pain or discomfort, noted feeling lethargic. patient's son at bedside. Reviewed role of palliative care and palliative SW. He discussed patient's condition and hx leading up to current admission. he noted he needs to discuss with his sister re: artifical feeding. all questions answered. support rendered. will follow. [id number]

## 2023-11-07 NOTE — CONSULT NOTE ADULT - PROBLEM SELECTOR RECOMMENDATION 6
-DNR/DNI  -ongoing medical management  -GOC as appropriate - family considering artificial nutrition as an option  -will follow

## 2023-11-07 NOTE — PROGRESS NOTE ADULT - SUBJECTIVE AND OBJECTIVE BOX
Patient is a 89y old  Male who presents with a chief complaint of sob (06 Nov 2023 17:46)        Over Night Events: remains on BIPAP         ROS:     All ROS are negative except HPI         PHYSICAL EXAM    ICU Vital Signs Last 24 Hrs  T(C): 37.7 (07 Nov 2023 07:00), Max: 37.9 (07 Nov 2023 03:01)  T(F): 99.9 (07 Nov 2023 07:00), Max: 100.2 (07 Nov 2023 03:01)  HR: 68 (07 Nov 2023 06:00) (63 - 663)  BP: 129/59 (07 Nov 2023 07:00) (99/50 - 173/68)  BP(mean): 85 (07 Nov 2023 07:00) (72 - 110)  ABP: --  ABP(mean): --  RR: 20 (07 Nov 2023 07:00) (17 - 34)  SpO2: 100% (07 Nov 2023 06:00) (80% - 100%)    O2 Parameters below as of 07 Nov 2023 06:00  Patient On (Oxygen Delivery Method): BiPAP/CPAP            CONSTITUTIONAL:  remains BIPAP dependant    ENT:   Airway patent,   Mouth with normal mucosa.   on NIV      EYES:   Pupils equal,   Round and reactive to light.    CARDIAC:   Normal rate,   Regular rhythm.           Vascular:  Normal systolic impulse  No Carotid bruits    RESPIRATORY:   No wheezing  Bilateral BS  Normal chest expansion  Not tachypneic,  No use of accessory muscles    GASTROINTESTINAL:  Abdomen soft,   Non-tender,   No guarding,   + BS    MUSCULOSKELETAL:   Range of motion is not limited,  No clubbing, cyanosis         SKIN:   Skin normal color for race,   Warm and dry and intact.   No evidence of rash.         11-06-23 @ 07:01  -  11-07-23 @ 07:00  --------------------------------------------------------  IN:    IV PiggyBack: 50 mL  Total IN: 50 mL    OUT:    Glucerna: 0 mL    Incontinent per Collection Bag (mL): 0 mL    Insulin: 0 mL    Other (mL): 2000 mL    Voided (mL): 405 mL  Total OUT: 2405 mL    Total NET: -2355 mL          LABS:                            9.9    6.97  )-----------( 273      ( 06 Nov 2023 05:18 )             30.6                                               11-06    147<H>  |  105  |  61<HH>  ----------------------------<  180<H>  5.0   |  19  |  2.3<H>    Ca    8.4      06 Nov 2023 15:38  Phos  3.2     11-06  Mg     2.2     11-06    TPro  6.6  /  Alb  3.0<L>  /  TBili  0.4  /  DBili  x   /  AST  35  /  ALT  30  /  AlkPhos  113  11-06                                             Urinalysis Basic - ( 06 Nov 2023 15:38 )    Color: x / Appearance: x / SG: x / pH: x  Gluc: 180 mg/dL / Ketone: x  / Bili: x / Urobili: x   Blood: x / Protein: x / Nitrite: x   Leuk Esterase: x / RBC: x / WBC x   Sq Epi: x / Non Sq Epi: x / Bacteria: x                                                  LIVER FUNCTIONS - ( 06 Nov 2023 15:38 )  Alb: 3.0 g/dL / Pro: 6.6 g/dL / ALK PHOS: 113 U/L / ALT: 30 U/L / AST: 35 U/L / GGT: x                                                  Culture - Blood (collected 04 Nov 2023 15:54)  Source: .Blood None  Preliminary Report (07 Nov 2023 01:02):    No growth at 48 Hours                                                                                           MEDICATIONS  (STANDING):  albuterol/ipratropium for Nebulization 3 milliLiter(s) Nebulizer every 6 hours  albuterol/ipratropium for Nebulization. 3 milliLiter(s) Nebulizer once  allopurinol 100 milliGRAM(s) Oral daily  aspirin  chewable 81 milliGRAM(s) Oral daily  buMETAnide Injectable 2 milliGRAM(s) IV Push daily  carvedilol 25 milliGRAM(s) Oral every 12 hours  cefepime   IVPB 1000 milliGRAM(s) IV Intermittent every 24 hours  chlorhexidine 2% Cloths 1 Application(s) Topical <User Schedule>  citalopram 20 milliGRAM(s) Oral daily  clopidogrel Tablet 75 milliGRAM(s) Oral daily  dextrose 5%. 1000 milliLiter(s) (50 mL/Hr) IV Continuous <Continuous>  dextrose 5%. 1000 milliLiter(s) (100 mL/Hr) IV Continuous <Continuous>  dextrose 50% Injectable 50 milliLiter(s) IV Push every 15 minutes  gabapentin 300 milliGRAM(s) Oral two times a day  glucagon  Injectable 1 milliGRAM(s) IntraMuscular once  heparin   Injectable 5000 Unit(s) SubCutaneous every 8 hours  insulin glargine Injectable (LANTUS) 20 Unit(s) SubCutaneous every morning  insulin lispro (ADMELOG) corrective regimen sliding scale   SubCutaneous three times a day before meals  insulin regular Infusion 1 Unit(s)/Hr (1 mL/Hr) IV Continuous <Continuous>  midodrine 10 milliGRAM(s) Oral every 8 hours  pantoprazole  Injectable 40 milliGRAM(s) IV Push daily  polyethylene glycol 3350 17 Gram(s) Oral daily  senna 2 Tablet(s) Oral at bedtime    MEDICATIONS  (PRN):  acetaminophen  Suppository .. 650 milliGRAM(s) Rectal every 6 hours PRN Temp greater or equal to 38C (100.4F)  albuterol    90 MICROgram(s) HFA Inhaler 2 Puff(s) Inhalation every 4 hours PRN Shortness of Breath and/or Wheezing  dextrose Oral Gel 15 Gram(s) Oral once PRN Blood Glucose LESS THAN 70 milliGRAM(s)/deciliter      New X-rays reviewed:                                                                                  ECHO    CXR interpreted by me:

## 2023-11-07 NOTE — CONSULT NOTE ADULT - CONVERSATION DETAILS
Spoke with son Bert over the phone. He was at bedside. Palliative care introduced.  HE was able to provide a medical history and hospital course. We discussed the patient's functional and cognitive status prior to hospitalization.  He noted he had help, but lived alone and could do a lot of functional activities of daily living himself.      We noted that he had spoke with the primary team earlier in the day about need for a possible alternative to NGT with TPN or PEG given his recent BIPAP need. The patient is currently off BIPAP, but may need it if it he is BIPAP again.  He noted the patient noted he would never want to be on dialysis or a feeding term long term even though he has both temporarily now, so it's something he would have to consider and discuss with his sister.   All questions answered.

## 2023-11-07 NOTE — PROGRESS NOTE ADULT - SUBJECTIVE AND OBJECTIVE BOX
AMANDA CASTORENA, 89y, Male; MRN# [id number]  Hospital Stay: 8d.    Patient is a 89y old Male who presents with a chief complaint of sob (07 Nov 2023 13:45)  Currently admitted to the ICU with the primary diagnosis of DKA (diabetic ketoacidosis)      SUBJECTIVE:  Interval/Overnight events:     REVIEW OF SYSTEMS:  As per HPI  OBJECTIVE:  VITALS:  T(F): 99.7 (11-07-23 @ 11:00), Max: 100.2 (11-07-23 @ 03:01)  HR: 71 (11-07-23 @ 12:00) (63 - 72)  BP: 133/63 (11-07-23 @ 12:00) (99/50 - 152/80)  ABP: --  ABP(mean): --  RR: 13 (11-07-23 @ 12:00) (13 - 31)  SpO2: 100% (11-07-23 @ 12:00) (80% - 100%)    Vent Settings    Blood Gas  11-07-23 @ 08:33  pH 7.46 | pCO2 33 | pO2 317 | HCO3 24 | O2 Sat 100.0    Drips  dextrose 5%. 1000 milliLiter(s) (50 mL/Hr) IV Continuous <Continuous>  dextrose 5%. 1000 milliLiter(s) (100 mL/Hr) IV Continuous <Continuous>  insulin regular Infusion 1 Unit(s)/Hr (1 mL/Hr) IV Continuous <Continuous>    I&Os:    11-06-23 @ 07:01  -  11-07-23 @ 07:00  --------------------------------------------------------  IN: 50 mL / OUT: 2405 mL / NET: -2355 mL    11-07-23 @ 07:01  -  11-07-23 @ 14:02  --------------------------------------------------------  IN: 10 mL / OUT: 0 mL / NET: 10 mL      PHYSICAL EXAM:        ACTIVE MEDICATIONS:  Standing  albuterol/ipratropium for Nebulization 3 milliLiter(s) Nebulizer every 6 hours  albuterol/ipratropium for Nebulization. 3 milliLiter(s) Nebulizer once  allopurinol 100 milliGRAM(s) Oral daily  aspirin  chewable 81 milliGRAM(s) Oral daily  carvedilol 25 milliGRAM(s) Oral every 12 hours  chlorhexidine 2% Cloths 1 Application(s) Topical <User Schedule>  citalopram 20 milliGRAM(s) Oral daily  clopidogrel Tablet 75 milliGRAM(s) Oral daily  dextrose 5%. 1000 milliLiter(s) (50 mL/Hr) IV Continuous <Continuous>  dextrose 5%. 1000 milliLiter(s) (100 mL/Hr) IV Continuous <Continuous>  dextrose 50% Injectable 50 milliLiter(s) IV Push every 15 minutes  gabapentin 300 milliGRAM(s) Oral two times a day  glucagon  Injectable 1 milliGRAM(s) IntraMuscular once  heparin   Injectable 5000 Unit(s) SubCutaneous every 8 hours  insulin glargine Injectable (LANTUS) 20 Unit(s) SubCutaneous every morning  insulin lispro (ADMELOG) corrective regimen sliding scale   SubCutaneous three times a day before meals  insulin regular Infusion 1 Unit(s)/Hr (1 mL/Hr) IV Continuous <Continuous>  midodrine 10 milliGRAM(s) Oral every 8 hours  pantoprazole  Injectable 40 milliGRAM(s) IV Push daily  polyethylene glycol 3350 17 Gram(s) Oral daily  senna 2 Tablet(s) Oral at bedtime    PRN  acetaminophen  Suppository .. 650 milliGRAM(s) Rectal every 6 hours PRN  albuterol    90 MICROgram(s) HFA Inhaler 2 Puff(s) Inhalation every 4 hours PRN  dextrose Oral Gel 15 Gram(s) Oral once PRN    LABS:  11-07-23 @ 11:28  WBC 7.43, H/H 10.2<L>/32.2<L>, plt 251  Na 144, K 4.5, Cl 103, CO2 23, BUN 86<HH>, Cr 3.4<H>, Glu 266<H>    Ca 7.8<L>, Mg 2.3, Phosphorus --    Tot Protein 6.1, Alb 2.9<L>, T. Bili 0.3, D. Bili --  AST 15, ALT 20, Alk phos 91    11-06-23 @ 15:38  WBC --, H/H --/--, plt --  Na 147<H>, K 5.0, Cl 105, CO2 19, BUN 61<HH>, Cr 2.3<H>, Glu 180<H>    Ca 8.4, Mg 2.2, Phosphorus 3.2    Tot Protein 6.6, Alb 3.0<L>, T. Bili 0.4, D. Bili --  AST 35, ALT 30, Alk phos 113        11-06-23 @ 09:10:  CRP --, ESR --, Procal 13.70<H>, Ferritin --, Fungitell --, D-dimer --      11-03-23 @ 05:43:  Hgb A1c 9.1<H>% | Mean plasma glucose 214<H>      10-30-23 @ 19:20:  Troponin T 0.24<HH>, BNP --          CULTURES:    Culture - Blood (collected 11-04-23 @ 15:54)  Source: .Blood None  Preliminary Report (11-07-23 @ 01:02):    No growth at 48 Hours    Culture - Blood (collected 10-31-23 @ 02:26)  Source: .Blood Blood-Peripheral  Final Report (11-05-23 @ 23:01):    No growth at 5 days    Culture - Blood (collected 10-31-23 @ 02:26)  Source: .Blood Blood-Peripheral  Final Report (11-05-23 @ 23:01):    No growth at 5 days      PENDING:  Complete Blood Count: AM Sched. Collection: 08-Nov-2023 04:30 (11-07-23 @ 13:10)  Phosphorus: AM Sched. Collection: 08-Nov-2023 04:30 (11-07-23 @ 13:10)  Magnesium: AM Sched. Collection: 08-Nov-2023 04:30 (11-07-23 @ 13:10)  Basic Metabolic Panel: AM Sched. Collection: 08-Nov-2023 04:30 (11-07-23 @ 13:10)  Magnesium: 15:00 (11-07-23 @ 12:57)  Basic Metabolic Panel: 15:00 (11-07-23 @ 12:57)  Prealbumin, Serum: AM Sched. Collection: 11-Nov-2023 04:30  Addl Info: On admission and every four days (11-07-23 @ 12:57)  Complete Blood Count: AM Sched. Collection: 08-Nov-2023 04:30 (11-07-23 @ 12:57)  Phosphorus: AM Sched. Collection: 08-Nov-2023 04:30 (11-07-23 @ 12:57)  Magnesium: AM Sched. Collection: 08-Nov-2023 04:30 (11-07-23 @ 12:57)  Calcium, Ionized: AM Sched. Collection: 08-Nov-2023 04:30 (11-07-23 @ 12:57)  Basic Metabolic Panel: AM Sched. Collection: 08-Nov-2023 04:30 (11-07-23 @ 12:57)    IMAGING:  Latest imaging reviewed.  Xray Chest 1 View- PORTABLE-Routine: AM   Indication: sob, chf  Transport: Portable,  w/ Monitor (11-07-23 @ 13:10)  Xray Chest 1 View- PORTABLE-Routine: AM   Indication: sob, chf  Transport: Portable,  w/ Monitor (11-07-23 @ 13:10)    ECHO:  TTE Echo Complete w/o Contrast w/ Doppler:   Transport: Portable  Monitor: w/ Monitor  Provider's Contact #: [phone] (10-31-23 @ 01:06)

## 2023-11-08 LAB
ANION GAP SERPL CALC-SCNC: 15 MMOL/L — HIGH (ref 7–14)
ANION GAP SERPL CALC-SCNC: 15 MMOL/L — HIGH (ref 7–14)
ANION GAP SERPL CALC-SCNC: 17 MMOL/L — HIGH (ref 7–14)
ANION GAP SERPL CALC-SCNC: 17 MMOL/L — HIGH (ref 7–14)
BUN SERPL-MCNC: 106 MG/DL — CRITICAL HIGH (ref 10–20)
BUN SERPL-MCNC: 106 MG/DL — CRITICAL HIGH (ref 10–20)
BUN SERPL-MCNC: 44 MG/DL — HIGH (ref 10–20)
BUN SERPL-MCNC: 44 MG/DL — HIGH (ref 10–20)
CALCIUM SERPL-MCNC: 7.6 MG/DL — LOW (ref 8.4–10.5)
CALCIUM SERPL-MCNC: 7.6 MG/DL — LOW (ref 8.4–10.5)
CALCIUM SERPL-MCNC: 7.8 MG/DL — LOW (ref 8.4–10.5)
CALCIUM SERPL-MCNC: 7.8 MG/DL — LOW (ref 8.4–10.5)
CHLORIDE SERPL-SCNC: 102 MMOL/L — SIGNIFICANT CHANGE UP (ref 98–110)
CHLORIDE SERPL-SCNC: 102 MMOL/L — SIGNIFICANT CHANGE UP (ref 98–110)
CHLORIDE SERPL-SCNC: 105 MMOL/L — SIGNIFICANT CHANGE UP (ref 98–110)
CHLORIDE SERPL-SCNC: 105 MMOL/L — SIGNIFICANT CHANGE UP (ref 98–110)
CO2 SERPL-SCNC: 23 MMOL/L — SIGNIFICANT CHANGE UP (ref 17–32)
CO2 SERPL-SCNC: 23 MMOL/L — SIGNIFICANT CHANGE UP (ref 17–32)
CO2 SERPL-SCNC: 24 MMOL/L — SIGNIFICANT CHANGE UP (ref 17–32)
CO2 SERPL-SCNC: 24 MMOL/L — SIGNIFICANT CHANGE UP (ref 17–32)
CREAT SERPL-MCNC: 1.8 MG/DL — HIGH (ref 0.7–1.5)
CREAT SERPL-MCNC: 1.8 MG/DL — HIGH (ref 0.7–1.5)
CREAT SERPL-MCNC: 3.7 MG/DL — HIGH (ref 0.7–1.5)
CREAT SERPL-MCNC: 3.7 MG/DL — HIGH (ref 0.7–1.5)
EGFR: 15 ML/MIN/1.73M2 — LOW
EGFR: 15 ML/MIN/1.73M2 — LOW
EGFR: 35 ML/MIN/1.73M2 — LOW
EGFR: 35 ML/MIN/1.73M2 — LOW
GLUCOSE BLDC GLUCOMTR-MCNC: 112 MG/DL — HIGH (ref 70–99)
GLUCOSE BLDC GLUCOMTR-MCNC: 112 MG/DL — HIGH (ref 70–99)
GLUCOSE BLDC GLUCOMTR-MCNC: 293 MG/DL — HIGH (ref 70–99)
GLUCOSE BLDC GLUCOMTR-MCNC: 293 MG/DL — HIGH (ref 70–99)
GLUCOSE BLDC GLUCOMTR-MCNC: 333 MG/DL — HIGH (ref 70–99)
GLUCOSE BLDC GLUCOMTR-MCNC: 333 MG/DL — HIGH (ref 70–99)
GLUCOSE BLDC GLUCOMTR-MCNC: 371 MG/DL — HIGH (ref 70–99)
GLUCOSE BLDC GLUCOMTR-MCNC: 371 MG/DL — HIGH (ref 70–99)
GLUCOSE BLDC GLUCOMTR-MCNC: 380 MG/DL — HIGH (ref 70–99)
GLUCOSE BLDC GLUCOMTR-MCNC: 380 MG/DL — HIGH (ref 70–99)
GLUCOSE SERPL-MCNC: 195 MG/DL — HIGH (ref 70–99)
GLUCOSE SERPL-MCNC: 195 MG/DL — HIGH (ref 70–99)
GLUCOSE SERPL-MCNC: 419 MG/DL — HIGH (ref 70–99)
GLUCOSE SERPL-MCNC: 419 MG/DL — HIGH (ref 70–99)
HCT VFR BLD CALC: 30.9 % — LOW (ref 42–52)
HCT VFR BLD CALC: 30.9 % — LOW (ref 42–52)
HGB BLD-MCNC: 9.9 G/DL — LOW (ref 14–18)
HGB BLD-MCNC: 9.9 G/DL — LOW (ref 14–18)
MAGNESIUM SERPL-MCNC: 2.1 MG/DL — SIGNIFICANT CHANGE UP (ref 1.8–2.4)
MAGNESIUM SERPL-MCNC: 2.1 MG/DL — SIGNIFICANT CHANGE UP (ref 1.8–2.4)
MAGNESIUM SERPL-MCNC: 2.3 MG/DL — SIGNIFICANT CHANGE UP (ref 1.8–2.4)
MAGNESIUM SERPL-MCNC: 2.3 MG/DL — SIGNIFICANT CHANGE UP (ref 1.8–2.4)
MCHC RBC-ENTMCNC: 28.3 PG — SIGNIFICANT CHANGE UP (ref 27–31)
MCHC RBC-ENTMCNC: 28.3 PG — SIGNIFICANT CHANGE UP (ref 27–31)
MCHC RBC-ENTMCNC: 32 G/DL — SIGNIFICANT CHANGE UP (ref 32–37)
MCHC RBC-ENTMCNC: 32 G/DL — SIGNIFICANT CHANGE UP (ref 32–37)
MCV RBC AUTO: 88.3 FL — SIGNIFICANT CHANGE UP (ref 80–94)
MCV RBC AUTO: 88.3 FL — SIGNIFICANT CHANGE UP (ref 80–94)
NRBC # BLD: 0 /100 WBCS — SIGNIFICANT CHANGE UP (ref 0–0)
NRBC # BLD: 0 /100 WBCS — SIGNIFICANT CHANGE UP (ref 0–0)
PHOSPHATE SERPL-MCNC: 6 MG/DL — HIGH (ref 2.1–4.9)
PHOSPHATE SERPL-MCNC: 6 MG/DL — HIGH (ref 2.1–4.9)
PLATELET # BLD AUTO: 253 K/UL — SIGNIFICANT CHANGE UP (ref 130–400)
PLATELET # BLD AUTO: 253 K/UL — SIGNIFICANT CHANGE UP (ref 130–400)
PMV BLD: 11.3 FL — HIGH (ref 7.4–10.4)
PMV BLD: 11.3 FL — HIGH (ref 7.4–10.4)
POTASSIUM SERPL-MCNC: 3.7 MMOL/L — SIGNIFICANT CHANGE UP (ref 3.5–5)
POTASSIUM SERPL-MCNC: 3.7 MMOL/L — SIGNIFICANT CHANGE UP (ref 3.5–5)
POTASSIUM SERPL-MCNC: 4.1 MMOL/L — SIGNIFICANT CHANGE UP (ref 3.5–5)
POTASSIUM SERPL-MCNC: 4.1 MMOL/L — SIGNIFICANT CHANGE UP (ref 3.5–5)
POTASSIUM SERPL-SCNC: 3.7 MMOL/L — SIGNIFICANT CHANGE UP (ref 3.5–5)
POTASSIUM SERPL-SCNC: 3.7 MMOL/L — SIGNIFICANT CHANGE UP (ref 3.5–5)
POTASSIUM SERPL-SCNC: 4.1 MMOL/L — SIGNIFICANT CHANGE UP (ref 3.5–5)
POTASSIUM SERPL-SCNC: 4.1 MMOL/L — SIGNIFICANT CHANGE UP (ref 3.5–5)
RBC # BLD: 3.5 M/UL — LOW (ref 4.7–6.1)
RBC # BLD: 3.5 M/UL — LOW (ref 4.7–6.1)
RBC # FLD: 15.9 % — HIGH (ref 11.5–14.5)
RBC # FLD: 15.9 % — HIGH (ref 11.5–14.5)
SODIUM SERPL-SCNC: 142 MMOL/L — SIGNIFICANT CHANGE UP (ref 135–146)
SODIUM SERPL-SCNC: 142 MMOL/L — SIGNIFICANT CHANGE UP (ref 135–146)
SODIUM SERPL-SCNC: 144 MMOL/L — SIGNIFICANT CHANGE UP (ref 135–146)
SODIUM SERPL-SCNC: 144 MMOL/L — SIGNIFICANT CHANGE UP (ref 135–146)
WBC # BLD: 8.62 K/UL — SIGNIFICANT CHANGE UP (ref 4.8–10.8)
WBC # BLD: 8.62 K/UL — SIGNIFICANT CHANGE UP (ref 4.8–10.8)
WBC # FLD AUTO: 8.62 K/UL — SIGNIFICANT CHANGE UP (ref 4.8–10.8)
WBC # FLD AUTO: 8.62 K/UL — SIGNIFICANT CHANGE UP (ref 4.8–10.8)

## 2023-11-08 PROCEDURE — 99233 SBSQ HOSP IP/OBS HIGH 50: CPT

## 2023-11-08 PROCEDURE — 71045 X-RAY EXAM CHEST 1 VIEW: CPT | Mod: 26

## 2023-11-08 RX ORDER — BUMETANIDE 0.25 MG/ML
2 INJECTION INTRAMUSCULAR; INTRAVENOUS EVERY 12 HOURS
Refills: 0 | Status: DISCONTINUED | OUTPATIENT
Start: 2023-11-08 | End: 2023-11-10

## 2023-11-08 RX ORDER — GABAPENTIN 400 MG/1
300 CAPSULE ORAL DAILY
Refills: 0 | Status: DISCONTINUED | OUTPATIENT
Start: 2023-11-09 | End: 2023-11-27

## 2023-11-08 RX ORDER — MORPHINE SULFATE 50 MG/1
2 CAPSULE, EXTENDED RELEASE ORAL ONCE
Refills: 0 | Status: DISCONTINUED | OUTPATIENT
Start: 2023-11-08 | End: 2023-11-08

## 2023-11-08 RX ORDER — BUMETANIDE 0.25 MG/ML
2 INJECTION INTRAMUSCULAR; INTRAVENOUS ONCE
Refills: 0 | Status: COMPLETED | OUTPATIENT
Start: 2023-11-08 | End: 2023-11-08

## 2023-11-08 RX ADMIN — MIDODRINE HYDROCHLORIDE 10 MILLIGRAM(S): 2.5 TABLET ORAL at 22:49

## 2023-11-08 RX ADMIN — Medication 3 MILLILITER(S): at 13:53

## 2023-11-08 RX ADMIN — Medication 100 MILLIGRAM(S): at 11:39

## 2023-11-08 RX ADMIN — MIDODRINE HYDROCHLORIDE 10 MILLIGRAM(S): 2.5 TABLET ORAL at 13:49

## 2023-11-08 RX ADMIN — INSULIN GLARGINE 20 UNIT(S): 100 INJECTION, SOLUTION SUBCUTANEOUS at 09:19

## 2023-11-08 RX ADMIN — CLOPIDOGREL BISULFATE 75 MILLIGRAM(S): 75 TABLET, FILM COATED ORAL at 11:39

## 2023-11-08 RX ADMIN — MORPHINE SULFATE 2 MILLIGRAM(S): 50 CAPSULE, EXTENDED RELEASE ORAL at 20:50

## 2023-11-08 RX ADMIN — MIDODRINE HYDROCHLORIDE 10 MILLIGRAM(S): 2.5 TABLET ORAL at 05:19

## 2023-11-08 RX ADMIN — MORPHINE SULFATE 2 MILLIGRAM(S): 50 CAPSULE, EXTENDED RELEASE ORAL at 20:23

## 2023-11-08 RX ADMIN — HEPARIN SODIUM 5000 UNIT(S): 5000 INJECTION INTRAVENOUS; SUBCUTANEOUS at 05:19

## 2023-11-08 RX ADMIN — GABAPENTIN 300 MILLIGRAM(S): 400 CAPSULE ORAL at 05:19

## 2023-11-08 RX ADMIN — Medication 3 MILLILITER(S): at 19:40

## 2023-11-08 RX ADMIN — PANTOPRAZOLE SODIUM 40 MILLIGRAM(S): 20 TABLET, DELAYED RELEASE ORAL at 11:39

## 2023-11-08 RX ADMIN — CITALOPRAM 20 MILLIGRAM(S): 10 TABLET, FILM COATED ORAL at 11:39

## 2023-11-08 RX ADMIN — Medication 81 MILLIGRAM(S): at 11:39

## 2023-11-08 RX ADMIN — POLYETHYLENE GLYCOL 3350 17 GRAM(S): 17 POWDER, FOR SOLUTION ORAL at 11:40

## 2023-11-08 RX ADMIN — HEPARIN SODIUM 5000 UNIT(S): 5000 INJECTION INTRAVENOUS; SUBCUTANEOUS at 13:49

## 2023-11-08 RX ADMIN — BUMETANIDE 2 MILLIGRAM(S): 0.25 INJECTION INTRAMUSCULAR; INTRAVENOUS at 17:06

## 2023-11-08 RX ADMIN — Medication 3 MILLILITER(S): at 07:28

## 2023-11-08 RX ADMIN — CARVEDILOL PHOSPHATE 25 MILLIGRAM(S): 80 CAPSULE, EXTENDED RELEASE ORAL at 05:19

## 2023-11-08 RX ADMIN — CARVEDILOL PHOSPHATE 25 MILLIGRAM(S): 80 CAPSULE, EXTENDED RELEASE ORAL at 17:05

## 2023-11-08 RX ADMIN — BUMETANIDE 2 MILLIGRAM(S): 0.25 INJECTION INTRAMUSCULAR; INTRAVENOUS at 09:19

## 2023-11-08 RX ADMIN — SENNA PLUS 2 TABLET(S): 8.6 TABLET ORAL at 22:49

## 2023-11-08 RX ADMIN — Medication 10: at 07:34

## 2023-11-08 RX ADMIN — HEPARIN SODIUM 5000 UNIT(S): 5000 INJECTION INTRAVENOUS; SUBCUTANEOUS at 22:49

## 2023-11-08 RX ADMIN — Medication 8: at 11:40

## 2023-11-08 RX ADMIN — CHLORHEXIDINE GLUCONATE 1 APPLICATION(S): 213 SOLUTION TOPICAL at 05:20

## 2023-11-08 NOTE — PROGRESS NOTE ADULT - SUBJECTIVE AND OBJECTIVE BOX
Patient is a 89y old  Male who presents with a chief complaint of sob (07 Nov 2023 14:41)        Over Night Events: Reamains on FNC         ROS:     All ROS are negative except HPI         PHYSICAL EXAM    ICU Vital Signs Last 24 Hrs  T(C): 37.3 (08 Nov 2023 07:01), Max: 37.9 (07 Nov 2023 23:01)  T(F): 99.1 (08 Nov 2023 07:01), Max: 100.2 (07 Nov 2023 23:01)  HR: 60 (08 Nov 2023 06:00) (60 - 75)  BP: 110/54 (08 Nov 2023 06:00) (107/59 - 162/71)  BP(mean): 78 (08 Nov 2023 06:00) (76 - 102)  RR: 16 (08 Nov 2023 07:01) (12 - 24)  SpO2: 100% (08 Nov 2023 07:29) (96% - 100%)    O2 Parameters below as of 08 Nov 2023 07:29  Patient On (Oxygen Delivery Method): nasal cannula, high flow  O2 Flow (L/min): 60  O2 Concentration (%): 60        CONSTITUTIONAL:  ill appearing    ENT:   Airway patent,   Mouth with normal mucosa.   [facility]      EYES:   Pupils equal,   Round and reactive to light.    CARDIAC:   Normal rate,   Regular rhythm.    No edema      Vascular:  Normal systolic impulse  No Carotid bruits    RESPIRATORY:   No wheezing  Bilateral BS  Normal chest expansion  Not tachypneic,  No use of accessory muscles    GASTROINTESTINAL:  Abdomen soft,   Non-tender,   No guarding,   + BS    MUSCULOSKELETAL:   Range of motion is not limited,  No clubbing, cyanosis    NEUROLOGICAL:   follows basic commands     11-07-23 @ 07:01  -  11-08-23 @ 07:00  --------------------------------------------------------  IN:    Glucerna: 765 mL  Total IN: 765 mL    OUT:    Incontinent per Collection Bag (mL): 371 mL    Voided (mL): 50 mL  Total OUT: 421 mL    Total NET: 344 mL          LABS:                            9.9    8.62  )-----------( 253      ( 08 Nov 2023 06:09 )             30.9                                               11-07    146  |  105  |  90<HH>  ----------------------------<  217<H>  4.1   |  22  |  3.5<H>    Ca    7.8<L>      07 Nov 2023 15:17  Phos  6.0     11-08  Mg     2.3     11-08    TPro  6.1  /  Alb  2.9<L>  /  TBili  0.3  /  DBili  x   /  AST  15  /  ALT  20  /  AlkPhos  91  11-07                                             Urinalysis Basic - ( 07 Nov 2023 15:17 )    Color: x / Appearance: x / SG: x / pH: x  Gluc: 217 mg/dL / Ketone: x  / Bili: x / Urobili: x   Blood: x / Protein: x / Nitrite: x   Leuk Esterase: x / RBC: x / WBC x   Sq Epi: x / Non Sq Epi: x / Bacteria: x                                                  LIVER FUNCTIONS - ( 07 Nov 2023 11:28 )  Alb: 2.9 g/dL / Pro: 6.1 g/dL / ALK PHOS: 91 U/L / ALT: 20 U/L / AST: 15 U/L / GGT: x                                                                                                                                   ABG - ( 07 Nov 2023 08:33 )  pH, Arterial: 7.46  pH, Blood: x     /  pCO2: 33    /  pO2: 317   / HCO3: 24    / Base Excess: 0.1   /  SaO2: 100.0               MEDICATIONS  (STANDING):  albuterol/ipratropium for Nebulization 3 milliLiter(s) Nebulizer every 6 hours  albuterol/ipratropium for Nebulization. 3 milliLiter(s) Nebulizer once  allopurinol 100 milliGRAM(s) Oral daily  aspirin  chewable 81 milliGRAM(s) Oral daily  buMETAnide Injectable 2 milliGRAM(s) IV Push every 12 hours  carvedilol 25 milliGRAM(s) Oral every 12 hours  chlorhexidine 2% Cloths 1 Application(s) Topical <User Schedule>  citalopram 20 milliGRAM(s) Oral daily  clopidogrel Tablet 75 milliGRAM(s) Oral daily  dextrose 5%. 1000 milliLiter(s) (50 mL/Hr) IV Continuous <Continuous>  dextrose 5%. 1000 milliLiter(s) (100 mL/Hr) IV Continuous <Continuous>  dextrose 50% Injectable 50 milliLiter(s) IV Push every 15 minutes  glucagon  Injectable 1 milliGRAM(s) IntraMuscular once  heparin   Injectable 5000 Unit(s) SubCutaneous every 8 hours  insulin glargine Injectable (LANTUS) 20 Unit(s) SubCutaneous every morning  insulin lispro (ADMELOG) corrective regimen sliding scale   SubCutaneous three times a day before meals  insulin regular Infusion 1 Unit(s)/Hr (1 mL/Hr) IV Continuous <Continuous>  midodrine 10 milliGRAM(s) Oral every 8 hours  pantoprazole  Injectable 40 milliGRAM(s) IV Push daily  polyethylene glycol 3350 17 Gram(s) Oral daily  senna 2 Tablet(s) Oral at bedtime    MEDICATIONS  (PRN):  acetaminophen  Suppository .. 650 milliGRAM(s) Rectal every 6 hours PRN Temp greater or equal to 38C (100.4F)  albuterol    90 MICROgram(s) HFA Inhaler 2 Puff(s) Inhalation every 4 hours PRN Shortness of Breath and/or Wheezing  dextrose Oral Gel 15 Gram(s) Oral once PRN Blood Glucose LESS THAN 70 milliGRAM(s)/deciliter

## 2023-11-08 NOTE — PROGRESS NOTE ADULT - SUBJECTIVE AND OBJECTIVE BOX
Patient seen and evaluated this am, on high flow oxygen      T(F): 99.1 (11-08-23 @ 07:01), Max: 100.2 (11-07-23 @ 23:01)  HR: 60 (11-08-23 @ 06:00)  BP: 110/54 (11-08-23 @ 06:00)  RR: 16 (11-08-23 @ 07:01)  SpO2: 100% (11-08-23 @ 09:06) (96% - 100%)    PHYSICAL EXAM:  GENERAL: NAD  HEAD:  Atraumatic, Normocephalic  EYES: EOMI, PERRLA, conjunctiva and sclera clear  NERVOUS SYSTEM:  Alert & Oriented X3, no focal deficits   CHEST/LUNG:  bilateral rales  HEART: Regular rate and rhythm; No murmurs, rubs, or gallops  ABDOMEN: Soft, Nontender, Nondistended; Bowel sounds present  EXTREMITIES:  2+ Peripheral Pulses, No clubbing, cyanosis, or edema    LABS  11-08    142  |  102  |  106<HH>  ----------------------------<  419<H>  4.1   |  23  |  3.7<H>    Ca    7.6<L>      08 Nov 2023 06:09  Phos  6.0     11-08  Mg     2.3     11-08    TPro  6.1  /  Alb  2.9<L>  /  TBili  0.3  /  DBili  x   /  AST  15  /  ALT  20  /  AlkPhos  91  11-07                          9.9    8.62  )-----------( 253      ( 08 Nov 2023 06:09 )             30.9     Procalcitonin, Serum (11.06.23 @ 09:10)   Procalcitonin, Serum: 13.70      Culture Results:   No growth at 72 Hours (11-04-23)  Culture Results:   No growth at 5 days (10-31-23)  Culture Results:   No growth at 5 days (10-31-23)    RADIOLOGY:      MEDICATIONS  (STANDING):  albuterol/ipratropium for Nebulization 3 milliLiter(s) Nebulizer every 6 hours  albuterol/ipratropium for Nebulization. 3 milliLiter(s) Nebulizer once  allopurinol 100 milliGRAM(s) Oral daily  aspirin  chewable 81 milliGRAM(s) Oral daily  buMETAnide Injectable 2 milliGRAM(s) IV Push every 12 hours  carvedilol 25 milliGRAM(s) Oral every 12 hours  chlorhexidine 2% Cloths 1 Application(s) Topical <User Schedule>  citalopram 20 milliGRAM(s) Oral daily  clopidogrel Tablet 75 milliGRAM(s) Oral daily  heparin   Injectable 5000 Unit(s) SubCutaneous every 8 hours  insulin glargine Injectable (LANTUS) 20 Unit(s) SubCutaneous every morning  insulin lispro (ADMELOG) corrective regimen sliding scale   SubCutaneous three times a day before meals  insulin regular Infusion 1 Unit(s)/Hr (1 mL/Hr) IV Continuous <Continuous>  midodrine 10 milliGRAM(s) Oral every 8 hours  pantoprazole  Injectable 40 milliGRAM(s) IV Push daily  polyethylene glycol 3350 17 Gram(s) Oral daily  senna 2 Tablet(s) Oral at bedtime    MEDICATIONS  (PRN):  acetaminophen  Suppository .. 650 milliGRAM(s) Rectal every 6 hours PRN Temp greater or equal to 38C (100.4F)  albuterol    90 MICROgram(s) HFA Inhaler 2 Puff(s) Inhalation every 4 hours PRN Shortness of Breath and/or Wheezing  dextrose Oral Gel 15 Gram(s) Oral once PRN Blood Glucose LESS THAN 70 milliGRAM(s)/deciliter      Patient seen and evaluated this am, on high flow oxygen      T(F): 99.1 (11-08-23 @ 07:01), Max: 100.2 (11-07-23 @ 23:01)  HR: 60 (11-08-23 @ 06:00)  BP: 110/54 (11-08-23 @ 06:00)  RR: 16 (11-08-23 @ 07:01)  SpO2: 100% (11-08-23 @ 09:06) (96% - 100%)    PHYSICAL EXAM:  GENERAL: NAD  HEAD:  Atraumatic, Normocephalic  EYES: EOMI, PERRLA, conjunctiva and sclera clear  NERVOUS SYSTEM:  Alert & Oriented X3, no focal deficits   CHEST/LUNG:  bilateral rales  HEART: Regular rate and rhythm; No murmurs, rubs, or gallops  ABDOMEN: Soft, Nontender, Nondistended; Bowel sounds present  EXTREMITIES:  2+ Peripheral Pulses, No clubbing, cyanosis, or edema    LABS  11-08    142  |  102  |  106<HH>  ----------------------------<  419<H>  4.1   |  23  |  3.7<H>    Ca    7.6<L>      08 Nov 2023 06:09  Phos  6.0     11-08  Mg     2.3     11-08    TPro  6.1  /  Alb  2.9<L>  /  TBili  0.3  /  DBili  x   /  AST  15  /  ALT  20  /  AlkPhos  91  11-07                          9.9    8.62  )-----------( 253      ( 08 Nov 2023 06:09 )             30.9     Procalcitonin, Serum (11.06.23 @ 09:10)   Procalcitonin, Serum: 13.70      Culture Results:   No growth at 72 Hours (11-04-23)  Culture Results:   No growth at 5 days (10-31-23)  Culture Results:   No growth at 5 days (10-31-23)    RADIOLOGY:  CXR - RLL opacity interpreted by me    MEDICATIONS  (STANDING):  albuterol/ipratropium for Nebulization 3 milliLiter(s) Nebulizer every 6 hours  albuterol/ipratropium for Nebulization. 3 milliLiter(s) Nebulizer once  allopurinol 100 milliGRAM(s) Oral daily  aspirin  chewable 81 milliGRAM(s) Oral daily  buMETAnide Injectable 2 milliGRAM(s) IV Push every 12 hours  carvedilol 25 milliGRAM(s) Oral every 12 hours  chlorhexidine 2% Cloths 1 Application(s) Topical <User Schedule>  citalopram 20 milliGRAM(s) Oral daily  clopidogrel Tablet 75 milliGRAM(s) Oral daily  heparin   Injectable 5000 Unit(s) SubCutaneous every 8 hours  insulin glargine Injectable (LANTUS) 20 Unit(s) SubCutaneous every morning  insulin lispro (ADMELOG) corrective regimen sliding scale   SubCutaneous three times a day before meals  insulin regular Infusion 1 Unit(s)/Hr (1 mL/Hr) IV Continuous <Continuous>  midodrine 10 milliGRAM(s) Oral every 8 hours  pantoprazole  Injectable 40 milliGRAM(s) IV Push daily  polyethylene glycol 3350 17 Gram(s) Oral daily  senna 2 Tablet(s) Oral at bedtime    MEDICATIONS  (PRN):  acetaminophen  Suppository .. 650 milliGRAM(s) Rectal every 6 hours PRN Temp greater or equal to 38C (100.4F)  albuterol    90 MICROgram(s) HFA Inhaler 2 Puff(s) Inhalation every 4 hours PRN Shortness of Breath and/or Wheezing  dextrose Oral Gel 15 Gram(s) Oral once PRN Blood Glucose LESS THAN 70 milliGRAM(s)/deciliter

## 2023-11-08 NOTE — PROGRESS NOTE ADULT - ASSESSMENT
89 yo male PMHx of  DM, CAD-bypass, chronic HFpEF, HTN, HLD, CKD3 presents w sob x few days, no cough fever or chills.  pt placed on NRBM by EMS and subsequently on BiPAP in ED, O2 sat now 99%.  Pt found to have elevated glucose w high AG and b-hydroxybutarate c/w DKA  Hospital course complicated by respiratory failure secondary to suspected aspiration pneumonia, PARKER and initiation of HD     DKA / sepsis - possible aspiration pneumonia / acute respiratory failure / PARKER on CKD3     - continue  sq insulin   - completed  antibiotics course   - procal is elevated - check repeat   - HD as per nephrology   - wean oxygen as tolerated - maintain pox>90%   - ativan prn   - poor prognosis    - I discussed case and plan with pulmonary     DNR / DNI

## 2023-11-08 NOTE — PROGRESS NOTE ADULT - SUBJECTIVE AND OBJECTIVE BOX
Nephrology Progress Note    AMANDA CASTORENA  MRN-[id number]  89y  Male    S:  Patient is seen and examined, events over the last 24h noted.    O:  Allergies:  No Known Allergies    Hospital Medications:   MEDICATIONS  (STANDING):  albuterol/ipratropium for Nebulization 3 milliLiter(s) Nebulizer every 6 hours  albuterol/ipratropium for Nebulization. 3 milliLiter(s) Nebulizer once  allopurinol 100 milliGRAM(s) Oral daily  aspirin  chewable 81 milliGRAM(s) Oral daily  buMETAnide Injectable 2 milliGRAM(s) IV Push every 12 hours  carvedilol 25 milliGRAM(s) Oral every 12 hours  chlorhexidine 2% Cloths 1 Application(s) Topical <User Schedule>  citalopram 20 milliGRAM(s) Oral daily  clopidogrel Tablet 75 milliGRAM(s) Oral daily  dextrose 5%. 1000 milliLiter(s) (50 mL/Hr) IV Continuous <Continuous>  dextrose 5%. 1000 milliLiter(s) (100 mL/Hr) IV Continuous <Continuous>  dextrose 50% Injectable 50 milliLiter(s) IV Push every 15 minutes  glucagon  Injectable 1 milliGRAM(s) IntraMuscular once  heparin   Injectable 5000 Unit(s) SubCutaneous every 8 hours  insulin glargine Injectable (LANTUS) 20 Unit(s) SubCutaneous every morning  insulin lispro (ADMELOG) corrective regimen sliding scale   SubCutaneous three times a day before meals  insulin regular Infusion 1 Unit(s)/Hr (1 mL/Hr) IV Continuous <Continuous>  midodrine 10 milliGRAM(s) Oral every 8 hours  pantoprazole  Injectable 40 milliGRAM(s) IV Push daily  polyethylene glycol 3350 17 Gram(s) Oral daily  senna 2 Tablet(s) Oral at bedtime    MEDICATIONS  (PRN):  acetaminophen  Suppository .. 650 milliGRAM(s) Rectal every 6 hours PRN Temp greater or equal to 38C (100.4F)  albuterol    90 MICROgram(s) HFA Inhaler 2 Puff(s) Inhalation every 4 hours PRN Shortness of Breath and/or Wheezing  dextrose Oral Gel 15 Gram(s) Oral once PRN Blood Glucose LESS THAN 70 milliGRAM(s)/deciliter    Home Medications:  allopurinol 100 mg oral tablet: 1 tab(s) orally once a day (25 Oct 2021 22:30)  citalopram 20 mg oral tablet: 1 tab(s) orally once a day (25 Oct 2021 22:30)  clonazePAM 1 mg oral tablet: 2 tab(s) orally once a day (at bedtime), As Needed (25 Oct 2021 22:30)  gabapentin 300 mg oral capsule: 1 cap(s) orally 2 times a day (25 Oct 2021 22:30)  glimepiride 4 mg oral tablet: 1 tab(s) orally 2 times a day (25 Oct 2021 22:30)  Lantus 100 units/mL subcutaneous solution: 30 unit(s) subcutaneous once a day (at bedtime) (25 Oct 2021 22:30)  NIFEdipine 60 mg oral tablet, extended release: 1 tab(s) orally once a day (25 Oct 2021 22:30)      VITALS:  Daily     Daily Weight in k (2023 07:01)  T(F): 99.1 (23 @ 07:01), Max: 100.2 (23 @ 23:01)  HR: 64 (23 @ 08:00)  BP: 128/58 (23 @ 08:00)  RR: 22 (23 @ 08:00)  SpO2: 100% (23 @ 09:06)  Wt(kg): --  I&O's Detail    2023 07:01  -  2023 07:00  --------------------------------------------------------  IN:    Glucerna: 765 mL  Total IN: 765 mL    OUT:    Incontinent per Collection Bag (mL): 371 mL    Voided (mL): 50 mL  Total OUT: 421 mL    Total NET: 344 mL        I&O's Summary    2023 07:  -  2023 07:00  --------------------------------------------------------  IN: 765 mL / OUT: 421 mL / NET: 344 mL          PHYSICAL EXAM:  Gen: NAD on HFNC, ill apeparing  Chest: b/l breath sounds  Abd: soft  Extremities: no edema  Vascular access: R IJ udall      LABS:  ABG - ( 2023 08:33 )  pH, Arterial: 7.46  pH, Blood: x     /  pCO2: 33    /  pO2: 317   / HCO3: 24    / Base Excess: 0.1   /  SaO2: 100.0   Blood Gas Arterial - Calcium, Ionized: 1.09 mmol/L (23 @ 08:33)        142  |  102  |  106<HH>  ----------------------------<  419<H>  4.1   |  23  |  3.7<H>    Ca    7.6<L>      2023 06:09  Phos  6.0       Mg     2.3         TPro  6.1  /  Alb  2.9<L>  /  TBili  0.3  /  DBili      /  AST  15  /  ALT  20  /  AlkPhos  91      Phosphorus: 6.0 mg/dL (23 @ 06:09)  Phosphorus: 3.2 mg/dL (23 @ 15:38)                            9.9    8.62  )-----------( 253      ( 2023 06:09 )             30.9     Mean Cell Volume: 88.3 fL (23 @ 06:09)      Urine Studies:  Protein, Urine: 30 mg/dL (23 @ 14:00)  White Blood Cell - Urine: 2 /HPF (23 @ 14:00)  Red Blood Cell - Urine: 5 /HPF (23 @ 14:00)    Sodium, Random Urine: <20.0 mmoL/L (10-31 @ 13:24)    Creatinine trend:  Creatinine: 3.7 mg/dL (23 @ 06:09)  Creatinine: 3.5 mg/dL (23 @ 15:17)  Creatinine: 3.4 mg/dL (23 @ 11:28)  Creatinine: 2.3 mg/dL (23 @ 15:38)  Creatinine: 3.4 mg/dL (23 @ 05:18)  Creatinine: 3.1 mg/dL (23 @ 19:45)  Creatinine: 3.0 mg/dL (23 @ 11:00)

## 2023-11-08 NOTE — PROGRESS NOTE ADULT - ASSESSMENT
# Severe PARKER likely due to sepsis. Pt has Screat up to ~ 2.0 as far back as June 2021  # Hyperkalemia, given IV CaGluc and Lokelma, plus Bumex IV resolved  # Urine chemistries c/w pre-renal state / CRS w/ FeNa 0.3%, FeUrea 14%  # HAGMA d/t DKA / renal failure    - started HD on 11/3  - non oliguric  - creat level up-trending since last HD sessions    Recommendations:  - HD today, 3hrs, optiflux dialyzer, 3K+ bath, 2L UF as tolerated   - BP noted;  cont midodrine pre-HD to allow for UF  - consider changing carvedilol to metoprolol for less effect on BP  - resume bumex to maximize urine output  - continue monitoring urine output and daily creatinine level  - cont strict i/o / daily bladder scan  - BG control per ICU protocol  - completed course of cefepime  - phos level noted/ monitor on low phos diet  - hgb 9.9; check iron stores

## 2023-11-09 LAB
ALBUMIN SERPL ELPH-MCNC: 2.8 G/DL — LOW (ref 3.5–5.2)
ALBUMIN SERPL ELPH-MCNC: 2.8 G/DL — LOW (ref 3.5–5.2)
ALP SERPL-CCNC: 91 U/L — SIGNIFICANT CHANGE UP (ref 30–115)
ALP SERPL-CCNC: 91 U/L — SIGNIFICANT CHANGE UP (ref 30–115)
ALT FLD-CCNC: 14 U/L — SIGNIFICANT CHANGE UP (ref 0–41)
ALT FLD-CCNC: 14 U/L — SIGNIFICANT CHANGE UP (ref 0–41)
ANION GAP SERPL CALC-SCNC: 14 MMOL/L — SIGNIFICANT CHANGE UP (ref 7–14)
ANION GAP SERPL CALC-SCNC: 14 MMOL/L — SIGNIFICANT CHANGE UP (ref 7–14)
ANION GAP SERPL CALC-SCNC: 17 MMOL/L — HIGH (ref 7–14)
ANION GAP SERPL CALC-SCNC: 17 MMOL/L — HIGH (ref 7–14)
AST SERPL-CCNC: 13 U/L — SIGNIFICANT CHANGE UP (ref 0–41)
AST SERPL-CCNC: 13 U/L — SIGNIFICANT CHANGE UP (ref 0–41)
BILIRUB SERPL-MCNC: 0.3 MG/DL — SIGNIFICANT CHANGE UP (ref 0.2–1.2)
BILIRUB SERPL-MCNC: 0.3 MG/DL — SIGNIFICANT CHANGE UP (ref 0.2–1.2)
BUN SERPL-MCNC: 68 MG/DL — CRITICAL HIGH (ref 10–20)
BUN SERPL-MCNC: 68 MG/DL — CRITICAL HIGH (ref 10–20)
BUN SERPL-MCNC: 78 MG/DL — CRITICAL HIGH (ref 10–20)
BUN SERPL-MCNC: 78 MG/DL — CRITICAL HIGH (ref 10–20)
CALCIUM SERPL-MCNC: 7.3 MG/DL — LOW (ref 8.4–10.5)
CALCIUM SERPL-MCNC: 7.3 MG/DL — LOW (ref 8.4–10.5)
CALCIUM SERPL-MCNC: 7.8 MG/DL — LOW (ref 8.4–10.5)
CALCIUM SERPL-MCNC: 7.8 MG/DL — LOW (ref 8.4–10.5)
CHLORIDE SERPL-SCNC: 102 MMOL/L — SIGNIFICANT CHANGE UP (ref 98–110)
CHLORIDE SERPL-SCNC: 102 MMOL/L — SIGNIFICANT CHANGE UP (ref 98–110)
CHLORIDE SERPL-SCNC: 98 MMOL/L — SIGNIFICANT CHANGE UP (ref 98–110)
CHLORIDE SERPL-SCNC: 98 MMOL/L — SIGNIFICANT CHANGE UP (ref 98–110)
CO2 SERPL-SCNC: 25 MMOL/L — SIGNIFICANT CHANGE UP (ref 17–32)
CO2 SERPL-SCNC: 25 MMOL/L — SIGNIFICANT CHANGE UP (ref 17–32)
CO2 SERPL-SCNC: 27 MMOL/L — SIGNIFICANT CHANGE UP (ref 17–32)
CO2 SERPL-SCNC: 27 MMOL/L — SIGNIFICANT CHANGE UP (ref 17–32)
CREAT SERPL-MCNC: 2.6 MG/DL — HIGH (ref 0.7–1.5)
CREAT SERPL-MCNC: 2.6 MG/DL — HIGH (ref 0.7–1.5)
CREAT SERPL-MCNC: 2.9 MG/DL — HIGH (ref 0.7–1.5)
CREAT SERPL-MCNC: 2.9 MG/DL — HIGH (ref 0.7–1.5)
EGFR: 20 ML/MIN/1.73M2 — LOW
EGFR: 20 ML/MIN/1.73M2 — LOW
EGFR: 22 ML/MIN/1.73M2 — LOW
EGFR: 22 ML/MIN/1.73M2 — LOW
GLUCOSE BLDC GLUCOMTR-MCNC: 446 MG/DL — HIGH (ref 70–99)
GLUCOSE BLDC GLUCOMTR-MCNC: 446 MG/DL — HIGH (ref 70–99)
GLUCOSE BLDC GLUCOMTR-MCNC: 447 MG/DL — HIGH (ref 70–99)
GLUCOSE BLDC GLUCOMTR-MCNC: 447 MG/DL — HIGH (ref 70–99)
GLUCOSE BLDC GLUCOMTR-MCNC: 463 MG/DL — CRITICAL HIGH (ref 70–99)
GLUCOSE BLDC GLUCOMTR-MCNC: 463 MG/DL — CRITICAL HIGH (ref 70–99)
GLUCOSE BLDC GLUCOMTR-MCNC: 468 MG/DL — CRITICAL HIGH (ref 70–99)
GLUCOSE BLDC GLUCOMTR-MCNC: 468 MG/DL — CRITICAL HIGH (ref 70–99)
GLUCOSE BLDC GLUCOMTR-MCNC: 472 MG/DL — CRITICAL HIGH (ref 70–99)
GLUCOSE BLDC GLUCOMTR-MCNC: 472 MG/DL — CRITICAL HIGH (ref 70–99)
GLUCOSE BLDC GLUCOMTR-MCNC: 526 MG/DL — CRITICAL HIGH (ref 70–99)
GLUCOSE BLDC GLUCOMTR-MCNC: 526 MG/DL — CRITICAL HIGH (ref 70–99)
GLUCOSE BLDC GLUCOMTR-MCNC: 546 MG/DL — CRITICAL HIGH (ref 70–99)
GLUCOSE BLDC GLUCOMTR-MCNC: 546 MG/DL — CRITICAL HIGH (ref 70–99)
GLUCOSE BLDC GLUCOMTR-MCNC: 549 MG/DL — CRITICAL HIGH (ref 70–99)
GLUCOSE BLDC GLUCOMTR-MCNC: 549 MG/DL — CRITICAL HIGH (ref 70–99)
GLUCOSE SERPL-MCNC: 434 MG/DL — HIGH (ref 70–99)
GLUCOSE SERPL-MCNC: 434 MG/DL — HIGH (ref 70–99)
GLUCOSE SERPL-MCNC: 516 MG/DL — CRITICAL HIGH (ref 70–99)
GLUCOSE SERPL-MCNC: 516 MG/DL — CRITICAL HIGH (ref 70–99)
HCT VFR BLD CALC: 31.3 % — LOW (ref 42–52)
HCT VFR BLD CALC: 31.3 % — LOW (ref 42–52)
HGB BLD-MCNC: 9.9 G/DL — LOW (ref 14–18)
HGB BLD-MCNC: 9.9 G/DL — LOW (ref 14–18)
MAGNESIUM SERPL-MCNC: 2.1 MG/DL — SIGNIFICANT CHANGE UP (ref 1.8–2.4)
MAGNESIUM SERPL-MCNC: 2.1 MG/DL — SIGNIFICANT CHANGE UP (ref 1.8–2.4)
MCHC RBC-ENTMCNC: 28.5 PG — SIGNIFICANT CHANGE UP (ref 27–31)
MCHC RBC-ENTMCNC: 28.5 PG — SIGNIFICANT CHANGE UP (ref 27–31)
MCHC RBC-ENTMCNC: 31.6 G/DL — LOW (ref 32–37)
MCHC RBC-ENTMCNC: 31.6 G/DL — LOW (ref 32–37)
MCV RBC AUTO: 90.2 FL — SIGNIFICANT CHANGE UP (ref 80–94)
MCV RBC AUTO: 90.2 FL — SIGNIFICANT CHANGE UP (ref 80–94)
NRBC # BLD: 0 /100 WBCS — SIGNIFICANT CHANGE UP (ref 0–0)
NRBC # BLD: 0 /100 WBCS — SIGNIFICANT CHANGE UP (ref 0–0)
PHOSPHATE SERPL-MCNC: 3.2 MG/DL — SIGNIFICANT CHANGE UP (ref 2.1–4.9)
PHOSPHATE SERPL-MCNC: 3.2 MG/DL — SIGNIFICANT CHANGE UP (ref 2.1–4.9)
PLATELET # BLD AUTO: 236 K/UL — SIGNIFICANT CHANGE UP (ref 130–400)
PLATELET # BLD AUTO: 236 K/UL — SIGNIFICANT CHANGE UP (ref 130–400)
PMV BLD: 11.4 FL — HIGH (ref 7.4–10.4)
PMV BLD: 11.4 FL — HIGH (ref 7.4–10.4)
POTASSIUM SERPL-MCNC: 4.1 MMOL/L — SIGNIFICANT CHANGE UP (ref 3.5–5)
POTASSIUM SERPL-MCNC: 4.1 MMOL/L — SIGNIFICANT CHANGE UP (ref 3.5–5)
POTASSIUM SERPL-MCNC: 4.3 MMOL/L — SIGNIFICANT CHANGE UP (ref 3.5–5)
POTASSIUM SERPL-MCNC: 4.3 MMOL/L — SIGNIFICANT CHANGE UP (ref 3.5–5)
POTASSIUM SERPL-SCNC: 4.1 MMOL/L — SIGNIFICANT CHANGE UP (ref 3.5–5)
POTASSIUM SERPL-SCNC: 4.1 MMOL/L — SIGNIFICANT CHANGE UP (ref 3.5–5)
POTASSIUM SERPL-SCNC: 4.3 MMOL/L — SIGNIFICANT CHANGE UP (ref 3.5–5)
POTASSIUM SERPL-SCNC: 4.3 MMOL/L — SIGNIFICANT CHANGE UP (ref 3.5–5)
PROCALCITONIN SERPL-MCNC: 3.05 NG/ML — HIGH (ref 0.02–0.1)
PROCALCITONIN SERPL-MCNC: 3.05 NG/ML — HIGH (ref 0.02–0.1)
PROT SERPL-MCNC: 5.9 G/DL — LOW (ref 6–8)
PROT SERPL-MCNC: 5.9 G/DL — LOW (ref 6–8)
RBC # BLD: 3.47 M/UL — LOW (ref 4.7–6.1)
RBC # BLD: 3.47 M/UL — LOW (ref 4.7–6.1)
RBC # FLD: 15.8 % — HIGH (ref 11.5–14.5)
RBC # FLD: 15.8 % — HIGH (ref 11.5–14.5)
SODIUM SERPL-SCNC: 140 MMOL/L — SIGNIFICANT CHANGE UP (ref 135–146)
SODIUM SERPL-SCNC: 140 MMOL/L — SIGNIFICANT CHANGE UP (ref 135–146)
SODIUM SERPL-SCNC: 143 MMOL/L — SIGNIFICANT CHANGE UP (ref 135–146)
SODIUM SERPL-SCNC: 143 MMOL/L — SIGNIFICANT CHANGE UP (ref 135–146)
WBC # BLD: 8.52 K/UL — SIGNIFICANT CHANGE UP (ref 4.8–10.8)
WBC # BLD: 8.52 K/UL — SIGNIFICANT CHANGE UP (ref 4.8–10.8)
WBC # FLD AUTO: 8.52 K/UL — SIGNIFICANT CHANGE UP (ref 4.8–10.8)
WBC # FLD AUTO: 8.52 K/UL — SIGNIFICANT CHANGE UP (ref 4.8–10.8)

## 2023-11-09 PROCEDURE — 99233 SBSQ HOSP IP/OBS HIGH 50: CPT

## 2023-11-09 PROCEDURE — 71045 X-RAY EXAM CHEST 1 VIEW: CPT | Mod: 26

## 2023-11-09 PROCEDURE — 99231 SBSQ HOSP IP/OBS SF/LOW 25: CPT

## 2023-11-09 RX ORDER — INSULIN HUMAN 100 [IU]/ML
4 INJECTION, SOLUTION SUBCUTANEOUS
Qty: 100 | Refills: 0 | Status: DISCONTINUED | OUTPATIENT
Start: 2023-11-09 | End: 2023-11-10

## 2023-11-09 RX ORDER — INSULIN LISPRO 100/ML
5 VIAL (ML) SUBCUTANEOUS
Refills: 0 | Status: DISCONTINUED | OUTPATIENT
Start: 2023-11-09 | End: 2023-11-09

## 2023-11-09 RX ORDER — INSULIN HUMAN 100 [IU]/ML
8 INJECTION, SOLUTION SUBCUTANEOUS ONCE
Refills: 0 | Status: COMPLETED | OUTPATIENT
Start: 2023-11-09 | End: 2023-11-09

## 2023-11-09 RX ORDER — INSULIN LISPRO 100/ML
VIAL (ML) SUBCUTANEOUS
Refills: 0 | Status: DISCONTINUED | OUTPATIENT
Start: 2023-11-09 | End: 2023-11-09

## 2023-11-09 RX ADMIN — INSULIN HUMAN 8 UNIT(S): 100 INJECTION, SOLUTION SUBCUTANEOUS at 22:54

## 2023-11-09 RX ADMIN — Medication 3 MILLILITER(S): at 07:55

## 2023-11-09 RX ADMIN — HEPARIN SODIUM 5000 UNIT(S): 5000 INJECTION INTRAVENOUS; SUBCUTANEOUS at 15:29

## 2023-11-09 RX ADMIN — INSULIN HUMAN 4 UNIT(S)/HR: 100 INJECTION, SOLUTION SUBCUTANEOUS at 22:55

## 2023-11-09 RX ADMIN — Medication 3 MILLILITER(S): at 13:12

## 2023-11-09 RX ADMIN — CARVEDILOL PHOSPHATE 25 MILLIGRAM(S): 80 CAPSULE, EXTENDED RELEASE ORAL at 17:59

## 2023-11-09 RX ADMIN — Medication 12: at 08:37

## 2023-11-09 RX ADMIN — POLYETHYLENE GLYCOL 3350 17 GRAM(S): 17 POWDER, FOR SOLUTION ORAL at 11:59

## 2023-11-09 RX ADMIN — Medication 5 UNIT(S): at 11:48

## 2023-11-09 RX ADMIN — INSULIN GLARGINE 20 UNIT(S): 100 INJECTION, SOLUTION SUBCUTANEOUS at 08:26

## 2023-11-09 RX ADMIN — BUMETANIDE 2 MILLIGRAM(S): 0.25 INJECTION INTRAMUSCULAR; INTRAVENOUS at 06:26

## 2023-11-09 RX ADMIN — MIDODRINE HYDROCHLORIDE 10 MILLIGRAM(S): 2.5 TABLET ORAL at 22:02

## 2023-11-09 RX ADMIN — Medication 6: at 11:49

## 2023-11-09 RX ADMIN — CHLORHEXIDINE GLUCONATE 1 APPLICATION(S): 213 SOLUTION TOPICAL at 06:32

## 2023-11-09 RX ADMIN — Medication 3 MILLILITER(S): at 01:07

## 2023-11-09 RX ADMIN — BUMETANIDE 2 MILLIGRAM(S): 0.25 INJECTION INTRAMUSCULAR; INTRAVENOUS at 18:00

## 2023-11-09 RX ADMIN — CARVEDILOL PHOSPHATE 25 MILLIGRAM(S): 80 CAPSULE, EXTENDED RELEASE ORAL at 06:26

## 2023-11-09 RX ADMIN — MIDODRINE HYDROCHLORIDE 10 MILLIGRAM(S): 2.5 TABLET ORAL at 06:26

## 2023-11-09 RX ADMIN — HEPARIN SODIUM 5000 UNIT(S): 5000 INJECTION INTRAVENOUS; SUBCUTANEOUS at 22:03

## 2023-11-09 RX ADMIN — HEPARIN SODIUM 5000 UNIT(S): 5000 INJECTION INTRAVENOUS; SUBCUTANEOUS at 06:26

## 2023-11-09 NOTE — PROGRESS NOTE ADULT - SUBJECTIVE AND OBJECTIVE BOX
AMANDA CASTORENA, 89y, Male; MRN# [id number]  Hospital Stay: 10d.    Patient is a 89y old Male who presents with a chief complaint of sob (09 Nov 2023 07:25)  Currently admitted to the ICU with the primary diagnosis of DKA (diabetic ketoacidosis)      SUBJECTIVE:  Interval/Overnight events:     REVIEW OF SYSTEMS:  As per HPI  OBJECTIVE:  VITALS:  T(F): 99.3 (11-09-23 @ 07:01), Max: 99.3 (11-09-23 @ 07:01)  HR: 69 (11-09-23 @ 06:00) (60 - 73)  BP: 124/65 (11-09-23 @ 06:00) (87/49 - 124/65)  ABP: --  ABP(mean): --  RR: 20 (11-09-23 @ 07:01) (15 - 28)  SpO2: 99% (11-09-23 @ 07:22) (98% - 100%)    Vent Settings    Blood Gas    Drips  dextrose 5%. 1000 milliLiter(s) (50 mL/Hr) IV Continuous <Continuous>  dextrose 5%. 1000 milliLiter(s) (100 mL/Hr) IV Continuous <Continuous>  insulin regular Infusion 1 Unit(s)/Hr (1 mL/Hr) IV Continuous <Continuous>    I&Os:    11-08-23 @ 07:01  -  11-09-23 @ 07:00  --------------------------------------------------------  IN: 1430 mL / OUT: 1675 mL / NET: -245 mL      PHYSICAL EXAM:    GENERAL: NAD  HEAD:  Atraumatic, Normocephalic  EYES: EOMI, PERRLA, conjunctiva and sclera clear on high flow nasal cannula  ENNT: + NG tube, R [place] in place  NERVOUS SYSTEM:  no focal deficits   CHEST/LUNG:  bilateral rhonchi  HEART: Regular rate and rhythm; No murmurs, rubs, or gallops  ABDOMEN: Soft, Nontender, Nondistended; Bowel sounds present  EXTREMITIES:  2+ Peripheral Pulses, No clubbing, cyanosis, or edema    ACTIVE MEDICATIONS:  Standing  albuterol/ipratropium for Nebulization 3 milliLiter(s) Nebulizer every 6 hours  albuterol/ipratropium for Nebulization. 3 milliLiter(s) Nebulizer once  allopurinol 100 milliGRAM(s) Oral daily  aspirin  chewable 81 milliGRAM(s) Oral daily  buMETAnide Injectable 2 milliGRAM(s) IV Push every 12 hours  carvedilol 25 milliGRAM(s) Oral every 12 hours  chlorhexidine 2% Cloths 1 Application(s) Topical <User Schedule>  citalopram 20 milliGRAM(s) Oral daily  clopidogrel Tablet 75 milliGRAM(s) Oral daily  dextrose 5%. 1000 milliLiter(s) (50 mL/Hr) IV Continuous <Continuous>  dextrose 5%. 1000 milliLiter(s) (100 mL/Hr) IV Continuous <Continuous>  dextrose 50% Injectable 50 milliLiter(s) IV Push every 15 minutes  gabapentin 300 milliGRAM(s) Oral daily  glucagon  Injectable 1 milliGRAM(s) IntraMuscular once  heparin   Injectable 5000 Unit(s) SubCutaneous every 8 hours  insulin glargine Injectable (LANTUS) 20 Unit(s) SubCutaneous every morning  insulin lispro (ADMELOG) corrective regimen sliding scale   SubCutaneous three times a day before meals  insulin regular Infusion 1 Unit(s)/Hr (1 mL/Hr) IV Continuous <Continuous>  midodrine 10 milliGRAM(s) Oral every 8 hours  pantoprazole  Injectable 40 milliGRAM(s) IV Push daily  polyethylene glycol 3350 17 Gram(s) Oral daily  senna 2 Tablet(s) Oral at bedtime    PRN  acetaminophen  Suppository .. 650 milliGRAM(s) Rectal every 6 hours PRN  albuterol    90 MICROgram(s) HFA Inhaler 2 Puff(s) Inhalation every 4 hours PRN  dextrose Oral Gel 15 Gram(s) Oral once PRN    LABS:  11-09-23 @ 05:18  WBC 8.52, H/H 9.9<L>/31.3<L>, plt 236  Na --, K --, Cl --, CO2 --, BUN --, Cr --, Glu --    Ca --, Mg --, Phosphorus --    Tot Protein --, Alb --, T. Bili --, DSue Bili --  AST --, ALT --, Alk phos --    11-08-23 @ 18:17  WBC --, H/H --/--, plt --  Na 144, K 3.7, Cl 105, CO2 24, BUN 44<H>, Cr 1.8<H>, Glu 195<H>    Ca 7.8<L>, Mg 2.1, Phosphorus --    Tot Protein --, Alb --, T. Bili --, D. Bili --  AST --, ALT --, Alk phos --        11-06-23 @ 09:10:  CRP --, ESR --, Procal 13.70<H>, Ferritin --, Fungitell --, D-dimer --      11-03-23 @ 05:43:  Hgb A1c 9.1<H>% | Mean plasma glucose 214<H>      10-30-23 @ 19:20:  Troponin T 0.24<HH>, BNP --          CULTURES:    Culture - Blood (collected 11-04-23 @ 15:54)  Source: .Blood None  Preliminary Report (11-09-23 @ 01:01):    No growth at 4 days    Culture - Blood (collected 10-31-23 @ 02:26)  Source: .Blood Blood-Peripheral  Final Report (11-05-23 @ 23:01):    No growth at 5 days    Culture - Blood (collected 10-31-23 @ 02:26)  Source: .Blood Blood-Peripheral  Final Report (11-05-23 @ 23:01):    No growth at 5 days      PENDING:  Basic Metabolic Panel: AM Sched. Collection: 09-Nov-2023 04:30 (11-09-23 @ 00:07)  Procalcitonin, Serum: AM Sched. Collection: 09-Nov-2023 04:30 (11-08-23 @ 09:58)    IMAGING:  Latest imaging reviewed.    ECHO:  TTE Echo Complete w/o Contrast w/ Doppler:   Transport: Portable  Monitor: w/ Monitor  Provider's Contact #: [phone] (10-31-23 @ 01:06)   AMANDA CASTORENA, 89y, Male; MRN# [id number]  Hospital Stay: 10d.    Patient is a 89y old Male who presents with a chief complaint of sob (09 Nov 2023 07:25)  Currently admitted to the ICU with the primary diagnosis of DKA (diabetic ketoacidosis)      SUBJECTIVE:  Interval/Overnight events:     Pt reports feeling better, off of high flow, now only on nasal cannula.      REVIEW OF SYSTEMS:  As per HPI      OBJECTIVE:  VITALS:  T(F): 99.3 (11-09-23 @ 07:01), Max: 99.3 (11-09-23 @ 07:01)  HR: 69 (11-09-23 @ 06:00) (60 - 73)  BP: 124/65 (11-09-23 @ 06:00) (87/49 - 124/65)  ABP: --  ABP(mean): --  RR: 20 (11-09-23 @ 07:01) (15 - 28)  SpO2: 99% (11-09-23 @ 07:22) (98% - 100%)    Vent Settings    Blood Gas    Drips  dextrose 5%. 1000 milliLiter(s) (50 mL/Hr) IV Continuous <Continuous>  dextrose 5%. 1000 milliLiter(s) (100 mL/Hr) IV Continuous <Continuous>  insulin regular Infusion 1 Unit(s)/Hr (1 mL/Hr) IV Continuous <Continuous>    I&Os:    11-08-23 @ 07:01  -  11-09-23 @ 07:00  --------------------------------------------------------  IN: 1430 mL / OUT: 1675 mL / NET: -245 mL      PHYSICAL EXAM:    GENERAL: NAD  HEAD:  Atraumatic, Normocephalic  EYES: EOMI, PERRLA, conjunctiva and sclera clear on high flow nasal cannula  ENNT: + NG tube, R [place] in place  NERVOUS SYSTEM:  no focal deficits   CHEST/LUNG:  bilateral rhonchi  HEART: Regular rate and rhythm; No murmurs, rubs, or gallops  ABDOMEN: Soft, Nontender, Nondistended; Bowel sounds present  EXTREMITIES:  2+ Peripheral Pulses, No clubbing, cyanosis, or edema    ACTIVE MEDICATIONS:  Standing  albuterol/ipratropium for Nebulization 3 milliLiter(s) Nebulizer every 6 hours  albuterol/ipratropium for Nebulization. 3 milliLiter(s) Nebulizer once  allopurinol 100 milliGRAM(s) Oral daily  aspirin  chewable 81 milliGRAM(s) Oral daily  buMETAnide Injectable 2 milliGRAM(s) IV Push every 12 hours  carvedilol 25 milliGRAM(s) Oral every 12 hours  chlorhexidine 2% Cloths 1 Application(s) Topical <User Schedule>  citalopram 20 milliGRAM(s) Oral daily  clopidogrel Tablet 75 milliGRAM(s) Oral daily  dextrose 5%. 1000 milliLiter(s) (50 mL/Hr) IV Continuous <Continuous>  dextrose 5%. 1000 milliLiter(s) (100 mL/Hr) IV Continuous <Continuous>  dextrose 50% Injectable 50 milliLiter(s) IV Push every 15 minutes  gabapentin 300 milliGRAM(s) Oral daily  glucagon  Injectable 1 milliGRAM(s) IntraMuscular once  heparin   Injectable 5000 Unit(s) SubCutaneous every 8 hours  insulin glargine Injectable (LANTUS) 20 Unit(s) SubCutaneous every morning  insulin lispro (ADMELOG) corrective regimen sliding scale   SubCutaneous three times a day before meals  insulin regular Infusion 1 Unit(s)/Hr (1 mL/Hr) IV Continuous <Continuous>  midodrine 10 milliGRAM(s) Oral every 8 hours  pantoprazole  Injectable 40 milliGRAM(s) IV Push daily  polyethylene glycol 3350 17 Gram(s) Oral daily  senna 2 Tablet(s) Oral at bedtime    PRN  acetaminophen  Suppository .. 650 milliGRAM(s) Rectal every 6 hours PRN  albuterol    90 MICROgram(s) HFA Inhaler 2 Puff(s) Inhalation every 4 hours PRN  dextrose Oral Gel 15 Gram(s) Oral once PRN    LABS:  11-09-23 @ 05:18  WBC 8.52, H/H 9.9<L>/31.3<L>, plt 236  Na --, K --, Cl --, CO2 --, BUN --, Cr --, Glu --    Ca --, Mg --, Phosphorus --    Tot Protein --, Alb --, T. Bili --, D. Bili --  AST --, ALT --, Alk phos --    11-08-23 @ 18:17  WBC --, H/H --/--, plt --  Na 144, K 3.7, Cl 105, CO2 24, BUN 44<H>, Cr 1.8<H>, Glu 195<H>    Ca 7.8<L>, Mg 2.1, Phosphorus --    Tot Protein --, Alb --, T. Bili --, D. Bili --  AST --, ALT --, Alk phos --        11-06-23 @ 09:10:  CRP --, ESR --, Procal 13.70<H>, Ferritin --, Fungitell --, D-dimer --      11-03-23 @ 05:43:  Hgb A1c 9.1<H>% | Mean plasma glucose 214<H>      10-30-23 @ 19:20:  Troponin T 0.24<HH>, BNP --          CULTURES:    Culture - Blood (collected 11-04-23 @ 15:54)  Source: .Blood None  Preliminary Report (11-09-23 @ 01:01):    No growth at 4 days    Culture - Blood (collected 10-31-23 @ 02:26)  Source: .Blood Blood-Peripheral  Final Report (11-05-23 @ 23:01):    No growth at 5 days    Culture - Blood (collected 10-31-23 @ 02:26)  Source: .Blood Blood-Peripheral  Final Report (11-05-23 @ 23:01):    No growth at 5 days      PENDING:  Basic Metabolic Panel: AM Sched. Collection: 09-Nov-2023 04:30 (11-09-23 @ 00:07)  Procalcitonin, Serum: AM Sched. Collection: 09-Nov-2023 04:30 (11-08-23 @ 09:58)    IMAGING:  Latest imaging reviewed.    ECHO:  TTE Echo Complete w/o Contrast w/ Doppler:   Transport: Portable  Monitor: w/ Monitor  Provider's Contact #: [phone] (10-31-23 @ 01:06)

## 2023-11-09 NOTE — PROGRESS NOTE ADULT - ASSESSMENT
# poorly controlled type 2 DM   - A1c 9.1%   - POC reviewed  - increase Lantus to 30 units daily   - increase lispro to 8 units TIDAC and change sliding scale to 2: 50 > 150 TIDAC   - will need basal bolus with Lantus and lispro on discharge and STOP glimepiride

## 2023-11-09 NOTE — PROGRESS NOTE ADULT - ASSESSMENT
89y Male  s/p     NEURO:  #Acute pain    -APAP prn    -Gabapentin 300mg BID (renally dose)  #Hx anxiety  Continue home medications:  -citalopram 20mg QD  -clonazepam 2mg prn at bedtime     RESP:   #DNI  #Oxygenation  comfortable on HFNC overnight  Alternate BIPAP PRN and overnight with daytime HFNC. Wean down Fio2. Chest PT and frequent suctioning.  repeat mrsa pending      CARDS:   #Hx HFPEF  -Keep negative balance as tolerated. BNP markedly elevated. HFPEF on echo. LVEF 61%  -continue bumex  -replete lytes  -on midodrine 10 q8 started by nephro 11/5      GI/NUTR:   #failed S/S--Feeds via NG tube  -reconsulting Nutrition, last saw on 11/1  #GI Prophylaxis   -continue pantoprazole  #Bowel regimen    -continue senna    /RENAL:   # Severe PARKER likely due to sepsis. Pt has Screat up to ~ 2.0 as far back as June 2021  # Hyperkalemia, given IV CaGluc and Lokelma, plus Bumex IV resolved  # Urine chemistries c/w pre-renal state / CRS w/ FeNa 0.3%, FeUrea 14%  -Nephro following R udall in place, last dialyzed 11/8    Labs          Electrolytes-Na 148 // K 4.2 // Mg 2.2 //  Phos 4.0 (11-06 @ 05:18)         HEME/ONC:   #DVT prophylaxis     -SCDs     -hep subQ    - continue home ASN, Plavix       Labs: Hb/Hct:  9.0/27.0  (11-05 @ 08:00)  -->,  9.9/30.6  (11-06 @ 05:18)  -->                      Plts:  240  -->,  273  -->                 PTT/INR:           ID:  WBC- 8.20  --->>,  6.92  --->>,  6.97  --->>  Temp trend- 24hrs T(F): 99 (11-06 @ 09:44), Max: 99.7 (11-06 @ 01:00)  Current antibiotics-cefepime   IVPB 1000 every 24 hours, per ID  last day today    ENDO:  #Hx DM  #DKA  -on insulin sliding scale  -FS elevated to 300+ plan to increase insulin regimen, recall endocrine for clarification on recommended home regimen  -FSG q6 if NPO or Tube feeds    -Glucose goal 140-180. if above 180 start ISS    ADVANCED DIRECTIVES: DNR/DNI            DISPO:   ICU       89y Male  s/p     NEURO:  #Acute pain    -APAP prn    -Gabapentin 300mg BID (renally dose)  #Hx anxiety  Continue home medications:  -citalopram 20mg QD  -clonazepam 2mg prn at bedtime     RESP:   #DNI  #Oxygenation  Improved respiratory status, now comfortable on nasal cannula  Alternate BIPAP PRN and overnight with daytime HFNC. Wean down Fio2. Chest PT and frequent suctioning.  repeat mrsa pending      CARDS:   #Hx HFPEF  -Keep negative balance as tolerated. BNP markedly elevated. HFPEF on echo. LVEF 61%  -continue bumex  -replete lytes  -on midodrine 10 q8 started by nephro 11/5      GI/NUTR:   #failed S/S--Feeds via NG tube  -reconsulting Nutrition, last saw on 11/1  #GI Prophylaxis   -continue pantoprazole  #Bowel regimen    -continue senna    /RENAL:   # Severe PARKER likely due to sepsis. Pt has Screat up to ~ 2.0 as far back as June 2021  # Hyperkalemia, given IV CaGluc and Lokelma, plus Bumex IV resolved  # Urine chemistries c/w pre-renal state / CRS w/ FeNa 0.3%, FeUrea 14%  -Nephro following R josepall in place, last dialyzed 11/8    Labs          Electrolytes-Na 148 // K 4.2 // Mg 2.2 //  Phos 4.0 (11-06 @ 05:18)         HEME/ONC:   #DVT prophylaxis     -SCDs     -hep subQ    - continue home ASN, Plavix       Labs: Hb/Hct:  9.0/27.0  (11-05 @ 08:00)  -->,  9.9/30.6  (11-06 @ 05:18)  -->                      Plts:  240  -->,  273  -->                 PTT/INR:           ID:  WBC- 8.20  --->>,  6.92  --->>,  6.97  --->>  Temp trend- 24hrs T(F): 99 (11-06 @ 09:44), Max: 99.7 (11-06 @ 01:00)  Current antibiotics-cefepime   IVPB 1000 every 24 hours, per ID  last day today    ENDO:  #Hx DM  #DKA  -FS elevated to 300+ increase insulin regimen, recall endocrine for clarification on recommended home regimen  -Insulin increase tomorrow to : Lantus 25, Lispro 5 TID, plus moderate sliding scale  -Discussed feeds with nutrition.   -FSG q6 if NPO or Tube feeds      ADVANCED DIRECTIVES: DNR/DNI  Per last discussion between family and palliative:  Patient's son noted the patient noted he would never want to be on dialysis or a feeding term long term even though he has both temporarily now, so it's something he would have to consider and discuss with his sister.         DISPO:   ICU

## 2023-11-09 NOTE — PROGRESS NOTE ADULT - ASSESSMENT
IMPRESSION:    ARF secondary to pulmonary edema fluid overload   Likely right aspiration pneumonia    DKA   alter mental status   sepsis fever   PARKER   metabolc acidosis     PLAN:    CNS: neurology follow. No seizure on EEG.     HEENT: oral care     PULMONARY: Aletrnate BIPAP PRN and overnight with daytime HHFNC. Wean down Fio2. Chest PT and frequent suctioning.      CARDIOVASCULAR: Hold diuresis. BNP markedly elevated. HFPEF on echo.     GI: GI ppx. NGT feeding as tolerated Speech and swallow re-evaluation.     RENAL: Start PO bicarb 650 TID. HD per nephrology Keep negative balance as tolerated.     INFECTIOUS DISEASE:   On cefepime per ID. repeat MRSA , Procal     HEMATOLOGICAL:  DVT prophylaxis. Keep hg more than 7.     ENDOCRINE:  Insulin SQ protocol. NGT for feeding. Lispro Lantus when starts oral diet.     MUSCULOSKELETAL: PT OT.     CODE STATUS: DNI DNR. Pall care follow up.     Keep in ICU    IMPRESSION:    ARF secondary to pulmonary edema fluid overload   Likely right aspiration pneumonia    DKA   alter mental status   sepsis fever   PARKER   metabolc acidosis     PLAN:    CNS: neurology follow. No seizure on EEG.     HEENT: oral care     PULMONARY: Aletrnate BIPAP PRN and overnight with daytime HHFNC. Wean down Fio2. Chest PT and frequent suctioning.      CARDIOVASCULAR: Hold diuresis. BNP markedly elevated. HFPEF on echo.     GI: GI ppx. NGT feeding as tolerated Speech and swallow re-evaluation.     RENAL: Start PO bicarb 650 TID. HD per nephrology Keep negative balance as tolerated.     INFECTIOUS DISEASE:   On cefepime per ID. repeat MRSA , Procal     HEMATOLOGICAL:  DVT prophylaxis. Keep hg more than 7.     ENDOCRINE:  Insulin SQ protocol. NGT for feeding. Lispro Lantus when starts oral diet.     MUSCULOSKELETAL: PT OT.     CODE STATUS: DNI DNR. Pall care follow up.     Floor    IMPRESSION:    ARF secondary to pulmonary edema fluid overload   Likely right aspiration pneumonia    DKA   alter mental status   sepsis fever   PARKER   metabolc acidosis     PLAN:    CNS: neurology follow. No seizure on EEG.     HEENT: oral care     PULMONARY: trial off BIPAP and HHFNC. Wean down Fio2. Chest PT and frequent suctioning.      CARDIOVASCULAR: Hold diuresis. BNP markedly elevated. HFPEF on echo.     GI: GI ppx. NGT feeding as tolerated Speech and swallow re-evaluation.     RENAL: Start PO bicarb 650 TID. HD per nephrology Keep negative balance as tolerated.     INFECTIOUS DISEASE:   On cefepime per ID. repeat MRSA , Procal     HEMATOLOGICAL:  DVT prophylaxis. Keep hg more than 7.     ENDOCRINE:  Insulin SQ protocol. NGT for feeding. Lispro Lantus when starts oral diet.     MUSCULOSKELETAL: PT OT.     CODE STATUS: DNI DNR. Pall care follow up.     Floor

## 2023-11-09 NOTE — PROGRESS NOTE ADULT - SUBJECTIVE AND OBJECTIVE BOX
Nephrology Progress Note    AMANDA CASTORENA  MRN-[id number]  89y  Male    S:  Patient is seen and examined, events over the last 24h noted.    O:  Allergies:  No Known Allergies    Hospital Medications:   MEDICATIONS  (STANDING):  albuterol/ipratropium for Nebulization 3 milliLiter(s) Nebulizer every 6 hours  albuterol/ipratropium for Nebulization. 3 milliLiter(s) Nebulizer once  allopurinol 100 milliGRAM(s) Oral daily  aspirin  chewable 81 milliGRAM(s) Oral daily  buMETAnide Injectable 2 milliGRAM(s) IV Push every 12 hours  carvedilol 25 milliGRAM(s) Oral every 12 hours  chlorhexidine 2% Cloths 1 Application(s) Topical <User Schedule>  citalopram 20 milliGRAM(s) Oral daily  clopidogrel Tablet 75 milliGRAM(s) Oral daily  dextrose 5%. 1000 milliLiter(s) (100 mL/Hr) IV Continuous <Continuous>  dextrose 5%. 1000 milliLiter(s) (50 mL/Hr) IV Continuous <Continuous>  dextrose 50% Injectable 50 milliLiter(s) IV Push every 15 minutes  gabapentin 300 milliGRAM(s) Oral daily  glucagon  Injectable 1 milliGRAM(s) IntraMuscular once  heparin   Injectable 5000 Unit(s) SubCutaneous every 8 hours  insulin lispro (ADMELOG) corrective regimen sliding scale   SubCutaneous three times a day before meals  insulin lispro Injectable (ADMELOG) 5 Unit(s) SubCutaneous before dinner  insulin lispro Injectable (ADMELOG) 5 Unit(s) SubCutaneous before lunch  midodrine 10 milliGRAM(s) Oral every 8 hours  pantoprazole  Injectable 40 milliGRAM(s) IV Push daily  polyethylene glycol 3350 17 Gram(s) Oral daily  senna 2 Tablet(s) Oral at bedtime    MEDICATIONS  (PRN):  acetaminophen  Suppository .. 650 milliGRAM(s) Rectal every 6 hours PRN Temp greater or equal to 38C (100.4F)  albuterol    90 MICROgram(s) HFA Inhaler 2 Puff(s) Inhalation every 4 hours PRN Shortness of Breath and/or Wheezing  dextrose Oral Gel 15 Gram(s) Oral once PRN Blood Glucose LESS THAN 70 milliGRAM(s)/deciliter    Home Medications:  allopurinol 100 mg oral tablet: 1 tab(s) orally once a day (25 Oct 2021 22:30)  citalopram 20 mg oral tablet: 1 tab(s) orally once a day (25 Oct 2021 22:30)  clonazePAM 1 mg oral tablet: 2 tab(s) orally once a day (at bedtime), As Needed (25 Oct 2021 22:30)  gabapentin 300 mg oral capsule: 1 cap(s) orally 2 times a day (25 Oct 2021 22:30)  glimepiride 4 mg oral tablet: 1 tab(s) orally 2 times a day (25 Oct 2021 22:30)  Lantus 100 units/mL subcutaneous solution: 30 unit(s) subcutaneous once a day (at bedtime) (25 Oct 2021 22:30)  NIFEdipine 60 mg oral tablet, extended release: 1 tab(s) orally once a day (25 Oct 2021 22:30)      VITALS:  Daily Weight in k (2023 07:01)  T(F): 99.3 (23 @ 07:01), Max: 99.3 (23 @ 07:01)  HR: 70 (23 @ 09:00)  BP: 130/64 (23 @ 09:00)  RR: 20 (23 @ 09:00)  SpO2: 100% (23 @ 09:08)  Wt(kg): --  I&O's Detail    2023 07:01  -  2023 07:00  --------------------------------------------------------  IN:    Enteral Tube Flush: 350 mL    Glucerna: 1080 mL  Total IN: 1430 mL    OUT:    Incontinent per Collection Bag (mL): 175 mL    Other (mL): 1500 mL  Total OUT: 1675 mL    Total NET: -245 mL      2023 07:01  -  2023 14:10  --------------------------------------------------------  IN:    Enteral Tube Flush: 150 mL    Glucerna: 360 mL  Total IN: 510 mL    OUT:    Incontinent per Collection Bag (mL): 40 mL  Total OUT: 40 mL    Total NET: 470 mL        I&O's Summary    2023 07:  -  2023 07:00  --------------------------------------------------------  IN: 1430 mL / OUT: 1675 mL / NET: -245 mL    2023 07:01  -  2023 14:10  --------------------------------------------------------  IN: 510 mL / OUT: 40 mL / NET: 470 mL          PHYSICAL EXAM:  Gen: NAD  Chest: b/l breath sounds  Abd: soft  Extremities: no edema  Vascular access: udall      LABS:        140  |  98  |  68<HH>  ----------------------------<  516<HH>  4.3   |  25  |  2.6<H>    Ca    7.3<L>      2023 05:18  Phos  6.0       Mg     2.1         Phosphorus: 6.0 mg/dL (23 @ 06:09)  Phosphorus: 3.2 mg/dL (23 @ 15:38)                            9.9    8.52  )-----------( 236      ( 2023 05:18 )             31.3     Mean Cell Volume: 90.2 fL (23 @ 05:18)        Creatinine trend:  Creatinine: 2.6 mg/dL (23 @ 05:18)  Creatinine: 1.8 mg/dL (23 @ 18:17)  Creatinine: 3.7 mg/dL (23 @ 06:09)  Creatinine: 3.5 mg/dL (23 @ 15:17)  Creatinine: 3.4 mg/dL (23 @ 11:28)  Creatinine: 2.3 mg/dL (23 @ 15:38) Nephrology Progress Note    AMANDA CASTORENA  MRN-[id number]  89y  Male    S:  Patient is seen and examined, events over the last 24h noted.    O:  Allergies:  No Known Allergies    Hospital Medications:   MEDICATIONS  (STANDING):  albuterol/ipratropium for Nebulization 3 milliLiter(s) Nebulizer every 6 hours  albuterol/ipratropium for Nebulization. 3 milliLiter(s) Nebulizer once  allopurinol 100 milliGRAM(s) Oral daily  aspirin  chewable 81 milliGRAM(s) Oral daily  buMETAnide Injectable 2 milliGRAM(s) IV Push every 12 hours  carvedilol 25 milliGRAM(s) Oral every 12 hours  chlorhexidine 2% Cloths 1 Application(s) Topical <User Schedule>  citalopram 20 milliGRAM(s) Oral daily  clopidogrel Tablet 75 milliGRAM(s) Oral daily  gabapentin 300 milliGRAM(s) Oral daily  glucagon  Injectable 1 milliGRAM(s) IntraMuscular once  heparin   Injectable 5000 Unit(s) SubCutaneous every 8 hours  insulin lispro (ADMELOG) corrective regimen sliding scale   SubCutaneous three times a day before meals  insulin lispro Injectable (ADMELOG) 5 Unit(s) SubCutaneous before dinner  insulin lispro Injectable (ADMELOG) 5 Unit(s) SubCutaneous before lunch  midodrine 10 milliGRAM(s) Oral every 8 hours  pantoprazole  Injectable 40 milliGRAM(s) IV Push daily  polyethylene glycol 3350 17 Gram(s) Oral daily  senna 2 Tablet(s) Oral at bedtime    MEDICATIONS  (PRN):  acetaminophen  Suppository .. 650 milliGRAM(s) Rectal every 6 hours PRN Temp greater or equal to 38C (100.4F)  albuterol    90 MICROgram(s) HFA Inhaler 2 Puff(s) Inhalation every 4 hours PRN Shortness of Breath and/or Wheezing  dextrose Oral Gel 15 Gram(s) Oral once PRN Blood Glucose LESS THAN 70 milliGRAM(s)/deciliter    Home Medications:  allopurinol 100 mg oral tablet: 1 tab(s) orally once a day (25 Oct 2021 22:30)  citalopram 20 mg oral tablet: 1 tab(s) orally once a day (25 Oct 2021 22:30)  clonazePAM 1 mg oral tablet: 2 tab(s) orally once a day (at bedtime), As Needed (25 Oct 2021 22:30)  gabapentin 300 mg oral capsule: 1 cap(s) orally 2 times a day (25 Oct 2021 22:30)  glimepiride 4 mg oral tablet: 1 tab(s) orally 2 times a day (25 Oct 2021 22:30)  Lantus 100 units/mL subcutaneous solution: 30 unit(s) subcutaneous once a day (at bedtime) (25 Oct 2021 22:30)  NIFEdipine 60 mg oral tablet, extended release: 1 tab(s) orally once a day (25 Oct 2021 22:30)      VITALS:  Daily Weight in k (2023 07:01)  T(F): 99.3 (23 @ 07:01), Max: 99.3 (23 @ 07:01)  HR: 70 (23 @ 09:00)  BP: 130/64 (23 @ 09:00)  RR: 20 (23 @ 09:00)  SpO2: 100% (23 @ 09:08)  Wt(kg): --  I&O's Detail    2023 07:01  -  2023 07:00  --------------------------------------------------------  IN:    Enteral Tube Flush: 350 mL    Glucerna: 1080 mL  Total IN: 1430 mL    OUT:    Incontinent per Collection Bag (mL): 175 mL    Other (mL): 1500 mL  Total OUT: 1675 mL    Total NET: -245 mL      2023 07:  -  2023 14:10  --------------------------------------------------------  IN:    Enteral Tube Flush: 150 mL    Glucerna: 360 mL  Total IN: 510 mL    OUT:    Incontinent per Collection Bag (mL): 40 mL  Total OUT: 40 mL    Total NET: 470 mL        I&O's Summary    2023 07:01  -  2023 07:00  --------------------------------------------------------  IN: 1430 mL / OUT: 1675 mL / NET: -245 mL    2023 07:01  -  2023 14:10  --------------------------------------------------------  IN: 510 mL / OUT: 40 mL / NET: 470 mL          PHYSICAL EXAM:  Gen: NAD  Chest: b/l breath sounds  Abd: soft  Extremities: no edema  Vascular access: udall      LABS:        140  |  98  |  68<HH>  ----------------------------<  516<HH>  4.3   |  25  |  2.6<H>    Ca    7.3<L>      2023 05:18  Phos  6.0       Mg     2.1         Phosphorus: 6.0 mg/dL (23 @ 06:09)  Phosphorus: 3.2 mg/dL (23 @ 15:38)                            9.9    8.52  )-----------( 236      ( 2023 05:18 )             31.3     Mean Cell Volume: 90.2 fL (23 @ 05:18)        Creatinine trend:  Creatinine: 2.6 mg/dL (23 @ 05:18)  Creatinine: 1.8 mg/dL (23 @ 18:17)  Creatinine: 3.7 mg/dL (23 @ 06:09)  Creatinine: 3.5 mg/dL (23 @ 15:17)  Creatinine: 3.4 mg/dL (23 @ 11:28)  Creatinine: 2.3 mg/dL (23 @ 15:38)

## 2023-11-09 NOTE — PROGRESS NOTE ADULT - SUBJECTIVE AND OBJECTIVE BOX
Patient is a 89y old  Male who presents with a chief complaint of sob (08 Nov 2023 12:20)        Over Night Events: remains on [facility] 40/40         ROS:     All ROS are negative except HPI         PHYSICAL EXAM    ICU Vital Signs Last 24 Hrs  T(C): 37.4 (09 Nov 2023 07:01), Max: 37.4 (09 Nov 2023 07:01)  T(F): 99.3 (09 Nov 2023 07:01), Max: 99.3 (09 Nov 2023 07:01)  HR: 69 (09 Nov 2023 06:00) (60 - 73)  BP: 124/65 (09 Nov 2023 06:00) (87/49 - 128/58)  BP(mean): 88 (09 Nov 2023 06:00) (66 - 88)  ABP: --  ABP(mean): --  RR: 20 (09 Nov 2023 07:01) (15 - 28)  SpO2: 99% (09 Nov 2023 07:22) (98% - 100%)    O2 Parameters below as of 09 Nov 2023 07:22  Patient On (Oxygen Delivery Method): nasal cannula, high flow  O2 Flow (L/min): 40  O2 Concentration (%): 40        CONSTITUTIONAL:  confused     ENT:   Airway patent,   Mouth with normal mucosa.   on [facility]     EYES:   Pupils equal,   Round and reactive to light.    CARDIAC:   Normal rate,   Regular rhythm.    No edema       RESPIRATORY:   No wheezing  Bilateral BS  Normal chest expansion  Not tachypneic,  No use of accessory muscles    GASTROINTESTINAL:  Abdomen soft,   Non-tender,   No guarding,   + BS    MUSCULOSKELETAL:   Range of motion is not limited,  No clubbing, cyanosis    NEUROLOGICAL:   confused               11-08-23 @ 07:01  -  11-09-23 @ 07:00  --------------------------------------------------------  IN:    Enteral Tube Flush: 350 mL    Glucerna: 1080 mL  Total IN: 1430 mL    OUT:    Incontinent per Collection Bag (mL): 175 mL    Other (mL): 1500 mL  Total OUT: 1675 mL    Total NET: -245 mL          LABS:                            9.9    8.52  )-----------( 236      ( 09 Nov 2023 05:18 )             31.3                                               11-08    144  |  105  |  44<H>  ----------------------------<  195<H>  3.7   |  24  |  1.8<H>    Ca    7.8<L>      08 Nov 2023 18:17  Phos  6.0     11-08  Mg     2.1     11-08    TPro  6.1  /  Alb  2.9<L>  /  TBili  0.3  /  DBili  x   /  AST  15  /  ALT  20  /  AlkPhos  91  11-07                                             Urinalysis Basic - ( 08 Nov 2023 18:17 )    Color: x / Appearance: x / SG: x / pH: x  Gluc: 195 mg/dL / Ketone: x  / Bili: x / Urobili: x   Blood: x / Protein: x / Nitrite: x   Leuk Esterase: x / RBC: x / WBC x   Sq Epi: x / Non Sq Epi: x / Bacteria: x                                                  LIVER FUNCTIONS - ( 07 Nov 2023 11:28 )  Alb: 2.9 g/dL / Pro: 6.1 g/dL / ALK PHOS: 91 U/L / ALT: 20 U/L / AST: 15 U/L / GGT: x                                                                                                                                   ABG - ( 07 Nov 2023 08:33 )  pH, Arterial: 7.46  pH, Blood: x     /  pCO2: 33    /  pO2: 317   / HCO3: 24    / Base Excess: 0.1   /  SaO2: 100.0               MEDICATIONS  (STANDING):  albuterol/ipratropium for Nebulization 3 milliLiter(s) Nebulizer every 6 hours  albuterol/ipratropium for Nebulization. 3 milliLiter(s) Nebulizer once  allopurinol 100 milliGRAM(s) Oral daily  aspirin  chewable 81 milliGRAM(s) Oral daily  buMETAnide Injectable 2 milliGRAM(s) IV Push every 12 hours  carvedilol 25 milliGRAM(s) Oral every 12 hours  chlorhexidine 2% Cloths 1 Application(s) Topical <User Schedule>  citalopram 20 milliGRAM(s) Oral daily  clopidogrel Tablet 75 milliGRAM(s) Oral daily  dextrose 5%. 1000 milliLiter(s) (100 mL/Hr) IV Continuous <Continuous>  dextrose 5%. 1000 milliLiter(s) (50 mL/Hr) IV Continuous <Continuous>  dextrose 50% Injectable 50 milliLiter(s) IV Push every 15 minutes  gabapentin 300 milliGRAM(s) Oral daily  glucagon  Injectable 1 milliGRAM(s) IntraMuscular once  heparin   Injectable 5000 Unit(s) SubCutaneous every 8 hours  insulin glargine Injectable (LANTUS) 20 Unit(s) SubCutaneous every morning  insulin lispro (ADMELOG) corrective regimen sliding scale   SubCutaneous three times a day before meals  insulin regular Infusion 1 Unit(s)/Hr (1 mL/Hr) IV Continuous <Continuous>  midodrine 10 milliGRAM(s) Oral every 8 hours  pantoprazole  Injectable 40 milliGRAM(s) IV Push daily  polyethylene glycol 3350 17 Gram(s) Oral daily  senna 2 Tablet(s) Oral at bedtime    MEDICATIONS  (PRN):  acetaminophen  Suppository .. 650 milliGRAM(s) Rectal every 6 hours PRN Temp greater or equal to 38C (100.4F)  albuterol    90 MICROgram(s) HFA Inhaler 2 Puff(s) Inhalation every 4 hours PRN Shortness of Breath and/or Wheezing  dextrose Oral Gel 15 Gram(s) Oral once PRN Blood Glucose LESS THAN 70 milliGRAM(s)/deciliter             Patient is a 89y old  Male who presents with a chief complaint of sob (08 Nov 2023 12:20)        Over Night Events: remains on [facility] 3L         ROS:     All ROS are negative except HPI         PHYSICAL EXAM    ICU Vital Signs Last 24 Hrs  T(C): 37.4 (09 Nov 2023 07:01), Max: 37.4 (09 Nov 2023 07:01)  T(F): 99.3 (09 Nov 2023 07:01), Max: 99.3 (09 Nov 2023 07:01)  HR: 69 (09 Nov 2023 06:00) (60 - 73)  BP: 124/65 (09 Nov 2023 06:00) (87/49 - 128/58)  BP(mean): 88 (09 Nov 2023 06:00) (66 - 88)  ABP: --  ABP(mean): --  RR: 20 (09 Nov 2023 07:01) (15 - 28)  SpO2: 99% (09 Nov 2023 07:22) (98% - 100%)    O2 Parameters below as of 09 Nov 2023 07:22  Patient On (Oxygen Delivery Method): nasal cannula, high flow  O2 Flow (L/min): 40  O2 Concentration (%): 40        CONSTITUTIONAL:  confused     ENT:   Airway patent,   Mouth with normal mucosa.   on [facility]     EYES:   Pupils equal,   Round and reactive to light.    CARDIAC:   Normal rate,   Regular rhythm.    No edema       RESPIRATORY:   No wheezing  Bilateral BS  Normal chest expansion  Not tachypneic,  No use of accessory muscles    GASTROINTESTINAL:  Abdomen soft,   Non-tender,   No guarding,   + BS    MUSCULOSKELETAL:   Range of motion is not limited,  No clubbing, cyanosis    NEUROLOGICAL:   confused               11-08-23 @ 07:01  -  11-09-23 @ 07:00  --------------------------------------------------------  IN:    Enteral Tube Flush: 350 mL    Glucerna: 1080 mL  Total IN: 1430 mL    OUT:    Incontinent per Collection Bag (mL): 175 mL    Other (mL): 1500 mL  Total OUT: 1675 mL    Total NET: -245 mL          LABS:                            9.9    8.52  )-----------( 236      ( 09 Nov 2023 05:18 )             31.3                                               11-08    144  |  105  |  44<H>  ----------------------------<  195<H>  3.7   |  24  |  1.8<H>    Ca    7.8<L>      08 Nov 2023 18:17  Phos  6.0     11-08  Mg     2.1     11-08    TPro  6.1  /  Alb  2.9<L>  /  TBili  0.3  /  DBili  x   /  AST  15  /  ALT  20  /  AlkPhos  91  11-07                                             Urinalysis Basic - ( 08 Nov 2023 18:17 )    Color: x / Appearance: x / SG: x / pH: x  Gluc: 195 mg/dL / Ketone: x  / Bili: x / Urobili: x   Blood: x / Protein: x / Nitrite: x   Leuk Esterase: x / RBC: x / WBC x   Sq Epi: x / Non Sq Epi: x / Bacteria: x                                                  LIVER FUNCTIONS - ( 07 Nov 2023 11:28 )  Alb: 2.9 g/dL / Pro: 6.1 g/dL / ALK PHOS: 91 U/L / ALT: 20 U/L / AST: 15 U/L / GGT: x                                                                                                                                   ABG - ( 07 Nov 2023 08:33 )  pH, Arterial: 7.46  pH, Blood: x     /  pCO2: 33    /  pO2: 317   / HCO3: 24    / Base Excess: 0.1   /  SaO2: 100.0               MEDICATIONS  (STANDING):  albuterol/ipratropium for Nebulization 3 milliLiter(s) Nebulizer every 6 hours  albuterol/ipratropium for Nebulization. 3 milliLiter(s) Nebulizer once  allopurinol 100 milliGRAM(s) Oral daily  aspirin  chewable 81 milliGRAM(s) Oral daily  buMETAnide Injectable 2 milliGRAM(s) IV Push every 12 hours  carvedilol 25 milliGRAM(s) Oral every 12 hours  chlorhexidine 2% Cloths 1 Application(s) Topical <User Schedule>  citalopram 20 milliGRAM(s) Oral daily  clopidogrel Tablet 75 milliGRAM(s) Oral daily  dextrose 5%. 1000 milliLiter(s) (100 mL/Hr) IV Continuous <Continuous>  dextrose 5%. 1000 milliLiter(s) (50 mL/Hr) IV Continuous <Continuous>  dextrose 50% Injectable 50 milliLiter(s) IV Push every 15 minutes  gabapentin 300 milliGRAM(s) Oral daily  glucagon  Injectable 1 milliGRAM(s) IntraMuscular once  heparin   Injectable 5000 Unit(s) SubCutaneous every 8 hours  insulin glargine Injectable (LANTUS) 20 Unit(s) SubCutaneous every morning  insulin lispro (ADMELOG) corrective regimen sliding scale   SubCutaneous three times a day before meals  insulin regular Infusion 1 Unit(s)/Hr (1 mL/Hr) IV Continuous <Continuous>  midodrine 10 milliGRAM(s) Oral every 8 hours  pantoprazole  Injectable 40 milliGRAM(s) IV Push daily  polyethylene glycol 3350 17 Gram(s) Oral daily  senna 2 Tablet(s) Oral at bedtime    MEDICATIONS  (PRN):  acetaminophen  Suppository .. 650 milliGRAM(s) Rectal every 6 hours PRN Temp greater or equal to 38C (100.4F)  albuterol    90 MICROgram(s) HFA Inhaler 2 Puff(s) Inhalation every 4 hours PRN Shortness of Breath and/or Wheezing  dextrose Oral Gel 15 Gram(s) Oral once PRN Blood Glucose LESS THAN 70 milliGRAM(s)/deciliter

## 2023-11-09 NOTE — GOALS OF CARE CONVERSATION - ADVANCED CARE PLANNING - CONVERSATION DETAILS
called and spoke with patient's son Bert to f/u on GOC. He noted not knowing from the primary team whether or not dialysis will be permanent. We also discussed nutrition and he noted that him and his sister are still discussing whether or not to pursue artificial nutrition. He asked for a call from primary team to get an update.     all questions answered. support rendered. will follow. [id number]

## 2023-11-09 NOTE — PHYSICAL THERAPY INITIAL EVALUATION ADULT - IMPAIRMENTS CONTRIBUTING IMPAIRED BED MOBILITY, REHAB EVAL
Note Text (......Xxx Chief Complaint.): This diagnosis correlates with the Detail Level: Zone Other (Free Text): Discussed cosmetic removal quoted:\\n$65 for #1-3\\n$150 for #4-10 impaired balance/narrow base of support/decreased strength

## 2023-11-09 NOTE — PHYSICAL THERAPY INITIAL EVALUATION ADULT - PERTINENT HX OF CURRENT PROBLEM, REHAB EVAL
89 yo male PMHx of  DM, CAD-bypass, chronic HFpEF, HTN, HLD, CKD3 presents w sob  few days, no cough fever or chills.  pt placed on NRBM by EMS and subsequently on BiPAP in ED, O2 sat now 99%.  Pt found to have elevated glucose w high AG and b-hydroxybutarate c/w DKA  Hospital course complicated by respiratory failure secondary to suspected aspiration pneumonia, PARKER and initiation of HD     DM with hyperglycemia  / sepsis - possible aspiration pneumonia / acute respiratory failure - resolving / PARKER on CKD3

## 2023-11-09 NOTE — PROGRESS NOTE ADULT - ASSESSMENT
# Severe PARKER likely due to sepsis. Pt has Screat up to ~ 2.0 as far back as June 2021  # Hyperkalemia, given IV CaGluc and Lokelma, plus Bumex IV resolved  # Urine chemistries c/w pre-renal state / CRS w/ FeNa 0.3%, FeUrea 14%  # HAGMA d/t DKA / renal failure    - started HD on 11/3  - non oliguric  - creat level up-trending since last HD session    Recommendations:  - plan for next HD tomorrow, 3hrs, optiflux dialyzer, 3K+ bath, 2L UF as tolerated   - BP noted;  cont midodrine pre-HD as needed to allow for UF  - consider changing carvedilol to metoprolol for less BP lowering  - cont bumex to maximize urine output  - continue monitoring urine output and daily chemistry  - monitor daily bladder scan  - last phos level noted/ monitor on low phos diet  - blood glucose uncontrolled; appreciate endo f/u  - completed course of cefepime;  appreciate ID f/u

## 2023-11-09 NOTE — PHYSICAL THERAPY INITIAL EVALUATION ADULT - GENERAL OBSERVATIONS, REHAB EVAL
14:20 -14:50 Chart reviewed. Order received.  Patient is ok to be  seen for PT,confirmed with RN. pt encountered  Semi stoll  in the bed denies pain, but c/o Dizziness during the session RN Shelia made aware   and agrees to participate in session, +  Heplock / tele /pulse ox / Urine collection device / o2 3 LPM via nC / bp cuff / NG tune  ,NAD.   Daughter at bedside

## 2023-11-09 NOTE — PROGRESS NOTE ADULT - ASSESSMENT
89 y/o male  with a past medical HX of bypass, CAD, CHF , HTN, dyslipidemia, DM , anxiety   brought in for SOB, DKA.     IMPRESSION  #Metabolic encephalopathy   #DKA  #SOB- Volume overload vs Pneumonia  #PARKER over CKD  #Sepsis   #Lactic acidosis  #Obesity BMI (kg/m2): 29.7  #DM   #HTN, CHF, Pacemaker   #MRSA nares negative    RECOMMENDATIONS  -Kept on 7 days of antibiotics. ED 11/6.  -Since so far cultures have been negative, will aim for discontinuation of antimicrobials and monitor.   -Initiated on HD on 11/3.   -Aspiration precautions. Off loading measures to avoid pressure injuries.   -Prognosis guarded.   -Recall ID as needed.     If any questions, please send a message or call on [facility]  Please continue to update ID with any pertinent new laboratory or radiographic findings.    Freedom Chen M.D  Infectious Diseases Attending  [facility]

## 2023-11-09 NOTE — PROGRESS NOTE ADULT - ASSESSMENT
89 yo male PMHx of  DM, CAD-bypass, chronic HFpEF, HTN, HLD, CKD3 presents w sob x few days, no cough fever or chills.  pt placed on NRBM by EMS and subsequently on BiPAP in ED, O2 sat now 99%.  Pt found to have elevated glucose w high AG and b-hydroxybutarate c/w DKA  Hospital course complicated by respiratory failure secondary to suspected aspiration pneumonia, PARKER and initiation of HD     DM with hyperglycemia  / sepsis - possible aspiration pneumonia / acute respiratory failure - resolving / PARKER on CKD3     - change feeds to 3 x daily and add pre meal short acting insulin     -  increase Lantus dose   - completed  antibiotics course   - procal is elevated - check repeat   - HD as per nephrology   - wean oxygen as tolerated - maintain pox>90%   - ativan prn   - poor prognosis    - I discussed case and plan with pulmonary     DNR / DNI

## 2023-11-09 NOTE — PROGRESS NOTE ADULT - SUBJECTIVE AND OBJECTIVE BOX
Patient seen and evaluated this am, awake on nc oxygen, no complaints       T(F): 99.3 (11-09-23 @ 07:01), Max: 99.3 (11-09-23 @ 07:01)  HR: 70 (11-09-23 @ 09:00)  BP: 130/64 (11-09-23 @ 09:00)  RR: 20 (11-09-23 @ 09:00)  SpO2: 100% (11-09-23 @ 09:08) (98% - 100%)    PHYSICAL EXAM:  GENERAL: NAD  HEAD:  Atraumatic, Normocephalic  EYES: EOMI, PERRLA, conjunctiva and sclera clear  NERVOUS SYSTEM:  no focal deficits   CHEST/LUNG:  bilateral rhonchi  HEART: Regular rate and rhythm; No murmurs, rubs, or gallops  ABDOMEN: Soft, Nontender, Nondistended; Bowel sounds present  EXTREMITIES:  2+ Peripheral Pulses, No clubbing, cyanosis, or edema    LABS  11-09    140  |  98  |  68<HH>  ----------------------------<  516<HH>  4.3   |  25  |  2.6<H>    Ca    7.3<L>      09 Nov 2023 05:18  Phos  6.0     11-08  Mg     2.1     11-08                          9.9    8.52  )-----------( 236      ( 09 Nov 2023 05:18 )             31.3       Culture Results:   No growth at 4 days (11-04-23)  Culture Results:   No growth at 5 days (10-31-23)  Culture Results:   No growth at 5 days (10-31-23)    RADIOLOGY  < from: Xray Chest 1 View- PORTABLE-Routine (Xray Chest 1 View- PORTABLE-Routine in AM.) (11.09.23 @ 07:42) >    Impression:    Bilateral lung opacities and effusions, unchanged.    < end of copied text >    MEDICATIONS  (STANDING):  albuterol/ipratropium for Nebulization 3 milliLiter(s) Nebulizer every 6 hours  albuterol/ipratropium for Nebulization. 3 milliLiter(s) Nebulizer once  allopurinol 100 milliGRAM(s) Oral daily  aspirin  chewable 81 milliGRAM(s) Oral daily  buMETAnide Injectable 2 milliGRAM(s) IV Push every 12 hours  carvedilol 25 milliGRAM(s) Oral every 12 hours  chlorhexidine 2% Cloths 1 Application(s) Topical <User Schedule>  citalopram 20 milliGRAM(s) Oral daily  clopidogrel Tablet 75 milliGRAM(s) Oral daily  gabapentin 300 milliGRAM(s) Oral daily  heparin   Injectable 5000 Unit(s) SubCutaneous every 8 hours  insulin lispro (ADMELOG) corrective regimen sliding scale   SubCutaneous three times a day before meals  insulin lispro Injectable (ADMELOG) 5 Unit(s) SubCutaneous before dinner  insulin lispro Injectable (ADMELOG) 5 Unit(s) SubCutaneous before lunch  midodrine 10 milliGRAM(s) Oral every 8 hours  pantoprazole  Injectable 40 milliGRAM(s) IV Push daily  polyethylene glycol 3350 17 Gram(s) Oral daily  senna 2 Tablet(s) Oral at bedtime    MEDICATIONS  (PRN):  acetaminophen  Suppository .. 650 milliGRAM(s) Rectal every 6 hours PRN Temp greater or equal to 38C (100.4F)  albuterol    90 MICROgram(s) HFA Inhaler 2 Puff(s) Inhalation every 4 hours PRN Shortness of Breath and/or Wheezing  dextrose Oral Gel 15 Gram(s) Oral once PRN Blood Glucose LESS THAN 70 milliGRAM(s)/deciliter

## 2023-11-09 NOTE — PROGRESS NOTE ADULT - SUBJECTIVE AND OBJECTIVE BOX
GILLAMANDA MURO  89y, Male    LOS  10d    INTERVAL EVENTS/HPI  - No acute events overnight  - T(F): , Max: 99.3 (11-09-23 @ 07:01)  - Tolerating medication  - WBC Count: 8.52 (11-09-23 @ 05:18)  WBC Count: 8.62 (11-08-23 @ 06:09)  - Creatinine: 2.9 (11-09-23 @ 15:10)  Creatinine: 2.6 (11-09-23 @ 05:18)    REVIEW OF SYSTEMS:  CONSTITUTIONAL: No fever or chills  HEENT: No sore throat  RESPIRATORY: No cough, no shortness of breath  CARDIOVASCULAR: No chest pain or palpitations  GASTROINTESTINAL: No abdominal or epigastric pain  GENITOURINARY: No dysuria  NEUROLOGICAL: No headache/dizziness  MSK: No joint pain, erythema, or swelling; no back pain  SKIN: No itching, rashes  All other ROS negative except noted above    Prior hospital charts reviewed [Yes]  Primary team notes reviewed [Yes]  Other consultant notes reviewed [Yes]    ANTIMICROBIALS:       OTHER MEDS: MEDICATIONS  (STANDING):  acetaminophen  Suppository .. 650 every 6 hours PRN  albuterol    90 MICROgram(s) HFA Inhaler 2 every 4 hours PRN  albuterol/ipratropium for Nebulization 3 every 6 hours  albuterol/ipratropium for Nebulization. 3 once  allopurinol 100 daily  aspirin  chewable 81 daily  buMETAnide Injectable 2 every 12 hours  carvedilol 25 every 12 hours  citalopram 20 daily  clopidogrel Tablet 75 daily  dextrose 50% Injectable 50 every 15 minutes  dextrose Oral Gel 15 once PRN  gabapentin 300 daily  glucagon  Injectable 1 once  heparin   Injectable 5000 every 8 hours  insulin lispro (ADMELOG) corrective regimen sliding scale  three times a day before meals  insulin lispro Injectable (ADMELOG) 5 before dinner  insulin lispro Injectable (ADMELOG) 5 before lunch  midodrine 10 every 8 hours  pantoprazole  Injectable 40 daily  polyethylene glycol 3350 17 daily  senna 2 at bedtime      Vital Signs Last 24 Hrs  T(F): 98.5 (11-09-23 @ 15:03), Max: 101.7 (11-03-23 @ 23:00)    Vital Signs Last 24 Hrs  HR: 66 (11-09-23 @ 15:15) (66 - 73)  BP: 121/57 (11-09-23 @ 15:15) (105/53 - 135/64)  RR: 22 (11-09-23 @ 15:15)  SpO2: 100% (11-09-23 @ 15:03) (98% - 100%)  Wt(kg): --    EXAM:  GENERAL: NAD, On NC.  EYES: Conjunctiva pink and cornea white  ENT: Normal external ears and nose  NECK: Supple, nontender to palpation  CHEST/LUNG: Shallow breath sounds. Diffuse Rhonchi   HEART: S1 S2  ABDOMEN: Soft, nontender.   EXTREMITIES: No clubbing, cyanosis, or petal edema  NERVOUS SYSTEM: Alert and oriented to person  MSK: No joint erythema, swelling or pain  SKIN: No rashes or lesions, no superficial thrombophlebitis      Labs:                        9.9    8.52  )-----------( 236      ( 09 Nov 2023 05:18 )             31.3     11-09    143  |  102  |  78<HH>  ----------------------------<  434<H>  4.1   |  27  |  2.9<H>    Ca    7.8<L>      09 Nov 2023 15:10  Phos  3.2     11-09  Mg     2.1     11-09    TPro  5.9<L>  /  Alb  2.8<L>  /  TBili  0.3  /  DBili  x   /  AST  13  /  ALT  14  /  AlkPhos  91  11-09      WBC Trend:  WBC Count: 8.52 (11-09-23 @ 05:18)  WBC Count: 8.62 (11-08-23 @ 06:09)  WBC Count: 7.43 (11-07-23 @ 11:28)  WBC Count: 6.97 (11-06-23 @ 05:18)      Creatine Trend:  Creatinine: 2.9 (11-09)  Creatinine: 2.6 (11-09)  Creatinine: 1.8 (11-08)  Creatinine: 3.7 (11-08)      Liver Biochemical Testing Trend:  Alanine Aminotransferase (ALT/SGPT): 14 (11-09)  Alanine Aminotransferase (ALT/SGPT): 20 (11-07)  Alanine Aminotransferase (ALT/SGPT): 30 (11-06)  Alanine Aminotransferase (ALT/SGPT): 30 (11-06)  Alanine Aminotransferase (ALT/SGPT): 16 (11-05)  Aspartate Aminotransferase (AST/SGOT): 13 (11-09-23 @ 15:10)  Aspartate Aminotransferase (AST/SGOT): 15 (11-07-23 @ 11:28)  Aspartate Aminotransferase (AST/SGOT): 35 (11-06-23 @ 15:38)  Aspartate Aminotransferase (AST/SGOT): 34 (11-06-23 @ 05:18)  Aspartate Aminotransferase (AST/SGOT): 46 (11-05-23 @ 19:45)  Bilirubin Total: 0.3 (11-09)  Bilirubin Total: 0.3 (11-07)  Bilirubin Total: 0.4 (11-06)  Bilirubin Total: 0.3 (11-06)  Bilirubin Total: 0.3 (11-05)      Trend LDH      Urinalysis Basic - ( 09 Nov 2023 15:10 )    Color: x / Appearance: x / SG: x / pH: x  Gluc: 434 mg/dL / Ketone: x  / Bili: x / Urobili: x   Blood: x / Protein: x / Nitrite: x   Leuk Esterase: x / RBC: x / WBC x   Sq Epi: x / Non Sq Epi: x / Bacteria: x        MICROBIOLOGY:  Vancomycin Level, Random: 6.1 (11-03 @ 07:07)  Vancomycin Level, Random: 11.5 (11-01 @ 08:08)    Male    Culture - Blood (collected 04 Nov 2023 15:54)  Source: .Blood None  Preliminary Report:    No growth at 4 days    Culture - Blood (collected 31 Oct 2023 02:26)  Source: .Blood Blood-Peripheral  Final Report:    No growth at 5 days    Culture - Blood (collected 31 Oct 2023 02:26)  Source: .Blood Blood-Peripheral  Final Report:    No growth at 5 days    Procalcitonin, Serum: 13.70 (11-06)                Blood Gas Arterial, Lactate: 1.50 (11-07 @ 08:33)        RADIOLOGY & ADDITIONAL TESTS:  I have personally reviewed the relevant images.   CXR      CT        WEIGHT  Weight (kg): 88.5 (10-30-23 @ 18:39)  Creatinine: 2.9 mg/dL (11-09-23 @ 15:10)  Creatinine: 2.6 mg/dL (11-09-23 @ 05:18)  Creatinine: 1.8 mg/dL (11-08-23 @ 18:17)      All available historical records have been reviewed

## 2023-11-09 NOTE — PHYSICAL THERAPY INITIAL EVALUATION ADULT - ADDITIONAL COMMENTS
As per  daughter pt lives alone in an apartment W/ no step to enter and all in one level inside the apartment , as per daughter pt was independent W/ Functional activity and was driving PTA

## 2023-11-09 NOTE — PROGRESS NOTE ADULT - SUBJECTIVE AND OBJECTIVE BOX
S: FS reviewed , above target ,was not getting meal time coverage   O: Vital Signs Last 24 Hrs  T(C): 37.4 (09 Nov 2023 07:01), Max: 37.4 (09 Nov 2023 07:01)  T(F): 99.3 (09 Nov 2023 07:01), Max: 99.3 (09 Nov 2023 07:01)  HR: 70 (09 Nov 2023 09:00) (60 - 73)  BP: 130/64 (09 Nov 2023 09:00) (105/53 - 135/64)  BP(mean): 92 (09 Nov 2023 09:00) (76 - 92)  RR: 20 (09 Nov 2023 09:00) (15 - 28)  SpO2: 100% (09 Nov 2023 09:08) (98% - 100%)    Parameters below as of 09 Nov 2023 09:08  Patient On (Oxygen Delivery Method): nasal cannula  O2 Flow (L/min): 3        11-09    140  |  98  |  68<HH>  ----------------------------<  516<HH>  4.3   |  25  |  2.6<H>    Ca    7.3<L>      09 Nov 2023 05:18  Phos  6.0     11-08  Mg     2.1     11-08        MEDICATIONS  (STANDING):  albuterol/ipratropium for Nebulization 3 milliLiter(s) Nebulizer every 6 hours  albuterol/ipratropium for Nebulization. 3 milliLiter(s) Nebulizer once  allopurinol 100 milliGRAM(s) Oral daily  aspirin  chewable 81 milliGRAM(s) Oral daily  buMETAnide Injectable 2 milliGRAM(s) IV Push every 12 hours  carvedilol 25 milliGRAM(s) Oral every 12 hours  chlorhexidine 2% Cloths 1 Application(s) Topical <User Schedule>  citalopram 20 milliGRAM(s) Oral daily  clopidogrel Tablet 75 milliGRAM(s) Oral daily  dextrose 5%. 1000 milliLiter(s) (50 mL/Hr) IV Continuous <Continuous>  dextrose 5%. 1000 milliLiter(s) (100 mL/Hr) IV Continuous <Continuous>  dextrose 50% Injectable 50 milliLiter(s) IV Push every 15 minutes  gabapentin 300 milliGRAM(s) Oral daily  glucagon  Injectable 1 milliGRAM(s) IntraMuscular once  heparin   Injectable 5000 Unit(s) SubCutaneous every 8 hours  insulin lispro (ADMELOG) corrective regimen sliding scale   SubCutaneous three times a day before meals  insulin lispro Injectable (ADMELOG) 5 Unit(s) SubCutaneous before dinner  insulin lispro Injectable (ADMELOG) 5 Unit(s) SubCutaneous before lunch  midodrine 10 milliGRAM(s) Oral every 8 hours  pantoprazole  Injectable 40 milliGRAM(s) IV Push daily  polyethylene glycol 3350 17 Gram(s) Oral daily  senna 2 Tablet(s) Oral at bedtime    MEDICATIONS  (PRN):  acetaminophen  Suppository .. 650 milliGRAM(s) Rectal every 6 hours PRN Temp greater or equal to 38C (100.4F)  albuterol    90 MICROgram(s) HFA Inhaler 2 Puff(s) Inhalation every 4 hours PRN Shortness of Breath and/or Wheezing  dextrose Oral Gel 15 Gram(s) Oral once PRN Blood Glucose LESS THAN 70 milliGRAM(s)/deciliter

## 2023-11-10 LAB
ANION GAP SERPL CALC-SCNC: 14 MMOL/L — SIGNIFICANT CHANGE UP (ref 7–14)
ANION GAP SERPL CALC-SCNC: 14 MMOL/L — SIGNIFICANT CHANGE UP (ref 7–14)
ANION GAP SERPL CALC-SCNC: 16 MMOL/L — HIGH (ref 7–14)
ANION GAP SERPL CALC-SCNC: 16 MMOL/L — HIGH (ref 7–14)
BASOPHILS # BLD AUTO: 0.04 K/UL — SIGNIFICANT CHANGE UP (ref 0–0.2)
BASOPHILS # BLD AUTO: 0.04 K/UL — SIGNIFICANT CHANGE UP (ref 0–0.2)
BASOPHILS NFR BLD AUTO: 0.4 % — SIGNIFICANT CHANGE UP (ref 0–1)
BASOPHILS NFR BLD AUTO: 0.4 % — SIGNIFICANT CHANGE UP (ref 0–1)
BUN SERPL-MCNC: 45 MG/DL — HIGH (ref 10–20)
BUN SERPL-MCNC: 45 MG/DL — HIGH (ref 10–20)
BUN SERPL-MCNC: 97 MG/DL — CRITICAL HIGH (ref 10–20)
BUN SERPL-MCNC: 97 MG/DL — CRITICAL HIGH (ref 10–20)
CA-I BLD-SCNC: 4 MG/DL — LOW (ref 4.5–5.6)
CA-I BLD-SCNC: 4 MG/DL — LOW (ref 4.5–5.6)
CALCIUM SERPL-MCNC: 7.9 MG/DL — LOW (ref 8.4–10.5)
CALCIUM SERPL-MCNC: 7.9 MG/DL — LOW (ref 8.4–10.5)
CALCIUM SERPL-MCNC: 8.5 MG/DL — SIGNIFICANT CHANGE UP (ref 8.4–10.5)
CALCIUM SERPL-MCNC: 8.5 MG/DL — SIGNIFICANT CHANGE UP (ref 8.4–10.5)
CHLORIDE SERPL-SCNC: 101 MMOL/L — SIGNIFICANT CHANGE UP (ref 98–110)
CHLORIDE SERPL-SCNC: 101 MMOL/L — SIGNIFICANT CHANGE UP (ref 98–110)
CHLORIDE SERPL-SCNC: 104 MMOL/L — SIGNIFICANT CHANGE UP (ref 98–110)
CHLORIDE SERPL-SCNC: 104 MMOL/L — SIGNIFICANT CHANGE UP (ref 98–110)
CO2 SERPL-SCNC: 25 MMOL/L — SIGNIFICANT CHANGE UP (ref 17–32)
CO2 SERPL-SCNC: 25 MMOL/L — SIGNIFICANT CHANGE UP (ref 17–32)
CO2 SERPL-SCNC: 28 MMOL/L — SIGNIFICANT CHANGE UP (ref 17–32)
CO2 SERPL-SCNC: 28 MMOL/L — SIGNIFICANT CHANGE UP (ref 17–32)
CREAT SERPL-MCNC: 1.9 MG/DL — HIGH (ref 0.7–1.5)
CREAT SERPL-MCNC: 1.9 MG/DL — HIGH (ref 0.7–1.5)
CREAT SERPL-MCNC: 3.4 MG/DL — HIGH (ref 0.7–1.5)
CREAT SERPL-MCNC: 3.4 MG/DL — HIGH (ref 0.7–1.5)
CULTURE RESULTS: SIGNIFICANT CHANGE UP
CULTURE RESULTS: SIGNIFICANT CHANGE UP
EGFR: 16 ML/MIN/1.73M2 — LOW
EGFR: 16 ML/MIN/1.73M2 — LOW
EGFR: 33 ML/MIN/1.73M2 — LOW
EGFR: 33 ML/MIN/1.73M2 — LOW
EOSINOPHIL # BLD AUTO: 0.09 K/UL — SIGNIFICANT CHANGE UP (ref 0–0.7)
EOSINOPHIL # BLD AUTO: 0.09 K/UL — SIGNIFICANT CHANGE UP (ref 0–0.7)
EOSINOPHIL NFR BLD AUTO: 0.8 % — SIGNIFICANT CHANGE UP (ref 0–8)
EOSINOPHIL NFR BLD AUTO: 0.8 % — SIGNIFICANT CHANGE UP (ref 0–8)
GAS PNL BLDA: SIGNIFICANT CHANGE UP
GAS PNL BLDA: SIGNIFICANT CHANGE UP
GLUCOSE BLDC GLUCOMTR-MCNC: 159 MG/DL — HIGH (ref 70–99)
GLUCOSE BLDC GLUCOMTR-MCNC: 159 MG/DL — HIGH (ref 70–99)
GLUCOSE BLDC GLUCOMTR-MCNC: 166 MG/DL — HIGH (ref 70–99)
GLUCOSE BLDC GLUCOMTR-MCNC: 166 MG/DL — HIGH (ref 70–99)
GLUCOSE BLDC GLUCOMTR-MCNC: 192 MG/DL — HIGH (ref 70–99)
GLUCOSE BLDC GLUCOMTR-MCNC: 192 MG/DL — HIGH (ref 70–99)
GLUCOSE BLDC GLUCOMTR-MCNC: 195 MG/DL — HIGH (ref 70–99)
GLUCOSE BLDC GLUCOMTR-MCNC: 195 MG/DL — HIGH (ref 70–99)
GLUCOSE BLDC GLUCOMTR-MCNC: 207 MG/DL — HIGH (ref 70–99)
GLUCOSE BLDC GLUCOMTR-MCNC: 207 MG/DL — HIGH (ref 70–99)
GLUCOSE BLDC GLUCOMTR-MCNC: 213 MG/DL — HIGH (ref 70–99)
GLUCOSE BLDC GLUCOMTR-MCNC: 213 MG/DL — HIGH (ref 70–99)
GLUCOSE BLDC GLUCOMTR-MCNC: 217 MG/DL — HIGH (ref 70–99)
GLUCOSE BLDC GLUCOMTR-MCNC: 268 MG/DL — HIGH (ref 70–99)
GLUCOSE BLDC GLUCOMTR-MCNC: 268 MG/DL — HIGH (ref 70–99)
GLUCOSE BLDC GLUCOMTR-MCNC: 276 MG/DL — HIGH (ref 70–99)
GLUCOSE BLDC GLUCOMTR-MCNC: 276 MG/DL — HIGH (ref 70–99)
GLUCOSE BLDC GLUCOMTR-MCNC: 282 MG/DL — HIGH (ref 70–99)
GLUCOSE BLDC GLUCOMTR-MCNC: 282 MG/DL — HIGH (ref 70–99)
GLUCOSE BLDC GLUCOMTR-MCNC: 288 MG/DL — HIGH (ref 70–99)
GLUCOSE BLDC GLUCOMTR-MCNC: 288 MG/DL — HIGH (ref 70–99)
GLUCOSE BLDC GLUCOMTR-MCNC: 297 MG/DL — HIGH (ref 70–99)
GLUCOSE BLDC GLUCOMTR-MCNC: 297 MG/DL — HIGH (ref 70–99)
GLUCOSE BLDC GLUCOMTR-MCNC: 300 MG/DL — HIGH (ref 70–99)
GLUCOSE BLDC GLUCOMTR-MCNC: 300 MG/DL — HIGH (ref 70–99)
GLUCOSE BLDC GLUCOMTR-MCNC: 324 MG/DL — HIGH (ref 70–99)
GLUCOSE BLDC GLUCOMTR-MCNC: 324 MG/DL — HIGH (ref 70–99)
GLUCOSE BLDC GLUCOMTR-MCNC: 331 MG/DL — HIGH (ref 70–99)
GLUCOSE BLDC GLUCOMTR-MCNC: 364 MG/DL — HIGH (ref 70–99)
GLUCOSE BLDC GLUCOMTR-MCNC: 364 MG/DL — HIGH (ref 70–99)
GLUCOSE BLDC GLUCOMTR-MCNC: 435 MG/DL — HIGH (ref 70–99)
GLUCOSE BLDC GLUCOMTR-MCNC: 435 MG/DL — HIGH (ref 70–99)
GLUCOSE BLDC GLUCOMTR-MCNC: 450 MG/DL — CRITICAL HIGH (ref 70–99)
GLUCOSE BLDC GLUCOMTR-MCNC: 450 MG/DL — CRITICAL HIGH (ref 70–99)
GLUCOSE BLDC GLUCOMTR-MCNC: 461 MG/DL — CRITICAL HIGH (ref 70–99)
GLUCOSE BLDC GLUCOMTR-MCNC: 461 MG/DL — CRITICAL HIGH (ref 70–99)
GLUCOSE BLDC GLUCOMTR-MCNC: 483 MG/DL — CRITICAL HIGH (ref 70–99)
GLUCOSE BLDC GLUCOMTR-MCNC: 483 MG/DL — CRITICAL HIGH (ref 70–99)
GLUCOSE SERPL-MCNC: 163 MG/DL — HIGH (ref 70–99)
GLUCOSE SERPL-MCNC: 163 MG/DL — HIGH (ref 70–99)
GLUCOSE SERPL-MCNC: 182 MG/DL — HIGH (ref 70–99)
GLUCOSE SERPL-MCNC: 182 MG/DL — HIGH (ref 70–99)
HCT VFR BLD CALC: 32.6 % — LOW (ref 42–52)
HCT VFR BLD CALC: 32.6 % — LOW (ref 42–52)
HGB BLD-MCNC: 10.5 G/DL — LOW (ref 14–18)
HGB BLD-MCNC: 10.5 G/DL — LOW (ref 14–18)
IMM GRANULOCYTES NFR BLD AUTO: 2.5 % — HIGH (ref 0.1–0.3)
IMM GRANULOCYTES NFR BLD AUTO: 2.5 % — HIGH (ref 0.1–0.3)
LYMPHOCYTES # BLD AUTO: 0.95 K/UL — LOW (ref 1.2–3.4)
LYMPHOCYTES # BLD AUTO: 0.95 K/UL — LOW (ref 1.2–3.4)
LYMPHOCYTES # BLD AUTO: 8.6 % — LOW (ref 20.5–51.1)
LYMPHOCYTES # BLD AUTO: 8.6 % — LOW (ref 20.5–51.1)
MAGNESIUM SERPL-MCNC: 2.4 MG/DL — SIGNIFICANT CHANGE UP (ref 1.8–2.4)
MAGNESIUM SERPL-MCNC: 2.4 MG/DL — SIGNIFICANT CHANGE UP (ref 1.8–2.4)
MCHC RBC-ENTMCNC: 28.8 PG — SIGNIFICANT CHANGE UP (ref 27–31)
MCHC RBC-ENTMCNC: 28.8 PG — SIGNIFICANT CHANGE UP (ref 27–31)
MCHC RBC-ENTMCNC: 32.2 G/DL — SIGNIFICANT CHANGE UP (ref 32–37)
MCHC RBC-ENTMCNC: 32.2 G/DL — SIGNIFICANT CHANGE UP (ref 32–37)
MCV RBC AUTO: 89.3 FL — SIGNIFICANT CHANGE UP (ref 80–94)
MCV RBC AUTO: 89.3 FL — SIGNIFICANT CHANGE UP (ref 80–94)
MONOCYTES # BLD AUTO: 0.57 K/UL — SIGNIFICANT CHANGE UP (ref 0.1–0.6)
MONOCYTES # BLD AUTO: 0.57 K/UL — SIGNIFICANT CHANGE UP (ref 0.1–0.6)
MONOCYTES NFR BLD AUTO: 5.2 % — SIGNIFICANT CHANGE UP (ref 1.7–9.3)
MONOCYTES NFR BLD AUTO: 5.2 % — SIGNIFICANT CHANGE UP (ref 1.7–9.3)
NEUTROPHILS # BLD AUTO: 9.1 K/UL — HIGH (ref 1.4–6.5)
NEUTROPHILS # BLD AUTO: 9.1 K/UL — HIGH (ref 1.4–6.5)
NEUTROPHILS NFR BLD AUTO: 82.5 % — HIGH (ref 42.2–75.2)
NEUTROPHILS NFR BLD AUTO: 82.5 % — HIGH (ref 42.2–75.2)
NRBC # BLD: 0 /100 WBCS — SIGNIFICANT CHANGE UP (ref 0–0)
NRBC # BLD: 0 /100 WBCS — SIGNIFICANT CHANGE UP (ref 0–0)
PHOSPHATE SERPL-MCNC: 3.7 MG/DL — SIGNIFICANT CHANGE UP (ref 2.1–4.9)
PHOSPHATE SERPL-MCNC: 3.7 MG/DL — SIGNIFICANT CHANGE UP (ref 2.1–4.9)
PLATELET # BLD AUTO: 226 K/UL — SIGNIFICANT CHANGE UP (ref 130–400)
PLATELET # BLD AUTO: 226 K/UL — SIGNIFICANT CHANGE UP (ref 130–400)
PMV BLD: 11.5 FL — HIGH (ref 7.4–10.4)
PMV BLD: 11.5 FL — HIGH (ref 7.4–10.4)
POTASSIUM SERPL-MCNC: 4 MMOL/L — SIGNIFICANT CHANGE UP (ref 3.5–5)
POTASSIUM SERPL-MCNC: 4 MMOL/L — SIGNIFICANT CHANGE UP (ref 3.5–5)
POTASSIUM SERPL-MCNC: 4.1 MMOL/L — SIGNIFICANT CHANGE UP (ref 3.5–5)
POTASSIUM SERPL-MCNC: 4.1 MMOL/L — SIGNIFICANT CHANGE UP (ref 3.5–5)
POTASSIUM SERPL-SCNC: 4 MMOL/L — SIGNIFICANT CHANGE UP (ref 3.5–5)
POTASSIUM SERPL-SCNC: 4 MMOL/L — SIGNIFICANT CHANGE UP (ref 3.5–5)
POTASSIUM SERPL-SCNC: 4.1 MMOL/L — SIGNIFICANT CHANGE UP (ref 3.5–5)
POTASSIUM SERPL-SCNC: 4.1 MMOL/L — SIGNIFICANT CHANGE UP (ref 3.5–5)
RBC # BLD: 3.65 M/UL — LOW (ref 4.7–6.1)
RBC # BLD: 3.65 M/UL — LOW (ref 4.7–6.1)
RBC # FLD: 15.7 % — HIGH (ref 11.5–14.5)
RBC # FLD: 15.7 % — HIGH (ref 11.5–14.5)
SODIUM SERPL-SCNC: 143 MMOL/L — SIGNIFICANT CHANGE UP (ref 135–146)
SODIUM SERPL-SCNC: 143 MMOL/L — SIGNIFICANT CHANGE UP (ref 135–146)
SODIUM SERPL-SCNC: 145 MMOL/L — SIGNIFICANT CHANGE UP (ref 135–146)
SODIUM SERPL-SCNC: 145 MMOL/L — SIGNIFICANT CHANGE UP (ref 135–146)
SPECIMEN SOURCE: SIGNIFICANT CHANGE UP
SPECIMEN SOURCE: SIGNIFICANT CHANGE UP
WBC # BLD: 11.03 K/UL — HIGH (ref 4.8–10.8)
WBC # BLD: 11.03 K/UL — HIGH (ref 4.8–10.8)
WBC # FLD AUTO: 11.03 K/UL — HIGH (ref 4.8–10.8)
WBC # FLD AUTO: 11.03 K/UL — HIGH (ref 4.8–10.8)

## 2023-11-10 PROCEDURE — 71045 X-RAY EXAM CHEST 1 VIEW: CPT | Mod: 26

## 2023-11-10 PROCEDURE — 99233 SBSQ HOSP IP/OBS HIGH 50: CPT

## 2023-11-10 RX ORDER — INSULIN LISPRO 100/ML
VIAL (ML) SUBCUTANEOUS
Refills: 0 | Status: DISCONTINUED | OUTPATIENT
Start: 2023-11-10 | End: 2023-11-11

## 2023-11-10 RX ORDER — NOREPINEPHRINE BITARTRATE/D5W 8 MG/250ML
0.05 PLASTIC BAG, INJECTION (ML) INTRAVENOUS
Qty: 8 | Refills: 0 | Status: DISCONTINUED | OUTPATIENT
Start: 2023-11-10 | End: 2023-11-14

## 2023-11-10 RX ORDER — INSULIN HUMAN 100 [IU]/ML
4 INJECTION, SOLUTION SUBCUTANEOUS
Qty: 100 | Refills: 0 | Status: DISCONTINUED | OUTPATIENT
Start: 2023-11-10 | End: 2023-11-11

## 2023-11-10 RX ORDER — INSULIN GLARGINE 100 [IU]/ML
22 INJECTION, SOLUTION SUBCUTANEOUS AT BEDTIME
Refills: 0 | Status: DISCONTINUED | OUTPATIENT
Start: 2023-11-10 | End: 2023-11-10

## 2023-11-10 RX ORDER — INSULIN LISPRO 100/ML
8 VIAL (ML) SUBCUTANEOUS
Refills: 0 | Status: DISCONTINUED | OUTPATIENT
Start: 2023-11-10 | End: 2023-11-11

## 2023-11-10 RX ORDER — INSULIN GLARGINE 100 [IU]/ML
30 INJECTION, SOLUTION SUBCUTANEOUS AT BEDTIME
Refills: 0 | Status: DISCONTINUED | OUTPATIENT
Start: 2023-11-10 | End: 2023-11-11

## 2023-11-10 RX ORDER — INSULIN LISPRO 100/ML
7 VIAL (ML) SUBCUTANEOUS
Refills: 0 | Status: DISCONTINUED | OUTPATIENT
Start: 2023-11-10 | End: 2023-11-10

## 2023-11-10 RX ORDER — POTASSIUM CHLORIDE 20 MEQ
20 PACKET (EA) ORAL ONCE
Refills: 0 | Status: COMPLETED | OUTPATIENT
Start: 2023-11-10 | End: 2023-11-10

## 2023-11-10 RX ADMIN — HEPARIN SODIUM 5000 UNIT(S): 5000 INJECTION INTRAVENOUS; SUBCUTANEOUS at 06:31

## 2023-11-10 RX ADMIN — HEPARIN SODIUM 5000 UNIT(S): 5000 INJECTION INTRAVENOUS; SUBCUTANEOUS at 14:35

## 2023-11-10 RX ADMIN — Medication 81 MILLIGRAM(S): at 11:27

## 2023-11-10 RX ADMIN — HEPARIN SODIUM 5000 UNIT(S): 5000 INJECTION INTRAVENOUS; SUBCUTANEOUS at 21:34

## 2023-11-10 RX ADMIN — INSULIN HUMAN 4 UNIT(S)/HR: 100 INJECTION, SOLUTION SUBCUTANEOUS at 07:46

## 2023-11-10 RX ADMIN — Medication 100 MILLIGRAM(S): at 11:29

## 2023-11-10 RX ADMIN — INSULIN HUMAN 3 UNIT(S)/HR: 100 INJECTION, SOLUTION SUBCUTANEOUS at 20:07

## 2023-11-10 RX ADMIN — MIDODRINE HYDROCHLORIDE 10 MILLIGRAM(S): 2.5 TABLET ORAL at 21:36

## 2023-11-10 RX ADMIN — INSULIN HUMAN 4 UNIT(S)/HR: 100 INJECTION, SOLUTION SUBCUTANEOUS at 21:15

## 2023-11-10 RX ADMIN — Medication 3 MILLILITER(S): at 13:48

## 2023-11-10 RX ADMIN — MIDODRINE HYDROCHLORIDE 10 MILLIGRAM(S): 2.5 TABLET ORAL at 06:31

## 2023-11-10 RX ADMIN — POLYETHYLENE GLYCOL 3350 17 GRAM(S): 17 POWDER, FOR SOLUTION ORAL at 11:26

## 2023-11-10 RX ADMIN — CLOPIDOGREL BISULFATE 75 MILLIGRAM(S): 75 TABLET, FILM COATED ORAL at 11:28

## 2023-11-10 RX ADMIN — GABAPENTIN 300 MILLIGRAM(S): 400 CAPSULE ORAL at 11:28

## 2023-11-10 RX ADMIN — CITALOPRAM 20 MILLIGRAM(S): 10 TABLET, FILM COATED ORAL at 11:29

## 2023-11-10 RX ADMIN — BUMETANIDE 2 MILLIGRAM(S): 0.25 INJECTION INTRAMUSCULAR; INTRAVENOUS at 06:31

## 2023-11-10 RX ADMIN — CARVEDILOL PHOSPHATE 25 MILLIGRAM(S): 80 CAPSULE, EXTENDED RELEASE ORAL at 17:53

## 2023-11-10 RX ADMIN — INSULIN GLARGINE 30 UNIT(S): 100 INJECTION, SOLUTION SUBCUTANEOUS at 18:43

## 2023-11-10 RX ADMIN — SENNA PLUS 2 TABLET(S): 8.6 TABLET ORAL at 21:34

## 2023-11-10 RX ADMIN — CHLORHEXIDINE GLUCONATE 1 APPLICATION(S): 213 SOLUTION TOPICAL at 06:31

## 2023-11-10 RX ADMIN — CARVEDILOL PHOSPHATE 25 MILLIGRAM(S): 80 CAPSULE, EXTENDED RELEASE ORAL at 06:31

## 2023-11-10 RX ADMIN — PANTOPRAZOLE SODIUM 40 MILLIGRAM(S): 20 TABLET, DELAYED RELEASE ORAL at 11:26

## 2023-11-10 RX ADMIN — Medication 3 MILLILITER(S): at 09:11

## 2023-11-10 NOTE — CHART NOTE - NSCHARTNOTEFT_GEN_A_CORE
Due to waxing/waning status pt is not appropriate for full puree diet. However, when most alert/awake and on O2 NC, allow for bites of puree and ice chips for comfort. Discontinue with s/s aspiration. Keep NGT in place.

## 2023-11-10 NOTE — PROGRESS NOTE ADULT - SUBJECTIVE AND OBJECTIVE BOX
AMANDA CASTORENA, 89y, Male; MRN# [id number]  Hospital Stay: 11d.    Patient is a 89y old Male who presents with a chief complaint of sob (09 Nov 2023 17:00)  Currently admitted to the ICU with the primary diagnosis of DKA (diabetic ketoacidosis), complicated by CHF exacerbation/fluid overload.       SUBJECTIVE:  Interval/Overnight events:   -fingersticks notably elevated yesterday, continued to rise despite increasing sliding scale and additional lispro  -insulin drip started overnight with improved glucose control.      REVIEW OF SYSTEMS:  As per HPI  OBJECTIVE:  VITALS:  T(F): 99.1 (11-10-23 @ 06:00), Max: 99.3 (11-09-23 @ 23:01)  HR: 64 (11-10-23 @ 06:00) (61 - 80)  BP: 99/54 (11-10-23 @ 06:00) (97/49 - 146/60)  ABP: --  ABP(mean): --  RR: 22 (11-10-23 @ 06:00) (20 - 30)  SpO2: 100% (11-10-23 @ 06:00) (98% - 100%)    Vent Settings    Blood Gas  11-10-23 @ 06:43  pH 7.50 | pCO2 39 | pO2 80 | HCO3 30 | O2 Sat 97.5    Drips  dextrose 5%. 1000 milliLiter(s) (100 mL/Hr) IV Continuous <Continuous>  dextrose 5%. 1000 milliLiter(s) (50 mL/Hr) IV Continuous <Continuous>  insulin regular Infusion 4 Unit(s)/Hr (4 mL/Hr) IV Continuous <Continuous>    I&Os:    11-09-23 @ 07:01  -  11-10-23 @ 07:00  --------------------------------------------------------  IN: 1916 mL / OUT: 640 mL / NET: 1276 mL      PHYSICAL EXAM:    PHYSICAL EXAM:  GENERAL: NAD  HEAD:  Atraumatic, Normocephalic  EYES: EOMI, PERRLA, conjunctiva and sclera clear  NERVOUS SYSTEM:  Alert & Oriented X3, no focal deficits   CHEST/LUNG:  bilateral rales  HEART: Regular rate and rhythm; No murmurs, rubs, or gallops  ABDOMEN: Soft, Nontender, Nondistended; Bowel sounds present  EXTREMITIES:  2+ Peripheral Pulses, No clubbing, cyanosis, or edema        ACTIVE MEDICATIONS:  Standing  albuterol/ipratropium for Nebulization 3 milliLiter(s) Nebulizer every 6 hours  albuterol/ipratropium for Nebulization. 3 milliLiter(s) Nebulizer once  allopurinol 100 milliGRAM(s) Oral daily  aspirin  chewable 81 milliGRAM(s) Oral daily  buMETAnide Injectable 2 milliGRAM(s) IV Push every 12 hours  carvedilol 25 milliGRAM(s) Oral every 12 hours  chlorhexidine 2% Cloths 1 Application(s) Topical <User Schedule>  citalopram 20 milliGRAM(s) Oral daily  clopidogrel Tablet 75 milliGRAM(s) Oral daily  dextrose 5%. 1000 milliLiter(s) (50 mL/Hr) IV Continuous <Continuous>  dextrose 5%. 1000 milliLiter(s) (100 mL/Hr) IV Continuous <Continuous>  dextrose 50% Injectable 50 milliLiter(s) IV Push every 15 minutes  gabapentin 300 milliGRAM(s) Oral daily  glucagon  Injectable 1 milliGRAM(s) IntraMuscular once  heparin   Injectable 5000 Unit(s) SubCutaneous every 8 hours  insulin regular Infusion 4 Unit(s)/Hr (4 mL/Hr) IV Continuous <Continuous>  midodrine 10 milliGRAM(s) Oral every 8 hours  pantoprazole  Injectable 40 milliGRAM(s) IV Push daily  polyethylene glycol 3350 17 Gram(s) Oral daily  potassium chloride  20 mEq/100 mL IVPB 20 milliEquivalent(s) IV Intermittent once  senna 2 Tablet(s) Oral at bedtime    PRN  acetaminophen  Suppository .. 650 milliGRAM(s) Rectal every 6 hours PRN  albuterol    90 MICROgram(s) HFA Inhaler 2 Puff(s) Inhalation every 4 hours PRN  dextrose Oral Gel 15 Gram(s) Oral once PRN    LABS:  11-09-23 @ 15:10  WBC --, H/H --/--, plt --  Na 143, K 4.1, Cl 102, CO2 27, BUN 78<HH>, Cr 2.9<H>, Glu 434<H>    Ca 7.8<L>, Mg 2.1, Phosphorus 3.2    Tot Protein 5.9<L>, Alb 2.8<L>, T. Bili 0.3, D. Bili --  AST 13, ALT 14, Alk phos 91        11-09-23 @ 05:18:  CRP --, ESR --, Procal 3.05<H>, Ferritin --, Fungitell --, D-dimer --  11-06-23 @ 09:10:  CRP --, ESR --, Procal 13.70<H>, Ferritin --, Fungitell --, D-dimer --      11-03-23 @ 05:43:  Hgb A1c 9.1<H>% | Mean plasma glucose 214<H>      10-30-23 @ 19:20:  Troponin T 0.24<HH>, BNP --          CULTURES:    Culture - Blood (collected 11-04-23 @ 15:54)  Source: .Blood None  Final Report (11-10-23 @ 01:00):    No growth at 5 days    Culture - Blood (collected 10-31-23 @ 02:26)  Source: .Blood Blood-Peripheral  Final Report (11-05-23 @ 23:01):    No growth at 5 days    Culture - Blood (collected 10-31-23 @ 02:26)  Source: .Blood Blood-Peripheral  Final Report (11-05-23 @ 23:01):    No growth at 5 days      PENDING:  Phosphorus: AM Sched. Collection: 11-Nov-2023 04:30 (11-09-23 @ 17:04)  Magnesium: AM Sched. Collection: 11-Nov-2023 04:30 (11-09-23 @ 17:04)  Calcium, Ionized: AM Sched. Collection: 11-Nov-2023 04:30 (11-09-23 @ 17:04)  Basic Metabolic Panel: AM Sched. Collection: 11-Nov-2023 04:30 (11-09-23 @ 17:04)  Xray Chest 1 View- PORTABLE-Routine: AM   Indication: sob  Transport: Portable,  w/ Monitor  Exam Completed (11-09-23 @ 17:04)  Phosphorus: AM Sched. Collection: 10-Nov-2023 04:30 (11-09-23 @ 17:04)  Magnesium: AM Sched. Collection: 10-Nov-2023 04:30 (11-09-23 @ 17:04)  Calcium, Ionized: AM Sched. Collection: 10-Nov-2023 04:30 (11-09-23 @ 17:04)  Basic Metabolic Panel: AM Sched. Collection: 10-Nov-2023 04:30 (11-09-23 @ 17:04)    IMAGING:  Latest imaging reviewed.  Xray Chest 1 View- PORTABLE-Routine: AM   Indication: sob  Transport: Portable,  w/ Monitor (11-09-23 @ 17:04)  Xray Chest 1 View- PORTABLE-Routine: AM   Indication: sob  Transport: Portable,  w/ Monitor  Exam Completed (11-09-23 @ 17:04)    ECHO:  TTE Echo Complete w/o Contrast w/ Doppler:   Transport: Portable  Monitor: w/ Monitor  Provider's Contact #: [phone] (10-31-23 @ 01:06)   AMANDA CASTORENA, 89y, Male; MRN# [id number]  Hospital Stay: 11d.    Patient is a 89y old Male who presents with a chief complaint of sob (09 Nov 2023 17:00)  Currently admitted to the ICU with the primary diagnosis of DKA (diabetic ketoacidosis), complicated by CHF exacerbation/fluid overload.       SUBJECTIVE:  Interval/Overnight events:   -fingersticks notably elevated yesterday, continued to rise despite increasing sliding scale and additional lispro  -insulin drip started overnight with improved glucose control.  -this morning patient noted to be more lethargic, confused, blood pressure dropped on dialysis to 80s/40s, started on low dose levo drip while dialysis ongoing    REVIEW OF SYSTEMS:  As per HPI  OBJECTIVE:  VITALS:  T(F): 99.1 (11-10-23 @ 06:00), Max: 99.3 (11-09-23 @ 23:01)  HR: 64 (11-10-23 @ 06:00) (61 - 80)  BP: 99/54 (11-10-23 @ 06:00) (97/49 - 146/60)  ABP: --  ABP(mean): --  RR: 22 (11-10-23 @ 06:00) (20 - 30)  SpO2: 100% (11-10-23 @ 06:00) (98% - 100%)    Vent Settings    Blood Gas  11-10-23 @ 06:43  pH 7.50 | pCO2 39 | pO2 80 | HCO3 30 | O2 Sat 97.5    Drips  dextrose 5%. 1000 milliLiter(s) (100 mL/Hr) IV Continuous <Continuous>  dextrose 5%. 1000 milliLiter(s) (50 mL/Hr) IV Continuous <Continuous>  insulin regular Infusion 4 Unit(s)/Hr (4 mL/Hr) IV Continuous <Continuous>    I&Os:    11-09-23 @ 07:01  -  11-10-23 @ 07:00  --------------------------------------------------------  IN: 1916 mL / OUT: 640 mL / NET: 1276 mL      PHYSICAL EXAM:    PHYSICAL EXAM:  GENERAL: NAD  HEAD:  Atraumatic, Normocephalic  EYES: EOMI, PERRLA, conjunctiva and sclera clear  NERVOUS SYSTEM:  Alert & Oriented X3, no focal deficits   CHEST/LUNG:  bilateral rales  HEART: Regular rate and rhythm; No murmurs, rubs, or gallops  ABDOMEN: Soft, Nontender, Nondistended; Bowel sounds present  EXTREMITIES:  2+ Peripheral Pulses, No clubbing, cyanosis, or edema        ACTIVE MEDICATIONS:  Standing  albuterol/ipratropium for Nebulization 3 milliLiter(s) Nebulizer every 6 hours  albuterol/ipratropium for Nebulization. 3 milliLiter(s) Nebulizer once  allopurinol 100 milliGRAM(s) Oral daily  aspirin  chewable 81 milliGRAM(s) Oral daily  buMETAnide Injectable 2 milliGRAM(s) IV Push every 12 hours  carvedilol 25 milliGRAM(s) Oral every 12 hours  chlorhexidine 2% Cloths 1 Application(s) Topical <User Schedule>  citalopram 20 milliGRAM(s) Oral daily  clopidogrel Tablet 75 milliGRAM(s) Oral daily  dextrose 5%. 1000 milliLiter(s) (50 mL/Hr) IV Continuous <Continuous>  dextrose 5%. 1000 milliLiter(s) (100 mL/Hr) IV Continuous <Continuous>  dextrose 50% Injectable 50 milliLiter(s) IV Push every 15 minutes  gabapentin 300 milliGRAM(s) Oral daily  glucagon  Injectable 1 milliGRAM(s) IntraMuscular once  heparin   Injectable 5000 Unit(s) SubCutaneous every 8 hours  insulin regular Infusion 4 Unit(s)/Hr (4 mL/Hr) IV Continuous <Continuous>  midodrine 10 milliGRAM(s) Oral every 8 hours  pantoprazole  Injectable 40 milliGRAM(s) IV Push daily  polyethylene glycol 3350 17 Gram(s) Oral daily  potassium chloride  20 mEq/100 mL IVPB 20 milliEquivalent(s) IV Intermittent once  senna 2 Tablet(s) Oral at bedtime    PRN  acetaminophen  Suppository .. 650 milliGRAM(s) Rectal every 6 hours PRN  albuterol    90 MICROgram(s) HFA Inhaler 2 Puff(s) Inhalation every 4 hours PRN  dextrose Oral Gel 15 Gram(s) Oral once PRN    LABS:  11-09-23 @ 15:10  WBC --, H/H --/--, plt --  Na 143, K 4.1, Cl 102, CO2 27, BUN 78<HH>, Cr 2.9<H>, Glu 434<H>    Ca 7.8<L>, Mg 2.1, Phosphorus 3.2    Tot Protein 5.9<L>, Alb 2.8<L>, T. Bili 0.3, D. Bili --  AST 13, ALT 14, Alk phos 91        11-09-23 @ 05:18:  CRP --, ESR --, Procal 3.05<H>, Ferritin --, Fungitell --, D-dimer --  11-06-23 @ 09:10:  CRP --, ESR --, Procal 13.70<H>, Ferritin --, Fungitell --, D-dimer --      11-03-23 @ 05:43:  Hgb A1c 9.1<H>% | Mean plasma glucose 214<H>      10-30-23 @ 19:20:  Troponin T 0.24<HH>, BNP --          CULTURES:    Culture - Blood (collected 11-04-23 @ 15:54)  Source: .Blood None  Final Report (11-10-23 @ 01:00):    No growth at 5 days    Culture - Blood (collected 10-31-23 @ 02:26)  Source: .Blood Blood-Peripheral  Final Report (11-05-23 @ 23:01):    No growth at 5 days    Culture - Blood (collected 10-31-23 @ 02:26)  Source: .Blood Blood-Peripheral  Final Report (11-05-23 @ 23:01):    No growth at 5 days      PENDING:  Phosphorus: AM Sched. Collection: 11-Nov-2023 04:30 (11-09-23 @ 17:04)  Magnesium: AM Sched. Collection: 11-Nov-2023 04:30 (11-09-23 @ 17:04)  Calcium, Ionized: AM Sched. Collection: 11-Nov-2023 04:30 (11-09-23 @ 17:04)  Basic Metabolic Panel: AM Sched. Collection: 11-Nov-2023 04:30 (11-09-23 @ 17:04)  Xray Chest 1 View- PORTABLE-Routine: AM   Indication: sob  Transport: Portable,  w/ Monitor  Exam Completed (11-09-23 @ 17:04)  Phosphorus: AM Sched. Collection: 10-Nov-2023 04:30 (11-09-23 @ 17:04)  Magnesium: AM Sched. Collection: 10-Nov-2023 04:30 (11-09-23 @ 17:04)  Calcium, Ionized: AM Sched. Collection: 10-Nov-2023 04:30 (11-09-23 @ 17:04)  Basic Metabolic Panel: AM Sched. Collection: 10-Nov-2023 04:30 (11-09-23 @ 17:04)    IMAGING:  Latest imaging reviewed.  Xray Chest 1 View- PORTABLE-Routine: AM   Indication: sob  Transport: Portable,  w/ Monitor (11-09-23 @ 17:04)  Xray Chest 1 View- PORTABLE-Routine: AM   Indication: sob  Transport: Portable,  w/ Monitor  Exam Completed (11-09-23 @ 17:04)    ECHO:  TTE Echo Complete w/o Contrast w/ Doppler:   Transport: Portable  Monitor: w/ Monitor  Provider's Contact #: [phone] (10-31-23 @ 01:06)

## 2023-11-10 NOTE — PROGRESS NOTE ADULT - ASSESSMENT
89 yo male PMHx of  DM, CAD-bypass, chronic HFpEF, HTN, HLD, CKD3 presents w sob x few days, no cough fever or chills.  pt placed on NRBM by EMS and subsequently on BiPAP in ED, O2 sat now 99%.  Pt found to have elevated glucose w high AG and b-hydroxybutarate c/w DKA  Hospital course complicated by respiratory failure secondary to suspected aspiration pneumonia, PARKER and initiation of HD     DM with hyperglycemia  / sepsis - possible aspiration pneumonia / acute respiratory failure - resolving / PARKER on CKD3     - pressors resumed   - HD again today - negative balance   - completed  antibiotics course   - procal is elevated but improved    - wean oxygen as tolerated - maintain pox>90%   - ativan prn   - poor prognosis    - I discussed case and plan with pulmonary     DNR / DNI        50 minutes total spent today on patient care

## 2023-11-10 NOTE — SWALLOW BEDSIDE ASSESSMENT ADULT - PHARYNGEAL PHASE
Within functional limits Wet vocal quality post oral intake/Cough post oral intake/Multiple swallows

## 2023-11-10 NOTE — PROGRESS NOTE ADULT - SUBJECTIVE AND OBJECTIVE BOX
Patient seen and evaluated this am, comfortable in bed more lethargic today      T(F): 99.1 (11-10-23 @ 14:00), Max: 99.3 (11-09-23 @ 23:01)  HR: 66 (11-10-23 @ 14:00)  BP: 117/56 (11-10-23 @ 14:00)  RR: 16 (11-10-23 @ 14:00)  SpO2: 100% (11-10-23 @ 14:00) (98% - 100%)    PHYSICAL EXAM:  GENERAL: NAD  HEAD:  Atraumatic, Normocephalic  EYES: EOMI, PERRLA, conjunctiva and sclera clear  NERVOUS SYSTEM:  no focal deficits   CHEST/LUNG:  bilateral rales  HEART: Regular rate and rhythm; No murmurs, rubs, or gallops  ABDOMEN: Soft, Nontender, Nondistended; Bowel sounds present  EXTREMITIES:  2+ Peripheral Pulses, No clubbing, cyanosis, or edema    LABS  11-10    145  |  104  |  45<H>  ----------------------------<  182<H>  4.0   |  25  |  1.9<H>    Ca    8.5      10 Nov 2023 11:33  Phos  3.7     11-10  Mg     2.4     11-10    TPro  5.9<L>  /  Alb  2.8<L>  /  TBili  0.3  /  DBili  x   /  AST  13  /  ALT  14  /  AlkPhos  91  11-09                          10.5   11.03 )-----------( 226      ( 10 Nov 2023 11:33 )             32.6     Culture Results:   No growth at 5 days (11-04-23)  Culture Results:   No growth at 5 days (10-31-23)  Culture Results:   No growth at 5 days (10-31-23)    RADIOLOGY  < from: Xray Chest 1 View- PORTABLE-Routine (11.10.23 @ 06:48) >    Impression:    Increasing bibasilar opacities      < end of copied text >    MEDICATIONS  (STANDING):  albuterol/ipratropium for Nebulization 3 milliLiter(s) Nebulizer every 6 hours  albuterol/ipratropium for Nebulization. 3 milliLiter(s) Nebulizer once  allopurinol 100 milliGRAM(s) Oral daily  aspirin  chewable 81 milliGRAM(s) Oral daily  carvedilol 25 milliGRAM(s) Oral every 12 hours  chlorhexidine 2% Cloths 1 Application(s) Topical <User Schedule>  citalopram 20 milliGRAM(s) Oral daily  clopidogrel Tablet 75 milliGRAM(s) Oral daily  gabapentin 300 milliGRAM(s) Oral daily  heparin   Injectable 5000 Unit(s) SubCutaneous every 8 hours  insulin regular Infusion 4 Unit(s)/Hr (4 mL/Hr) IV Continuous <Continuous>  midodrine 10 milliGRAM(s) Oral every 8 hours  norepinephrine Infusion 0.05 MICROgram(s)/kG/Min (8.3 mL/Hr) IV Continuous <Continuous>  pantoprazole  Injectable 40 milliGRAM(s) IV Push daily  polyethylene glycol 3350 17 Gram(s) Oral daily  senna 2 Tablet(s) Oral at bedtime    MEDICATIONS  (PRN):  acetaminophen  Suppository .. 650 milliGRAM(s) Rectal every 6 hours PRN Temp greater or equal to 38C (100.4F)  albuterol    90 MICROgram(s) HFA Inhaler 2 Puff(s) Inhalation every 4 hours PRN Shortness of Breath and/or Wheezing  dextrose Oral Gel 15 Gram(s) Oral once PRN Blood Glucose LESS THAN 70 milliGRAM(s)/deciliter

## 2023-11-10 NOTE — OCCUPATIONAL THERAPY INITIAL EVALUATION ADULT - SPECIFY REASON(S)
Chart reviewed. As discussed with BARAK García, pt is unavailable for bedside OT IE 2/2 lethargic and currently with HD. OT to f/u when appropriate; BARAK García aware.

## 2023-11-10 NOTE — PROGRESS NOTE ADULT - SUBJECTIVE AND OBJECTIVE BOX
Nephrology Progress Note    AMANDA CASTORENA  MRN-[id number]  89y  Male    S:  Patient is seen and examined, events over the last 24h noted.    O:  Allergies:  No Known Allergies    Hospital Medications:   MEDICATIONS  (STANDING):  albuterol/ipratropium for Nebulization 3 milliLiter(s) Nebulizer every 6 hours  albuterol/ipratropium for Nebulization. 3 milliLiter(s) Nebulizer once  allopurinol 100 milliGRAM(s) Oral daily  aspirin  chewable 81 milliGRAM(s) Oral daily  carvedilol 25 milliGRAM(s) Oral every 12 hours  chlorhexidine 2% Cloths 1 Application(s) Topical <User Schedule>  citalopram 20 milliGRAM(s) Oral daily  clopidogrel Tablet 75 milliGRAM(s) Oral daily  dextrose 5%. 1000 milliLiter(s) (100 mL/Hr) IV Continuous <Continuous>  dextrose 5%. 1000 milliLiter(s) (50 mL/Hr) IV Continuous <Continuous>  dextrose 50% Injectable 50 milliLiter(s) IV Push every 15 minutes  gabapentin 300 milliGRAM(s) Oral daily  glucagon  Injectable 1 milliGRAM(s) IntraMuscular once  heparin   Injectable 5000 Unit(s) SubCutaneous every 8 hours  insulin regular Infusion 4 Unit(s)/Hr (4 mL/Hr) IV Continuous <Continuous>  midodrine 10 milliGRAM(s) Oral every 8 hours  pantoprazole  Injectable 40 milliGRAM(s) IV Push daily  polyethylene glycol 3350 17 Gram(s) Oral daily  senna 2 Tablet(s) Oral at bedtime    MEDICATIONS  (PRN):  acetaminophen  Suppository .. 650 milliGRAM(s) Rectal every 6 hours PRN Temp greater or equal to 38C (100.4F)  albuterol    90 MICROgram(s) HFA Inhaler 2 Puff(s) Inhalation every 4 hours PRN Shortness of Breath and/or Wheezing  dextrose Oral Gel 15 Gram(s) Oral once PRN Blood Glucose LESS THAN 70 milliGRAM(s)/deciliter    Home Medications:  allopurinol 100 mg oral tablet: 1 tab(s) orally once a day (25 Oct 2021 22:30)  citalopram 20 mg oral tablet: 1 tab(s) orally once a day (25 Oct 2021 22:30)  clonazePAM 1 mg oral tablet: 2 tab(s) orally once a day (at bedtime), As Needed (25 Oct 2021 22:30)  gabapentin 300 mg oral capsule: 1 cap(s) orally 2 times a day (25 Oct 2021 22:30)  glimepiride 4 mg oral tablet: 1 tab(s) orally 2 times a day (25 Oct 2021 22:30)  Lantus 100 units/mL subcutaneous solution: 30 unit(s) subcutaneous once a day (at bedtime) (25 Oct 2021 22:30)  NIFEdipine 60 mg oral tablet, extended release: 1 tab(s) orally once a day (25 Oct 2021 22:30)      VITALS:  Daily     Daily   T(F): 98.6 (11-10-23 @ 09:45), Max: 99.3 (11-09-23 @ 23:01)  HR: 62 (11-10-23 @ 09:45)  BP: 118/54 (11-10-23 @ 09:45)  RR: 17 (11-10-23 @ 09:45)  SpO2: 100% (11-10-23 @ 09:45)  Wt(kg): --  I&O's Detail    09 Nov 2023 07:01  -  10 Nov 2023 07:00  --------------------------------------------------------  IN:    Enteral Tube Flush: 100 mL    Enteral Tube Flush: 450 mL    Glucerna: 1320 mL    Insulin: 46 mL  Total IN: 1916 mL    OUT:    Incontinent per Collection Bag (mL): 40 mL    Voided (mL): 600 mL  Total OUT: 640 mL    Total NET: 1276 mL        I&O's Summary    09 Nov 2023 07:01  -  10 Nov 2023 07:00  --------------------------------------------------------  IN: 1916 mL / OUT: 640 mL / NET: 1276 mL          PHYSICAL EXAM:  Gen: NAD  Chest: b/l breath sounds  Abd: soft  Extremities: no edema  Vascular access:       LABS:  ABG - ( 10 Nov 2023 06:43 )  pH, Arterial: 7.50  pH, Blood: x     /  pCO2: 39    /  pO2: 80    / HCO3: 30    / Base Excess: 6.7   /  SaO2: 97.5      11-10    143  |  101  |  97<HH>  ----------------------------<  163<H>  4.1   |  28  |  3.4<H>    Ca    7.9<L>      10 Nov 2023 05:48  Phos  3.7     11-10  Mg     2.4     11-10    TPro  5.9<L>  /  Alb  2.8<L>  /  TBili  0.3  /  DBili      /  AST  13  /  ALT  14  /  AlkPhos  91  11-09    Phosphorus: 3.7 mg/dL (11-10-23 @ 05:48)  Phosphorus: 3.2 mg/dL (11-09-23 @ 15:10)                            9.9    8.52  )-----------( 236      ( 09 Nov 2023 05:18 )             31.3     Mean Cell Volume: 90.2 fL (11-09-23 @ 05:18)    Creatinine trend:  Creatinine: 3.4 mg/dL (11-10-23 @ 05:48)  Creatinine: 2.9 mg/dL (11-09-23 @ 15:10)  Creatinine: 2.6 mg/dL (11-09-23 @ 05:18)  Creatinine: 1.8 mg/dL (11-08-23 @ 18:17)  Creatinine: 3.7 mg/dL (11-08-23 @ 06:09)  Creatinine: 3.5 mg/dL (11-07-23 @ 15:17)  Creatinine: 3.4 mg/dL (11-07-23 @ 11:28) Nephrology Progress Note    AMANDA CASTORENA  MRN-[id number]  89y  Male    S:  Patient is seen and examined, events over the last 24h noted.    O:  Allergies:  No Known Allergies    Hospital Medications:   MEDICATIONS  (STANDING):  albuterol/ipratropium for Nebulization 3 milliLiter(s) Nebulizer every 6 hours  albuterol/ipratropium for Nebulization. 3 milliLiter(s) Nebulizer once  allopurinol 100 milliGRAM(s) Oral daily  aspirin  chewable 81 milliGRAM(s) Oral daily  carvedilol 25 milliGRAM(s) Oral every 12 hours  chlorhexidine 2% Cloths 1 Application(s) Topical <User Schedule>  citalopram 20 milliGRAM(s) Oral daily  clopidogrel Tablet 75 milliGRAM(s) Oral daily  gabapentin 300 milliGRAM(s) Oral daily  heparin   Injectable 5000 Unit(s) SubCutaneous every 8 hours  insulin regular Infusion 4 Unit(s)/Hr (4 mL/Hr) IV Continuous <Continuous>  midodrine 10 milliGRAM(s) Oral every 8 hours  pantoprazole  Injectable 40 milliGRAM(s) IV Push daily  polyethylene glycol 3350 17 Gram(s) Oral daily  senna 2 Tablet(s) Oral at bedtime    MEDICATIONS  (PRN):  acetaminophen  Suppository .. 650 milliGRAM(s) Rectal every 6 hours PRN Temp greater or equal to 38C (100.4F)  albuterol    90 MICROgram(s) HFA Inhaler 2 Puff(s) Inhalation every 4 hours PRN Shortness of Breath and/or Wheezing  dextrose Oral Gel 15 Gram(s) Oral once PRN Blood Glucose LESS THAN 70 milliGRAM(s)/deciliter    Home Medications:  allopurinol 100 mg oral tablet: 1 tab(s) orally once a day (25 Oct 2021 22:30)  citalopram 20 mg oral tablet: 1 tab(s) orally once a day (25 Oct 2021 22:30)  clonazePAM 1 mg oral tablet: 2 tab(s) orally once a day (at bedtime), As Needed (25 Oct 2021 22:30)  gabapentin 300 mg oral capsule: 1 cap(s) orally 2 times a day (25 Oct 2021 22:30)  glimepiride 4 mg oral tablet: 1 tab(s) orally 2 times a day (25 Oct 2021 22:30)  Lantus 100 units/mL subcutaneous solution: 30 unit(s) subcutaneous once a day (at bedtime) (25 Oct 2021 22:30)  NIFEdipine 60 mg oral tablet, extended release: 1 tab(s) orally once a day (25 Oct 2021 22:30)      VITALS:  T(F): 98.6 (11-10-23 @ 09:45), Max: 99.3 (11-09-23 @ 23:01)  HR: 62 (11-10-23 @ 09:45)  BP: 118/54 (11-10-23 @ 09:45)  RR: 17 (11-10-23 @ 09:45)  SpO2: 100% (11-10-23 @ 09:45)  Wt(kg): --  I&O's Detail    09 Nov 2023 07:01  -  10 Nov 2023 07:00  --------------------------------------------------------  IN:    Enteral Tube Flush: 100 mL    Enteral Tube Flush: 450 mL    Glucerna: 1320 mL    Insulin: 46 mL  Total IN: 1916 mL    OUT:    Incontinent per Collection Bag (mL): 40 mL    Voided (mL): 600 mL  Total OUT: 640 mL    Total NET: 1276 mL        I&O's Summary    09 Nov 2023 07:01  -  10 Nov 2023 07:00  --------------------------------------------------------  IN: 1916 mL / OUT: 640 mL / NET: 1276 mL          PHYSICAL EXAM:  Gen: lethargic, on nc, +NGT   Chest: b/l breath sounds, decreased on the left  Abd: soft  Extremities: no edema  Vascular access: udall      LABS:  ABG - ( 10 Nov 2023 06:43 )  pH, Arterial: 7.50  pH, Blood: x     /  pCO2: 39    /  pO2: 80    / HCO3: 30    / Base Excess: 6.7   /  SaO2: 97.5      11-10    143  |  101  |  97<HH>  ----------------------------<  163<H>  4.1   |  28  |  3.4<H>    Ca    7.9<L>      10 Nov 2023 05:48  Phos  3.7     11-10  Mg     2.4     11-10    TPro  5.9<L>  /  Alb  2.8<L>  /  TBili  0.3  /  DBili      /  AST  13  /  ALT  14  /  AlkPhos  91  11-09    Phosphorus: 3.7 mg/dL (11-10-23 @ 05:48)  Phosphorus: 3.2 mg/dL (11-09-23 @ 15:10)                            9.9    8.52  )-----------( 236      ( 09 Nov 2023 05:18 )             31.3     Mean Cell Volume: 90.2 fL (11-09-23 @ 05:18)    Creatinine trend:  Creatinine: 3.4 mg/dL (11-10-23 @ 05:48)  Creatinine: 2.9 mg/dL (11-09-23 @ 15:10)  Creatinine: 2.6 mg/dL (11-09-23 @ 05:18)  Creatinine: 1.8 mg/dL (11-08-23 @ 18:17)  Creatinine: 3.7 mg/dL (11-08-23 @ 06:09)  Creatinine: 3.5 mg/dL (11-07-23 @ 15:17)  Creatinine: 3.4 mg/dL (11-07-23 @ 11:28)

## 2023-11-10 NOTE — PROGRESS NOTE ADULT - ATTENDING COMMENTS
Attending Statement: I have personally performed a face to face diagnostic evaluation on this patient. The patient is suffering from:  ARF secondary to pulmonary edema fluid overload   Likely right aspiration pneumonia    DKA   alter mental status   sepsis fever   PARKER   metabolc acidosis     I have made amendments to the documentation where necessary. I have personally seen and examined this patient.  I have fully participated in the care of this patient.  I have reviewed all pertinent clinical information, including history, physical exam, plan and note.

## 2023-11-10 NOTE — SWALLOW BEDSIDE ASSESSMENT ADULT - COMMENTS
SLP attempted to see pt for FEES this AM however pt was lethargic, minimally responsive, receiving HD, and being put on HFNC d/t desaturation. Not appropriate for FEES.    Later this AM after completion of HD, pt was awake, alert, and back on 2L NC. Seen for bedside swallow eval at this time. + toleration observed without overt symptoms of penetration/aspiration.   Due to waxing/waning status pt is not appropriate for full puree diet, but allow for bites of puree and ice chips for comfort, only when most alert/awake and on NC. Discontinue with s/s aspiration. Keep NGT in place.

## 2023-11-10 NOTE — PROGRESS NOTE ADULT - ASSESSMENT
# Severe PARKER likely due to sepsis. Pt has Screat up to ~ 2.0 as far back as June 2021  # Hyperkalemia, given IV CaGluc and Lokelma, plus Bumex IV resolved  # Urine chemistries c/w pre-renal state / CRS w/ FeNa 0.3%, FeUrea 14%  # HAGMA d/t DKA / renal failure    - started HD on 11/3  - non oliguric  - creat level up-trending pre-HD    Recommendations:  - HD today, 3hrs, optiflux dialyzer, 3K+ bath, 2L UF as tolerated   - BP noted;  cont midodrine pre-HD as needed to allow for UF  - consider changing carvedilol to metoprolol for less BP effect  - cont bumex to maximize urine output (non oliguric)  - continue monitoring urine output and daily chemistry  - monitor daily bladder scan  - phos level at goal  - blood glucose better controlled; appreciate endo f/u  - completed course of cefepime;  appreciate ID f/u   - surgery eval for TDC placement  - worse mental status today, ?neuro f/u / GOC

## 2023-11-10 NOTE — PROGRESS NOTE ADULT - ASSESSMENT
89y Male  s/p     NEURO:  #Acute pain    -APAP prn    -Gabapentin 300mg BID (renally dose)  #Hx anxiety  Continue home medications:  -citalopram 20mg QD  -clonazepam 2mg prn at bedtime     RESP:   #DNI  #Oxygenation  Alternate BIPAP PRN and overnight with daytime HFNC. Wean down Fio2. Chest PT and frequent suctioning.  repeat mrsa pending      CARDS:   #Hx HFPEF  -Keep negative balance as tolerated. BNP markedly elevated. HFPEF on echo. LVEF 61%  -continue bumex  -replete lytes  -on midodrine 10 q8 started by nephro 11/5      GI/NUTR:   #failed S/S--Feeds via NG tube  -reconsulting Nutrition, last saw on 11/1  #GI Prophylaxis   -continue pantoprazole  #Bowel regimen    -continue senna    /RENAL:   # Severe PARKER likely due to sepsis. Pt has Screat up to ~ 2.0 as far back as June 2021  # Hyperkalemia, given IV CaGluc and Lokelma, plus Bumex IV resolved  # Urine chemistries c/w pre-renal state / CRS w/ FeNa 0.3%, FeUrea 14%  -Nephro following R serena in place, last dialyzed 11/6 >>plan for dialysis today    #urine output in critically ill    -indwelling bowden (placed     Current Rx:     Labs:          BUN/Cr- 68/2.6  -->,  78/2.9  -->          Electrolytes-Na 143 // K 4.1 // Mg 2.1 //  Phos 3.2 (11-09 @ 15:10)      Home Rx:         Home Rx:          HEME/ONC:   #DVT prophylaxis     -SCDs     -hep subQ    - continue home ASN, Plavix       Labs: Hb/Hct:  9.0/27.0  (11-05 @ 08:00)  -->,  9.9/30.6  (11-06 @ 05:18)  -->                      Plts:  240  -->,  273  -->                 PTT/INR:           ID:    WBC- 7.43  --->>,  8.62  --->>,  8.52  --->>  Temp trend- 24hrs T(F): 99.1 (11-10 @ 06:00), Max: 99.3 (11-09 @ 23:01)  Current antibiotics-        ENDO:  #Hx DM  #DKA  -discussed optimizing diet with nutrition yesterday  -fingersticks notably elevated yesterday, continued to rise despite increasing sliding scale and additional lispro  -insulin drip started overnight with improved glucose control.  -FSG q6 if NPO or Tube feeds    -Glucose goal 140-180. if above 180 start ISS        ADVANCED DIRECTIVES: DNR/DNI  -discussed with son Bert via phone, and daughter at bedside yesterday about permanent dialysis catheter, they intended to discuss with patient and let team know today          DISPO:   ICU 89y Male admitted for DKA, treatment course complicated by CHF exacerbation/fluid overload    Interval/Overnight events:   -fingersticks notably elevated yesterday, continued to rise despite increasing sliding scale and additional lispro  -insulin drip started overnight with improved glucose control.  -this morning patient noted to be more lethargic, confused, blood pressure dropped on dialysis to 80s/40s, started on low dose levo drip while dialysis ongoing    NEURO:  #Acute pain    -APAP prn    -Gabapentin 300mg BID (renally dose)  #Hx anxiety  Continue home medications:  -citalopram 20mg QD  avoid sedative medications     RESP:   #DNI  #Oxygenation  Alternate BIPAP PRN and overnight with daytime HFNC. Wean down Fio2. Chest PT and frequent suctioning.  11/6 repeat MRSA negative      CARDS:   #Hx HFPEF  -Keep negative balance as tolerated. BNP markedly elevated. HFPEF on echo. LVEF 61%  -continue bumex  -replete lytes  -on midodrine 10 q8 started by nephro 11/5      GI/NUTR:   #failed S/S--Feeds via NG tube  -reconsulting Nutrition, last saw on 11/1  #GI Prophylaxis   -continue pantoprazole  #Bowel regimen    -continue senna    /RENAL:   # Severe PARKER likely due to sepsis. Pt has Screat up to ~ 2.0 as far back as June 2021  # Hyperkalemia, given IV CaGluc and Lokelma, plus Bumex IV resolved  # Urine chemistries c/w pre-renal state / CRS w/ FeNa 0.3%, FeUrea 14%  -Nephro following R serena in place, last dialyzed 11/6 >>plan for dialysis today    #urine output in critically ill    -indwelling bowden (placed     Current Rx:     Labs:          BUN/Cr- 68/2.6  -->,  78/2.9  -->          Electrolytes-Na 143 // K 4.1 // Mg 2.1 //  Phos 3.2 (11-09 @ 15:10)      Home Rx:         Home Rx:          HEME/ONC:   #DVT prophylaxis     -SCDs     -hep subQ    - continue home ASN, Plavix       Labs: Hb/Hct:  9.0/27.0  (11-05 @ 08:00)  -->,  9.9/30.6  (11-06 @ 05:18)  -->                      Plts:  240  -->,  273  -->                 PTT/INR:           ID:    WBC- 7.43  --->>,  8.62  --->>,  8.52  --->>  Temp trend- 24hrs T(F): 99.1 (11-10 @ 06:00), Max: 99.3 (11-09 @ 23:01)  Current antibiotics-        ENDO:  #Hx DM  #DKA  -discussed optimizing diet with nutrition yesterday  -fingersticks notably elevated yesterday, continued to rise despite increasing sliding scale and additional lispro  -insulin drip started overnight with improved glucose control.  -FSG q6 if NPO or Tube feeds    -Glucose goal 140-180. if above 180 start ISS        ADVANCED DIRECTIVES: DNR/DNI  -discussed with son Bert via phone, and daughter at bedside yesterday about permanent dialysis catheter, they intended to discuss with patient and let team know today       DISPO:   ICU

## 2023-11-10 NOTE — PROGRESS NOTE ADULT - SUBJECTIVE AND OBJECTIVE BOX
Patient is a 89y old  Male who presents with a chief complaint of sob (10 Nov 2023 07:15)        Over Night Events: remains on NC, Confused.         ROS:     All ROS are negative except HPI         PHYSICAL EXAM    ICU Vital Signs Last 24 Hrs  T(C): 37.1 (10 Nov 2023 07:00), Max: 37.4 (09 Nov 2023 23:01)  T(F): 98.8 (10 Nov 2023 07:00), Max: 99.3 (09 Nov 2023 23:01)  HR: 70 (10 Nov 2023 07:00) (61 - 80)  BP: 145/65 (10 Nov 2023 07:00) (97/49 - 146/60)  BP(mean): 93 (10 Nov 2023 07:00) (69 - 93)  ABP: --  ABP(mean): --  RR: 18 (10 Nov 2023 07:00) (18 - 30)  SpO2: 100% (10 Nov 2023 07:00) (98% - 100%)    O2 Parameters below as of 10 Nov 2023 06:00  Patient On (Oxygen Delivery Method): nasal cannula            CONSTITUTIONAL:  ill appearing    ENT:   Airway patent,   Mouth with normal mucosa.   NC      EYES:   Pupils equal,   Round and reactive to light.    CARDIAC:   Normal rate,   Regular rhythm.    No edema      Vascular:  Normal systolic impulse  No Carotid bruits    RESPIRATORY:   No wheezing  Bilateral BS  Normal chest expansion  Not tachypneic,  No use of accessory muscles    GASTROINTESTINAL:  Abdomen soft,   Non-tender,   No guarding,   + BS    MUSCULOSKELETAL:   Range of motion is not limited,  No clubbing, cyanosis    NEUROLOGICAL:   confused       11-09-23 @ 07:01  -  11-10-23 @ 07:00  --------------------------------------------------------  IN:    Enteral Tube Flush: 100 mL    Enteral Tube Flush: 450 mL    Glucerna: 1320 mL    Insulin: 46 mL  Total IN: 1916 mL    OUT:    Incontinent per Collection Bag (mL): 40 mL    Voided (mL): 600 mL  Total OUT: 640 mL    Total NET: 1276 mL          LABS:                            9.9    8.52  )-----------( 236      ( 09 Nov 2023 05:18 )             31.3                                               11-10    143  |  101  |  97<HH>  ----------------------------<  163<H>  4.1   |  28  |  3.4<H>    Ca    7.9<L>      10 Nov 2023 05:48  Phos  3.7     11-10  Mg     2.4     11-10    TPro  5.9<L>  /  Alb  2.8<L>  /  TBili  0.3  /  DBili  x   /  AST  13  /  ALT  14  /  AlkPhos  91  11-09                                             Urinalysis Basic - ( 10 Nov 2023 05:48 )    Color: x / Appearance: x / SG: x / pH: x  Gluc: 163 mg/dL / Ketone: x  / Bili: x / Urobili: x   Blood: x / Protein: x / Nitrite: x   Leuk Esterase: x / RBC: x / WBC x   Sq Epi: x / Non Sq Epi: x / Bacteria: x                                                  LIVER FUNCTIONS - ( 09 Nov 2023 15:10 )  Alb: 2.8 g/dL / Pro: 5.9 g/dL / ALK PHOS: 91 U/L / ALT: 14 U/L / AST: 13 U/L / GGT: x                                                                                                                                   ABG - ( 10 Nov 2023 06:43 )  pH, Arterial: 7.50  pH, Blood: x     /  pCO2: 39    /  pO2: 80    / HCO3: 30    / Base Excess: 6.7   /  SaO2: 97.5                MEDICATIONS  (STANDING):  albuterol/ipratropium for Nebulization 3 milliLiter(s) Nebulizer every 6 hours  albuterol/ipratropium for Nebulization. 3 milliLiter(s) Nebulizer once  allopurinol 100 milliGRAM(s) Oral daily  aspirin  chewable 81 milliGRAM(s) Oral daily  buMETAnide Injectable 2 milliGRAM(s) IV Push every 12 hours  carvedilol 25 milliGRAM(s) Oral every 12 hours  chlorhexidine 2% Cloths 1 Application(s) Topical <User Schedule>  citalopram 20 milliGRAM(s) Oral daily  clopidogrel Tablet 75 milliGRAM(s) Oral daily  dextrose 5%. 1000 milliLiter(s) (50 mL/Hr) IV Continuous <Continuous>  dextrose 5%. 1000 milliLiter(s) (100 mL/Hr) IV Continuous <Continuous>  dextrose 50% Injectable 50 milliLiter(s) IV Push every 15 minutes  gabapentin 300 milliGRAM(s) Oral daily  glucagon  Injectable 1 milliGRAM(s) IntraMuscular once  heparin   Injectable 5000 Unit(s) SubCutaneous every 8 hours  insulin regular Infusion 4 Unit(s)/Hr (4 mL/Hr) IV Continuous <Continuous>  midodrine 10 milliGRAM(s) Oral every 8 hours  pantoprazole  Injectable 40 milliGRAM(s) IV Push daily  polyethylene glycol 3350 17 Gram(s) Oral daily  senna 2 Tablet(s) Oral at bedtime    MEDICATIONS  (PRN):  acetaminophen  Suppository .. 650 milliGRAM(s) Rectal every 6 hours PRN Temp greater or equal to 38C (100.4F)  albuterol    90 MICROgram(s) HFA Inhaler 2 Puff(s) Inhalation every 4 hours PRN Shortness of Breath and/or Wheezing  dextrose Oral Gel 15 Gram(s) Oral once PRN Blood Glucose LESS THAN 70 milliGRAM(s)/deciliter      New X-rays reviewed:                                                                                  ECHO    CXR interpreted by me:       Patient is a 89y old  Male who presents with a chief complaint of sob (10 Nov 2023 07:15)        Over Night Events: remains on NC, Confused. on insulin drip and levo support undegoing HD.         ROS:     All ROS are negative except HPI         PHYSICAL EXAM    ICU Vital Signs Last 24 Hrs  T(C): 37.1 (10 Nov 2023 07:00), Max: 37.4 (09 Nov 2023 23:01)  T(F): 98.8 (10 Nov 2023 07:00), Max: 99.3 (09 Nov 2023 23:01)  HR: 70 (10 Nov 2023 07:00) (61 - 80)  BP: 145/65 (10 Nov 2023 07:00) (97/49 - 146/60)  BP(mean): 93 (10 Nov 2023 07:00) (69 - 93)  ABP: --  ABP(mean): --  RR: 18 (10 Nov 2023 07:00) (18 - 30)  SpO2: 100% (10 Nov 2023 07:00) (98% - 100%)    O2 Parameters below as of 10 Nov 2023 06:00  Patient On (Oxygen Delivery Method): nasal cannula            CONSTITUTIONAL:  ill appearing    ENT:   Airway patent,   Mouth with normal mucosa.   NC      EYES:   Pupils equal,   Round and reactive to light.    CARDIAC:   Normal rate,   Regular rhythm.    No edema      Vascular:  Normal systolic impulse  No Carotid bruits    RESPIRATORY:   No wheezing  Bilateral BS  Normal chest expansion  Not tachypneic,  No use of accessory muscles    GASTROINTESTINAL:  Abdomen soft,   Non-tender,   No guarding,   + BS    MUSCULOSKELETAL:   Range of motion is not limited,  No clubbing, cyanosis    NEUROLOGICAL:   confused       11-09-23 @ 07:01  -  11-10-23 @ 07:00  --------------------------------------------------------  IN:    Enteral Tube Flush: 100 mL    Enteral Tube Flush: 450 mL    Glucerna: 1320 mL    Insulin: 46 mL  Total IN: 1916 mL    OUT:    Incontinent per Collection Bag (mL): 40 mL    Voided (mL): 600 mL  Total OUT: 640 mL    Total NET: 1276 mL          LABS:                            9.9    8.52  )-----------( 236      ( 09 Nov 2023 05:18 )             31.3                                               11-10    143  |  101  |  97<HH>  ----------------------------<  163<H>  4.1   |  28  |  3.4<H>    Ca    7.9<L>      10 Nov 2023 05:48  Phos  3.7     11-10  Mg     2.4     11-10    TPro  5.9<L>  /  Alb  2.8<L>  /  TBili  0.3  /  DBili  x   /  AST  13  /  ALT  14  /  AlkPhos  91  11-09                                             Urinalysis Basic - ( 10 Nov 2023 05:48 )    Color: x / Appearance: x / SG: x / pH: x  Gluc: 163 mg/dL / Ketone: x  / Bili: x / Urobili: x   Blood: x / Protein: x / Nitrite: x   Leuk Esterase: x / RBC: x / WBC x   Sq Epi: x / Non Sq Epi: x / Bacteria: x                                                  LIVER FUNCTIONS - ( 09 Nov 2023 15:10 )  Alb: 2.8 g/dL / Pro: 5.9 g/dL / ALK PHOS: 91 U/L / ALT: 14 U/L / AST: 13 U/L / GGT: x                                                                                                                                   ABG - ( 10 Nov 2023 06:43 )  pH, Arterial: 7.50  pH, Blood: x     /  pCO2: 39    /  pO2: 80    / HCO3: 30    / Base Excess: 6.7   /  SaO2: 97.5                MEDICATIONS  (STANDING):  albuterol/ipratropium for Nebulization 3 milliLiter(s) Nebulizer every 6 hours  albuterol/ipratropium for Nebulization. 3 milliLiter(s) Nebulizer once  allopurinol 100 milliGRAM(s) Oral daily  aspirin  chewable 81 milliGRAM(s) Oral daily  buMETAnide Injectable 2 milliGRAM(s) IV Push every 12 hours  carvedilol 25 milliGRAM(s) Oral every 12 hours  chlorhexidine 2% Cloths 1 Application(s) Topical <User Schedule>  citalopram 20 milliGRAM(s) Oral daily  clopidogrel Tablet 75 milliGRAM(s) Oral daily  dextrose 5%. 1000 milliLiter(s) (50 mL/Hr) IV Continuous <Continuous>  dextrose 5%. 1000 milliLiter(s) (100 mL/Hr) IV Continuous <Continuous>  dextrose 50% Injectable 50 milliLiter(s) IV Push every 15 minutes  gabapentin 300 milliGRAM(s) Oral daily  glucagon  Injectable 1 milliGRAM(s) IntraMuscular once  heparin   Injectable 5000 Unit(s) SubCutaneous every 8 hours  insulin regular Infusion 4 Unit(s)/Hr (4 mL/Hr) IV Continuous <Continuous>  midodrine 10 milliGRAM(s) Oral every 8 hours  pantoprazole  Injectable 40 milliGRAM(s) IV Push daily  polyethylene glycol 3350 17 Gram(s) Oral daily  senna 2 Tablet(s) Oral at bedtime    MEDICATIONS  (PRN):  acetaminophen  Suppository .. 650 milliGRAM(s) Rectal every 6 hours PRN Temp greater or equal to 38C (100.4F)  albuterol    90 MICROgram(s) HFA Inhaler 2 Puff(s) Inhalation every 4 hours PRN Shortness of Breath and/or Wheezing  dextrose Oral Gel 15 Gram(s) Oral once PRN Blood Glucose LESS THAN 70 milliGRAM(s)/deciliter      New X-rays reviewed:                                                                                  ECHO    CXR interpreted by me:

## 2023-11-10 NOTE — PROGRESS NOTE ADULT - ASSESSMENT
IMPRESSION:    ARF secondary to pulmonary edema fluid overload   Likely right aspiration pneumonia    DKA   alter mental status   sepsis fever   PARKER   metabolc acidosis     PLAN:    CNS: neurology follow. No seizure on EEG.     HEENT: oral care     PULMONARY: trial off BIPAP and HHFNC. Wean down Fio2. Chest PT and frequent suctioning.      CARDIOVASCULAR  BNP markedly elevated. HFPEF on echo. diamox with bumex    GI: GI ppx. NGT feeding as tolerated Speech and swallow re-evaluation.     RENAL:   HD per nephrology Keep negative balance as tolerated.     INFECTIOUS DISEASE:   abx per ID epeat MRSA , Procal     HEMATOLOGICAL:  DVT prophylaxis. Keep hg more than 7.     ENDOCRINE:  Insulin SQ protocol. NGT for feeding. Lispro Lantus when starts oral diet.     MUSCULOSKELETAL: PT OT.     CODE STATUS: DNI DNR. Pall care follow up.     SDU   IMPRESSION:    ARF secondary to pulmonary edema fluid overload   Likely right aspiration pneumonia    Hyperglycemia   alter mental status   sepsis fever   PARKER   metabolc acidosis     PLAN:    CNS: neurology follow. No seizure on EEG.     HEENT: oral care     PULMONARY: Trial of HHFNC . Wean down Fio2. Chest PT and frequent suctioning.      CARDIOVASCULAR  BNP markedly elevated. HFPEF on echo. Hold bumex  and diamox pending gas after HD.    GI: GI ppx. NGT feeding as tolerated Speech and swallow re-evaluation.     RENAL:   HD per nephrology Keep negative balance as tolerated.     INFECTIOUS DISEASE:   abx per ID , Procal     HEMATOLOGICAL:  DVT prophylaxis. Keep hg more than 7.     ENDOCRINE:  Insulin SQ protocol. NGT for feeding. Lispro Lantus when starts oral diet.     MUSCULOSKELETAL: PT OT.     CODE STATUS: DNI DNR. Pall care follow up.     MICu  IMPRESSION:    ARF secondary to pulmonary edema fluid overload   Likely right aspiration pneumonia    Hyperglycemia   alter mental status   sepsis fever   PARKER   metabolc acidosis     PLAN:    CNS: neurology follow. No seizure on EEG.     HEENT: oral care     PULMONARY:   Wean down Fio2. Chest PT and frequent suctioning.      CARDIOVASCULAR  BNP markedly elevated. HFPEF on echo.   Hold bumex  and diamox pending gas after HD.    GI: GI ppx. NGT feeding as tolerated Speech and swallow re-evaluation.     RENAL:   HD per nephrology Keep negative balance as tolerated.     INFECTIOUS DISEASE:   abx per ID , Procal     HEMATOLOGICAL:  DVT prophylaxis. Keep hg more than 7.     ENDOCRINE:  Insulin SQ protocol. NGT for feeding. Lispro Lantus when starts oral diet.     MUSCULOSKELETAL: PT OT.     CODE STATUS: DNI DNR. Pall care follow up.     MICu

## 2023-11-11 LAB
ANION GAP SERPL CALC-SCNC: 12 MMOL/L — SIGNIFICANT CHANGE UP (ref 7–14)
BASOPHILS # BLD AUTO: 0.03 K/UL — SIGNIFICANT CHANGE UP (ref 0–0.2)
BASOPHILS # BLD AUTO: 0.03 K/UL — SIGNIFICANT CHANGE UP (ref 0–0.2)
BASOPHILS NFR BLD AUTO: 0.3 % — SIGNIFICANT CHANGE UP (ref 0–1)
BASOPHILS NFR BLD AUTO: 0.3 % — SIGNIFICANT CHANGE UP (ref 0–1)
BUN SERPL-MCNC: 77 MG/DL — CRITICAL HIGH (ref 10–20)
BUN SERPL-MCNC: 77 MG/DL — CRITICAL HIGH (ref 10–20)
BUN SERPL-MCNC: 88 MG/DL — CRITICAL HIGH (ref 10–20)
BUN SERPL-MCNC: 88 MG/DL — CRITICAL HIGH (ref 10–20)
CA-I BLD-SCNC: 4.6 MG/DL — SIGNIFICANT CHANGE UP (ref 4.5–5.6)
CA-I BLD-SCNC: 4.6 MG/DL — SIGNIFICANT CHANGE UP (ref 4.5–5.6)
CALCIUM SERPL-MCNC: 7.6 MG/DL — LOW (ref 8.4–10.5)
CALCIUM SERPL-MCNC: 7.6 MG/DL — LOW (ref 8.4–10.5)
CALCIUM SERPL-MCNC: 7.9 MG/DL — LOW (ref 8.4–10.5)
CALCIUM SERPL-MCNC: 7.9 MG/DL — LOW (ref 8.4–10.5)
CHLORIDE SERPL-SCNC: 100 MMOL/L — SIGNIFICANT CHANGE UP (ref 98–110)
CHLORIDE SERPL-SCNC: 100 MMOL/L — SIGNIFICANT CHANGE UP (ref 98–110)
CHLORIDE SERPL-SCNC: 99 MMOL/L — SIGNIFICANT CHANGE UP (ref 98–110)
CHLORIDE SERPL-SCNC: 99 MMOL/L — SIGNIFICANT CHANGE UP (ref 98–110)
CO2 SERPL-SCNC: 27 MMOL/L — SIGNIFICANT CHANGE UP (ref 17–32)
CO2 SERPL-SCNC: 27 MMOL/L — SIGNIFICANT CHANGE UP (ref 17–32)
CO2 SERPL-SCNC: 28 MMOL/L — SIGNIFICANT CHANGE UP (ref 17–32)
CO2 SERPL-SCNC: 28 MMOL/L — SIGNIFICANT CHANGE UP (ref 17–32)
CREAT SERPL-MCNC: 2.4 MG/DL — HIGH (ref 0.7–1.5)
CREAT SERPL-MCNC: 2.4 MG/DL — HIGH (ref 0.7–1.5)
CREAT SERPL-MCNC: 2.5 MG/DL — HIGH (ref 0.7–1.5)
CREAT SERPL-MCNC: 2.5 MG/DL — HIGH (ref 0.7–1.5)
EGFR: 24 ML/MIN/1.73M2 — LOW
EGFR: 24 ML/MIN/1.73M2 — LOW
EGFR: 25 ML/MIN/1.73M2 — LOW
EGFR: 25 ML/MIN/1.73M2 — LOW
EOSINOPHIL # BLD AUTO: 0.1 K/UL — SIGNIFICANT CHANGE UP (ref 0–0.7)
EOSINOPHIL # BLD AUTO: 0.1 K/UL — SIGNIFICANT CHANGE UP (ref 0–0.7)
EOSINOPHIL NFR BLD AUTO: 1.1 % — SIGNIFICANT CHANGE UP (ref 0–8)
EOSINOPHIL NFR BLD AUTO: 1.1 % — SIGNIFICANT CHANGE UP (ref 0–8)
GLUCOSE BLDC GLUCOMTR-MCNC: 102 MG/DL — HIGH (ref 70–99)
GLUCOSE BLDC GLUCOMTR-MCNC: 102 MG/DL — HIGH (ref 70–99)
GLUCOSE BLDC GLUCOMTR-MCNC: 104 MG/DL — HIGH (ref 70–99)
GLUCOSE BLDC GLUCOMTR-MCNC: 104 MG/DL — HIGH (ref 70–99)
GLUCOSE BLDC GLUCOMTR-MCNC: 112 MG/DL — HIGH (ref 70–99)
GLUCOSE BLDC GLUCOMTR-MCNC: 112 MG/DL — HIGH (ref 70–99)
GLUCOSE BLDC GLUCOMTR-MCNC: 131 MG/DL — HIGH (ref 70–99)
GLUCOSE BLDC GLUCOMTR-MCNC: 156 MG/DL — HIGH (ref 70–99)
GLUCOSE BLDC GLUCOMTR-MCNC: 156 MG/DL — HIGH (ref 70–99)
GLUCOSE BLDC GLUCOMTR-MCNC: 189 MG/DL — HIGH (ref 70–99)
GLUCOSE BLDC GLUCOMTR-MCNC: 189 MG/DL — HIGH (ref 70–99)
GLUCOSE BLDC GLUCOMTR-MCNC: 220 MG/DL — HIGH (ref 70–99)
GLUCOSE BLDC GLUCOMTR-MCNC: 220 MG/DL — HIGH (ref 70–99)
GLUCOSE BLDC GLUCOMTR-MCNC: 235 MG/DL — HIGH (ref 70–99)
GLUCOSE BLDC GLUCOMTR-MCNC: 235 MG/DL — HIGH (ref 70–99)
GLUCOSE BLDC GLUCOMTR-MCNC: 249 MG/DL — HIGH (ref 70–99)
GLUCOSE BLDC GLUCOMTR-MCNC: 249 MG/DL — HIGH (ref 70–99)
GLUCOSE BLDC GLUCOMTR-MCNC: 270 MG/DL — HIGH (ref 70–99)
GLUCOSE BLDC GLUCOMTR-MCNC: 270 MG/DL — HIGH (ref 70–99)
GLUCOSE BLDC GLUCOMTR-MCNC: 278 MG/DL — HIGH (ref 70–99)
GLUCOSE BLDC GLUCOMTR-MCNC: 278 MG/DL — HIGH (ref 70–99)
GLUCOSE BLDC GLUCOMTR-MCNC: 287 MG/DL — HIGH (ref 70–99)
GLUCOSE BLDC GLUCOMTR-MCNC: 287 MG/DL — HIGH (ref 70–99)
GLUCOSE BLDC GLUCOMTR-MCNC: 297 MG/DL — HIGH (ref 70–99)
GLUCOSE BLDC GLUCOMTR-MCNC: 297 MG/DL — HIGH (ref 70–99)
GLUCOSE BLDC GLUCOMTR-MCNC: 304 MG/DL — HIGH (ref 70–99)
GLUCOSE BLDC GLUCOMTR-MCNC: 304 MG/DL — HIGH (ref 70–99)
GLUCOSE BLDC GLUCOMTR-MCNC: 307 MG/DL — HIGH (ref 70–99)
GLUCOSE BLDC GLUCOMTR-MCNC: 307 MG/DL — HIGH (ref 70–99)
GLUCOSE BLDC GLUCOMTR-MCNC: 316 MG/DL — HIGH (ref 70–99)
GLUCOSE BLDC GLUCOMTR-MCNC: 316 MG/DL — HIGH (ref 70–99)
GLUCOSE SERPL-MCNC: 114 MG/DL — HIGH (ref 70–99)
GLUCOSE SERPL-MCNC: 114 MG/DL — HIGH (ref 70–99)
GLUCOSE SERPL-MCNC: 264 MG/DL — HIGH (ref 70–99)
GLUCOSE SERPL-MCNC: 264 MG/DL — HIGH (ref 70–99)
HCT VFR BLD CALC: 29.5 % — LOW (ref 42–52)
HCT VFR BLD CALC: 29.5 % — LOW (ref 42–52)
HGB BLD-MCNC: 9.4 G/DL — LOW (ref 14–18)
HGB BLD-MCNC: 9.4 G/DL — LOW (ref 14–18)
IMM GRANULOCYTES NFR BLD AUTO: 2.2 % — HIGH (ref 0.1–0.3)
IMM GRANULOCYTES NFR BLD AUTO: 2.2 % — HIGH (ref 0.1–0.3)
LYMPHOCYTES # BLD AUTO: 0.94 K/UL — LOW (ref 1.2–3.4)
LYMPHOCYTES # BLD AUTO: 0.94 K/UL — LOW (ref 1.2–3.4)
LYMPHOCYTES # BLD AUTO: 10.5 % — LOW (ref 20.5–51.1)
LYMPHOCYTES # BLD AUTO: 10.5 % — LOW (ref 20.5–51.1)
MAGNESIUM SERPL-MCNC: 2.4 MG/DL — SIGNIFICANT CHANGE UP (ref 1.8–2.4)
MAGNESIUM SERPL-MCNC: 2.4 MG/DL — SIGNIFICANT CHANGE UP (ref 1.8–2.4)
MCHC RBC-ENTMCNC: 28.5 PG — SIGNIFICANT CHANGE UP (ref 27–31)
MCHC RBC-ENTMCNC: 28.5 PG — SIGNIFICANT CHANGE UP (ref 27–31)
MCHC RBC-ENTMCNC: 31.9 G/DL — LOW (ref 32–37)
MCHC RBC-ENTMCNC: 31.9 G/DL — LOW (ref 32–37)
MCV RBC AUTO: 89.4 FL — SIGNIFICANT CHANGE UP (ref 80–94)
MCV RBC AUTO: 89.4 FL — SIGNIFICANT CHANGE UP (ref 80–94)
MONOCYTES # BLD AUTO: 0.54 K/UL — SIGNIFICANT CHANGE UP (ref 0.1–0.6)
MONOCYTES # BLD AUTO: 0.54 K/UL — SIGNIFICANT CHANGE UP (ref 0.1–0.6)
MONOCYTES NFR BLD AUTO: 6 % — SIGNIFICANT CHANGE UP (ref 1.7–9.3)
MONOCYTES NFR BLD AUTO: 6 % — SIGNIFICANT CHANGE UP (ref 1.7–9.3)
NEUTROPHILS # BLD AUTO: 7.17 K/UL — HIGH (ref 1.4–6.5)
NEUTROPHILS # BLD AUTO: 7.17 K/UL — HIGH (ref 1.4–6.5)
NEUTROPHILS NFR BLD AUTO: 79.9 % — HIGH (ref 42.2–75.2)
NEUTROPHILS NFR BLD AUTO: 79.9 % — HIGH (ref 42.2–75.2)
NRBC # BLD: 0 /100 WBCS — SIGNIFICANT CHANGE UP (ref 0–0)
NRBC # BLD: 0 /100 WBCS — SIGNIFICANT CHANGE UP (ref 0–0)
PHOSPHATE SERPL-MCNC: 3.2 MG/DL — SIGNIFICANT CHANGE UP (ref 2.1–4.9)
PHOSPHATE SERPL-MCNC: 3.2 MG/DL — SIGNIFICANT CHANGE UP (ref 2.1–4.9)
PLATELET # BLD AUTO: 189 K/UL — SIGNIFICANT CHANGE UP (ref 130–400)
PLATELET # BLD AUTO: 189 K/UL — SIGNIFICANT CHANGE UP (ref 130–400)
PMV BLD: 11.9 FL — HIGH (ref 7.4–10.4)
PMV BLD: 11.9 FL — HIGH (ref 7.4–10.4)
POTASSIUM SERPL-MCNC: 4.1 MMOL/L — SIGNIFICANT CHANGE UP (ref 3.5–5)
POTASSIUM SERPL-MCNC: 4.1 MMOL/L — SIGNIFICANT CHANGE UP (ref 3.5–5)
POTASSIUM SERPL-MCNC: 4.3 MMOL/L — SIGNIFICANT CHANGE UP (ref 3.5–5)
POTASSIUM SERPL-MCNC: 4.3 MMOL/L — SIGNIFICANT CHANGE UP (ref 3.5–5)
POTASSIUM SERPL-SCNC: 4.1 MMOL/L — SIGNIFICANT CHANGE UP (ref 3.5–5)
POTASSIUM SERPL-SCNC: 4.1 MMOL/L — SIGNIFICANT CHANGE UP (ref 3.5–5)
POTASSIUM SERPL-SCNC: 4.3 MMOL/L — SIGNIFICANT CHANGE UP (ref 3.5–5)
POTASSIUM SERPL-SCNC: 4.3 MMOL/L — SIGNIFICANT CHANGE UP (ref 3.5–5)
PREALB SERPL-MCNC: 18 MG/DL — LOW (ref 20–40)
PREALB SERPL-MCNC: 18 MG/DL — LOW (ref 20–40)
PROCALCITONIN SERPL-MCNC: 1.73 NG/ML — HIGH (ref 0.02–0.1)
PROCALCITONIN SERPL-MCNC: 1.73 NG/ML — HIGH (ref 0.02–0.1)
RBC # BLD: 3.3 M/UL — LOW (ref 4.7–6.1)
RBC # BLD: 3.3 M/UL — LOW (ref 4.7–6.1)
RBC # FLD: 15.6 % — HIGH (ref 11.5–14.5)
RBC # FLD: 15.6 % — HIGH (ref 11.5–14.5)
SODIUM SERPL-SCNC: 138 MMOL/L — SIGNIFICANT CHANGE UP (ref 135–146)
SODIUM SERPL-SCNC: 138 MMOL/L — SIGNIFICANT CHANGE UP (ref 135–146)
SODIUM SERPL-SCNC: 140 MMOL/L — SIGNIFICANT CHANGE UP (ref 135–146)
SODIUM SERPL-SCNC: 140 MMOL/L — SIGNIFICANT CHANGE UP (ref 135–146)
WBC # BLD: 8.98 K/UL — SIGNIFICANT CHANGE UP (ref 4.8–10.8)
WBC # BLD: 8.98 K/UL — SIGNIFICANT CHANGE UP (ref 4.8–10.8)
WBC # FLD AUTO: 8.98 K/UL — SIGNIFICANT CHANGE UP (ref 4.8–10.8)
WBC # FLD AUTO: 8.98 K/UL — SIGNIFICANT CHANGE UP (ref 4.8–10.8)

## 2023-11-11 PROCEDURE — 99233 SBSQ HOSP IP/OBS HIGH 50: CPT

## 2023-11-11 PROCEDURE — 71045 X-RAY EXAM CHEST 1 VIEW: CPT | Mod: 26

## 2023-11-11 RX ORDER — INSULIN HUMAN 100 [IU]/ML
5 INJECTION, SOLUTION SUBCUTANEOUS
Qty: 100 | Refills: 0 | Status: DISCONTINUED | OUTPATIENT
Start: 2023-11-11 | End: 2023-11-15

## 2023-11-11 RX ORDER — INSULIN HUMAN 100 [IU]/ML
5 INJECTION, SOLUTION SUBCUTANEOUS
Qty: 100 | Refills: 0 | Status: DISCONTINUED | OUTPATIENT
Start: 2023-11-11 | End: 2023-11-11

## 2023-11-11 RX ORDER — INSULIN LISPRO 100/ML
15 VIAL (ML) SUBCUTANEOUS
Refills: 0 | Status: DISCONTINUED | OUTPATIENT
Start: 2023-11-11 | End: 2023-11-15

## 2023-11-11 RX ORDER — INSULIN GLARGINE 100 [IU]/ML
45 INJECTION, SOLUTION SUBCUTANEOUS AT BEDTIME
Refills: 0 | Status: DISCONTINUED | OUTPATIENT
Start: 2023-11-11 | End: 2023-11-14

## 2023-11-11 RX ORDER — INSULIN LISPRO 100/ML
VIAL (ML) SUBCUTANEOUS
Refills: 0 | Status: DISCONTINUED | OUTPATIENT
Start: 2023-11-11 | End: 2023-11-19

## 2023-11-11 RX ADMIN — CARVEDILOL PHOSPHATE 25 MILLIGRAM(S): 80 CAPSULE, EXTENDED RELEASE ORAL at 17:17

## 2023-11-11 RX ADMIN — Medication 15 UNIT(S): at 23:42

## 2023-11-11 RX ADMIN — Medication 81 MILLIGRAM(S): at 12:45

## 2023-11-11 RX ADMIN — HEPARIN SODIUM 5000 UNIT(S): 5000 INJECTION INTRAVENOUS; SUBCUTANEOUS at 05:34

## 2023-11-11 RX ADMIN — SENNA PLUS 2 TABLET(S): 8.6 TABLET ORAL at 21:50

## 2023-11-11 RX ADMIN — MIDODRINE HYDROCHLORIDE 10 MILLIGRAM(S): 2.5 TABLET ORAL at 14:36

## 2023-11-11 RX ADMIN — CHLORHEXIDINE GLUCONATE 1 APPLICATION(S): 213 SOLUTION TOPICAL at 05:34

## 2023-11-11 RX ADMIN — HEPARIN SODIUM 5000 UNIT(S): 5000 INJECTION INTRAVENOUS; SUBCUTANEOUS at 14:35

## 2023-11-11 RX ADMIN — MIDODRINE HYDROCHLORIDE 10 MILLIGRAM(S): 2.5 TABLET ORAL at 05:33

## 2023-11-11 RX ADMIN — Medication 3 MILLILITER(S): at 15:41

## 2023-11-11 RX ADMIN — INSULIN HUMAN 4 UNIT(S)/HR: 100 INJECTION, SOLUTION SUBCUTANEOUS at 06:55

## 2023-11-11 RX ADMIN — Medication 3 MILLILITER(S): at 20:25

## 2023-11-11 RX ADMIN — CLOPIDOGREL BISULFATE 75 MILLIGRAM(S): 75 TABLET, FILM COATED ORAL at 12:44

## 2023-11-11 RX ADMIN — Medication 3 MILLILITER(S): at 09:39

## 2023-11-11 RX ADMIN — HEPARIN SODIUM 5000 UNIT(S): 5000 INJECTION INTRAVENOUS; SUBCUTANEOUS at 21:50

## 2023-11-11 RX ADMIN — INSULIN GLARGINE 45 UNIT(S): 100 INJECTION, SOLUTION SUBCUTANEOUS at 21:51

## 2023-11-11 RX ADMIN — INSULIN HUMAN 5 UNIT(S)/HR: 100 INJECTION, SOLUTION SUBCUTANEOUS at 11:52

## 2023-11-11 RX ADMIN — GABAPENTIN 300 MILLIGRAM(S): 400 CAPSULE ORAL at 12:46

## 2023-11-11 RX ADMIN — Medication 100 MILLIGRAM(S): at 12:43

## 2023-11-11 RX ADMIN — PANTOPRAZOLE SODIUM 40 MILLIGRAM(S): 20 TABLET, DELAYED RELEASE ORAL at 12:45

## 2023-11-11 RX ADMIN — CITALOPRAM 20 MILLIGRAM(S): 10 TABLET, FILM COATED ORAL at 12:44

## 2023-11-11 NOTE — OCCUPATIONAL THERAPY INITIAL EVALUATION ADULT - GENERAL OBSERVATIONS, REHAB EVAL
11:30-11:55. Chart reviewed, ok to treat by Occupational Therapist as confirmed by RN Kerrie, Pt left as received in semi stoll position in bed ++  Heplock / tele /pulse ox / Urine collection device / 02 3 LPM via NC / bp cuff / NG tube/ hearing device in NAD.

## 2023-11-11 NOTE — OCCUPATIONAL THERAPY INITIAL EVALUATION ADULT - LEVEL OF INDEPENDENCE: SIT/STAND, REHAB EVAL
[facility]  [address] [place] [address]              Rios García  [address] [facility] [id number]      10/5/2021    Dear Rios García:     Re:   Your follow up low-dose chest CT done on: October 4, 2021            Report sent to: Jordan Chun MD      We are writing to let you know that your recent low-dose lung screening CT shows small lung nodules which are likely benign (not cancer). Lung nodules are very common and many people without cancer have these nodules. To make sure these nodules are benign, screening guidelines recommend you have another low-dose chest CT on or around: July 22, 2022, back to your original screening date.    • Although low-dose lung screening CT is very effective at finding lung cancer early, it cannot find all lung cancers. If you develop any new symptoms such as shortness of breath, chest pain, or coughing up blood, please call your doctor.    • Please keep in mind that maintaining good health involves an annual physical exam, continued screening with low-dose chest CT, and if you smoke, quitting.  We understand that quitting is difficult, for help call the [place] Quit Line at 2-338-QUITNOW or [phone] or visit the website at ([email or web address]).    If you have questions about your results call your provider; moreover, if you have any questions about the lung screening program, please call us between 8:30 am and 4:30 pm, Monday through Friday at [phone].    Sincerely,    [facility] Screening Program Team      [employer] with [employer] allows you to get test results faster, manage appointments, pay bills, message your doctor, renew or refill prescriptions and start virtual visits from your computer, tablet or smartphone.     Create an account at: [email or web address] or install the [employer] with [employer] tim on your cell phone. For questions or assistance call [phone]     2/2 fatigue; TBA when appropriate

## 2023-11-11 NOTE — PROGRESS NOTE ADULT - SUBJECTIVE AND OBJECTIVE BOX
Patient is a 89y old  Male who presents with a chief complaint of sob (10 Nov 2023 14:44)      Over Night Events:  Patient seen and examined.   awake still on insulin drip     ROS:  See HPI    PHYSICAL EXAM    ICU Vital Signs Last 24 Hrs  T(C): 37.3 (11 Nov 2023 03:01), Max: 37.3 (10 Nov 2023 06:00)  T(F): 99.1 (11 Nov 2023 03:01), Max: 99.1 (10 Nov 2023 06:00)  HR: 70 (11 Nov 2023 05:00) (60 - 78)  BP: 109/62 (11 Nov 2023 05:00) (93/50 - 145/65)  BP(mean): 79 (11 Nov 2023 05:00) (68 - 96)  ABP: --  ABP(mean): --  RR: 20 (11 Nov 2023 05:00) (16 - 27)  SpO2: 96% (11 Nov 2023 05:00) (96% - 100%)    O2 Parameters below as of 11 Nov 2023 04:00  Patient On (Oxygen Delivery Method): nasal cannula  O2 Flow (L/min): 2          General:awake   HEENT:      augie          Lymph Nodes: NO cervical LN   Lungs: Bilateral BS  Cardiovascular: Regular   Abdomen: Soft, Positive BS  Extremities: No clubbing   Skin: warm   Neurological: no focal   Musculoskeletal: move all ext     I&O's Detail    09 Nov 2023 07:01  -  10 Nov 2023 07:00  --------------------------------------------------------  IN:    Enteral Tube Flush: 480 mL    Enteral Tube Flush: 130 mL    Glucerna: 1320 mL    Insulin: 46 mL  Total IN: 1976 mL    OUT:    Incontinent per Collection Bag (mL): 40 mL    Voided (mL): 600 mL  Total OUT: 640 mL    Total NET: 1336 mL      10 Nov 2023 07:01  -  11 Nov 2023 05:53  --------------------------------------------------------  IN:    Enteral Tube Flush: 600 mL    Enteral Tube Flush: 270 mL    Glucerna: 960 mL    Insulin: 20 mL    Insulin: 6 mL    Insulin: 36 mL    Modular Fluid: 90 mL    Norepinephrine: 9 mL  Total IN: 1991 mL    OUT:    Incontinent per Collection Bag (mL): 150 mL    Other (mL): 2000 mL  Total OUT: 2150 mL    Total NET: -159 mL          LABS:                          10.5   11.03 )-----------( 226      ( 10 Nov 2023 11:33 )             32.6         10 Nov 2023 11:33    145    |  104    |  45     ----------------------------<  182    4.0     |  25     |  1.9      Ca    8.5        10 Nov 2023 11:33  Phos  3.7       10 Nov 2023 05:48  Mg     2.4       10 Nov 2023 05:48                                                                                      Urinalysis Basic - ( 10 Nov 2023 11:33 )    Color: x / Appearance: x / SG: x / pH: x  Gluc: 182 mg/dL / Ketone: x  / Bili: x / Urobili: x   Blood: x / Protein: x / Nitrite: x   Leuk Esterase: x / RBC: x / WBC x   Sq Epi: x / Non Sq Epi: x / Bacteria: x                                                                                                                                                 ABG - ( 10 Nov 2023 06:43 )  pH, Arterial: 7.50  pH, Blood: x     /  pCO2: 39    /  pO2: 80    / HCO3: 30    / Base Excess: 6.7   /  SaO2: 97.5                MEDICATIONS  (STANDING):  albuterol/ipratropium for Nebulization 3 milliLiter(s) Nebulizer every 6 hours  albuterol/ipratropium for Nebulization. 3 milliLiter(s) Nebulizer once  allopurinol 100 milliGRAM(s) Oral daily  aspirin  chewable 81 milliGRAM(s) Oral daily  carvedilol 25 milliGRAM(s) Oral every 12 hours  chlorhexidine 2% Cloths 1 Application(s) Topical <User Schedule>  citalopram 20 milliGRAM(s) Oral daily  clopidogrel Tablet 75 milliGRAM(s) Oral daily  dextrose 5%. 1000 milliLiter(s) (50 mL/Hr) IV Continuous <Continuous>  dextrose 5%. 1000 milliLiter(s) (100 mL/Hr) IV Continuous <Continuous>  dextrose 50% Injectable 50 milliLiter(s) IV Push every 15 minutes  gabapentin 300 milliGRAM(s) Oral daily  glucagon  Injectable 1 milliGRAM(s) IntraMuscular once  heparin   Injectable 5000 Unit(s) SubCutaneous every 8 hours  insulin glargine Injectable (LANTUS) 30 Unit(s) SubCutaneous at bedtime  insulin lispro (ADMELOG) corrective regimen sliding scale   SubCutaneous three times a day before meals  insulin lispro Injectable (ADMELOG) 8 Unit(s) SubCutaneous three times a day before meals  insulin regular Infusion 4 Unit(s)/Hr (4 mL/Hr) IV Continuous <Continuous>  midodrine 10 milliGRAM(s) Oral every 8 hours  norepinephrine Infusion 0.05 MICROgram(s)/kG/Min (8.3 mL/Hr) IV Continuous <Continuous>  pantoprazole  Injectable 40 milliGRAM(s) IV Push daily  polyethylene glycol 3350 17 Gram(s) Oral daily  senna 2 Tablet(s) Oral at bedtime    MEDICATIONS  (PRN):  acetaminophen  Suppository .. 650 milliGRAM(s) Rectal every 6 hours PRN Temp greater or equal to 38C (100.4F)  albuterol    90 MICROgram(s) HFA Inhaler 2 Puff(s) Inhalation every 4 hours PRN Shortness of Breath and/or Wheezing  dextrose Oral Gel 15 Gram(s) Oral once PRN Blood Glucose LESS THAN 70 milliGRAM(s)/deciliter          Xrays:  TLC:  OG:  ET tube:                                                                                       ECHO:  [facility] ICU:           Breathing spontaneous and unlabored. Breath sounds clear and equal bilaterally with regular rhythm.

## 2023-11-11 NOTE — PROGRESS NOTE ADULT - ASSESSMENT
IMPRESSION:    ARF secondary to pulmonary edema fluid overload   Likely right aspiration pneumonia    Hyperglycemia   alter mental status   sepsis fever   PARKER   metabolc acidosis     PLAN:    CNS: neurology follow. No seizure on EEG.     HEENT: oral care     PULMONARY:   Wean down Fio2. Chest PT and frequent suctioning.    cxr today   ABG reji   CARDIOVASCULAR  BNP markedly elevated. HFPEF on echo.   Hold bumex  and diamox  pendign cxr keep IS < os     GI: GI ppx. NGT feeding as tolerated Speech and swallow re-evaluation.     RENAL:   HD per nephrology Keep negative balance as tolerated.     INFECTIOUS DISEASE:   abx per ID , Procal     HEMATOLOGICAL:  DVT prophylaxis. Keep hg more than 7.     ENDOCRINE:  Insulin SQ protocol. NGT for feeding. Lispro Lantus when starts oral diet.   adjust lantus and d/c insulin drip target -180  MUSCULOSKELETAL: PT OT.     CODE STATUS: DNI DNR. Pall care follow up.     MICu while on insulin drip

## 2023-11-11 NOTE — CHART NOTE - NSCHARTNOTEFT_GEN_A_CORE
Patient on insulit ggt with now po food tolerance gap closed ready for dose of lantus and dc of ggt 2 hours from now. Chart review notable for attempt to wean ggt yesterday evening however 30u Lantus dose was subtheraputic (patient with a1c>9).     Plan  Current fs 112 on 2u insulin ggt drop to 1 unit   Give dose of lantus 45 units 9pm   Turn off ggt 11pm  Start basal bolus 45/15 + sliding scale.     FS q2 hours overnight will monitor efficacy of this new higher insulin dosing.

## 2023-11-11 NOTE — OCCUPATIONAL THERAPY INITIAL EVALUATION ADULT - FINE MOTOR COORDINATION, RIGHT HAND, FINGER TO NOSE, OT EVAL
normal performance Show Aperture Variable?: Yes Consent: The patient's consent was obtained including but not limited to risks of crusting, scabbing, blistering, scarring, darker or lighter pigmentary change, recurrence, incomplete removal and infection. Render Post-Care Instructions In Note?: no Number Of Freeze-Thaw Cycles: 3 freeze-thaw cycles Detail Level: Detailed Post-Care Instructions: I reviewed with the patient in detail post-care instructions. Patient is to wear sunprotection, and avoid picking at any of the treated lesions. Pt may apply Vaseline to crusted or scabbing areas. Duration Of Freeze Thaw-Cycle (Seconds): 2

## 2023-11-11 NOTE — PROVIDER CONTACT NOTE (OTHER) - BACKGROUND
Patient here for DKA. Blood cx were drawn and IV abx started.
Pt. with NGT to left nare.
Pt NPO and on the BIPAP 100% fio2

## 2023-11-11 NOTE — OCCUPATIONAL THERAPY INITIAL EVALUATION ADULT - PERTINENT HX OF CURRENT PROBLEM, REHAB EVAL
89 yo male PMHx of  DM, CAD-bypass, chronic HFpEF, HTN, HLD, CKD3 presents w sob  few days, no cough fever or chills.  pt placed on NRBM by EMS and subsequently on BiPAP in ED, O2 sat now 99%.  Pt found to have elevated glucose w high AG and b-hydroxybutarate c/w DKA  Hospital course complicated by respiratory failure secondary to suspected aspiration pneumonia, PARKER and initiation of HD

## 2023-11-11 NOTE — PROGRESS NOTE ADULT - SUBJECTIVE AND OBJECTIVE BOX
AMANDA CASTORENA  89y, Male  Allergy: No Known Allergies      CHIEF COMPLAINT: sob (11 Nov 2023 05:53)      HPI:    Patient is a 89y old  Male who presents with a chief complaint of sob      HPI: 89 yo male PMHx sig for DM, CAD-bypass, CHF, HTN, HLD presents w sob x few days, no cough fever or chills.  pt placed on NRBM by EMS and subsequently on BiPAP in ED, O2 sat now 99%.  Pt found to have elevated glucose w high AG and b-hydroxybutarate c/w DKA      PAST MEDICAL & SURGICAL HISTORY:  CHF (congestive heart failure)      DM (diabetes mellitus)      HTN (hypertension)      Prostate CA  in remission      Pacemaker      H/O total knee replacement, right        Allergies    No Known Allergies    Intolerances      FAMILY HISTORY:  FH: type 2 diabetes (Father)      Home Medications:  allopurinol 100 mg oral tablet: 1 tab(s) orally once a day (25 Oct 2021 22:30)  citalopram 20 mg oral tablet: 1 tab(s) orally once a day (25 Oct 2021 22:30)  clonazePAM 1 mg oral tablet: 2 tab(s) orally once a day (at bedtime), As Needed (25 Oct 2021 22:30)  gabapentin 300 mg oral capsule: 1 cap(s) orally 2 times a day (25 Oct 2021 22:30)  glimepiride 4 mg oral tablet: 1 tab(s) orally 2 times a day (25 Oct 2021 22:30)  Lantus 100 units/mL subcutaneous solution: 30 unit(s) subcutaneous once a day (at bedtime) (25 Oct 2021 22:30)  NIFEdipine 60 mg oral tablet, extended release: 1 tab(s) orally once a day (25 Oct 2021 22:30)    Occupation:  Alochol: Denied  Smoking: Denied  Drug Use: Denied  Marital Status:         ROS: as in HPI; All other systems reviewed are negative    ICU Vital Signs Last 24 Hrs  T(C): 37.2 (30 Oct 2023 18:39), Max: 37.2 (30 Oct 2023 18:39)  T(F): 98.9 (30 Oct 2023 18:39), Max: 98.9 (30 Oct 2023 18:39)  HR: 62 (30 Oct 2023 18:39) (62 - 62)  BP: 121/88 (30 Oct 2023 18:39) (121/88 - 121/88)  BP(mean): --  ABP: --  ABP(mean): --  RR: 18 (30 Oct 2023 18:39) (18 - 18)  SpO2: 99% (30 Oct 2023 18:39) (99% - 99%)    O2 Parameters below as of 30 Oct 2023 18:39  Patient On (Oxygen Delivery Method): mask, nonrebreather  O2 Flow (L/min): 15            Physical Examination:    General: No acute distress.  Alert, oriented, interactive, nonfocal    HEENT: Pupils equal, reactive to light.  Symmetric. riht IJ udall     PULM: Clear to auscultation bilaterally, no significant sputum production    CVS: Regular rate and rhythm, no murmurs, rubs, or gallops    ABD: Soft, nondistended, nontender, normoactive bowel sounds, no masses    EXT: No edema, nontender    SKIN: Warm and well perfused, no rashes noted.              I&O's Detail        LABS:                        9.5    12.75 )-----------( 198      ( 30 Oct 2023 19:20 )             28.6     30 Oct 2023 19:20    129    |  90     |  95     ----------------------------<  477    5.2     |  15     |  3.7      Ca    8.1        30 Oct 2023 19:20    TPro  6.6    /  Alb  3.8    /  TBili  0.4    /  DBili  x      /  AST  36     /  ALT  23     /  AlkPhos  100    30 Oct 2023 19:20  Amylase x     lipase x          CARDIAC MARKERS ( 30 Oct 2023 19:20 )  x     / 0.24 ng/mL / x     / x     / x          CAPILLARY BLOOD GLUCOSE          Urinalysis Basic - ( 30 Oct 2023 19:20 )    Color: x / Appearance: x / SG: x / pH: x  Gluc: 477 mg/dL / Ketone: x  / Bili: x / Urobili: x   Blood: x / Protein: x / Nitrite: x   Leuk Esterase: x / RBC: x / WBC x   Sq Epi: x / Non Sq Epi: x / Bacteria: x      Culture        MEDICATIONS  (STANDING):  insulin regular Infusion 5 Unit(s)/Hr (5 mL/Hr) IV Continuous <Continuous>    MEDICATIONS  (PRN):        RADIOLOGY: ***     CXR:  ? congestion, awaiting official report          IMPRESSION/PLAN:  pt w sob and ?chf exacerbation in setting of worsening PARKER.  Likely exacerbated by Uncontrolled DM/DKA.  Continue insulin drip. FSBS q1hr, Serial metabolic panels. Cautious IVF hydration.  May try patient on HFNC.  Check TTE. Cardio eval. admit to ICU.        HPI:    FAMILY HISTORY:  FH: type 2 diabetes (Father)      PAST MEDICAL & SURGICAL HISTORY:  CHF (congestive heart failure)      DM (diabetes mellitus)      HTN (hypertension)      Prostate CA  in remission      Pacemaker      H/O total knee replacement, right          SOCIAL HISTORY  Social History:  pt denies smoking, drug or etoh. (19 Oct 2022 01:49)      Home Medications:  allopurinol 100 mg oral tablet: 1 tab(s) orally once a day (25 Oct 2021 22:30)  citalopram 20 mg oral tablet: 1 tab(s) orally once a day (25 Oct 2021 22:30)  clonazePAM 1 mg oral tablet: 2 tab(s) orally once a day (at bedtime), As Needed (25 Oct 2021 22:30)  gabapentin 300 mg oral capsule: 1 cap(s) orally 2 times a day (25 Oct 2021 22:30)  glimepiride 4 mg oral tablet: 1 tab(s) orally 2 times a day (25 Oct 2021 22:30)  Lantus 100 units/mL subcutaneous solution: 30 unit(s) subcutaneous once a day (at bedtime) (25 Oct 2021 22:30)  NIFEdipine 60 mg oral tablet, extended release: 1 tab(s) orally once a day (25 Oct 2021 22:30)      ROS  General: Denies fevers, chills, nightsweats, weight loss +[place]  HEENT: Denies headache, rhinorrhea, sore throat, eye pain right ij udall   CV: Denies CP, palpitations  PULM: Denies SOB, cough  GI: Denies abdominal pain, diarrhea  : Denies dysuria, hematuria  MSK: Denies arthralgias  SKIN: Denies rash   NEURO: Denies paresthesias, weakness  PSYCH: Denies depression    VITALS:  T(F): 99.1, Max: 99.1 (11-10-23 @ 14:00)  HR: 61  BP: 94/46  RR: 18Vital Signs Last 24 Hrs  T(C): 37.3 (11 Nov 2023 07:05), Max: 37.3 (10 Nov 2023 14:00)  T(F): 99.1 (11 Nov 2023 07:05), Max: 99.1 (10 Nov 2023 14:00)  HR: 61 (11 Nov 2023 08:01) (60 - 78)  BP: 94/46 (11 Nov 2023 08:01) (93/50 - 151/67)  BP(mean): 66 (11 Nov 2023 08:01) (66 - 96)  RR: 18 (11 Nov 2023 08:01) (15 - 27)  SpO2: 100% (11 Nov 2023 08:01) (96% - 100%)    Parameters below as of 11 Nov 2023 07:01  Patient On (Oxygen Delivery Method): nasal cannula  O2 Flow (L/min): 2      PHYSICAL EXAM:  Gen: NAD, resting in bed  HEENT: Normocephalic, atraumatic  Neck: supple, no lymphadenopathy  CV: Regular rate & regular rhythm  Lungs: CTABL no wheeze  Abdomen: Soft, NTND+ BS present  Ext: Warm, well perfused no CCE  Neuro: non focal, awake, CN II-XII intact   Skin: no rash, no erythema  Psych: no SI, HI, Hallucination     TESTS & MEASUREMENTS:                        9.4    8.98  )-----------( 189      ( 11 Nov 2023 05:15 )             29.5     11-11    140  |  100  |  77<HH>  ----------------------------<  264<H>  4.1   |  28  |  2.4<H>    Ca    7.6<L>      11 Nov 2023 05:15  Phos  3.2     11-11  Mg     2.4     11-11    TPro  5.9<L>  /  Alb  2.8<L>  /  TBili  0.3  /  DBili  x   /  AST  13  /  ALT  14  /  AlkPhos  91  11-09      LIVER FUNCTIONS - ( 09 Nov 2023 15:10 )  Alb: 2.8 g/dL / Pro: 5.9 g/dL / ALK PHOS: 91 U/L / ALT: 14 U/L / AST: 13 U/L / GGT: x           Urinalysis Basic - ( 11 Nov 2023 05:15 )    Color: x / Appearance: x / SG: x / pH: x  Gluc: 264 mg/dL / Ketone: x  / Bili: x / Urobili: x   Blood: x / Protein: x / Nitrite: x   Leuk Esterase: x / RBC: x / WBC x   Sq Epi: x / Non Sq Epi: x / Bacteria: x        Culture - Blood (collected 11-04-23 @ 15:54)  Source: .Blood None  Final Report (11-10-23 @ 01:00):    No growth at 5 days          QRS axis to [] ° and NSR at a rate of [] BPM. There was no atrial enlargement. There was no ventricular hypertrophy. There were no ST-T changes and all intervals were normal.      INFECTIOUS DISEASES TESTING  MRSA PCR Result.: Negative (11-06-23 @ 09:40)  Hepatitis B Surface Antigen: Nonreact (11-03-23 @ 17:45)  MRSA PCR Result.: Negative (10-31-23 @ 13:25)      RADIOLOGY & ADDITIONAL TESTS:  I have personally reviewed the last Chest xray  CXR      CT      CARDIOLOGY TESTING  12 Lead ECG:   Ventricular Rate 87 BPM    Atrial Rate 97 BPM    P-R Interval 176 ms    QRS Duration 122 ms    Q-T Interval 416 ms    QTC Calculation(Bazett) 500 ms    P Axis 79 degrees    R Axis 90 degrees    T Axis 21 degrees    Diagnosis Line Sinus rhythm with marked sinus arrhythmia with occasional ventricular-paced  complexes  Right bundle branch block  Abnormal ECG    Confirmed by MYKEL ANDERSON MD ([id number]) on 11/1/2023 7:17:46 AM (10-31-23 @ 15:28)  12 Lead ECG:   Ventricular Rate 87 BPM    Atrial Rate 87 BPM    P-R Interval 172 ms    QRS Duration 122 ms    Q-T Interval 380 ms    QTC Calculation(Bazett) 457 ms    P Axis 93 degrees    R Axis 88 degrees    T Axis -36 degrees    Diagnosis Line Sinus rhythm withmarked sinus arrhythmia with frequent ventricular-paced  complexes  Right bundle branch block  Nonspecific ST-T changes    Confirmed by MYKEL ANDERSON MD ([id number]) on 10/31/2023 4:54:06 PM (10-31-23 @ 07:58)      MEDICATIONS  (STANDING):  albuterol/ipratropium for Nebulization 3 milliLiter(s) Nebulizer every 6 hours  albuterol/ipratropium for Nebulization. 3 milliLiter(s) Nebulizer once  allopurinol 100 milliGRAM(s) Oral daily  aspirin  chewable 81 milliGRAM(s) Oral daily  carvedilol 25 milliGRAM(s) Oral every 12 hours  chlorhexidine 2% Cloths 1 Application(s) Topical <User Schedule>  citalopram 20 milliGRAM(s) Oral daily  clopidogrel Tablet 75 milliGRAM(s) Oral daily  dextrose 5%. 1000 milliLiter(s) (50 mL/Hr) IV Continuous <Continuous>  dextrose 5%. 1000 milliLiter(s) (100 mL/Hr) IV Continuous <Continuous>  dextrose 50% Injectable 50 milliLiter(s) IV Push every 15 minutes  gabapentin 300 milliGRAM(s) Oral daily  glucagon  Injectable 1 milliGRAM(s) IntraMuscular once  heparin   Injectable 5000 Unit(s) SubCutaneous every 8 hours  insulin glargine Injectable (LANTUS) 30 Unit(s) SubCutaneous at bedtime  insulin regular Infusion 5 Unit(s)/Hr (5 mL/Hr) IV Continuous <Continuous>  midodrine 10 milliGRAM(s) Oral every 8 hours  norepinephrine Infusion 0.05 MICROgram(s)/kG/Min (8.3 mL/Hr) IV Continuous <Continuous>  pantoprazole  Injectable 40 milliGRAM(s) IV Push daily  polyethylene glycol 3350 17 Gram(s) Oral daily  senna 2 Tablet(s) Oral at bedtime    MEDICATIONS  (PRN):  acetaminophen  Suppository .. 650 milliGRAM(s) Rectal every 6 hours PRN Temp greater or equal to 38C (100.4F)  albuterol    90 MICROgram(s) HFA Inhaler 2 Puff(s) Inhalation every 4 hours PRN Shortness of Breath and/or Wheezing  dextrose Oral Gel 15 Gram(s) Oral once PRN Blood Glucose LESS THAN 70 milliGRAM(s)/deciliter      ANTIBIOTICS:      All available historical data has been reviewed    ASSESSMENT  89y M admitted with Acute renal failure        PROBLEMS

## 2023-11-11 NOTE — OCCUPATIONAL THERAPY INITIAL EVALUATION ADULT - LIVES WITH, PROFILE
As per PT IE pt lives alone in an apartment W/ no step to enter and all in one level inside the apartment , as per daughter pt was independent W/ Functional activity and was driving PTA./alone

## 2023-11-12 LAB
ANION GAP SERPL CALC-SCNC: 10 MMOL/L — SIGNIFICANT CHANGE UP (ref 7–14)
ANION GAP SERPL CALC-SCNC: 10 MMOL/L — SIGNIFICANT CHANGE UP (ref 7–14)
BASOPHILS # BLD AUTO: 0.03 K/UL — SIGNIFICANT CHANGE UP (ref 0–0.2)
BASOPHILS # BLD AUTO: 0.03 K/UL — SIGNIFICANT CHANGE UP (ref 0–0.2)
BASOPHILS NFR BLD AUTO: 0.3 % — SIGNIFICANT CHANGE UP (ref 0–1)
BASOPHILS NFR BLD AUTO: 0.3 % — SIGNIFICANT CHANGE UP (ref 0–1)
BUN SERPL-MCNC: 99 MG/DL — CRITICAL HIGH (ref 10–20)
BUN SERPL-MCNC: 99 MG/DL — CRITICAL HIGH (ref 10–20)
CALCIUM SERPL-MCNC: 7.8 MG/DL — LOW (ref 8.4–10.5)
CALCIUM SERPL-MCNC: 7.8 MG/DL — LOW (ref 8.4–10.5)
CHLORIDE SERPL-SCNC: 97 MMOL/L — LOW (ref 98–110)
CHLORIDE SERPL-SCNC: 97 MMOL/L — LOW (ref 98–110)
CO2 SERPL-SCNC: 29 MMOL/L — SIGNIFICANT CHANGE UP (ref 17–32)
CO2 SERPL-SCNC: 29 MMOL/L — SIGNIFICANT CHANGE UP (ref 17–32)
CREAT SERPL-MCNC: 2.5 MG/DL — HIGH (ref 0.7–1.5)
CREAT SERPL-MCNC: 2.5 MG/DL — HIGH (ref 0.7–1.5)
EGFR: 24 ML/MIN/1.73M2 — LOW
EGFR: 24 ML/MIN/1.73M2 — LOW
EOSINOPHIL # BLD AUTO: 0.08 K/UL — SIGNIFICANT CHANGE UP (ref 0–0.7)
EOSINOPHIL # BLD AUTO: 0.08 K/UL — SIGNIFICANT CHANGE UP (ref 0–0.7)
EOSINOPHIL NFR BLD AUTO: 0.9 % — SIGNIFICANT CHANGE UP (ref 0–8)
EOSINOPHIL NFR BLD AUTO: 0.9 % — SIGNIFICANT CHANGE UP (ref 0–8)
GLUCOSE BLDC GLUCOMTR-MCNC: 210 MG/DL — HIGH (ref 70–99)
GLUCOSE BLDC GLUCOMTR-MCNC: 210 MG/DL — HIGH (ref 70–99)
GLUCOSE BLDC GLUCOMTR-MCNC: 211 MG/DL — HIGH (ref 70–99)
GLUCOSE BLDC GLUCOMTR-MCNC: 211 MG/DL — HIGH (ref 70–99)
GLUCOSE BLDC GLUCOMTR-MCNC: 218 MG/DL — HIGH (ref 70–99)
GLUCOSE BLDC GLUCOMTR-MCNC: 218 MG/DL — HIGH (ref 70–99)
GLUCOSE BLDC GLUCOMTR-MCNC: 250 MG/DL — HIGH (ref 70–99)
GLUCOSE BLDC GLUCOMTR-MCNC: 250 MG/DL — HIGH (ref 70–99)
GLUCOSE BLDC GLUCOMTR-MCNC: 254 MG/DL — HIGH (ref 70–99)
GLUCOSE BLDC GLUCOMTR-MCNC: 254 MG/DL — HIGH (ref 70–99)
GLUCOSE BLDC GLUCOMTR-MCNC: 259 MG/DL — HIGH (ref 70–99)
GLUCOSE BLDC GLUCOMTR-MCNC: 259 MG/DL — HIGH (ref 70–99)
GLUCOSE SERPL-MCNC: 213 MG/DL — HIGH (ref 70–99)
GLUCOSE SERPL-MCNC: 213 MG/DL — HIGH (ref 70–99)
HCT VFR BLD CALC: 27.8 % — LOW (ref 42–52)
HCT VFR BLD CALC: 27.8 % — LOW (ref 42–52)
HGB BLD-MCNC: 8.7 G/DL — LOW (ref 14–18)
HGB BLD-MCNC: 8.7 G/DL — LOW (ref 14–18)
IMM GRANULOCYTES NFR BLD AUTO: 1.7 % — HIGH (ref 0.1–0.3)
IMM GRANULOCYTES NFR BLD AUTO: 1.7 % — HIGH (ref 0.1–0.3)
LYMPHOCYTES # BLD AUTO: 0.89 K/UL — LOW (ref 1.2–3.4)
LYMPHOCYTES # BLD AUTO: 0.89 K/UL — LOW (ref 1.2–3.4)
LYMPHOCYTES # BLD AUTO: 10.3 % — LOW (ref 20.5–51.1)
LYMPHOCYTES # BLD AUTO: 10.3 % — LOW (ref 20.5–51.1)
MAGNESIUM SERPL-MCNC: 2.4 MG/DL — SIGNIFICANT CHANGE UP (ref 1.8–2.4)
MAGNESIUM SERPL-MCNC: 2.4 MG/DL — SIGNIFICANT CHANGE UP (ref 1.8–2.4)
MCHC RBC-ENTMCNC: 28.8 PG — SIGNIFICANT CHANGE UP (ref 27–31)
MCHC RBC-ENTMCNC: 28.8 PG — SIGNIFICANT CHANGE UP (ref 27–31)
MCHC RBC-ENTMCNC: 31.3 G/DL — LOW (ref 32–37)
MCHC RBC-ENTMCNC: 31.3 G/DL — LOW (ref 32–37)
MCV RBC AUTO: 92.1 FL — SIGNIFICANT CHANGE UP (ref 80–94)
MCV RBC AUTO: 92.1 FL — SIGNIFICANT CHANGE UP (ref 80–94)
MONOCYTES # BLD AUTO: 0.52 K/UL — SIGNIFICANT CHANGE UP (ref 0.1–0.6)
MONOCYTES # BLD AUTO: 0.52 K/UL — SIGNIFICANT CHANGE UP (ref 0.1–0.6)
MONOCYTES NFR BLD AUTO: 6 % — SIGNIFICANT CHANGE UP (ref 1.7–9.3)
MONOCYTES NFR BLD AUTO: 6 % — SIGNIFICANT CHANGE UP (ref 1.7–9.3)
NEUTROPHILS # BLD AUTO: 7 K/UL — HIGH (ref 1.4–6.5)
NEUTROPHILS # BLD AUTO: 7 K/UL — HIGH (ref 1.4–6.5)
NEUTROPHILS NFR BLD AUTO: 80.8 % — HIGH (ref 42.2–75.2)
NEUTROPHILS NFR BLD AUTO: 80.8 % — HIGH (ref 42.2–75.2)
NRBC # BLD: 0 /100 WBCS — SIGNIFICANT CHANGE UP (ref 0–0)
NRBC # BLD: 0 /100 WBCS — SIGNIFICANT CHANGE UP (ref 0–0)
PHOSPHATE SERPL-MCNC: 3.3 MG/DL — SIGNIFICANT CHANGE UP (ref 2.1–4.9)
PHOSPHATE SERPL-MCNC: 3.3 MG/DL — SIGNIFICANT CHANGE UP (ref 2.1–4.9)
PLATELET # BLD AUTO: 181 K/UL — SIGNIFICANT CHANGE UP (ref 130–400)
PLATELET # BLD AUTO: 181 K/UL — SIGNIFICANT CHANGE UP (ref 130–400)
PMV BLD: 12.2 FL — HIGH (ref 7.4–10.4)
PMV BLD: 12.2 FL — HIGH (ref 7.4–10.4)
POTASSIUM SERPL-MCNC: 4.5 MMOL/L — SIGNIFICANT CHANGE UP (ref 3.5–5)
POTASSIUM SERPL-MCNC: 4.5 MMOL/L — SIGNIFICANT CHANGE UP (ref 3.5–5)
POTASSIUM SERPL-SCNC: 4.5 MMOL/L — SIGNIFICANT CHANGE UP (ref 3.5–5)
POTASSIUM SERPL-SCNC: 4.5 MMOL/L — SIGNIFICANT CHANGE UP (ref 3.5–5)
RBC # BLD: 3.02 M/UL — LOW (ref 4.7–6.1)
RBC # BLD: 3.02 M/UL — LOW (ref 4.7–6.1)
RBC # FLD: 15.8 % — HIGH (ref 11.5–14.5)
RBC # FLD: 15.8 % — HIGH (ref 11.5–14.5)
SODIUM SERPL-SCNC: 136 MMOL/L — SIGNIFICANT CHANGE UP (ref 135–146)
SODIUM SERPL-SCNC: 136 MMOL/L — SIGNIFICANT CHANGE UP (ref 135–146)
WBC # BLD: 8.67 K/UL — SIGNIFICANT CHANGE UP (ref 4.8–10.8)
WBC # BLD: 8.67 K/UL — SIGNIFICANT CHANGE UP (ref 4.8–10.8)
WBC # FLD AUTO: 8.67 K/UL — SIGNIFICANT CHANGE UP (ref 4.8–10.8)
WBC # FLD AUTO: 8.67 K/UL — SIGNIFICANT CHANGE UP (ref 4.8–10.8)

## 2023-11-12 PROCEDURE — 99232 SBSQ HOSP IP/OBS MODERATE 35: CPT

## 2023-11-12 PROCEDURE — 99233 SBSQ HOSP IP/OBS HIGH 50: CPT

## 2023-11-12 PROCEDURE — 71045 X-RAY EXAM CHEST 1 VIEW: CPT | Mod: 26

## 2023-11-12 RX ORDER — ACETAMINOPHEN 500 MG
650 TABLET ORAL EVERY 6 HOURS
Refills: 0 | Status: DISCONTINUED | OUTPATIENT
Start: 2023-11-12 | End: 2023-11-28

## 2023-11-12 RX ORDER — INSULIN LISPRO 100/ML
13 VIAL (ML) SUBCUTANEOUS ONCE
Refills: 0 | Status: COMPLETED | OUTPATIENT
Start: 2023-11-12 | End: 2023-11-12

## 2023-11-12 RX ORDER — ALPRAZOLAM 0.25 MG
0.25 TABLET ORAL ONCE
Refills: 0 | Status: DISCONTINUED | OUTPATIENT
Start: 2023-11-12 | End: 2023-11-12

## 2023-11-12 RX ORDER — BUMETANIDE 0.25 MG/ML
2 INJECTION INTRAMUSCULAR; INTRAVENOUS ONCE
Refills: 0 | Status: COMPLETED | OUTPATIENT
Start: 2023-11-12 | End: 2023-11-12

## 2023-11-12 RX ORDER — LANOLIN ALCOHOL/MO/W.PET/CERES
5 CREAM (GRAM) TOPICAL AT BEDTIME
Refills: 0 | Status: DISCONTINUED | OUTPATIENT
Start: 2023-11-12 | End: 2023-11-26

## 2023-11-12 RX ADMIN — Medication 5 MILLIGRAM(S): at 01:30

## 2023-11-12 RX ADMIN — PANTOPRAZOLE SODIUM 40 MILLIGRAM(S): 20 TABLET, DELAYED RELEASE ORAL at 13:33

## 2023-11-12 RX ADMIN — CITALOPRAM 20 MILLIGRAM(S): 10 TABLET, FILM COATED ORAL at 13:31

## 2023-11-12 RX ADMIN — HEPARIN SODIUM 5000 UNIT(S): 5000 INJECTION INTRAVENOUS; SUBCUTANEOUS at 06:13

## 2023-11-12 RX ADMIN — MIDODRINE HYDROCHLORIDE 10 MILLIGRAM(S): 2.5 TABLET ORAL at 13:36

## 2023-11-12 RX ADMIN — Medication 15 UNIT(S): at 22:15

## 2023-11-12 RX ADMIN — HEPARIN SODIUM 5000 UNIT(S): 5000 INJECTION INTRAVENOUS; SUBCUTANEOUS at 13:33

## 2023-11-12 RX ADMIN — HEPARIN SODIUM 5000 UNIT(S): 5000 INJECTION INTRAVENOUS; SUBCUTANEOUS at 21:10

## 2023-11-12 RX ADMIN — Medication 650 MILLIGRAM(S): at 19:39

## 2023-11-12 RX ADMIN — CLOPIDOGREL BISULFATE 75 MILLIGRAM(S): 75 TABLET, FILM COATED ORAL at 13:31

## 2023-11-12 RX ADMIN — CARVEDILOL PHOSPHATE 25 MILLIGRAM(S): 80 CAPSULE, EXTENDED RELEASE ORAL at 18:18

## 2023-11-12 RX ADMIN — Medication 4: at 22:16

## 2023-11-12 RX ADMIN — Medication 100 MILLIGRAM(S): at 13:32

## 2023-11-12 RX ADMIN — Medication 5 MILLIGRAM(S): at 21:10

## 2023-11-12 RX ADMIN — Medication 4: at 06:20

## 2023-11-12 RX ADMIN — Medication 4: at 13:30

## 2023-11-12 RX ADMIN — Medication 0.25 MILLIGRAM(S): at 22:43

## 2023-11-12 RX ADMIN — POLYETHYLENE GLYCOL 3350 17 GRAM(S): 17 POWDER, FOR SOLUTION ORAL at 13:33

## 2023-11-12 RX ADMIN — Medication 650 MILLIGRAM(S): at 09:41

## 2023-11-12 RX ADMIN — Medication 650 MILLIGRAM(S): at 10:41

## 2023-11-12 RX ADMIN — BUMETANIDE 2 MILLIGRAM(S): 0.25 INJECTION INTRAMUSCULAR; INTRAVENOUS at 08:40

## 2023-11-12 RX ADMIN — Medication 15 UNIT(S): at 13:30

## 2023-11-12 RX ADMIN — INSULIN GLARGINE 45 UNIT(S): 100 INJECTION, SOLUTION SUBCUTANEOUS at 22:15

## 2023-11-12 RX ADMIN — SENNA PLUS 2 TABLET(S): 8.6 TABLET ORAL at 21:11

## 2023-11-12 RX ADMIN — MIDODRINE HYDROCHLORIDE 10 MILLIGRAM(S): 2.5 TABLET ORAL at 21:10

## 2023-11-12 RX ADMIN — Medication 15 UNIT(S): at 06:20

## 2023-11-12 RX ADMIN — GABAPENTIN 300 MILLIGRAM(S): 400 CAPSULE ORAL at 13:31

## 2023-11-12 RX ADMIN — Medication 3 MILLILITER(S): at 08:48

## 2023-11-12 RX ADMIN — Medication 3 MILLILITER(S): at 20:05

## 2023-11-12 RX ADMIN — Medication 81 MILLIGRAM(S): at 13:32

## 2023-11-12 RX ADMIN — CHLORHEXIDINE GLUCONATE 1 APPLICATION(S): 213 SOLUTION TOPICAL at 06:13

## 2023-11-12 RX ADMIN — Medication 13 UNIT(S): at 08:40

## 2023-11-12 RX ADMIN — CARVEDILOL PHOSPHATE 25 MILLIGRAM(S): 80 CAPSULE, EXTENDED RELEASE ORAL at 06:18

## 2023-11-12 RX ADMIN — Medication 3 MILLILITER(S): at 14:41

## 2023-11-12 NOTE — PROGRESS NOTE ADULT - SUBJECTIVE AND OBJECTIVE BOX
Patient is a 89y old  Male who presents with a chief complaint of sob (11 Nov 2023 11:05)      Over Night Events:  Patient seen and examined.   off insulin this morning     ROS:  See HPI    PHYSICAL EXAM    ICU Vital Signs Last 24 Hrs  T(C): 36.6 (12 Nov 2023 03:00), Max: 37.3 (11 Nov 2023 07:05)  T(F): 97.9 (12 Nov 2023 03:00), Max: 99.1 (11 Nov 2023 07:05)  HR: 69 (12 Nov 2023 05:00) (61 - 79)  BP: 136/63 (12 Nov 2023 05:00) (94/46 - 155/65)  BP(mean): 91 (12 Nov 2023 05:00) (66 - 96)  ABP: --  ABP(mean): --  RR: 17 (12 Nov 2023 05:00) (15 - 27)  SpO2: 100% (12 Nov 2023 05:00) (95% - 100%)    O2 Parameters below as of 12 Nov 2023 04:00  Patient On (Oxygen Delivery Method): nasal cannula  O2 Flow (L/min): 2          General: awake   HEENT:       augie         Lymph Nodes: NO cervical LN   Lungs: Bilateral BS  Cardiovascular: Regular   Abdomen: Soft, Positive BS  Extremities: No clubbing   Skin: warm   Neurological:   Musculoskeletal: move all ext     I&O's Detail    10 Nov 2023 07:01  -  11 Nov 2023 07:00  --------------------------------------------------------  IN:    Enteral Tube Flush: 600 mL    Enteral Tube Flush: 270 mL    Glucerna: 960 mL    Insulin: 20 mL    Insulin: 6 mL    Insulin: 36 mL    Insulin: 4 mL    Modular Fluid: 90 mL    Norepinephrine: 9 mL  Total IN: 1995 mL    OUT:    Incontinent per Collection Bag (mL): 150 mL    Other (mL): 2000 mL    Voided (mL): 50 mL  Total OUT: 2200 mL    Total NET: -205 mL      11 Nov 2023 07:01  -  12 Nov 2023 05:55  --------------------------------------------------------  IN:    Enteral Tube Flush: 270 mL    Enteral Tube Flush: 450 mL    Glucerna: 960 mL    Insulin: 8 mL    Insulin: 73 mL    Oral Fluid: 120 mL  Total IN: 1881 mL    OUT:    Incontinent per Collection Bag (mL): 320 mL    Voided (mL): 10 mL  Total OUT: 330 mL    Total NET: 1551 mL          LABS:                          9.4    8.98  )-----------( 189      ( 11 Nov 2023 05:15 )             29.5         11 Nov 2023 21:49    138    |  99     |  88     ----------------------------<  114    4.3     |  27     |  2.5      Ca    7.9        11 Nov 2023 21:49  Phos  3.2       11 Nov 2023 05:15  Mg     2.4       11 Nov 2023 05:15                                                                                      Urinalysis Basic - ( 11 Nov 2023 21:49 )    Color: x / Appearance: x / SG: x / pH: x  Gluc: 114 mg/dL / Ketone: x  / Bili: x / Urobili: x   Blood: x / Protein: x / Nitrite: x   Leuk Esterase: x / RBC: x / WBC x   Sq Epi: x / Non Sq Epi: x / Bacteria: x                                                                Culture - Blood (collected 10 Nov 2023 11:33)  Source: .Blood None  Preliminary Report (11 Nov 2023 23:02):    No growth at 24 hours    Culture - Blood (collected 10 Nov 2023 11:33)  Source: .Blood None  Preliminary Report (11 Nov 2023 23:02):    No growth at 24 hours                                                                                       ABG - ( 10 Nov 2023 06:43 )  pH, Arterial: 7.50  pH, Blood: x     /  pCO2: 39    /  pO2: 80    / HCO3: 30    / Base Excess: 6.7   /  SaO2: 97.5                MEDICATIONS  (STANDING):  albuterol/ipratropium for Nebulization 3 milliLiter(s) Nebulizer every 6 hours  albuterol/ipratropium for Nebulization. 3 milliLiter(s) Nebulizer once  allopurinol 100 milliGRAM(s) Oral daily  aspirin  chewable 81 milliGRAM(s) Oral daily  carvedilol 25 milliGRAM(s) Oral every 12 hours  chlorhexidine 2% Cloths 1 Application(s) Topical <User Schedule>  citalopram 20 milliGRAM(s) Oral daily  clopidogrel Tablet 75 milliGRAM(s) Oral daily  dextrose 5%. 1000 milliLiter(s) (50 mL/Hr) IV Continuous <Continuous>  dextrose 5%. 1000 milliLiter(s) (100 mL/Hr) IV Continuous <Continuous>  dextrose 50% Injectable 50 milliLiter(s) IV Push every 15 minutes  gabapentin 300 milliGRAM(s) Oral daily  glucagon  Injectable 1 milliGRAM(s) IntraMuscular once  heparin   Injectable 5000 Unit(s) SubCutaneous every 8 hours  insulin glargine Injectable (LANTUS) 45 Unit(s) SubCutaneous at bedtime  insulin lispro (ADMELOG) corrective regimen sliding scale   SubCutaneous three times a day before meals  insulin lispro Injectable (ADMELOG) 15 Unit(s) SubCutaneous three times a day before meals  insulin regular Infusion 5 Unit(s)/Hr (5 mL/Hr) IV Continuous <Continuous>  melatonin 5 milliGRAM(s) Oral at bedtime  midodrine 10 milliGRAM(s) Oral every 8 hours  norepinephrine Infusion 0.05 MICROgram(s)/kG/Min (8.3 mL/Hr) IV Continuous <Continuous>  pantoprazole  Injectable 40 milliGRAM(s) IV Push daily  polyethylene glycol 3350 17 Gram(s) Oral daily  senna 2 Tablet(s) Oral at bedtime    MEDICATIONS  (PRN):  acetaminophen  Suppository .. 650 milliGRAM(s) Rectal every 6 hours PRN Temp greater or equal to 38C (100.4F)  albuterol    90 MICROgram(s) HFA Inhaler 2 Puff(s) Inhalation every 4 hours PRN Shortness of Breath and/or Wheezing  dextrose Oral Gel 15 Gram(s) Oral once PRN Blood Glucose LESS THAN 70 milliGRAM(s)/deciliter          Xrays:  TLC:  OG:  ET tube:                                                                                       ECHO:  [facility] ICU:

## 2023-11-12 NOTE — PROGRESS NOTE ADULT - ASSESSMENT
89 yo male PMHx of  DM, CAD-bypass, chronic HFpEF, HTN, HLD, CKD3 presents w sob x few days, no cough fever or chills.  pt placed on NRBM by EMS and subsequently on BiPAP in ED, O2 sat now 99%.  Pt found to have elevated glucose w high AG and b-hydroxybutarate c/w DKA  Hospital course complicated by respiratory failure secondary to suspected aspiration pneumonia, PARKER and initiation of HD     DM with hyperglycemia  / sepsis - possible aspiration pneumonia / acute respiratory failure - resolving / PARKER on CKD3     - clinically improved   - off pressors   - s/p HD yesterday   - completed  antibiotics course   - procal is elevated but improved    - wean oxygen as tolerated - maintain pox>90%   - advance diet as tolerated   - continue insulin sq hospital protocol       DNR / DNI        50 minutes total spent today on patient care

## 2023-11-12 NOTE — PROVIDER CONTACT NOTE (OTHER) - ACTION/TREATMENT ORDERED:
Insulin drip adjusted per Dr. Little's orders.
Lantus given as ordered. Lispro given with 10pm feeding. FS rechecked every 2 hours until the AM.
No intervention
MD made aware
NGT adhesive device removed and changed to remove pressure from area.
STAT blood cultures ordered. Hypothermia blanket ordered.
Chest xray, abgs, Lasix, IVP dextrose due to sugar of 181, and IV Tylenol ordered and administered.

## 2023-11-12 NOTE — PROVIDER CONTACT NOTE (OTHER) - REASON
FS= [id number]
Hourly FSBS results
Urine output
fever
FSBS results and management
Suspected DTI noted on left nostril
patient temp, could not swallow, SOB

## 2023-11-12 NOTE — PROGRESS NOTE ADULT - ASSESSMENT
IMPRESSION:    ARF secondary to pulmonary edema fluid overload   Likely right aspiration pneumonia    Hyperglycemia   alter mental status   sepsis fever   PARKER   metabolc acidosis     PLAN:    CNS: neurology follow. No seizure on EEG.     HEENT: oral care     PULMONARY:   Wean down Fio2. Chest PT and frequent suctioning.    cxr reji    CARDIOVASCULAR  BNP markedly elevated. HFPEF on echo.   bumex 2mg once     GI: GI ppx. NGT feeding as tolerated Speech and swallow re-evaluation.     RENAL:   HD per nephrology Keep negative balance as tolerated.     INFECTIOUS DISEASE:   abx per ID , Procal     HEMATOLOGICAL:  DVT prophylaxis. Keep hg more than 7.     ENDOCRINE:   NGT for feeding.   adjust lantus  target -180  MUSCULOSKELETAL: PT OT.     CODE STATUS: DNI DNR. Pall care follow up.     downgrade to SDU

## 2023-11-12 NOTE — PROVIDER CONTACT NOTE (OTHER) - DATE AND TIME:
08-Nov-2023 13:08
12-Nov-2023 01:14
11-Nov-2023 05:57
10-Nov-2023 20:00
31-Oct-2023 01:05
31-Oct-2023 06:30
06-Nov-2023 22:50

## 2023-11-12 NOTE — PROGRESS NOTE ADULT - SUBJECTIVE AND OBJECTIVE BOX
Patient seen and evaluated this am, awake and hungry, complaining of b/l leg aches      T(F): 96.8 (11-12-23 @ 07:05), Max: 98.8 (11-11-23 @ 11:00)  HR: 60 (11-12-23 @ 06:00)  BP: 120/60 (11-12-23 @ 06:00)  RR: 12 (11-12-23 @ 07:05)  SpO2: 100% (11-12-23 @ 06:00) (95% - 100%)    PHYSICAL EXAM:  GENERAL: NAD  HEAD:  Atraumatic, Normocephalic  EYES: EOMI, PERRLA, conjunctiva and sclera clear  NERVOUS SYSTEM:  Alert & Oriented X3, no focal deficits   CHEST/LUNG:  bilateral rhonchi  HEART: Regular rate and rhythm; No murmurs, rubs, or gallops  ABDOMEN: Soft, Nontender, Nondistended; Bowel sounds present  EXTREMITIES:  2+ Peripheral Pulses, No clubbing, cyanosis, or edema    LABS  11-12    136  |  97<L>  |  99<HH>  ----------------------------<  213<H>  4.5   |  29  |  2.5<H>    Ca    7.8<L>      12 Nov 2023 05:29  Phos  3.3     11-12  Mg     2.4     11-12                            9.4    8.98  )-----------( 189      ( 11 Nov 2023 05:15 )             29.5     Culture Results:   No growth at 24 hours (11-10-23)  Culture Results:   No growth at 24 hours (11-10-23)  Culture Results:   No growth at 5 days (11-04-23)  Culture Results:   No growth at 5 days (10-31-23)  Culture Results:   No growth at 5 days (10-31-23)    RADIOLOGY  < from: Xray Chest 1 View- PORTABLE-Routine (11.11.23 @ 08:12) >  Impression:    Right opacity/pleural effusion, decreased. Left opacity/pleural effusion,   increased.      < end of copied text >    MEDICATIONS  (STANDING):  albuterol/ipratropium for Nebulization 3 milliLiter(s) Nebulizer every 6 hours  albuterol/ipratropium for Nebulization. 3 milliLiter(s) Nebulizer once  allopurinol 100 milliGRAM(s) Oral daily  aspirin  chewable 81 milliGRAM(s) Oral daily  carvedilol 25 milliGRAM(s) Oral every 12 hours  chlorhexidine 2% Cloths 1 Application(s) Topical <User Schedule>  citalopram 20 milliGRAM(s) Oral daily  clopidogrel Tablet 75 milliGRAM(s) Oral daily  gabapentin 300 milliGRAM(s) Oral daily  heparin   Injectable 5000 Unit(s) SubCutaneous every 8 hours  insulin glargine Injectable (LANTUS) 45 Unit(s) SubCutaneous at bedtime  insulin lispro (ADMELOG) corrective regimen sliding scale   SubCutaneous three times a day before meals  insulin lispro Injectable (ADMELOG) 15 Unit(s) SubCutaneous three times a day before meals  insulin regular Infusion 5 Unit(s)/Hr (5 mL/Hr) IV Continuous <Continuous>  melatonin 5 milliGRAM(s) Oral at bedtime  midodrine 10 milliGRAM(s) Oral every 8 hours  pantoprazole  Injectable 40 milliGRAM(s) IV Push daily  polyethylene glycol 3350 17 Gram(s) Oral daily  senna 2 Tablet(s) Oral at bedtime    MEDICATIONS  (PRN):  acetaminophen     Tablet .. 650 milliGRAM(s) Oral every 6 hours PRN Temp greater or equal to 38C (100.4F), Mild Pain (1 - 3)  albuterol    90 MICROgram(s) HFA Inhaler 2 Puff(s) Inhalation every 4 hours PRN Shortness of Breath and/or Wheezing  dextrose Oral Gel 15 Gram(s) Oral once PRN Blood Glucose LESS THAN 70 milliGRAM(s)/deciliter

## 2023-11-12 NOTE — PROVIDER CONTACT NOTE (OTHER) - SITUATION
Patient having tremors and could not hold a cup. Patient unable to take medications due to inability to swallow. Patient SOB and still having elevated temp.
FSBS elevated. Discussed with Dr. Orellana.
Patient has 50cc of urine output after receiving bumex 2mg IVP. Bladder scan showed 6cc. Dr. Grullon on unit, made aware. Patient to be dialyzed today
Patient temp 102.4 after Tylenol

## 2023-11-12 NOTE — SWALLOW BEDSIDE ASSESSMENT ADULT - COMMENTS
+ toleration observed without overt symptoms of penetration/aspiration.   Due to waxing/waning status pt is not appropriate for full puree diet, but allow for bites of puree and ice chips for comfort, only when most alert/awake and on NC. Discontinue with s/s aspiration. Keep NGT in place.

## 2023-11-13 LAB
ALBUMIN SERPL ELPH-MCNC: 2.9 G/DL — LOW (ref 3.5–5.2)
ALP SERPL-CCNC: 78 U/L — SIGNIFICANT CHANGE UP (ref 30–115)
ALT FLD-CCNC: 10 U/L — SIGNIFICANT CHANGE UP (ref 0–41)
ANION GAP SERPL CALC-SCNC: 15 MMOL/L — HIGH (ref 7–14)
ANION GAP SERPL CALC-SCNC: 15 MMOL/L — HIGH (ref 7–14)
ANION GAP SERPL CALC-SCNC: 9 MMOL/L — SIGNIFICANT CHANGE UP (ref 7–14)
ANION GAP SERPL CALC-SCNC: 9 MMOL/L — SIGNIFICANT CHANGE UP (ref 7–14)
AST SERPL-CCNC: 15 U/L — SIGNIFICANT CHANGE UP (ref 0–41)
AST SERPL-CCNC: 15 U/L — SIGNIFICANT CHANGE UP (ref 0–41)
AST SERPL-CCNC: 17 U/L — SIGNIFICANT CHANGE UP (ref 0–41)
AST SERPL-CCNC: 17 U/L — SIGNIFICANT CHANGE UP (ref 0–41)
BASOPHILS # BLD AUTO: 0.02 K/UL — SIGNIFICANT CHANGE UP (ref 0–0.2)
BASOPHILS # BLD AUTO: 0.02 K/UL — SIGNIFICANT CHANGE UP (ref 0–0.2)
BASOPHILS NFR BLD AUTO: 0.2 % — SIGNIFICANT CHANGE UP (ref 0–1)
BASOPHILS NFR BLD AUTO: 0.2 % — SIGNIFICANT CHANGE UP (ref 0–1)
BILIRUB SERPL-MCNC: 0.3 MG/DL — SIGNIFICANT CHANGE UP (ref 0.2–1.2)
BUN SERPL-MCNC: 101 MG/DL — CRITICAL HIGH (ref 10–20)
BUN SERPL-MCNC: 101 MG/DL — CRITICAL HIGH (ref 10–20)
BUN SERPL-MCNC: 105 MG/DL — CRITICAL HIGH (ref 10–20)
BUN SERPL-MCNC: 105 MG/DL — CRITICAL HIGH (ref 10–20)
CA-I BLD-SCNC: 4.3 MG/DL — LOW (ref 4.5–5.6)
CA-I BLD-SCNC: 4.3 MG/DL — LOW (ref 4.5–5.6)
CALCIUM SERPL-MCNC: 8.1 MG/DL — LOW (ref 8.4–10.5)
CALCIUM SERPL-MCNC: 8.1 MG/DL — LOW (ref 8.4–10.5)
CALCIUM SERPL-MCNC: 8.3 MG/DL — LOW (ref 8.4–10.5)
CALCIUM SERPL-MCNC: 8.3 MG/DL — LOW (ref 8.4–10.5)
CHLORIDE SERPL-SCNC: 94 MMOL/L — LOW (ref 98–110)
CHLORIDE SERPL-SCNC: 94 MMOL/L — LOW (ref 98–110)
CHLORIDE SERPL-SCNC: 96 MMOL/L — LOW (ref 98–110)
CHLORIDE SERPL-SCNC: 96 MMOL/L — LOW (ref 98–110)
CO2 SERPL-SCNC: 26 MMOL/L — SIGNIFICANT CHANGE UP (ref 17–32)
CO2 SERPL-SCNC: 26 MMOL/L — SIGNIFICANT CHANGE UP (ref 17–32)
CO2 SERPL-SCNC: 30 MMOL/L — SIGNIFICANT CHANGE UP (ref 17–32)
CO2 SERPL-SCNC: 30 MMOL/L — SIGNIFICANT CHANGE UP (ref 17–32)
CREAT SERPL-MCNC: 2.3 MG/DL — HIGH (ref 0.7–1.5)
CREAT SERPL-MCNC: 2.3 MG/DL — HIGH (ref 0.7–1.5)
CREAT SERPL-MCNC: 2.4 MG/DL — HIGH (ref 0.7–1.5)
CREAT SERPL-MCNC: 2.4 MG/DL — HIGH (ref 0.7–1.5)
EGFR: 25 ML/MIN/1.73M2 — LOW
EGFR: 25 ML/MIN/1.73M2 — LOW
EGFR: 26 ML/MIN/1.73M2 — LOW
EGFR: 26 ML/MIN/1.73M2 — LOW
EOSINOPHIL # BLD AUTO: 0.1 K/UL — SIGNIFICANT CHANGE UP (ref 0–0.7)
EOSINOPHIL # BLD AUTO: 0.1 K/UL — SIGNIFICANT CHANGE UP (ref 0–0.7)
EOSINOPHIL NFR BLD AUTO: 1.1 % — SIGNIFICANT CHANGE UP (ref 0–8)
EOSINOPHIL NFR BLD AUTO: 1.1 % — SIGNIFICANT CHANGE UP (ref 0–8)
GLUCOSE BLDC GLUCOMTR-MCNC: 113 MG/DL — HIGH (ref 70–99)
GLUCOSE BLDC GLUCOMTR-MCNC: 113 MG/DL — HIGH (ref 70–99)
GLUCOSE BLDC GLUCOMTR-MCNC: 129 MG/DL — HIGH (ref 70–99)
GLUCOSE BLDC GLUCOMTR-MCNC: 129 MG/DL — HIGH (ref 70–99)
GLUCOSE BLDC GLUCOMTR-MCNC: 180 MG/DL — HIGH (ref 70–99)
GLUCOSE BLDC GLUCOMTR-MCNC: 180 MG/DL — HIGH (ref 70–99)
GLUCOSE BLDC GLUCOMTR-MCNC: 302 MG/DL — HIGH (ref 70–99)
GLUCOSE BLDC GLUCOMTR-MCNC: 302 MG/DL — HIGH (ref 70–99)
GLUCOSE BLDC GLUCOMTR-MCNC: 79 MG/DL — SIGNIFICANT CHANGE UP (ref 70–99)
GLUCOSE BLDC GLUCOMTR-MCNC: 79 MG/DL — SIGNIFICANT CHANGE UP (ref 70–99)
GLUCOSE BLDC GLUCOMTR-MCNC: 80 MG/DL — SIGNIFICANT CHANGE UP (ref 70–99)
GLUCOSE BLDC GLUCOMTR-MCNC: 80 MG/DL — SIGNIFICANT CHANGE UP (ref 70–99)
GLUCOSE SERPL-MCNC: 124 MG/DL — HIGH (ref 70–99)
GLUCOSE SERPL-MCNC: 124 MG/DL — HIGH (ref 70–99)
GLUCOSE SERPL-MCNC: 237 MG/DL — HIGH (ref 70–99)
GLUCOSE SERPL-MCNC: 237 MG/DL — HIGH (ref 70–99)
HCT VFR BLD CALC: 28 % — LOW (ref 42–52)
HCT VFR BLD CALC: 28 % — LOW (ref 42–52)
HGB BLD-MCNC: 9.3 G/DL — LOW (ref 14–18)
HGB BLD-MCNC: 9.3 G/DL — LOW (ref 14–18)
IMM GRANULOCYTES NFR BLD AUTO: 1.5 % — HIGH (ref 0.1–0.3)
IMM GRANULOCYTES NFR BLD AUTO: 1.5 % — HIGH (ref 0.1–0.3)
LYMPHOCYTES # BLD AUTO: 0.92 K/UL — LOW (ref 1.2–3.4)
LYMPHOCYTES # BLD AUTO: 0.92 K/UL — LOW (ref 1.2–3.4)
LYMPHOCYTES # BLD AUTO: 10.3 % — LOW (ref 20.5–51.1)
LYMPHOCYTES # BLD AUTO: 10.3 % — LOW (ref 20.5–51.1)
MAGNESIUM SERPL-MCNC: 2.3 MG/DL — SIGNIFICANT CHANGE UP (ref 1.8–2.4)
MAGNESIUM SERPL-MCNC: 2.3 MG/DL — SIGNIFICANT CHANGE UP (ref 1.8–2.4)
MAGNESIUM SERPL-MCNC: 2.4 MG/DL — SIGNIFICANT CHANGE UP (ref 1.8–2.4)
MAGNESIUM SERPL-MCNC: 2.4 MG/DL — SIGNIFICANT CHANGE UP (ref 1.8–2.4)
MCHC RBC-ENTMCNC: 29.3 PG — SIGNIFICANT CHANGE UP (ref 27–31)
MCHC RBC-ENTMCNC: 29.3 PG — SIGNIFICANT CHANGE UP (ref 27–31)
MCHC RBC-ENTMCNC: 33.2 G/DL — SIGNIFICANT CHANGE UP (ref 32–37)
MCHC RBC-ENTMCNC: 33.2 G/DL — SIGNIFICANT CHANGE UP (ref 32–37)
MCV RBC AUTO: 88.3 FL — SIGNIFICANT CHANGE UP (ref 80–94)
MCV RBC AUTO: 88.3 FL — SIGNIFICANT CHANGE UP (ref 80–94)
MONOCYTES # BLD AUTO: 0.55 K/UL — SIGNIFICANT CHANGE UP (ref 0.1–0.6)
MONOCYTES # BLD AUTO: 0.55 K/UL — SIGNIFICANT CHANGE UP (ref 0.1–0.6)
MONOCYTES NFR BLD AUTO: 6.2 % — SIGNIFICANT CHANGE UP (ref 1.7–9.3)
MONOCYTES NFR BLD AUTO: 6.2 % — SIGNIFICANT CHANGE UP (ref 1.7–9.3)
NEUTROPHILS # BLD AUTO: 7.22 K/UL — HIGH (ref 1.4–6.5)
NEUTROPHILS # BLD AUTO: 7.22 K/UL — HIGH (ref 1.4–6.5)
NEUTROPHILS NFR BLD AUTO: 80.7 % — HIGH (ref 42.2–75.2)
NEUTROPHILS NFR BLD AUTO: 80.7 % — HIGH (ref 42.2–75.2)
NRBC # BLD: 0 /100 WBCS — SIGNIFICANT CHANGE UP (ref 0–0)
NRBC # BLD: 0 /100 WBCS — SIGNIFICANT CHANGE UP (ref 0–0)
PHOSPHATE SERPL-MCNC: 3 MG/DL — SIGNIFICANT CHANGE UP (ref 2.1–4.9)
PHOSPHATE SERPL-MCNC: 3 MG/DL — SIGNIFICANT CHANGE UP (ref 2.1–4.9)
PHOSPHATE SERPL-MCNC: 3.1 MG/DL — SIGNIFICANT CHANGE UP (ref 2.1–4.9)
PHOSPHATE SERPL-MCNC: 3.1 MG/DL — SIGNIFICANT CHANGE UP (ref 2.1–4.9)
PLATELET # BLD AUTO: 184 K/UL — SIGNIFICANT CHANGE UP (ref 130–400)
PLATELET # BLD AUTO: 184 K/UL — SIGNIFICANT CHANGE UP (ref 130–400)
PMV BLD: 12 FL — HIGH (ref 7.4–10.4)
PMV BLD: 12 FL — HIGH (ref 7.4–10.4)
POTASSIUM SERPL-MCNC: 4.5 MMOL/L — SIGNIFICANT CHANGE UP (ref 3.5–5)
POTASSIUM SERPL-MCNC: 4.5 MMOL/L — SIGNIFICANT CHANGE UP (ref 3.5–5)
POTASSIUM SERPL-MCNC: 4.7 MMOL/L — SIGNIFICANT CHANGE UP (ref 3.5–5)
POTASSIUM SERPL-MCNC: 4.7 MMOL/L — SIGNIFICANT CHANGE UP (ref 3.5–5)
POTASSIUM SERPL-SCNC: 4.5 MMOL/L — SIGNIFICANT CHANGE UP (ref 3.5–5)
POTASSIUM SERPL-SCNC: 4.5 MMOL/L — SIGNIFICANT CHANGE UP (ref 3.5–5)
POTASSIUM SERPL-SCNC: 4.7 MMOL/L — SIGNIFICANT CHANGE UP (ref 3.5–5)
POTASSIUM SERPL-SCNC: 4.7 MMOL/L — SIGNIFICANT CHANGE UP (ref 3.5–5)
PROT SERPL-MCNC: 5.8 G/DL — LOW (ref 6–8)
RBC # BLD: 3.17 M/UL — LOW (ref 4.7–6.1)
RBC # BLD: 3.17 M/UL — LOW (ref 4.7–6.1)
RBC # FLD: 15.9 % — HIGH (ref 11.5–14.5)
RBC # FLD: 15.9 % — HIGH (ref 11.5–14.5)
SODIUM SERPL-SCNC: 135 MMOL/L — SIGNIFICANT CHANGE UP (ref 135–146)
WBC # BLD: 8.94 K/UL — SIGNIFICANT CHANGE UP (ref 4.8–10.8)
WBC # BLD: 8.94 K/UL — SIGNIFICANT CHANGE UP (ref 4.8–10.8)
WBC # FLD AUTO: 8.94 K/UL — SIGNIFICANT CHANGE UP (ref 4.8–10.8)
WBC # FLD AUTO: 8.94 K/UL — SIGNIFICANT CHANGE UP (ref 4.8–10.8)

## 2023-11-13 PROCEDURE — 99233 SBSQ HOSP IP/OBS HIGH 50: CPT

## 2023-11-13 PROCEDURE — 71045 X-RAY EXAM CHEST 1 VIEW: CPT | Mod: 26

## 2023-11-13 PROCEDURE — 99232 SBSQ HOSP IP/OBS MODERATE 35: CPT

## 2023-11-13 RX ORDER — BACITRACIN ZINC 500 UNIT/G
1 OINTMENT IN PACKET (EA) TOPICAL DAILY
Refills: 0 | Status: DISCONTINUED | OUTPATIENT
Start: 2023-11-13 | End: 2023-11-28

## 2023-11-13 RX ORDER — BUMETANIDE 0.25 MG/ML
2 INJECTION INTRAMUSCULAR; INTRAVENOUS DAILY
Refills: 0 | Status: DISCONTINUED | OUTPATIENT
Start: 2023-11-13 | End: 2023-11-16

## 2023-11-13 RX ADMIN — CARVEDILOL PHOSPHATE 25 MILLIGRAM(S): 80 CAPSULE, EXTENDED RELEASE ORAL at 17:09

## 2023-11-13 RX ADMIN — HEPARIN SODIUM 5000 UNIT(S): 5000 INJECTION INTRAVENOUS; SUBCUTANEOUS at 05:43

## 2023-11-13 RX ADMIN — Medication 5 MILLIGRAM(S): at 21:32

## 2023-11-13 RX ADMIN — MIDODRINE HYDROCHLORIDE 10 MILLIGRAM(S): 2.5 TABLET ORAL at 21:32

## 2023-11-13 RX ADMIN — Medication 2: at 17:05

## 2023-11-13 RX ADMIN — MIDODRINE HYDROCHLORIDE 10 MILLIGRAM(S): 2.5 TABLET ORAL at 13:24

## 2023-11-13 RX ADMIN — Medication 8: at 12:26

## 2023-11-13 RX ADMIN — Medication 15 UNIT(S): at 17:05

## 2023-11-13 RX ADMIN — CLOPIDOGREL BISULFATE 75 MILLIGRAM(S): 75 TABLET, FILM COATED ORAL at 12:14

## 2023-11-13 RX ADMIN — HEPARIN SODIUM 5000 UNIT(S): 5000 INJECTION INTRAVENOUS; SUBCUTANEOUS at 21:34

## 2023-11-13 RX ADMIN — Medication 1 APPLICATION(S): at 13:35

## 2023-11-13 RX ADMIN — CARVEDILOL PHOSPHATE 25 MILLIGRAM(S): 80 CAPSULE, EXTENDED RELEASE ORAL at 05:43

## 2023-11-13 RX ADMIN — Medication 100 MILLIGRAM(S): at 12:14

## 2023-11-13 RX ADMIN — GABAPENTIN 300 MILLIGRAM(S): 400 CAPSULE ORAL at 12:16

## 2023-11-13 RX ADMIN — CHLORHEXIDINE GLUCONATE 1 APPLICATION(S): 213 SOLUTION TOPICAL at 06:56

## 2023-11-13 RX ADMIN — BUMETANIDE 2 MILLIGRAM(S): 0.25 INJECTION INTRAMUSCULAR; INTRAVENOUS at 12:16

## 2023-11-13 RX ADMIN — Medication 81 MILLIGRAM(S): at 12:14

## 2023-11-13 RX ADMIN — HEPARIN SODIUM 5000 UNIT(S): 5000 INJECTION INTRAVENOUS; SUBCUTANEOUS at 13:24

## 2023-11-13 RX ADMIN — MIDODRINE HYDROCHLORIDE 10 MILLIGRAM(S): 2.5 TABLET ORAL at 05:42

## 2023-11-13 RX ADMIN — Medication 3 MILLILITER(S): at 08:02

## 2023-11-13 RX ADMIN — CITALOPRAM 20 MILLIGRAM(S): 10 TABLET, FILM COATED ORAL at 12:15

## 2023-11-13 RX ADMIN — PANTOPRAZOLE SODIUM 40 MILLIGRAM(S): 20 TABLET, DELAYED RELEASE ORAL at 12:15

## 2023-11-13 RX ADMIN — Medication 15 UNIT(S): at 12:26

## 2023-11-13 NOTE — PROGRESS NOTE ADULT - ASSESSMENT
# Severe PARKER likely due to sepsis. Pt has Screat up to ~ 2.0 as far back as June 2021  # Hyperkalemia, given IV CaGluc and Lokelma, plus Bumex IV resolved  # Urine chemistries c/w pre-renal state / CRS w/ FeNa 0.3%, FeUrea 14%  # HAGMA d/t DKA / renal failure    - started HD on 11/3, last HD was 11/10  - non oliguric  - creat level stable    Recommendations:  - no need for HD as creat is stable and pt is non oliguric  - please d/c udall catheter  - cont bumex  - avoid hypotension- would decrease carvedilol or change to metoprolol  - continue monitoring urine output and daily chemistry  - monitor daily bladder scan  - phos level at goal  - blood glucose better controlled; appreciate endo f/u

## 2023-11-13 NOTE — PROGRESS NOTE ADULT - SUBJECTIVE AND OBJECTIVE BOX
Patient is a 89y old  Male who presents with a chief complaint of sob (12 Nov 2023 09:25)      Over Night Events:  Patient seen and examined.   off isnulin   NG feed   on NC     ROS:  See HPI    PHYSICAL EXAM    ICU Vital Signs Last 24 Hrs  T(C): 35.9 (13 Nov 2023 07:00), Max: 36.4 (13 Nov 2023 05:09)  T(F): 96.6 (13 Nov 2023 07:00), Max: 97.6 (13 Nov 2023 05:09)  HR: 69 (13 Nov 2023 05:09) (68 - 74)  BP: 135/63 (13 Nov 2023 07:00) (99/51 - 152/69)  BP(mean): 91 (13 Nov 2023 07:00) (63 - 102)  ABP: --  ABP(mean): --  RR: 22 (13 Nov 2023 07:00) (22 - 26)  SpO2: 96% (13 Nov 2023 05:09) (95% - 99%)    O2 Parameters below as of 13 Nov 2023 05:09  Patient On (Oxygen Delivery Method): room air            General: awake   HEENT:         au[place]       Lymph Nodes: NO cervical LN   Lungs: Bilateral BS  Cardiovascular: Regular   Abdomen: Soft, Positive BS  Extremities: No clubbing   Skin: warm   Neurological: no focal   Musculoskeletal: move all ext     I&O's Detail    12 Nov 2023 07:01  -  13 Nov 2023 07:00  --------------------------------------------------------  IN:    Enteral Tube Flush: 300 mL    Enteral Tube Flush: 210 mL    Glucerna: 480 mL  Total IN: 990 mL    OUT:    Incontinent per Collection Bag (mL): 660 mL    Voided (mL): 150 mL  Total OUT: 810 mL    Total NET: 180 mL          LABS:                          9.3    8.94  )-----------( 184      ( 13 Nov 2023 06:09 )             28.0         12 Nov 2023 05:29    136    |  97     |  99     ----------------------------<  213    4.5     |  29     |  2.5      Ca    7.8        12 Nov 2023 05:29  Phos  3.3       12 Nov 2023 05:29  Mg     2.4       12 Nov 2023 05:29                                                                                      Urinalysis Basic - ( 12 Nov 2023 05:29 )    Color: x / Appearance: x / SG: x / pH: x  Gluc: 213 mg/dL / Ketone: x  / Bili: x / Urobili: x   Blood: x / Protein: x / Nitrite: x   Leuk Esterase: x / RBC: x / WBC x   Sq Epi: x / Non Sq Epi: x / Bacteria: x                                                                Culture - Blood (collected 10 Nov 2023 11:33)  Source: .Blood None  Preliminary Report (12 Nov 2023 23:01):    No growth at 48 Hours    Culture - Blood (collected 10 Nov 2023 11:33)  Source: .Blood None  Preliminary Report (12 Nov 2023 23:01):    No growth at 48 Hours                                                                                           MEDICATIONS  (STANDING):  albuterol/ipratropium for Nebulization 3 milliLiter(s) Nebulizer every 6 hours  albuterol/ipratropium for Nebulization. 3 milliLiter(s) Nebulizer once  allopurinol 100 milliGRAM(s) Oral daily  aspirin  chewable 81 milliGRAM(s) Oral daily  carvedilol 25 milliGRAM(s) Oral every 12 hours  chlorhexidine 2% Cloths 1 Application(s) Topical <User Schedule>  citalopram 20 milliGRAM(s) Oral daily  clopidogrel Tablet 75 milliGRAM(s) Oral daily  dextrose 5%. 1000 milliLiter(s) (50 mL/Hr) IV Continuous <Continuous>  dextrose 5%. 1000 milliLiter(s) (100 mL/Hr) IV Continuous <Continuous>  dextrose 50% Injectable 50 milliLiter(s) IV Push every 15 minutes  gabapentin 300 milliGRAM(s) Oral daily  glucagon  Injectable 1 milliGRAM(s) IntraMuscular once  heparin   Injectable 5000 Unit(s) SubCutaneous every 8 hours  insulin glargine Injectable (LANTUS) 45 Unit(s) SubCutaneous at bedtime  insulin lispro (ADMELOG) corrective regimen sliding scale   SubCutaneous three times a day before meals  insulin lispro Injectable (ADMELOG) 15 Unit(s) SubCutaneous three times a day before meals  insulin regular Infusion 5 Unit(s)/Hr (5 mL/Hr) IV Continuous <Continuous>  melatonin 5 milliGRAM(s) Oral at bedtime  midodrine 10 milliGRAM(s) Oral every 8 hours  norepinephrine Infusion 0.05 MICROgram(s)/kG/Min (8.3 mL/Hr) IV Continuous <Continuous>  pantoprazole  Injectable 40 milliGRAM(s) IV Push daily  polyethylene glycol 3350 17 Gram(s) Oral daily  senna 2 Tablet(s) Oral at bedtime    MEDICATIONS  (PRN):  acetaminophen     Tablet .. 650 milliGRAM(s) Oral every 6 hours PRN Temp greater or equal to 38C (100.4F), Mild Pain (1 - 3)  albuterol    90 MICROgram(s) HFA Inhaler 2 Puff(s) Inhalation every 4 hours PRN Shortness of Breath and/or Wheezing  dextrose Oral Gel 15 Gram(s) Oral once PRN Blood Glucose LESS THAN 70 milliGRAM(s)/deciliter          Xrays:  TLC:  OG:  ET tube:                                                                                    b/l effusion /basilar opacity    ECHO:  [facility] ICU:

## 2023-11-13 NOTE — CHART NOTE - NSCHARTNOTEFT_GEN_A_CORE
received call from patient's son Bert requesting medical update. He noted patient has been having good and bad days, however, hopes that patient will continue to improve with each day. support rendered. will follow. [phone]  spoke with with  Over re: med update for bert.

## 2023-11-13 NOTE — PROGRESS NOTE ADULT - SUBJECTIVE AND OBJECTIVE BOX
AMANDA CASTORENA, 89y, Male; MRN# [id number]  Hospital Stay: 14d.    Patient is a 89y old Male who presents with a chief complaint of sob (13 Nov 2023 10:26)  Currently admitted to the ICU with the primary diagnosis of DKA (diabetic ketoacidosis)      SUBJECTIVE:  Interval/Overnight events:   -pt satting 98% on RA, appears improved, denies complaints.          REVIEW OF SYSTEMS:  As per HPI  OBJECTIVE:  VITALS:  T(F): 96.6 (11-13-23 @ 07:00), Max: 97.6 (11-13-23 @ 05:09)  HR: 80 (11-13-23 @ 10:00) (63 - 80)  BP: 119/78 (11-13-23 @ 10:00) (99/51 - 152/69)  ABP: --  ABP(mean): --  RR: 33 (11-13-23 @ 10:00) (22 - 33)  SpO2: 97% (11-13-23 @ 10:00) (95% - 100%)    Vent Settings    Blood Gas    Drips  dextrose 5%. 1000 milliLiter(s) (50 mL/Hr) IV Continuous <Continuous>  dextrose 5%. 1000 milliLiter(s) (100 mL/Hr) IV Continuous <Continuous>  insulin regular Infusion 5 Unit(s)/Hr (5 mL/Hr) IV Continuous <Continuous>  norepinephrine Infusion 0.05 MICROgram(s)/kG/Min (8.3 mL/Hr) IV Continuous <Continuous>    I&Os:    11-12-23 @ 07:01  -  11-13-23 @ 07:00  --------------------------------------------------------  IN: 990 mL / OUT: 810 mL / NET: 180 mL      PHYSICAL EXAM:    ACTIVE MEDICATIONS:  Standing  albuterol/ipratropium for Nebulization 3 milliLiter(s) Nebulizer every 6 hours  albuterol/ipratropium for Nebulization. 3 milliLiter(s) Nebulizer once  allopurinol 100 milliGRAM(s) Oral daily  aspirin  chewable 81 milliGRAM(s) Oral daily  bacitracin   Ointment 1 Application(s) Topical daily  buMETAnide Injectable 2 milliGRAM(s) IV Push daily  carvedilol 25 milliGRAM(s) Oral every 12 hours  chlorhexidine 2% Cloths 1 Application(s) Topical <User Schedule>  citalopram 20 milliGRAM(s) Oral daily  clopidogrel Tablet 75 milliGRAM(s) Oral daily  dextrose 5%. 1000 milliLiter(s) (50 mL/Hr) IV Continuous <Continuous>  dextrose 5%. 1000 milliLiter(s) (100 mL/Hr) IV Continuous <Continuous>  dextrose 50% Injectable 50 milliLiter(s) IV Push every 15 minutes  gabapentin 300 milliGRAM(s) Oral daily  glucagon  Injectable 1 milliGRAM(s) IntraMuscular once  heparin   Injectable 5000 Unit(s) SubCutaneous every 8 hours  insulin glargine Injectable (LANTUS) 45 Unit(s) SubCutaneous at bedtime  insulin lispro (ADMELOG) corrective regimen sliding scale   SubCutaneous three times a day before meals  insulin lispro Injectable (ADMELOG) 15 Unit(s) SubCutaneous three times a day before meals  insulin regular Infusion 5 Unit(s)/Hr (5 mL/Hr) IV Continuous <Continuous>  melatonin 5 milliGRAM(s) Oral at bedtime  midodrine 10 milliGRAM(s) Oral every 8 hours  norepinephrine Infusion 0.05 MICROgram(s)/kG/Min (8.3 mL/Hr) IV Continuous <Continuous>  pantoprazole  Injectable 40 milliGRAM(s) IV Push daily  polyethylene glycol 3350 17 Gram(s) Oral daily  senna 2 Tablet(s) Oral at bedtime    PRN  acetaminophen     Tablet .. 650 milliGRAM(s) Oral every 6 hours PRN  albuterol    90 MICROgram(s) HFA Inhaler 2 Puff(s) Inhalation every 4 hours PRN  dextrose Oral Gel 15 Gram(s) Oral once PRN    LABS:  11-13-23 @ 06:09  WBC 8.94, H/H 9.3<L>/28.0<L>, plt 184  Na 135, K 4.5, Cl 96<L>, CO2 30, <HH>, Cr 2.4<H>, Glu 124<H>    Ca 8.1<L>, Mg 2.4, Phosphorus 3.1    Tot Protein 5.8<L>, Alb 2.9<L>, T. Bili 0.3, D. Bili --  AST 15, ALT 10, Alk phos 78        11-10-23 @ 12:34:  CRP --, ESR --, Procal 1.73<H>, Ferritin --, Fungitell --, D-dimer --      11-03-23 @ 05:43:  Hgb A1c 9.1<H>% | Mean plasma glucose 214<H>      10-30-23 @ 19:20:  Troponin T 0.24<HH>, BNP --          CULTURES:    Culture - Blood (collected 11-10-23 @ 11:33)  Source: .Blood None  Preliminary Report (11-12-23 @ 23:01):    No growth at 48 Hours    Culture - Blood (collected 11-10-23 @ 11:33)  Source: .Blood None  Preliminary Report (11-12-23 @ 23:01):    No growth at 48 Hours    Culture - Blood (collected 11-04-23 @ 15:54)  Source: .Blood None  Final Report (11-10-23 @ 01:00):    No growth at 5 days    Culture - Blood (collected 10-31-23 @ 02:26)  Source: .Blood Blood-Peripheral  Final Report (11-05-23 @ 23:01):    No growth at 5 days    Culture - Blood (collected 10-31-23 @ 02:26)  Source: .Blood Blood-Peripheral  Final Report (11-05-23 @ 23:01):    No growth at 5 days      PENDING:    IMAGING:  Latest imaging reviewed.  Xray Chest 1 View- PORTABLE-Routine: AM   Indication: sob  Transport: Portable,  w/ Monitor  Exam Completed (11-12-23 @ 14:09)    ECHO:  TTE Echo Complete w/o Contrast w/ Doppler:   Transport: Portable  Monitor: w/ Monitor  Provider's Contact #: [phone] (10-31-23 @ 01:06)

## 2023-11-13 NOTE — PROGRESS NOTE ADULT - SUBJECTIVE AND OBJECTIVE BOX
Nephrology Progress Note    AMANDA CASTORENA  MRN-[id number]  89y  Male    S:  Patient is seen and examined, events over the last 24h noted.    O:  Allergies:  No Known Allergies    Hospital Medications:   MEDICATIONS  (STANDING):  albuterol/ipratropium for Nebulization 3 milliLiter(s) Nebulizer every 6 hours  albuterol/ipratropium for Nebulization. 3 milliLiter(s) Nebulizer once  allopurinol 100 milliGRAM(s) Oral daily  aspirin  chewable 81 milliGRAM(s) Oral daily  buMETAnide Injectable 2 milliGRAM(s) IV Push daily  carvedilol 25 milliGRAM(s) Oral every 12 hours  chlorhexidine 2% Cloths 1 Application(s) Topical <User Schedule>  citalopram 20 milliGRAM(s) Oral daily  clopidogrel Tablet 75 milliGRAM(s) Oral daily  dextrose 5%. 1000 milliLiter(s) (100 mL/Hr) IV Continuous <Continuous>  dextrose 5%. 1000 milliLiter(s) (50 mL/Hr) IV Continuous <Continuous>  dextrose 50% Injectable 50 milliLiter(s) IV Push every 15 minutes  gabapentin 300 milliGRAM(s) Oral daily  glucagon  Injectable 1 milliGRAM(s) IntraMuscular once  heparin   Injectable 5000 Unit(s) SubCutaneous every 8 hours  insulin glargine Injectable (LANTUS) 45 Unit(s) SubCutaneous at bedtime  insulin lispro (ADMELOG) corrective regimen sliding scale   SubCutaneous three times a day before meals  insulin lispro Injectable (ADMELOG) 15 Unit(s) SubCutaneous three times a day before meals  insulin regular Infusion 5 Unit(s)/Hr (5 mL/Hr) IV Continuous <Continuous>  melatonin 5 milliGRAM(s) Oral at bedtime  midodrine 10 milliGRAM(s) Oral every 8 hours  norepinephrine Infusion 0.05 MICROgram(s)/kG/Min (8.3 mL/Hr) IV Continuous <Continuous>  pantoprazole  Injectable 40 milliGRAM(s) IV Push daily  polyethylene glycol 3350 17 Gram(s) Oral daily  senna 2 Tablet(s) Oral at bedtime    MEDICATIONS  (PRN):  acetaminophen     Tablet .. 650 milliGRAM(s) Oral every 6 hours PRN Temp greater or equal to 38C (100.4F), Mild Pain (1 - 3)  albuterol    90 MICROgram(s) HFA Inhaler 2 Puff(s) Inhalation every 4 hours PRN Shortness of Breath and/or Wheezing  dextrose Oral Gel 15 Gram(s) Oral once PRN Blood Glucose LESS THAN 70 milliGRAM(s)/deciliter    Home Medications:  allopurinol 100 mg oral tablet: 1 tab(s) orally once a day (25 Oct 2021 22:30)  citalopram 20 mg oral tablet: 1 tab(s) orally once a day (25 Oct 2021 22:30)  clonazePAM 1 mg oral tablet: 2 tab(s) orally once a day (at bedtime), As Needed (25 Oct 2021 22:30)  gabapentin 300 mg oral capsule: 1 cap(s) orally 2 times a day (25 Oct 2021 22:30)  glimepiride 4 mg oral tablet: 1 tab(s) orally 2 times a day (25 Oct 2021 22:30)  Lantus 100 units/mL subcutaneous solution: 30 unit(s) subcutaneous once a day (at bedtime) (25 Oct 2021 22:30)  NIFEdipine 60 mg oral tablet, extended release: 1 tab(s) orally once a day (25 Oct 2021 22:30)      VITALS:  Daily     Daily Weight in k.9 (2023 05:09)  T(F): 96.6 (23 @ 07:00), Max: 97.6 (23 @ 05:09)  HR: 63 (23 @ 08:00)  BP: 125/58 (23 @ 08:00)  RR: 25 (23 @ 08:00)  SpO2: 100% (23 @ 08:00)  Wt(kg): --  I&O's Detail    2023 07:01  -  2023 07:00  --------------------------------------------------------  IN:    Enteral Tube Flush: 300 mL    Enteral Tube Flush: 210 mL    Glucerna: 480 mL  Total IN: 990 mL    OUT:    Incontinent per Collection Bag (mL): 660 mL    Voided (mL): 150 mL  Total OUT: 810 mL    Total NET: 180 mL        I&O's Summary    2023 07:01  -  2023 07:00  --------------------------------------------------------  IN: 990 mL / OUT: 810 mL / NET: 180 mL          PHYSICAL EXAM:  Gen: NAD  Chest: b/l breath sounds  Abd: soft  Extremities: no edema  Vascular access:       LABS:    Blood Gas Arterial - Sodium: 139 mmol/L (11-10-23 @ 06:43)  Blood Gas Arterial - Calcium, Ionized: 1.06 mmol/L (11-10-23 @ 06:43)  Blood Gas Arterial - Sodium: 140 mmol/L (23 @ 08:33)        135  |  96<L>  |  105<HH>  ----------------------------<  124<H>  4.5   |  30  |  2.4<H>    Ca    8.1<L>      2023 06:09  Phos  3.1       Mg     2.4         TPro  5.8<L>  /  Alb  2.9<L>  /  TBili  0.3  /  DBili      /  AST  15  /  ALT  10  /  AlkPhos  78      eGFR: 25 mL/min/1.73m2 (23 @ 06:09)  eGFR: 24 mL/min/1.73m2 (23 @ 05:29)    Phosphorus: 3.1 mg/dL (23 @ 06:09)  Phosphorus: 3.3 mg/dL (23 @ 05:29)                            9.3    8.94  )-----------( 184      ( 2023 06:09 )             28.0     Mean Cell Volume: 88.3 fL (23 @ 06:09)    Iron Total, Serum: 59 ug/dL (10-26-21 @ 04:30)  % Saturation, Iron: 23 % (10-26-21 @ 04:30)        Urine Studies:  Protein, Urine: 30 mg/dL (23 @ 14:00)  White Blood Cell - Urine: 2 /HPF (23 @ 14:00)  Red Blood Cell - Urine: 5 /HPF (23 @ 14:00)  Protein, Urine: 300 mg/dL (10-31-23 @ 13:24)  White Blood Cell - Urine: 2 /HPF (10-31-23 @ 13:24)  Red Blood Cell - Urine: 18 /HPF (10-31-23 @ 13:24)  Granular Cast: Present (10-31-23 @ 13:24)  Protein, Urine: 300 mg/dL (10-31-23 @ 07:40)  White Blood Cell - Urine: 0 /HPF (10-31-23 @ 07:40)  Red Blood Cell - Urine: 20 /HPF (10-31-23 @ 07:40)  Protein, Urine: Trace (10-27-21 @ 11:00)  Protein, Urine: Trace (10-25-21 @ 18:49)  Protein, Urine: 100 mg/dL (04-15-19 @ 14:09)  White Blood Cell - Urine: Negative (04-15-19 @ 14:09)  Red Blood Cell - Urine: 3-5 /HPF (04-15-19 @ 14:09)      Urea Nitrogen,  Random Urine: 573 mg/dL (10-31-23 @ 13:24)    Culture Results:   No growth at 48 Hours (11-10 @ 11:33)  Culture Results:   No growth at 48 Hours (11-10 @ 11:33)    Creatinine trend:  Creatinine: 2.4 mg/dL (23 @ 06:09)  Creatinine: 2.5 mg/dL (23 @ 05:29)  Creatinine: 2.5 mg/dL (23 @ 21:49)  Creatinine: 2.4 mg/dL (23 @ 05:15)  Creatinine: 1.9 mg/dL (11-10-23 @ 11:33)  Creatinine: 3.4 mg/dL (11-10-23 @ 05:48)  Creatinine: 2.9 mg/dL (23 @ 15:10)  Creatinine: 2.6 mg/dL (23 @ 05:18)  Creatinine: 1.8 mg/dL (23 @ 18:17)  Creatinine: 3.7 mg/dL (23 @ 06:09)  Creatinine: 3.5 mg/dL (23 @ 15:17)  Creatinine: 3.4 mg/dL (23 @ 11:28)  Creatinine: 2.3 mg/dL (23 @ 15:38)  Creatinine: 3.4 mg/dL (23 @ 05:18)  Creatinine: 3.1 mg/dL (23 @ 19:45)  Creatinine: 3.0 mg/dL (23 @ 11:00)  Creatinine: 3.1 mg/dL (23 @ 08:00)  Creatinine: 2.9 mg/dL (23 @ 21:39)  Creatinine: 2.7 mg/dL (23 @ 15:54)  Creatinine: 4.0 mg/dL (23 @ 06:15)    Hepatitis B Core Antibody, Total: Nonreact (23 @ 17:45)  Hepatitis B Core IgM Antibody: Nonreact (23 @ 17:45)  Hepatitis B Surface Antibody: Nonreact (23 @ 17:45)      US Kidney and Bladder:   ACC: [id number] EXAM:  US KIDNEYS AND BLADDER   ORDERED BY: JOSEFA SARAVIA     PROCEDURE DATE:  10/31/2023          INTERPRETATION:  CLINICAL INFORMATION: Worsening renal function.    COMPARISON: 2021 ultrasound.    TECHNIQUE: Sonography of the kidneys and bladder.    FINDINGS:    Right kidney: 12.0 cm. Echogenic in appearance without hydronephrosis or   nephrolithiasis.    Left kidney:  11.6 cm. Echogenic in appearance without hydronephrosis or   nephrolithiasis.    Urinary bladder: Patient has a condom catheter.    IMPRESSION:    Echogenic kidneys consistent with medical renal disease. No   hydronephrosis or nephrolithiasis.        --- End of Report ---            JOSÉ GREENBERG MD; Attending Interventional Radiologist  This document has been electronically signed. Oct 31 2023 11:41AM (10-31-23 @ 11:33)     Nephrology Progress Note    AMANDA CASTORENA  MRN-[id number]  89y  Male    S:  Patient is seen and examined, events over the last 24h noted.    O:  Allergies:  No Known Allergies    Hospital Medications:   MEDICATIONS  (STANDING):  albuterol/ipratropium for Nebulization 3 milliLiter(s) Nebulizer every 6 hours  albuterol/ipratropium for Nebulization. 3 milliLiter(s) Nebulizer once  allopurinol 100 milliGRAM(s) Oral daily  aspirin  chewable 81 milliGRAM(s) Oral daily  buMETAnide Injectable 2 milliGRAM(s) IV Push daily  carvedilol 25 milliGRAM(s) Oral every 12 hours  chlorhexidine 2% Cloths 1 Application(s) Topical <User Schedule>  citalopram 20 milliGRAM(s) Oral daily  clopidogrel Tablet 75 milliGRAM(s) Oral daily  gabapentin 300 milliGRAM(s) Oral daily  glucagon  Injectable 1 milliGRAM(s) IntraMuscular once  heparin   Injectable 5000 Unit(s) SubCutaneous every 8 hours  insulin glargine Injectable (LANTUS) 45 Unit(s) SubCutaneous at bedtime  insulin lispro (ADMELOG) corrective regimen sliding scale   SubCutaneous three times a day before meals  insulin lispro Injectable (ADMELOG) 15 Unit(s) SubCutaneous three times a day before meals  insulin regular Infusion 5 Unit(s)/Hr (5 mL/Hr) IV Continuous <Continuous>  melatonin 5 milliGRAM(s) Oral at bedtime  midodrine 10 milliGRAM(s) Oral every 8 hours  norepinephrine Infusion 0.05 MICROgram(s)/kG/Min (8.3 mL/Hr) IV Continuous <Continuous>  pantoprazole  Injectable 40 milliGRAM(s) IV Push daily  polyethylene glycol 3350 17 Gram(s) Oral daily  senna 2 Tablet(s) Oral at bedtime    MEDICATIONS  (PRN):  acetaminophen     Tablet .. 650 milliGRAM(s) Oral every 6 hours PRN Temp greater or equal to 38C (100.4F), Mild Pain (1 - 3)  albuterol    90 MICROgram(s) HFA Inhaler 2 Puff(s) Inhalation every 4 hours PRN Shortness of Breath and/or Wheezing  dextrose Oral Gel 15 Gram(s) Oral once PRN Blood Glucose LESS THAN 70 milliGRAM(s)/deciliter    Home Medications:  allopurinol 100 mg oral tablet: 1 tab(s) orally once a day (25 Oct 2021 22:30)  citalopram 20 mg oral tablet: 1 tab(s) orally once a day (25 Oct 2021 22:30)  clonazePAM 1 mg oral tablet: 2 tab(s) orally once a day (at bedtime), As Needed (25 Oct 2021 22:30)  gabapentin 300 mg oral capsule: 1 cap(s) orally 2 times a day (25 Oct 2021 22:30)  glimepiride 4 mg oral tablet: 1 tab(s) orally 2 times a day (25 Oct 2021 22:30)  Lantus 100 units/mL subcutaneous solution: 30 unit(s) subcutaneous once a day (at bedtime) (25 Oct 2021 22:30)  NIFEdipine 60 mg oral tablet, extended release: 1 tab(s) orally once a day (25 Oct 2021 22:30)      VITALS:  Daily     Daily Weight in k.9 (2023 05:09)  T(F): 96.6 (23 @ 07:00), Max: 97.6 (23 @ 05:09)  HR: 63 (23 @ 08:00)  BP: 125/58 (23 @ 08:00)  RR: 25 (23 @ 08:00)  SpO2: 100% (23 @ 08:00)  Wt(kg): --  I&O's Detail    2023 07:01  -  2023 07:00  --------------------------------------------------------  IN:    Enteral Tube Flush: 300 mL    Enteral Tube Flush: 210 mL    Glucerna: 480 mL  Total IN: 990 mL    OUT:    Incontinent per Collection Bag (mL): 660 mL    Voided (mL): 150 mL  Total OUT: 810 mL    Total NET: 180 mL        I&O's Summary    2023 07:01  -  2023 07:00  --------------------------------------------------------  IN: 990 mL / OUT: 810 mL / NET: 180 mL          PHYSICAL EXAM:  Gen: NAD  Chest: b/l breath sounds  Abd: soft  Extremities: no edema      LABS:        135  |  96<L>  |  105<HH>  ----------------------------<  124<H>  4.5   |  30  |  2.4<H>    Ca    8.1<L>      2023 06:09  Phos  3.1       Mg     2.4         TPro  5.8<L>  /  Alb  2.9<L>  /  TBili  0.3  /  DBili      /  AST  15  /  ALT  10  /  AlkPhos  78      eGFR: 25 mL/min/1.73m2 (23 @ 06:09)  eGFR: 24 mL/min/1.73m2 (23 @ 05:29)    Phosphorus: 3.1 mg/dL (23 @ 06:09)  Phosphorus: 3.3 mg/dL (23 @ 05:29)                            9.3    8.94  )-----------( 184      ( 2023 06:09 )             28.0     Mean Cell Volume: 88.3 fL (23 @ 06:09)

## 2023-11-13 NOTE — PROGRESS NOTE ADULT - SUBJECTIVE AND OBJECTIVE BOX
NUTRITION SUPPORT TEAM  -  PROGRESS NOTE     Interval Events:  pt awake, alert, headphones on, O2NC with no distress  being fed by staff when seen this evening, and eating well - edentulous but eating puree with no issue  abd soft, no complaints  right IJ [place] in place -dressing needs to be secured    VITALS:  T(F): 96.4 (11-13 @ 15:04), Max: 96.4 (11-13 @ 15:04)  HR: 64 (11-13 @ 21:19) (64 - 75)  BP: 134/63 (11-13 @ 18:38) (112/57 - 153/65)  RR: 29 (11-13 @ 18:38) (18 - 29)  SpO2: 99% (11-13 @ 21:19) (95% - 99%)    HEIGHT/WEIGHT/BMI:   Height (cm): 175 (11-04)  Weight (kg): 88.5 (10-30)  BMI (kg/m2): 28.9 (11-04)    I/Os:     11-12-23 @ 07:01  -  11-13-23 @ 07:00  --------------------------------------------------------  IN:    Enteral Tube Flush: 300 mL    Enteral Tube Flush: 210 mL    Glucerna: 480 mL  Total IN: 990 mL    OUT:    Incontinent per Collection Bag (mL): 660 mL    Voided (mL): 150 mL  Total OUT: 810 mL    Total NET: 180 mL    STANDING MEDICATIONS:   albuterol/ipratropium for Nebulization 3 milliLiter(s) Nebulizer every 6 hours  albuterol/ipratropium for Nebulization. 3 milliLiter(s) Nebulizer once  allopurinol 100 milliGRAM(s) Oral daily  aspirin  chewable 81 milliGRAM(s) Oral daily  bacitracin   Ointment 1 Application(s) Topical daily  buMETAnide Injectable 2 milliGRAM(s) IV Push daily  carvedilol 25 milliGRAM(s) Oral every 12 hours  chlorhexidine 2% Cloths 1 Application(s) Topical <User Schedule>  citalopram 20 milliGRAM(s) Oral daily  clopidogrel Tablet 75 milliGRAM(s) Oral daily  gabapentin 300 milliGRAM(s) Oral daily  heparin   Injectable 5000 Unit(s) SubCutaneous every 8 hours  insulin glargine Injectable (LANTUS) 45 Unit(s) SubCutaneous at bedtime  insulin lispro (ADMELOG) corrective regimen sliding scale   SubCutaneous three times a day before meals  insulin lispro Injectable (ADMELOG) 15 Unit(s) SubCutaneous three times a day before meals  insulin regular Infusion 5 Unit(s)/Hr IV Continuous <Continuous>  melatonin 5 milliGRAM(s) Oral at bedtime  midodrine 10 milliGRAM(s) Oral every 8 hours  norepinephrine Infusion 0.05 MICROgram(s)/kG/Min IV Continuous <Continuous>  pantoprazole  Injectable 40 milliGRAM(s) IV Push daily  polyethylene glycol 3350 17 Gram(s) Oral daily  senna 2 Tablet(s) Oral at bedtime      LABS:                         9.3    8.94  )-----------( 184      ( 13 Nov 2023 06:09 )             28.0     135  |  94<L>  |  101<HH>  ----------------------------<  237<H>          (11-13-23 @ 15:39)  4.7   |  26  |  2.3<H>    Ca    8.3<L>          (11-13-23 @ 15:39)  Phos  3.0         (11-13-23 @ 15:39)  Mg     2.3         (11-13-23 @ 15:39)    TPro  5.8<L>  /  Alb  2.9<L>  /  TBili  0.3  /  DBili  x   /  AST  17  /  ALT  10  /  AlkPhos  78       11-13-23 @ 15:39      Triglycerides, Serum:   Prealbumin, Serum: 18 mg/dL (11-11-23 @ 05:15) - be aware of renal effect       Blood Glucose (Past 24 hours):  113 mg/dL (11-13 @ 22:27)  80 mg/dL (11-13 @ 21:48)  79 mg/dL (11-13 @ 21:08)  180 mg/dL (11-13 @ 17:01)  302 mg/dL (11-13 @ 12:22)  129 mg/dL (11-13 @ 06:34)    DIET:   Diet, Pureed:   Mildly Thick Liquids (MILDTHICKLIQS) (11-13-23 @ 13:51) [Active]

## 2023-11-13 NOTE — PROGRESS NOTE ADULT - ASSESSMENT
89 yo male PMHx of  DM, CAD-bypass, chronic HFpEF, HTN, HLD, CKD3 presents w sob x few days, no cough fever or chills.  pt placed on NRBM by EMS and subsequently on BiPAP in ED, O2 sat now 99%.  Pt found to have elevated glucose w high AG and b-hydroxybutarate c/w DKA  Hospital course complicated by respiratory failure secondary to suspected aspiration pneumonia, PARKER and initiation of HD     DM with hyperglycemia  / sepsis - possible aspiration pneumonia / acute respiratory failure - resolving / PARKER on CKD3     - clinically improved   - off pressors   - s/p HD yesterday   - completed  antibiotics course   - procal is elevated but improved    - wean oxygen as tolerated - maintain pox>90%   - advance diet as tolerated   - continue insulin sq hospital protocol       DNR / DNI        50 minutes total spent today on patient care          89 yo male PMHx of  DM, CAD-bypass, chronic HFpEF, HTN, HLD, CKD3 presents w sob x few days, no cough fever or chills.  pt placed on NRBM by EMS and subsequently on BiPAP in ED, O2 sat now 99%.  Pt found to have elevated glucose w high AG and b-hydroxybutarate c/w DKA  Hospital course complicated by respiratory failure secondary to suspected aspiration pneumonia, PARKER and initiation of HD     DM with hyperglycemia  / sepsis - possible aspiration pneumonia / acute respiratory failure - resolving / PARKER on CKD3     - clinically improved   - off pressors   - continue  HD as per nephrology   - completed  antibiotics course   - wean oxygen as tolerated - maintain pox>90%   - advance diet as tolerated S&S f/u   - continue insulin sq hospital protocol       DNR / DNI        50 minutes total spent today on patient care

## 2023-11-13 NOTE — CONSULT NOTE ADULT - ASSESSMENT
Skin assessed- Left nostril partial thickness ulceration , wound base pink , dry                                              No drainage or foul smell noted                                             High risk for pressure injury development or progression       Plan:  Wound and skin care  recs.   Clean wound with saline , pat dry then apply  bacitracin ointment   Pressure  injury  preventive  measures  skin  and incontinence care   Assess wound and inform primary provider of any changes   Case discussed with primary Rn  Wound/ ostomy specialist  to f/u as needed     Offloading: [x ] Frequent position changes [x ] Devices/Equipment  Cleansing: [a ] Saline [ ] Soap/Water [ ] Other: ______  Topicals: [ ] Barrier Cream [ ] Antimicrobial [ ] Enzymatic Wound Debridement  Dressings: [ ] Dry, sterile [ ] Allevyn  Foam [ ] Absorbant Pads [ ] Collagenase    Other Recs.   per primary team      Total time for bedside assessment , review of medical records  and  discussion of plan of care with primary team greater than 35 min

## 2023-11-13 NOTE — SWALLOW BEDSIDE ASSESSMENT ADULT - NS SPL SWALLOW CLINIC TRIAL FT
Concern for pharyngeal dysphagia
Pt c/o fear drinking thins

## 2023-11-13 NOTE — CHART NOTE - NSCHARTNOTEFT_GEN_A_CORE
Registered Dietitian Follow-Up     Patient Profile Reviewed                           Yes [X]   No []     Nutrition History Previously Obtained        Yes [X]  No []  as per family at bedside, NKFA or intolerances. good po intake PTA     Pertinent Information: pt is 89 year old male with hx of DM, CAD,-bypass, CHF, HTN, presents with SOB bipap warranted. pt found to be in DKA (presently resolved), septic with PARKER.  11/2 IVF d/c'd, pt restarted on insulin infusion. pulm edema fluid overload and aspiration PNA noted s/p bumex. pt couldn't tolerate Hi FLow.   11/3 SLP eval recommend NPO with alternate means of nutrition 2/2 concern for pharyngeal dysphagia. s/p udall placement HD 11/3 with most recent session on 11/11 11/9 pt tolerated nasal canula but failed SLP therefore NGT placed for EN and pt was followed by Dr. Buck.   s/p SLP re-eval today and placed on puree with mildly thick fluids-tolerating thus far.       Diet, Pureed:   Mildly Thick Liquids (MILDTHICKLIQS) (11-13-23 @ 13:51) [Active]       Anthropometrics:  - Ht. 175 cm  - Wt. 80.9 kg today vs 86.7 kg upon admission   - %wt change  - BMI   - IBW      Pertinent Lab Data:  11-13    135  |  96<L>  |  105<HH>  ----------------------------<  124<H>  4.5   |  30  |  2.4<H>    Ca    8.1<L>      13 Nov 2023 06:09  Phos  3.1     11-13  Mg     2.4     11-13    TPro  5.8<L>  /  Alb  2.9<L>  /  TBili  0.3  /  DBili  x   /  AST  15  /  ALT  10  /  AlkPhos  78  11-13                          9.3    8.94  )-----------( 184      ( 13 Nov 2023 06:09 )             28.0       MEDICATIONS  (STANDING):  albuterol/ipratropium for Nebulization 3 milliLiter(s) Nebulizer every 6 hours  albuterol/ipratropium for Nebulization. 3 milliLiter(s) Nebulizer once  allopurinol 100 milliGRAM(s) Oral daily  aspirin  chewable 81 milliGRAM(s) Oral daily  buMETAnide Injectable 2 milliGRAM(s) IV Push daily  carvedilol 25 milliGRAM(s) Oral every 12 hours>  citalopram 20 milliGRAM(s) Oral daily  clopidogrel Tablet 75 milliGRAM(s) Oral daily    gabapentin 300 milliGRAM(s) Oral daily  insulin glargine Injectable (LANTUS) 45 Unit(s) SubCutaneous at bedtime  insulin lispro (ADMELOG) corrective regimen sliding scale   SubCutaneous three times a day before meals  insulin lispro Injectable (ADMELOG) 15 Unit(s) SubCutaneous three times a day before meals  insulin regular Infusion 5 Unit(s)/Hr (5 mL/Hr) IV Continuous <Continuous>  melatonin 5 milliGRAM(s) Oral at bedtime  midodrine 10 milliGRAM(s) Oral every 8 hours  norepinephrine Infusion 0.05 MICROgram(s)/kG/Min (8.3 mL/Hr) IV Continuous <Continuous>  pantoprazole  Injectable 40 milliGRAM(s) IV Push daily  polyethylene glycol 3350 17 Gram(s) Oral daily  senna 2 Tablet(s) Oral at bedtime    MEDICATIONS  (PRN):  acetaminophen     Tablet .. 650 milliGRAM(s) Oral every 6 hours PRN Temp greater or equal to 38C (100.4F), Mild Pain (1 - 3)  albuterol    90 MICROgram(s) HFA Inhaler 2 Puff(s) Inhalation every 4 hours PRN Shortness of Breath and/or Wheezing  dextrose Oral Gel 15 Gram(s) Oral once PRN Blood Glucose LESS THAN 70 milliGRAM(s)/deciliter       Physical Findings:  - Appearance: alert, orientated  - GI function: +BS, large BM x2 11/12/23  - Tubes: n/a   - Oral/Mouth cavity:  - Skin: Left nostril partial thickness ulceration     Nutrition Requirements  Weight Used: 80.9 kgs     Estimated Energy Needs:  2140-7461 kcals (20-25 kcal/kg/BW)  97g-113g  protein (1.2- 1.4g/kg/BW)  fluid needs as per nephrology           Nutrient Intake: pt tolerated lunch well consumed 50% of meal        [] Previous Nutrition Diagnosis:            [X] Ongoing          [] Resolved    [] No active nutrition diagnosis identified at this time       Nutrition Intervention: maintain on present diet, add CHO Consistent/Renal  restriction, monitor for need of supplementation      Goal/Expected Outcome: pt to continue to tolerate po diet and meet at least 80% of estimated nutrient needs within 3-5 days     Indicator/Monitoring: RD to monitor tolerance to po diet, labs/meds, NFPF and f/u as needed within 3-5 days  high risk

## 2023-11-13 NOTE — CONSULT NOTE ADULT - SUBJECTIVE AND OBJECTIVE BOX
HPI:    Patients a  89 yo male PMHx sig for DM, CAD-bypass, CHF, HTN, HLD presents w sob x few days, no cough fever or chills.  pt placed on NRBM by EMS and subsequently on BiPAP in ED, O2 sat now 99%.  Pt found to have elevated glucose w high AG and b-hydroxybutarate c/w DKA      PAST MEDICAL & SURGICAL HISTORY:  CHF (congestive heart failure)  DM (diabetes mellitus)  HTN (hypertension)  Prostate CA  in remission  Pacemaker  H/O total knee replacement, right  Allergies  No Known Allergies  Intolerances      FAMILY HISTORY:  FH: type 2 diabetes (Father)      Home Medications:  allopurinol 100 mg oral tablet: 1 tab(s) orally once a day (25 Oct 2021 22:30)  citalopram 20 mg oral tablet: 1 tab(s) orally once a day (25 Oct 2021 22:30)  clonazePAM 1 mg oral tablet: 2 tab(s) orally once a day (at bedtime), As Needed (25 Oct 2021 22:30)  gabapentin 300 mg oral capsule: 1 cap(s) orally 2 times a day (25 Oct 2021 22:30)  glimepiride 4 mg oral tablet: 1 tab(s) orally 2 times a day (25 Oct 2021 22:30)  Lantus 100 units/mL subcutaneous solution: 30 unit(s) subcutaneous once a day (at bedtime) (25 Oct 2021 22:30)  NIFEdipine 60 mg oral tablet, extended release: 1 tab(s) orally once a day (25 Oct 2021 22:30)    Occupation:  Alochol: Denied  Smoking: Denied  Drug Use: Denied  Marital Status:         ROS: as in HPI; All other systems reviewed are negative    ICU Vital Signs Last 24 Hrs  T(C): 37.2 (30 Oct 2023 18:39), Max: 37.2 (30 Oct 2023 18:39)  T(F): 98.9 (30 Oct 2023 18:39), Max: 98.9 (30 Oct 2023 18:39)  HR: 62 (30 Oct 2023 18:39) (62 - 62)  BP: 121/88 (30 Oct 2023 18:39) (121/88 - 121/88)  BP(mean): --  ABP: --  ABP(mean): --  RR: 18 (30 Oct 2023 18:39) (18 - 18)  SpO2: 99% (30 Oct 2023 18:39) (99% - 99%)    O2 Parameters below as of 30 Oct 2023 18:39  Patient On (Oxygen Delivery Method): mask, nonrebreather  O2 Flow (L/min): 15      Physical Examination:    General: No acute distress.  Alert, oriented, interactive, nonfocal    HEENT: Pupils equal, reactive to light.  Symmetric.    PULM: Clear to auscultation bilaterally, no significant sputum production    CVS: Regular rate and rhythm, no murmurs, rubs, or gallops    ABD: Soft, nondistended, nontender, normoactive bowel sounds, no masses    EXT: No edema, nontender    SKIN: Warm and well perfused, no rashes noted.      LABS:                        9.5    12.75 )-----------( 198      ( 30 Oct 2023 19:20 )             28.6     30 Oct 2023 19:20    129    |  90     |  95     ----------------------------<  477    5.2     |  15     |  3.7      Ca    8.1        30 Oct 2023 19:20    TPro  6.6    /  Alb  3.8    /  TBili  0.4    /  DBili  x      /  AST  36     /  ALT  23     /  AlkPhos  100    30 Oct 2023 19:20  Amylase x     lipase x          CARDIAC MARKERS ( 30 Oct 2023 19:20 )  x     / 0.24 ng/mL / x     / x     / x          CAPILLARY BLOOD GLUCOSE          Urinalysis Basic - ( 30 Oct 2023 19:20 )    Color: x / Appearance: x / SG: x / pH: x  Gluc: 477 mg/dL / Ketone: x  / Bili: x / Urobili: x   Blood: x / Protein: x / Nitrite: x   Leuk Esterase: x / RBC: x / WBC x   Sq Epi: x / Non Sq Epi: x / Bacteria: x      Culture        MEDICATIONS  (STANDING):  insulin regular Infusion 5 Unit(s)/Hr (5 mL/Hr) IV Continuous <Continuous>    MEDICATIONS  (PRN):        RADIOLOGY: ***     CXR:  ? congestion, awaiting official report      MEDICATIONS  (STANDING):  albuterol/ipratropium for Nebulization 3 milliLiter(s) Nebulizer every 6 hours  albuterol/ipratropium for Nebulization. 3 milliLiter(s) Nebulizer once  allopurinol 100 milliGRAM(s) Oral daily  aspirin  chewable 81 milliGRAM(s) Oral daily  buMETAnide Injectable 2 milliGRAM(s) IV Push daily  carvedilol 25 milliGRAM(s) Oral every 12 hours  chlorhexidine 2% Cloths 1 Application(s) Topical <User Schedule>  citalopram 20 milliGRAM(s) Oral daily  clopidogrel Tablet 75 milliGRAM(s) Oral daily  dextrose 5%. 1000 milliLiter(s) (100 mL/Hr) IV Continuous <Continuous>  dextrose 5%. 1000 milliLiter(s) (50 mL/Hr) IV Continuous <Continuous>  dextrose 50% Injectable 50 milliLiter(s) IV Push every 15 minutes  gabapentin 300 milliGRAM(s) Oral daily  glucagon  Injectable 1 milliGRAM(s) IntraMuscular once  heparin   Injectable 5000 Unit(s) SubCutaneous every 8 hours  insulin glargine Injectable (LANTUS) 45 Unit(s) SubCutaneous at bedtime  insulin lispro (ADMELOG) corrective regimen sliding scale   SubCutaneous three times a day before meals  insulin lispro Injectable (ADMELOG) 15 Unit(s) SubCutaneous three times a day before meals  insulin regular Infusion 5 Unit(s)/Hr (5 mL/Hr) IV Continuous <Continuous>  melatonin 5 milliGRAM(s) Oral at bedtime  midodrine 10 milliGRAM(s) Oral every 8 hours  norepinephrine Infusion 0.05 MICROgram(s)/kG/Min (8.3 mL/Hr) IV Continuous <Continuous>  pantoprazole  Injectable 40 milliGRAM(s) IV Push daily  polyethylene glycol 3350 17 Gram(s) Oral daily  senna 2 Tablet(s) Oral at bedtime    MEDICATIONS  (PRN):  acetaminophen     Tablet .. 650 milliGRAM(s) Oral every 6 hours PRN Temp greater or equal to 38C (100.4F), Mild Pain (1 - 3)  albuterol    90 MICROgram(s) HFA Inhaler 2 Puff(s) Inhalation every 4 hours PRN Shortness of Breath and/or Wheezing  dextrose Oral Gel 15 Gram(s) Oral once PRN Blood Glucose LESS THAN 70 milliGRAM(s)/deciliter      Allergies  No Known Allergies  Intolerances        SOCIAL HISTORY:  From home     FAMILY HISTORY:  FH: type 2 diabetes (Father)       PHYSICAL EXAM:  Vital Signs Last 24 Hrs  T(C): 35.9 (13 Nov 2023 07:00), Max: 36.4 (13 Nov 2023 05:09)  T(F): 96.6 (13 Nov 2023 07:00), Max: 97.6 (13 Nov 2023 05:09)  HR: 80 (13 Nov 2023 10:00) (63 - 80)  BP: 119/78 (13 Nov 2023 10:00) (99/51 - 152/69)  BP(mean): 93 (13 Nov 2023 10:00) (63 - 102)  RR: 33 (13 Nov 2023 10:00) (22 - 33)  SpO2: 97% (13 Nov 2023 10:00) (95% - 100%)    Parameters below as of 13 Nov 2023 10:00  Patient On (Oxygen Delivery Method): room air          LABS/ CULTURES/ RADIOLOGY:                        9.3    8.94  )-----------( 184      ( 13 Nov 2023 06:09 )             28.0       135  |  96  |  105  ----------------------------<  124      [11-13-23 @ 06:09]  4.5   |  30  |  2.4        Ca     8.1     [11-13-23 @ 06:09]      iCa    4.6     [11-10 @ 05:48]      Mg     2.4     [11-13-23 @ 06:09]      Phos  3.1     [11-13-23 @ 06:09]    TPro  5.8  /  Alb  2.9  /  TBili  0.3  /  DBili  x   /  AST  15  /  ALT  10  /  AlkPhos  78  [11-13-23 @ 06:09]              Culture - Blood (collected 11-10-23 @ 11:33)  Source: .Blood None  Preliminary Report (11-12-23 @ 23:01):    No growth at 48 Hours    Culture - Blood (collected 11-10-23 @ 11:33)  Source: .Blood None  Preliminary Report (11-12-23 @ 23:01):    No growth at 48 Hours

## 2023-11-13 NOTE — PROGRESS NOTE ADULT - ASSESSMENT
89 yo male PMHx of  DM, CAD-bypass, chronic HFpEF, HTN, HLD, CKD3 presents w sob x few days, no cough fever or chills.  pt placed on NRBM by EMS and subsequently on BiPAP in ED, O2 sat now 99%.  Pt found to have elevated glucose w high AG and b-hydroxybutarate c/w DKA  Hospital course complicated by respiratory failure secondary to suspected aspiration pneumonia, PARKER and initiation of HD    # Acute hypoxic respiratory failure - improving   # Sepsis POA   # R/O aspiration PNA   # DKA - resolved    # Metabolic encephalopathy - improved   # PARKER on CKD3 , now on HD     SUGGEST:  - team to d/w nephrology regarding need for long term HD  - clinically much improved, off BiPAP, eating puree with assistance  - d/w RD, we concur  - suggest changing diet to carb consistent puree with limited 60 mEq K/d  - would not restrict phos at this point, but follow

## 2023-11-13 NOTE — PROGRESS NOTE ADULT - ASSESSMENT
IMPRESSION:    ARF secondary to pulmonary edema fluid overload   Likely right aspiration pneumonia    Hyperglycemia   alter mental status   sepsis fever   PARKER   metabolc acidosis     PLAN:    CNS: neurology follow. No seizure on EEG.     HEENT: oral care     PULMONARY:   Wean down Fio2. Chest PT and frequent suctioning.    cxr reji    CARDIOVASCULAR  BNP markedly elevated. HFPEF on echo.   resume bumex Q12 hrs as per renal     GI: GI ppx. NGT feeding as tolerated Speech and swallow re-evaluation.     RENAL:   HD per nephrology Keep negative balance as tolerated.     INFECTIOUS DISEASE:   abx per ID , Procal     HEMATOLOGICAL:  DVT prophylaxis. Keep hg more than 7.     ENDOCRINE:   NGT for feeding.   adjust lantus  target -180  MUSCULOSKELETAL: PT OT.     CODE STATUS: DNI DNR. Pall care follow up.       SDU  IMPRESSION:    ARF secondary to pulmonary edema fluid overload   Likely right aspiration pneumonia    Hyperglycemia   alter mental status   sepsis fever   PARKER   metabolc acidosis     PLAN:    CNS: neurology follow. No seizure on EEG.     HEENT: oral care     PULMONARY:   Wean down Fio2. Chest PT and frequent suctioning.    cxr reji    CARDIOVASCULAR  BNP markedly elevated. HFPEF on echo.   resume bumex Q12 hrs as per renal     GI: GI ppx. NGT feeding as tolerated Speech and swallow re-evaluation.     RENAL:   HD per nephrology Keep negative balance as tolerated.     INFECTIOUS DISEASE:   abx per ID , Procal     HEMATOLOGICAL:  DVT prophylaxis. Keep hg more than 7.     ENDOCRINE:   NGT for feeding.   adjust lantus  target -180  MUSCULOSKELETAL: PT OT.     CODE STATUS: DNI DNR. Pall care follow up.       SDU possible downgrade to floor late afternoon after HD

## 2023-11-13 NOTE — PROGRESS NOTE ADULT - ASSESSMENT
89y Male admitted for DKA, treatment course complicated by CHF exacerbation/fluid overload    Interval/Overnight events:       NEURO:  #Acute pain    -APAP prn    -Gabapentin 300mg BID (renally dose)  #Hx anxiety  Continue home medications:  -citalopram 20mg QD  avoid sedative medications     RESP:   #DNI  #Oxygenation-- improving  Alternate BIPAP PRN and overnight with daytime HFNC. Wean down Fio2. Chest PT and frequent suctioning.  11/6 repeat MRSA negative      CARDS:   #Hx HFPEF  -Keep negative balance as tolerated. BNP markedly elevated. HFPEF on echo. LVEF 61%  -restarted bumex  -replete lytes  -on midodrine 10 q8 started by nephro 11/5      GI/NUTR:   #Passed S/S 11/13  -cleared for pureed diet   -NG tube removed  #GI Prophylaxis   -continue pantoprazole  #Bowel regimen    -continue senna    /RENAL:   # Severe PARKER likely due to sepsis. Pt has Screat up to ~ 2.0 as far back as June 2021  # Hyperkalemia, given IV CaGluc and Lokelma, plus Bumex IV resolved  # Urine chemistries c/w pre-renal state / CRS w/ FeNa 0.3%, FeUrea 14%  -Nephro following R serena in place, last dialyzed 11/6 >>plan for dialysis today    #urine output in critically ill    -indwelling bowden (placed     Current Rx:     Labs:          BUN/Cr- 68/2.6  -->,  78/2.9  -->          Electrolytes-Na 143 // K 4.1 // Mg 2.1 //  Phos 3.2 (11-09 @ 15:10)      Home Rx:        HEME/ONC:   #DVT prophylaxis     -SCDs     -hep subQ    - continue home ASN, Plavix       Labs: Hb/Hct:  9.0/27.0  (11-05 @ 08:00)  -->,  9.9/30.6  (11-06 @ 05:18)  -->                      Plts:  240  -->,  273  -->                 PTT/INR:           ID:    WBC- 7.43  --->>,  8.62  --->>,  8.52  --->>  Temp trend- 24hrs T(F): 99.1 (11-10 @ 06:00), Max: 99.3 (11-09 @ 23:01)  Current antibiotics-        ENDO:  #Hx DM  #DKA  -discussed optimizing diet with nutrition yesterday  -fingersticks notably elevated yesterday, continued to rise despite increasing sliding scale and additional lispro  -insulin drip started overnight with improved glucose control.  -FSG q6 if NPO or Tube feeds    -Glucose goal 140-180. if above 180 start ISS        ADVANCED DIRECTIVES: DNR/DNI         DISPO:   ICU

## 2023-11-14 LAB
ANION GAP SERPL CALC-SCNC: 11 MMOL/L — SIGNIFICANT CHANGE UP (ref 7–14)
ANION GAP SERPL CALC-SCNC: 11 MMOL/L — SIGNIFICANT CHANGE UP (ref 7–14)
ANION GAP SERPL CALC-SCNC: 12 MMOL/L — SIGNIFICANT CHANGE UP (ref 7–14)
ANION GAP SERPL CALC-SCNC: 12 MMOL/L — SIGNIFICANT CHANGE UP (ref 7–14)
BASOPHILS # BLD AUTO: 0.03 K/UL — SIGNIFICANT CHANGE UP (ref 0–0.2)
BASOPHILS # BLD AUTO: 0.03 K/UL — SIGNIFICANT CHANGE UP (ref 0–0.2)
BASOPHILS NFR BLD AUTO: 0.4 % — SIGNIFICANT CHANGE UP (ref 0–1)
BASOPHILS NFR BLD AUTO: 0.4 % — SIGNIFICANT CHANGE UP (ref 0–1)
BUN SERPL-MCNC: 95 MG/DL — CRITICAL HIGH (ref 10–20)
BUN SERPL-MCNC: 95 MG/DL — CRITICAL HIGH (ref 10–20)
BUN SERPL-MCNC: 97 MG/DL — CRITICAL HIGH (ref 10–20)
BUN SERPL-MCNC: 97 MG/DL — CRITICAL HIGH (ref 10–20)
CA-I BLD-SCNC: 4.3 MG/DL — LOW (ref 4.5–5.6)
CA-I BLD-SCNC: 4.3 MG/DL — LOW (ref 4.5–5.6)
CALCIUM SERPL-MCNC: 8.3 MG/DL — LOW (ref 8.4–10.5)
CALCIUM SERPL-MCNC: 8.3 MG/DL — LOW (ref 8.4–10.5)
CALCIUM SERPL-MCNC: 8.4 MG/DL — SIGNIFICANT CHANGE UP (ref 8.4–10.5)
CALCIUM SERPL-MCNC: 8.4 MG/DL — SIGNIFICANT CHANGE UP (ref 8.4–10.5)
CHLORIDE SERPL-SCNC: 95 MMOL/L — LOW (ref 98–110)
CHLORIDE SERPL-SCNC: 95 MMOL/L — LOW (ref 98–110)
CHLORIDE SERPL-SCNC: 96 MMOL/L — LOW (ref 98–110)
CHLORIDE SERPL-SCNC: 96 MMOL/L — LOW (ref 98–110)
CO2 SERPL-SCNC: 28 MMOL/L — SIGNIFICANT CHANGE UP (ref 17–32)
CREAT SERPL-MCNC: 2.3 MG/DL — HIGH (ref 0.7–1.5)
CREAT SERPL-MCNC: 2.3 MG/DL — HIGH (ref 0.7–1.5)
CREAT SERPL-MCNC: 2.5 MG/DL — HIGH (ref 0.7–1.5)
CREAT SERPL-MCNC: 2.5 MG/DL — HIGH (ref 0.7–1.5)
EGFR: 24 ML/MIN/1.73M2 — LOW
EGFR: 24 ML/MIN/1.73M2 — LOW
EGFR: 26 ML/MIN/1.73M2 — LOW
EGFR: 26 ML/MIN/1.73M2 — LOW
EOSINOPHIL # BLD AUTO: 0.05 K/UL — SIGNIFICANT CHANGE UP (ref 0–0.7)
EOSINOPHIL # BLD AUTO: 0.05 K/UL — SIGNIFICANT CHANGE UP (ref 0–0.7)
EOSINOPHIL NFR BLD AUTO: 0.6 % — SIGNIFICANT CHANGE UP (ref 0–8)
EOSINOPHIL NFR BLD AUTO: 0.6 % — SIGNIFICANT CHANGE UP (ref 0–8)
GLUCOSE BLDC GLUCOMTR-MCNC: 166 MG/DL — HIGH (ref 70–99)
GLUCOSE BLDC GLUCOMTR-MCNC: 166 MG/DL — HIGH (ref 70–99)
GLUCOSE BLDC GLUCOMTR-MCNC: 204 MG/DL — HIGH (ref 70–99)
GLUCOSE BLDC GLUCOMTR-MCNC: 204 MG/DL — HIGH (ref 70–99)
GLUCOSE BLDC GLUCOMTR-MCNC: 233 MG/DL — HIGH (ref 70–99)
GLUCOSE BLDC GLUCOMTR-MCNC: 233 MG/DL — HIGH (ref 70–99)
GLUCOSE BLDC GLUCOMTR-MCNC: 97 MG/DL — SIGNIFICANT CHANGE UP (ref 70–99)
GLUCOSE BLDC GLUCOMTR-MCNC: 97 MG/DL — SIGNIFICANT CHANGE UP (ref 70–99)
GLUCOSE SERPL-MCNC: 189 MG/DL — HIGH (ref 70–99)
GLUCOSE SERPL-MCNC: 189 MG/DL — HIGH (ref 70–99)
GLUCOSE SERPL-MCNC: 95 MG/DL — SIGNIFICANT CHANGE UP (ref 70–99)
GLUCOSE SERPL-MCNC: 95 MG/DL — SIGNIFICANT CHANGE UP (ref 70–99)
HCT VFR BLD CALC: 29.4 % — LOW (ref 42–52)
HCT VFR BLD CALC: 29.4 % — LOW (ref 42–52)
HGB BLD-MCNC: 9.6 G/DL — LOW (ref 14–18)
HGB BLD-MCNC: 9.6 G/DL — LOW (ref 14–18)
IMM GRANULOCYTES NFR BLD AUTO: 1.5 % — HIGH (ref 0.1–0.3)
IMM GRANULOCYTES NFR BLD AUTO: 1.5 % — HIGH (ref 0.1–0.3)
LYMPHOCYTES # BLD AUTO: 0.91 K/UL — LOW (ref 1.2–3.4)
LYMPHOCYTES # BLD AUTO: 0.91 K/UL — LOW (ref 1.2–3.4)
LYMPHOCYTES # BLD AUTO: 11.6 % — LOW (ref 20.5–51.1)
LYMPHOCYTES # BLD AUTO: 11.6 % — LOW (ref 20.5–51.1)
MAGNESIUM SERPL-MCNC: 2.2 MG/DL — SIGNIFICANT CHANGE UP (ref 1.8–2.4)
MCHC RBC-ENTMCNC: 29 PG — SIGNIFICANT CHANGE UP (ref 27–31)
MCHC RBC-ENTMCNC: 29 PG — SIGNIFICANT CHANGE UP (ref 27–31)
MCHC RBC-ENTMCNC: 32.7 G/DL — SIGNIFICANT CHANGE UP (ref 32–37)
MCHC RBC-ENTMCNC: 32.7 G/DL — SIGNIFICANT CHANGE UP (ref 32–37)
MCV RBC AUTO: 88.8 FL — SIGNIFICANT CHANGE UP (ref 80–94)
MCV RBC AUTO: 88.8 FL — SIGNIFICANT CHANGE UP (ref 80–94)
MONOCYTES # BLD AUTO: 0.54 K/UL — SIGNIFICANT CHANGE UP (ref 0.1–0.6)
MONOCYTES # BLD AUTO: 0.54 K/UL — SIGNIFICANT CHANGE UP (ref 0.1–0.6)
MONOCYTES NFR BLD AUTO: 6.9 % — SIGNIFICANT CHANGE UP (ref 1.7–9.3)
MONOCYTES NFR BLD AUTO: 6.9 % — SIGNIFICANT CHANGE UP (ref 1.7–9.3)
NEUTROPHILS # BLD AUTO: 6.19 K/UL — SIGNIFICANT CHANGE UP (ref 1.4–6.5)
NEUTROPHILS # BLD AUTO: 6.19 K/UL — SIGNIFICANT CHANGE UP (ref 1.4–6.5)
NEUTROPHILS NFR BLD AUTO: 79 % — HIGH (ref 42.2–75.2)
NEUTROPHILS NFR BLD AUTO: 79 % — HIGH (ref 42.2–75.2)
NRBC # BLD: 0 /100 WBCS — SIGNIFICANT CHANGE UP (ref 0–0)
NRBC # BLD: 0 /100 WBCS — SIGNIFICANT CHANGE UP (ref 0–0)
PHOSPHATE SERPL-MCNC: 4.4 MG/DL — SIGNIFICANT CHANGE UP (ref 2.1–4.9)
PHOSPHATE SERPL-MCNC: 4.4 MG/DL — SIGNIFICANT CHANGE UP (ref 2.1–4.9)
PHOSPHATE SERPL-MCNC: 4.9 MG/DL — SIGNIFICANT CHANGE UP (ref 2.1–4.9)
PHOSPHATE SERPL-MCNC: 4.9 MG/DL — SIGNIFICANT CHANGE UP (ref 2.1–4.9)
PLATELET # BLD AUTO: 203 K/UL — SIGNIFICANT CHANGE UP (ref 130–400)
PLATELET # BLD AUTO: 203 K/UL — SIGNIFICANT CHANGE UP (ref 130–400)
PMV BLD: 11.7 FL — HIGH (ref 7.4–10.4)
PMV BLD: 11.7 FL — HIGH (ref 7.4–10.4)
POTASSIUM SERPL-MCNC: 4.5 MMOL/L — SIGNIFICANT CHANGE UP (ref 3.5–5)
POTASSIUM SERPL-MCNC: 4.5 MMOL/L — SIGNIFICANT CHANGE UP (ref 3.5–5)
POTASSIUM SERPL-MCNC: 4.7 MMOL/L — SIGNIFICANT CHANGE UP (ref 3.5–5)
POTASSIUM SERPL-MCNC: 4.7 MMOL/L — SIGNIFICANT CHANGE UP (ref 3.5–5)
POTASSIUM SERPL-SCNC: 4.5 MMOL/L — SIGNIFICANT CHANGE UP (ref 3.5–5)
POTASSIUM SERPL-SCNC: 4.5 MMOL/L — SIGNIFICANT CHANGE UP (ref 3.5–5)
POTASSIUM SERPL-SCNC: 4.7 MMOL/L — SIGNIFICANT CHANGE UP (ref 3.5–5)
POTASSIUM SERPL-SCNC: 4.7 MMOL/L — SIGNIFICANT CHANGE UP (ref 3.5–5)
RBC # BLD: 3.31 M/UL — LOW (ref 4.7–6.1)
RBC # BLD: 3.31 M/UL — LOW (ref 4.7–6.1)
RBC # FLD: 16.1 % — HIGH (ref 11.5–14.5)
RBC # FLD: 16.1 % — HIGH (ref 11.5–14.5)
SODIUM SERPL-SCNC: 135 MMOL/L — SIGNIFICANT CHANGE UP (ref 135–146)
WBC # BLD: 7.84 K/UL — SIGNIFICANT CHANGE UP (ref 4.8–10.8)
WBC # BLD: 7.84 K/UL — SIGNIFICANT CHANGE UP (ref 4.8–10.8)
WBC # FLD AUTO: 7.84 K/UL — SIGNIFICANT CHANGE UP (ref 4.8–10.8)
WBC # FLD AUTO: 7.84 K/UL — SIGNIFICANT CHANGE UP (ref 4.8–10.8)

## 2023-11-14 PROCEDURE — 99233 SBSQ HOSP IP/OBS HIGH 50: CPT

## 2023-11-14 PROCEDURE — 71045 X-RAY EXAM CHEST 1 VIEW: CPT | Mod: 26

## 2023-11-14 PROCEDURE — 99232 SBSQ HOSP IP/OBS MODERATE 35: CPT

## 2023-11-14 RX ORDER — INSULIN GLARGINE 100 [IU]/ML
20 INJECTION, SOLUTION SUBCUTANEOUS AT BEDTIME
Refills: 0 | Status: DISCONTINUED | OUTPATIENT
Start: 2023-11-14 | End: 2023-11-15

## 2023-11-14 RX ADMIN — BUMETANIDE 2 MILLIGRAM(S): 0.25 INJECTION INTRAMUSCULAR; INTRAVENOUS at 05:26

## 2023-11-14 RX ADMIN — Medication 650 MILLIGRAM(S): at 10:00

## 2023-11-14 RX ADMIN — Medication 4: at 08:03

## 2023-11-14 RX ADMIN — MIDODRINE HYDROCHLORIDE 10 MILLIGRAM(S): 2.5 TABLET ORAL at 05:24

## 2023-11-14 RX ADMIN — CLOPIDOGREL BISULFATE 75 MILLIGRAM(S): 75 TABLET, FILM COATED ORAL at 11:51

## 2023-11-14 RX ADMIN — Medication 15 UNIT(S): at 11:39

## 2023-11-14 RX ADMIN — Medication 15 UNIT(S): at 08:03

## 2023-11-14 RX ADMIN — HEPARIN SODIUM 5000 UNIT(S): 5000 INJECTION INTRAVENOUS; SUBCUTANEOUS at 15:13

## 2023-11-14 RX ADMIN — SENNA PLUS 2 TABLET(S): 8.6 TABLET ORAL at 21:22

## 2023-11-14 RX ADMIN — HEPARIN SODIUM 5000 UNIT(S): 5000 INJECTION INTRAVENOUS; SUBCUTANEOUS at 05:25

## 2023-11-14 RX ADMIN — Medication 650 MILLIGRAM(S): at 09:19

## 2023-11-14 RX ADMIN — GABAPENTIN 300 MILLIGRAM(S): 400 CAPSULE ORAL at 11:52

## 2023-11-14 RX ADMIN — Medication 1 APPLICATION(S): at 11:52

## 2023-11-14 RX ADMIN — MIDODRINE HYDROCHLORIDE 10 MILLIGRAM(S): 2.5 TABLET ORAL at 21:21

## 2023-11-14 RX ADMIN — MIDODRINE HYDROCHLORIDE 10 MILLIGRAM(S): 2.5 TABLET ORAL at 15:13

## 2023-11-14 RX ADMIN — CHLORHEXIDINE GLUCONATE 1 APPLICATION(S): 213 SOLUTION TOPICAL at 08:55

## 2023-11-14 RX ADMIN — Medication 5 MILLIGRAM(S): at 21:21

## 2023-11-14 RX ADMIN — PANTOPRAZOLE SODIUM 40 MILLIGRAM(S): 20 TABLET, DELAYED RELEASE ORAL at 11:52

## 2023-11-14 RX ADMIN — INSULIN GLARGINE 20 UNIT(S): 100 INJECTION, SOLUTION SUBCUTANEOUS at 21:42

## 2023-11-14 RX ADMIN — Medication 100 MILLIGRAM(S): at 11:50

## 2023-11-14 RX ADMIN — CITALOPRAM 20 MILLIGRAM(S): 10 TABLET, FILM COATED ORAL at 11:51

## 2023-11-14 RX ADMIN — Medication 4: at 11:39

## 2023-11-14 RX ADMIN — Medication 81 MILLIGRAM(S): at 11:51

## 2023-11-14 RX ADMIN — CARVEDILOL PHOSPHATE 25 MILLIGRAM(S): 80 CAPSULE, EXTENDED RELEASE ORAL at 05:25

## 2023-11-14 RX ADMIN — CARVEDILOL PHOSPHATE 25 MILLIGRAM(S): 80 CAPSULE, EXTENDED RELEASE ORAL at 18:02

## 2023-11-14 RX ADMIN — HEPARIN SODIUM 5000 UNIT(S): 5000 INJECTION INTRAVENOUS; SUBCUTANEOUS at 21:22

## 2023-11-14 NOTE — CHART NOTE - NSCHARTNOTEFT_GEN_A_CORE
ICU Transfer Note    Transfer from: ICU  Transfer to:  (  ) Medicine    (  ) Telemetry    (  ) RCU    (  ) Palliative    (  ) Stroke Unit    ( X) Med/surg floor    HPI: 89 yo male PMHx sig for DM, CAD-bypass, CHF, HTN, HLD presents w sob x few days, no cough fever or chills.  pt placed on NRBM by EMS and subsequently on BiPAP in ED, O2 sat now 99%.  Pt found to have elevated glucose w high AG and b-hydroxybutarate c/w DKA      ICU COURSE:    Pt admitted to ICU for DKA and PARKER started on insulin drip  Treatment course complicated by fluid overload and CHF exacerbation  Due to BNP elevation, increased WOB, kidney function continuing to worsen, pt placed on BIPAP and intermittent dialysis via udall.  Pt slowly weaned off of BIPAP, now satting 96-99% on RA.   Nephro recommend d/c udall now that kidney function improved, patient should follow up for possible placement of tunnelled catheter in the future.    Patient was seen and examined at bedside this morning:    CONSTITUTIONAL: NAD  SKIN: Warm, dry  HEAD: NCAT  EYES: Clear conjunctiva   ENT: MMM  NECK: Supple  CARD: RRR, S1, S2; no M/R/G  RESP: Normal respiratory effort, CTAB  ABD: Soft, nontender. No rebound, rigidity, or guarding  EXT: Pulses palpable distally  NEURO: Grossly intact. Awake, alert, moving all extremities, no facial asymmetry, answering questions.          ASSESSMENT & PLAN:    ARF secondary to pulmonary edema fluid overload   Likely right aspiration pneumonia    Hyperglycemia   alter mental status   sepsis fever   PARKER   metabolc acidosis     PLAN:    CNS: neurology follow. No seizure on EEG.     HEENT: oral care     PULMONARY:   Wean down Fio2. Chest PT and frequent suctioning.        CARDIOVASCULAR  HFPEF on echo. resume bumex Q12 hrs as per renal     GI: GI ppx. Speech and swallow cleared for purree diet, planning for barium swallow tomorrow (11/15)    RENAL:  udall d/c'd today, per nephro. may need tunnelled catheter placement in the future    INFECTIOUS DISEASE: monitor off abx    HEMATOLOGICAL:  DVT prophylaxis. Keep hg more than 7.     ENDOCRINE: following, follow up outpatient for improved glucose control at home    MUSCULOSKELETAL: PT OT.     CODE STATUS: DNI DNR. Pall care follow up.     downgrade to floor

## 2023-11-14 NOTE — SWALLOW BEDSIDE ASSESSMENT ADULT - SWALLOW EVAL: FUNCTIONAL LEVEL AT TIME OF EVAL
Awake, hoarse Vocal quality although significantly improved. On RA
Awake, aphonic, On 2L O2 nasal cannula.
Awake, aphonic, On 2L O2 nasal cannula.
Awake, hoarse Vocal quality although significantly improved. On RA
Awake, mumbled speech, on NC.
Awake, hoarse low volume, slightly confused. On 2L O2 nasal cannula.
Awake, mumbled speech, on HFNC 60/50.

## 2023-11-14 NOTE — PROGRESS NOTE ADULT - SUBJECTIVE AND OBJECTIVE BOX
Nephrology Progress Note    AMANDA CASTORENA  MRN-[id number]  89y  Male    S:  Patient is seen and examined, events over the last 24h noted.    O:  Allergies:  No Known Allergies    Hospital Medications:   MEDICATIONS  (STANDING):  albuterol/ipratropium for Nebulization 3 milliLiter(s) Nebulizer every 6 hours  albuterol/ipratropium for Nebulization. 3 milliLiter(s) Nebulizer once  allopurinol 100 milliGRAM(s) Oral daily  aspirin  chewable 81 milliGRAM(s) Oral daily  bacitracin   Ointment 1 Application(s) Topical daily  buMETAnide Injectable 2 milliGRAM(s) IV Push daily  carvedilol 25 milliGRAM(s) Oral every 12 hours  chlorhexidine 2% Cloths 1 Application(s) Topical <User Schedule>  citalopram 20 milliGRAM(s) Oral daily  clopidogrel Tablet 75 milliGRAM(s) Oral daily  dextrose 5%. 1000 milliLiter(s) (50 mL/Hr) IV Continuous <Continuous>  dextrose 5%. 1000 milliLiter(s) (100 mL/Hr) IV Continuous <Continuous>  dextrose 50% Injectable 50 milliLiter(s) IV Push every 15 minutes  gabapentin 300 milliGRAM(s) Oral daily  glucagon  Injectable 1 milliGRAM(s) IntraMuscular once  heparin   Injectable 5000 Unit(s) SubCutaneous every 8 hours  insulin glargine Injectable (LANTUS) 45 Unit(s) SubCutaneous at bedtime  insulin lispro (ADMELOG) corrective regimen sliding scale   SubCutaneous three times a day before meals  insulin lispro Injectable (ADMELOG) 15 Unit(s) SubCutaneous three times a day before meals  insulin regular Infusion 5 Unit(s)/Hr (5 mL/Hr) IV Continuous <Continuous>  melatonin 5 milliGRAM(s) Oral at bedtime  midodrine 10 milliGRAM(s) Oral every 8 hours  pantoprazole  Injectable 40 milliGRAM(s) IV Push daily  polyethylene glycol 3350 17 Gram(s) Oral daily  senna 2 Tablet(s) Oral at bedtime    MEDICATIONS  (PRN):  acetaminophen     Tablet .. 650 milliGRAM(s) Oral every 6 hours PRN Temp greater or equal to 38C (100.4F), Mild Pain (1 - 3)  albuterol    90 MICROgram(s) HFA Inhaler 2 Puff(s) Inhalation every 4 hours PRN Shortness of Breath and/or Wheezing  dextrose Oral Gel 15 Gram(s) Oral once PRN Blood Glucose LESS THAN 70 milliGRAM(s)/deciliter    Home Medications:  allopurinol 100 mg oral tablet: 1 tab(s) orally once a day (25 Oct 2021 22:30)  citalopram 20 mg oral tablet: 1 tab(s) orally once a day (25 Oct 2021 22:30)  clonazePAM 1 mg oral tablet: 2 tab(s) orally once a day (at bedtime), As Needed (25 Oct 2021 22:30)  gabapentin 300 mg oral capsule: 1 cap(s) orally 2 times a day (25 Oct 2021 22:30)  glimepiride 4 mg oral tablet: 1 tab(s) orally 2 times a day (25 Oct 2021 22:30)  Lantus 100 units/mL subcutaneous solution: 30 unit(s) subcutaneous once a day (at bedtime) (25 Oct 2021 22:30)  NIFEdipine 60 mg oral tablet, extended release: 1 tab(s) orally once a day (25 Oct 2021 22:30)      VITALS:  Daily     Daily Weight in k (2023 07:05)  T(F): 97.3 (23 @ 07:05), Max: 97.3 (23 @ 07:05)  HR: 63 (23 @ 06:59)  BP: 131/58 (23 @ 06:59)  RR: 22 (23 @ 07:05)  SpO2: 97% (23 @ 06:59)  Wt(kg): --  I&O's Detail    2023 07:01  -  2023 07:00  --------------------------------------------------------  IN:    Oral Fluid: 120 mL  Total IN: 120 mL    OUT:    Incontinent per Collection Bag (mL): 1400 mL    Voided (mL): 325 mL  Total OUT: 1725 mL    Total NET: -1605 mL        I&O's Summary    2023 07:01  -  2023 07:00  --------------------------------------------------------  IN: 120 mL / OUT: 1725 mL / NET: -1605 mL          PHYSICAL EXAM:  Gen: NAD  Chest: b/l breath sounds  Abd: soft  Extremities: no edema  Vascular access:       LABS:    Blood Gas Arterial - Sodium: 139 mmol/L (11-10-23 @ 06:43)  Blood Gas Arterial - Calcium, Ionized: 1.06 mmol/L (11-10-23 @ 06:43)  Blood Gas Arterial - Sodium: 140 mmol/L (23 @ 08:33)        135  |  95<L>  |  95<HH>  ----------------------------<  189<H>  4.7   |  28  |  2.3<H>    Ca    8.4      2023 05:17  Phos  4.4       Mg     2.2         TPro  5.8<L>  /  Alb  2.9<L>  /  TBili  0.3  /  DBili      /  AST  17  /  ALT  10  /  AlkPhos  78      eGFR: 26 mL/min/1.73m2 (23 @ 05:17)  eGFR: 26 mL/min/1.73m2 (23 @ 15:39)    Phosphorus: 4.4 mg/dL (23 @ 05:17)  Phosphorus: 3.0 mg/dL (23 @ 15:39)                            9.6    7.84  )-----------( 203      ( 2023 05:17 )             29.4     Mean Cell Volume: 88.8 fL (23 @ 05:17)    Iron Total, Serum: 59 ug/dL (10-26-21 @ 04:30)  % Saturation, Iron: 23 % (10-26-21 @ 04:30)        Urine Studies:  Protein, Urine: 30 mg/dL (23 @ 14:00)  White Blood Cell - Urine: 2 /HPF (23 @ 14:00)  Red Blood Cell - Urine: 5 /HPF (23 @ 14:00)  Protein, Urine: 300 mg/dL (10-31-23 @ 13:24)  White Blood Cell - Urine: 2 /HPF (10-31-23 @ 13:24)  Red Blood Cell - Urine: 18 /HPF (10-31-23 @ 13:24)  Granular Cast: Present (10-31-23 @ 13:24)  Protein, Urine: 300 mg/dL (10-31-23 @ 07:40)  White Blood Cell - Urine: 0 /HPF (10-31-23 @ 07:40)  Red Blood Cell - Urine: 20 /HPF (10-31-23 @ 07:40)  Protein, Urine: Trace (10-27-21 @ 11:00)  Protein, Urine: Trace (10-25-21 @ 18:49)  Protein, Urine: 100 mg/dL (04-15-19 @ 14:09)  White Blood Cell - Urine: Negative (04-15-19 @ 14:09)  Red Blood Cell - Urine: 3-5 /HPF (04-15-19 @ 14:09)      Urea Nitrogen,  Random Urine: 573 mg/dL (10-31-23 @ 13:24)    Culture Results:   No growth at 72 Hours (11-10 @ 11:33)  Culture Results:   No growth at 72 Hours (11-10 @ 11:33)    Creatinine trend:  Creatinine: 2.3 mg/dL (23 @ 05:17)  Creatinine: 2.3 mg/dL (23 @ 15:39)  Creatinine: 2.4 mg/dL (23 @ 06:09)  Creatinine: 2.5 mg/dL (23 @ 05:29)  Creatinine: 2.5 mg/dL (23 @ 21:49)  Creatinine: 2.4 mg/dL (23 @ 05:15)  Creatinine: 1.9 mg/dL (11-10-23 @ 11:33)  Creatinine: 3.4 mg/dL (11-10-23 @ 05:48)  Creatinine: 2.9 mg/dL (23 @ 15:10)  Creatinine: 2.6 mg/dL (23 @ 05:18)  Creatinine: 1.8 mg/dL (23 @ 18:17)  Creatinine: 3.7 mg/dL (23 @ 06:09)  Creatinine: 3.5 mg/dL (23 @ 15:17)  Creatinine: 3.4 mg/dL (23 @ 11:28)  Creatinine: 2.3 mg/dL (23 @ 15:38)  Creatinine: 3.4 mg/dL (23 @ 05:18)  Creatinine: 3.1 mg/dL (23 @ 19:45)  Creatinine: 3.0 mg/dL (23 @ 11:00)  Creatinine: 3.1 mg/dL (23 @ 08:00)  Creatinine: 2.9 mg/dL (23 @ 21:39)    Hepatitis B Core Antibody, Total: Nonreact (23 @ 17:45)  Hepatitis B Core IgM Antibody: Nonreact (23 @ 17:45)  Hepatitis B Surface Antibody: Nonreact (23 @ 17:45)      US Kidney and Bladder:   ACC: [id number] EXAM:  US KIDNEYS AND BLADDER   ORDERED BY: JOSEFA SARAVIA     PROCEDURE DATE:  10/31/2023          INTERPRETATION:  CLINICAL INFORMATION: Worsening renal function.    COMPARISON: 2021 ultrasound.    TECHNIQUE: Sonography of the kidneys and bladder.    FINDINGS:    Right kidney: 12.0 cm. Echogenic in appearance without hydronephrosis or   nephrolithiasis.    Left kidney:  11.6 cm. Echogenic in appearance without hydronephrosis or   nephrolithiasis.    Urinary bladder: Patient has a condom catheter.    IMPRESSION:    Echogenic kidneys consistent with medical renal disease. No   hydronephrosis or nephrolithiasis.        --- End of Report ---            JOSÉ GREENBERG MD; Attending Interventional Radiologist  This document has been electronically signed. Oct 31 2023 11:41AM (10-31-23 @ 11:33)     Nephrology Progress Note    AMANDA CASTORENA  MRN-[id number]  89y  Male    S:  Patient is seen and examined, events over the last 24h noted.    O:  Allergies:  No Known Allergies    Hospital Medications:   MEDICATIONS  (STANDING):  albuterol/ipratropium for Nebulization 3 milliLiter(s) Nebulizer every 6 hours  albuterol/ipratropium for Nebulization. 3 milliLiter(s) Nebulizer once  allopurinol 100 milliGRAM(s) Oral daily  aspirin  chewable 81 milliGRAM(s) Oral daily  bacitracin   Ointment 1 Application(s) Topical daily  buMETAnide Injectable 2 milliGRAM(s) IV Push daily  carvedilol 25 milliGRAM(s) Oral every 12 hours  citalopram 20 milliGRAM(s) Oral daily  clopidogrel Tablet 75 milliGRAM(s) Oral daily  gabapentin 300 milliGRAM(s) Oral daily  heparin   Injectable 5000 Unit(s) SubCutaneous every 8 hours  insulin glargine Injectable (LANTUS) 45 Unit(s) SubCutaneous at bedtime  insulin lispro (ADMELOG) corrective regimen sliding scale   SubCutaneous three times a day before meals  insulin lispro Injectable (ADMELOG) 15 Unit(s) SubCutaneous three times a day before meals  insulin regular Infusion 5 Unit(s)/Hr (5 mL/Hr) IV Continuous <Continuous>  melatonin 5 milliGRAM(s) Oral at bedtime  midodrine 10 milliGRAM(s) Oral every 8 hours  pantoprazole  Injectable 40 milliGRAM(s) IV Push daily  polyethylene glycol 3350 17 Gram(s) Oral daily  senna 2 Tablet(s) Oral at bedtime    MEDICATIONS  (PRN):  acetaminophen     Tablet .. 650 milliGRAM(s) Oral every 6 hours PRN Temp greater or equal to 38C (100.4F), Mild Pain (1 - 3)  albuterol    90 MICROgram(s) HFA Inhaler 2 Puff(s) Inhalation every 4 hours PRN Shortness of Breath and/or Wheezing  dextrose Oral Gel 15 Gram(s) Oral once PRN Blood Glucose LESS THAN 70 milliGRAM(s)/deciliter    Home Medications:  allopurinol 100 mg oral tablet: 1 tab(s) orally once a day (25 Oct 2021 22:30)  citalopram 20 mg oral tablet: 1 tab(s) orally once a day (25 Oct 2021 22:30)  clonazePAM 1 mg oral tablet: 2 tab(s) orally once a day (at bedtime), As Needed (25 Oct 2021 22:30)  gabapentin 300 mg oral capsule: 1 cap(s) orally 2 times a day (25 Oct 2021 22:30)  glimepiride 4 mg oral tablet: 1 tab(s) orally 2 times a day (25 Oct 2021 22:30)  Lantus 100 units/mL subcutaneous solution: 30 unit(s) subcutaneous once a day (at bedtime) (25 Oct 2021 22:30)  NIFEdipine 60 mg oral tablet, extended release: 1 tab(s) orally once a day (25 Oct 2021 22:30)      VITALS:  Daily     Daily Weight in k (2023 07:05)  T(F): 97.3 (23 @ 07:05), Max: 97.3 (23 @ 07:05)  HR: 63 (23 @ 06:59)  BP: 131/58 (23 @ 06:59)  RR: 22 (23 @ 07:05)  SpO2: 97% (23 @ 06:59)  Wt(kg): --  I&O's Detail    2023 07:01  -  2023 07:00  --------------------------------------------------------  IN:    Oral Fluid: 120 mL  Total IN: 120 mL    OUT:    Incontinent per Collection Bag (mL): 1400 mL    Voided (mL): 325 mL  Total OUT: 1725 mL    Total NET: -1605 mL        I&O's Summary    2023 07:01  -  2023 07:00  --------------------------------------------------------  IN: 120 mL / OUT: 1725 mL / NET: -1605 mL          PHYSICAL EXAM:  Gen: NAD  Chest: b/l breath sounds  Abd: soft  Extremities: no edema      LABS:        135  |  95<L>  |  95<HH>  ----------------------------<  189<H>  4.7   |  28  |  2.3<H>    Ca    8.4      2023 05:17  Phos  4.4       Mg     2.2         TPro  5.8<L>  /  Alb  2.9<L>  /  TBili  0.3  /  DBili      /  AST  17  /  ALT  10  /  AlkPhos  78      eGFR: 26 mL/min/1.73m2 (23 @ 05:17)  eGFR: 26 mL/min/1.73m2 (23 @ 15:39)    Phosphorus: 4.4 mg/dL (23 @ 05:17)  Phosphorus: 3.0 mg/dL (23 @ 15:39)                            9.6    7.84  )-----------( 203      ( 2023 05:17 )             29.4     Mean Cell Volume: 88.8 fL (23 @ 05:17)    Iron Total, Serum: 59 ug/dL (10-26-21 @ 04:30)  % Saturation, Iron: 23 % (10-26-21 @ 04:30)    Urine Studies:  Protein, Urine: 30 mg/dL (23 @ 14:00)  White Blood Cell - Urine: 2 /HPF (23 @ 14:00)  Red Blood Cell - Urine: 5 /HPF (23 @ 14:00)    Creatinine trend:  Creatinine: 2.3 mg/dL (23 @ 05:17)  Creatinine: 2.3 mg/dL (23 @ 15:39)  Creatinine: 2.4 mg/dL (23 @ 06:09)  Creatinine: 2.5 mg/dL (23 @ 05:29)  Creatinine: 2.5 mg/dL (23 @ 21:49)

## 2023-11-14 NOTE — PROGRESS NOTE ADULT - ASSESSMENT
IMPRESSION:    ARF secondary to pulmonary edema fluid overload   Likely right aspiration pneumonia    Hyperglycemia   alter mental status   sepsis fever   PARKER   metabolc acidosis     PLAN:    CNS: neurology follow. No seizure on EEG.     HEENT: oral care     PULMONARY:   Wean down Fio2. Chest PT and frequent suctioning.        CARDIOVASCULAR  BNP markedly elevated. HFPEF on echo.   resume bumex Q12 hrs as per renal     GI: GI ppx. NGT feeding as tolerated Speech and swallow re-evaluation.     RENAL:  d/c udall a sper renal     INFECTIOUS DISEASE:   abx per ID , Procal     HEMATOLOGICAL:  DVT prophylaxis. Keep hg more than 7.     ENDOCRINE:   NGT for feeding.   adjust lantus  target -180  MUSCULOSKELETAL: PT OT.     CODE STATUS: DNI DNR. Pall care follow up.     downgrade to floor

## 2023-11-14 NOTE — CHART NOTE - NSCHARTNOTEFT_GEN_A_CORE
visited patient earlier today. patient encountered resting in bed. awake and alert. patient endorsed feeling tired and with back pain. he requested to be repositioned. covering bedside RN notified. [phone]

## 2023-11-14 NOTE — PROGRESS NOTE ADULT - ASSESSMENT
89 yo male PMHx of  DM, CAD-bypass, chronic HFpEF, HTN, HLD, CKD3 presents w sob x few days, no cough fever or chills.  pt placed on NRBM by EMS and subsequently on BiPAP in ED, O2 sat now 99%.  Pt found to have elevated glucose w high AG and b-hydroxybutarate c/w DKA  Hospital course complicated by respiratory failure secondary to suspected aspiration pneumonia, PARKER and initiation of HD     DM with hyperglycemia  / sepsis - aspiration pneumonia / acute respiratory failure - resolved / PARKER on CKD3 - resolving     - clinically improved   - off pressors   - no more HD (udall removed) as per nephrology   - completed  antibiotics course   - wean oxygen as tolerated - maintain pox>90%   - advance diet as tolerated S&S f/u   - continue insulin sq hospital protocol       DNR / DNI        50 minutes total spent today on patient care

## 2023-11-14 NOTE — SWALLOW BEDSIDE ASSESSMENT ADULT - SWALLOW EVAL: ORAL MUSCULATURE
+generalized weakness

## 2023-11-14 NOTE — PROGRESS NOTE ADULT - SUBJECTIVE AND OBJECTIVE BOX
Patient is a 89y old  Male who presents with a chief complaint of sob (13 Nov 2023 22:32)      Over Night Events:  Patient seen and examined.   awake follow command   VS stable     ROS:  See HPI    PHYSICAL EXAM    ICU Vital Signs Last 24 Hrs  T(C): 36.3 (14 Nov 2023 07:05), Max: 36.3 (14 Nov 2023 07:05)  T(F): 97.3 (14 Nov 2023 07:05), Max: 97.3 (14 Nov 2023 07:05)  HR: 67 (14 Nov 2023 05:07) (63 - 80)  BP: 114/56 (14 Nov 2023 05:07) (112/57 - 153/65)  BP(mean): 81 (14 Nov 2023 05:07) (81 - 95)  ABP: --  ABP(mean): --  RR: 22 (14 Nov 2023 07:05) (18 - 33)  SpO2: 99% (14 Nov 2023 05:07) (95% - 100%)    O2 Parameters below as of 14 Nov 2023 05:07  Patient On (Oxygen Delivery Method): room air            General:awake   HEENT:   augie             Lymph Nodes: NO cervical LN   Lungs: Bilateral BS  Cardiovascular: Regular   Abdomen: Soft, Positive BS  Extremities: No clubbing   Skin: warm   Neurological: no focal   Musculoskeletal: move all ext     I&O's Detail    13 Nov 2023 07:01  -  14 Nov 2023 07:00  --------------------------------------------------------  IN:    Oral Fluid: 120 mL  Total IN: 120 mL    OUT:    Incontinent per Collection Bag (mL): 1400 mL    Voided (mL): 325 mL  Total OUT: 1725 mL    Total NET: -1605 mL          LABS:                          9.6    7.84  )-----------( 203      ( 14 Nov 2023 05:17 )             29.4         13 Nov 2023 15:39    135    |  94     |  101    ----------------------------<  237    4.7     |  26     |  2.3      Ca    8.3        13 Nov 2023 15:39  Phos  3.0       13 Nov 2023 15:39  Mg     2.3       13 Nov 2023 15:39    TPro  5.8    /  Alb  2.9    /  TBili  0.3    /  DBili  x      /  AST  17     /  ALT  10     /  AlkPhos  78     13 Nov 2023 15:39  Amylase x     lipase x                                                                                        Urinalysis Basic - ( 13 Nov 2023 15:39 )    Color: x / Appearance: x / SG: x / pH: x  Gluc: 237 mg/dL / Ketone: x  / Bili: x / Urobili: x   Blood: x / Protein: x / Nitrite: x   Leuk Esterase: x / RBC: x / WBC x   Sq Epi: x / Non Sq Epi: x / Bacteria: x                                                                                                                                                     MEDICATIONS  (STANDING):  albuterol/ipratropium for Nebulization 3 milliLiter(s) Nebulizer every 6 hours  albuterol/ipratropium for Nebulization. 3 milliLiter(s) Nebulizer once  allopurinol 100 milliGRAM(s) Oral daily  aspirin  chewable 81 milliGRAM(s) Oral daily  bacitracin   Ointment 1 Application(s) Topical daily  buMETAnide Injectable 2 milliGRAM(s) IV Push daily  carvedilol 25 milliGRAM(s) Oral every 12 hours  chlorhexidine 2% Cloths 1 Application(s) Topical <User Schedule>  citalopram 20 milliGRAM(s) Oral daily  clopidogrel Tablet 75 milliGRAM(s) Oral daily  dextrose 5%. 1000 milliLiter(s) (100 mL/Hr) IV Continuous <Continuous>  dextrose 5%. 1000 milliLiter(s) (50 mL/Hr) IV Continuous <Continuous>  dextrose 50% Injectable 50 milliLiter(s) IV Push every 15 minutes  gabapentin 300 milliGRAM(s) Oral daily  glucagon  Injectable 1 milliGRAM(s) IntraMuscular once  heparin   Injectable 5000 Unit(s) SubCutaneous every 8 hours  insulin glargine Injectable (LANTUS) 45 Unit(s) SubCutaneous at bedtime  insulin lispro (ADMELOG) corrective regimen sliding scale   SubCutaneous three times a day before meals  insulin lispro Injectable (ADMELOG) 15 Unit(s) SubCutaneous three times a day before meals  insulin regular Infusion 5 Unit(s)/Hr (5 mL/Hr) IV Continuous <Continuous>  melatonin 5 milliGRAM(s) Oral at bedtime  midodrine 10 milliGRAM(s) Oral every 8 hours  norepinephrine Infusion 0.05 MICROgram(s)/kG/Min (8.3 mL/Hr) IV Continuous <Continuous>  pantoprazole  Injectable 40 milliGRAM(s) IV Push daily  polyethylene glycol 3350 17 Gram(s) Oral daily  senna 2 Tablet(s) Oral at bedtime    MEDICATIONS  (PRN):  acetaminophen     Tablet .. 650 milliGRAM(s) Oral every 6 hours PRN Temp greater or equal to 38C (100.4F), Mild Pain (1 - 3)  albuterol    90 MICROgram(s) HFA Inhaler 2 Puff(s) Inhalation every 4 hours PRN Shortness of Breath and/or Wheezing  dextrose Oral Gel 15 Gram(s) Oral once PRN Blood Glucose LESS THAN 70 milliGRAM(s)/deciliter          Xrays:  TLC:  OG:  ET tube:                                                                                       ECHO:  [facility] ICU:

## 2023-11-14 NOTE — PROGRESS NOTE ADULT - ASSESSMENT
# Severe PARKER likely due to sepsis. Pt has Screat up to ~ 2.0 as far back as June 2021  # Hyperkalemia, given IV CaGluc and Lokelma, plus Bumex IV resolved  # Urine chemistries c/w pre-renal state / CRS w/ FeNa 0.3%, FeUrea 14%  # HAGMA d/t DKA / renal failure    - started HD on 11/3, last HD was 11/10  - non oliguric  - creat level stable off dialysis    Recommendations:  - no need for HD / udall d/c  - cont bumex  - avoid hypotension- would decrease carvedilol or change to metoprolol and d/c midodrine  - monitor daily bladder scan  - phos level at goal  - blood glucose better controlled; appreciate endo f/u

## 2023-11-14 NOTE — PROGRESS NOTE ADULT - SUBJECTIVE AND OBJECTIVE BOX
Patient is a 89y old  Male who presents with a chief complaint of sob (14 Nov 2023 10:06)      T(F): 96.8 (11-14-23 @ 15:30), Max: 97.3 (11-14-23 @ 07:05)  HR: 64 (11-14-23 @ 15:30)  BP: 129/60 (11-14-23 @ 15:17)  RR: 24 (11-14-23 @ 15:30)  SpO2: 96% (11-14-23 @ 15:30) (96% - 100%)    PHYSICAL EXAM:  GENERAL: NAD  HEAD:  Atraumatic, Normocephalic  EYES: EOMI, PERRLA, conjunctiva and sclera clear  NERVOUS SYSTEM:  Alert & Oriented X3, no focal deficits   CHEST/LUNG:  bilateral rhonchi  HEART: Regular rate and rhythm; No murmurs, rubs, or gallops  ABDOMEN: Soft, Nontender, Nondistended; Bowel sounds present  EXTREMITIES:  2+ Peripheral Pulses, No clubbing, cyanosis, or edema    LABS  11-14    135  |  96<L>  |  97<HH>  ----------------------------<  95  4.5   |  28  |  2.5<H>    Ca    8.3<L>      14 Nov 2023 15:14  Phos  4.9     11-14  Mg     2.2     11-14    TPro  5.8<L>  /  Alb  2.9<L>  /  TBili  0.3  /  DBili  x   /  AST  17  /  ALT  10  /  AlkPhos  78  11-13                          9.6    7.84  )-----------( 203      ( 14 Nov 2023 05:17 )             29.4     Culture Results:   No growth at 72 Hours (11-10-23)  Culture Results:   No growth at 72 Hours (11-10-23)  Culture Results:   No growth at 5 days (11-04-23)  Culture Results:   No growth at 5 days (10-31-23)  Culture Results:   No growth at 5 days (10-31-23)    RADIOLOGY  < from: Xray Chest 1 View- PORTABLE-Routine (11.14.23 @ 07:48) >  Impression:    Improving bibasilar opacities.    < end of copied text >    MEDICATIONS  (STANDING):  albuterol/ipratropium for Nebulization 3 milliLiter(s) Nebulizer every 6 hours  albuterol/ipratropium for Nebulization. 3 milliLiter(s) Nebulizer once  allopurinol 100 milliGRAM(s) Oral daily  aspirin  chewable 81 milliGRAM(s) Oral daily  bacitracin   Ointment 1 Application(s) Topical daily  buMETAnide Injectable 2 milliGRAM(s) IV Push daily  carvedilol 25 milliGRAM(s) Oral every 12 hours  chlorhexidine 2% Cloths 1 Application(s) Topical <User Schedule>  citalopram 20 milliGRAM(s) Oral daily  clopidogrel Tablet 75 milliGRAM(s) Oral daily  gabapentin 300 milliGRAM(s) Oral daily  heparin   Injectable 5000 Unit(s) SubCutaneous every 8 hours  insulin glargine Injectable (LANTUS) 45 Unit(s) SubCutaneous at bedtime  insulin lispro (ADMELOG) corrective regimen sliding scale   SubCutaneous three times a day before meals  insulin lispro Injectable (ADMELOG) 15 Unit(s) SubCutaneous three times a day before meals  insulin regular Infusion 5 Unit(s)/Hr (5 mL/Hr) IV Continuous <Continuous>  melatonin 5 milliGRAM(s) Oral at bedtime  midodrine 10 milliGRAM(s) Oral every 8 hours  pantoprazole  Injectable 40 milliGRAM(s) IV Push daily  polyethylene glycol 3350 17 Gram(s) Oral daily  senna 2 Tablet(s) Oral at bedtime    MEDICATIONS  (PRN):  acetaminophen     Tablet .. 650 milliGRAM(s) Oral every 6 hours PRN Temp greater or equal to 38C (100.4F), Mild Pain (1 - 3)  albuterol    90 MICROgram(s) HFA Inhaler 2 Puff(s) Inhalation every 4 hours PRN Shortness of Breath and/or Wheezing  dextrose Oral Gel 15 Gram(s) Oral once PRN Blood Glucose LESS THAN 70 milliGRAM(s)/deciliter

## 2023-11-15 LAB
ANION GAP SERPL CALC-SCNC: 13 MMOL/L — SIGNIFICANT CHANGE UP (ref 7–14)
ANION GAP SERPL CALC-SCNC: 13 MMOL/L — SIGNIFICANT CHANGE UP (ref 7–14)
BUN SERPL-MCNC: 94 MG/DL — CRITICAL HIGH (ref 10–20)
BUN SERPL-MCNC: 94 MG/DL — CRITICAL HIGH (ref 10–20)
CALCIUM SERPL-MCNC: 8.2 MG/DL — LOW (ref 8.4–10.5)
CALCIUM SERPL-MCNC: 8.2 MG/DL — LOW (ref 8.4–10.5)
CHLORIDE SERPL-SCNC: 95 MMOL/L — LOW (ref 98–110)
CHLORIDE SERPL-SCNC: 95 MMOL/L — LOW (ref 98–110)
CO2 SERPL-SCNC: 26 MMOL/L — SIGNIFICANT CHANGE UP (ref 17–32)
CO2 SERPL-SCNC: 26 MMOL/L — SIGNIFICANT CHANGE UP (ref 17–32)
CREAT SERPL-MCNC: 2.2 MG/DL — HIGH (ref 0.7–1.5)
CREAT SERPL-MCNC: 2.2 MG/DL — HIGH (ref 0.7–1.5)
CULTURE RESULTS: SIGNIFICANT CHANGE UP
EGFR: 27 ML/MIN/1.73M2 — LOW
EGFR: 27 ML/MIN/1.73M2 — LOW
GLUCOSE BLDC GLUCOMTR-MCNC: 164 MG/DL — HIGH (ref 70–99)
GLUCOSE BLDC GLUCOMTR-MCNC: 164 MG/DL — HIGH (ref 70–99)
GLUCOSE BLDC GLUCOMTR-MCNC: 255 MG/DL — HIGH (ref 70–99)
GLUCOSE BLDC GLUCOMTR-MCNC: 255 MG/DL — HIGH (ref 70–99)
GLUCOSE BLDC GLUCOMTR-MCNC: 270 MG/DL — HIGH (ref 70–99)
GLUCOSE BLDC GLUCOMTR-MCNC: 270 MG/DL — HIGH (ref 70–99)
GLUCOSE BLDC GLUCOMTR-MCNC: 284 MG/DL — HIGH (ref 70–99)
GLUCOSE BLDC GLUCOMTR-MCNC: 284 MG/DL — HIGH (ref 70–99)
GLUCOSE BLDC GLUCOMTR-MCNC: 288 MG/DL — HIGH (ref 70–99)
GLUCOSE BLDC GLUCOMTR-MCNC: 288 MG/DL — HIGH (ref 70–99)
GLUCOSE BLDC GLUCOMTR-MCNC: 84 MG/DL — SIGNIFICANT CHANGE UP (ref 70–99)
GLUCOSE BLDC GLUCOMTR-MCNC: 84 MG/DL — SIGNIFICANT CHANGE UP (ref 70–99)
GLUCOSE SERPL-MCNC: 208 MG/DL — HIGH (ref 70–99)
GLUCOSE SERPL-MCNC: 208 MG/DL — HIGH (ref 70–99)
HCT VFR BLD CALC: 29.4 % — LOW (ref 42–52)
HCT VFR BLD CALC: 29.4 % — LOW (ref 42–52)
HGB BLD-MCNC: 9.7 G/DL — LOW (ref 14–18)
HGB BLD-MCNC: 9.7 G/DL — LOW (ref 14–18)
MAGNESIUM SERPL-MCNC: 2.3 MG/DL — SIGNIFICANT CHANGE UP (ref 1.8–2.4)
MAGNESIUM SERPL-MCNC: 2.3 MG/DL — SIGNIFICANT CHANGE UP (ref 1.8–2.4)
MCHC RBC-ENTMCNC: 29.3 PG — SIGNIFICANT CHANGE UP (ref 27–31)
MCHC RBC-ENTMCNC: 29.3 PG — SIGNIFICANT CHANGE UP (ref 27–31)
MCHC RBC-ENTMCNC: 33 G/DL — SIGNIFICANT CHANGE UP (ref 32–37)
MCHC RBC-ENTMCNC: 33 G/DL — SIGNIFICANT CHANGE UP (ref 32–37)
MCV RBC AUTO: 88.8 FL — SIGNIFICANT CHANGE UP (ref 80–94)
MCV RBC AUTO: 88.8 FL — SIGNIFICANT CHANGE UP (ref 80–94)
NRBC # BLD: 0 /100 WBCS — SIGNIFICANT CHANGE UP (ref 0–0)
NRBC # BLD: 0 /100 WBCS — SIGNIFICANT CHANGE UP (ref 0–0)
PHOSPHATE SERPL-MCNC: 5.3 MG/DL — HIGH (ref 2.1–4.9)
PHOSPHATE SERPL-MCNC: 5.3 MG/DL — HIGH (ref 2.1–4.9)
PLATELET # BLD AUTO: 206 K/UL — SIGNIFICANT CHANGE UP (ref 130–400)
PLATELET # BLD AUTO: 206 K/UL — SIGNIFICANT CHANGE UP (ref 130–400)
PMV BLD: 11.9 FL — HIGH (ref 7.4–10.4)
PMV BLD: 11.9 FL — HIGH (ref 7.4–10.4)
POTASSIUM SERPL-MCNC: 4.5 MMOL/L — SIGNIFICANT CHANGE UP (ref 3.5–5)
POTASSIUM SERPL-MCNC: 4.5 MMOL/L — SIGNIFICANT CHANGE UP (ref 3.5–5)
POTASSIUM SERPL-SCNC: 4.5 MMOL/L — SIGNIFICANT CHANGE UP (ref 3.5–5)
POTASSIUM SERPL-SCNC: 4.5 MMOL/L — SIGNIFICANT CHANGE UP (ref 3.5–5)
PREALB SERPL-MCNC: 22 MG/DL — SIGNIFICANT CHANGE UP (ref 20–40)
PREALB SERPL-MCNC: 22 MG/DL — SIGNIFICANT CHANGE UP (ref 20–40)
RBC # BLD: 3.31 M/UL — LOW (ref 4.7–6.1)
RBC # BLD: 3.31 M/UL — LOW (ref 4.7–6.1)
RBC # FLD: 16.1 % — HIGH (ref 11.5–14.5)
RBC # FLD: 16.1 % — HIGH (ref 11.5–14.5)
SODIUM SERPL-SCNC: 134 MMOL/L — LOW (ref 135–146)
SODIUM SERPL-SCNC: 134 MMOL/L — LOW (ref 135–146)
SPECIMEN SOURCE: SIGNIFICANT CHANGE UP
WBC # BLD: 6.55 K/UL — SIGNIFICANT CHANGE UP (ref 4.8–10.8)
WBC # BLD: 6.55 K/UL — SIGNIFICANT CHANGE UP (ref 4.8–10.8)
WBC # FLD AUTO: 6.55 K/UL — SIGNIFICANT CHANGE UP (ref 4.8–10.8)
WBC # FLD AUTO: 6.55 K/UL — SIGNIFICANT CHANGE UP (ref 4.8–10.8)

## 2023-11-15 PROCEDURE — 74230 X-RAY XM SWLNG FUNCJ C+: CPT | Mod: 26

## 2023-11-15 PROCEDURE — 99232 SBSQ HOSP IP/OBS MODERATE 35: CPT

## 2023-11-15 PROCEDURE — 99233 SBSQ HOSP IP/OBS HIGH 50: CPT

## 2023-11-15 PROCEDURE — 71045 X-RAY EXAM CHEST 1 VIEW: CPT | Mod: 26

## 2023-11-15 RX ORDER — INSULIN LISPRO 100/ML
4 VIAL (ML) SUBCUTANEOUS
Refills: 0 | Status: DISCONTINUED | OUTPATIENT
Start: 2023-11-15 | End: 2023-11-15

## 2023-11-15 RX ORDER — ONDANSETRON 8 MG/1
4 TABLET, FILM COATED ORAL ONCE
Refills: 0 | Status: COMPLETED | OUTPATIENT
Start: 2023-11-15 | End: 2023-11-15

## 2023-11-15 RX ORDER — CLONAZEPAM 1 MG
1 TABLET ORAL ONCE
Refills: 0 | Status: DISCONTINUED | OUTPATIENT
Start: 2023-11-15 | End: 2023-11-15

## 2023-11-15 RX ORDER — INSULIN LISPRO 100/ML
10 VIAL (ML) SUBCUTANEOUS
Refills: 0 | Status: DISCONTINUED | OUTPATIENT
Start: 2023-11-15 | End: 2023-11-17

## 2023-11-15 RX ORDER — MORPHINE SULFATE 50 MG/1
2 CAPSULE, EXTENDED RELEASE ORAL ONCE
Refills: 0 | Status: DISCONTINUED | OUTPATIENT
Start: 2023-11-15 | End: 2023-11-15

## 2023-11-15 RX ORDER — INSULIN GLARGINE 100 [IU]/ML
30 INJECTION, SOLUTION SUBCUTANEOUS AT BEDTIME
Refills: 0 | Status: DISCONTINUED | OUTPATIENT
Start: 2023-11-15 | End: 2023-11-15

## 2023-11-15 RX ORDER — INSULIN LISPRO 100/ML
20 VIAL (ML) SUBCUTANEOUS
Refills: 0 | Status: DISCONTINUED | OUTPATIENT
Start: 2023-11-15 | End: 2023-11-18

## 2023-11-15 RX ORDER — INSULIN GLARGINE 100 [IU]/ML
40 INJECTION, SOLUTION SUBCUTANEOUS AT BEDTIME
Refills: 0 | Status: DISCONTINUED | OUTPATIENT
Start: 2023-11-15 | End: 2023-11-18

## 2023-11-15 RX ADMIN — Medication 3 MILLILITER(S): at 13:40

## 2023-11-15 RX ADMIN — MIDODRINE HYDROCHLORIDE 10 MILLIGRAM(S): 2.5 TABLET ORAL at 05:21

## 2023-11-15 RX ADMIN — Medication 650 MILLIGRAM(S): at 12:45

## 2023-11-15 RX ADMIN — Medication 650 MILLIGRAM(S): at 18:38

## 2023-11-15 RX ADMIN — Medication 1 APPLICATION(S): at 12:27

## 2023-11-15 RX ADMIN — Medication 6: at 12:58

## 2023-11-15 RX ADMIN — CLOPIDOGREL BISULFATE 75 MILLIGRAM(S): 75 TABLET, FILM COATED ORAL at 12:27

## 2023-11-15 RX ADMIN — Medication 81 MILLIGRAM(S): at 12:26

## 2023-11-15 RX ADMIN — CARVEDILOL PHOSPHATE 25 MILLIGRAM(S): 80 CAPSULE, EXTENDED RELEASE ORAL at 17:51

## 2023-11-15 RX ADMIN — MIDODRINE HYDROCHLORIDE 10 MILLIGRAM(S): 2.5 TABLET ORAL at 21:48

## 2023-11-15 RX ADMIN — Medication 1 MILLIGRAM(S): at 21:34

## 2023-11-15 RX ADMIN — HEPARIN SODIUM 5000 UNIT(S): 5000 INJECTION INTRAVENOUS; SUBCUTANEOUS at 05:24

## 2023-11-15 RX ADMIN — ONDANSETRON 4 MILLIGRAM(S): 8 TABLET, FILM COATED ORAL at 20:25

## 2023-11-15 RX ADMIN — SENNA PLUS 2 TABLET(S): 8.6 TABLET ORAL at 21:48

## 2023-11-15 RX ADMIN — Medication 5 MILLIGRAM(S): at 21:48

## 2023-11-15 RX ADMIN — CARVEDILOL PHOSPHATE 25 MILLIGRAM(S): 80 CAPSULE, EXTENDED RELEASE ORAL at 05:22

## 2023-11-15 RX ADMIN — BUMETANIDE 2 MILLIGRAM(S): 0.25 INJECTION INTRAMUSCULAR; INTRAVENOUS at 05:25

## 2023-11-15 RX ADMIN — Medication 100 MILLIGRAM(S): at 12:26

## 2023-11-15 RX ADMIN — GABAPENTIN 300 MILLIGRAM(S): 400 CAPSULE ORAL at 12:26

## 2023-11-15 RX ADMIN — PANTOPRAZOLE SODIUM 40 MILLIGRAM(S): 20 TABLET, DELAYED RELEASE ORAL at 12:27

## 2023-11-15 RX ADMIN — MIDODRINE HYDROCHLORIDE 10 MILLIGRAM(S): 2.5 TABLET ORAL at 14:26

## 2023-11-15 RX ADMIN — HEPARIN SODIUM 5000 UNIT(S): 5000 INJECTION INTRAVENOUS; SUBCUTANEOUS at 21:48

## 2023-11-15 RX ADMIN — HEPARIN SODIUM 5000 UNIT(S): 5000 INJECTION INTRAVENOUS; SUBCUTANEOUS at 14:26

## 2023-11-15 RX ADMIN — CHLORHEXIDINE GLUCONATE 1 APPLICATION(S): 213 SOLUTION TOPICAL at 12:25

## 2023-11-15 RX ADMIN — Medication 10 UNIT(S): at 12:58

## 2023-11-15 RX ADMIN — MORPHINE SULFATE 2 MILLIGRAM(S): 50 CAPSULE, EXTENDED RELEASE ORAL at 20:25

## 2023-11-15 RX ADMIN — Medication 6: at 09:29

## 2023-11-15 RX ADMIN — Medication 20 UNIT(S): at 16:30

## 2023-11-15 RX ADMIN — CITALOPRAM 20 MILLIGRAM(S): 10 TABLET, FILM COATED ORAL at 12:26

## 2023-11-15 RX ADMIN — Medication 650 MILLIGRAM(S): at 12:26

## 2023-11-15 NOTE — PROGRESS NOTE ADULT - SUBJECTIVE AND OBJECTIVE BOX
[facility] FOLLOW UP NOTE  --------------------------------------------------------------------------------  Chief Complaint:    24 hour events/subjective:  Events over last 24hrs noted.  Seen sitting in chair, no c/o.  Hemodynamically stable, afebrile        PAST HISTORY  --------------------------------------------------------------------------------  No significant changes to PMH, PSH, FHx, SHx, unless otherwise noted    ALLERGIES & MEDICATIONS  --------------------------------------------------------------------------------  Allergies    No Known Allergies    Intolerances      Standing Inpatient Medications  albuterol/ipratropium for Nebulization 3 milliLiter(s) Nebulizer every 6 hours  albuterol/ipratropium for Nebulization. 3 milliLiter(s) Nebulizer once  allopurinol 100 milliGRAM(s) Oral daily  aspirin  chewable 81 milliGRAM(s) Oral daily  bacitracin   Ointment 1 Application(s) Topical daily  buMETAnide Injectable 2 milliGRAM(s) IV Push daily  carvedilol 25 milliGRAM(s) Oral every 12 hours  chlorhexidine 2% Cloths 1 Application(s) Topical <User Schedule>  citalopram 20 milliGRAM(s) Oral daily  clopidogrel Tablet 75 milliGRAM(s) Oral daily  dextrose 5%. 1000 milliLiter(s) IV Continuous <Continuous>  dextrose 5%. 1000 milliLiter(s) IV Continuous <Continuous>  dextrose 50% Injectable 50 milliLiter(s) IV Push every 15 minutes  gabapentin 300 milliGRAM(s) Oral daily  glucagon  Injectable 1 milliGRAM(s) IntraMuscular once  heparin   Injectable 5000 Unit(s) SubCutaneous every 8 hours  insulin glargine Injectable (LANTUS) 40 Unit(s) SubCutaneous at bedtime  insulin lispro (ADMELOG) corrective regimen sliding scale   SubCutaneous three times a day before meals  insulin lispro Injectable (ADMELOG) 20 Unit(s) SubCutaneous three times a day before meals  insulin lispro Injectable (ADMELOG) 10 Unit(s) SubCutaneous before breakfast  melatonin 5 milliGRAM(s) Oral at bedtime  midodrine 10 milliGRAM(s) Oral every 8 hours  pantoprazole  Injectable 40 milliGRAM(s) IV Push daily  polyethylene glycol 3350 17 Gram(s) Oral daily  senna 2 Tablet(s) Oral at bedtime    PRN Inpatient Medications  acetaminophen     Tablet .. 650 milliGRAM(s) Oral every 6 hours PRN  albuterol    90 MICROgram(s) HFA Inhaler 2 Puff(s) Inhalation every 4 hours PRN  dextrose Oral Gel 15 Gram(s) Oral once PRN      REVIEW OF SYSTEMS  --------------------------------------------------------------------------------  Gen: No weight changes, fatigue, fevers/chills, weakness  Skin: No rashes  Head/Eyes/Ears/Mouth: No headache; Normal hearing; Normal vision w/o blurriness; No sinus pain/discomfort, sore throat  Respiratory: No dyspnea, cough, wheezing, hemoptysis  CV: No chest pain, PND, orthopnea  GI: No abdominal pain, diarrhea, constipation, nausea, vomiting, melena, hematochezia  : No increased frequency, dysuria, hematuria, nocturia  MSK: No joint pain/swelling; no back pain; no edema  Neuro: No dizziness/lightheadedness, weakness, seizures, numbness, tingling  Heme: No easy bruising or bleeding  Endo: No heat/cold intolerance  Psych: No significant nervousness, anxiety, stress, depression    All other systems were reviewed and are negative, except as noted.    VITALS/PHYSICAL EXAM  --------------------------------------------------------------------------------  T(C): 36.1 (11-15-23 @ 07:05), Max: 36.2 (11-14-23 @ 23:00)  HR: 63 (11-15-23 @ 11:11) (60 - 72)  BP: 126/61 (11-15-23 @ 11:11) (114/53 - 151/65)  RR: 26 (11-15-23 @ 11:11) (18 - 31)  SpO2: 100% (11-15-23 @ 11:11) (96% - 100%)  Wt(kg): --        11-14-23 @ 07:01  -  11-15-23 @ 07:00  --------------------------------------------------------  IN: 0 mL / OUT: 352 mL / NET: -352 mL    11-15-23 @ 07:01  -  11-15-23 @ 13:06  --------------------------------------------------------  IN: 120 mL / OUT: 0 mL / NET: 120 mL      Physical Exam:  	Gen: NAD, well-appearing  	HEENT: anicteric  	Pulm: CTA B/L  	UE: Warm, FROM, no edema  	LE: Warm, FROM; no edema  	Neuro: No focal deficits  	Psych: Normal affect and mood  	Skin: Warm, without rashes  	Vascular access:    LABS/STUDIES  --------------------------------------------------------------------------------              9.7    6.55  >-----------<  206      [11-15-23 @ 05:22]              29.4     134  |  95  |  94  ----------------------------<  208      [11-15-23 @ 05:22]  4.5   |  26  |  2.2        Ca     8.2     [11-15-23 @ 05:22]      Mg     2.3     [11-15-23 @ 05:22]      Phos  5.3     [11-15-23 @ 05:22]    TPro  5.8  /  Alb  2.9  /  TBili  0.3  /  DBili  x   /  AST  17  /  ALT  10  /  AlkPhos  78  [11-13-23 @ 15:39]          Creatinine Trend:  SCr 2.2 [11-15 @ 05:22]  SCr 2.5 [11-14 @ 15:14]  SCr 2.3 [11-14 @ 05:17]  SCr 2.3 [11-13 @ 15:39]  SCr 2.4 [11-13 @ 06:09]    Urinalysis - [11-15-23 @ 05:22]      Color  / Appearance  / SG  / pH       Gluc 208 / Ketone   / Bili  / Urobili        Blood  / Protein  / Leuk Est  / Nitrite       RBC  / WBC  / Hyaline  / Gran  / Sq Epi  / Non Sq Epi  / Bacteria       HbA1c 8.1      [04-16-19 @ 06:47]    HBsAb Nonreact      [11-03-23 @ 17:45]  HBsAg Nonreact      [11-03-23 @ 17:45]  HBcAb Nonreact      [11-03-23 @ 17:45]

## 2023-11-15 NOTE — PROGRESS NOTE ADULT - ASSESSMENT
89 yo male PMHx of  DM, CAD-bypass, chronic HFpEF, HTN, HLD, CKD3 presents w sob x few days, no cough fever or chills.  pt placed on NRBM by EMS and subsequently on BiPAP in ED, O2 sat now 99%.  Pt found to have elevated glucose w high AG and b-hydroxybutarate c/w DKA  Hospital course complicated by respiratory failure secondary to suspected aspiration pneumonia, PARKER and initiation of HD     DM now with controlled glucose   / sepsis - resolved / aspiration pneumonia / acute respiratory failure - resolved / PARKER on CKD3 - resolved     - clinically improved   - off pressors   - no more HD (udall removed) as per nephrology   - completed  antibiotics course   - wean oxygen as tolerated - maintain pox>90%   - advance diet as tolerated S&S f/u   - continue insulin sq hospital protocol   - PT and DC planning to STR       DNR / DNI

## 2023-11-15 NOTE — PROGRESS NOTE ADULT - SUBJECTIVE AND OBJECTIVE BOX
Patient seen and evaluated this am, comfortable in bed, no SOB, c/o dry mouth      T(F): 97.1 (11-14-23 @ 23:00), Max: 97.1 (11-14-23 @ 23:00)  HR: 72 (11-15-23 @ 05:20)  BP: 151/65 (11-15-23 @ 05:20)  RR: 20  SpO2: 99% (11-15-23 @ 05:20) (96% - 99%)    PHYSICAL EXAM:  GENERAL: NAD  HEAD:  Atraumatic, Normocephalic  EYES: EOMI, PERRLA, conjunctiva and sclera clear  NERVOUS SYSTEM:  Alert & Oriented X3, no focal deficits   CHEST/LUNG: Clear to percussion bilaterally; No rales, rhonchi, wheezing, or rubs  HEART: Regular rate and rhythm; No murmurs, rubs, or gallops  ABDOMEN: Soft, Nontender, Nondistended; Bowel sounds present  EXTREMITIES:  2+ Peripheral Pulses, No clubbing, cyanosis, or edema    LABS  11-14    135  |  96<L>  |  97<HH>  ----------------------------<  95  4.5   |  28  |  2.5<H>    Ca    8.3<L>      14 Nov 2023 15:14  Phos  4.9     11-14  Mg     2.2     11-14    TPro  5.8<L>  /  Alb  2.9<L>  /  TBili  0.3  /  DBili  x   /  AST  17  /  ALT  10  /  AlkPhos  78  11-13                          9.7    6.55  )-----------( 206      ( 15 Nov 2023 05:22 )             29.4     < from: TTE Echo Complete w/o Contrast w/ Doppler (10.31.23 @ 09:39) >  Summary:   1. Patient with significant PVCs during study.   2. Normal global left ventricular systolic function.   3. LV Ejection Fraction by Adkins's Method with a biplane EF of 61 %.   4. The left ventricular diastolic function could not be assessed in this   study.   5. Normal right ventricular size and function.   6. Normal left atrial size.   7. Normal right atrial size.   8. Mild aortic valve stenosis.   9. Moderate to severe mitral annular calcification.  10. Moderate thickening and calcification of the anterior and posterior   mitral valve leaflets.  11. Mild mitral stenosis.  12. No evidence of mitral valve regurgitation.  13. Estimated pulmonary artery systolic pressure is 67.1 mmHg assuming a   right atrial pressure of 15 mmHg, which is consistent with severe   pulmonary hypertension.  14. There is no evidence of pericardial effusion.    < end of copied text >      Culture Results:   No growth at 4 days (11-10-23)  Culture Results:   No growth at 4 days (11-10-23)  Culture Results:   No growth at 5 days (11-04-23)  Culture Results:   No growth at 5 days (10-31-23)  Culture Results:   No growth at 5 days (10-31-23)    RADIOLOGY  < from: Xray Chest 1 View- PORTABLE-Routine (11.14.23 @ 07:48) >  Impression:    Improving bibasilar opacities.    < end of copied text >    MEDICATIONS  (STANDING):  albuterol/ipratropium for Nebulization 3 milliLiter(s) Nebulizer every 6 hours  albuterol/ipratropium for Nebulization. 3 milliLiter(s) Nebulizer once  allopurinol 100 milliGRAM(s) Oral daily  aspirin  chewable 81 milliGRAM(s) Oral daily  bacitracin   Ointment 1 Application(s) Topical daily  buMETAnide Injectable 2 milliGRAM(s) IV Push daily  carvedilol 25 milliGRAM(s) Oral every 12 hours  chlorhexidine 2% Cloths 1 Application(s) Topical <User Schedule>  citalopram 20 milliGRAM(s) Oral daily  clopidogrel Tablet 75 milliGRAM(s) Oral daily  gabapentin 300 milliGRAM(s) Oral daily  heparin   Injectable 5000 Unit(s) SubCutaneous every 8 hours  insulin glargine Injectable (LANTUS) 20 Unit(s) SubCutaneous at bedtime  insulin lispro (ADMELOG) corrective regimen sliding scale   SubCutaneous three times a day before meals  insulin lispro Injectable (ADMELOG) 15 Unit(s) SubCutaneous three times a day before meals  insulin regular Infusion 5 Unit(s)/Hr (5 mL/Hr) IV Continuous <Continuous>  melatonin 5 milliGRAM(s) Oral at bedtime  midodrine 10 milliGRAM(s) Oral every 8 hours  pantoprazole  Injectable 40 milliGRAM(s) IV Push daily  polyethylene glycol 3350 17 Gram(s) Oral daily  senna 2 Tablet(s) Oral at bedtime    MEDICATIONS  (PRN):  acetaminophen     Tablet .. 650 milliGRAM(s) Oral every 6 hours PRN Temp greater or equal to 38C (100.4F), Mild Pain (1 - 3)  albuterol    90 MICROgram(s) HFA Inhaler 2 Puff(s) Inhalation every 4 hours PRN Shortness of Breath and/or Wheezing  dextrose Oral Gel 15 Gram(s) Oral once PRN Blood Glucose LESS THAN 70 milliGRAM(s)/deciliter

## 2023-11-15 NOTE — PROGRESS NOTE ADULT - ASSESSMENT
Patient called in requesting a prescription for a cane. Patient has not been seen since September, an appointment was scheduled for a check up. Patient still requested a message to be sent.     Please advise 1)  PARKER on CKD, temporarily requiring HD.  Renal function stable since last HD    2)  Underlying CKD w/ previous creat up to ~ 2.0 at baseline    Recommendations:    1)  No further HD required    2)  Change Bumetanide to 2mg PO QD in AM    3)  TOV, ambulation

## 2023-11-15 NOTE — PROGRESS NOTE ADULT - SUBJECTIVE AND OBJECTIVE BOX
Patient is a 89y old  Male who presents with a chief complaint of sob (14 Nov 2023 18:57)      Over Night Events:  Patient seen and examined.   awake follow command comfortable     ROS:  See HPI    PHYSICAL EXAM    ICU Vital Signs Last 24 Hrs  T(C): 36.2 (14 Nov 2023 23:00), Max: 36.2 (14 Nov 2023 23:00)  T(F): 97.1 (14 Nov 2023 23:00), Max: 97.1 (14 Nov 2023 23:00)  HR: 72 (15 Nov 2023 05:20) (61 - 72)  BP: 151/65 (15 Nov 2023 05:20) (127/61 - 151/65)  BP(mean): 93 (15 Nov 2023 05:20) (86 - 97)  ABP: --  ABP(mean): --  RR: 26 (15 Nov 2023 05:20) (18 - 31)  SpO2: 99% (15 Nov 2023 05:20) (96% - 99%)    O2 Parameters below as of 14 Nov 2023 15:17  Patient On (Oxygen Delivery Method): room air            General:awake   HEENT:           augie     Lymph Nodes: NO cervical LN   Lungs: Bilateral BS  Cardiovascular: Regular   Abdomen: Soft, Positive BS  Extremities: No clubbing   Skin: warm   Neurological: no focal   Musculoskeletal: move all ext     I&O's Detail    14 Nov 2023 07:01  -  15 Nov 2023 07:00  --------------------------------------------------------  IN:  Total IN: 0 mL    OUT:    Blood Loss (mL): 2 mL    Incontinent per Collection Bag (mL): 350 mL  Total OUT: 352 mL    Total NET: -352 mL          LABS:                          9.7    6.55  )-----------( 206      ( 15 Nov 2023 05:22 )             29.4         15 Nov 2023 05:22    134    |  95     |  94     ----------------------------<  208    4.5     |  26     |  2.2      Ca    8.2        15 Nov 2023 05:22  Phos  5.3       15 Nov 2023 05:22  Mg     2.3       15 Nov 2023 05:22                                                                                      Urinalysis Basic - ( 15 Nov 2023 05:22 )    Color: x / Appearance: x / SG: x / pH: x  Gluc: 208 mg/dL / Ketone: x  / Bili: x / Urobili: x   Blood: x / Protein: x / Nitrite: x   Leuk Esterase: x / RBC: x / WBC x   Sq Epi: x / Non Sq Epi: x / Bacteria: x                                                                                                                                                     MEDICATIONS  (STANDING):  albuterol/ipratropium for Nebulization 3 milliLiter(s) Nebulizer every 6 hours  albuterol/ipratropium for Nebulization. 3 milliLiter(s) Nebulizer once  allopurinol 100 milliGRAM(s) Oral daily  aspirin  chewable 81 milliGRAM(s) Oral daily  bacitracin   Ointment 1 Application(s) Topical daily  buMETAnide Injectable 2 milliGRAM(s) IV Push daily  carvedilol 25 milliGRAM(s) Oral every 12 hours  chlorhexidine 2% Cloths 1 Application(s) Topical <User Schedule>  citalopram 20 milliGRAM(s) Oral daily  clopidogrel Tablet 75 milliGRAM(s) Oral daily  dextrose 5%. 1000 milliLiter(s) (50 mL/Hr) IV Continuous <Continuous>  dextrose 5%. 1000 milliLiter(s) (100 mL/Hr) IV Continuous <Continuous>  dextrose 50% Injectable 50 milliLiter(s) IV Push every 15 minutes  gabapentin 300 milliGRAM(s) Oral daily  glucagon  Injectable 1 milliGRAM(s) IntraMuscular once  heparin   Injectable 5000 Unit(s) SubCutaneous every 8 hours  insulin glargine Injectable (LANTUS) 20 Unit(s) SubCutaneous at bedtime  insulin lispro (ADMELOG) corrective regimen sliding scale   SubCutaneous three times a day before meals  insulin lispro Injectable (ADMELOG) 15 Unit(s) SubCutaneous three times a day before meals  insulin regular Infusion 5 Unit(s)/Hr (5 mL/Hr) IV Continuous <Continuous>  melatonin 5 milliGRAM(s) Oral at bedtime  midodrine 10 milliGRAM(s) Oral every 8 hours  pantoprazole  Injectable 40 milliGRAM(s) IV Push daily  polyethylene glycol 3350 17 Gram(s) Oral daily  senna 2 Tablet(s) Oral at bedtime    MEDICATIONS  (PRN):  acetaminophen     Tablet .. 650 milliGRAM(s) Oral every 6 hours PRN Temp greater or equal to 38C (100.4F), Mild Pain (1 - 3)  albuterol    90 MICROgram(s) HFA Inhaler 2 Puff(s) Inhalation every 4 hours PRN Shortness of Breath and/or Wheezing  dextrose Oral Gel 15 Gram(s) Oral once PRN Blood Glucose LESS THAN 70 milliGRAM(s)/deciliter          Xrays:  TLC:  OG:  ET tube:                                                                                       ECHO:  [facility] ICU:

## 2023-11-15 NOTE — CHART NOTE - NSCHARTNOTEFT_GEN_A_CORE
This report was requested by: Chito Sky | Reference #: [id number]    Practitioner Count: 1  Pharmacy Count: 1  Current Opioid Prescriptions: 0  Current Benzodiazepine Prescriptions: 0  Current Stimulant Prescriptions: 0      Patient Demographic Information (PDI)       PDI	First Name	Last Name	Birth Date	Gender	Street Address	[facility]	Zip Code  A	[place]	08/27/1931	Male	[address] [place] [facility]	[address]  B	[place]	08/27/1931	Male	[address] [facility]	[id number]    Prescription Information      PDI Filter:    PDI	Current Rx	Drug Type	Rx Written	Rx Dispensed	Drug	Quantity	Days Supply	Prescriber Name	Prescriber AUDIE #	Payment Method	Dispenser  A	N	B	09/14/2023	09/16/2023	clonazepam 1 mg tablet	90	30	Ernie Kim	[id number]	Medicare	[id number]  A	N	B	07/31/2023	08/01/2023	clonazepam 1 mg tablet	90	30	Thomas Parker MD	[id number]	Medicare	[id number]  A	N	B	06/06/2023	06/07/2023	clonazepam 1 mg tablet	90	30	Thomas Parker MD	[id number]	Medicare	[id number]  B	N	B	03/31/2023	04/04/2023	clonazepam 1 mg tablet	90	30	Thomas Parker MD	[id number]	Medicare	[facility] [employer].  B	N	B	02/14/2023	02/15/2023	clonazepam 1 mg tablet	90	30	Thomas Parker MD	[id number]	Medicare	[facility] [employer].  B	N	B	01/03/2023	01/06/2023	clonazepam 1 mg tablet	90	30	Thomas Parker MD	[id number]	Medicare	[facility] [employer].  B	N	B	11/21/2022	11/23/2022	clonazepam 1 mg tablet	90	30	Thomas Parker MD	[id number]	Medicare	[employer].

## 2023-11-15 NOTE — PROGRESS NOTE ADULT - ASSESSMENT
IMPRESSION:    ARF secondary to pulmonary edema fluid overload   Likely right aspiration pneumonia    Hyperglycemia   alter mental status   sepsis fever   PARKER   metabolc acidosis     PLAN:    CNS: neurology follow. No seizure on EEG.     HEENT: oral care     PULMONARY:   Wean down Fio2. Chest PT and frequent suctioning.        CARDIOVASCULAR  BNP markedly elevated. HFPEF on echo.   on bumex     GI: GI ppx. NGT feeding as tolerated Speech and swallow re-evaluation.     RENAL:   udall removed     on bumex   bladder scan daily     INFECTIOUS DISEASE:   s/p abx      HEMATOLOGICAL:  DVT prophylaxis. Keep hg more than 7.     ENDOCRINE:   NGT for feeding.   adjust lantus  target -180  MUSCULOSKELETAL: PT OT.     CODE STATUS: DNI DNR. Pall care follow up.     downgrade to floor

## 2023-11-16 DIAGNOSIS — Z29.9 ENCOUNTER FOR PROPHYLACTIC MEASURES, UNSPECIFIED: ICD-10-CM

## 2023-11-16 DIAGNOSIS — R13.10 DYSPHAGIA, UNSPECIFIED: ICD-10-CM

## 2023-11-16 DIAGNOSIS — I25.10 ATHEROSCLEROTIC HEART DISEASE OF NATIVE CORONARY ARTERY WITHOUT ANGINA PECTORIS: ICD-10-CM

## 2023-11-16 DIAGNOSIS — I50.32 CHRONIC DIASTOLIC (CONGESTIVE) HEART FAILURE: ICD-10-CM

## 2023-11-16 DIAGNOSIS — I10 ESSENTIAL (PRIMARY) HYPERTENSION: ICD-10-CM

## 2023-11-16 DIAGNOSIS — E11.9 TYPE 2 DIABETES MELLITUS WITHOUT COMPLICATIONS: ICD-10-CM

## 2023-11-16 DIAGNOSIS — N17.9 ACUTE KIDNEY FAILURE, UNSPECIFIED: ICD-10-CM

## 2023-11-16 LAB
ALBUMIN SERPL ELPH-MCNC: 3.4 G/DL — LOW (ref 3.5–5.2)
ALBUMIN SERPL ELPH-MCNC: 3.4 G/DL — LOW (ref 3.5–5.2)
ALP SERPL-CCNC: 89 U/L — SIGNIFICANT CHANGE UP (ref 30–115)
ALP SERPL-CCNC: 89 U/L — SIGNIFICANT CHANGE UP (ref 30–115)
ALT FLD-CCNC: 17 U/L — SIGNIFICANT CHANGE UP (ref 0–41)
ALT FLD-CCNC: 17 U/L — SIGNIFICANT CHANGE UP (ref 0–41)
ANION GAP SERPL CALC-SCNC: 15 MMOL/L — HIGH (ref 7–14)
ANION GAP SERPL CALC-SCNC: 15 MMOL/L — HIGH (ref 7–14)
APTT BLD: 25.4 SEC — LOW (ref 27–39.2)
APTT BLD: 25.4 SEC — LOW (ref 27–39.2)
AST SERPL-CCNC: 24 U/L — SIGNIFICANT CHANGE UP (ref 0–41)
AST SERPL-CCNC: 24 U/L — SIGNIFICANT CHANGE UP (ref 0–41)
BASOPHILS # BLD AUTO: 0.05 K/UL — SIGNIFICANT CHANGE UP (ref 0–0.2)
BASOPHILS # BLD AUTO: 0.05 K/UL — SIGNIFICANT CHANGE UP (ref 0–0.2)
BASOPHILS NFR BLD AUTO: 0.8 % — SIGNIFICANT CHANGE UP (ref 0–1)
BASOPHILS NFR BLD AUTO: 0.8 % — SIGNIFICANT CHANGE UP (ref 0–1)
BILIRUB SERPL-MCNC: 0.3 MG/DL — SIGNIFICANT CHANGE UP (ref 0.2–1.2)
BILIRUB SERPL-MCNC: 0.3 MG/DL — SIGNIFICANT CHANGE UP (ref 0.2–1.2)
BUN SERPL-MCNC: 92 MG/DL — CRITICAL HIGH (ref 10–20)
BUN SERPL-MCNC: 92 MG/DL — CRITICAL HIGH (ref 10–20)
CALCIUM SERPL-MCNC: 8.6 MG/DL — SIGNIFICANT CHANGE UP (ref 8.4–10.5)
CALCIUM SERPL-MCNC: 8.6 MG/DL — SIGNIFICANT CHANGE UP (ref 8.4–10.5)
CHLORIDE SERPL-SCNC: 95 MMOL/L — LOW (ref 98–110)
CHLORIDE SERPL-SCNC: 95 MMOL/L — LOW (ref 98–110)
CO2 SERPL-SCNC: 27 MMOL/L — SIGNIFICANT CHANGE UP (ref 17–32)
CO2 SERPL-SCNC: 27 MMOL/L — SIGNIFICANT CHANGE UP (ref 17–32)
CREAT SERPL-MCNC: 2.7 MG/DL — HIGH (ref 0.7–1.5)
CREAT SERPL-MCNC: 2.7 MG/DL — HIGH (ref 0.7–1.5)
EGFR: 21 ML/MIN/1.73M2 — LOW
EGFR: 21 ML/MIN/1.73M2 — LOW
EOSINOPHIL # BLD AUTO: 0.09 K/UL — SIGNIFICANT CHANGE UP (ref 0–0.7)
EOSINOPHIL # BLD AUTO: 0.09 K/UL — SIGNIFICANT CHANGE UP (ref 0–0.7)
EOSINOPHIL NFR BLD AUTO: 1.4 % — SIGNIFICANT CHANGE UP (ref 0–8)
EOSINOPHIL NFR BLD AUTO: 1.4 % — SIGNIFICANT CHANGE UP (ref 0–8)
GLUCOSE BLDC GLUCOMTR-MCNC: 117 MG/DL — HIGH (ref 70–99)
GLUCOSE BLDC GLUCOMTR-MCNC: 117 MG/DL — HIGH (ref 70–99)
GLUCOSE BLDC GLUCOMTR-MCNC: 176 MG/DL — HIGH (ref 70–99)
GLUCOSE BLDC GLUCOMTR-MCNC: 176 MG/DL — HIGH (ref 70–99)
GLUCOSE SERPL-MCNC: 145 MG/DL — HIGH (ref 70–99)
GLUCOSE SERPL-MCNC: 145 MG/DL — HIGH (ref 70–99)
HCT VFR BLD CALC: 30.8 % — LOW (ref 42–52)
HCT VFR BLD CALC: 30.8 % — LOW (ref 42–52)
HGB BLD-MCNC: 9.6 G/DL — LOW (ref 14–18)
HGB BLD-MCNC: 9.6 G/DL — LOW (ref 14–18)
IMM GRANULOCYTES NFR BLD AUTO: 0.9 % — HIGH (ref 0.1–0.3)
IMM GRANULOCYTES NFR BLD AUTO: 0.9 % — HIGH (ref 0.1–0.3)
INR BLD: 1.04 RATIO — SIGNIFICANT CHANGE UP (ref 0.65–1.3)
INR BLD: 1.04 RATIO — SIGNIFICANT CHANGE UP (ref 0.65–1.3)
LYMPHOCYTES # BLD AUTO: 1.02 K/UL — LOW (ref 1.2–3.4)
LYMPHOCYTES # BLD AUTO: 1.02 K/UL — LOW (ref 1.2–3.4)
LYMPHOCYTES # BLD AUTO: 15.4 % — LOW (ref 20.5–51.1)
LYMPHOCYTES # BLD AUTO: 15.4 % — LOW (ref 20.5–51.1)
MAGNESIUM SERPL-MCNC: 2.2 MG/DL — SIGNIFICANT CHANGE UP (ref 1.8–2.4)
MAGNESIUM SERPL-MCNC: 2.2 MG/DL — SIGNIFICANT CHANGE UP (ref 1.8–2.4)
MCHC RBC-ENTMCNC: 28.7 PG — SIGNIFICANT CHANGE UP (ref 27–31)
MCHC RBC-ENTMCNC: 28.7 PG — SIGNIFICANT CHANGE UP (ref 27–31)
MCHC RBC-ENTMCNC: 31.2 G/DL — LOW (ref 32–37)
MCHC RBC-ENTMCNC: 31.2 G/DL — LOW (ref 32–37)
MCV RBC AUTO: 92.2 FL — SIGNIFICANT CHANGE UP (ref 80–94)
MCV RBC AUTO: 92.2 FL — SIGNIFICANT CHANGE UP (ref 80–94)
MONOCYTES # BLD AUTO: 0.69 K/UL — HIGH (ref 0.1–0.6)
MONOCYTES # BLD AUTO: 0.69 K/UL — HIGH (ref 0.1–0.6)
MONOCYTES NFR BLD AUTO: 10.4 % — HIGH (ref 1.7–9.3)
MONOCYTES NFR BLD AUTO: 10.4 % — HIGH (ref 1.7–9.3)
NEUTROPHILS # BLD AUTO: 4.72 K/UL — SIGNIFICANT CHANGE UP (ref 1.4–6.5)
NEUTROPHILS # BLD AUTO: 4.72 K/UL — SIGNIFICANT CHANGE UP (ref 1.4–6.5)
NEUTROPHILS NFR BLD AUTO: 71.1 % — SIGNIFICANT CHANGE UP (ref 42.2–75.2)
NEUTROPHILS NFR BLD AUTO: 71.1 % — SIGNIFICANT CHANGE UP (ref 42.2–75.2)
NRBC # BLD: 0 /100 WBCS — SIGNIFICANT CHANGE UP (ref 0–0)
NRBC # BLD: 0 /100 WBCS — SIGNIFICANT CHANGE UP (ref 0–0)
PHOSPHATE SERPL-MCNC: 6.3 MG/DL — HIGH (ref 2.1–4.9)
PHOSPHATE SERPL-MCNC: 6.3 MG/DL — HIGH (ref 2.1–4.9)
PLATELET # BLD AUTO: 245 K/UL — SIGNIFICANT CHANGE UP (ref 130–400)
PLATELET # BLD AUTO: 245 K/UL — SIGNIFICANT CHANGE UP (ref 130–400)
PMV BLD: 11.1 FL — HIGH (ref 7.4–10.4)
PMV BLD: 11.1 FL — HIGH (ref 7.4–10.4)
POTASSIUM SERPL-MCNC: 4.9 MMOL/L — SIGNIFICANT CHANGE UP (ref 3.5–5)
POTASSIUM SERPL-MCNC: 4.9 MMOL/L — SIGNIFICANT CHANGE UP (ref 3.5–5)
POTASSIUM SERPL-SCNC: 4.9 MMOL/L — SIGNIFICANT CHANGE UP (ref 3.5–5)
POTASSIUM SERPL-SCNC: 4.9 MMOL/L — SIGNIFICANT CHANGE UP (ref 3.5–5)
PROT SERPL-MCNC: 6.3 G/DL — SIGNIFICANT CHANGE UP (ref 6–8)
PROT SERPL-MCNC: 6.3 G/DL — SIGNIFICANT CHANGE UP (ref 6–8)
PROTHROM AB SERPL-ACNC: 11.9 SEC — SIGNIFICANT CHANGE UP (ref 9.95–12.87)
PROTHROM AB SERPL-ACNC: 11.9 SEC — SIGNIFICANT CHANGE UP (ref 9.95–12.87)
RBC # BLD: 3.34 M/UL — LOW (ref 4.7–6.1)
RBC # BLD: 3.34 M/UL — LOW (ref 4.7–6.1)
RBC # FLD: 16 % — HIGH (ref 11.5–14.5)
RBC # FLD: 16 % — HIGH (ref 11.5–14.5)
SODIUM SERPL-SCNC: 137 MMOL/L — SIGNIFICANT CHANGE UP (ref 135–146)
SODIUM SERPL-SCNC: 137 MMOL/L — SIGNIFICANT CHANGE UP (ref 135–146)
WBC # BLD: 6.63 K/UL — SIGNIFICANT CHANGE UP (ref 4.8–10.8)
WBC # BLD: 6.63 K/UL — SIGNIFICANT CHANGE UP (ref 4.8–10.8)
WBC # FLD AUTO: 6.63 K/UL — SIGNIFICANT CHANGE UP (ref 4.8–10.8)
WBC # FLD AUTO: 6.63 K/UL — SIGNIFICANT CHANGE UP (ref 4.8–10.8)

## 2023-11-16 PROCEDURE — 99232 SBSQ HOSP IP/OBS MODERATE 35: CPT

## 2023-11-16 RX ORDER — CLONAZEPAM 1 MG
1 TABLET ORAL ONCE
Refills: 0 | Status: DISCONTINUED | OUTPATIENT
Start: 2023-11-16 | End: 2023-11-16

## 2023-11-16 RX ORDER — BUMETANIDE 0.25 MG/ML
2 INJECTION INTRAMUSCULAR; INTRAVENOUS DAILY
Refills: 0 | Status: DISCONTINUED | OUTPATIENT
Start: 2023-11-17 | End: 2023-11-28

## 2023-11-16 RX ORDER — CARVEDILOL PHOSPHATE 80 MG/1
12.5 CAPSULE, EXTENDED RELEASE ORAL EVERY 12 HOURS
Refills: 0 | Status: DISCONTINUED | OUTPATIENT
Start: 2023-11-16 | End: 2023-11-28

## 2023-11-16 RX ADMIN — HEPARIN SODIUM 5000 UNIT(S): 5000 INJECTION INTRAVENOUS; SUBCUTANEOUS at 05:12

## 2023-11-16 RX ADMIN — Medication 1 MILLIGRAM(S): at 21:26

## 2023-11-16 RX ADMIN — HEPARIN SODIUM 5000 UNIT(S): 5000 INJECTION INTRAVENOUS; SUBCUTANEOUS at 21:26

## 2023-11-16 RX ADMIN — CARVEDILOL PHOSPHATE 12.5 MILLIGRAM(S): 80 CAPSULE, EXTENDED RELEASE ORAL at 16:56

## 2023-11-16 RX ADMIN — Medication 2: at 07:54

## 2023-11-16 RX ADMIN — Medication 20 UNIT(S): at 12:17

## 2023-11-16 RX ADMIN — Medication 3 MILLILITER(S): at 08:54

## 2023-11-16 RX ADMIN — SENNA PLUS 2 TABLET(S): 8.6 TABLET ORAL at 21:25

## 2023-11-16 RX ADMIN — CHLORHEXIDINE GLUCONATE 1 APPLICATION(S): 213 SOLUTION TOPICAL at 05:13

## 2023-11-16 RX ADMIN — Medication 2: at 12:14

## 2023-11-16 RX ADMIN — Medication 10 UNIT(S): at 07:55

## 2023-11-16 RX ADMIN — INSULIN GLARGINE 40 UNIT(S): 100 INJECTION, SOLUTION SUBCUTANEOUS at 21:42

## 2023-11-16 RX ADMIN — Medication 5 MILLIGRAM(S): at 21:26

## 2023-11-16 RX ADMIN — MIDODRINE HYDROCHLORIDE 10 MILLIGRAM(S): 2.5 TABLET ORAL at 05:12

## 2023-11-16 RX ADMIN — CARVEDILOL PHOSPHATE 25 MILLIGRAM(S): 80 CAPSULE, EXTENDED RELEASE ORAL at 05:12

## 2023-11-16 RX ADMIN — BUMETANIDE 2 MILLIGRAM(S): 0.25 INJECTION INTRAMUSCULAR; INTRAVENOUS at 05:12

## 2023-11-16 RX ADMIN — HEPARIN SODIUM 5000 UNIT(S): 5000 INJECTION INTRAVENOUS; SUBCUTANEOUS at 11:45

## 2023-11-16 NOTE — PROGRESS NOTE ADULT - SUBJECTIVE AND OBJECTIVE BOX
Vital Signs Last 24 Hrs  T(C): 35.8 (16 Nov 2023 04:55), Max: 36.1 (15 Nov 2023 21:19)  T(F): 96.5 (16 Nov 2023 04:55), Max: 96.9 (15 Nov 2023 21:19)  HR: 63 (16 Nov 2023 04:55) (59 - 72)  BP: 122/58 (16 Nov 2023 04:55) (101/57 - 166/70)  BP(mean): 101 (15 Nov 2023 15:48) (101 - 101)  RR: 18 (16 Nov 2023 04:55) (18 - 24)  SpO2: 97% (16 Nov 2023 04:55) (93% - 99%)    Parameters below as of 16 Nov 2023 04:55  Patient On (Oxygen Delivery Method): room air      GENERAL: NAD  HEAD:  Atraumatic, Normocephalic  Lung: normal work of breathing, no rhonchi appreciated  Cardiovascular: S1&S2+, rrr, no m/r/g appreciated  ABDOMEN: soft, nt  : bowden catheter+, no suprapubic pain to palpation  Neuro: Alert & follows commands, no flaccid paralysis in extremities appreciated  SKIN: warm and dry, no visible purulence in exposed areas    Patient evaluated at bedside this morning, no complaints. discontinue midodrine and decrease coreg for now. if patient tolerates can likely set up for SASHA    Full notation to follow CC: 89M admitted for DKA; hospital course c/b Septic Shock 2/2 Aspiration Pneumonia(now resolved) & Acute Respiratory Failure w/ Hypoxia/Acute on Chronic Diastolic HF/Acute Renal Failure on CKD    SUBJECTIVE / OVERNIGHT EVENTS:  No acute events overnight. Patient seen and evaluated at bedside. No CP or SOB    ROS:  No fever    MEDICATIONS  (STANDING):  albuterol/ipratropium for Nebulization 3 milliLiter(s) Nebulizer every 6 hours  albuterol/ipratropium for Nebulization. 3 milliLiter(s) Nebulizer once  allopurinol 100 milliGRAM(s) Oral daily  aspirin  chewable 81 milliGRAM(s) Oral daily  bacitracin   Ointment 1 Application(s) Topical daily  carvedilol 12.5 milliGRAM(s) Oral every 12 hours  chlorhexidine 2% Cloths 1 Application(s) Topical <User Schedule>  citalopram 20 milliGRAM(s) Oral daily  clopidogrel Tablet 75 milliGRAM(s) Oral daily  dextrose 5%. 1000 milliLiter(s) (50 mL/Hr) IV Continuous <Continuous>  dextrose 5%. 1000 milliLiter(s) (100 mL/Hr) IV Continuous <Continuous>  dextrose 50% Injectable 50 milliLiter(s) IV Push every 15 minutes  gabapentin 300 milliGRAM(s) Oral daily  glucagon  Injectable 1 milliGRAM(s) IntraMuscular once  heparin   Injectable 5000 Unit(s) SubCutaneous every 8 hours  insulin glargine Injectable (LANTUS) 40 Unit(s) SubCutaneous at bedtime  insulin lispro (ADMELOG) corrective regimen sliding scale   SubCutaneous three times a day before meals  insulin lispro Injectable (ADMELOG) 10 Unit(s) SubCutaneous before breakfast  insulin lispro Injectable (ADMELOG) 20 Unit(s) SubCutaneous three times a day before meals  melatonin 5 milliGRAM(s) Oral at bedtime  pantoprazole  Injectable 40 milliGRAM(s) IV Push daily  polyethylene glycol 3350 17 Gram(s) Oral daily  senna 2 Tablet(s) Oral at bedtime    MEDICATIONS  (PRN):  acetaminophen     Tablet .. 650 milliGRAM(s) Oral every 6 hours PRN Temp greater or equal to 38C (100.4F), Mild Pain (1 - 3)  albuterol    90 MICROgram(s) HFA Inhaler 2 Puff(s) Inhalation every 4 hours PRN Shortness of Breath and/or Wheezing  dextrose Oral Gel 15 Gram(s) Oral once PRN Blood Glucose LESS THAN 70 milliGRAM(s)/deciliter    CAPILLARY BLOOD GLUCOSE    POCT Blood Glucose.: 80 mg/dL (16 Nov 2023 16:31)  POCT Blood Glucose.: 186 mg/dL (16 Nov 2023 11:43)  POCT Blood Glucose.: 176 mg/dL (16 Nov 2023 07:49)  POCT Blood Glucose.: 117 mg/dL (16 Nov 2023 03:13)  POCT Blood Glucose.: 84 mg/dL (15 Nov 2023 21:09)    I&O's Summary    15 Nov 2023 07:01  -  16 Nov 2023 07:00  --------------------------------------------------------  IN: 120 mL / OUT: 930 mL / NET: -810 mL    Physical Exam  Vital Signs Last 24 Hrs  T(C): 35.8 (16 Nov 2023 04:55), Max: 36.1 (15 Nov 2023 21:19)  T(F): 96.5 (16 Nov 2023 04:55), Max: 96.9 (15 Nov 2023 21:19)  HR: 63 (16 Nov 2023 04:55) (59 - 72)  BP: 122/58 (16 Nov 2023 04:55) (101/57 - 166/70)  BP(mean): 101 (15 Nov 2023 15:48) (101 - 101)  RR: 18 (16 Nov 2023 04:55) (18 - 24)  SpO2: 97% (16 Nov 2023 04:55) (93% - 99%)    Parameters below as of 16 Nov 2023 04:55  Patient On (Oxygen Delivery Method): room air    GENERAL: NAD  HEAD:  Atraumatic, Normocephalic  Lung: normal work of breathing, no rhonchi appreciated  Cardiovascular: S1&S2+, rrr, no m/r/g appreciated  ABDOMEN: soft, nt  : bowden catheter+, no suprapubic pain to palpation  Neuro: Alert & follows commands, no flaccid paralysis in extremities appreciated  SKIN: warm and dry, no visible purulence in exposed areas    LABS:                        9.6    6.63  )-----------( 245      ( 16 Nov 2023 11:22 )             30.8     11-16    137  |  95<L>  |  92<HH>  ----------------------------<  145<H>  4.9   |  27  |  2.7<H>    Ca    8.6      16 Nov 2023 11:22  Phos  6.3     11-16  Mg     2.2     11-16  TPro  6.3  /  Alb  3.4<L>  /  TBili  0.3  /  DBili  x   /  AST  24  /  ALT  17  /  AlkPhos  89  11-16    PT/INR - ( 16 Nov 2023 11:22 )   PT: 11.90 sec;   INR: 1.04 ratio    PTT - ( 16 Nov 2023 11:22 )  PTT:25.4 sec    Urinalysis Basic - ( 16 Nov 2023 11:22 )  Color: x / Appearance: x / SG: x / pH: x  Gluc: 145 mg/dL / Ketone: x  / Bili: x / Urobili: x   Blood: x / Protein: x / Nitrite: x   Leuk Esterase: x / RBC: x / WBC x   Sq Epi: x / Non Sq Epi: x / Bacteria: x    RADIOLOGY & ADDITIONAL TESTS:  Results Reviewed:   Imaging Personally Reviewed:  Electrocardiogram Personally Reviewed:    COORDINATION OF CARE:  Care Discussed with Consultants/Other Providers [Y/N]:  Prior or Outpatient Records Reviewed [Y/N]:

## 2023-11-16 NOTE — PROGRESS NOTE ADULT - ASSESSMENT
89M w/ CAD c/b NSTEMI s/p LAD DESx2(2022), HFpEF, s/p PPM, DM2 on Insulin, CKD3, HTN, HLD, Hx of Dens Fx, Prostate Ca in remission was admitted to MICU (10/30) for DKA w/ ICU course c/b  Septic Shock 2/2 Aspiration Pneumonia (now resolved) &  Acute Respiratory Failure w/ Hypoxia 2/2 Fluid Overload from Acute on Chronic HFpEF & Acute Renal Failure requiring temporary HD(last HD 11/10, [facility] HD cath now removed). The patient was transferred to medicine service on 11/16 for further medical optimization.    #DM2 w/ course c/b DKA  - Endocrinology consulted in ICU  - c/w basal-bolus insulin regimen w/ corrective insulin as required    #Acute Renal Failure on CKD3  - required temporary HD during ICU course  - Nephrology consulted & a/w management  - bowden cath was placed at time of critical illness, monitor through tomorrow- will consider TOV vs discharge with     #Dysphagia  - c/w pureed diet    INCOMPLETE full notation to follow 89M w/ CAD c/b NSTEMI s/p LAD DESx2(2022), HFpEF, s/p PPM, DM2 on Insulin, CKD3, HTN, HLD, Hx of Dens Fx, Prostate Ca in remission was admitted to MICU (10/30) for DKA w/ ICU course c/b Septic Shock 2/2 Aspiration Pneumonia (now resolved; s/p course of Cefepime) &  Acute Respiratory Failure w/ Hypoxia 2/2 Fluid Overload from Acute on Chronic HFpEF & Acute Renal Failure requiring temporary HD(last HD 11/10, [facility] HD cath now removed). The patient was transferred to medicine service on 11/16 for further medical optimization.    #DM2 w/ course c/b DKA  - Endocrinology consulted in ICU  - c/w basal-bolus insulin regimen w/ corrective insulin as required    #Acute Renal Failure on CKD3  - Nephrology consulted & a/w management  - required temporary HD during ICU course however per Nephro no longer required; per chart review--suspected 2/2 severe sepsis/shock  - bowden cath was placed at time of critical illness, monitor through tomorrow- will consider TOV vs discharge with   - monitor CrCl daily    #HFpEF  - appears euvolemic on exam  - initially during course had acute on chronic HF 2/2 Fluid overload from renal failure now resolved    #CAD  - charted hx of CABG- no sternotomy scar on exam, and no sternotomy wire on cxr (this appears incorrect), per chart reviewed completed staged PCI in 2022 for NSTEMI and on cath report there is no mention of implanted vessels  - c/w Aspirin, Clopidogrel, Carvedilol    #HTN  - d/c midodrine from ICU, and cut back on carvedilol (appears midodrine started to bridge off of levophed)    #Dysphagia  - c/w pureed diet    #Prophylactic Measure  DVT ppx- HSQ  Dispo- plan for [facility]  [facility]- Palliative care consulted in ICU, patient is DNR

## 2023-11-17 LAB
ALBUMIN SERPL ELPH-MCNC: 3 G/DL — LOW (ref 3.5–5.2)
ALBUMIN SERPL ELPH-MCNC: 3 G/DL — LOW (ref 3.5–5.2)
ALP SERPL-CCNC: 88 U/L — SIGNIFICANT CHANGE UP (ref 30–115)
ALP SERPL-CCNC: 88 U/L — SIGNIFICANT CHANGE UP (ref 30–115)
ALT FLD-CCNC: 16 U/L — SIGNIFICANT CHANGE UP (ref 0–41)
ALT FLD-CCNC: 16 U/L — SIGNIFICANT CHANGE UP (ref 0–41)
ANION GAP SERPL CALC-SCNC: 15 MMOL/L — HIGH (ref 7–14)
ANION GAP SERPL CALC-SCNC: 15 MMOL/L — HIGH (ref 7–14)
APTT BLD: 25.2 SEC — LOW (ref 27–39.2)
APTT BLD: 25.2 SEC — LOW (ref 27–39.2)
AST SERPL-CCNC: 22 U/L — SIGNIFICANT CHANGE UP (ref 0–41)
AST SERPL-CCNC: 22 U/L — SIGNIFICANT CHANGE UP (ref 0–41)
BASOPHILS # BLD AUTO: 0.02 K/UL — SIGNIFICANT CHANGE UP (ref 0–0.2)
BASOPHILS # BLD AUTO: 0.02 K/UL — SIGNIFICANT CHANGE UP (ref 0–0.2)
BASOPHILS NFR BLD AUTO: 0.4 % — SIGNIFICANT CHANGE UP (ref 0–1)
BASOPHILS NFR BLD AUTO: 0.4 % — SIGNIFICANT CHANGE UP (ref 0–1)
BILIRUB SERPL-MCNC: 0.4 MG/DL — SIGNIFICANT CHANGE UP (ref 0.2–1.2)
BILIRUB SERPL-MCNC: 0.4 MG/DL — SIGNIFICANT CHANGE UP (ref 0.2–1.2)
BUN SERPL-MCNC: 91 MG/DL — CRITICAL HIGH (ref 10–20)
BUN SERPL-MCNC: 91 MG/DL — CRITICAL HIGH (ref 10–20)
CALCIUM SERPL-MCNC: 8.4 MG/DL — SIGNIFICANT CHANGE UP (ref 8.4–10.5)
CALCIUM SERPL-MCNC: 8.4 MG/DL — SIGNIFICANT CHANGE UP (ref 8.4–10.5)
CHLORIDE SERPL-SCNC: 95 MMOL/L — LOW (ref 98–110)
CHLORIDE SERPL-SCNC: 95 MMOL/L — LOW (ref 98–110)
CO2 SERPL-SCNC: 24 MMOL/L — SIGNIFICANT CHANGE UP (ref 17–32)
CO2 SERPL-SCNC: 24 MMOL/L — SIGNIFICANT CHANGE UP (ref 17–32)
CREAT SERPL-MCNC: 2.6 MG/DL — HIGH (ref 0.7–1.5)
CREAT SERPL-MCNC: 2.6 MG/DL — HIGH (ref 0.7–1.5)
EGFR: 22 ML/MIN/1.73M2 — LOW
EGFR: 22 ML/MIN/1.73M2 — LOW
EOSINOPHIL # BLD AUTO: 0.1 K/UL — SIGNIFICANT CHANGE UP (ref 0–0.7)
EOSINOPHIL # BLD AUTO: 0.1 K/UL — SIGNIFICANT CHANGE UP (ref 0–0.7)
EOSINOPHIL NFR BLD AUTO: 2.1 % — SIGNIFICANT CHANGE UP (ref 0–8)
EOSINOPHIL NFR BLD AUTO: 2.1 % — SIGNIFICANT CHANGE UP (ref 0–8)
GLUCOSE SERPL-MCNC: 173 MG/DL — HIGH (ref 70–99)
GLUCOSE SERPL-MCNC: 173 MG/DL — HIGH (ref 70–99)
HCT VFR BLD CALC: 28.9 % — LOW (ref 42–52)
HCT VFR BLD CALC: 28.9 % — LOW (ref 42–52)
HGB BLD-MCNC: 9 G/DL — LOW (ref 14–18)
HGB BLD-MCNC: 9 G/DL — LOW (ref 14–18)
IMM GRANULOCYTES NFR BLD AUTO: 0.8 % — HIGH (ref 0.1–0.3)
IMM GRANULOCYTES NFR BLD AUTO: 0.8 % — HIGH (ref 0.1–0.3)
INR BLD: 1.06 RATIO — SIGNIFICANT CHANGE UP (ref 0.65–1.3)
INR BLD: 1.06 RATIO — SIGNIFICANT CHANGE UP (ref 0.65–1.3)
LYMPHOCYTES # BLD AUTO: 0.99 K/UL — LOW (ref 1.2–3.4)
LYMPHOCYTES # BLD AUTO: 0.99 K/UL — LOW (ref 1.2–3.4)
LYMPHOCYTES # BLD AUTO: 20.7 % — SIGNIFICANT CHANGE UP (ref 20.5–51.1)
LYMPHOCYTES # BLD AUTO: 20.7 % — SIGNIFICANT CHANGE UP (ref 20.5–51.1)
MAGNESIUM SERPL-MCNC: 2.2 MG/DL — SIGNIFICANT CHANGE UP (ref 1.8–2.4)
MAGNESIUM SERPL-MCNC: 2.2 MG/DL — SIGNIFICANT CHANGE UP (ref 1.8–2.4)
MCHC RBC-ENTMCNC: 28.9 PG — SIGNIFICANT CHANGE UP (ref 27–31)
MCHC RBC-ENTMCNC: 28.9 PG — SIGNIFICANT CHANGE UP (ref 27–31)
MCHC RBC-ENTMCNC: 31.1 G/DL — LOW (ref 32–37)
MCHC RBC-ENTMCNC: 31.1 G/DL — LOW (ref 32–37)
MCV RBC AUTO: 92.9 FL — SIGNIFICANT CHANGE UP (ref 80–94)
MCV RBC AUTO: 92.9 FL — SIGNIFICANT CHANGE UP (ref 80–94)
MONOCYTES # BLD AUTO: 0.44 K/UL — SIGNIFICANT CHANGE UP (ref 0.1–0.6)
MONOCYTES # BLD AUTO: 0.44 K/UL — SIGNIFICANT CHANGE UP (ref 0.1–0.6)
MONOCYTES NFR BLD AUTO: 9.2 % — SIGNIFICANT CHANGE UP (ref 1.7–9.3)
MONOCYTES NFR BLD AUTO: 9.2 % — SIGNIFICANT CHANGE UP (ref 1.7–9.3)
NEUTROPHILS # BLD AUTO: 3.19 K/UL — SIGNIFICANT CHANGE UP (ref 1.4–6.5)
NEUTROPHILS # BLD AUTO: 3.19 K/UL — SIGNIFICANT CHANGE UP (ref 1.4–6.5)
NEUTROPHILS NFR BLD AUTO: 66.8 % — SIGNIFICANT CHANGE UP (ref 42.2–75.2)
NEUTROPHILS NFR BLD AUTO: 66.8 % — SIGNIFICANT CHANGE UP (ref 42.2–75.2)
NRBC # BLD: 0 /100 WBCS — SIGNIFICANT CHANGE UP (ref 0–0)
NRBC # BLD: 0 /100 WBCS — SIGNIFICANT CHANGE UP (ref 0–0)
PHOSPHATE SERPL-MCNC: 6 MG/DL — HIGH (ref 2.1–4.9)
PHOSPHATE SERPL-MCNC: 6 MG/DL — HIGH (ref 2.1–4.9)
PLATELET # BLD AUTO: 209 K/UL — SIGNIFICANT CHANGE UP (ref 130–400)
PLATELET # BLD AUTO: 209 K/UL — SIGNIFICANT CHANGE UP (ref 130–400)
PMV BLD: 11.6 FL — HIGH (ref 7.4–10.4)
PMV BLD: 11.6 FL — HIGH (ref 7.4–10.4)
POTASSIUM SERPL-MCNC: 5 MMOL/L — SIGNIFICANT CHANGE UP (ref 3.5–5)
POTASSIUM SERPL-MCNC: 5 MMOL/L — SIGNIFICANT CHANGE UP (ref 3.5–5)
POTASSIUM SERPL-SCNC: 5 MMOL/L — SIGNIFICANT CHANGE UP (ref 3.5–5)
POTASSIUM SERPL-SCNC: 5 MMOL/L — SIGNIFICANT CHANGE UP (ref 3.5–5)
PROT SERPL-MCNC: 6.4 G/DL — SIGNIFICANT CHANGE UP (ref 6–8)
PROT SERPL-MCNC: 6.4 G/DL — SIGNIFICANT CHANGE UP (ref 6–8)
PROTHROM AB SERPL-ACNC: 12.1 SEC — SIGNIFICANT CHANGE UP (ref 9.95–12.87)
PROTHROM AB SERPL-ACNC: 12.1 SEC — SIGNIFICANT CHANGE UP (ref 9.95–12.87)
RBC # BLD: 3.11 M/UL — LOW (ref 4.7–6.1)
RBC # BLD: 3.11 M/UL — LOW (ref 4.7–6.1)
RBC # FLD: 16 % — HIGH (ref 11.5–14.5)
RBC # FLD: 16 % — HIGH (ref 11.5–14.5)
SODIUM SERPL-SCNC: 134 MMOL/L — LOW (ref 135–146)
SODIUM SERPL-SCNC: 134 MMOL/L — LOW (ref 135–146)
WBC # BLD: 4.78 K/UL — LOW (ref 4.8–10.8)
WBC # BLD: 4.78 K/UL — LOW (ref 4.8–10.8)
WBC # FLD AUTO: 4.78 K/UL — LOW (ref 4.8–10.8)
WBC # FLD AUTO: 4.78 K/UL — LOW (ref 4.8–10.8)

## 2023-11-17 PROCEDURE — 99232 SBSQ HOSP IP/OBS MODERATE 35: CPT

## 2023-11-17 RX ORDER — CLONAZEPAM 1 MG
1 TABLET ORAL AT BEDTIME
Refills: 0 | Status: DISCONTINUED | OUTPATIENT
Start: 2023-11-17 | End: 2023-11-24

## 2023-11-17 RX ORDER — CALCIUM ACETATE 667 MG
667 TABLET ORAL
Refills: 0 | Status: DISCONTINUED | OUTPATIENT
Start: 2023-11-17 | End: 2023-11-27

## 2023-11-17 RX ORDER — MORPHINE SULFATE 50 MG/1
2 CAPSULE, EXTENDED RELEASE ORAL ONCE
Refills: 0 | Status: DISCONTINUED | OUTPATIENT
Start: 2023-11-17 | End: 2023-11-17

## 2023-11-17 RX ORDER — TRAMADOL HYDROCHLORIDE 50 MG/1
50 TABLET ORAL ONCE
Refills: 0 | Status: DISCONTINUED | OUTPATIENT
Start: 2023-11-17 | End: 2023-11-17

## 2023-11-17 RX ADMIN — Medication 3 MILLILITER(S): at 07:54

## 2023-11-17 RX ADMIN — TRAMADOL HYDROCHLORIDE 50 MILLIGRAM(S): 50 TABLET ORAL at 17:49

## 2023-11-17 RX ADMIN — Medication 20 UNIT(S): at 12:24

## 2023-11-17 RX ADMIN — Medication 4: at 08:19

## 2023-11-17 RX ADMIN — HEPARIN SODIUM 5000 UNIT(S): 5000 INJECTION INTRAVENOUS; SUBCUTANEOUS at 05:38

## 2023-11-17 RX ADMIN — CITALOPRAM 20 MILLIGRAM(S): 10 TABLET, FILM COATED ORAL at 12:45

## 2023-11-17 RX ADMIN — Medication 667 MILLIGRAM(S): at 12:44

## 2023-11-17 RX ADMIN — PANTOPRAZOLE SODIUM 40 MILLIGRAM(S): 20 TABLET, DELAYED RELEASE ORAL at 12:45

## 2023-11-17 RX ADMIN — Medication 3 MILLILITER(S): at 13:49

## 2023-11-17 RX ADMIN — HEPARIN SODIUM 5000 UNIT(S): 5000 INJECTION INTRAVENOUS; SUBCUTANEOUS at 13:36

## 2023-11-17 RX ADMIN — BUMETANIDE 2 MILLIGRAM(S): 0.25 INJECTION INTRAMUSCULAR; INTRAVENOUS at 05:38

## 2023-11-17 RX ADMIN — CARVEDILOL PHOSPHATE 12.5 MILLIGRAM(S): 80 CAPSULE, EXTENDED RELEASE ORAL at 05:39

## 2023-11-17 RX ADMIN — Medication 10 UNIT(S): at 08:20

## 2023-11-17 RX ADMIN — Medication 5 MILLIGRAM(S): at 22:03

## 2023-11-17 RX ADMIN — Medication 1 APPLICATION(S): at 17:38

## 2023-11-17 RX ADMIN — CLOPIDOGREL BISULFATE 75 MILLIGRAM(S): 75 TABLET, FILM COATED ORAL at 12:44

## 2023-11-17 RX ADMIN — Medication 100 MILLIGRAM(S): at 12:45

## 2023-11-17 RX ADMIN — SENNA PLUS 2 TABLET(S): 8.6 TABLET ORAL at 22:03

## 2023-11-17 RX ADMIN — TRAMADOL HYDROCHLORIDE 50 MILLIGRAM(S): 50 TABLET ORAL at 13:36

## 2023-11-17 RX ADMIN — HEPARIN SODIUM 5000 UNIT(S): 5000 INJECTION INTRAVENOUS; SUBCUTANEOUS at 22:03

## 2023-11-17 RX ADMIN — CARVEDILOL PHOSPHATE 12.5 MILLIGRAM(S): 80 CAPSULE, EXTENDED RELEASE ORAL at 18:06

## 2023-11-17 RX ADMIN — Medication 81 MILLIGRAM(S): at 12:45

## 2023-11-17 RX ADMIN — Medication 1 APPLICATION(S): at 12:45

## 2023-11-17 RX ADMIN — INSULIN GLARGINE 40 UNIT(S): 100 INJECTION, SOLUTION SUBCUTANEOUS at 22:03

## 2023-11-17 RX ADMIN — Medication 650 MILLIGRAM(S): at 01:31

## 2023-11-17 RX ADMIN — Medication 4: at 12:23

## 2023-11-17 RX ADMIN — GABAPENTIN 300 MILLIGRAM(S): 400 CAPSULE ORAL at 12:45

## 2023-11-17 RX ADMIN — Medication 667 MILLIGRAM(S): at 08:50

## 2023-11-17 RX ADMIN — Medication 667 MILLIGRAM(S): at 18:01

## 2023-11-17 RX ADMIN — Medication 1 MILLIGRAM(S): at 22:03

## 2023-11-17 NOTE — PROGRESS NOTE ADULT - SUBJECTIVE AND OBJECTIVE BOX
Nephrology Progress Note    AMANDA CASTORENA  MRN-[id number]  89y  Male    S:  Patient is seen and examined, events over the last 24h noted.    O:  Allergies:  No Known Allergies    Hospital Medications:   MEDICATIONS  (STANDING):  albuterol/ipratropium for Nebulization 3 milliLiter(s) Nebulizer every 6 hours  albuterol/ipratropium for Nebulization. 3 milliLiter(s) Nebulizer once  allopurinol 100 milliGRAM(s) Oral daily  aspirin  chewable 81 milliGRAM(s) Oral daily  bacitracin   Ointment 1 Application(s) Topical daily  buMETAnide 2 milliGRAM(s) Oral daily  calcium acetate 667 milliGRAM(s) Oral three times a day with meals  carvedilol 12.5 milliGRAM(s) Oral every 12 hours  chlorhexidine 2% Cloths 1 Application(s) Topical <User Schedule>  citalopram 20 milliGRAM(s) Oral daily  clopidogrel Tablet 75 milliGRAM(s) Oral daily  dextrose 5%. 1000 milliLiter(s) (50 mL/Hr) IV Continuous <Continuous>  dextrose 5%. 1000 milliLiter(s) (100 mL/Hr) IV Continuous <Continuous>  dextrose 50% Injectable 50 milliLiter(s) IV Push every 15 minutes  gabapentin 300 milliGRAM(s) Oral daily  glucagon  Injectable 1 milliGRAM(s) IntraMuscular once  heparin   Injectable 5000 Unit(s) SubCutaneous every 8 hours  insulin glargine Injectable (LANTUS) 40 Unit(s) SubCutaneous at bedtime  insulin lispro (ADMELOG) corrective regimen sliding scale   SubCutaneous three times a day before meals  insulin lispro Injectable (ADMELOG) 10 Unit(s) SubCutaneous before breakfast  insulin lispro Injectable (ADMELOG) 20 Unit(s) SubCutaneous three times a day before meals  melatonin 5 milliGRAM(s) Oral at bedtime  pantoprazole  Injectable 40 milliGRAM(s) IV Push daily  polyethylene glycol 3350 17 Gram(s) Oral daily  senna 2 Tablet(s) Oral at bedtime  triamcinolone 0.1% Cream 1 Application(s) Topical two times a day    MEDICATIONS  (PRN):  acetaminophen     Tablet .. 650 milliGRAM(s) Oral every 6 hours PRN Temp greater or equal to 38C (100.4F), Mild Pain (1 - 3)  albuterol    90 MICROgram(s) HFA Inhaler 2 Puff(s) Inhalation every 4 hours PRN Shortness of Breath and/or Wheezing  dextrose Oral Gel 15 Gram(s) Oral once PRN Blood Glucose LESS THAN 70 milliGRAM(s)/deciliter    Home Medications:  allopurinol 100 mg oral tablet: 1 tab(s) orally once a day (25 Oct 2021 22:30)  citalopram 20 mg oral tablet: 1 tab(s) orally once a day (25 Oct 2021 22:30)  clonazePAM 1 mg oral tablet: 2 tab(s) orally once a day (at bedtime), As Needed (25 Oct 2021 22:30)  gabapentin 300 mg oral capsule: 1 cap(s) orally 2 times a day (25 Oct 2021 22:30)  glimepiride 4 mg oral tablet: 1 tab(s) orally 2 times a day (25 Oct 2021 22:30)  Lantus 100 units/mL subcutaneous solution: 30 unit(s) subcutaneous once a day (at bedtime) (25 Oct 2021 22:30)  NIFEdipine 60 mg oral tablet, extended release: 1 tab(s) orally once a day (25 Oct 2021 22:30)      VITALS:  Daily     Daily   T(F): 96.9 (11-17-23 @ 04:37), Max: 97.6 (11-16-23 @ 22:04)  HR: 59 (11-17-23 @ 04:37)  BP: 106/53 (11-17-23 @ 04:37)  RR: 18 (11-17-23 @ 04:37)  SpO2: 98% (11-16-23 @ 22:04)  Wt(kg): --  I&O's Detail    16 Nov 2023 07:01  -  17 Nov 2023 07:00  --------------------------------------------------------  IN:  Total IN: 0 mL    OUT:    Voided (mL): 775 mL  Total OUT: 775 mL    Total NET: -775 mL        I&O's Summary    16 Nov 2023 07:01  -  17 Nov 2023 07:00  --------------------------------------------------------  IN: 0 mL / OUT: 775 mL / NET: -775 mL          PHYSICAL EXAM:  Gen: NAD  Chest: b/l breath sounds  Abd: soft  Extremities: no edema  Vascular access:       LABS:    Blood Gas Arterial - Sodium: 139 mmol/L (11-10-23 @ 06:43)  Blood Gas Arterial - Calcium, Ionized: 1.06 mmol/L (11-10-23 @ 06:43)  Blood Gas Arterial - Sodium: 140 mmol/L (11-07-23 @ 08:33)    11-17    134<L>  |  95<L>  |  91<HH>  ----------------------------<  173<H>  5.0   |  24  |  2.6<H>    Ca    8.4      17 Nov 2023 07:00  Phos  6.0     11-17  Mg     2.2     11-17    TPro  6.4  /  Alb  3.0<L>  /  TBili  0.4  /  DBili      /  AST  22  /  ALT  16  /  AlkPhos  88  11-17    eGFR: 22 mL/min/1.73m2 (11-17-23 @ 07:00)  eGFR: 21 mL/min/1.73m2 (11-16-23 @ 11:22)    Phosphorus: 6.0 mg/dL (11-17-23 @ 07:00)  Phosphorus: 6.3 mg/dL (11-16-23 @ 11:22)                            9.0    4.78  )-----------( 209      ( 17 Nov 2023 07:00 )             28.9     Mean Cell Volume: 92.9 fL (11-17-23 @ 07:00)    Iron Total, Serum: 59 ug/dL (10-26-21 @ 04:30)  % Saturation, Iron: 23 % (10-26-21 @ 04:30)        Urine Studies:  Protein, Urine: 30 mg/dL (11-04-23 @ 14:00)  White Blood Cell - Urine: 2 /HPF (11-04-23 @ 14:00)  Red Blood Cell - Urine: 5 /HPF (11-04-23 @ 14:00)  Protein, Urine: 300 mg/dL (10-31-23 @ 13:24)  White Blood Cell - Urine: 2 /HPF (10-31-23 @ 13:24)  Red Blood Cell - Urine: 18 /HPF (10-31-23 @ 13:24)  Granular Cast: Present (10-31-23 @ 13:24)  Protein, Urine: 300 mg/dL (10-31-23 @ 07:40)  White Blood Cell - Urine: 0 /HPF (10-31-23 @ 07:40)  Red Blood Cell - Urine: 20 /HPF (10-31-23 @ 07:40)  Protein, Urine: Trace (10-27-21 @ 11:00)  Protein, Urine: Trace (10-25-21 @ 18:49)  Protein, Urine: 100 mg/dL (04-15-19 @ 14:09)  White Blood Cell - Urine: Negative (04-15-19 @ 14:09)  Red Blood Cell - Urine: 3-5 /HPF (04-15-19 @ 14:09)      Urea Nitrogen,  Random Urine: 573 mg/dL (10-31-23 @ 13:24)    Culture Results:   No growth at 5 days (11-10 @ 11:33)  Culture Results:   No growth at 5 days (11-10 @ 11:33)    Creatinine trend:  Creatinine: 2.6 mg/dL (11-17-23 @ 07:00)  Creatinine: 2.7 mg/dL (11-16-23 @ 11:22)  Creatinine: 2.2 mg/dL (11-15-23 @ 05:22)  Creatinine: 2.5 mg/dL (11-14-23 @ 15:14)  Creatinine: 2.3 mg/dL (11-14-23 @ 05:17)  Creatinine: 2.3 mg/dL (11-13-23 @ 15:39)  Creatinine: 2.4 mg/dL (11-13-23 @ 06:09)  Creatinine: 2.5 mg/dL (11-12-23 @ 05:29)  Creatinine: 2.5 mg/dL (11-11-23 @ 21:49)  Creatinine: 2.4 mg/dL (11-11-23 @ 05:15)  Creatinine: 1.9 mg/dL (11-10-23 @ 11:33)  Creatinine: 3.4 mg/dL (11-10-23 @ 05:48)  Creatinine: 2.9 mg/dL (11-09-23 @ 15:10)  Creatinine: 2.6 mg/dL (11-09-23 @ 05:18)  Creatinine: 1.8 mg/dL (11-08-23 @ 18:17)  Creatinine: 3.7 mg/dL (11-08-23 @ 06:09)  Creatinine: 3.5 mg/dL (11-07-23 @ 15:17)  Creatinine: 3.4 mg/dL (11-07-23 @ 11:28)  Creatinine: 2.3 mg/dL (11-06-23 @ 15:38)  Creatinine: 3.4 mg/dL (11-06-23 @ 05:18)    Hepatitis B Core Antibody, Total: Nonreact (11-03-23 @ 17:45)  Hepatitis B Core IgM Antibody: Nonreact (11-03-23 @ 17:45)  Hepatitis B Surface Antibody: Nonreact (11-03-23 @ 17:45)      US Kidney and Bladder:   ACC: [id number] EXAM:  US KIDNEYS AND BLADDER   ORDERED BY: JOSEFA SARAVIA     PROCEDURE DATE:  10/31/2023          INTERPRETATION:  CLINICAL INFORMATION: Worsening renal function.    COMPARISON: October 26, 2021 ultrasound.    TECHNIQUE: Sonography of the kidneys and bladder.    FINDINGS:    Right kidney: 12.0 cm. Echogenic in appearance without hydronephrosis or   nephrolithiasis.    Left kidney:  11.6 cm. Echogenic in appearance without hydronephrosis or   nephrolithiasis.    Urinary bladder: Patient has a condom catheter.    IMPRESSION:    Echogenic kidneys consistent with medical renal disease. No   hydronephrosis or nephrolithiasis.        --- End of Report ---            JOSÉ GREENBERG MD; Attending Interventional Radiologist  This document has been electronically signed. Oct 31 2023 11:41AM (10-31-23 @ 11:33)     Nephrology Progress Note    AMANDA CASTORENA  MRN-[id number]  89y  Male    S:  Patient is seen and examined, events over the last 24h noted.    O:  Allergies:  No Known Allergies    Hospital Medications:   MEDICATIONS  (STANDING):  albuterol/ipratropium for Nebulization 3 milliLiter(s) Nebulizer every 6 hours  albuterol/ipratropium for Nebulization. 3 milliLiter(s) Nebulizer once  allopurinol 100 milliGRAM(s) Oral daily  aspirin  chewable 81 milliGRAM(s) Oral daily  bacitracin   Ointment 1 Application(s) Topical daily  buMETAnide 2 milliGRAM(s) Oral daily  calcium acetate 667 milliGRAM(s) Oral three times a day with meals  carvedilol 12.5 milliGRAM(s) Oral every 12 hours  chlorhexidine 2% Cloths 1 Application(s) Topical <User Schedule>  citalopram 20 milliGRAM(s) Oral daily  clopidogrel Tablet 75 milliGRAM(s) Oral daily  dextrose 5%. 1000 milliLiter(s) (50 mL/Hr) IV Continuous <Continuous>  dextrose 5%. 1000 milliLiter(s) (100 mL/Hr) IV Continuous <Continuous>  dextrose 50% Injectable 50 milliLiter(s) IV Push every 15 minutes  gabapentin 300 milliGRAM(s) Oral daily  glucagon  Injectable 1 milliGRAM(s) IntraMuscular once  heparin   Injectable 5000 Unit(s) SubCutaneous every 8 hours  insulin glargine Injectable (LANTUS) 40 Unit(s) SubCutaneous at bedtime  insulin lispro (ADMELOG) corrective regimen sliding scale   SubCutaneous three times a day before meals  insulin lispro Injectable (ADMELOG) 10 Unit(s) SubCutaneous before breakfast  insulin lispro Injectable (ADMELOG) 20 Unit(s) SubCutaneous three times a day before meals  melatonin 5 milliGRAM(s) Oral at bedtime  pantoprazole  Injectable 40 milliGRAM(s) IV Push daily  polyethylene glycol 3350 17 Gram(s) Oral daily  senna 2 Tablet(s) Oral at bedtime  triamcinolone 0.1% Cream 1 Application(s) Topical two times a day    MEDICATIONS  (PRN):  acetaminophen     Tablet .. 650 milliGRAM(s) Oral every 6 hours PRN Temp greater or equal to 38C (100.4F), Mild Pain (1 - 3)  albuterol    90 MICROgram(s) HFA Inhaler 2 Puff(s) Inhalation every 4 hours PRN Shortness of Breath and/or Wheezing  dextrose Oral Gel 15 Gram(s) Oral once PRN Blood Glucose LESS THAN 70 milliGRAM(s)/deciliter    Home Medications:  allopurinol 100 mg oral tablet: 1 tab(s) orally once a day (25 Oct 2021 22:30)  citalopram 20 mg oral tablet: 1 tab(s) orally once a day (25 Oct 2021 22:30)  clonazePAM 1 mg oral tablet: 2 tab(s) orally once a day (at bedtime), As Needed (25 Oct 2021 22:30)  gabapentin 300 mg oral capsule: 1 cap(s) orally 2 times a day (25 Oct 2021 22:30)  glimepiride 4 mg oral tablet: 1 tab(s) orally 2 times a day (25 Oct 2021 22:30)  Lantus 100 units/mL subcutaneous solution: 30 unit(s) subcutaneous once a day (at bedtime) (25 Oct 2021 22:30)  NIFEdipine 60 mg oral tablet, extended release: 1 tab(s) orally once a day (25 Oct 2021 22:30)      VITALS:  Daily     Daily   T(F): 96.9 (11-17-23 @ 04:37), Max: 97.6 (11-16-23 @ 22:04)  HR: 59 (11-17-23 @ 04:37)  BP: 106/53 (11-17-23 @ 04:37)  RR: 18 (11-17-23 @ 04:37)  SpO2: 98% (11-16-23 @ 22:04)  Wt(kg): --  I&O's Detail    16 Nov 2023 07:01  -  17 Nov 2023 07:00  --------------------------------------------------------  IN:  Total IN: 0 mL    OUT:    Voided (mL): 775 mL  Total OUT: 775 mL    Total NET: -775 mL        I&O's Summary    16 Nov 2023 07:01  -  17 Nov 2023 07:00  --------------------------------------------------------  IN: 0 mL / OUT: 775 mL / NET: -775 mL          PHYSICAL EXAM:  Gen: NAD  Chest: b/l breath sounds  Abd: soft  Extremities: no edema  condom catheter    LABS:    11-17    134<L>  |  95<L>  |  91<HH>  ----------------------------<  173<H>  5.0   |  24  |  2.6<H>    Ca    8.4      17 Nov 2023 07:00  Phos  6.0     11-17  Mg     2.2     11-17    TPro  6.4  /  Alb  3.0<L>  /  TBili  0.4  /  DBili      /  AST  22  /  ALT  16  /  AlkPhos  88  11-17    Phosphorus: 6.0 mg/dL (11-17-23 @ 07:00)  Phosphorus: 6.3 mg/dL (11-16-23 @ 11:22)                            9.0    4.78  )-----------( 209      ( 17 Nov 2023 07:00 )             28.9     Mean Cell Volume: 92.9 fL (11-17-23 @ 07:00)    Iron Total, Serum: 59 ug/dL (10-26-21 @ 04:30)  % Saturation, Iron: 23 % (10-26-21 @ 04:30)      Creatinine trend:  Creatinine: 2.6 mg/dL (11-17-23 @ 07:00)  Creatinine: 2.7 mg/dL (11-16-23 @ 11:22)  Creatinine: 2.2 mg/dL (11-15-23 @ 05:22)  Creatinine: 2.5 mg/dL (11-14-23 @ 15:14)  Creatinine: 2.3 mg/dL (11-14-23 @ 05:17)  Creatinine: 2.3 mg/dL (11-13-23 @ 15:39)  Creatinine: 2.4 mg/dL (11-13-23 @ 06:09)  Creatinine: 2.5 mg/dL (11-12-23 @ 05:29)

## 2023-11-17 NOTE — CHART NOTE - NSCHARTNOTEFT_GEN_A_CORE
Registered Dietitian Follow-Up     Patient Profile Reviewed                           Yes [x]   No []     Pertinent Subjective Information:  Patient needs maximum encouragement and assistance with all meals. PO intake appears minimal - 26-50%      Pertinent Medical Interventions:  DM2 w/course c/b DKA - - c/w basal-bolus insulin regimen w/ corrective insulin as required; Acute Renal Failure on CKD 3; HfpEF; CAD; HTN; Dysphagia;     SLP swallow bedside evaluation : Recommending minced and moist with mildly thick liquids      Diet order:   Diet, Pureed:   Mildly Thick Liquids (MILDTHICKLIQS) (23 @ 13:51) [Active]    Anthropometrics:  Height: 175 cm   Weight: 88.5 kg   BMI: 28.9  IBW: 72.7 kg     Daily Weight in k.8 (11-15), Weight in k (-14), Weight in k.9 (-13), Weight in k.5 (11-12), Weight in k.7 (11-11)  % Weight Change    MEDICATIONS  (STANDING):  albuterol/ipratropium for Nebulization 3 milliLiter(s) Nebulizer every 6 hours  albuterol/ipratropium for Nebulization. 3 milliLiter(s) Nebulizer once  allopurinol 100 milliGRAM(s) Oral daily  aspirin  chewable 81 milliGRAM(s) Oral daily  bacitracin   Ointment 1 Application(s) Topical daily  buMETAnide 2 milliGRAM(s) Oral daily  calcium acetate 667 milliGRAM(s) Oral three times a day with meals  carvedilol 12.5 milliGRAM(s) Oral every 12 hours  chlorhexidine 2% Cloths 1 Application(s) Topical <User Schedule>  citalopram 20 milliGRAM(s) Oral daily  clopidogrel Tablet 75 milliGRAM(s) Oral daily  dextrose 5%. 1000 milliLiter(s) (50 mL/Hr) IV Continuous <Continuous>  dextrose 5%. 1000 milliLiter(s) (100 mL/Hr) IV Continuous <Continuous>  dextrose 50% Injectable 50 milliLiter(s) IV Push every 15 minutes  gabapentin 300 milliGRAM(s) Oral daily  glucagon  Injectable 1 milliGRAM(s) IntraMuscular once  heparin   Injectable 5000 Unit(s) SubCutaneous every 8 hours  insulin glargine Injectable (LANTUS) 40 Unit(s) SubCutaneous at bedtime  insulin lispro (ADMELOG) corrective regimen sliding scale   SubCutaneous three times a day before meals  insulin lispro Injectable (ADMELOG) 20 Unit(s) SubCutaneous three times a day before meals  insulin lispro Injectable (ADMELOG) 10 Unit(s) SubCutaneous before breakfast  melatonin 5 milliGRAM(s) Oral at bedtime  pantoprazole  Injectable 40 milliGRAM(s) IV Push daily  polyethylene glycol 3350 17 Gram(s) Oral daily  senna 2 Tablet(s) Oral at bedtime  triamcinolone 0.1% Cream 1 Application(s) Topical two times a day    MEDICATIONS  (PRN):  acetaminophen     Tablet .. 650 milliGRAM(s) Oral every 6 hours PRN Temp greater or equal to 38C (100.4F), Mild Pain (1 - 3)  albuterol    90 MICROgram(s) HFA Inhaler 2 Puff(s) Inhalation every 4 hours PRN Shortness of Breath and/or Wheezing  dextrose Oral Gel 15 Gram(s) Oral once PRN Blood Glucose LESS THAN 70 milliGRAM(s)/deciliter    Pertinent Labs:  @ 07:00: Na 134<L>, BUN 91<HH>, Cr 2.6<H>, <H>, K+ 5.0, Phos 6.0<H>, Mg 2.2, Alk Phos 88, ALT/SGPT 16, AST/SGOT 22, HbA1c --    Finger Sticks:  POCT Blood Glucose.: 242 mg/dL ( @ 11:40)  POCT Blood Glucose.: 205 mg/dL ( @ 07:50)  POCT Blood Glucose.: 154 mg/dL ( @ 21:36)  POCT Blood Glucose.: 80 mg/dL ( @ 16:31)    Physical Findings:  - Appearance:  - GI function:  - Tubes:  - Oral/Mouth cavity:  - Skin:     Nutrition Requirements:  Weight Used: 80.9 kg      Estimated Energy Needs    Continue [x]  Adjust []  1618 - 2023 kcal/day (20-25 kcal/kg)      Estimated Protein Needs    Continue [x]  Adjust []  97 - 113 gm/day (1.2 - 1.4 gm/kg)      Estimated Fluid Needs        Continue [x]  Adjust []  1 mL/kcal or per LIP     Nutrient Intake:     [] Previous Nutrition Diagnosis:            [] Ongoing          [] Resolved    [] No active nutrition diagnosis identified at this time     Nutrition Intervention      Goal/Expected Outcome:      Indicator/Monitoring:      Recommendation: Registered Dietitian Follow-Up     Patient Profile Reviewed                           Yes [x]   No []     Pertinent Subjective Information:  Patient needs maximum encouragement and assistance with all meals. PO intake appears minimal - 26-50%      Pertinent Medical Interventions:  DM2 w/course c/b DKA - - c/w basal-bolus insulin regimen w/ corrective insulin as required; Acute Renal Failure on CKD 3; HfpEF; CAD; HTN; Dysphagia;     SLP swallow bedside evaluation : Recommending minced and moist with mildly thick liquids      Diet order:   Diet, Pureed:   Mildly Thick Liquids (MILDTHICKLIQS) (23 @ 13:51) [Active]    Anthropometrics:  Height: 175 cm   Weight: 88.5 kg   BMI: 28.9  IBW: 72.7 kg     Daily Weight in k.8 (-15), Weight in k (-14), Weight in k.9 (-13), Weight in k.5 (-12), Weight in k.7 (-11)    MEDICATIONS  (STANDING):  albuterol/ipratropium for Nebulization 3 milliLiter(s) Nebulizer every 6 hours  albuterol/ipratropium for Nebulization. 3 milliLiter(s) Nebulizer once  allopurinol 100 milliGRAM(s) Oral daily  aspirin  chewable 81 milliGRAM(s) Oral daily  bacitracin   Ointment 1 Application(s) Topical daily  buMETAnide 2 milliGRAM(s) Oral daily  calcium acetate 667 milliGRAM(s) Oral three times a day with meals  carvedilol 12.5 milliGRAM(s) Oral every 12 hours  chlorhexidine 2% Cloths 1 Application(s) Topical <User Schedule>  citalopram 20 milliGRAM(s) Oral daily  clopidogrel Tablet 75 milliGRAM(s) Oral daily  dextrose 5%. 1000 milliLiter(s) (50 mL/Hr) IV Continuous <Continuous>  dextrose 5%. 1000 milliLiter(s) (100 mL/Hr) IV Continuous <Continuous>  dextrose 50% Injectable 50 milliLiter(s) IV Push every 15 minutes  gabapentin 300 milliGRAM(s) Oral daily  glucagon  Injectable 1 milliGRAM(s) IntraMuscular once  heparin   Injectable 5000 Unit(s) SubCutaneous every 8 hours  insulin glargine Injectable (LANTUS) 40 Unit(s) SubCutaneous at bedtime  insulin lispro (ADMELOG) corrective regimen sliding scale   SubCutaneous three times a day before meals  insulin lispro Injectable (ADMELOG) 20 Unit(s) SubCutaneous three times a day before meals  insulin lispro Injectable (ADMELOG) 10 Unit(s) SubCutaneous before breakfast  melatonin 5 milliGRAM(s) Oral at bedtime  pantoprazole  Injectable 40 milliGRAM(s) IV Push daily  polyethylene glycol 3350 17 Gram(s) Oral daily  senna 2 Tablet(s) Oral at bedtime  triamcinolone 0.1% Cream 1 Application(s) Topical two times a day    MEDICATIONS  (PRN):  acetaminophen     Tablet .. 650 milliGRAM(s) Oral every 6 hours PRN Temp greater or equal to 38C (100.4F), Mild Pain (1 - 3)  albuterol    90 MICROgram(s) HFA Inhaler 2 Puff(s) Inhalation every 4 hours PRN Shortness of Breath and/or Wheezing  dextrose Oral Gel 15 Gram(s) Oral once PRN Blood Glucose LESS THAN 70 milliGRAM(s)/deciliter    Pertinent Labs:  @ 07:00: Na 134<L>, BUN 91<HH>, Cr 2.6<H>, <H>, K+ 5.0, Phos 6.0<H>, Mg 2.2, Alk Phos 88, ALT/SGPT 16, AST/SGOT 22, HbA1c --    Finger Sticks:  POCT Blood Glucose.: 242 mg/dL ( @ 11:40)  POCT Blood Glucose.: 205 mg/dL ( @ 07:50)  POCT Blood Glucose.: 154 mg/dL ( @ 21:36)  POCT Blood Glucose.: 80 mg/dL ( @ 16:31)    Physical Findings:  - Appearance: WDL  - GI function: 2x BM on 10/15 - patient reported BM today as well  - Tubes: n/a - no feeding tubes   - Oral/Mouth cavity: minced and moist texture/mildly thick liquids  - Skin: sacral spine pressure injury healed; left nostril - sDTI   - Edema: generalized 1+ edema       Nutrition Requirements:  Weight Used: 80.9 kg      Estimated Energy Needs    Continue [x]  Adjust []  1618 - 2023 kcal/day (20-25 kcal/kg)      Estimated Protein Needs    Continue [x]  Adjust []  97 - 113 gm/day (1.2 - 1.4 gm/kg)      Estimated Fluid Needs        Continue [x]  Adjust []  1 mL/kcal or per LIP     Nutrient Intake: 26-50% of meals      [x] Previous Nutrition Diagnosis:            [x] Ongoing          [] Resolved     Nutrition Intervention:  meals and snacks, medical food supplements, coordination of care     Goal/Expected Outcome:   PO intake >50% of meals within 3-5 days      Indicator/Monitoring:   energy intake, weight, labs, skin status, NFPE      Recommendation:  1) Would recommend to continue current diet order - but restrict K+ and Phosphorus as it is currently elevated  -minced and moist texture with mildly thick liquids per SLP  2) Recommend Prosource Gelatein 20 SF 2x/day (80-90 kcal, 20 gm Protein each) to optimize kcal and protein intake  3) Maximum encouragement and assistance appreciated with all meals, snacks, supplement  4) Monitor  BM, GI s/s    Patient is at high nutrition risk, RD to f/u in 3-5 days or PRN    RD to remain available: Mary Ann Lucas, MILAN [phone] or via Teams Registered Dietitian Follow-Up     Patient Profile Reviewed                           Yes [x]   No []     Pertinent Subjective Information:  Patient needs maximum encouragement and assistance with all meals. PO intake appears minimal - 26-50%      Pertinent Medical Interventions:  DM2 w/course c/b DKA - - c/w basal-bolus insulin regimen w/ corrective insulin as required; Acute Renal Failure on CKD 3; HfpEF; CAD; HTN; Dysphagia;     SLP swallow bedside evaluation : Recommending minced and moist with mildly thick liquids      Diet order:   Diet, Pureed:   Mildly Thick Liquids (MILDTHICKLIQS) (23 @ 13:51) [Active]    Anthropometrics:  Height: 175 cm   Weight: 88.5 kg   BMI: 28.9  IBW: 72.7 kg     Daily Weight in k.8 (-15), Weight in k (-14), Weight in k.9 (-13), Weight in k.5 (-12), Weight in k.7 (-11)    MEDICATIONS  (STANDING):  albuterol/ipratropium for Nebulization 3 milliLiter(s) Nebulizer every 6 hours  albuterol/ipratropium for Nebulization. 3 milliLiter(s) Nebulizer once  allopurinol 100 milliGRAM(s) Oral daily  aspirin  chewable 81 milliGRAM(s) Oral daily  bacitracin   Ointment 1 Application(s) Topical daily  buMETAnide 2 milliGRAM(s) Oral daily  calcium acetate 667 milliGRAM(s) Oral three times a day with meals  carvedilol 12.5 milliGRAM(s) Oral every 12 hours  chlorhexidine 2% Cloths 1 Application(s) Topical <User Schedule>  citalopram 20 milliGRAM(s) Oral daily  clopidogrel Tablet 75 milliGRAM(s) Oral daily  dextrose 5%. 1000 milliLiter(s) (50 mL/Hr) IV Continuous <Continuous>  dextrose 5%. 1000 milliLiter(s) (100 mL/Hr) IV Continuous <Continuous>  dextrose 50% Injectable 50 milliLiter(s) IV Push every 15 minutes  gabapentin 300 milliGRAM(s) Oral daily  glucagon  Injectable 1 milliGRAM(s) IntraMuscular once  heparin   Injectable 5000 Unit(s) SubCutaneous every 8 hours  insulin glargine Injectable (LANTUS) 40 Unit(s) SubCutaneous at bedtime  insulin lispro (ADMELOG) corrective regimen sliding scale   SubCutaneous three times a day before meals  insulin lispro Injectable (ADMELOG) 20 Unit(s) SubCutaneous three times a day before meals  insulin lispro Injectable (ADMELOG) 10 Unit(s) SubCutaneous before breakfast  melatonin 5 milliGRAM(s) Oral at bedtime  pantoprazole  Injectable 40 milliGRAM(s) IV Push daily  polyethylene glycol 3350 17 Gram(s) Oral daily  senna 2 Tablet(s) Oral at bedtime  triamcinolone 0.1% Cream 1 Application(s) Topical two times a day    MEDICATIONS  (PRN):  acetaminophen     Tablet .. 650 milliGRAM(s) Oral every 6 hours PRN Temp greater or equal to 38C (100.4F), Mild Pain (1 - 3)  albuterol    90 MICROgram(s) HFA Inhaler 2 Puff(s) Inhalation every 4 hours PRN Shortness of Breath and/or Wheezing  dextrose Oral Gel 15 Gram(s) Oral once PRN Blood Glucose LESS THAN 70 milliGRAM(s)/deciliter    Pertinent Labs:  @ 07:00: Na 134<L>, BUN 91<HH>, Cr 2.6<H>, <H>, K+ 5.0, Phos 6.0<H>, Mg 2.2, Alk Phos 88, ALT/SGPT 16, AST/SGOT 22, HbA1c --    Finger Sticks:  POCT Blood Glucose.: 242 mg/dL ( @ 11:40)  POCT Blood Glucose.: 205 mg/dL ( @ 07:50)  POCT Blood Glucose.: 154 mg/dL ( @ 21:36)  POCT Blood Glucose.: 80 mg/dL ( @ 16:31)    Physical Findings:  - Appearance: WDL  - GI function: 2x BM on 10/15 - patient reported BM today as well  - Tubes: n/a - no feeding tubes   - Oral/Mouth cavity: minced and moist texture/mildly thick liquids  - Skin: sacral spine pressure injury healed; left nostril - sDTI   - Edema: generalized 1+ edema       Nutrition Requirements:  Weight Used: 80.9 kg      Estimated Energy Needs    Continue [x]  Adjust []  1618 - 2023 kcal/day (20-25 kcal/kg)      Estimated Protein Needs    Continue [x]  Adjust []  97 - 113 gm/day (1.2 - 1.4 gm/kg)      Estimated Fluid Needs        Continue [x]  Adjust []  1 mL/kcal or per LIP     Nutrient Intake: 26-50% of meals      [x] Previous Nutrition Diagnosis:            [x] Ongoing          [] Resolved     Nutrition Intervention:  meals and snacks, medical food supplements, coordination of care     Goal/Expected Outcome:   PO intake >50% of meals within 3-5 days      Indicator/Monitoring:   energy intake, weight, labs, skin status, NFPE      Recommendation:  1) Would recommend to continue current diet order - but add Consistent CHO, no concentrated K+ and no concentrated Phosphorus as those labs appear to be elevated  -changed texture to minced and moist texture with mildly thick liquids per SLP recs   2) Recommend Prosource Gelatein 20 SF 2x/day (80-90 kcal, 20 gm Protein each) to optimize kcal and protein intake  3) Maximum encouragement and assistance appreciated with all meals, snacks, supplement  4) Monitor  BM, GI s/s    Patient is at high nutrition risk, RD to f/u in 3-5 days or PRN    RD to remain available: Mary Ann Lucas, MILAN [phone] or via Teams

## 2023-11-17 NOTE — PROGRESS NOTE ADULT - SUBJECTIVE AND OBJECTIVE BOX
CC: 89M admitted for DKA; hospital course c/b Septic Shock 2/2 Aspiration Pneumonia(now resolved) & Acute Respiratory Failure w/ Hypoxia/Acute on Chronic Diastolic HF/Acute Renal Failure on CKD    SUBJECTIVE / OVERNIGHT EVENTS:  No acute events overnight. Patient seen and evaluated at bedside. No CP or palpitations. He reports itchiness of lower back.    ROS:  No fever    MEDICATIONS  (STANDING):  albuterol/ipratropium for Nebulization 3 milliLiter(s) Nebulizer every 6 hours  albuterol/ipratropium for Nebulization. 3 milliLiter(s) Nebulizer once  allopurinol 100 milliGRAM(s) Oral daily  aspirin  chewable 81 milliGRAM(s) Oral daily  bacitracin   Ointment 1 Application(s) Topical daily  buMETAnide 2 milliGRAM(s) Oral daily  carvedilol 12.5 milliGRAM(s) Oral every 12 hours  chlorhexidine 2% Cloths 1 Application(s) Topical <User Schedule>  citalopram 20 milliGRAM(s) Oral daily  clopidogrel Tablet 75 milliGRAM(s) Oral daily  dextrose 5%. 1000 milliLiter(s) (50 mL/Hr) IV Continuous <Continuous>  dextrose 5%. 1000 milliLiter(s) (100 mL/Hr) IV Continuous <Continuous>  dextrose 50% Injectable 50 milliLiter(s) IV Push every 15 minutes  gabapentin 300 milliGRAM(s) Oral daily  glucagon  Injectable 1 milliGRAM(s) IntraMuscular once  heparin   Injectable 5000 Unit(s) SubCutaneous every 8 hours  insulin glargine Injectable (LANTUS) 40 Unit(s) SubCutaneous at bedtime  insulin lispro (ADMELOG) corrective regimen sliding scale   SubCutaneous three times a day before meals  insulin lispro Injectable (ADMELOG) 10 Unit(s) SubCutaneous before breakfast  insulin lispro Injectable (ADMELOG) 20 Unit(s) SubCutaneous three times a day before meals  melatonin 5 milliGRAM(s) Oral at bedtime  morphine  - Injectable 2 milliGRAM(s) IV Push once  pantoprazole  Injectable 40 milliGRAM(s) IV Push daily  polyethylene glycol 3350 17 Gram(s) Oral daily  senna 2 Tablet(s) Oral at bedtime  triamcinolone 0.1% Cream 1 Application(s) Topical two times a day    MEDICATIONS  (PRN):  acetaminophen     Tablet .. 650 milliGRAM(s) Oral every 6 hours PRN Temp greater or equal to 38C (100.4F), Mild Pain (1 - 3)  albuterol    90 MICROgram(s) HFA Inhaler 2 Puff(s) Inhalation every 4 hours PRN Shortness of Breath and/or Wheezing  dextrose Oral Gel 15 Gram(s) Oral once PRN Blood Glucose LESS THAN 70 milliGRAM(s)/deciliter    CAPILLARY BLOOD GLUCOSE    POCT Blood Glucose.: 154 mg/dL (16 Nov 2023 21:36)  POCT Blood Glucose.: 80 mg/dL (16 Nov 2023 16:31)  POCT Blood Glucose.: 186 mg/dL (16 Nov 2023 11:43)  POCT Blood Glucose.: 176 mg/dL (16 Nov 2023 07:49)    I&O's Summary    16 Nov 2023 07:01  -  17 Nov 2023 07:00  --------------------------------------------------------  IN: 0 mL / OUT: 775 mL / NET: -775 mL    Physical Exam  GENERAL: NAD  HEAD:  Atraumatic, Normocephalic  Lung: normal work of breathing, no rhonchi appreciated  Cardiovascular: S1&S2+, rrr, no m/r/g appreciated  ABDOMEN: soft, nt  : bowden catheter+, no suprapubic pain to palpation  Neuro: Alert & follows commands, no flaccid paralysis in extremities appreciated  SKIN: warm and dry, no visible purulence in exposed areas    LABS:                        9.6    6.63  )-----------( 245      ( 16 Nov 2023 11:22 )             30.8     11-16    137  |  95<L>  |  92<HH>  ----------------------------<  145<H>  4.9   |  27  |  2.7<H>    Ca    8.6      16 Nov 2023 11:22  Phos  6.3     11-16  Mg     2.2     11-16    TPro  6.3  /  Alb  3.4<L>  /  TBili  0.3  /  DBili  x   /  AST  24  /  ALT  17  /  AlkPhos  89  11-16    PT/INR - ( 16 Nov 2023 11:22 )   PT: 11.90 sec;   INR: 1.04 ratio         PTT - ( 16 Nov 2023 11:22 )  PTT:25.4 sec      Urinalysis Basic - ( 16 Nov 2023 11:22 )    Color: x / Appearance: x / SG: x / pH: x  Gluc: 145 mg/dL / Ketone: x  / Bili: x / Urobili: x   Blood: x / Protein: x / Nitrite: x   Leuk Esterase: x / RBC: x / WBC x   Sq Epi: x / Non Sq Epi: x / Bacteria: x          RADIOLOGY & ADDITIONAL TESTS:  Results Reviewed:   Imaging Personally Reviewed:  Electrocardiogram Personally Reviewed:    COORDINATION OF CARE:  Care Discussed with Consultants/Other Providers [Y/N]:  Prior or Outpatient Records Reviewed [Y/N]:

## 2023-11-17 NOTE — PROGRESS NOTE ADULT - ASSESSMENT
# PARKER on CKD, temporarily requiring HD.  Renal function overall stable, has not improved to previous baseline of ~2    Recommendations:  - no further HD required  - cont po bumex  - d/w RN at bedside, will check bladder scan to ensure pt is not retaining urine (had small increase in serum creat, stabilized)  - avoid hypotension; agree with dereasing carvedilol  - PT, ambulation

## 2023-11-17 NOTE — PROGRESS NOTE ADULT - ASSESSMENT
89M w/ CAD c/b NSTEMI s/p LAD DESx2(2022), HFpEF, s/p PPM, DM2 on Insulin, CKD3, HTN, HLD, Hx of Dens Fx, Prostate Ca in remission was admitted to MICU (10/30) for DKA w/ ICU course c/b Septic Shock 2/2 Aspiration Pneumonia (now resolved; s/p course of Cefepime) &  Acute Respiratory Failure w/ Hypoxia 2/2 Fluid Overload from Acute on Chronic HFpEF & Acute Renal Failure requiring temporary HD(last HD 11/10, [facility] HD cath now removed). The patient was transferred to medicine service on 11/16 for further medical optimization.    #DM2 w/ course c/b DKA  - Endocrinology consulted in ICU  - c/w basal-bolus insulin regimen w/ corrective insulin as required    #Acute Renal Failure on CKD3  - Nephrology consulted & a/w management  - required temporary HD during ICU course however per Nephro no longer required; per chart review--suspected 2/2 severe sepsis/shock  - bowden cath was placed at time of critical illness, with elevated CrCl for now will leave in place until nephrology can further evaluate  - monitor CrCl daily    #HFpEF  - appears euvolemic on exam  - initially during course had acute on chronic HF 2/2 Fluid overload from renal failure now resolved    #CAD  - charted hx of CABG- no sternotomy scar on exam, and no sternotomy wire on cxr (this appears incorrect), per chart reviewed completed staged PCI in 2022 for NSTEMI and on cath report there is no mention of implanted vessels  - c/w Aspirin, Clopidogrel, Carvedilol    #HTN  - c/w carvedilol    #Dysphagia  - c/w pureed diet    #Prophylactic Measure  DVT ppx- HSQ  Dispo- plan for [facility]  GO- Palliative care consulted in ICU, patient is DNR

## 2023-11-18 LAB
ALBUMIN SERPL ELPH-MCNC: 2.9 G/DL — LOW (ref 3.5–5.2)
ALBUMIN SERPL ELPH-MCNC: 2.9 G/DL — LOW (ref 3.5–5.2)
ALP SERPL-CCNC: 84 U/L — SIGNIFICANT CHANGE UP (ref 30–115)
ALP SERPL-CCNC: 84 U/L — SIGNIFICANT CHANGE UP (ref 30–115)
ALT FLD-CCNC: 16 U/L — SIGNIFICANT CHANGE UP (ref 0–41)
ALT FLD-CCNC: 16 U/L — SIGNIFICANT CHANGE UP (ref 0–41)
ANION GAP SERPL CALC-SCNC: 15 MMOL/L — HIGH (ref 7–14)
ANION GAP SERPL CALC-SCNC: 15 MMOL/L — HIGH (ref 7–14)
AST SERPL-CCNC: 22 U/L — SIGNIFICANT CHANGE UP (ref 0–41)
AST SERPL-CCNC: 22 U/L — SIGNIFICANT CHANGE UP (ref 0–41)
BASOPHILS # BLD AUTO: 0.01 K/UL — SIGNIFICANT CHANGE UP (ref 0–0.2)
BASOPHILS # BLD AUTO: 0.01 K/UL — SIGNIFICANT CHANGE UP (ref 0–0.2)
BASOPHILS NFR BLD AUTO: 0.3 % — SIGNIFICANT CHANGE UP (ref 0–1)
BASOPHILS NFR BLD AUTO: 0.3 % — SIGNIFICANT CHANGE UP (ref 0–1)
BILIRUB SERPL-MCNC: 0.4 MG/DL — SIGNIFICANT CHANGE UP (ref 0.2–1.2)
BILIRUB SERPL-MCNC: 0.4 MG/DL — SIGNIFICANT CHANGE UP (ref 0.2–1.2)
BUN SERPL-MCNC: 84 MG/DL — CRITICAL HIGH (ref 10–20)
BUN SERPL-MCNC: 84 MG/DL — CRITICAL HIGH (ref 10–20)
CALCIUM SERPL-MCNC: 8.1 MG/DL — LOW (ref 8.4–10.5)
CALCIUM SERPL-MCNC: 8.1 MG/DL — LOW (ref 8.4–10.5)
CHLORIDE SERPL-SCNC: 96 MMOL/L — LOW (ref 98–110)
CHLORIDE SERPL-SCNC: 96 MMOL/L — LOW (ref 98–110)
CO2 SERPL-SCNC: 24 MMOL/L — SIGNIFICANT CHANGE UP (ref 17–32)
CO2 SERPL-SCNC: 24 MMOL/L — SIGNIFICANT CHANGE UP (ref 17–32)
CREAT SERPL-MCNC: 2.3 MG/DL — HIGH (ref 0.7–1.5)
CREAT SERPL-MCNC: 2.3 MG/DL — HIGH (ref 0.7–1.5)
EGFR: 26 ML/MIN/1.73M2 — LOW
EGFR: 26 ML/MIN/1.73M2 — LOW
EOSINOPHIL # BLD AUTO: 0.14 K/UL — SIGNIFICANT CHANGE UP (ref 0–0.7)
EOSINOPHIL # BLD AUTO: 0.14 K/UL — SIGNIFICANT CHANGE UP (ref 0–0.7)
EOSINOPHIL NFR BLD AUTO: 3.6 % — SIGNIFICANT CHANGE UP (ref 0–8)
EOSINOPHIL NFR BLD AUTO: 3.6 % — SIGNIFICANT CHANGE UP (ref 0–8)
GLUCOSE SERPL-MCNC: 123 MG/DL — HIGH (ref 70–99)
GLUCOSE SERPL-MCNC: 123 MG/DL — HIGH (ref 70–99)
HCT VFR BLD CALC: 28.5 % — LOW (ref 42–52)
HCT VFR BLD CALC: 28.5 % — LOW (ref 42–52)
HGB BLD-MCNC: 9.3 G/DL — LOW (ref 14–18)
HGB BLD-MCNC: 9.3 G/DL — LOW (ref 14–18)
IMM GRANULOCYTES NFR BLD AUTO: 0.8 % — HIGH (ref 0.1–0.3)
IMM GRANULOCYTES NFR BLD AUTO: 0.8 % — HIGH (ref 0.1–0.3)
LYMPHOCYTES # BLD AUTO: 0.85 K/UL — LOW (ref 1.2–3.4)
LYMPHOCYTES # BLD AUTO: 0.85 K/UL — LOW (ref 1.2–3.4)
LYMPHOCYTES # BLD AUTO: 21.9 % — SIGNIFICANT CHANGE UP (ref 20.5–51.1)
LYMPHOCYTES # BLD AUTO: 21.9 % — SIGNIFICANT CHANGE UP (ref 20.5–51.1)
MAGNESIUM SERPL-MCNC: 1.9 MG/DL — SIGNIFICANT CHANGE UP (ref 1.8–2.4)
MAGNESIUM SERPL-MCNC: 1.9 MG/DL — SIGNIFICANT CHANGE UP (ref 1.8–2.4)
MCHC RBC-ENTMCNC: 29.2 PG — SIGNIFICANT CHANGE UP (ref 27–31)
MCHC RBC-ENTMCNC: 29.2 PG — SIGNIFICANT CHANGE UP (ref 27–31)
MCHC RBC-ENTMCNC: 32.6 G/DL — SIGNIFICANT CHANGE UP (ref 32–37)
MCHC RBC-ENTMCNC: 32.6 G/DL — SIGNIFICANT CHANGE UP (ref 32–37)
MCV RBC AUTO: 89.6 FL — SIGNIFICANT CHANGE UP (ref 80–94)
MCV RBC AUTO: 89.6 FL — SIGNIFICANT CHANGE UP (ref 80–94)
MONOCYTES # BLD AUTO: 0.4 K/UL — SIGNIFICANT CHANGE UP (ref 0.1–0.6)
MONOCYTES # BLD AUTO: 0.4 K/UL — SIGNIFICANT CHANGE UP (ref 0.1–0.6)
MONOCYTES NFR BLD AUTO: 10.3 % — HIGH (ref 1.7–9.3)
MONOCYTES NFR BLD AUTO: 10.3 % — HIGH (ref 1.7–9.3)
NEUTROPHILS # BLD AUTO: 2.46 K/UL — SIGNIFICANT CHANGE UP (ref 1.4–6.5)
NEUTROPHILS # BLD AUTO: 2.46 K/UL — SIGNIFICANT CHANGE UP (ref 1.4–6.5)
NEUTROPHILS NFR BLD AUTO: 63.1 % — SIGNIFICANT CHANGE UP (ref 42.2–75.2)
NEUTROPHILS NFR BLD AUTO: 63.1 % — SIGNIFICANT CHANGE UP (ref 42.2–75.2)
NRBC # BLD: 0 /100 WBCS — SIGNIFICANT CHANGE UP (ref 0–0)
NRBC # BLD: 0 /100 WBCS — SIGNIFICANT CHANGE UP (ref 0–0)
PHOSPHATE SERPL-MCNC: 4.9 MG/DL — SIGNIFICANT CHANGE UP (ref 2.1–4.9)
PHOSPHATE SERPL-MCNC: 4.9 MG/DL — SIGNIFICANT CHANGE UP (ref 2.1–4.9)
PLATELET # BLD AUTO: 176 K/UL — SIGNIFICANT CHANGE UP (ref 130–400)
PLATELET # BLD AUTO: 176 K/UL — SIGNIFICANT CHANGE UP (ref 130–400)
PMV BLD: 11.2 FL — HIGH (ref 7.4–10.4)
PMV BLD: 11.2 FL — HIGH (ref 7.4–10.4)
POTASSIUM SERPL-MCNC: 4.4 MMOL/L — SIGNIFICANT CHANGE UP (ref 3.5–5)
POTASSIUM SERPL-MCNC: 4.4 MMOL/L — SIGNIFICANT CHANGE UP (ref 3.5–5)
POTASSIUM SERPL-SCNC: 4.4 MMOL/L — SIGNIFICANT CHANGE UP (ref 3.5–5)
POTASSIUM SERPL-SCNC: 4.4 MMOL/L — SIGNIFICANT CHANGE UP (ref 3.5–5)
PROT SERPL-MCNC: 6.1 G/DL — SIGNIFICANT CHANGE UP (ref 6–8)
PROT SERPL-MCNC: 6.1 G/DL — SIGNIFICANT CHANGE UP (ref 6–8)
RBC # BLD: 3.18 M/UL — LOW (ref 4.7–6.1)
RBC # BLD: 3.18 M/UL — LOW (ref 4.7–6.1)
RBC # FLD: 15.8 % — HIGH (ref 11.5–14.5)
RBC # FLD: 15.8 % — HIGH (ref 11.5–14.5)
SODIUM SERPL-SCNC: 135 MMOL/L — SIGNIFICANT CHANGE UP (ref 135–146)
SODIUM SERPL-SCNC: 135 MMOL/L — SIGNIFICANT CHANGE UP (ref 135–146)
WBC # BLD: 3.89 K/UL — LOW (ref 4.8–10.8)
WBC # BLD: 3.89 K/UL — LOW (ref 4.8–10.8)
WBC # FLD AUTO: 3.89 K/UL — LOW (ref 4.8–10.8)
WBC # FLD AUTO: 3.89 K/UL — LOW (ref 4.8–10.8)

## 2023-11-18 PROCEDURE — 99232 SBSQ HOSP IP/OBS MODERATE 35: CPT

## 2023-11-18 RX ORDER — SODIUM CHLORIDE 9 MG/ML
1000 INJECTION, SOLUTION INTRAVENOUS
Refills: 0 | Status: DISCONTINUED | OUTPATIENT
Start: 2023-11-18 | End: 2023-11-19

## 2023-11-18 RX ORDER — DEXTROSE 50 % IN WATER 50 %
50 SYRINGE (ML) INTRAVENOUS ONCE
Refills: 0 | Status: COMPLETED | OUTPATIENT
Start: 2023-11-18 | End: 2023-11-18

## 2023-11-18 RX ORDER — INSULIN LISPRO 100/ML
10 VIAL (ML) SUBCUTANEOUS
Refills: 0 | Status: DISCONTINUED | OUTPATIENT
Start: 2023-11-19 | End: 2023-11-19

## 2023-11-18 RX ORDER — PANTOPRAZOLE SODIUM 20 MG/1
40 TABLET, DELAYED RELEASE ORAL
Refills: 0 | Status: DISCONTINUED | OUTPATIENT
Start: 2023-11-18 | End: 2023-11-28

## 2023-11-18 RX ADMIN — HEPARIN SODIUM 5000 UNIT(S): 5000 INJECTION INTRAVENOUS; SUBCUTANEOUS at 21:28

## 2023-11-18 RX ADMIN — Medication 100 MILLIGRAM(S): at 12:36

## 2023-11-18 RX ADMIN — Medication 667 MILLIGRAM(S): at 18:57

## 2023-11-18 RX ADMIN — POLYETHYLENE GLYCOL 3350 17 GRAM(S): 17 POWDER, FOR SOLUTION ORAL at 12:38

## 2023-11-18 RX ADMIN — Medication 667 MILLIGRAM(S): at 08:13

## 2023-11-18 RX ADMIN — Medication 81 MILLIGRAM(S): at 12:36

## 2023-11-18 RX ADMIN — Medication 1 APPLICATION(S): at 18:58

## 2023-11-18 RX ADMIN — SODIUM CHLORIDE 50 MILLILITER(S): 9 INJECTION, SOLUTION INTRAVENOUS at 21:21

## 2023-11-18 RX ADMIN — CITALOPRAM 20 MILLIGRAM(S): 10 TABLET, FILM COATED ORAL at 12:36

## 2023-11-18 RX ADMIN — Medication 20 UNIT(S): at 12:29

## 2023-11-18 RX ADMIN — CLOPIDOGREL BISULFATE 75 MILLIGRAM(S): 75 TABLET, FILM COATED ORAL at 12:36

## 2023-11-18 RX ADMIN — SENNA PLUS 2 TABLET(S): 8.6 TABLET ORAL at 21:25

## 2023-11-18 RX ADMIN — Medication 1 MILLIGRAM(S): at 21:24

## 2023-11-18 RX ADMIN — Medication 20 UNIT(S): at 08:14

## 2023-11-18 RX ADMIN — CARVEDILOL PHOSPHATE 12.5 MILLIGRAM(S): 80 CAPSULE, EXTENDED RELEASE ORAL at 18:57

## 2023-11-18 RX ADMIN — Medication 667 MILLIGRAM(S): at 12:37

## 2023-11-18 RX ADMIN — HEPARIN SODIUM 5000 UNIT(S): 5000 INJECTION INTRAVENOUS; SUBCUTANEOUS at 06:12

## 2023-11-18 RX ADMIN — Medication 1 APPLICATION(S): at 12:37

## 2023-11-18 RX ADMIN — GABAPENTIN 300 MILLIGRAM(S): 400 CAPSULE ORAL at 12:37

## 2023-11-18 RX ADMIN — Medication 50 MILLILITER(S): at 18:58

## 2023-11-18 RX ADMIN — Medication 2: at 08:14

## 2023-11-18 RX ADMIN — Medication 1 APPLICATION(S): at 07:01

## 2023-11-18 RX ADMIN — Medication 3 MILLILITER(S): at 07:19

## 2023-11-18 RX ADMIN — BUMETANIDE 2 MILLIGRAM(S): 0.25 INJECTION INTRAMUSCULAR; INTRAVENOUS at 06:10

## 2023-11-18 RX ADMIN — Medication 650 MILLIGRAM(S): at 12:37

## 2023-11-18 RX ADMIN — CARVEDILOL PHOSPHATE 12.5 MILLIGRAM(S): 80 CAPSULE, EXTENDED RELEASE ORAL at 06:10

## 2023-11-18 RX ADMIN — Medication 5 MILLIGRAM(S): at 21:25

## 2023-11-18 NOTE — PROGRESS NOTE ADULT - SUBJECTIVE AND OBJECTIVE BOX
CC: 89M admitted for DKA; hospital course c/b Septic Shock 2/2 Aspiration Pneumonia(now resolved) & Acute Respiratory Failure w/ Hypoxia/Acute on Chronic Diastolic HF/Acute Renal Failure on CKD    SUBJECTIVE / OVERNIGHT EVENTS:  No acute events overnight. Patient seen and evaluated at bedside. No CP or palpitations    ROS:  no n/v    MEDICATIONS  (STANDING):  albuterol/ipratropium for Nebulization 3 milliLiter(s) Nebulizer every 6 hours  albuterol/ipratropium for Nebulization. 3 milliLiter(s) Nebulizer once  allopurinol 100 milliGRAM(s) Oral daily  aspirin  chewable 81 milliGRAM(s) Oral daily  bacitracin   Ointment 1 Application(s) Topical daily  buMETAnide 2 milliGRAM(s) Oral daily  calcium acetate 667 milliGRAM(s) Oral three times a day with meals  carvedilol 12.5 milliGRAM(s) Oral every 12 hours  chlorhexidine 2% Cloths 1 Application(s) Topical <User Schedule>  citalopram 20 milliGRAM(s) Oral daily  clopidogrel Tablet 75 milliGRAM(s) Oral daily  dextrose 5%. 1000 milliLiter(s) (50 mL/Hr) IV Continuous <Continuous>  dextrose 5%. 1000 milliLiter(s) (100 mL/Hr) IV Continuous <Continuous>  dextrose 50% Injectable 50 milliLiter(s) IV Push every 15 minutes  gabapentin 300 milliGRAM(s) Oral daily  glucagon  Injectable 1 milliGRAM(s) IntraMuscular once  heparin   Injectable 5000 Unit(s) SubCutaneous every 8 hours  insulin glargine Injectable (LANTUS) 40 Unit(s) SubCutaneous at bedtime  insulin lispro (ADMELOG) corrective regimen sliding scale   SubCutaneous three times a day before meals  insulin lispro Injectable (ADMELOG) 20 Unit(s) SubCutaneous three times a day before meals  melatonin 5 milliGRAM(s) Oral at bedtime  pantoprazole    Tablet 40 milliGRAM(s) Oral before breakfast  polyethylene glycol 3350 17 Gram(s) Oral daily  senna 2 Tablet(s) Oral at bedtime  triamcinolone 0.1% Cream 1 Application(s) Topical two times a day    MEDICATIONS  (PRN):  acetaminophen     Tablet .. 650 milliGRAM(s) Oral every 6 hours PRN Temp greater or equal to 38C (100.4F), Mild Pain (1 - 3)  albuterol    90 MICROgram(s) HFA Inhaler 2 Puff(s) Inhalation every 4 hours PRN Shortness of Breath and/or Wheezing  clonazePAM  Tablet 1 milliGRAM(s) Oral at bedtime PRN Anxiety  dextrose Oral Gel 15 Gram(s) Oral once PRN Blood Glucose LESS THAN 70 milliGRAM(s)/deciliter    CAPILLARY BLOOD GLUCOSE  POCT Blood Glucose.: 153 mg/dL (18 Nov 2023 07:41)  POCT Blood Glucose.: 133 mg/dL (17 Nov 2023 21:22)  POCT Blood Glucose.: 106 mg/dL (17 Nov 2023 16:34)  POCT Blood Glucose.: 242 mg/dL (17 Nov 2023 11:40)    I&O's Summary    Physical Exam  Vital Signs Last 24 Hrs  T(C): 35.6 (18 Nov 2023 05:00), Max: 36.7 (17 Nov 2023 21:00)  T(F): 96 (18 Nov 2023 05:00), Max: 98 (17 Nov 2023 21:00)  HR: 63 (18 Nov 2023 05:00) (62 - 72)  BP: 108/59 (18 Nov 2023 05:00) (108/59 - 154/67)  RR: 16 (18 Nov 2023 05:00) (16 - 16)  SpO2: 98% (18 Nov 2023 05:00) (98% - 98%)    Parameters below as of 18 Nov 2023 05:00  Patient On (Oxygen Delivery Method): room air    HEAD:  Atraumatic, Normocephalic  Lung: normal work of breathing, no rhonchi appreciated  Cardiovascular: S1&S2+, rrr, no m/r/g appreciated  ABDOMEN: soft, nt  : no bowden cath, no suprapubic pain to palpation  Neuro: Alert & follows commands, no flaccid paralysis in extremities appreciated  SKIN: warm and dry, no visible purulence in exposed areas    LABS:                        9.3    3.89  )-----------( 176      ( 18 Nov 2023 04:30 )             28.5     11-18    135  |  96<L>  |  84<HH>  ----------------------------<  123<H>  4.4   |  24  |  2.3<H>    Ca    8.1<L>      18 Nov 2023 04:30  Phos  4.9     11-18  Mg     1.9     11-18    TPro  6.1  /  Alb  2.9<L>  /  TBili  0.4  /  DBili  x   /  AST  22  /  ALT  16  /  AlkPhos  84  11-18    PT/INR - ( 17 Nov 2023 07:00 )   PT: 12.10 sec;   INR: 1.06 ratio         PTT - ( 17 Nov 2023 07:00 )  PTT:25.2 sec      Urinalysis Basic - ( 18 Nov 2023 04:30 )    Color: x / Appearance: x / SG: x / pH: x  Gluc: 123 mg/dL / Ketone: x  / Bili: x / Urobili: x   Blood: x / Protein: x / Nitrite: x   Leuk Esterase: x / RBC: x / WBC x   Sq Epi: x / Non Sq Epi: x / Bacteria: x          RADIOLOGY & ADDITIONAL TESTS:  Results Reviewed:   Imaging Personally Reviewed:  Electrocardiogram Personally Reviewed:    COORDINATION OF CARE:  Care Discussed with Consultants/Other Providers [Y/N]:  Prior or Outpatient Records Reviewed [Y/N]:

## 2023-11-18 NOTE — CHART NOTE - NSCHARTNOTEFT_GEN_A_CORE
Hospitalist Chart Update    Called by RN for asymptomatic hypoglycemia initially not responsive to juice. Discussed plan w/ RN staff to follow hypoglycemia protocol and give amp of D50 now. Will hold basal-bolus insulin for now and leave corrective insulin prn. plan to reinitiate basal-bolus regimen once improved.    Luc Ferrara MD  Hospitalist

## 2023-11-18 NOTE — PROGRESS NOTE ADULT - ASSESSMENT
89M w/ CAD c/b NSTEMI s/p LAD DESx2(2022), HFpEF, s/p PPM, DM2 on Insulin, CKD3, HTN, HLD, Hx of Dens Fx, Prostate Ca in remission was admitted to MICU (10/30) for DKA w/ ICU course c/b Septic Shock 2/2 Aspiration Pneumonia (now resolved; s/p course of Cefepime) &  Acute Respiratory Failure w/ Hypoxia 2/2 Fluid Overload from Acute on Chronic HFpEF & Acute Renal Failure requiring temporary HD(last HD 11/10, [facility] HD cath now removed). The patient was transferred to medicine service on 11/16 for further medical optimization.    #DM2 w/ course c/b DKA  - Endocrinology consulted in ICU  - c/w basal-bolus insulin regimen w/ corrective insulin as required    #Acute Renal Failure on CKD3  - Nephrology consulted & a/w management  - required temporary HD during ICU course however per Nephro no longer required; per chart review--suspected 2/2 severe sepsis/shock  - bowden cath out, plan for post-void bladder scan  - monitor CrCl daily, improved from days prior    #HFpEF  - appears euvolemic on exam  - initially during course had acute on chronic HF 2/2 Fluid overload from renal failure now resolved    #CAD  - charted hx of CABG- no sternotomy scar on exam, and no sternotomy wire on cxr (charting of s/p CABG appears error on my review), per chart reviewed completed staged PCI in 2022 for NSTEMI and on cath report there is no mention of implanted vessels  - c/w Aspirin, Clopidogrel, Carvedilol    #HTN  - c/w carvedilol    #Dysphagia  - c/w pureed diet    #Prophylactic Measure  DVT ppx- HSQ  Dispo- plan for [facility]  GO- Palliative care consulted in ICU, patient is DNR

## 2023-11-19 LAB
ALBUMIN SERPL ELPH-MCNC: 2.8 G/DL — LOW (ref 3.5–5.2)
ALBUMIN SERPL ELPH-MCNC: 2.8 G/DL — LOW (ref 3.5–5.2)
ALP SERPL-CCNC: 85 U/L — SIGNIFICANT CHANGE UP (ref 30–115)
ALP SERPL-CCNC: 85 U/L — SIGNIFICANT CHANGE UP (ref 30–115)
ALT FLD-CCNC: 19 U/L — SIGNIFICANT CHANGE UP (ref 0–41)
ALT FLD-CCNC: 19 U/L — SIGNIFICANT CHANGE UP (ref 0–41)
ANION GAP SERPL CALC-SCNC: 11 MMOL/L — SIGNIFICANT CHANGE UP (ref 7–14)
ANION GAP SERPL CALC-SCNC: 11 MMOL/L — SIGNIFICANT CHANGE UP (ref 7–14)
AST SERPL-CCNC: 24 U/L — SIGNIFICANT CHANGE UP (ref 0–41)
AST SERPL-CCNC: 24 U/L — SIGNIFICANT CHANGE UP (ref 0–41)
BASOPHILS # BLD AUTO: 0.01 K/UL — SIGNIFICANT CHANGE UP (ref 0–0.2)
BASOPHILS # BLD AUTO: 0.01 K/UL — SIGNIFICANT CHANGE UP (ref 0–0.2)
BASOPHILS NFR BLD AUTO: 0.3 % — SIGNIFICANT CHANGE UP (ref 0–1)
BASOPHILS NFR BLD AUTO: 0.3 % — SIGNIFICANT CHANGE UP (ref 0–1)
BILIRUB SERPL-MCNC: 0.3 MG/DL — SIGNIFICANT CHANGE UP (ref 0.2–1.2)
BILIRUB SERPL-MCNC: 0.3 MG/DL — SIGNIFICANT CHANGE UP (ref 0.2–1.2)
BUN SERPL-MCNC: 77 MG/DL — CRITICAL HIGH (ref 10–20)
BUN SERPL-MCNC: 77 MG/DL — CRITICAL HIGH (ref 10–20)
CALCIUM SERPL-MCNC: 8.1 MG/DL — LOW (ref 8.4–10.5)
CALCIUM SERPL-MCNC: 8.1 MG/DL — LOW (ref 8.4–10.5)
CHLORIDE SERPL-SCNC: 93 MMOL/L — LOW (ref 98–110)
CHLORIDE SERPL-SCNC: 93 MMOL/L — LOW (ref 98–110)
CO2 SERPL-SCNC: 26 MMOL/L — SIGNIFICANT CHANGE UP (ref 17–32)
CO2 SERPL-SCNC: 26 MMOL/L — SIGNIFICANT CHANGE UP (ref 17–32)
CREAT SERPL-MCNC: 2.2 MG/DL — HIGH (ref 0.7–1.5)
CREAT SERPL-MCNC: 2.2 MG/DL — HIGH (ref 0.7–1.5)
EGFR: 27 ML/MIN/1.73M2 — LOW
EGFR: 27 ML/MIN/1.73M2 — LOW
EOSINOPHIL # BLD AUTO: 0.17 K/UL — SIGNIFICANT CHANGE UP (ref 0–0.7)
EOSINOPHIL # BLD AUTO: 0.17 K/UL — SIGNIFICANT CHANGE UP (ref 0–0.7)
EOSINOPHIL NFR BLD AUTO: 4.4 % — SIGNIFICANT CHANGE UP (ref 0–8)
EOSINOPHIL NFR BLD AUTO: 4.4 % — SIGNIFICANT CHANGE UP (ref 0–8)
GLUCOSE SERPL-MCNC: 179 MG/DL — HIGH (ref 70–99)
GLUCOSE SERPL-MCNC: 179 MG/DL — HIGH (ref 70–99)
HCT VFR BLD CALC: 27 % — LOW (ref 42–52)
HCT VFR BLD CALC: 27 % — LOW (ref 42–52)
HGB BLD-MCNC: 8.9 G/DL — LOW (ref 14–18)
HGB BLD-MCNC: 8.9 G/DL — LOW (ref 14–18)
IMM GRANULOCYTES NFR BLD AUTO: 0.5 % — HIGH (ref 0.1–0.3)
IMM GRANULOCYTES NFR BLD AUTO: 0.5 % — HIGH (ref 0.1–0.3)
LYMPHOCYTES # BLD AUTO: 0.87 K/UL — LOW (ref 1.2–3.4)
LYMPHOCYTES # BLD AUTO: 0.87 K/UL — LOW (ref 1.2–3.4)
LYMPHOCYTES # BLD AUTO: 22.5 % — SIGNIFICANT CHANGE UP (ref 20.5–51.1)
LYMPHOCYTES # BLD AUTO: 22.5 % — SIGNIFICANT CHANGE UP (ref 20.5–51.1)
MAGNESIUM SERPL-MCNC: 1.8 MG/DL — SIGNIFICANT CHANGE UP (ref 1.8–2.4)
MAGNESIUM SERPL-MCNC: 1.8 MG/DL — SIGNIFICANT CHANGE UP (ref 1.8–2.4)
MCHC RBC-ENTMCNC: 29.1 PG — SIGNIFICANT CHANGE UP (ref 27–31)
MCHC RBC-ENTMCNC: 29.1 PG — SIGNIFICANT CHANGE UP (ref 27–31)
MCHC RBC-ENTMCNC: 33 G/DL — SIGNIFICANT CHANGE UP (ref 32–37)
MCHC RBC-ENTMCNC: 33 G/DL — SIGNIFICANT CHANGE UP (ref 32–37)
MCV RBC AUTO: 88.2 FL — SIGNIFICANT CHANGE UP (ref 80–94)
MCV RBC AUTO: 88.2 FL — SIGNIFICANT CHANGE UP (ref 80–94)
MONOCYTES # BLD AUTO: 0.36 K/UL — SIGNIFICANT CHANGE UP (ref 0.1–0.6)
MONOCYTES # BLD AUTO: 0.36 K/UL — SIGNIFICANT CHANGE UP (ref 0.1–0.6)
MONOCYTES NFR BLD AUTO: 9.3 % — SIGNIFICANT CHANGE UP (ref 1.7–9.3)
MONOCYTES NFR BLD AUTO: 9.3 % — SIGNIFICANT CHANGE UP (ref 1.7–9.3)
NEUTROPHILS # BLD AUTO: 2.44 K/UL — SIGNIFICANT CHANGE UP (ref 1.4–6.5)
NEUTROPHILS # BLD AUTO: 2.44 K/UL — SIGNIFICANT CHANGE UP (ref 1.4–6.5)
NEUTROPHILS NFR BLD AUTO: 63 % — SIGNIFICANT CHANGE UP (ref 42.2–75.2)
NEUTROPHILS NFR BLD AUTO: 63 % — SIGNIFICANT CHANGE UP (ref 42.2–75.2)
NRBC # BLD: 0 /100 WBCS — SIGNIFICANT CHANGE UP (ref 0–0)
NRBC # BLD: 0 /100 WBCS — SIGNIFICANT CHANGE UP (ref 0–0)
PHOSPHATE SERPL-MCNC: 4.4 MG/DL — SIGNIFICANT CHANGE UP (ref 2.1–4.9)
PHOSPHATE SERPL-MCNC: 4.4 MG/DL — SIGNIFICANT CHANGE UP (ref 2.1–4.9)
PLATELET # BLD AUTO: 179 K/UL — SIGNIFICANT CHANGE UP (ref 130–400)
PLATELET # BLD AUTO: 179 K/UL — SIGNIFICANT CHANGE UP (ref 130–400)
PMV BLD: 11 FL — HIGH (ref 7.4–10.4)
PMV BLD: 11 FL — HIGH (ref 7.4–10.4)
POTASSIUM SERPL-MCNC: 4.7 MMOL/L — SIGNIFICANT CHANGE UP (ref 3.5–5)
POTASSIUM SERPL-MCNC: 4.7 MMOL/L — SIGNIFICANT CHANGE UP (ref 3.5–5)
POTASSIUM SERPL-SCNC: 4.7 MMOL/L — SIGNIFICANT CHANGE UP (ref 3.5–5)
POTASSIUM SERPL-SCNC: 4.7 MMOL/L — SIGNIFICANT CHANGE UP (ref 3.5–5)
PREALB SERPL-MCNC: 20 MG/DL — SIGNIFICANT CHANGE UP (ref 20–40)
PREALB SERPL-MCNC: 20 MG/DL — SIGNIFICANT CHANGE UP (ref 20–40)
PROT SERPL-MCNC: 5.7 G/DL — LOW (ref 6–8)
PROT SERPL-MCNC: 5.7 G/DL — LOW (ref 6–8)
RBC # BLD: 3.06 M/UL — LOW (ref 4.7–6.1)
RBC # BLD: 3.06 M/UL — LOW (ref 4.7–6.1)
RBC # FLD: 15.9 % — HIGH (ref 11.5–14.5)
RBC # FLD: 15.9 % — HIGH (ref 11.5–14.5)
SODIUM SERPL-SCNC: 130 MMOL/L — LOW (ref 135–146)
SODIUM SERPL-SCNC: 130 MMOL/L — LOW (ref 135–146)
WBC # BLD: 3.87 K/UL — LOW (ref 4.8–10.8)
WBC # BLD: 3.87 K/UL — LOW (ref 4.8–10.8)
WBC # FLD AUTO: 3.87 K/UL — LOW (ref 4.8–10.8)
WBC # FLD AUTO: 3.87 K/UL — LOW (ref 4.8–10.8)

## 2023-11-19 PROCEDURE — 99232 SBSQ HOSP IP/OBS MODERATE 35: CPT

## 2023-11-19 RX ORDER — INSULIN LISPRO 100/ML
VIAL (ML) SUBCUTANEOUS AT BEDTIME
Refills: 0 | Status: DISCONTINUED | OUTPATIENT
Start: 2023-11-19 | End: 2023-11-28

## 2023-11-19 RX ORDER — INSULIN LISPRO 100/ML
10 VIAL (ML) SUBCUTANEOUS
Refills: 0 | Status: DISCONTINUED | OUTPATIENT
Start: 2023-11-19 | End: 2023-11-28

## 2023-11-19 RX ORDER — DEXTROSE 50 % IN WATER 50 %
15 SYRINGE (ML) INTRAVENOUS ONCE
Refills: 0 | Status: DISCONTINUED | OUTPATIENT
Start: 2023-11-19 | End: 2023-11-28

## 2023-11-19 RX ORDER — INSULIN LISPRO 100/ML
VIAL (ML) SUBCUTANEOUS
Refills: 0 | Status: DISCONTINUED | OUTPATIENT
Start: 2023-11-19 | End: 2023-11-28

## 2023-11-19 RX ORDER — INSULIN GLARGINE 100 [IU]/ML
20 INJECTION, SOLUTION SUBCUTANEOUS AT BEDTIME
Refills: 0 | Status: DISCONTINUED | OUTPATIENT
Start: 2023-11-19 | End: 2023-11-28

## 2023-11-19 RX ADMIN — Medication 100 MILLIGRAM(S): at 11:51

## 2023-11-19 RX ADMIN — CARVEDILOL PHOSPHATE 12.5 MILLIGRAM(S): 80 CAPSULE, EXTENDED RELEASE ORAL at 18:36

## 2023-11-19 RX ADMIN — BUMETANIDE 2 MILLIGRAM(S): 0.25 INJECTION INTRAMUSCULAR; INTRAVENOUS at 05:07

## 2023-11-19 RX ADMIN — Medication 81 MILLIGRAM(S): at 11:51

## 2023-11-19 RX ADMIN — Medication 1 MILLIGRAM(S): at 21:55

## 2023-11-19 RX ADMIN — Medication 667 MILLIGRAM(S): at 11:52

## 2023-11-19 RX ADMIN — HEPARIN SODIUM 5000 UNIT(S): 5000 INJECTION INTRAVENOUS; SUBCUTANEOUS at 05:07

## 2023-11-19 RX ADMIN — INSULIN GLARGINE 20 UNIT(S): 100 INJECTION, SOLUTION SUBCUTANEOUS at 22:00

## 2023-11-19 RX ADMIN — SENNA PLUS 2 TABLET(S): 8.6 TABLET ORAL at 21:55

## 2023-11-19 RX ADMIN — Medication 667 MILLIGRAM(S): at 18:36

## 2023-11-19 RX ADMIN — GABAPENTIN 300 MILLIGRAM(S): 400 CAPSULE ORAL at 11:52

## 2023-11-19 RX ADMIN — CARVEDILOL PHOSPHATE 12.5 MILLIGRAM(S): 80 CAPSULE, EXTENDED RELEASE ORAL at 05:06

## 2023-11-19 RX ADMIN — Medication 1 APPLICATION(S): at 18:36

## 2023-11-19 RX ADMIN — HEPARIN SODIUM 5000 UNIT(S): 5000 INJECTION INTRAVENOUS; SUBCUTANEOUS at 21:55

## 2023-11-19 RX ADMIN — CLOPIDOGREL BISULFATE 75 MILLIGRAM(S): 75 TABLET, FILM COATED ORAL at 11:52

## 2023-11-19 RX ADMIN — Medication 650 MILLIGRAM(S): at 05:06

## 2023-11-19 RX ADMIN — POLYETHYLENE GLYCOL 3350 17 GRAM(S): 17 POWDER, FOR SOLUTION ORAL at 11:51

## 2023-11-19 RX ADMIN — Medication 2: at 07:48

## 2023-11-19 RX ADMIN — Medication 4: at 11:59

## 2023-11-19 RX ADMIN — CHLORHEXIDINE GLUCONATE 1 APPLICATION(S): 213 SOLUTION TOPICAL at 05:15

## 2023-11-19 RX ADMIN — Medication 5 MILLIGRAM(S): at 21:55

## 2023-11-19 RX ADMIN — CITALOPRAM 20 MILLIGRAM(S): 10 TABLET, FILM COATED ORAL at 11:52

## 2023-11-19 RX ADMIN — Medication 1 APPLICATION(S): at 05:12

## 2023-11-19 RX ADMIN — Medication 667 MILLIGRAM(S): at 07:28

## 2023-11-19 RX ADMIN — PANTOPRAZOLE SODIUM 40 MILLIGRAM(S): 20 TABLET, DELAYED RELEASE ORAL at 05:07

## 2023-11-19 RX ADMIN — Medication 10 UNIT(S): at 16:39

## 2023-11-19 RX ADMIN — Medication 1 APPLICATION(S): at 11:51

## 2023-11-19 RX ADMIN — Medication 3: at 16:40

## 2023-11-19 NOTE — PROGRESS NOTE ADULT - SUBJECTIVE AND OBJECTIVE BOX
CC: 89M admitted for DKA; hospital course c/b Septic Shock 2/2 Aspiration Pneumonia(now resolved) & Acute Respiratory Failure w/ Hypoxia/Acute on Chronic Diastolic HF/Acute Renal Failure on CKD    SUBJECTIVE / OVERNIGHT EVENTS:  Last night the patient had asymptomatic hypoglycemia and therefore previous basal-bolus insulin stopped and only corrective insulin ordered. Patient seen and evaluated at bedside. No reported cp or sob    ROS:  no n/v    MEDICATIONS  (STANDING):  albuterol/ipratropium for Nebulization 3 milliLiter(s) Nebulizer every 6 hours  albuterol/ipratropium for Nebulization. 3 milliLiter(s) Nebulizer once  allopurinol 100 milliGRAM(s) Oral daily  aspirin  chewable 81 milliGRAM(s) Oral daily  bacitracin   Ointment 1 Application(s) Topical daily  buMETAnide 2 milliGRAM(s) Oral daily  calcium acetate 667 milliGRAM(s) Oral three times a day with meals  carvedilol 12.5 milliGRAM(s) Oral every 12 hours  chlorhexidine 2% Cloths 1 Application(s) Topical <User Schedule>  citalopram 20 milliGRAM(s) Oral daily  clopidogrel Tablet 75 milliGRAM(s) Oral daily  dextrose 5%. 1000 milliLiter(s) (50 mL/Hr) IV Continuous <Continuous>  dextrose 5%. 1000 milliLiter(s) (100 mL/Hr) IV Continuous <Continuous>  dextrose 50% Injectable 50 milliLiter(s) IV Push every 15 minutes  gabapentin 300 milliGRAM(s) Oral daily  glucagon  Injectable 1 milliGRAM(s) IntraMuscular once  heparin   Injectable 5000 Unit(s) SubCutaneous every 8 hours  insulin glargine Injectable (LANTUS) 20 Unit(s) SubCutaneous at bedtime  insulin lispro (ADMELOG) corrective regimen sliding scale   SubCutaneous three times a day before meals  insulin lispro (ADMELOG) corrective regimen sliding scale   SubCutaneous at bedtime  insulin lispro Injectable (ADMELOG) 10 Unit(s) SubCutaneous three times a day before meals  melatonin 5 milliGRAM(s) Oral at bedtime  pantoprazole    Tablet 40 milliGRAM(s) Oral before breakfast  polyethylene glycol 3350 17 Gram(s) Oral daily  senna 2 Tablet(s) Oral at bedtime  triamcinolone 0.1% Cream 1 Application(s) Topical two times a day    MEDICATIONS  (PRN):  acetaminophen     Tablet .. 650 milliGRAM(s) Oral every 6 hours PRN Temp greater or equal to 38C (100.4F), Mild Pain (1 - 3)  albuterol    90 MICROgram(s) HFA Inhaler 2 Puff(s) Inhalation every 4 hours PRN Shortness of Breath and/or Wheezing  clonazePAM  Tablet 1 milliGRAM(s) Oral at bedtime PRN Anxiety  dextrose Oral Gel 15 Gram(s) Oral once PRN Blood Glucose LESS THAN 70 milliGRAM(s)/deciliter  dextrose Oral Gel 15 Gram(s) Oral once PRN Blood Glucose LESS THAN 70 milliGRAM(s)/deciliter      CAPILLARY BLOOD GLUCOSE      POCT Blood Glucose.: 282 mg/dL (19 Nov 2023 16:10)  POCT Blood Glucose.: 250 mg/dL (19 Nov 2023 11:40)  POCT Blood Glucose.: 185 mg/dL (19 Nov 2023 07:32)  POCT Blood Glucose.: 150 mg/dL (18 Nov 2023 21:34)  POCT Blood Glucose.: 147 mg/dL (18 Nov 2023 18:02)  POCT Blood Glucose.: 40 mg/dL (18 Nov 2023 17:14)  POCT Blood Glucose.: 49 mg/dL (18 Nov 2023 16:36)  POCT Blood Glucose.: 44 mg/dL (18 Nov 2023 16:34)    I&O's Summary    18 Nov 2023 07:01  -  19 Nov 2023 07:00  --------------------------------------------------------  IN: 0 mL / OUT: 300 mL / NET: -300 mL        Physical Exam  Vital Signs Last 24 Hrs  T(C): 35.7 (19 Nov 2023 14:30), Max: 35.8 (18 Nov 2023 16:53)  T(F): 96.3 (19 Nov 2023 14:30), Max: 96.5 (18 Nov 2023 16:53)  HR: 75 (19 Nov 2023 14:30) (63 - 77)  BP: 104/53 (19 Nov 2023 14:30) (104/53 - 179/77)  RR: 18 (19 Nov 2023 14:30) (16 - 18)  SpO2: 96% (19 Nov 2023 04:49) (96% - 99%)    Parameters below as of 19 Nov 2023 04:49  Patient On (Oxygen Delivery Method): room air    HEAD:  Atraumatic, Normocephalic  Lung: normal work of breathing, no rhonchi appreciated  Cardiovascular: S1&S2+, rrr, no m/r/g appreciated  ABDOMEN: soft, nt  : no bowden cath, no suprapubic pain to palpation  Neuro: Alert & follows commands, no flaccid paralysis in extremities appreciated  SKIN: warm and dry, no visible purulence in exposed areas    LABS:                        8.9    3.87  )-----------( 179      ( 19 Nov 2023 04:30 )             27.0     11-19    130<L>  |  93<L>  |  77<HH>  ----------------------------<  179<H>  4.7   |  26  |  2.2<H>    Ca    8.1<L>      19 Nov 2023 04:30  Phos  4.4     11-19  Mg     1.8     11-19    TPro  5.7<L>  /  Alb  2.8<L>  /  TBili  0.3  /  DBili  x   /  AST  24  /  ALT  19  /  AlkPhos  85  11-19          Urinalysis Basic - ( 19 Nov 2023 04:30 )    Color: x / Appearance: x / SG: x / pH: x  Gluc: 179 mg/dL / Ketone: x  / Bili: x / Urobili: x   Blood: x / Protein: x / Nitrite: x   Leuk Esterase: x / RBC: x / WBC x   Sq Epi: x / Non Sq Epi: x / Bacteria: x          RADIOLOGY & ADDITIONAL TESTS:  Results Reviewed:   Imaging Personally Reviewed:  Electrocardiogram Personally Reviewed:    COORDINATION OF CARE:  Care Discussed with Consultants/Other Providers [Y/N]:  Prior or Outpatient Records Reviewed [Y/N]:

## 2023-11-19 NOTE — PROGRESS NOTE ADULT - ASSESSMENT
89M w/ CAD c/b NSTEMI s/p LAD DESx2(2022), HFpEF, s/p PPM, DM2 on Insulin, CKD3, HTN, HLD, Hx of Dens Fx, Prostate Ca in remission was admitted to MICU (10/30) for DKA w/ ICU course c/b Septic Shock 2/2 Aspiration Pneumonia (now resolved; s/p course of Cefepime) &  Acute Respiratory Failure w/ Hypoxia 2/2 Fluid Overload from Acute on Chronic HFpEF & Acute Renal Failure requiring temporary HD(last HD 11/10, [facility] HD cath now removed). The patient was transferred to medicine service on 11/16 for further medical optimization.    #DM2 w/ course c/b DKA  - Endocrinology consulted in ICU  - on 11/19 the patient developed asymptomatic hypoglycemia and overnight through the day he was on sliding scale but now becoming hyperglycemic. will start basal-bolus insulin regimen at lower dosing    #Acute Renal Failure on CKD3  - Nephrology consulted & a/w management  - required temporary HD during ICU course however per Nephro no longer required; per chart review--suspected 2/2 severe sepsis/shock  - monitor CrCl daily, improved since initial transfer from ICU and is making urine    #HFpEF  - appears euvolemic on exam  - initially during course had acute on chronic HF 2/2 Fluid overload from renal failure now resolved    #CAD  - charted hx of CABG- no sternotomy scar on exam, and no sternotomy wire on cxr (charting of s/p CABG appears error on my review), per chart reviewed completed staged PCI in 2022 for NSTEMI and on cath report there is no mention of implanted vessels  - c/w Aspirin, Clopidogrel, Carvedilol    #HTN  - c/w carvedilol    #Dysphagia  - diet per speech & swallow    #Prophylactic Measure  DVT ppx- HSQ  Dispo- plan for [facility]  GOC- Palliative care consulted in ICU, patient is DNR

## 2023-11-20 LAB
ALBUMIN SERPL ELPH-MCNC: 3.1 G/DL — LOW (ref 3.5–5.2)
ALBUMIN SERPL ELPH-MCNC: 3.1 G/DL — LOW (ref 3.5–5.2)
ALP SERPL-CCNC: 89 U/L — SIGNIFICANT CHANGE UP (ref 30–115)
ALP SERPL-CCNC: 89 U/L — SIGNIFICANT CHANGE UP (ref 30–115)
ALT FLD-CCNC: 21 U/L — SIGNIFICANT CHANGE UP (ref 0–41)
ALT FLD-CCNC: 21 U/L — SIGNIFICANT CHANGE UP (ref 0–41)
ANION GAP SERPL CALC-SCNC: 11 MMOL/L — SIGNIFICANT CHANGE UP (ref 7–14)
ANION GAP SERPL CALC-SCNC: 11 MMOL/L — SIGNIFICANT CHANGE UP (ref 7–14)
AST SERPL-CCNC: 22 U/L — SIGNIFICANT CHANGE UP (ref 0–41)
AST SERPL-CCNC: 22 U/L — SIGNIFICANT CHANGE UP (ref 0–41)
BASOPHILS # BLD AUTO: 0.02 K/UL — SIGNIFICANT CHANGE UP (ref 0–0.2)
BASOPHILS # BLD AUTO: 0.02 K/UL — SIGNIFICANT CHANGE UP (ref 0–0.2)
BASOPHILS NFR BLD AUTO: 0.6 % — SIGNIFICANT CHANGE UP (ref 0–1)
BASOPHILS NFR BLD AUTO: 0.6 % — SIGNIFICANT CHANGE UP (ref 0–1)
BILIRUB SERPL-MCNC: 0.4 MG/DL — SIGNIFICANT CHANGE UP (ref 0.2–1.2)
BILIRUB SERPL-MCNC: 0.4 MG/DL — SIGNIFICANT CHANGE UP (ref 0.2–1.2)
BUN SERPL-MCNC: 69 MG/DL — CRITICAL HIGH (ref 10–20)
BUN SERPL-MCNC: 69 MG/DL — CRITICAL HIGH (ref 10–20)
CALCIUM SERPL-MCNC: 8.5 MG/DL — SIGNIFICANT CHANGE UP (ref 8.4–10.5)
CALCIUM SERPL-MCNC: 8.5 MG/DL — SIGNIFICANT CHANGE UP (ref 8.4–10.5)
CHLORIDE SERPL-SCNC: 93 MMOL/L — LOW (ref 98–110)
CHLORIDE SERPL-SCNC: 93 MMOL/L — LOW (ref 98–110)
CO2 SERPL-SCNC: 27 MMOL/L — SIGNIFICANT CHANGE UP (ref 17–32)
CO2 SERPL-SCNC: 27 MMOL/L — SIGNIFICANT CHANGE UP (ref 17–32)
CREAT SERPL-MCNC: 2.2 MG/DL — HIGH (ref 0.7–1.5)
CREAT SERPL-MCNC: 2.2 MG/DL — HIGH (ref 0.7–1.5)
EGFR: 27 ML/MIN/1.73M2 — LOW
EGFR: 27 ML/MIN/1.73M2 — LOW
EOSINOPHIL # BLD AUTO: 0.17 K/UL — SIGNIFICANT CHANGE UP (ref 0–0.7)
EOSINOPHIL # BLD AUTO: 0.17 K/UL — SIGNIFICANT CHANGE UP (ref 0–0.7)
EOSINOPHIL NFR BLD AUTO: 4.7 % — SIGNIFICANT CHANGE UP (ref 0–8)
EOSINOPHIL NFR BLD AUTO: 4.7 % — SIGNIFICANT CHANGE UP (ref 0–8)
GLUCOSE SERPL-MCNC: 144 MG/DL — HIGH (ref 70–99)
GLUCOSE SERPL-MCNC: 144 MG/DL — HIGH (ref 70–99)
HCT VFR BLD CALC: 28.6 % — LOW (ref 42–52)
HCT VFR BLD CALC: 28.6 % — LOW (ref 42–52)
HGB BLD-MCNC: 9.1 G/DL — LOW (ref 14–18)
HGB BLD-MCNC: 9.1 G/DL — LOW (ref 14–18)
IMM GRANULOCYTES NFR BLD AUTO: 0.6 % — HIGH (ref 0.1–0.3)
IMM GRANULOCYTES NFR BLD AUTO: 0.6 % — HIGH (ref 0.1–0.3)
LYMPHOCYTES # BLD AUTO: 0.86 K/UL — LOW (ref 1.2–3.4)
LYMPHOCYTES # BLD AUTO: 0.86 K/UL — LOW (ref 1.2–3.4)
LYMPHOCYTES # BLD AUTO: 23.8 % — SIGNIFICANT CHANGE UP (ref 20.5–51.1)
LYMPHOCYTES # BLD AUTO: 23.8 % — SIGNIFICANT CHANGE UP (ref 20.5–51.1)
MAGNESIUM SERPL-MCNC: 1.9 MG/DL — SIGNIFICANT CHANGE UP (ref 1.8–2.4)
MAGNESIUM SERPL-MCNC: 1.9 MG/DL — SIGNIFICANT CHANGE UP (ref 1.8–2.4)
MCHC RBC-ENTMCNC: 29.2 PG — SIGNIFICANT CHANGE UP (ref 27–31)
MCHC RBC-ENTMCNC: 29.2 PG — SIGNIFICANT CHANGE UP (ref 27–31)
MCHC RBC-ENTMCNC: 31.8 G/DL — LOW (ref 32–37)
MCHC RBC-ENTMCNC: 31.8 G/DL — LOW (ref 32–37)
MCV RBC AUTO: 91.7 FL — SIGNIFICANT CHANGE UP (ref 80–94)
MCV RBC AUTO: 91.7 FL — SIGNIFICANT CHANGE UP (ref 80–94)
MONOCYTES # BLD AUTO: 0.32 K/UL — SIGNIFICANT CHANGE UP (ref 0.1–0.6)
MONOCYTES # BLD AUTO: 0.32 K/UL — SIGNIFICANT CHANGE UP (ref 0.1–0.6)
MONOCYTES NFR BLD AUTO: 8.8 % — SIGNIFICANT CHANGE UP (ref 1.7–9.3)
MONOCYTES NFR BLD AUTO: 8.8 % — SIGNIFICANT CHANGE UP (ref 1.7–9.3)
NEUTROPHILS # BLD AUTO: 2.23 K/UL — SIGNIFICANT CHANGE UP (ref 1.4–6.5)
NEUTROPHILS # BLD AUTO: 2.23 K/UL — SIGNIFICANT CHANGE UP (ref 1.4–6.5)
NEUTROPHILS NFR BLD AUTO: 61.5 % — SIGNIFICANT CHANGE UP (ref 42.2–75.2)
NEUTROPHILS NFR BLD AUTO: 61.5 % — SIGNIFICANT CHANGE UP (ref 42.2–75.2)
NRBC # BLD: 0 /100 WBCS — SIGNIFICANT CHANGE UP (ref 0–0)
NRBC # BLD: 0 /100 WBCS — SIGNIFICANT CHANGE UP (ref 0–0)
PHOSPHATE SERPL-MCNC: 4.3 MG/DL — SIGNIFICANT CHANGE UP (ref 2.1–4.9)
PHOSPHATE SERPL-MCNC: 4.3 MG/DL — SIGNIFICANT CHANGE UP (ref 2.1–4.9)
PLATELET # BLD AUTO: 182 K/UL — SIGNIFICANT CHANGE UP (ref 130–400)
PLATELET # BLD AUTO: 182 K/UL — SIGNIFICANT CHANGE UP (ref 130–400)
PMV BLD: 10.7 FL — HIGH (ref 7.4–10.4)
PMV BLD: 10.7 FL — HIGH (ref 7.4–10.4)
POTASSIUM SERPL-MCNC: 4.9 MMOL/L — SIGNIFICANT CHANGE UP (ref 3.5–5)
POTASSIUM SERPL-MCNC: 4.9 MMOL/L — SIGNIFICANT CHANGE UP (ref 3.5–5)
POTASSIUM SERPL-SCNC: 4.9 MMOL/L — SIGNIFICANT CHANGE UP (ref 3.5–5)
POTASSIUM SERPL-SCNC: 4.9 MMOL/L — SIGNIFICANT CHANGE UP (ref 3.5–5)
PROT SERPL-MCNC: 6.2 G/DL — SIGNIFICANT CHANGE UP (ref 6–8)
PROT SERPL-MCNC: 6.2 G/DL — SIGNIFICANT CHANGE UP (ref 6–8)
RBC # BLD: 3.12 M/UL — LOW (ref 4.7–6.1)
RBC # BLD: 3.12 M/UL — LOW (ref 4.7–6.1)
RBC # FLD: 15.7 % — HIGH (ref 11.5–14.5)
RBC # FLD: 15.7 % — HIGH (ref 11.5–14.5)
SARS-COV-2 RNA SPEC QL NAA+PROBE: SIGNIFICANT CHANGE UP
SARS-COV-2 RNA SPEC QL NAA+PROBE: SIGNIFICANT CHANGE UP
SODIUM SERPL-SCNC: 131 MMOL/L — LOW (ref 135–146)
SODIUM SERPL-SCNC: 131 MMOL/L — LOW (ref 135–146)
WBC # BLD: 3.62 K/UL — LOW (ref 4.8–10.8)
WBC # BLD: 3.62 K/UL — LOW (ref 4.8–10.8)
WBC # FLD AUTO: 3.62 K/UL — LOW (ref 4.8–10.8)
WBC # FLD AUTO: 3.62 K/UL — LOW (ref 4.8–10.8)

## 2023-11-20 PROCEDURE — 99232 SBSQ HOSP IP/OBS MODERATE 35: CPT

## 2023-11-20 RX ADMIN — HEPARIN SODIUM 5000 UNIT(S): 5000 INJECTION INTRAVENOUS; SUBCUTANEOUS at 17:15

## 2023-11-20 RX ADMIN — Medication 100 MILLIGRAM(S): at 12:26

## 2023-11-20 RX ADMIN — CLOPIDOGREL BISULFATE 75 MILLIGRAM(S): 75 TABLET, FILM COATED ORAL at 12:26

## 2023-11-20 RX ADMIN — INSULIN GLARGINE 20 UNIT(S): 100 INJECTION, SOLUTION SUBCUTANEOUS at 21:18

## 2023-11-20 RX ADMIN — Medication 10 UNIT(S): at 17:42

## 2023-11-20 RX ADMIN — Medication 5 MILLIGRAM(S): at 21:13

## 2023-11-20 RX ADMIN — Medication 1 APPLICATION(S): at 06:03

## 2023-11-20 RX ADMIN — Medication 10 UNIT(S): at 12:22

## 2023-11-20 RX ADMIN — Medication 2: at 12:23

## 2023-11-20 RX ADMIN — Medication 1 APPLICATION(S): at 12:30

## 2023-11-20 RX ADMIN — CARVEDILOL PHOSPHATE 12.5 MILLIGRAM(S): 80 CAPSULE, EXTENDED RELEASE ORAL at 17:49

## 2023-11-20 RX ADMIN — Medication 650 MILLIGRAM(S): at 21:47

## 2023-11-20 RX ADMIN — CHLORHEXIDINE GLUCONATE 1 APPLICATION(S): 213 SOLUTION TOPICAL at 06:02

## 2023-11-20 RX ADMIN — Medication 667 MILLIGRAM(S): at 12:26

## 2023-11-20 RX ADMIN — Medication 10 UNIT(S): at 08:45

## 2023-11-20 RX ADMIN — Medication 650 MILLIGRAM(S): at 06:02

## 2023-11-20 RX ADMIN — Medication 667 MILLIGRAM(S): at 08:46

## 2023-11-20 RX ADMIN — CITALOPRAM 20 MILLIGRAM(S): 10 TABLET, FILM COATED ORAL at 12:26

## 2023-11-20 RX ADMIN — CARVEDILOL PHOSPHATE 12.5 MILLIGRAM(S): 80 CAPSULE, EXTENDED RELEASE ORAL at 06:01

## 2023-11-20 RX ADMIN — BUMETANIDE 2 MILLIGRAM(S): 0.25 INJECTION INTRAMUSCULAR; INTRAVENOUS at 06:01

## 2023-11-20 RX ADMIN — Medication 1 APPLICATION(S): at 17:51

## 2023-11-20 RX ADMIN — POLYETHYLENE GLYCOL 3350 17 GRAM(S): 17 POWDER, FOR SOLUTION ORAL at 12:30

## 2023-11-20 RX ADMIN — Medication 667 MILLIGRAM(S): at 17:49

## 2023-11-20 RX ADMIN — Medication 2: at 08:45

## 2023-11-20 RX ADMIN — HEPARIN SODIUM 5000 UNIT(S): 5000 INJECTION INTRAVENOUS; SUBCUTANEOUS at 06:02

## 2023-11-20 RX ADMIN — HEPARIN SODIUM 5000 UNIT(S): 5000 INJECTION INTRAVENOUS; SUBCUTANEOUS at 21:14

## 2023-11-20 RX ADMIN — Medication 650 MILLIGRAM(S): at 19:54

## 2023-11-20 RX ADMIN — Medication 81 MILLIGRAM(S): at 12:26

## 2023-11-20 RX ADMIN — GABAPENTIN 300 MILLIGRAM(S): 400 CAPSULE ORAL at 12:26

## 2023-11-20 NOTE — PROGRESS NOTE ADULT - ASSESSMENT
# PARKER on CKD, temporarily requiring HD.  Renal function overall stable, has not improved to previous baseline of ~2  - creat stable, improved to near baseline  - no further HD required  - cont po bumex  - sodium level mildly low, avoid excessive fluid intake (seen plenty of cups of water on table, being encouraged to drink)    Nephrology signing off.  Recall as needed

## 2023-11-20 NOTE — PROGRESS NOTE ADULT - SUBJECTIVE AND OBJECTIVE BOX
Nephrology Progress Note    AMANDA CASTORENA  MRN-[id number]  89y  Male    S:  Patient is seen and examined, events over the last 24h noted.    O:  Allergies:  No Known Allergies    Hospital Medications:   MEDICATIONS  (STANDING):  albuterol/ipratropium for Nebulization 3 milliLiter(s) Nebulizer every 6 hours  albuterol/ipratropium for Nebulization. 3 milliLiter(s) Nebulizer once  allopurinol 100 milliGRAM(s) Oral daily  aspirin  chewable 81 milliGRAM(s) Oral daily  bacitracin   Ointment 1 Application(s) Topical daily  buMETAnide 2 milliGRAM(s) Oral daily  calcium acetate 667 milliGRAM(s) Oral three times a day with meals  carvedilol 12.5 milliGRAM(s) Oral every 12 hours  chlorhexidine 2% Cloths 1 Application(s) Topical <User Schedule>  citalopram 20 milliGRAM(s) Oral daily  clopidogrel Tablet 75 milliGRAM(s) Oral daily  dextrose 5%. 1000 milliLiter(s) (100 mL/Hr) IV Continuous <Continuous>  dextrose 5%. 1000 milliLiter(s) (50 mL/Hr) IV Continuous <Continuous>  dextrose 50% Injectable 50 milliLiter(s) IV Push every 15 minutes  gabapentin 300 milliGRAM(s) Oral daily  glucagon  Injectable 1 milliGRAM(s) IntraMuscular once  heparin   Injectable 5000 Unit(s) SubCutaneous every 8 hours  insulin glargine Injectable (LANTUS) 20 Unit(s) SubCutaneous at bedtime  insulin lispro (ADMELOG) corrective regimen sliding scale   SubCutaneous at bedtime  insulin lispro (ADMELOG) corrective regimen sliding scale   SubCutaneous three times a day before meals  insulin lispro Injectable (ADMELOG) 10 Unit(s) SubCutaneous three times a day before meals  melatonin 5 milliGRAM(s) Oral at bedtime  pantoprazole    Tablet 40 milliGRAM(s) Oral before breakfast  polyethylene glycol 3350 17 Gram(s) Oral daily  senna 2 Tablet(s) Oral at bedtime  triamcinolone 0.1% Cream 1 Application(s) Topical two times a day    MEDICATIONS  (PRN):  acetaminophen     Tablet .. 650 milliGRAM(s) Oral every 6 hours PRN Temp greater or equal to 38C (100.4F), Mild Pain (1 - 3)  albuterol    90 MICROgram(s) HFA Inhaler 2 Puff(s) Inhalation every 4 hours PRN Shortness of Breath and/or Wheezing  clonazePAM  Tablet 1 milliGRAM(s) Oral at bedtime PRN Anxiety  dextrose Oral Gel 15 Gram(s) Oral once PRN Blood Glucose LESS THAN 70 milliGRAM(s)/deciliter  dextrose Oral Gel 15 Gram(s) Oral once PRN Blood Glucose LESS THAN 70 milliGRAM(s)/deciliter    Home Medications:  allopurinol 100 mg oral tablet: 1 tab(s) orally once a day (25 Oct 2021 22:30)  citalopram 20 mg oral tablet: 1 tab(s) orally once a day (25 Oct 2021 22:30)  clonazePAM 1 mg oral tablet: 2 tab(s) orally once a day (at bedtime), As Needed (25 Oct 2021 22:30)  gabapentin 300 mg oral capsule: 1 cap(s) orally 2 times a day (25 Oct 2021 22:30)  glimepiride 4 mg oral tablet: 1 tab(s) orally 2 times a day (25 Oct 2021 22:30)  Lantus 100 units/mL subcutaneous solution: 30 unit(s) subcutaneous once a day (at bedtime) (25 Oct 2021 22:30)  NIFEdipine 60 mg oral tablet, extended release: 1 tab(s) orally once a day (25 Oct 2021 22:30)      VITALS:  Daily     Daily   T(F): 97.2 (11-20-23 @ 05:15), Max: 97.4 (11-19-23 @ 20:47)  HR: 76 (11-20-23 @ 05:15)  BP: 147/62 (11-20-23 @ 05:15)  RR: 18 (11-20-23 @ 05:15)  SpO2: 98% (11-20-23 @ 05:15)  Wt(kg): --  I&O's Detail    19 Nov 2023 07:01  -  20 Nov 2023 07:00  --------------------------------------------------------  IN:  Total IN: 0 mL    OUT:    Voided (mL): 300 mL  Total OUT: 300 mL    Total NET: -300 mL        I&O's Summary    19 Nov 2023 07:01  -  20 Nov 2023 07:00  --------------------------------------------------------  IN: 0 mL / OUT: 300 mL / NET: -300 mL          PHYSICAL EXAM:  Gen: NAD  Chest: b/l breath sounds  Abd: soft  Extremities: no edema      LABS:    11-20    131<L>  |  93<L>  |  69<HH>  ----------------------------<  144<H>  4.9   |  27  |  2.2<H>    Ca    8.5      20 Nov 2023 06:16  Phos  4.3     11-20  Mg     1.9     11-20    TPro  6.2  /  Alb  3.1<L>  /  TBili  0.4  /  DBili      /  AST  22  /  ALT  21  /  AlkPhos  89  11-20    eGFR: 27 mL/min/1.73m2 (11-20-23 @ 06:16)  eGFR: 27 mL/min/1.73m2 (11-19-23 @ 04:30)    Phosphorus: 4.3 mg/dL (11-20-23 @ 06:16)  Phosphorus: 4.4 mg/dL (11-19-23 @ 04:30)                            9.1    3.62  )-----------( 182      ( 20 Nov 2023 06:16 )             28.6     Mean Cell Volume: 91.7 fL (11-20-23 @ 06:16)    Creatinine trend:  Creatinine: 2.2 mg/dL (11-20-23 @ 06:16)  Creatinine: 2.2 mg/dL (11-19-23 @ 04:30)  Creatinine: 2.3 mg/dL (11-18-23 @ 04:30)  Creatinine: 2.6 mg/dL (11-17-23 @ 07:00)  Creatinine: 2.7 mg/dL (11-16-23 @ 11:22)  Creatinine: 2.2 mg/dL (11-15-23 @ 05:22)

## 2023-11-20 NOTE — ED ADULT TRIAGE NOTE - BP NONINVASIVE SYSTOLIC (MM HG)
Detailed message left for Rachael to notify her that the orders for the splint and EMG have been placed. Advised to call clinic with any questions/concerns.   147

## 2023-11-20 NOTE — PROGRESS NOTE ADULT - ASSESSMENT
89M w/ CAD c/b NSTEMI s/p LAD DESx2(2022), HFpEF, s/p PPM, DM2 on Insulin, CKD3, HTN, HLD, Hx of Dens Fx, Prostate Ca in remission was admitted to MICU (10/30) for DKA w/ ICU course c/b Septic Shock 2/2 Aspiration Pneumonia (now resolved; s/p course of Cefepime) &  Acute Respiratory Failure w/ Hypoxia 2/2 Fluid Overload from Acute on Chronic HFpEF & Acute Renal Failure requiring temporary HD(last HD 11/10, [facility] HD cath now removed). The patient was transferred to medicine service on 11/16 for further medical optimization.    #DM2 w/ course c/b DKA  - Endocrinology consulted in ICU  - on 11/19 the patient developed asymptomatic hypoglycemia and overnight through the day he was on sliding scale but now becoming hyperglycemic. restarted start basal-bolus insulin regimen at lower dosing and tolerating    #Acute Renal Failure on CKD3  - Nephrology consulted & a/w management  - required temporary HD during ICU course however per Nephro no longer required; per chart review--suspected 2/2 severe sepsis/shock  - monitor CrCl daily, improved since initial transfer from ICU and is making urine    #HFpEF  - appears euvolemic on exam  - initially during course had acute on chronic HF 2/2 Fluid overload from renal failure now resolved    #CAD  - charted hx of CABG- no sternotomy scar on exam, and no sternotomy wire on cxr (charting of s/p CABG appears error on my review), per chart reviewed completed staged PCI in 2022 for NSTEMI and on cath report there is no mention of implanted vessels  - c/w Aspirin, Clopidogrel, Carvedilol    #HTN  - c/w carvedilol    #Dysphagia  - diet per speech & swallow    #Prophylactic Measure  DVT ppx- HSQ  Dispo- plan for [facility]  GOC- Palliative care consulted in ICU, patient is DNR 89M w/ CAD c/b NSTEMI s/p LAD DESx2(2022), HFpEF, s/p PPM, DM2 on Insulin, CKD3, HTN, HLD, Hx of Dens Fx, Prostate Ca in remission was admitted to MICU (10/30) for DKA w/ ICU course c/b Septic Shock 2/2 Aspiration Pneumonia (now resolved; s/p course of Cefepime) &  Acute Respiratory Failure w/ Hypoxia 2/2 Fluid Overload from Acute on Chronic HFpEF & Acute Renal Failure requiring temporary HD(last HD 11/10, [facility] HD cath now removed). The patient was transferred to medicine service on 11/16 for further medical optimization.    #DM2 w/ course c/b DKA  - Endocrinology consulted in ICU  - on 11/19 the patient developed asymptomatic hypoglycemia and overnight through the day he was on sliding scale but now becoming hyperglycemic. restarted start basal-bolus insulin regimen at lower dosing and tolerating    #Acute Renal Failure on CKD3  - Nephrology consulted & a/w management  - required temporary HD during ICU course however per Nephro no longer required; per chart review--suspected 2/2 severe sepsis/shock  - monitor CrCl daily, improved since initial transfer from ICU and is making urine    #HFpEF  - appears euvolemic on exam  - initially during course had acute on chronic HF 2/2 Fluid overload from renal failure now resolved  - c/w bumex    #CAD  - charted hx of CABG- no sternotomy scar on exam, and no sternotomy wire on cxr (charting of s/p CABG appears error on my review), per chart reviewed completed staged PCI in 2022 for NSTEMI and on cath report there is no mention of implanted vessels  - c/w Aspirin, Clopidogrel, Carvedilol    #HTN  - c/w carvedilol    #Dysphagia  - diet per speech & swallow    #Prophylactic Measure  DVT ppx- HSQ  Dispo- plan for [facility]  GOC- Palliative care consulted in ICU, patient is DNR

## 2023-11-20 NOTE — PROGRESS NOTE ADULT - SUBJECTIVE AND OBJECTIVE BOX
CC: 89M admitted for DKA; hospital course c/b Septic Shock 2/2 Aspiration Pneumonia(now resolved) & Acute Respiratory Failure w/ Hypoxia/Acute on Chronic Diastolic HF/Acute Renal Failure on CKD    SUBJECTIVE / OVERNIGHT EVENTS:  No acute events overnight. Patient seen and evaluated at bedside. No CP, palpitations or sob.    ROS:  no fever    MEDICATIONS  (STANDING):  albuterol/ipratropium for Nebulization 3 milliLiter(s) Nebulizer every 6 hours  albuterol/ipratropium for Nebulization. 3 milliLiter(s) Nebulizer once  allopurinol 100 milliGRAM(s) Oral daily  aspirin  chewable 81 milliGRAM(s) Oral daily  bacitracin   Ointment 1 Application(s) Topical daily  buMETAnide 2 milliGRAM(s) Oral daily  calcium acetate 667 milliGRAM(s) Oral three times a day with meals  carvedilol 12.5 milliGRAM(s) Oral every 12 hours  chlorhexidine 2% Cloths 1 Application(s) Topical <User Schedule>  citalopram 20 milliGRAM(s) Oral daily  clopidogrel Tablet 75 milliGRAM(s) Oral daily  dextrose 5%. 1000 milliLiter(s) (100 mL/Hr) IV Continuous <Continuous>  dextrose 5%. 1000 milliLiter(s) (50 mL/Hr) IV Continuous <Continuous>  dextrose 50% Injectable 50 milliLiter(s) IV Push every 15 minutes  gabapentin 300 milliGRAM(s) Oral daily  glucagon  Injectable 1 milliGRAM(s) IntraMuscular once  heparin   Injectable 5000 Unit(s) SubCutaneous every 8 hours  insulin glargine Injectable (LANTUS) 20 Unit(s) SubCutaneous at bedtime  insulin lispro (ADMELOG) corrective regimen sliding scale   SubCutaneous at bedtime  insulin lispro (ADMELOG) corrective regimen sliding scale   SubCutaneous three times a day before meals  insulin lispro Injectable (ADMELOG) 10 Unit(s) SubCutaneous three times a day before meals  melatonin 5 milliGRAM(s) Oral at bedtime  pantoprazole    Tablet 40 milliGRAM(s) Oral before breakfast  polyethylene glycol 3350 17 Gram(s) Oral daily  senna 2 Tablet(s) Oral at bedtime  triamcinolone 0.1% Cream 1 Application(s) Topical two times a day    MEDICATIONS  (PRN):  acetaminophen     Tablet .. 650 milliGRAM(s) Oral every 6 hours PRN Temp greater or equal to 38C (100.4F), Mild Pain (1 - 3)  albuterol    90 MICROgram(s) HFA Inhaler 2 Puff(s) Inhalation every 4 hours PRN Shortness of Breath and/or Wheezing  clonazePAM  Tablet 1 milliGRAM(s) Oral at bedtime PRN Anxiety  dextrose Oral Gel 15 Gram(s) Oral once PRN Blood Glucose LESS THAN 70 milliGRAM(s)/deciliter  dextrose Oral Gel 15 Gram(s) Oral once PRN Blood Glucose LESS THAN 70 milliGRAM(s)/deciliter    CAPILLARY BLOOD GLUCOSE  POCT Blood Glucose.: 144 mg/dL (19 Nov 2023 21:40)  POCT Blood Glucose.: 282 mg/dL (19 Nov 2023 16:10)  POCT Blood Glucose.: 250 mg/dL (19 Nov 2023 11:40)  POCT Blood Glucose.: 185 mg/dL (19 Nov 2023 07:32)    I&O's Summary    19 Nov 2023 07:01  -  20 Nov 2023 07:00  --------------------------------------------------------  IN: 0 mL / OUT: 300 mL / NET: -300 mL    Physical Exam  Vital Signs Last 24 Hrs  T(C): 36.2 (20 Nov 2023 05:15), Max: 36.3 (19 Nov 2023 20:47)  T(F): 97.2 (20 Nov 2023 05:15), Max: 97.4 (19 Nov 2023 20:47)  HR: 76 (20 Nov 2023 05:15) (63 - 76)  BP: 147/62 (20 Nov 2023 05:15) (104/53 - 147/62)  RR: 18 (20 Nov 2023 05:15) (18 - 18)  SpO2: 98% (20 Nov 2023 05:15) (98% - 98%)    Parameters below as of 19 Nov 2023 20:47  Patient On (Oxygen Delivery Method): room air    HEAD:  Atraumatic, Normocephalic  Lung: normal work of breathing, no rhonchi appreciated  Cardiovascular: S1&S2+, rrr, no m/r/g appreciated  ABDOMEN: soft, nt  : condom cath+, no suprapubic pain to palpation  SKIN: warm and dry, no visible purulence in exposed areas    LABS:                        8.9    3.87  )-----------( 179      ( 19 Nov 2023 04:30 )             27.0     11-19    130<L>  |  93<L>  |  77<HH>  ----------------------------<  179<H>  4.7   |  26  |  2.2<H>    Ca    8.1<L>      19 Nov 2023 04:30  Phos  4.4     11-19  Mg     1.8     11-19    TPro  5.7<L>  /  Alb  2.8<L>  /  TBili  0.3  /  DBili  x   /  AST  24  /  ALT  19  /  AlkPhos  85  11-19          Urinalysis Basic - ( 19 Nov 2023 04:30 )    Color: x / Appearance: x / SG: x / pH: x  Gluc: 179 mg/dL / Ketone: x  / Bili: x / Urobili: x   Blood: x / Protein: x / Nitrite: x   Leuk Esterase: x / RBC: x / WBC x   Sq Epi: x / Non Sq Epi: x / Bacteria: x          RADIOLOGY & ADDITIONAL TESTS:  Results Reviewed:   Imaging Personally Reviewed:  Electrocardiogram Personally Reviewed:    COORDINATION OF CARE:  Care Discussed with Consultants/Other Providers [Y/N]:  Prior or Outpatient Records Reviewed [Y/N]:

## 2023-11-21 ENCOUNTER — TRANSCRIPTION ENCOUNTER (OUTPATIENT)
Age: 88
End: 2023-11-21

## 2023-11-21 LAB
ANION GAP SERPL CALC-SCNC: 11 MMOL/L — SIGNIFICANT CHANGE UP (ref 7–14)
ANION GAP SERPL CALC-SCNC: 11 MMOL/L — SIGNIFICANT CHANGE UP (ref 7–14)
BUN SERPL-MCNC: 69 MG/DL — CRITICAL HIGH (ref 10–20)
BUN SERPL-MCNC: 69 MG/DL — CRITICAL HIGH (ref 10–20)
CALCIUM SERPL-MCNC: 8.1 MG/DL — LOW (ref 8.4–10.5)
CALCIUM SERPL-MCNC: 8.1 MG/DL — LOW (ref 8.4–10.5)
CHLORIDE SERPL-SCNC: 94 MMOL/L — LOW (ref 98–110)
CHLORIDE SERPL-SCNC: 94 MMOL/L — LOW (ref 98–110)
CO2 SERPL-SCNC: 26 MMOL/L — SIGNIFICANT CHANGE UP (ref 17–32)
CO2 SERPL-SCNC: 26 MMOL/L — SIGNIFICANT CHANGE UP (ref 17–32)
CREAT SERPL-MCNC: 2.1 MG/DL — HIGH (ref 0.7–1.5)
CREAT SERPL-MCNC: 2.1 MG/DL — HIGH (ref 0.7–1.5)
EGFR: 29 ML/MIN/1.73M2 — LOW
EGFR: 29 ML/MIN/1.73M2 — LOW
GLUCOSE SERPL-MCNC: 130 MG/DL — HIGH (ref 70–99)
GLUCOSE SERPL-MCNC: 130 MG/DL — HIGH (ref 70–99)
HCT VFR BLD CALC: 26.2 % — LOW (ref 42–52)
HCT VFR BLD CALC: 26.2 % — LOW (ref 42–52)
HGB BLD-MCNC: 8.5 G/DL — LOW (ref 14–18)
HGB BLD-MCNC: 8.5 G/DL — LOW (ref 14–18)
MAGNESIUM SERPL-MCNC: 1.8 MG/DL — SIGNIFICANT CHANGE UP (ref 1.8–2.4)
MAGNESIUM SERPL-MCNC: 1.8 MG/DL — SIGNIFICANT CHANGE UP (ref 1.8–2.4)
MCHC RBC-ENTMCNC: 29.6 PG — SIGNIFICANT CHANGE UP (ref 27–31)
MCHC RBC-ENTMCNC: 29.6 PG — SIGNIFICANT CHANGE UP (ref 27–31)
MCHC RBC-ENTMCNC: 32.4 G/DL — SIGNIFICANT CHANGE UP (ref 32–37)
MCHC RBC-ENTMCNC: 32.4 G/DL — SIGNIFICANT CHANGE UP (ref 32–37)
MCV RBC AUTO: 91.3 FL — SIGNIFICANT CHANGE UP (ref 80–94)
MCV RBC AUTO: 91.3 FL — SIGNIFICANT CHANGE UP (ref 80–94)
NRBC # BLD: 0 /100 WBCS — SIGNIFICANT CHANGE UP (ref 0–0)
NRBC # BLD: 0 /100 WBCS — SIGNIFICANT CHANGE UP (ref 0–0)
PHOSPHATE SERPL-MCNC: 4.3 MG/DL — SIGNIFICANT CHANGE UP (ref 2.1–4.9)
PHOSPHATE SERPL-MCNC: 4.3 MG/DL — SIGNIFICANT CHANGE UP (ref 2.1–4.9)
PLATELET # BLD AUTO: 172 K/UL — SIGNIFICANT CHANGE UP (ref 130–400)
PLATELET # BLD AUTO: 172 K/UL — SIGNIFICANT CHANGE UP (ref 130–400)
PMV BLD: 10.6 FL — HIGH (ref 7.4–10.4)
PMV BLD: 10.6 FL — HIGH (ref 7.4–10.4)
POTASSIUM SERPL-MCNC: 4.4 MMOL/L — SIGNIFICANT CHANGE UP (ref 3.5–5)
POTASSIUM SERPL-MCNC: 4.4 MMOL/L — SIGNIFICANT CHANGE UP (ref 3.5–5)
POTASSIUM SERPL-SCNC: 4.4 MMOL/L — SIGNIFICANT CHANGE UP (ref 3.5–5)
POTASSIUM SERPL-SCNC: 4.4 MMOL/L — SIGNIFICANT CHANGE UP (ref 3.5–5)
RBC # BLD: 2.87 M/UL — LOW (ref 4.7–6.1)
RBC # BLD: 2.87 M/UL — LOW (ref 4.7–6.1)
RBC # FLD: 15.4 % — HIGH (ref 11.5–14.5)
RBC # FLD: 15.4 % — HIGH (ref 11.5–14.5)
SODIUM SERPL-SCNC: 131 MMOL/L — LOW (ref 135–146)
SODIUM SERPL-SCNC: 131 MMOL/L — LOW (ref 135–146)
WBC # BLD: 3.62 K/UL — LOW (ref 4.8–10.8)
WBC # BLD: 3.62 K/UL — LOW (ref 4.8–10.8)
WBC # FLD AUTO: 3.62 K/UL — LOW (ref 4.8–10.8)
WBC # FLD AUTO: 3.62 K/UL — LOW (ref 4.8–10.8)

## 2023-11-21 PROCEDURE — 74230 X-RAY XM SWLNG FUNCJ C+: CPT | Mod: 26

## 2023-11-21 PROCEDURE — 99232 SBSQ HOSP IP/OBS MODERATE 35: CPT

## 2023-11-21 PROCEDURE — 70490 CT SOFT TISSUE NECK W/O DYE: CPT | Mod: 26

## 2023-11-21 RX ORDER — HEPARIN SODIUM 5000 [USP'U]/ML
5000 INJECTION INTRAVENOUS; SUBCUTANEOUS EVERY 8 HOURS
Refills: 0 | Status: DISCONTINUED | OUTPATIENT
Start: 2023-11-21 | End: 2023-11-22

## 2023-11-21 RX ADMIN — Medication 81 MILLIGRAM(S): at 11:50

## 2023-11-21 RX ADMIN — Medication 1 MILLIGRAM(S): at 23:52

## 2023-11-21 RX ADMIN — CARVEDILOL PHOSPHATE 12.5 MILLIGRAM(S): 80 CAPSULE, EXTENDED RELEASE ORAL at 16:35

## 2023-11-21 RX ADMIN — HEPARIN SODIUM 5000 UNIT(S): 5000 INJECTION INTRAVENOUS; SUBCUTANEOUS at 21:54

## 2023-11-21 RX ADMIN — BUMETANIDE 2 MILLIGRAM(S): 0.25 INJECTION INTRAMUSCULAR; INTRAVENOUS at 05:25

## 2023-11-21 RX ADMIN — GABAPENTIN 300 MILLIGRAM(S): 400 CAPSULE ORAL at 11:20

## 2023-11-21 RX ADMIN — Medication 667 MILLIGRAM(S): at 11:50

## 2023-11-21 RX ADMIN — Medication 100 MILLIGRAM(S): at 11:32

## 2023-11-21 RX ADMIN — CARVEDILOL PHOSPHATE 12.5 MILLIGRAM(S): 80 CAPSULE, EXTENDED RELEASE ORAL at 05:25

## 2023-11-21 RX ADMIN — HEPARIN SODIUM 5000 UNIT(S): 5000 INJECTION INTRAVENOUS; SUBCUTANEOUS at 05:25

## 2023-11-21 RX ADMIN — Medication 10 UNIT(S): at 17:09

## 2023-11-21 RX ADMIN — Medication 10 UNIT(S): at 11:50

## 2023-11-21 RX ADMIN — Medication 10 UNIT(S): at 07:49

## 2023-11-21 RX ADMIN — Medication 650 MILLIGRAM(S): at 20:50

## 2023-11-21 RX ADMIN — PANTOPRAZOLE SODIUM 40 MILLIGRAM(S): 20 TABLET, DELAYED RELEASE ORAL at 05:25

## 2023-11-21 RX ADMIN — Medication 1 MILLIGRAM(S): at 01:40

## 2023-11-21 RX ADMIN — Medication 1 APPLICATION(S): at 05:31

## 2023-11-21 RX ADMIN — Medication 667 MILLIGRAM(S): at 16:35

## 2023-11-21 RX ADMIN — Medication 667 MILLIGRAM(S): at 07:38

## 2023-11-21 RX ADMIN — CITALOPRAM 20 MILLIGRAM(S): 10 TABLET, FILM COATED ORAL at 11:20

## 2023-11-21 RX ADMIN — Medication 650 MILLIGRAM(S): at 01:39

## 2023-11-21 RX ADMIN — CHLORHEXIDINE GLUCONATE 1 APPLICATION(S): 213 SOLUTION TOPICAL at 05:32

## 2023-11-21 RX ADMIN — Medication 1 APPLICATION(S): at 17:25

## 2023-11-21 RX ADMIN — Medication 650 MILLIGRAM(S): at 02:30

## 2023-11-21 RX ADMIN — SENNA PLUS 2 TABLET(S): 8.6 TABLET ORAL at 21:54

## 2023-11-21 RX ADMIN — Medication 1 APPLICATION(S): at 11:53

## 2023-11-21 RX ADMIN — Medication 5 MILLIGRAM(S): at 21:54

## 2023-11-21 RX ADMIN — POLYETHYLENE GLYCOL 3350 17 GRAM(S): 17 POWDER, FOR SOLUTION ORAL at 11:21

## 2023-11-21 RX ADMIN — HEPARIN SODIUM 5000 UNIT(S): 5000 INJECTION INTRAVENOUS; SUBCUTANEOUS at 17:09

## 2023-11-21 RX ADMIN — CLOPIDOGREL BISULFATE 75 MILLIGRAM(S): 75 TABLET, FILM COATED ORAL at 11:19

## 2023-11-21 NOTE — PROGRESS NOTE ADULT - PROBLEM SELECTOR PROBLEM 2
Dysphagia
Acute renal failure superimposed on stage 3 chronic kidney disease, unspecified acute renal failure type, unspecified whether stage 3a or 3b CKD

## 2023-11-21 NOTE — PROGRESS NOTE ADULT - ASSESSMENT
89M w/ CAD c/b NSTEMI s/p LAD DESx2(2022), HFpEF, s/p PPM, DM2 on Insulin, CKD3, HTN, HLD, Hx of Dens Fx, Prostate Ca in remission was admitted to MICU (10/30) for DKA w/ ICU course c/b Septic Shock 2/2 Aspiration Pneumonia (now resolved; s/p course of Cefepime) &  Acute Respiratory Failure w/ Hypoxia 2/2 Fluid Overload from Acute on Chronic HFpEF & Acute Renal Failure requiring temporary HD(last HD 11/10, RIJ HD cath now removed). The patient was transferred to medicine service on 11/16 for further medical optimization. Pending [facility] placement    ***Note on 11/21 during modified barium swallow patient identified to have possible esophageal/pharyngeal mass under epiglottis. CT Neck w/o co ordered, GI consult placed give dysphagia    #DM2 w/ course c/b DKA  - Endocrinology consulted in ICU  - c/w start basal-bolus insulin regimen at lower dosing and tolerating    #Dysphagia  - diet per speech & swallow  - GI consult placed, CT neck soft tissue ordered and pending    #Acute Renal Failure on CKD3  - Nephrology consulted & a/w management  - required temporary HD during ICU course however per Nephro no longer required; per chart review--suspected 2/2 severe sepsis/shock  - monitor CrCl daily, improved since initial transfer from ICU and is making urine    #HFpEF  - appears euvolemic on exam  - initially during course had acute on chronic HF 2/2 Fluid overload from renal failure now resolved  - c/w bumex    #CAD  - charted hx of CABG- no sternotomy scar on exam, and no sternotomy wire on cxr (charting of s/p CABG appears error on my review), per chart reviewed completed staged PCI in 2022 for NSTEMI and on cath report there is no mention of implanted vessels  - c/w Aspirin, Clopidogrel, Carvedilol    #HTN  - c/w carvedilol    #Dysphagia  - diet per speech & swallow    #Prophylactic Measure  DVT ppx- HSQ  Dispo- plan for [facility]  GOC- Palliative care consulted in ICU, patient is DNR    Handoff- pending dispo to [facility] (possible tomorrow), late on 11/21 reported possible pharyngeal mass on modified barium swallow- CT neck ordered, GI consutled (If GI indicates further outpatient w/u then can d/c tomorrow afternoon/evening 89M w/ CAD c/b NSTEMI s/p LAD DESx2(2022), HFpEF, s/p PPM, DM2 on Insulin, CKD3, HTN, HLD, Hx of Dens Fx, Prostate Ca in remission was admitted to MICU (10/30) for DKA w/ ICU course c/b Septic Shock 2/2 Aspiration Pneumonia (now resolved; s/p course of Cefepime) &  Acute Respiratory Failure w/ Hypoxia 2/2 Fluid Overload from Acute on Chronic HFpEF & Acute Renal Failure requiring temporary HD(last HD 11/10, RIJ HD cath now removed). The patient was transferred to medicine service on 11/16 for further medical optimization. Pending [facility] placement    ***Note on 11/21 during modified barium swallow patient identified to have possible esophageal/pharyngeal mass under epiglottis. CT Neck w/o co ordered, GI consult placed give dysphagia    #DM2 w/ course c/b DKA  - Endocrinology consulted in ICU  - c/w start basal-bolus insulin regimen at lower dosing and tolerating    #Dysphagia  - diet per speech & swallow  - GI consult placed, CT neck soft tissue ordered and pending    #Acute Renal Failure on CKD3  - Nephrology consulted & a/w management  - required temporary HD during ICU course however per Nephro no longer required; per chart review--suspected 2/2 severe sepsis/shock  - monitor CrCl daily, improved since initial transfer from ICU and is making urine    #HFpEF  - appears euvolemic on exam  - initially during course had acute on chronic HF 2/2 Fluid overload from renal failure now resolved  - c/w bumex    #CAD  - charted hx of CABG- no sternotomy scar on exam, and no sternotomy wire on cxr (charting of s/p CABG appears error on my review), per chart reviewed completed staged PCI in 2022 for NSTEMI and on cath report there is no mention of implanted vessels  - c/w Aspirin, Clopidogrel, Carvedilol    #HTN  - c/w carvedilol    #Dysphagia  - diet per speech & swallow    #Prophylactic Measure  DVT ppx- HSQ  Dispo- plan for [facility]  GOC- Palliative care consulted in ICU, patient is DNR    Handoff- pending dispo to [facility] (possible tomorrow), late on 11/21 reported possible pharyngeal mass on modified barium swallow- CT neck ordered, GI consutled (If GI indicates further outpatient w/u then can d/c tomorrow afternoon/evening  ***Unable to reach family to inform of possible pharyngeal/esophageal mass seen on MBS. will need team to inform family tomorrow. patient made aware of finding but he defers for family to be informed and make decision

## 2023-11-21 NOTE — PROGRESS NOTE ADULT - PROBLEM SELECTOR PROBLEM 4
CAD (coronary artery disease)
Chronic diastolic heart failure
CAD (coronary artery disease)

## 2023-11-21 NOTE — SWALLOW VFSS/MBS ASSESSMENT ADULT - ORAL PREP COMMENTS
No dentures in place, unable to tolerate soft & bite sized diet.
No dentures in place, unable to tolerate soft & bite sized trial- spit it out.

## 2023-11-21 NOTE — DISCHARGE NOTE PROVIDER - NSDCMRMEDTOKEN_GEN_ALL_CORE_FT
allopurinol 100 mg oral tablet: 1 tab(s) orally once a day  aspirin 81 mg oral tablet, chewable: 1 tab(s) orally once a day  atorvastatin 80 mg oral tablet: 1 tab(s) orally once a day (at bedtime)  carvedilol 25 mg oral tablet: 1 tab(s) orally every 12 hours  citalopram 20 mg oral tablet: 1 tab(s) orally once a day  clonazePAM 1 mg oral tablet: 2 tab(s) orally once a day (at bedtime), As Needed  clopidogrel 75 mg oral tablet: 1 tab(s) orally once a day  furosemide 40 mg oral tablet: 1 tab(s) orally every 12 hours starting 6 days after discharge.  gabapentin 300 mg oral capsule: 1 cap(s) orally 2 times a day  glimepiride 4 mg oral tablet: 1 tab(s) orally 2 times a day  Lantus 100 units/mL subcutaneous solution: 30 unit(s) subcutaneous once a day (at bedtime)  NIFEdipine 60 mg oral tablet, extended release: 1 tab(s) orally once a day   allopurinol 100 mg oral tablet: 1 tab(s) orally once a day  aspirin 81 mg oral tablet, chewable: 1 tab(s) orally once a day  atorvastatin 80 mg oral tablet: 1 tab(s) orally once a day (at bedtime)  bumetanide 2 mg oral tablet: 1 tab(s) orally once a day  carvedilol 12.5 mg oral tablet: 1 tab(s) orally every 12 hours  citalopram 20 mg oral tablet: 1 tab(s) orally once a day  clonazePAM 1 mg oral tablet: 2 tab(s) orally once a day (at bedtime), As Needed  clopidogrel 75 mg oral tablet: 1 tab(s) orally once a day  gabapentin 300 mg oral capsule: 1 cap(s) orally 2 times a day  Lantus 100 units/mL subcutaneous solution: 30 unit(s) subcutaneous once a day (at bedtime)  lidocaine 4% topical film: Apply topically to affected area once a day  melatonin 5 mg oral tablet: 1 tab(s) orally once a day (at bedtime)  pantoprazole 40 mg oral delayed release tablet: 1 tab(s) orally once a day (before a meal)  polyethylene glycol 3350 oral powder for reconstitution: 17 gram(s) orally once a day  senna leaf extract oral tablet: 2 tab(s) orally once a day (at bedtime)

## 2023-11-21 NOTE — DISCHARGE NOTE PROVIDER - ATTENDING DISCHARGE PHYSICAL EXAMINATION:
GENERAL: No acute distress, well-developed  HEAD:  Atraumatic, Normocephalic, presbycusis   EYES: EOMI, PERRLA, conjunctiva and sclera clear  NECK: Supple, no lymphadenopathy, no JVD  CHEST/LUNG: CTAB; No wheezes, rales, or rhonchi  HEART: Regular rate and rhythm; No murmurs, rubs, or gallops  ABDOMEN: Soft, non-tender, non-distended; normal bowel sounds, no organomegaly  EXTREMITIES:  2+ peripheral pulses b/l, No clubbing, cyanosis, or edema  NEUROLOGY: A&O x 2, no focal deficits  SKIN: No rashes or lesions

## 2023-11-21 NOTE — SWALLOW VFSS/MBS ASSESSMENT ADULT - ADDITIONAL INFORMATION
Per discussion with radiologist, there is a cervical osteophyte right below the level of the epiglottis. Right above this osteophyte but still slightly below level of epiglottis there is ?tissue that bulges into pharyngeal space and moves with the swallow. MD Ferrara made aware of this finding.

## 2023-11-21 NOTE — SWALLOW VFSS/MBS ASSESSMENT ADULT - UNSUCCESSFUL STRATEGIES TRIALED DURING PROCEDURE
chin tuck/productive volitional cough following clinician cue
chin tuck/productive volitional cough following clinician cue

## 2023-11-21 NOTE — DISCHARGE NOTE PROVIDER - NSDCCPCAREPLAN_GEN_ALL_CORE_FT
PRINCIPAL DISCHARGE DIAGNOSIS  Diagnosis: DKA (diabetic ketoacidosis)  Assessment and Plan of Treatment: seen by  Endocrinology consulted in ICU, on ISS,  basal-bolus insulin regiment, glucose controlled.  follow up with endocrinology as outpt.         SECONDARY DISCHARGE DIAGNOSES  Diagnosis: PARKER (acute kidney injury)  Assessment and Plan of Treatment: seen by nephrology ,  required temporary HD during ICU course however per Nephro no longer required; per chart review--suspected 2/2 severe sepsis/shock, cr improved, nephrology signed off, to follow up with nephrology as outpt.       Diagnosis: Septic shock  Assessment and Plan of Treatment: due to aspiration PNA , treated with IV abx, afebrile.     PRINCIPAL DISCHARGE DIAGNOSIS  Diagnosis: DKA (diabetic ketoacidosis)  Assessment and Plan of Treatment: seen by  Endocrinology consulted in ICU, on ISS,  basal-bolus insulin regiment, glucose controlled.  follow up with endocrinology as outpt.         SECONDARY DISCHARGE DIAGNOSES  Diagnosis: PARKER (acute kidney injury)  Assessment and Plan of Treatment: seen by nephrology ,  required temporary HD during ICU course however per Nephro no longer required; per chart review--suspected 2/2 severe sepsis/shock, cr improved, nephrology signed off, to follow up with nephrology as outpt.       Diagnosis: Septic shock  Assessment and Plan of Treatment: due to aspiration PNA , treated with IV abx, afebrile.  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   CT Neck did not show any mass, but radiologist recomended IV contrast if looking for a mass, howqever given recent recovery of renal function, i dont think is wise to give Contrast at this tyime  IF in the future patient has trouble swallowing, can reat CT valencia with contrast sooner or later

## 2023-11-21 NOTE — SWALLOW VFSS/MBS ASSESSMENT ADULT - CONSISTENCIES ADMINISTERED
thin liquid/moderately thick/mildly thick/pureed
thin liquid/moderately thick/mildly thick/pureed/soft & bite-sized

## 2023-11-21 NOTE — DISCHARGE NOTE NURSING/CASE MANAGEMENT/SOCIAL WORK - NSDCPEFALRISK_GEN_ALL_CORE
For information on Fall & Injury Prevention, visit: [email or web address] OR  [email or web address] OR  https://www.cdc.gov/steadi/patient.html

## 2023-11-21 NOTE — SWALLOW VFSS/MBS ASSESSMENT ADULT - RECOMMENDED FEEDING/EATING TECHNIQUES
alternate food with liquid/maintain upright posture during/after eating for 30 mins/oral hygiene/position upright (90 degrees)/small sips/bites
alternate food with liquid/maintain upright posture during/after eating for 30 mins/oral hygiene/position upright (90 degrees)/small sips/bites

## 2023-11-21 NOTE — SWALLOW VFSS/MBS ASSESSMENT ADULT - ROSENBEK'S PENETRATION ASPIRATION SCALE
for thins/(8) contrast passes glottis, visible subglottic residue remains, absent patient response (aspiration)
for thins/(8) contrast passes glottis, visible subglottic residue remains, absent patient response (aspiration)

## 2023-11-21 NOTE — SWALLOW VFSS/MBS ASSESSMENT ADULT - DIAGNOSTIC IMPRESSIONS
Moderate pharyngeal dysphagia for thins with aspiration and weak/inconsistent cough response. Mild-mod pharyngeal dysphagia for mildly thick, moderately thick, and pureed solids with mild-mod diffuse pharyngeal residue after the initial swallow. This residue partially cleared with use of secondary swallow and alternating foods/liquids.
Moderate pharyngeal dysphagia for thins with aspiration and weak/inconsistent cough response. Mild-mod pharyngeal dysphagia for mildly thick, moderately thick, and pureed solids with mild-mod diffuse pharyngeal residue after the initial swallow. This residue partially cleared with use of secondary swallow and alternating foods/liquids.

## 2023-11-21 NOTE — DISCHARGE NOTE PROVIDER - HOSPITAL COURSE
89M w/ CAD c/b NSTEMI s/p LAD DESx2(2022), HFpEF, s/p PPM, DM2 on Insulin, CKD3, HTN, HLD, Hx of Dens Fx, Prostate Ca in remission was admitted to MICU (10/30) for DKA w/ ICU course c/b Septic Shock 2/2 Aspiration Pneumonia (now resolved; s/p course of Cefepime) &  Acute Respiratory Failure w/ Hypoxia 2/2 Fluid Overload from Acute on Chronic HFpEF & Acute Renal Failure requiring temporary HD(last HD 11/10, RIJ HD cath now removed). The patient was transferred to medicine service on 11/16 for further medical optimization.  Pt with DM2 w/ course c/b DKA, seen by  Endocrinology consulted in ICU, on ISS,  basal-bolus insulin regiment, glucose controlled. Pt to follow up with endocrinology as outpt.   Pt with Acute Renal Failure on CKD3, seen by nephrology ,  required temporary HD during ICU course however per Nephro no longer required; per chart review--suspected 2/2 severe sepsis/shock, cr improved, nephrology signed off, pt to follow up with nephrology as outpt.   Pt with HFpEF, initially during course had acute on chronic HF 2/2 Fluid overload from renal failure now resolved on bumex. seen by cardiology ECHO EF 61 %.  Dysphagia, seen by speech and swallow, had barium swallow- result pending.   Pt to follow up with pcp.        89M w/ CAD c/b NSTEMI s/p LAD DESx2(2022), HFpEF, s/p PPM, DM2 on Insulin, CKD3, HTN, HLD, Hx of Dens Fx, Prostate Ca in remission was admitted to MICU (10/30) for DKA w/ ICU course c/b Septic Shock 2/2 Aspiration Pneumonia (now resolved; s/p course of Cefepime) &  Acute Respiratory Failure w/ Hypoxia 2/2 Fluid Overload from Acute on Chronic HFpEF & Acute Renal Failure requiring temporary HD(last HD 11/10, RIJ HD cath now removed). The patient was transferred to medicine service on 11/16 for further medical optimization.  Pt with DM2 w/ course c/b DKA, seen by  Endocrinology consulted in ICU, on ISS,  basal-bolus insulin regiment, glucose controlled. Pt to follow up with endocrinology as outpt.   Pt with Acute Renal Failure on CKD3, seen by nephrology ,  required temporary HD during ICU course however per Nephro no longer required; per chart review--suspected 2/2 severe sepsis/shock, cr improved, nephrology signed off, pt to follow up with nephrology as outpt.   Pt with HFpEF, initially during course had acute on chronic HF 2/2 Fluid overload from renal failure now resolved on bumex. seen by cardiology ECHO EF 61 %.  Dysphagia, seen by speech and swallow, had barium swallow

## 2023-11-21 NOTE — PROGRESS NOTE ADULT - SUBJECTIVE AND OBJECTIVE BOX
CC: 89M admitted for DKA; hospital course c/b Septic Shock 2/2 Aspiration Pneumonia(now resolved) & Acute Respiratory Failure w/ Hypoxia/Acute on Chronic Diastolic HF/Acute Renal Failure on CKD    SUBJECTIVE / OVERNIGHT EVENTS:  No acute events overnight. Patient seen and evaluated at bedside. No cp or sob    ROS:  no fever    MEDICATIONS  (STANDING):  albuterol/ipratropium for Nebulization 3 milliLiter(s) Nebulizer every 6 hours  albuterol/ipratropium for Nebulization. 3 milliLiter(s) Nebulizer once  allopurinol 100 milliGRAM(s) Oral daily  aspirin  chewable 81 milliGRAM(s) Oral daily  bacitracin   Ointment 1 Application(s) Topical daily  buMETAnide 2 milliGRAM(s) Oral daily  calcium acetate 667 milliGRAM(s) Oral three times a day with meals  carvedilol 12.5 milliGRAM(s) Oral every 12 hours  chlorhexidine 2% Cloths 1 Application(s) Topical <User Schedule>  citalopram 20 milliGRAM(s) Oral daily  clopidogrel Tablet 75 milliGRAM(s) Oral daily  dextrose 5%. 1000 milliLiter(s) (50 mL/Hr) IV Continuous <Continuous>  dextrose 5%. 1000 milliLiter(s) (100 mL/Hr) IV Continuous <Continuous>  dextrose 50% Injectable 50 milliLiter(s) IV Push every 15 minutes  gabapentin 300 milliGRAM(s) Oral daily  glucagon  Injectable 1 milliGRAM(s) IntraMuscular once  heparin   Injectable 5000 Unit(s) SubCutaneous every 8 hours  insulin glargine Injectable (LANTUS) 20 Unit(s) SubCutaneous at bedtime  insulin lispro (ADMELOG) corrective regimen sliding scale   SubCutaneous three times a day before meals  insulin lispro (ADMELOG) corrective regimen sliding scale   SubCutaneous at bedtime  insulin lispro Injectable (ADMELOG) 10 Unit(s) SubCutaneous three times a day before meals  melatonin 5 milliGRAM(s) Oral at bedtime  pantoprazole    Tablet 40 milliGRAM(s) Oral before breakfast  polyethylene glycol 3350 17 Gram(s) Oral daily  senna 2 Tablet(s) Oral at bedtime  triamcinolone 0.1% Cream 1 Application(s) Topical two times a day    MEDICATIONS  (PRN):  acetaminophen     Tablet .. 650 milliGRAM(s) Oral every 6 hours PRN Temp greater or equal to 38C (100.4F), Mild Pain (1 - 3)  albuterol    90 MICROgram(s) HFA Inhaler 2 Puff(s) Inhalation every 4 hours PRN Shortness of Breath and/or Wheezing  clonazePAM  Tablet 1 milliGRAM(s) Oral at bedtime PRN Anxiety  dextrose Oral Gel 15 Gram(s) Oral once PRN Blood Glucose LESS THAN 70 milliGRAM(s)/deciliter  dextrose Oral Gel 15 Gram(s) Oral once PRN Blood Glucose LESS THAN 70 milliGRAM(s)/deciliter      CAPILLARY BLOOD GLUCOSE      POCT Blood Glucose.: 117 mg/dL (21 Nov 2023 16:26)  POCT Blood Glucose.: 118 mg/dL (21 Nov 2023 11:45)  POCT Blood Glucose.: 141 mg/dL (21 Nov 2023 07:40)  POCT Blood Glucose.: 217 mg/dL (20 Nov 2023 21:08)    I&O's Summary      Physical Exam  Vital Signs Last 24 Hrs  T(C): 35.9 (21 Nov 2023 14:32), Max: 36.4 (21 Nov 2023 05:12)  T(F): 96.6 (21 Nov 2023 14:32), Max: 97.6 (21 Nov 2023 05:12)  HR: 76 (21 Nov 2023 14:32) (70 - 86)  BP: 101/49 (21 Nov 2023 14:32) (101/49 - 140/60)  RR: 18 (21 Nov 2023 05:12) (18 - 18)  SpO2: 100% (20 Nov 2023 20:53) (100% - 100%)    HEAD:  Atraumatic, Normocephalic  Lung: normal work of breathing, no rhonchi appreciated  Cardiovascular: S1&S2+, rrr, no m/r/g appreciated  ABDOMEN: soft, nt  : condom cath+, no suprapubic pain to palpation  SKIN: warm and dry, no visible purulence in exposed areas    LABS:                        8.5    3.62  )-----------( 172      ( 21 Nov 2023 06:25 )             26.2     11-21    131<L>  |  94<L>  |  69<HH>  ----------------------------<  130<H>  4.4   |  26  |  2.1<H>    Ca    8.1<L>      21 Nov 2023 06:25  Phos  4.3     11-21  Mg     1.8     11-21    TPro  6.2  /  Alb  3.1<L>  /  TBili  0.4  /  DBili  x   /  AST  22  /  ALT  21  /  AlkPhos  89  11-20          Urinalysis Basic - ( 21 Nov 2023 06:25 )    Color: x / Appearance: x / SG: x / pH: x  Gluc: 130 mg/dL / Ketone: x  / Bili: x / Urobili: x   Blood: x / Protein: x / Nitrite: x   Leuk Esterase: x / RBC: x / WBC x   Sq Epi: x / Non Sq Epi: x / Bacteria: x          RADIOLOGY & ADDITIONAL TESTS:  Results Reviewed:   Imaging Personally Reviewed:  Electrocardiogram Personally Reviewed:    COORDINATION OF CARE:  Care Discussed with Consultants/Other Providers [Y/N]:  Prior or Outpatient Records Reviewed [Y/N]:

## 2023-11-21 NOTE — SWALLOW BEDSIDE ASSESSMENT ADULT - ASR SWALLOW RECOMMEND DIAG
FEES
SLP to plan for MBSS this week./VFSS/MBS
will repeat instrumental prior to d/c now that overall status is improving
Further investigate swallow function/integrity- possibly Monday after HD/FEES
SLP to plan for MBSS this week./VFSS/MBS
Further investigate swallow function/integrity- possibly Monday after HD/FEES
will repeat instrumental prior to d/c now that overall status is improving
Further investigate swallow function/integrity/VFSS/MBS/FEES

## 2023-11-21 NOTE — SWALLOW VFSS/MBS ASSESSMENT ADULT - PHARYNGEAL PHASE COMMENTS
Moderate pharyngeal dysphagia for thins with aspiration and weak/inconsistent cough response. Mild-mod pharyngeal dysphagia for mildly thick, moderately thick, and pureed solids with mild-mod diffuse pharyngeal residue after the initial swallow but no aspiration.
Moderate pharyngeal dysphagia for thins with aspiration and weak/inconsistent cough response. Mild-mod pharyngeal dysphagia for mildly thick, moderately thick, and pureed solids with mild-mod diffuse pharyngeal residue after the initial swallow but no aspiration.

## 2023-11-21 NOTE — SWALLOW VFSS/MBS ASSESSMENT ADULT - ASPIRATION DURING THE SWALLOW - SILENT
for thins, inconsistent cough response/Trace
for thins, inconsistent cough response. Immediate upon trigger of swallow/Trace

## 2023-11-21 NOTE — DISCHARGE NOTE NURSING/CASE MANAGEMENT/SOCIAL WORK - PATIENT PORTAL LINK FT
You can access the FollowMyHealth Patient Portal offered by [facility] by registering at the following website: [email or web address]. By joining [facility]’s FollowMyHealth portal, you will also be able to view your health information using other applications (apps) compatible with our system.

## 2023-11-21 NOTE — SWALLOW VFSS/MBS ASSESSMENT ADULT - ADDITIONAL RECOMMENDATIONS
SLP services to f/u. Will require repeat instrumental before further liquid upgrade. Consider upgrading solids at the bedside.
SLP to f/u. Solids can be upgraded further with dentures in place.

## 2023-11-21 NOTE — DISCHARGE NOTE PROVIDER - CARE PROVIDER_API CALL
Thomas Parker  Internal Medicine  [address] [place] [address]  Phone: [phone]  Fax: [phone]  Follow Up Time: 1 week    Shelia Grullon  Nephrology  [address] [place] [id number]  Phone: [phone]  Fax: [phone]  Follow Up Time: 1 week    Santa Gerber  Endocrinology/Metab/Diabetes  [address] [place] [address], [address]  [place] [address]  Phone: [phone]  Fax: [phone]  Follow Up Time: 1 week

## 2023-11-21 NOTE — SWALLOW VFSS/MBS ASSESSMENT ADULT - SLP GENERAL OBSERVATIONS
Pt received in radiology suite on RA. Off tele orders in place for MBSS.
Pt received in radiology suite on RA.

## 2023-11-21 NOTE — PROGRESS NOTE ADULT - PROBLEM SELECTOR PROBLEM 5
CAD (coronary artery disease)
HTN (hypertension)

## 2023-11-21 NOTE — CHART NOTE - NSCHARTNOTEFT_GEN_A_CORE
called and spoke with patient's son Bert. He is working with case management team on d/c planning for rehab at [facility].       as goals are clear, palliative team will sign off. please re-consult PRN. [phone]

## 2023-11-21 NOTE — DISCHARGE NOTE PROVIDER - PROVIDER TOKENS
PROVIDER:[TOKEN:[[id number]],FOLLOWUP:[1 week]],PROVIDER:[TOKEN:[[id number]],FOLLOWUP:[1 week]],PROVIDER:[TOKEN:[[id number]],FOLLOWUP:[1 week]]

## 2023-11-21 NOTE — SWALLOW VFSS/MBS ASSESSMENT ADULT - SLP PERTINENT HISTORY OF CURRENT PROBLEM
89 yo male PMHx sig for DM, CAD-bypass, CHF, HTN, HLD presents w sob x few days, no cough fever or chills.  pt placed on NRBM by EMS and subsequently on BiPAP in ED, O2 sat now 99%.  Pt found to have elevated glucose w high AG and b-hydroxybutarate c/w DKA
89 yo male PMHx sig for DM, CAD-bypass, CHF, HTN, HLD presents w sob x few days, no cough fever or chills.  pt placed on NRBM by EMS and subsequently on BiPAP in ED, O2 sat now 99%.  Pt found to have elevated glucose w high AG and b-hydroxybutarate c/w DKA

## 2023-11-21 NOTE — DISCHARGE NOTE PROVIDER - CARE PROVIDERS DIRECT ADDRESSES
[email or web address],[email or web address],DirectAddress_Unknown

## 2023-11-22 LAB
ALBUMIN SERPL ELPH-MCNC: 2.9 G/DL — LOW (ref 3.5–5.2)
ALBUMIN SERPL ELPH-MCNC: 2.9 G/DL — LOW (ref 3.5–5.2)
ALP SERPL-CCNC: 85 U/L — SIGNIFICANT CHANGE UP (ref 30–115)
ALP SERPL-CCNC: 85 U/L — SIGNIFICANT CHANGE UP (ref 30–115)
ALT FLD-CCNC: 24 U/L — SIGNIFICANT CHANGE UP (ref 0–41)
ALT FLD-CCNC: 24 U/L — SIGNIFICANT CHANGE UP (ref 0–41)
ANION GAP SERPL CALC-SCNC: 11 MMOL/L — SIGNIFICANT CHANGE UP (ref 7–14)
ANION GAP SERPL CALC-SCNC: 11 MMOL/L — SIGNIFICANT CHANGE UP (ref 7–14)
ANION GAP SERPL CALC-SCNC: 13 MMOL/L — SIGNIFICANT CHANGE UP (ref 7–14)
ANION GAP SERPL CALC-SCNC: 13 MMOL/L — SIGNIFICANT CHANGE UP (ref 7–14)
AST SERPL-CCNC: 25 U/L — SIGNIFICANT CHANGE UP (ref 0–41)
AST SERPL-CCNC: 25 U/L — SIGNIFICANT CHANGE UP (ref 0–41)
BASOPHILS # BLD AUTO: 0.02 K/UL — SIGNIFICANT CHANGE UP (ref 0–0.2)
BASOPHILS # BLD AUTO: 0.02 K/UL — SIGNIFICANT CHANGE UP (ref 0–0.2)
BASOPHILS NFR BLD AUTO: 0.6 % — SIGNIFICANT CHANGE UP (ref 0–1)
BASOPHILS NFR BLD AUTO: 0.6 % — SIGNIFICANT CHANGE UP (ref 0–1)
BILIRUB SERPL-MCNC: 0.3 MG/DL — SIGNIFICANT CHANGE UP (ref 0.2–1.2)
BILIRUB SERPL-MCNC: 0.3 MG/DL — SIGNIFICANT CHANGE UP (ref 0.2–1.2)
BUN SERPL-MCNC: 58 MG/DL — HIGH (ref 10–20)
BUN SERPL-MCNC: 58 MG/DL — HIGH (ref 10–20)
BUN SERPL-MCNC: 59 MG/DL — HIGH (ref 10–20)
BUN SERPL-MCNC: 59 MG/DL — HIGH (ref 10–20)
CALCIUM SERPL-MCNC: 8 MG/DL — LOW (ref 8.4–10.5)
CALCIUM SERPL-MCNC: 8 MG/DL — LOW (ref 8.4–10.5)
CALCIUM SERPL-MCNC: 8.5 MG/DL — SIGNIFICANT CHANGE UP (ref 8.4–10.5)
CALCIUM SERPL-MCNC: 8.5 MG/DL — SIGNIFICANT CHANGE UP (ref 8.4–10.5)
CHLORIDE SERPL-SCNC: 92 MMOL/L — LOW (ref 98–110)
CO2 SERPL-SCNC: 23 MMOL/L — SIGNIFICANT CHANGE UP (ref 17–32)
CO2 SERPL-SCNC: 23 MMOL/L — SIGNIFICANT CHANGE UP (ref 17–32)
CO2 SERPL-SCNC: 26 MMOL/L — SIGNIFICANT CHANGE UP (ref 17–32)
CO2 SERPL-SCNC: 26 MMOL/L — SIGNIFICANT CHANGE UP (ref 17–32)
CREAT SERPL-MCNC: 2 MG/DL — HIGH (ref 0.7–1.5)
CREAT SERPL-MCNC: 2 MG/DL — HIGH (ref 0.7–1.5)
CREAT SERPL-MCNC: 2.1 MG/DL — HIGH (ref 0.7–1.5)
CREAT SERPL-MCNC: 2.1 MG/DL — HIGH (ref 0.7–1.5)
EGFR: 29 ML/MIN/1.73M2 — LOW
EGFR: 29 ML/MIN/1.73M2 — LOW
EGFR: 31 ML/MIN/1.73M2 — LOW
EGFR: 31 ML/MIN/1.73M2 — LOW
EOSINOPHIL # BLD AUTO: 0.16 K/UL — SIGNIFICANT CHANGE UP (ref 0–0.7)
EOSINOPHIL # BLD AUTO: 0.16 K/UL — SIGNIFICANT CHANGE UP (ref 0–0.7)
EOSINOPHIL NFR BLD AUTO: 4.6 % — SIGNIFICANT CHANGE UP (ref 0–8)
EOSINOPHIL NFR BLD AUTO: 4.6 % — SIGNIFICANT CHANGE UP (ref 0–8)
GLUCOSE BLDC GLUCOMTR-MCNC: 138 MG/DL — HIGH (ref 70–99)
GLUCOSE BLDC GLUCOMTR-MCNC: 138 MG/DL — HIGH (ref 70–99)
GLUCOSE BLDC GLUCOMTR-MCNC: 150 MG/DL — HIGH (ref 70–99)
GLUCOSE BLDC GLUCOMTR-MCNC: 150 MG/DL — HIGH (ref 70–99)
GLUCOSE BLDC GLUCOMTR-MCNC: 216 MG/DL — HIGH (ref 70–99)
GLUCOSE BLDC GLUCOMTR-MCNC: 216 MG/DL — HIGH (ref 70–99)
GLUCOSE SERPL-MCNC: 153 MG/DL — HIGH (ref 70–99)
GLUCOSE SERPL-MCNC: 153 MG/DL — HIGH (ref 70–99)
GLUCOSE SERPL-MCNC: 199 MG/DL — HIGH (ref 70–99)
GLUCOSE SERPL-MCNC: 199 MG/DL — HIGH (ref 70–99)
HCT VFR BLD CALC: 26.1 % — LOW (ref 42–52)
HCT VFR BLD CALC: 26.1 % — LOW (ref 42–52)
HGB BLD-MCNC: 8.4 G/DL — LOW (ref 14–18)
HGB BLD-MCNC: 8.4 G/DL — LOW (ref 14–18)
IMM GRANULOCYTES NFR BLD AUTO: 1.1 % — HIGH (ref 0.1–0.3)
IMM GRANULOCYTES NFR BLD AUTO: 1.1 % — HIGH (ref 0.1–0.3)
LYMPHOCYTES # BLD AUTO: 0.89 K/UL — LOW (ref 1.2–3.4)
LYMPHOCYTES # BLD AUTO: 0.89 K/UL — LOW (ref 1.2–3.4)
LYMPHOCYTES # BLD AUTO: 25.4 % — SIGNIFICANT CHANGE UP (ref 20.5–51.1)
LYMPHOCYTES # BLD AUTO: 25.4 % — SIGNIFICANT CHANGE UP (ref 20.5–51.1)
MAGNESIUM SERPL-MCNC: 1.7 MG/DL — LOW (ref 1.8–2.4)
MAGNESIUM SERPL-MCNC: 1.7 MG/DL — LOW (ref 1.8–2.4)
MCHC RBC-ENTMCNC: 29 PG — SIGNIFICANT CHANGE UP (ref 27–31)
MCHC RBC-ENTMCNC: 29 PG — SIGNIFICANT CHANGE UP (ref 27–31)
MCHC RBC-ENTMCNC: 32.2 G/DL — SIGNIFICANT CHANGE UP (ref 32–37)
MCHC RBC-ENTMCNC: 32.2 G/DL — SIGNIFICANT CHANGE UP (ref 32–37)
MCV RBC AUTO: 90 FL — SIGNIFICANT CHANGE UP (ref 80–94)
MCV RBC AUTO: 90 FL — SIGNIFICANT CHANGE UP (ref 80–94)
MONOCYTES # BLD AUTO: 0.33 K/UL — SIGNIFICANT CHANGE UP (ref 0.1–0.6)
MONOCYTES # BLD AUTO: 0.33 K/UL — SIGNIFICANT CHANGE UP (ref 0.1–0.6)
MONOCYTES NFR BLD AUTO: 9.4 % — HIGH (ref 1.7–9.3)
MONOCYTES NFR BLD AUTO: 9.4 % — HIGH (ref 1.7–9.3)
NEUTROPHILS # BLD AUTO: 2.06 K/UL — SIGNIFICANT CHANGE UP (ref 1.4–6.5)
NEUTROPHILS # BLD AUTO: 2.06 K/UL — SIGNIFICANT CHANGE UP (ref 1.4–6.5)
NEUTROPHILS NFR BLD AUTO: 58.9 % — SIGNIFICANT CHANGE UP (ref 42.2–75.2)
NEUTROPHILS NFR BLD AUTO: 58.9 % — SIGNIFICANT CHANGE UP (ref 42.2–75.2)
NRBC # BLD: 0 /100 WBCS — SIGNIFICANT CHANGE UP (ref 0–0)
NRBC # BLD: 0 /100 WBCS — SIGNIFICANT CHANGE UP (ref 0–0)
PHOSPHATE SERPL-MCNC: 3.9 MG/DL — SIGNIFICANT CHANGE UP (ref 2.1–4.9)
PHOSPHATE SERPL-MCNC: 3.9 MG/DL — SIGNIFICANT CHANGE UP (ref 2.1–4.9)
PLATELET # BLD AUTO: 156 K/UL — SIGNIFICANT CHANGE UP (ref 130–400)
PLATELET # BLD AUTO: 156 K/UL — SIGNIFICANT CHANGE UP (ref 130–400)
PMV BLD: 10.6 FL — HIGH (ref 7.4–10.4)
PMV BLD: 10.6 FL — HIGH (ref 7.4–10.4)
POTASSIUM SERPL-MCNC: 4.5 MMOL/L — SIGNIFICANT CHANGE UP (ref 3.5–5)
POTASSIUM SERPL-MCNC: 4.5 MMOL/L — SIGNIFICANT CHANGE UP (ref 3.5–5)
POTASSIUM SERPL-MCNC: 5.1 MMOL/L — HIGH (ref 3.5–5)
POTASSIUM SERPL-MCNC: 5.1 MMOL/L — HIGH (ref 3.5–5)
POTASSIUM SERPL-SCNC: 4.5 MMOL/L — SIGNIFICANT CHANGE UP (ref 3.5–5)
POTASSIUM SERPL-SCNC: 4.5 MMOL/L — SIGNIFICANT CHANGE UP (ref 3.5–5)
POTASSIUM SERPL-SCNC: 5.1 MMOL/L — HIGH (ref 3.5–5)
POTASSIUM SERPL-SCNC: 5.1 MMOL/L — HIGH (ref 3.5–5)
PROT SERPL-MCNC: 5.3 G/DL — LOW (ref 6–8)
PROT SERPL-MCNC: 5.3 G/DL — LOW (ref 6–8)
RBC # BLD: 2.9 M/UL — LOW (ref 4.7–6.1)
RBC # BLD: 2.9 M/UL — LOW (ref 4.7–6.1)
RBC # FLD: 15.4 % — HIGH (ref 11.5–14.5)
RBC # FLD: 15.4 % — HIGH (ref 11.5–14.5)
SODIUM SERPL-SCNC: 128 MMOL/L — LOW (ref 135–146)
SODIUM SERPL-SCNC: 128 MMOL/L — LOW (ref 135–146)
SODIUM SERPL-SCNC: 129 MMOL/L — LOW (ref 135–146)
SODIUM SERPL-SCNC: 129 MMOL/L — LOW (ref 135–146)
WBC # BLD: 3.5 K/UL — LOW (ref 4.8–10.8)
WBC # BLD: 3.5 K/UL — LOW (ref 4.8–10.8)
WBC # FLD AUTO: 3.5 K/UL — LOW (ref 4.8–10.8)
WBC # FLD AUTO: 3.5 K/UL — LOW (ref 4.8–10.8)

## 2023-11-22 PROCEDURE — 99232 SBSQ HOSP IP/OBS MODERATE 35: CPT

## 2023-11-22 PROCEDURE — 99223 1ST HOSP IP/OBS HIGH 75: CPT

## 2023-11-22 RX ORDER — HEPARIN SODIUM 5000 [USP'U]/ML
5000 INJECTION INTRAVENOUS; SUBCUTANEOUS EVERY 12 HOURS
Refills: 0 | Status: DISCONTINUED | OUTPATIENT
Start: 2023-11-22 | End: 2023-11-28

## 2023-11-22 RX ORDER — TRAMADOL HYDROCHLORIDE 50 MG/1
25 TABLET ORAL ONCE
Refills: 0 | Status: DISCONTINUED | OUTPATIENT
Start: 2023-11-22 | End: 2023-11-22

## 2023-11-22 RX ORDER — LIDOCAINE 4 G/100G
2 CREAM TOPICAL EVERY 24 HOURS
Refills: 0 | Status: DISCONTINUED | OUTPATIENT
Start: 2023-11-22 | End: 2023-11-28

## 2023-11-22 RX ADMIN — Medication 10 UNIT(S): at 07:57

## 2023-11-22 RX ADMIN — Medication 1 APPLICATION(S): at 05:22

## 2023-11-22 RX ADMIN — BUMETANIDE 2 MILLIGRAM(S): 0.25 INJECTION INTRAMUSCULAR; INTRAVENOUS at 05:06

## 2023-11-22 RX ADMIN — Medication 667 MILLIGRAM(S): at 12:49

## 2023-11-22 RX ADMIN — Medication 0: at 21:06

## 2023-11-22 RX ADMIN — GABAPENTIN 300 MILLIGRAM(S): 400 CAPSULE ORAL at 12:49

## 2023-11-22 RX ADMIN — PANTOPRAZOLE SODIUM 40 MILLIGRAM(S): 20 TABLET, DELAYED RELEASE ORAL at 05:04

## 2023-11-22 RX ADMIN — Medication 1 MILLIGRAM(S): at 21:06

## 2023-11-22 RX ADMIN — Medication 1 APPLICATION(S): at 17:55

## 2023-11-22 RX ADMIN — Medication 1 APPLICATION(S): at 13:45

## 2023-11-22 RX ADMIN — Medication 81 MILLIGRAM(S): at 12:49

## 2023-11-22 RX ADMIN — TRAMADOL HYDROCHLORIDE 25 MILLIGRAM(S): 50 TABLET ORAL at 17:50

## 2023-11-22 RX ADMIN — CLOPIDOGREL BISULFATE 75 MILLIGRAM(S): 75 TABLET, FILM COATED ORAL at 12:50

## 2023-11-22 RX ADMIN — Medication 667 MILLIGRAM(S): at 07:57

## 2023-11-22 RX ADMIN — Medication 2: at 12:50

## 2023-11-22 RX ADMIN — Medication 650 MILLIGRAM(S): at 05:08

## 2023-11-22 RX ADMIN — CARVEDILOL PHOSPHATE 12.5 MILLIGRAM(S): 80 CAPSULE, EXTENDED RELEASE ORAL at 05:05

## 2023-11-22 RX ADMIN — Medication 2: at 07:57

## 2023-11-22 RX ADMIN — HEPARIN SODIUM 5000 UNIT(S): 5000 INJECTION INTRAVENOUS; SUBCUTANEOUS at 17:51

## 2023-11-22 RX ADMIN — Medication 10 UNIT(S): at 17:47

## 2023-11-22 RX ADMIN — INSULIN GLARGINE 20 UNIT(S): 100 INJECTION, SOLUTION SUBCUTANEOUS at 21:05

## 2023-11-22 RX ADMIN — Medication 667 MILLIGRAM(S): at 17:51

## 2023-11-22 RX ADMIN — LIDOCAINE 2 PATCH: 4 CREAM TOPICAL at 17:54

## 2023-11-22 RX ADMIN — HEPARIN SODIUM 5000 UNIT(S): 5000 INJECTION INTRAVENOUS; SUBCUTANEOUS at 05:05

## 2023-11-22 RX ADMIN — CITALOPRAM 20 MILLIGRAM(S): 10 TABLET, FILM COATED ORAL at 12:50

## 2023-11-22 RX ADMIN — CHLORHEXIDINE GLUCONATE 1 APPLICATION(S): 213 SOLUTION TOPICAL at 05:08

## 2023-11-22 RX ADMIN — Medication 10 UNIT(S): at 12:50

## 2023-11-22 RX ADMIN — Medication 100 MILLIGRAM(S): at 12:49

## 2023-11-22 RX ADMIN — CARVEDILOL PHOSPHATE 12.5 MILLIGRAM(S): 80 CAPSULE, EXTENDED RELEASE ORAL at 17:55

## 2023-11-22 NOTE — PROGRESS NOTE ADULT - SUBJECTIVE AND OBJECTIVE BOX
Patient is a 89y old  Male who presents with a chief complaint of 89M admitted for DKA (21 Nov 2023 17:37)        MEDICATIONS  (STANDING):  albuterol/ipratropium for Nebulization 3 milliLiter(s) Nebulizer every 6 hours  albuterol/ipratropium for Nebulization. 3 milliLiter(s) Nebulizer once  allopurinol 100 milliGRAM(s) Oral daily  aspirin  chewable 81 milliGRAM(s) Oral daily  bacitracin   Ointment 1 Application(s) Topical daily  buMETAnide 2 milliGRAM(s) Oral daily  calcium acetate 667 milliGRAM(s) Oral three times a day with meals  carvedilol 12.5 milliGRAM(s) Oral every 12 hours  chlorhexidine 2% Cloths 1 Application(s) Topical <User Schedule>  citalopram 20 milliGRAM(s) Oral daily  clopidogrel Tablet 75 milliGRAM(s) Oral daily  dextrose 5%. 1000 milliLiter(s) (100 mL/Hr) IV Continuous <Continuous>  dextrose 5%. 1000 milliLiter(s) (50 mL/Hr) IV Continuous <Continuous>  dextrose 50% Injectable 50 milliLiter(s) IV Push every 15 minutes  gabapentin 300 milliGRAM(s) Oral daily  glucagon  Injectable 1 milliGRAM(s) IntraMuscular once  heparin   Injectable 5000 Unit(s) SubCutaneous every 12 hours  insulin glargine Injectable (LANTUS) 20 Unit(s) SubCutaneous at bedtime  insulin lispro (ADMELOG) corrective regimen sliding scale   SubCutaneous three times a day before meals  insulin lispro (ADMELOG) corrective regimen sliding scale   SubCutaneous at bedtime  insulin lispro Injectable (ADMELOG) 10 Unit(s) SubCutaneous three times a day before meals  melatonin 5 milliGRAM(s) Oral at bedtime  pantoprazole    Tablet 40 milliGRAM(s) Oral before breakfast  polyethylene glycol 3350 17 Gram(s) Oral daily  senna 2 Tablet(s) Oral at bedtime  triamcinolone 0.1% Cream 1 Application(s) Topical two times a day    MEDICATIONS  (PRN):  acetaminophen     Tablet .. 650 milliGRAM(s) Oral every 6 hours PRN Temp greater or equal to 38C (100.4F), Mild Pain (1 - 3)  albuterol    90 MICROgram(s) HFA Inhaler 2 Puff(s) Inhalation every 4 hours PRN Shortness of Breath and/or Wheezing  clonazePAM  Tablet 1 milliGRAM(s) Oral at bedtime PRN Anxiety  dextrose Oral Gel 15 Gram(s) Oral once PRN Blood Glucose LESS THAN 70 milliGRAM(s)/deciliter  dextrose Oral Gel 15 Gram(s) Oral once PRN Blood Glucose LESS THAN 70 milliGRAM(s)/deciliter      CAPILLARY BLOOD GLUCOSE  POCT Blood Glucose.: 230 mg/dL (22 Nov 2023 07:43)  POCT Blood Glucose.: 74 mg/dL (21 Nov 2023 20:46)  POCT Blood Glucose.: 117 mg/dL (21 Nov 2023 16:26)  POCT Blood Glucose.: 118 mg/dL (21 Nov 2023 11:45)    I&O's Summary    21 Nov 2023 07:01  -  22 Nov 2023 07:00  --------------------------------------------------------  IN: 0 mL / OUT: 1400 mL / NET: -1400 mL        PHYSICAL EXAM:  Vital Signs Last 24 Hrs  T(C): 36.3 (22 Nov 2023 04:20), Max: 36.3 (22 Nov 2023 04:20)  T(F): 97.4 (22 Nov 2023 04:20), Max: 97.4 (22 Nov 2023 04:20)  HR: 92 (22 Nov 2023 04:20) (72 - 92)  BP: 151/71 (22 Nov 2023 04:20) (101/49 - 151/71)  BP(mean): --  RR: 18 (22 Nov 2023 04:20) (18 - 18)  SpO2: 93% (22 Nov 2023 04:20) (93% - 93%)      GENERAL: No acute distress, well-developed  HEAD:  Atraumatic, Normocephalic  EYES: EOMI, PERRLA, conjunctiva and sclera clear  NECK: Supple, no lymphadenopathy, no JVD  CHEST/LUNG: CTAB; No wheezes, rales, or rhonchi  HEART: Regular rate and rhythm; No murmurs, rubs, or gallops  ABDOMEN: Soft, non-tender, non-distended; normal bowel sounds, no organomegaly  EXTREMITIES:  2+ peripheral pulses b/l, No clubbing, cyanosis, or edema  NEUROLOGY: A&O x 3, no focal deficits  SKIN: No rashes or lesions    LABS:                        8.4    3.50  )-----------( 156      ( 22 Nov 2023 06:29 )             26.1     11-22    128<L>  |  92<L>  |  59<H>  ----------------------------<  199<H>  4.5   |  23  |  2.0<H>    Ca    8.0<L>      22 Nov 2023 06:29  Phos  3.9     11-22  Mg     1.7     11-22    TPro  5.3<L>  /  Alb  2.9<L>  /  TBili  0.3  /  DBili  x   /  AST  25  /  ALT  24  /  AlkPhos  85  11-22    Urinalysis Basic - ( 22 Nov 2023 06:29 )    Color: x / Appearance: x / SG: x / pH: x  Gluc: 199 mg/dL / Ketone: x  / Bili: x / Urobili: x   Blood: x / Protein: x / Nitrite: x   Leuk Esterase: x / RBC: x / WBC x   Sq Epi: x / Non Sq Epi: x / Bacteria: x

## 2023-11-22 NOTE — SWALLOW BEDSIDE ASSESSMENT ADULT - COMMENTS
MBSS without significant change from prior. Revealed Moderate pharyngeal dysphagia for thins with aspiration and weak/inconsistent cough response. Mild-mod pharyngeal dysphagia for mildly thick, moderately thick, and pureed solids with mild-mod diffuse pharyngeal residue after the initial swallow. This residue partially cleared with use of secondary swallow and alternating foods/liquids.  Rec: Minced & moist /mildly thick. Secondary swallows, alternate bites/sips

## 2023-11-22 NOTE — CONSULT NOTE ADULT - SUBJECTIVE AND OBJECTIVE BOX
Chief complaint/Reason for consult: subglottic mass?    HPI: 89 yo male PMHx sig for DM, CAD-bypass, CHF, HTN, HLD presents w sob x few days, no cough fever or chills.  pt placed on NRBM by EMS and subsequently on BiPAP in ED, O2 sat now 99%.  Pt found to have elevated glucose w high AG and b-hydroxybutarate c/w DKA    GI updates: 89 yo pmh DM, CAD-bypass, HTN, HLD admitted with DKA. GI consulted for dysphagia and subepiglottic pharyngeal mass. Patient eating diet as per speech and swallow at current.  No hematemesis, melena, blood in stool reported.     PAST MEDICAL & SURGICAL HISTORY:   CHF (congestive heart failure)      DM (diabetes mellitus)      HTN (hypertension)      Prostate CA  in remission      Pacemaker      H/O total knee replacement, right            Family history:  FAMILY HISTORY:  FH: type 2 diabetes (Father)      No GI cancers in first or second degree relatives    Social History: No smoking. No alcohol. No illegal drug use.    Allergies:   No Known Allergies  Intolerances      MEDICATIONS: Home Medications:  allopurinol 100 mg oral tablet: 1 tab(s) orally once a day (25 Oct 2021 22:30)  citalopram 20 mg oral tablet: 1 tab(s) orally once a day (25 Oct 2021 22:30)  clonazePAM 1 mg oral tablet: 2 tab(s) orally once a day (at bedtime), As Needed (25 Oct 2021 22:30)  gabapentin 300 mg oral capsule: 1 cap(s) orally 2 times a day (25 Oct 2021 22:30)  glimepiride 4 mg oral tablet: 1 tab(s) orally 2 times a day (25 Oct 2021 22:30)  Lantus 100 units/mL subcutaneous solution: 30 unit(s) subcutaneous once a day (at bedtime) (25 Oct 2021 22:30)  NIFEdipine 60 mg oral tablet, extended release: 1 tab(s) orally once a day (25 Oct 2021 22:30)    MEDICATIONS  (STANDING):  albuterol/ipratropium for Nebulization 3 milliLiter(s) Nebulizer every 6 hours  albuterol/ipratropium for Nebulization. 3 milliLiter(s) Nebulizer once  allopurinol 100 milliGRAM(s) Oral daily  aspirin  chewable 81 milliGRAM(s) Oral daily  bacitracin   Ointment 1 Application(s) Topical daily  buMETAnide 2 milliGRAM(s) Oral daily  calcium acetate 667 milliGRAM(s) Oral three times a day with meals  carvedilol 12.5 milliGRAM(s) Oral every 12 hours  chlorhexidine 2% Cloths 1 Application(s) Topical <User Schedule>  citalopram 20 milliGRAM(s) Oral daily  clopidogrel Tablet 75 milliGRAM(s) Oral daily  dextrose 5%. 1000 milliLiter(s) (50 mL/Hr) IV Continuous <Continuous>  dextrose 5%. 1000 milliLiter(s) (100 mL/Hr) IV Continuous <Continuous>  dextrose 50% Injectable 50 milliLiter(s) IV Push every 15 minutes  gabapentin 300 milliGRAM(s) Oral daily  glucagon  Injectable 1 milliGRAM(s) IntraMuscular once  heparin   Injectable 5000 Unit(s) SubCutaneous every 12 hours  insulin glargine Injectable (LANTUS) 20 Unit(s) SubCutaneous at bedtime  insulin lispro (ADMELOG) corrective regimen sliding scale   SubCutaneous three times a day before meals  insulin lispro (ADMELOG) corrective regimen sliding scale   SubCutaneous at bedtime  insulin lispro Injectable (ADMELOG) 10 Unit(s) SubCutaneous three times a day before meals  melatonin 5 milliGRAM(s) Oral at bedtime  pantoprazole    Tablet 40 milliGRAM(s) Oral before breakfast  polyethylene glycol 3350 17 Gram(s) Oral daily  senna 2 Tablet(s) Oral at bedtime  triamcinolone 0.1% Cream 1 Application(s) Topical two times a day    MEDICATIONS  (PRN):  acetaminophen     Tablet .. 650 milliGRAM(s) Oral every 6 hours PRN Temp greater or equal to 38C (100.4F), Mild Pain (1 - 3)  albuterol    90 MICROgram(s) HFA Inhaler 2 Puff(s) Inhalation every 4 hours PRN Shortness of Breath and/or Wheezing  clonazePAM  Tablet 1 milliGRAM(s) Oral at bedtime PRN Anxiety  dextrose Oral Gel 15 Gram(s) Oral once PRN Blood Glucose LESS THAN 70 milliGRAM(s)/deciliter  dextrose Oral Gel 15 Gram(s) Oral once PRN Blood Glucose LESS THAN 70 milliGRAM(s)/deciliter        REVIEW OF SYSTEMS  General:  no weight loss, fevers, or chills.  Eyes:  No reported pain or visual changes  ENT:  No sore throat or runny nose.  NECK: No stiffness or lymphadenopathy  CV:  No chest pain or palpitations.  Resp:  No shortness of breath, cough, wheezing or hemoptysis  GI:  No abdominal pain, nausea, vomiting, +dysphagia, no diarrhea or constipation. No rectal bleeding, melena, or hematemesis.  Neuro:  No tingling, numbness       VITALS:   T(F): 97.4 (11-22-23 @ 04:20), Max: 97.4 (11-22-23 @ 04:20)  HR: 92 (11-22-23 @ 04:20) (72 - 92)  BP: 151/71 (11-22-23 @ 04:20) (101/49 - 151/71)  RR: 18 (11-22-23 @ 04:20) (18 - 18)  SpO2: 93% (11-22-23 @ 04:20) (93% - 93%)    PHYSICAL EXAM:  GENERAL: AAOx3, no acute distress.  HEAD:  Atraumatic, Normocephalic  EYES: conjunctiva and sclera clear  NECK: Supple, No thyromegaly   CHEST/LUNG: Clear to auscultation bilaterally; No wheeze, rhonchi, or rales  HEART: Regular rate and rhythm; normal S1, S2, No murmurs.  ABDOMEN: Soft, nontender, nondistended; Bowel sounds present  NEUROLOGY: No asterixis or tremor  SKIN: Intact, no jaundice          LABS:  11-22    128<L>  |  92<L>  |  59<H>  ----------------------------<  199<H>  4.5   |  23  |  2.0<H>    Ca    8.0<L>      22 Nov 2023 06:29  Phos  3.9     11-22  Mg     1.7     11-22    TPro  5.3<L>  /  Alb  2.9<L>  /  TBili  0.3  /  DBili  x   /  AST  25  /  ALT  24  /  AlkPhos  85  11-22                          8.4    3.50  )-----------( 156      ( 22 Nov 2023 06:29 )             26.1     LIVER FUNCTIONS - ( 22 Nov 2023 06:29 )  Alb: 2.9 g/dL / Pro: 5.3 g/dL / ALK PHOS: 85 U/L / ALT: 24 U/L / AST: 25 U/L / GGT: x               IMAGING:    < from: CT Neck Soft Tissue No Cont (11.21.23 @ 18:42) >    ******PRELIMINARY REPORT******      ******PRELIMINARY REPORT******         ACC: [id number] EXAM:  CT NECK SOFT TISSUE   ORDERED BY: IRINA BIRD     PROCEDURE DATE:  11/21/2023    ******PRELIMINARY REPORT******      ******PRELIMINARY REPORT******           INTERPRETATION:  INDICATIONS:  Pharyngeal mass.    TECHNIQUE: CT of the neck was obtained without administration of IV   contrast. Multiplanar reformations were submitted for review.    COMPARISON EXAMINATION:  CT cervical spine 10/30/2023.    FINDINGS/  IMPRESSION:    A focused preliminary read was performed as [facility] reported a cervical   spine fracture.    Review of images and priors reveals unchanged healed nonunionized   odontoid fracture with slight posterior angulation without significant   change.    Full attending neuroradiologist report to follow in the a.m.              ******PRELIMINARY REPORT******      ******PRELIMINARY REPORT******       SUNDEEP MATTHEW MD; Resident Radiologist  This document is a PRELIMINARY interpretationand is pending final   attending approval. Nov 21 2023 10:24PM    < end of copied text >

## 2023-11-22 NOTE — CONSULT NOTE ADULT - PROVIDER SPECIALTY LIST ADULT
Wound Care
Wound Care
Cardiology
Neurology
Nephrology
Endocrinology
Gastroenterology
Infectious Disease
Nutrition Support
Nutrition Support
Pulmonology
Palliative Care

## 2023-11-22 NOTE — CONSULT NOTE ADULT - CONSULT REQUESTED BY NAME
dr carrillo
primary team
ICU
Critical Care Team
Dr Rudy Chavez
Dr Grant
service
Medicine
hospitalist
icu
Primary team

## 2023-11-22 NOTE — CONSULT NOTE ADULT - CONSULT REQUESTED DATE/TIME
01-Nov-2023
06-Nov-2023 12:10
13-Nov-2023 12:07
31-Oct-2023 10:00
01-Nov-2023 11:39
07-Nov-2023 11:16
31-Oct-2023 07:31
31-Oct-2023 10:16
01-Nov-2023 20:02
22-Nov-2023 12:30
31-Oct-2023 11:59
07-Nov-2023 14:44

## 2023-11-22 NOTE — CONSULT NOTE ADULT - CONSULT REASON
DKA
sepsis
uncontrolled diabetes
Skin assessment and treatment recommendation
PARKER
Skin assessment and treatment  recommendation
SOB
altered mental status
subglottic mass?
NPO
NPO on BiPAP
GOC

## 2023-11-22 NOTE — CONSULT NOTE ADULT - NS ATTEND AMEND GEN_ALL_CORE FT
Patient seen and examined and agree with above except as noted.  Patients history, notes, labs, imaging, vitals and meds reviewed personally.    Plan as above
I edited the note

## 2023-11-22 NOTE — CONSULT NOTE ADULT - ASSESSMENT
89 yo pmh DM, CAD-bypass, HTN, HLD admitted with DKA. GI consulted for dysphagia and subepiglottic pharyngeal mass. Patient eating diet as per speech and swallow at current.      Problem 1-Dysphagia  subglottal mass?  Rec  -ENT eval for laryngoscopy   no acute GI intervention  -diet as per speech and swallow    89 yo pmh DM, CAD-bypass, HTN, HLD admitted with DKA. GI consulted for dysphagia and subepiglottic pharyngeal mass. Patient eating diet as per speech and swallow at current.      Problem 1-Dysphagia  subepiglottic pharyngeal mass?  Rec  -ENT eval for laryngoscopy   -no acute GI intervention  -ppi ppx  -diet as per speech and swallow       Recall GI if needed

## 2023-11-22 NOTE — PROGRESS NOTE ADULT - ASSESSMENT
89M w/ CAD c/b NSTEMI s/p LAD DESx2(2022), HFpEF, s/p PPM, DM2 on Insulin, CKD3, HTN, HLD, Hx of Dens Fx, Prostate Ca in remission was admitted to MICU (10/30) for DKA w/ ICU course c/b Septic Shock 2/2 Aspiration Pneumonia (now resolved; s/p course of Cefepime) &  Acute Respiratory Failure w/ Hypoxia 2/2 Fluid Overload from Acute on Chronic HFpEF & Acute Renal Failure requiring temporary HD(last HD 11/10, RIJ HD cath now removed). The patient was transferred to medicine service on 11/16 for further medical optimization. Pending [facility] placement  ***Note on 11/21 during modified barium swallow patient identified to have possible esophageal/pharyngeal mass under epiglottis. CT Neck w/o co ordered, GI consult placed give dysphagia      Dysphagia with Hypovolemic Hyponatremia   - Det per speech & swallow, avoid IVF given recent Overload requiring Supplemental Oxygen   - CT neck soft tissue: no acute Findings, Radiology recs for contrast if looking for mass  -Pts Kidney function is improving will avoid contrast for now, likely out patient   - GI consult appreciated, recs for ENT eval for Laryngoscope   ****ENT Eval will be O/P, team dose not come to  [facility]     DM2 w/ course c/b DKA  - Endocrinology consulted in ICU  - c/w start basal-bolus insulin regimen at lower dosing and tolerating    Acute Renal Failure on CKD3: improving   - Nephrology consulted & a/w management  - required temporary HD during ICU course however per Nephro no longer required; per chart review--suspected 2/2 severe sepsis/shock  - monitor CrCl daily, improved since initial transfer from ICU and is making urine    Acute on chronic HF 2/2 Fluid overload from renal failure: now resolved  Chronic Diastolic CHF: euvolemic on exam  - c/w Bumex    CAD s/p Stents + Hypertension  - c/w Aspirin, Clopidogrel, Carvedilol    DVT ppx- HSQ  Dispo- plan for [facility]  GOC- Palliative care consulted in ICU, patient is DNR  -Daily D/C anticipation

## 2023-11-23 LAB
ALBUMIN SERPL ELPH-MCNC: 3.1 G/DL — LOW (ref 3.5–5.2)
ALBUMIN SERPL ELPH-MCNC: 3.1 G/DL — LOW (ref 3.5–5.2)
ALP SERPL-CCNC: 94 U/L — SIGNIFICANT CHANGE UP (ref 30–115)
ALP SERPL-CCNC: 94 U/L — SIGNIFICANT CHANGE UP (ref 30–115)
ALT FLD-CCNC: 25 U/L — SIGNIFICANT CHANGE UP (ref 0–41)
ALT FLD-CCNC: 25 U/L — SIGNIFICANT CHANGE UP (ref 0–41)
ANION GAP SERPL CALC-SCNC: 12 MMOL/L — SIGNIFICANT CHANGE UP (ref 7–14)
ANION GAP SERPL CALC-SCNC: 12 MMOL/L — SIGNIFICANT CHANGE UP (ref 7–14)
AST SERPL-CCNC: 26 U/L — SIGNIFICANT CHANGE UP (ref 0–41)
AST SERPL-CCNC: 26 U/L — SIGNIFICANT CHANGE UP (ref 0–41)
BASOPHILS # BLD AUTO: 0.03 K/UL — SIGNIFICANT CHANGE UP (ref 0–0.2)
BASOPHILS # BLD AUTO: 0.03 K/UL — SIGNIFICANT CHANGE UP (ref 0–0.2)
BASOPHILS NFR BLD AUTO: 0.9 % — SIGNIFICANT CHANGE UP (ref 0–1)
BASOPHILS NFR BLD AUTO: 0.9 % — SIGNIFICANT CHANGE UP (ref 0–1)
BILIRUB SERPL-MCNC: 0.4 MG/DL — SIGNIFICANT CHANGE UP (ref 0.2–1.2)
BILIRUB SERPL-MCNC: 0.4 MG/DL — SIGNIFICANT CHANGE UP (ref 0.2–1.2)
BUN SERPL-MCNC: 52 MG/DL — HIGH (ref 10–20)
BUN SERPL-MCNC: 52 MG/DL — HIGH (ref 10–20)
CALCIUM SERPL-MCNC: 8.4 MG/DL — SIGNIFICANT CHANGE UP (ref 8.4–10.5)
CALCIUM SERPL-MCNC: 8.4 MG/DL — SIGNIFICANT CHANGE UP (ref 8.4–10.5)
CHLORIDE SERPL-SCNC: 94 MMOL/L — LOW (ref 98–110)
CHLORIDE SERPL-SCNC: 94 MMOL/L — LOW (ref 98–110)
CO2 SERPL-SCNC: 23 MMOL/L — SIGNIFICANT CHANGE UP (ref 17–32)
CO2 SERPL-SCNC: 23 MMOL/L — SIGNIFICANT CHANGE UP (ref 17–32)
CREAT SERPL-MCNC: 1.8 MG/DL — HIGH (ref 0.7–1.5)
CREAT SERPL-MCNC: 1.8 MG/DL — HIGH (ref 0.7–1.5)
EGFR: 35 ML/MIN/1.73M2 — LOW
EGFR: 35 ML/MIN/1.73M2 — LOW
EOSINOPHIL # BLD AUTO: 0.13 K/UL — SIGNIFICANT CHANGE UP (ref 0–0.7)
EOSINOPHIL # BLD AUTO: 0.13 K/UL — SIGNIFICANT CHANGE UP (ref 0–0.7)
EOSINOPHIL NFR BLD AUTO: 3.8 % — SIGNIFICANT CHANGE UP (ref 0–8)
EOSINOPHIL NFR BLD AUTO: 3.8 % — SIGNIFICANT CHANGE UP (ref 0–8)
GLUCOSE BLDC GLUCOMTR-MCNC: 193 MG/DL — HIGH (ref 70–99)
GLUCOSE BLDC GLUCOMTR-MCNC: 193 MG/DL — HIGH (ref 70–99)
GLUCOSE BLDC GLUCOMTR-MCNC: 266 MG/DL — HIGH (ref 70–99)
GLUCOSE BLDC GLUCOMTR-MCNC: 266 MG/DL — HIGH (ref 70–99)
GLUCOSE BLDC GLUCOMTR-MCNC: 88 MG/DL — SIGNIFICANT CHANGE UP (ref 70–99)
GLUCOSE BLDC GLUCOMTR-MCNC: 88 MG/DL — SIGNIFICANT CHANGE UP (ref 70–99)
GLUCOSE SERPL-MCNC: 161 MG/DL — HIGH (ref 70–99)
GLUCOSE SERPL-MCNC: 161 MG/DL — HIGH (ref 70–99)
HCT VFR BLD CALC: 28.8 % — LOW (ref 42–52)
HCT VFR BLD CALC: 28.8 % — LOW (ref 42–52)
HGB BLD-MCNC: 9.5 G/DL — LOW (ref 14–18)
HGB BLD-MCNC: 9.5 G/DL — LOW (ref 14–18)
IMM GRANULOCYTES NFR BLD AUTO: 1.5 % — HIGH (ref 0.1–0.3)
IMM GRANULOCYTES NFR BLD AUTO: 1.5 % — HIGH (ref 0.1–0.3)
LYMPHOCYTES # BLD AUTO: 0.86 K/UL — LOW (ref 1.2–3.4)
LYMPHOCYTES # BLD AUTO: 0.86 K/UL — LOW (ref 1.2–3.4)
LYMPHOCYTES # BLD AUTO: 25.1 % — SIGNIFICANT CHANGE UP (ref 20.5–51.1)
LYMPHOCYTES # BLD AUTO: 25.1 % — SIGNIFICANT CHANGE UP (ref 20.5–51.1)
MAGNESIUM SERPL-MCNC: 1.8 MG/DL — SIGNIFICANT CHANGE UP (ref 1.8–2.4)
MAGNESIUM SERPL-MCNC: 1.8 MG/DL — SIGNIFICANT CHANGE UP (ref 1.8–2.4)
MCHC RBC-ENTMCNC: 29 PG — SIGNIFICANT CHANGE UP (ref 27–31)
MCHC RBC-ENTMCNC: 29 PG — SIGNIFICANT CHANGE UP (ref 27–31)
MCHC RBC-ENTMCNC: 33 G/DL — SIGNIFICANT CHANGE UP (ref 32–37)
MCHC RBC-ENTMCNC: 33 G/DL — SIGNIFICANT CHANGE UP (ref 32–37)
MCV RBC AUTO: 87.8 FL — SIGNIFICANT CHANGE UP (ref 80–94)
MCV RBC AUTO: 87.8 FL — SIGNIFICANT CHANGE UP (ref 80–94)
MONOCYTES # BLD AUTO: 0.33 K/UL — SIGNIFICANT CHANGE UP (ref 0.1–0.6)
MONOCYTES # BLD AUTO: 0.33 K/UL — SIGNIFICANT CHANGE UP (ref 0.1–0.6)
MONOCYTES NFR BLD AUTO: 9.6 % — HIGH (ref 1.7–9.3)
MONOCYTES NFR BLD AUTO: 9.6 % — HIGH (ref 1.7–9.3)
NEUTROPHILS # BLD AUTO: 2.03 K/UL — SIGNIFICANT CHANGE UP (ref 1.4–6.5)
NEUTROPHILS # BLD AUTO: 2.03 K/UL — SIGNIFICANT CHANGE UP (ref 1.4–6.5)
NEUTROPHILS NFR BLD AUTO: 59.1 % — SIGNIFICANT CHANGE UP (ref 42.2–75.2)
NEUTROPHILS NFR BLD AUTO: 59.1 % — SIGNIFICANT CHANGE UP (ref 42.2–75.2)
NRBC # BLD: 0 /100 WBCS — SIGNIFICANT CHANGE UP (ref 0–0)
NRBC # BLD: 0 /100 WBCS — SIGNIFICANT CHANGE UP (ref 0–0)
PHOSPHATE SERPL-MCNC: 3.8 MG/DL — SIGNIFICANT CHANGE UP (ref 2.1–4.9)
PHOSPHATE SERPL-MCNC: 3.8 MG/DL — SIGNIFICANT CHANGE UP (ref 2.1–4.9)
PLATELET # BLD AUTO: 148 K/UL — SIGNIFICANT CHANGE UP (ref 130–400)
PLATELET # BLD AUTO: 148 K/UL — SIGNIFICANT CHANGE UP (ref 130–400)
PMV BLD: 10.1 FL — SIGNIFICANT CHANGE UP (ref 7.4–10.4)
PMV BLD: 10.1 FL — SIGNIFICANT CHANGE UP (ref 7.4–10.4)
POTASSIUM SERPL-MCNC: 4.7 MMOL/L — SIGNIFICANT CHANGE UP (ref 3.5–5)
POTASSIUM SERPL-MCNC: 4.7 MMOL/L — SIGNIFICANT CHANGE UP (ref 3.5–5)
POTASSIUM SERPL-SCNC: 4.7 MMOL/L — SIGNIFICANT CHANGE UP (ref 3.5–5)
POTASSIUM SERPL-SCNC: 4.7 MMOL/L — SIGNIFICANT CHANGE UP (ref 3.5–5)
PREALB SERPL-MCNC: 26 MG/DL — SIGNIFICANT CHANGE UP (ref 20–40)
PREALB SERPL-MCNC: 26 MG/DL — SIGNIFICANT CHANGE UP (ref 20–40)
PROT SERPL-MCNC: 5.7 G/DL — LOW (ref 6–8)
PROT SERPL-MCNC: 5.7 G/DL — LOW (ref 6–8)
RBC # BLD: 3.28 M/UL — LOW (ref 4.7–6.1)
RBC # BLD: 3.28 M/UL — LOW (ref 4.7–6.1)
RBC # FLD: 15.6 % — HIGH (ref 11.5–14.5)
RBC # FLD: 15.6 % — HIGH (ref 11.5–14.5)
SODIUM SERPL-SCNC: 129 MMOL/L — LOW (ref 135–146)
SODIUM SERPL-SCNC: 129 MMOL/L — LOW (ref 135–146)
WBC # BLD: 3.43 K/UL — LOW (ref 4.8–10.8)
WBC # BLD: 3.43 K/UL — LOW (ref 4.8–10.8)
WBC # FLD AUTO: 3.43 K/UL — LOW (ref 4.8–10.8)
WBC # FLD AUTO: 3.43 K/UL — LOW (ref 4.8–10.8)

## 2023-11-23 PROCEDURE — 99232 SBSQ HOSP IP/OBS MODERATE 35: CPT

## 2023-11-23 RX ADMIN — CARVEDILOL PHOSPHATE 12.5 MILLIGRAM(S): 80 CAPSULE, EXTENDED RELEASE ORAL at 16:08

## 2023-11-23 RX ADMIN — Medication 667 MILLIGRAM(S): at 08:30

## 2023-11-23 RX ADMIN — HEPARIN SODIUM 5000 UNIT(S): 5000 INJECTION INTRAVENOUS; SUBCUTANEOUS at 17:52

## 2023-11-23 RX ADMIN — CLOPIDOGREL BISULFATE 75 MILLIGRAM(S): 75 TABLET, FILM COATED ORAL at 12:24

## 2023-11-23 RX ADMIN — Medication 1 MILLIGRAM(S): at 21:06

## 2023-11-23 RX ADMIN — POLYETHYLENE GLYCOL 3350 17 GRAM(S): 17 POWDER, FOR SOLUTION ORAL at 12:24

## 2023-11-23 RX ADMIN — BUMETANIDE 2 MILLIGRAM(S): 0.25 INJECTION INTRAMUSCULAR; INTRAVENOUS at 05:49

## 2023-11-23 RX ADMIN — LIDOCAINE 2 PATCH: 4 CREAM TOPICAL at 05:50

## 2023-11-23 RX ADMIN — INSULIN GLARGINE 20 UNIT(S): 100 INJECTION, SOLUTION SUBCUTANEOUS at 22:15

## 2023-11-23 RX ADMIN — Medication 650 MILLIGRAM(S): at 12:26

## 2023-11-23 RX ADMIN — Medication 3: at 12:25

## 2023-11-23 RX ADMIN — CHLORHEXIDINE GLUCONATE 1 APPLICATION(S): 213 SOLUTION TOPICAL at 05:50

## 2023-11-23 RX ADMIN — Medication 100 MILLIGRAM(S): at 12:24

## 2023-11-23 RX ADMIN — Medication 5 MILLIGRAM(S): at 21:06

## 2023-11-23 RX ADMIN — Medication 650 MILLIGRAM(S): at 10:15

## 2023-11-23 RX ADMIN — Medication 667 MILLIGRAM(S): at 16:11

## 2023-11-23 RX ADMIN — LIDOCAINE 2 PATCH: 4 CREAM TOPICAL at 19:33

## 2023-11-23 RX ADMIN — CITALOPRAM 20 MILLIGRAM(S): 10 TABLET, FILM COATED ORAL at 12:24

## 2023-11-23 RX ADMIN — LIDOCAINE 2 PATCH: 4 CREAM TOPICAL at 16:01

## 2023-11-23 RX ADMIN — Medication 10 UNIT(S): at 12:24

## 2023-11-23 RX ADMIN — Medication 1 APPLICATION(S): at 05:51

## 2023-11-23 RX ADMIN — CARVEDILOL PHOSPHATE 12.5 MILLIGRAM(S): 80 CAPSULE, EXTENDED RELEASE ORAL at 05:49

## 2023-11-23 RX ADMIN — GABAPENTIN 300 MILLIGRAM(S): 400 CAPSULE ORAL at 12:23

## 2023-11-23 RX ADMIN — SENNA PLUS 2 TABLET(S): 8.6 TABLET ORAL at 21:06

## 2023-11-23 RX ADMIN — PANTOPRAZOLE SODIUM 40 MILLIGRAM(S): 20 TABLET, DELAYED RELEASE ORAL at 06:01

## 2023-11-23 RX ADMIN — Medication 667 MILLIGRAM(S): at 12:24

## 2023-11-23 RX ADMIN — Medication 10 UNIT(S): at 08:22

## 2023-11-23 RX ADMIN — Medication 81 MILLIGRAM(S): at 12:23

## 2023-11-23 RX ADMIN — Medication 1 APPLICATION(S): at 12:15

## 2023-11-23 RX ADMIN — Medication 650 MILLIGRAM(S): at 21:51

## 2023-11-23 RX ADMIN — Medication 650 MILLIGRAM(S): at 20:51

## 2023-11-23 RX ADMIN — HEPARIN SODIUM 5000 UNIT(S): 5000 INJECTION INTRAVENOUS; SUBCUTANEOUS at 05:50

## 2023-11-23 RX ADMIN — Medication 1 APPLICATION(S): at 17:52

## 2023-11-23 NOTE — PROGRESS NOTE ADULT - SUBJECTIVE AND OBJECTIVE BOX
Patient is a 89y old  Male who presents with a chief complaint of sob (22 Nov 2023 12:29)          MEDICATIONS  (STANDING):  albuterol/ipratropium for Nebulization 3 milliLiter(s) Nebulizer every 6 hours  albuterol/ipratropium for Nebulization. 3 milliLiter(s) Nebulizer once  allopurinol 100 milliGRAM(s) Oral daily  aspirin  chewable 81 milliGRAM(s) Oral daily  bacitracin   Ointment 1 Application(s) Topical daily  buMETAnide 2 milliGRAM(s) Oral daily  calcium acetate 667 milliGRAM(s) Oral three times a day with meals  carvedilol 12.5 milliGRAM(s) Oral every 12 hours  chlorhexidine 2% Cloths 1 Application(s) Topical <User Schedule>  citalopram 20 milliGRAM(s) Oral daily  clopidogrel Tablet 75 milliGRAM(s) Oral daily  dextrose 5%. 1000 milliLiter(s) (50 mL/Hr) IV Continuous <Continuous>  dextrose 5%. 1000 milliLiter(s) (100 mL/Hr) IV Continuous <Continuous>  dextrose 50% Injectable 50 milliLiter(s) IV Push every 15 minutes  gabapentin 300 milliGRAM(s) Oral daily  glucagon  Injectable 1 milliGRAM(s) IntraMuscular once  heparin   Injectable 5000 Unit(s) SubCutaneous every 12 hours  insulin glargine Injectable (LANTUS) 20 Unit(s) SubCutaneous at bedtime  insulin lispro (ADMELOG) corrective regimen sliding scale   SubCutaneous three times a day before meals  insulin lispro (ADMELOG) corrective regimen sliding scale   SubCutaneous at bedtime  insulin lispro Injectable (ADMELOG) 10 Unit(s) SubCutaneous three times a day before meals  lidocaine   4% Patch 2 Patch Transdermal every 24 hours  melatonin 5 milliGRAM(s) Oral at bedtime  pantoprazole    Tablet 40 milliGRAM(s) Oral before breakfast  polyethylene glycol 3350 17 Gram(s) Oral daily  senna 2 Tablet(s) Oral at bedtime  triamcinolone 0.1% Cream 1 Application(s) Topical two times a day    MEDICATIONS  (PRN):  acetaminophen     Tablet .. 650 milliGRAM(s) Oral every 6 hours PRN Temp greater or equal to 38C (100.4F), Mild Pain (1 - 3)  albuterol    90 MICROgram(s) HFA Inhaler 2 Puff(s) Inhalation every 4 hours PRN Shortness of Breath and/or Wheezing  clonazePAM  Tablet 1 milliGRAM(s) Oral at bedtime PRN Anxiety  dextrose Oral Gel 15 Gram(s) Oral once PRN Blood Glucose LESS THAN 70 milliGRAM(s)/deciliter  dextrose Oral Gel 15 Gram(s) Oral once PRN Blood Glucose LESS THAN 70 milliGRAM(s)/deciliter      CAPILLARY BLOOD GLUCOSE      POCT Blood Glucose.: 138 mg/dL (22 Nov 2023 20:46)  POCT Blood Glucose.: 150 mg/dL (22 Nov 2023 16:56)  POCT Blood Glucose.: 216 mg/dL (22 Nov 2023 12:07)    I&O's Summary    22 Nov 2023 07:01  -  23 Nov 2023 07:00  --------------------------------------------------------  IN: 0 mL / OUT: 1400 mL / NET: -1400 mL        PHYSICAL EXAM:  Vital Signs Last 24 Hrs  T(C): 35.6 (23 Nov 2023 04:25), Max: 35.8 (22 Nov 2023 13:45)  T(F): 96.1 (23 Nov 2023 04:25), Max: 96.5 (22 Nov 2023 13:45)  HR: 82 (23 Nov 2023 04:25) (76 - 82)  BP: 148/66 (23 Nov 2023 04:25) (121/58 - 148/66)  BP(mean): --  RR: 18 (23 Nov 2023 04:25) (18 - 18)  SpO2: 100% (23 Nov 2023 04:25) (99% - 100%)      GENERAL: No acute distress, well-developed  HEAD:  Atraumatic, Normocephalic  EYES: EOMI, PERRLA, conjunctiva and sclera clear  NECK: Supple, no lymphadenopathy, no JVD  CHEST/LUNG: CTAB; No wheezes, rales, or rhonchi  HEART: Regular rate and rhythm; No murmurs, rubs, or gallops  ABDOMEN: Soft, non-tender, non-distended; normal bowel sounds, no organomegaly  EXTREMITIES:  2+ peripheral pulses b/l, No clubbing, cyanosis, or edema  NEUROLOGY: A&O x 2-3, no focal deficits  SKIN: No rashes or lesions    LABS:                        9.5    3.43  )-----------( 148      ( 23 Nov 2023 04:30 )             28.8     11-23    129<L>  |  94<L>  |  52<H>  ----------------------------<  161<H>  4.7   |  23  |  1.8<H>    Ca    8.4      23 Nov 2023 04:30  Phos  3.8     11-23  Mg     1.8     11-23    TPro  5.7<L>  /  Alb  3.1<L>  /  TBili  0.4  /  DBili  x   /  AST  26  /  ALT  25  /  AlkPhos  94  11-23      Urinalysis Basic - ( 23 Nov 2023 04:30 )    Color: x / Appearance: x / SG: x / pH: x  Gluc: 161 mg/dL / Ketone: x  / Bili: x / Urobili: x   Blood: x / Protein: x / Nitrite: x   Leuk Esterase: x / RBC: x / WBC x   Sq Epi: x / Non Sq Epi: x / Bacteria: x

## 2023-11-23 NOTE — PROGRESS NOTE ADULT - ASSESSMENT
89M w/ CAD c/b NSTEMI s/p LAD DESx2(2022), HFpEF, s/p PPM, DM2 on Insulin, CKD3, HTN, HLD, Hx of Dens Fx, Prostate Ca in remission was admitted to MICU (10/30) for DKA w/ ICU course c/b Septic Shock 2/2 Aspiration Pneumonia (now resolved; s/p course of Cefepime) &  Acute Respiratory Failure w/ Hypoxia 2/2 Fluid Overload from Acute on Chronic HFpEF & Acute Renal Failure requiring temporary HD(last HD 11/10, RIJ HD cath now removed). The patient was transferred to medicine service on 11/16 for further medical optimization. Pending [facility] placement  ***Note on 11/21 during modified barium swallow patient identified to have possible esophageal/pharyngeal mass under epiglottis. CT Neck w/o co ordered, GI consult placed give dysphagia      Dysphagia with Hypovolemic Hyponatremia   - Det per speech & swallow, avoid IVF given recent Overload requiring Supplemental Oxygen   - CT neck soft tissue: no acute Findings, Radiology recs for contrast if looking for mass  -Pts Kidney function is improving will avoid contrast for now, likely out patient   - GI consult appreciated, recs for ENT eval for Laryngoscope   ****ENT Eval will be O/P, team dose not come to  [facility]     DM2 w/ course c/b DKA: now Resolved   - Endocrinology consulted in ICU  - c/w  basal-bolus insulin regimen at lower dosing and tolerating    Acute Renal Failure on CKD3: improving   - Nephrology consulted & a/w management  - required temporary HD during ICU course   - monitor CrCl daily    Acute on chronic HF 2/2 Fluid overload from renal failure: now resolved  Chronic Diastolic CHF: euvolemic on exam  - c/w Bumex    CAD s/p Stents + Hypertension  - c/w Aspirin, Clopidogrel, Carvedilol    DVT ppx- HSQ  Dispo- plan for [facility]  GOC- Palliative care consulted in ICU, patient is DNR  -Daily D/C anticipation pending improvement of Sodium levels

## 2023-11-24 LAB
ANION GAP SERPL CALC-SCNC: 11 MMOL/L — SIGNIFICANT CHANGE UP (ref 7–14)
ANION GAP SERPL CALC-SCNC: 11 MMOL/L — SIGNIFICANT CHANGE UP (ref 7–14)
BUN SERPL-MCNC: 59 MG/DL — HIGH (ref 10–20)
BUN SERPL-MCNC: 59 MG/DL — HIGH (ref 10–20)
CALCIUM SERPL-MCNC: 8.4 MG/DL — SIGNIFICANT CHANGE UP (ref 8.4–10.5)
CALCIUM SERPL-MCNC: 8.4 MG/DL — SIGNIFICANT CHANGE UP (ref 8.4–10.5)
CHLORIDE SERPL-SCNC: 93 MMOL/L — LOW (ref 98–110)
CHLORIDE SERPL-SCNC: 93 MMOL/L — LOW (ref 98–110)
CO2 SERPL-SCNC: 24 MMOL/L — SIGNIFICANT CHANGE UP (ref 17–32)
CO2 SERPL-SCNC: 24 MMOL/L — SIGNIFICANT CHANGE UP (ref 17–32)
CREAT SERPL-MCNC: 2.3 MG/DL — HIGH (ref 0.7–1.5)
CREAT SERPL-MCNC: 2.3 MG/DL — HIGH (ref 0.7–1.5)
EGFR: 26 ML/MIN/1.73M2 — LOW
EGFR: 26 ML/MIN/1.73M2 — LOW
GLUCOSE BLDC GLUCOMTR-MCNC: 136 MG/DL — HIGH (ref 70–99)
GLUCOSE BLDC GLUCOMTR-MCNC: 136 MG/DL — HIGH (ref 70–99)
GLUCOSE BLDC GLUCOMTR-MCNC: 189 MG/DL — HIGH (ref 70–99)
GLUCOSE BLDC GLUCOMTR-MCNC: 189 MG/DL — HIGH (ref 70–99)
GLUCOSE BLDC GLUCOMTR-MCNC: 214 MG/DL — HIGH (ref 70–99)
GLUCOSE BLDC GLUCOMTR-MCNC: 214 MG/DL — HIGH (ref 70–99)
GLUCOSE BLDC GLUCOMTR-MCNC: 94 MG/DL — SIGNIFICANT CHANGE UP (ref 70–99)
GLUCOSE BLDC GLUCOMTR-MCNC: 94 MG/DL — SIGNIFICANT CHANGE UP (ref 70–99)
GLUCOSE SERPL-MCNC: 172 MG/DL — HIGH (ref 70–99)
GLUCOSE SERPL-MCNC: 172 MG/DL — HIGH (ref 70–99)
POTASSIUM SERPL-MCNC: 4.4 MMOL/L — SIGNIFICANT CHANGE UP (ref 3.5–5)
POTASSIUM SERPL-MCNC: 4.4 MMOL/L — SIGNIFICANT CHANGE UP (ref 3.5–5)
POTASSIUM SERPL-SCNC: 4.4 MMOL/L — SIGNIFICANT CHANGE UP (ref 3.5–5)
POTASSIUM SERPL-SCNC: 4.4 MMOL/L — SIGNIFICANT CHANGE UP (ref 3.5–5)
SARS-COV-2 RNA SPEC QL NAA+PROBE: SIGNIFICANT CHANGE UP
SARS-COV-2 RNA SPEC QL NAA+PROBE: SIGNIFICANT CHANGE UP
SODIUM SERPL-SCNC: 128 MMOL/L — LOW (ref 135–146)
SODIUM SERPL-SCNC: 128 MMOL/L — LOW (ref 135–146)

## 2023-11-24 PROCEDURE — 99232 SBSQ HOSP IP/OBS MODERATE 35: CPT

## 2023-11-24 RX ADMIN — Medication 667 MILLIGRAM(S): at 17:14

## 2023-11-24 RX ADMIN — Medication 10 UNIT(S): at 08:08

## 2023-11-24 RX ADMIN — Medication 100 MILLIGRAM(S): at 11:59

## 2023-11-24 RX ADMIN — Medication 10 UNIT(S): at 12:13

## 2023-11-24 RX ADMIN — Medication 10 UNIT(S): at 17:12

## 2023-11-24 RX ADMIN — Medication 1 APPLICATION(S): at 17:13

## 2023-11-24 RX ADMIN — Medication 1 APPLICATION(S): at 12:03

## 2023-11-24 RX ADMIN — POLYETHYLENE GLYCOL 3350 17 GRAM(S): 17 POWDER, FOR SOLUTION ORAL at 12:00

## 2023-11-24 RX ADMIN — PANTOPRAZOLE SODIUM 40 MILLIGRAM(S): 20 TABLET, DELAYED RELEASE ORAL at 06:03

## 2023-11-24 RX ADMIN — Medication 5 MILLIGRAM(S): at 21:03

## 2023-11-24 RX ADMIN — Medication 2: at 12:13

## 2023-11-24 RX ADMIN — Medication 667 MILLIGRAM(S): at 11:59

## 2023-11-24 RX ADMIN — HEPARIN SODIUM 5000 UNIT(S): 5000 INJECTION INTRAVENOUS; SUBCUTANEOUS at 17:14

## 2023-11-24 RX ADMIN — BUMETANIDE 2 MILLIGRAM(S): 0.25 INJECTION INTRAMUSCULAR; INTRAVENOUS at 06:03

## 2023-11-24 RX ADMIN — Medication 667 MILLIGRAM(S): at 07:46

## 2023-11-24 RX ADMIN — CARVEDILOL PHOSPHATE 12.5 MILLIGRAM(S): 80 CAPSULE, EXTENDED RELEASE ORAL at 17:14

## 2023-11-24 RX ADMIN — HEPARIN SODIUM 5000 UNIT(S): 5000 INJECTION INTRAVENOUS; SUBCUTANEOUS at 06:03

## 2023-11-24 RX ADMIN — CARVEDILOL PHOSPHATE 12.5 MILLIGRAM(S): 80 CAPSULE, EXTENDED RELEASE ORAL at 06:03

## 2023-11-24 RX ADMIN — SENNA PLUS 2 TABLET(S): 8.6 TABLET ORAL at 21:02

## 2023-11-24 RX ADMIN — Medication 1 APPLICATION(S): at 06:03

## 2023-11-24 RX ADMIN — CITALOPRAM 20 MILLIGRAM(S): 10 TABLET, FILM COATED ORAL at 12:00

## 2023-11-24 RX ADMIN — LIDOCAINE 2 PATCH: 4 CREAM TOPICAL at 04:05

## 2023-11-24 RX ADMIN — Medication 1 MILLIGRAM(S): at 21:02

## 2023-11-24 RX ADMIN — Medication 81 MILLIGRAM(S): at 11:59

## 2023-11-24 RX ADMIN — GABAPENTIN 300 MILLIGRAM(S): 400 CAPSULE ORAL at 11:59

## 2023-11-24 RX ADMIN — CHLORHEXIDINE GLUCONATE 1 APPLICATION(S): 213 SOLUTION TOPICAL at 06:03

## 2023-11-24 RX ADMIN — Medication 3 MILLILITER(S): at 07:56

## 2023-11-24 RX ADMIN — LIDOCAINE 2 PATCH: 4 CREAM TOPICAL at 19:30

## 2023-11-24 RX ADMIN — Medication 1: at 08:09

## 2023-11-24 RX ADMIN — CLOPIDOGREL BISULFATE 75 MILLIGRAM(S): 75 TABLET, FILM COATED ORAL at 12:16

## 2023-11-24 RX ADMIN — LIDOCAINE 2 PATCH: 4 CREAM TOPICAL at 17:14

## 2023-11-24 NOTE — PROGRESS NOTE ADULT - SUBJECTIVE AND OBJECTIVE BOX
Patient is a 89y old  Male who presents with a chief complaint of sob (23 Nov 2023 10:37)        MEDICATIONS  (STANDING):  albuterol/ipratropium for Nebulization 3 milliLiter(s) Nebulizer every 6 hours  albuterol/ipratropium for Nebulization. 3 milliLiter(s) Nebulizer once  allopurinol 100 milliGRAM(s) Oral daily  aspirin  chewable 81 milliGRAM(s) Oral daily  bacitracin   Ointment 1 Application(s) Topical daily  buMETAnide 2 milliGRAM(s) Oral daily  calcium acetate 667 milliGRAM(s) Oral three times a day with meals  carvedilol 12.5 milliGRAM(s) Oral every 12 hours  chlorhexidine 2% Cloths 1 Application(s) Topical <User Schedule>  citalopram 20 milliGRAM(s) Oral daily  clopidogrel Tablet 75 milliGRAM(s) Oral daily  dextrose 5%. 1000 milliLiter(s) (100 mL/Hr) IV Continuous <Continuous>  dextrose 5%. 1000 milliLiter(s) (50 mL/Hr) IV Continuous <Continuous>  dextrose 50% Injectable 50 milliLiter(s) IV Push every 15 minutes  gabapentin 300 milliGRAM(s) Oral daily  glucagon  Injectable 1 milliGRAM(s) IntraMuscular once  heparin   Injectable 5000 Unit(s) SubCutaneous every 12 hours  insulin glargine Injectable (LANTUS) 20 Unit(s) SubCutaneous at bedtime  insulin lispro (ADMELOG) corrective regimen sliding scale   SubCutaneous three times a day before meals  insulin lispro (ADMELOG) corrective regimen sliding scale   SubCutaneous at bedtime  insulin lispro Injectable (ADMELOG) 10 Unit(s) SubCutaneous three times a day before meals  lidocaine   4% Patch 2 Patch Transdermal every 24 hours  melatonin 5 milliGRAM(s) Oral at bedtime  pantoprazole    Tablet 40 milliGRAM(s) Oral before breakfast  polyethylene glycol 3350 17 Gram(s) Oral daily  senna 2 Tablet(s) Oral at bedtime  triamcinolone 0.1% Cream 1 Application(s) Topical two times a day    MEDICATIONS  (PRN):  acetaminophen     Tablet .. 650 milliGRAM(s) Oral every 6 hours PRN Temp greater or equal to 38C (100.4F), Mild Pain (1 - 3)  albuterol    90 MICROgram(s) HFA Inhaler 2 Puff(s) Inhalation every 4 hours PRN Shortness of Breath and/or Wheezing  clonazePAM  Tablet 1 milliGRAM(s) Oral at bedtime PRN Anxiety  dextrose Oral Gel 15 Gram(s) Oral once PRN Blood Glucose LESS THAN 70 milliGRAM(s)/deciliter  dextrose Oral Gel 15 Gram(s) Oral once PRN Blood Glucose LESS THAN 70 milliGRAM(s)/deciliter      CAPILLARY BLOOD GLUCOSE  POCT Blood Glucose.: 193 mg/dL (23 Nov 2023 21:43)  POCT Blood Glucose.: 88 mg/dL (23 Nov 2023 16:27)  POCT Blood Glucose.: 266 mg/dL (23 Nov 2023 12:23)    I&O's Summary      PHYSICAL EXAM:  Vital Signs Last 24 Hrs  T(C): 36.4 (24 Nov 2023 04:05), Max: 36.4 (24 Nov 2023 04:05)  T(F): 97.6 (24 Nov 2023 04:05), Max: 97.6 (24 Nov 2023 04:05)  HR: 91 (24 Nov 2023 04:05) (77 - 92)  BP: 146/68 (24 Nov 2023 04:05) (122/60 - 158/72)  BP(mean): --  RR: 18 (24 Nov 2023 04:05) (18 - 18)  SpO2: 100% (24 Nov 2023 04:05) (100% - 100%)    Parameters below as of 23 Nov 2023 13:02  Patient On (Oxygen Delivery Method): room air      GENERAL: No acute distress, well-developed  HEAD:  Atraumatic, Normocephalic  EYES: EOMI, PERRLA, conjunctiva and sclera clear  NECK: Supple, no lymphadenopathy, no JVD  CHEST/LUNG: CTAB; No wheezes, rales, or rhonchi  HEART: Regular rate and rhythm; No murmurs, rubs, or gallops  ABDOMEN: Soft, non-tender, non-distended; normal bowel sounds, no organomegaly  EXTREMITIES:  2+ peripheral pulses b/l, No clubbing, cyanosis, or edema  NEUROLOGY: A&O x 3, no focal deficits  SKIN: No rashes or lesions    LABS:                        9.5    3.43  )-----------( 148      ( 23 Nov 2023 04:30 )             28.8     11-23    129<L>  |  94<L>  |  52<H>  ----------------------------<  161<H>  4.7   |  23  |  1.8<H>    Ca    8.4      23 Nov 2023 04:30  Phos  3.8     11-23  Mg     1.8     11-23    TPro  5.7<L>  /  Alb  3.1<L>  /  TBili  0.4  /  DBili  x   /  AST  26  /  ALT  25  /  AlkPhos  94  11-2      Urinalysis Basic - ( 23 Nov 2023 04:30 )    Color: x / Appearance: x / SG: x / pH: x  Gluc: 161 mg/dL / Ketone: x  / Bili: x / Urobili: x   Blood: x / Protein: x / Nitrite: x   Leuk Esterase: x / RBC: x / WBC x   Sq Epi: x / Non Sq Epi: x / Bacteria: x

## 2023-11-24 NOTE — PROGRESS NOTE ADULT - ASSESSMENT
89M w/ CAD c/b NSTEMI s/p LAD DESx2(2022), HFpEF, s/p PPM, DM2 on Insulin, CKD3, HTN, HLD, Hx of Dens Fx, Prostate Ca in remission was admitted to MICU (10/30) for DKA w/ ICU course c/b Septic Shock 2/2 Aspiration Pneumonia (now resolved; s/p course of Cefepime) &  Acute Respiratory Failure w/ Hypoxia 2/2 Fluid Overload from Acute on Chronic HFpEF & Acute Renal Failure requiring temporary HD(last HD 11/10, RIJ HD cath now removed). The patient was transferred to medicine service on 11/16 for further medical optimization. Pending [facility] placement  ***Note on 11/21 during modified barium swallow patient identified to have possible esophageal/pharyngeal mass under epiglottis. CT Neck w/o co ordered, GI consult placed give dysphagia      Dysphagia with Hypovolemic Hyponatremia   - Det per speech & swallow, avoid IVF given recent Overload requiring Supplemental Oxygen   - CT neck soft tissue: no acute Findings, Radiology recs for contrast if looking for mass  -Pts Kidney function is improving will avoid contrast for now, likely out patient   - GI consult appreciated, recs for ENT eval for Laryngoscope   ****ENT Eval will be O/P, team dose not come to  [facility]     DM2 w/ course c/b DKA: now Resolved   - Endocrinology consulted in ICU  - c/w  basal-bolus insulin regimen at lower dosing and tolerating    Acute Renal Failure on CKD3: improving   - Nephrology consulted & a/w management  - required temporary HD during ICU course   - monitor CrCl daily    Acute on chronic HF 2/2 Fluid overload from renal failure: now resolved  Chronic Diastolic CHF: euvolemic on exam  - c/w Bumex    CAD s/p Stents + Hypertension  - c/w Aspirin, Clopidogrel, Carvedilol    DVT ppx- HSQ  Dispo- plan for [facility]  GOC- Palliative care consulted in ICU, patient is DNR  -Daily D/C anticipation to NH

## 2023-11-24 NOTE — CHART NOTE - NSCHARTNOTEFT_GEN_A_CORE
Registered Dietitian Follow-Up  assessment completed remotely     Patient Profile Reviewed                           Yes [x]   No []     Nutrition History Previously Obtained        Yes []  No []       Pertinent  Information:  pt is 89 year old male with hx of DM, CAD,-bypass, CHF, HTN, presents with SOB bipap warranted. pt found to be in DKA (presently resolved), septic with PARKER,  pulm edema fluid overload and aspiration PNA.  s/p udall placement HD 11/3-no longer warranted. Pt found to have possible esophageal/pharyngeal mass under epiglottis, GI recommends ENT eval as outpt. pt followed by SLP      Diet, Minced and Moist:   Consistent Carbohydrate {Evening Snack}  Mildly Thick Liquids (MILDTHICKLIQS)  No Concentrated Potassium  No Concentrated Phosphorus (11-19-23 @ 16:27) [Active]         Anthropometrics:  - Ht. 175 cm  - Wt. 84.8 kgs vs 86.7 kg upon admission  - %wt change  - BMI   - IBW      Pertinent Lab Data:  11-24    128<L>  |  93<L>  |  59<H>  ----------------------------<  172<H>  4.4   |  24  |  2.3<H>    Ca    8.4      24 Nov 2023 07:57  Phos  3.8     11-23  Mg     1.8     11-23    TPro  5.7<L>  /  Alb  3.1<L>  /  TBili  0.4  /  DBili  x   /  AST  26  /  ALT  25  /  AlkPhos  94  11-23                          9.5    3.43  )-----------( 148      ( 23 Nov 2023 04:30 )             28.8        Pertinent Meds:  MEDICATIONS  (STANDING):  albuterol/ipratropium for Nebulization 3 milliLiter(s) Nebulizer every 6 hours  allopurinol 100 milliGRAM(s) Oral daily  buMETAnide 2 milliGRAM(s) Oral daily  calcium acetate 667 milliGRAM(s) Oral three times a day with meals  carvedilol 12.5 milliGRAM(s) Oral every 12 hours  citalopram 20 milliGRAM(s) Oral daily  clopidogrel Tablet 75 milliGRAM(s) Oral daily  gabapentin 300 milliGRAM(s) Oral daily  melatonin 5 milliGRAM(s) Oral at bedtime  pantoprazole    Tablet 40 milliGRAM(s) Oral before breakfast  polyethylene glycol 3350 17 Gram(s) Oral daily  senna 2 Tablet(s) Oral at bedtime    MEDICATIONS  (PRN):  acetaminophen     Tablet .. 650 milliGRAM(s) Oral every 6 hours PRN Temp greater or equal to 38C (100.4F), Mild Pain (1 - 3)  albuterol    90 MICROgram(s) HFA Inhaler 2 Puff(s) Inhalation every 4 hours PRN Shortness of Breath and/or Wheezing  clonazePAM  Tablet 1 milliGRAM(s) Oral at bedtime PRN Anxiety  dextrose Oral Gel 15 Gram(s) Oral once PRN Blood Glucose LESS THAN 70 milliGRAM(s)/deciliter  dextrose Oral Gel 15 Gram(s) Oral once PRN Blood Glucose LESS THAN 70 milliGRAM(s)/deciliter       Physical Findings:  - Appearance:  - GI function: + BS  - Tubes: n/a  - Oral/Mouth cavity:  - Skin: L nostril partial thickness ulceration     Nutrition Requirements  Weight Used: 84.8 kgs     Estimated Energy Needs:  7136-7796 kcals (20-25kcal/kg/BW)  84.8-102g protein (1-1.2g/kg/BW)  1;1 kcal for est fluid needs      Nutrient Intake: remains fair at 25-75% of meals consumed with assistance        [] Previous Nutrition Diagnosis:            [X] Ongoing          [] Resolved    inadequate por-energy intake remains     Nutrition Intervention: recommend to d/c renal restrictions, maintain on CHO consistent minced and moist with mildly thick, add magic cup reduced sugar with meals (290 kcals, 9g protein) 1:1 feed     Goal/Expected Outcome: pt to tolerate po diet and meet at least 50-75% of est needs within 3-5 days     Indicator/Monitoring: RD to monitor tolerance to po diet, labs/meds, NFPF and f/u as needed within 3-5 days  high risk

## 2023-11-25 LAB
GLUCOSE BLDC GLUCOMTR-MCNC: 102 MG/DL — HIGH (ref 70–99)
GLUCOSE BLDC GLUCOMTR-MCNC: 102 MG/DL — HIGH (ref 70–99)
GLUCOSE BLDC GLUCOMTR-MCNC: 177 MG/DL — HIGH (ref 70–99)
GLUCOSE BLDC GLUCOMTR-MCNC: 177 MG/DL — HIGH (ref 70–99)
GLUCOSE BLDC GLUCOMTR-MCNC: 205 MG/DL — HIGH (ref 70–99)
GLUCOSE BLDC GLUCOMTR-MCNC: 205 MG/DL — HIGH (ref 70–99)
GLUCOSE BLDC GLUCOMTR-MCNC: 214 MG/DL — HIGH (ref 70–99)
GLUCOSE BLDC GLUCOMTR-MCNC: 214 MG/DL — HIGH (ref 70–99)

## 2023-11-25 PROCEDURE — 99232 SBSQ HOSP IP/OBS MODERATE 35: CPT

## 2023-11-25 RX ORDER — LIDOCAINE 4 G/100G
2 CREAM TOPICAL
Qty: 0 | Refills: 0 | DISCHARGE
Start: 2023-11-25

## 2023-11-25 RX ORDER — PANTOPRAZOLE SODIUM 20 MG/1
1 TABLET, DELAYED RELEASE ORAL
Qty: 0 | Refills: 0 | DISCHARGE
Start: 2023-11-25

## 2023-11-25 RX ORDER — GLIMEPIRIDE 1 MG
1 TABLET ORAL
Qty: 0 | Refills: 0 | DISCHARGE

## 2023-11-25 RX ORDER — TRAMADOL HYDROCHLORIDE 50 MG/1
25 TABLET ORAL EVERY 6 HOURS
Refills: 0 | Status: DISCONTINUED | OUTPATIENT
Start: 2023-11-25 | End: 2023-11-28

## 2023-11-25 RX ORDER — POLYETHYLENE GLYCOL 3350 17 G/17G
17 POWDER, FOR SOLUTION ORAL
Qty: 0 | Refills: 0 | DISCHARGE
Start: 2023-11-25

## 2023-11-25 RX ORDER — BUMETANIDE 0.25 MG/ML
1 INJECTION INTRAMUSCULAR; INTRAVENOUS
Qty: 0 | Refills: 0 | DISCHARGE
Start: 2023-11-25

## 2023-11-25 RX ORDER — LANOLIN ALCOHOL/MO/W.PET/CERES
1 CREAM (GRAM) TOPICAL
Qty: 0 | Refills: 0 | DISCHARGE
Start: 2023-11-25

## 2023-11-25 RX ORDER — SENNA PLUS 8.6 MG/1
2 TABLET ORAL
Qty: 0 | Refills: 0 | DISCHARGE
Start: 2023-11-25

## 2023-11-25 RX ORDER — CARVEDILOL PHOSPHATE 80 MG/1
1 CAPSULE, EXTENDED RELEASE ORAL
Qty: 0 | Refills: 0 | DISCHARGE
Start: 2023-11-25

## 2023-11-25 RX ADMIN — Medication 650 MILLIGRAM(S): at 02:07

## 2023-11-25 RX ADMIN — CLOPIDOGREL BISULFATE 75 MILLIGRAM(S): 75 TABLET, FILM COATED ORAL at 11:50

## 2023-11-25 RX ADMIN — TRAMADOL HYDROCHLORIDE 25 MILLIGRAM(S): 50 TABLET ORAL at 13:18

## 2023-11-25 RX ADMIN — Medication 1 APPLICATION(S): at 05:19

## 2023-11-25 RX ADMIN — Medication 1 APPLICATION(S): at 11:49

## 2023-11-25 RX ADMIN — Medication 10 UNIT(S): at 12:03

## 2023-11-25 RX ADMIN — Medication 2: at 12:03

## 2023-11-25 RX ADMIN — Medication 81 MILLIGRAM(S): at 11:48

## 2023-11-25 RX ADMIN — Medication 1 APPLICATION(S): at 17:12

## 2023-11-25 RX ADMIN — Medication 667 MILLIGRAM(S): at 08:05

## 2023-11-25 RX ADMIN — Medication 5 MILLIGRAM(S): at 21:16

## 2023-11-25 RX ADMIN — POLYETHYLENE GLYCOL 3350 17 GRAM(S): 17 POWDER, FOR SOLUTION ORAL at 11:52

## 2023-11-25 RX ADMIN — LIDOCAINE 2 PATCH: 4 CREAM TOPICAL at 19:34

## 2023-11-25 RX ADMIN — CITALOPRAM 20 MILLIGRAM(S): 10 TABLET, FILM COATED ORAL at 11:50

## 2023-11-25 RX ADMIN — TRAMADOL HYDROCHLORIDE 25 MILLIGRAM(S): 50 TABLET ORAL at 12:01

## 2023-11-25 RX ADMIN — Medication 1: at 08:04

## 2023-11-25 RX ADMIN — Medication 10 UNIT(S): at 16:25

## 2023-11-25 RX ADMIN — Medication 650 MILLIGRAM(S): at 01:13

## 2023-11-25 RX ADMIN — Medication 100 MILLIGRAM(S): at 11:47

## 2023-11-25 RX ADMIN — Medication 650 MILLIGRAM(S): at 16:24

## 2023-11-25 RX ADMIN — Medication 667 MILLIGRAM(S): at 17:12

## 2023-11-25 RX ADMIN — PANTOPRAZOLE SODIUM 40 MILLIGRAM(S): 20 TABLET, DELAYED RELEASE ORAL at 05:20

## 2023-11-25 RX ADMIN — Medication 650 MILLIGRAM(S): at 14:14

## 2023-11-25 RX ADMIN — HEPARIN SODIUM 5000 UNIT(S): 5000 INJECTION INTRAVENOUS; SUBCUTANEOUS at 05:20

## 2023-11-25 RX ADMIN — CARVEDILOL PHOSPHATE 12.5 MILLIGRAM(S): 80 CAPSULE, EXTENDED RELEASE ORAL at 16:27

## 2023-11-25 RX ADMIN — BUMETANIDE 2 MILLIGRAM(S): 0.25 INJECTION INTRAMUSCULAR; INTRAVENOUS at 05:20

## 2023-11-25 RX ADMIN — SENNA PLUS 2 TABLET(S): 8.6 TABLET ORAL at 21:16

## 2023-11-25 RX ADMIN — INSULIN GLARGINE 20 UNIT(S): 100 INJECTION, SOLUTION SUBCUTANEOUS at 21:16

## 2023-11-25 RX ADMIN — LIDOCAINE 2 PATCH: 4 CREAM TOPICAL at 05:15

## 2023-11-25 RX ADMIN — Medication 10 UNIT(S): at 08:04

## 2023-11-25 RX ADMIN — Medication 2: at 16:26

## 2023-11-25 RX ADMIN — HEPARIN SODIUM 5000 UNIT(S): 5000 INJECTION INTRAVENOUS; SUBCUTANEOUS at 17:12

## 2023-11-25 RX ADMIN — LIDOCAINE 2 PATCH: 4 CREAM TOPICAL at 16:20

## 2023-11-25 RX ADMIN — CHLORHEXIDINE GLUCONATE 1 APPLICATION(S): 213 SOLUTION TOPICAL at 05:21

## 2023-11-25 RX ADMIN — GABAPENTIN 300 MILLIGRAM(S): 400 CAPSULE ORAL at 11:51

## 2023-11-25 RX ADMIN — CARVEDILOL PHOSPHATE 12.5 MILLIGRAM(S): 80 CAPSULE, EXTENDED RELEASE ORAL at 05:20

## 2023-11-25 RX ADMIN — Medication 667 MILLIGRAM(S): at 11:57

## 2023-11-25 NOTE — PROGRESS NOTE ADULT - SUBJECTIVE AND OBJECTIVE BOX
Patient is a 89y old  Male who presents with a chief complaint of sob       MEDICATIONS  (STANDING):  albuterol/ipratropium for Nebulization 3 milliLiter(s) Nebulizer every 6 hours  allopurinol 100 milliGRAM(s) Oral daily  aspirin  chewable 81 milliGRAM(s) Oral daily  bacitracin   Ointment 1 Application(s) Topical daily  buMETAnide 2 milliGRAM(s) Oral daily  calcium acetate 667 milliGRAM(s) Oral three times a day with meals  carvedilol 12.5 milliGRAM(s) Oral every 12 hours  chlorhexidine 2% Cloths 1 Application(s) Topical <User Schedule>  citalopram 20 milliGRAM(s) Oral daily  clopidogrel Tablet 75 milliGRAM(s) Oral daily  dextrose 5%. 1000 milliLiter(s) (100 mL/Hr) IV Continuous <Continuous>  dextrose 5%. 1000 milliLiter(s) (50 mL/Hr) IV Continuous <Continuous>  dextrose 50% Injectable 50 milliLiter(s) IV Push every 15 minutes  gabapentin 300 milliGRAM(s) Oral daily  glucagon  Injectable 1 milliGRAM(s) IntraMuscular once  heparin   Injectable 5000 Unit(s) SubCutaneous every 12 hours  insulin glargine Injectable (LANTUS) 20 Unit(s) SubCutaneous at bedtime  insulin lispro (ADMELOG) corrective regimen sliding scale   SubCutaneous three times a day before meals  insulin lispro (ADMELOG) corrective regimen sliding scale   SubCutaneous at bedtime  insulin lispro Injectable (ADMELOG) 10 Unit(s) SubCutaneous three times a day before meals  lidocaine   4% Patch 2 Patch Transdermal every 24 hours  pantoprazole    Tablet 40 milliGRAM(s) Oral before breakfast  polyethylene glycol 3350 17 Gram(s) Oral daily  senna 2 Tablet(s) Oral at bedtime  triamcinolone 0.1% Cream 1 Application(s) Topical two times a day    MEDICATIONS  (PRN):  acetaminophen     Tablet .. 650 milliGRAM(s) Oral every 6 hours PRN Temp greater or equal to 38C (100.4F), Mild Pain (1 - 3)  albuterol    90 MICROgram(s) HFA Inhaler 2 Puff(s) Inhalation every 4 hours PRN Shortness of Breath and/or Wheezing  dextrose Oral Gel 15 Gram(s) Oral once PRN Blood Glucose LESS THAN 70 milliGRAM(s)/deciliter  dextrose Oral Gel 15 Gram(s) Oral once PRN Blood Glucose LESS THAN 70 milliGRAM(s)/deciliter  traMADol 25 milliGRAM(s) Oral every 6 hours PRN Moderate Pain (4 - 6)  traZODone 50 milliGRAM(s) Oral at bedtime PRN insomnia      CAPILLARY BLOOD GLUCOSE      POCT Blood Glucose.: 138 mg/dL (26 Nov 2023 07:25)  POCT Blood Glucose.: 102 mg/dL (25 Nov 2023 20:52)  POCT Blood Glucose.: 214 mg/dL (25 Nov 2023 16:21)  POCT Blood Glucose.: 205 mg/dL (25 Nov 2023 12:00)    I&O's Summary    25 Nov 2023 07:01  -  26 Nov 2023 07:00  --------------------------------------------------------  IN: 0 mL / OUT: 1250 mL / NET: -1250 mL        PHYSICAL EXAM:  Vital Signs Last 24 Hrs  T(C): 36.8 (26 Nov 2023 05:25), Max: 36.8 (26 Nov 2023 05:25)  T(F): 98.2 (26 Nov 2023 05:25), Max: 98.2 (26 Nov 2023 05:25)  HR: 64 (26 Nov 2023 05:25) (64 - 89)  BP: 142/62 (26 Nov 2023 05:25) (125/58 - 158/70)  BP(mean): --  RR: 18 (26 Nov 2023 05:25) (18 - 18)  SpO2: 98% (25 Nov 2023 20:10) (98% - 100%)    Parameters below as of 25 Nov 2023 20:10  Patient On (Oxygen Delivery Method): room air        GENERAL: No acute distress, well-developed  HEAD:  Atraumatic, Normocephalic  EYES: EOMI, PERRLA, conjunctiva and sclera clear  NECK: Supple, no lymphadenopathy, no JVD  CHEST/LUNG: CTAB; No wheezes, rales, or rhonchi  HEART: Regular rate and rhythm; No murmurs, rubs, or gallops  ABDOMEN: Soft, non-tender, non-distended; normal bowel sounds, no organomegaly  EXTREMITIES:  2+ peripheral pulses b/l, No clubbing, cyanosis, or edema  NEUROLOGY: A&O x 3, no focal deficits  SKIN: No rashes or lesions

## 2023-11-26 LAB
GLUCOSE BLDC GLUCOMTR-MCNC: 124 MG/DL — HIGH (ref 70–99)
GLUCOSE BLDC GLUCOMTR-MCNC: 124 MG/DL — HIGH (ref 70–99)
GLUCOSE BLDC GLUCOMTR-MCNC: 138 MG/DL — HIGH (ref 70–99)
GLUCOSE BLDC GLUCOMTR-MCNC: 138 MG/DL — HIGH (ref 70–99)
GLUCOSE BLDC GLUCOMTR-MCNC: 240 MG/DL — HIGH (ref 70–99)
GLUCOSE BLDC GLUCOMTR-MCNC: 240 MG/DL — HIGH (ref 70–99)
GLUCOSE BLDC GLUCOMTR-MCNC: 77 MG/DL — SIGNIFICANT CHANGE UP (ref 70–99)
GLUCOSE BLDC GLUCOMTR-MCNC: 77 MG/DL — SIGNIFICANT CHANGE UP (ref 70–99)

## 2023-11-26 PROCEDURE — 99232 SBSQ HOSP IP/OBS MODERATE 35: CPT

## 2023-11-26 RX ORDER — TRAZODONE HCL 50 MG
50 TABLET ORAL AT BEDTIME
Refills: 0 | Status: DISCONTINUED | OUTPATIENT
Start: 2023-11-26 | End: 2023-11-28

## 2023-11-26 RX ORDER — TRAZODONE HCL 50 MG
50 TABLET ORAL ONCE
Refills: 0 | Status: COMPLETED | OUTPATIENT
Start: 2023-11-26 | End: 2023-11-26

## 2023-11-26 RX ADMIN — Medication 10 UNIT(S): at 11:42

## 2023-11-26 RX ADMIN — Medication 10 UNIT(S): at 17:09

## 2023-11-26 RX ADMIN — CLOPIDOGREL BISULFATE 75 MILLIGRAM(S): 75 TABLET, FILM COATED ORAL at 11:40

## 2023-11-26 RX ADMIN — Medication 50 MILLIGRAM(S): at 02:39

## 2023-11-26 RX ADMIN — HEPARIN SODIUM 5000 UNIT(S): 5000 INJECTION INTRAVENOUS; SUBCUTANEOUS at 06:15

## 2023-11-26 RX ADMIN — Medication 100 MILLIGRAM(S): at 11:45

## 2023-11-26 RX ADMIN — SENNA PLUS 2 TABLET(S): 8.6 TABLET ORAL at 21:35

## 2023-11-26 RX ADMIN — Medication 81 MILLIGRAM(S): at 11:40

## 2023-11-26 RX ADMIN — Medication 10 UNIT(S): at 08:41

## 2023-11-26 RX ADMIN — Medication 2: at 11:42

## 2023-11-26 RX ADMIN — CARVEDILOL PHOSPHATE 12.5 MILLIGRAM(S): 80 CAPSULE, EXTENDED RELEASE ORAL at 06:15

## 2023-11-26 RX ADMIN — LIDOCAINE 2 PATCH: 4 CREAM TOPICAL at 11:41

## 2023-11-26 RX ADMIN — Medication 1 APPLICATION(S): at 11:40

## 2023-11-26 RX ADMIN — LIDOCAINE 2 PATCH: 4 CREAM TOPICAL at 23:37

## 2023-11-26 RX ADMIN — Medication 1 APPLICATION(S): at 06:15

## 2023-11-26 RX ADMIN — CHLORHEXIDINE GLUCONATE 1 APPLICATION(S): 213 SOLUTION TOPICAL at 06:16

## 2023-11-26 RX ADMIN — INSULIN GLARGINE 20 UNIT(S): 100 INJECTION, SOLUTION SUBCUTANEOUS at 21:35

## 2023-11-26 RX ADMIN — TRAMADOL HYDROCHLORIDE 25 MILLIGRAM(S): 50 TABLET ORAL at 11:40

## 2023-11-26 RX ADMIN — LIDOCAINE 2 PATCH: 4 CREAM TOPICAL at 04:20

## 2023-11-26 RX ADMIN — CARVEDILOL PHOSPHATE 12.5 MILLIGRAM(S): 80 CAPSULE, EXTENDED RELEASE ORAL at 17:13

## 2023-11-26 RX ADMIN — Medication 667 MILLIGRAM(S): at 11:40

## 2023-11-26 RX ADMIN — Medication 667 MILLIGRAM(S): at 17:13

## 2023-11-26 RX ADMIN — TRAMADOL HYDROCHLORIDE 25 MILLIGRAM(S): 50 TABLET ORAL at 12:10

## 2023-11-26 RX ADMIN — Medication 1 APPLICATION(S): at 17:14

## 2023-11-26 RX ADMIN — GABAPENTIN 300 MILLIGRAM(S): 400 CAPSULE ORAL at 11:40

## 2023-11-26 RX ADMIN — Medication 50 MILLIGRAM(S): at 21:35

## 2023-11-26 RX ADMIN — HEPARIN SODIUM 5000 UNIT(S): 5000 INJECTION INTRAVENOUS; SUBCUTANEOUS at 17:15

## 2023-11-26 RX ADMIN — CITALOPRAM 20 MILLIGRAM(S): 10 TABLET, FILM COATED ORAL at 11:40

## 2023-11-26 RX ADMIN — Medication 667 MILLIGRAM(S): at 08:43

## 2023-11-26 RX ADMIN — PANTOPRAZOLE SODIUM 40 MILLIGRAM(S): 20 TABLET, DELAYED RELEASE ORAL at 06:15

## 2023-11-26 RX ADMIN — BUMETANIDE 2 MILLIGRAM(S): 0.25 INJECTION INTRAMUSCULAR; INTRAVENOUS at 06:15

## 2023-11-26 NOTE — SWALLOW BEDSIDE ASSESSMENT ADULT - SWALLOW EVAL: RECOMMENDED FEEDING/EATING TECHNIQUES
maintain upright posture during/after eating for 30 mins/oral hygiene/position upright (90 degrees)
maintain upright posture during/after eating for 30 mins/oral hygiene/position upright (90 degrees)
+consecutive swallows/alternate food with liquid/position upright (90 degrees)/small sips/bites
+consecutive swallows/alternate food with liquid/position upright (90 degrees)/small sips/bites
oral hygiene/position upright (90 degrees)/small sips/bites
maintain upright posture during/after eating for 30 mins/oral hygiene/position upright (90 degrees)
oral hygiene/position upright (90 degrees)/small sips/bites

## 2023-11-26 NOTE — SWALLOW BEDSIDE ASSESSMENT ADULT - ASR SWALLOW ASPIRATION MONITOR
change of breathing pattern/oral hygiene/position upright (89Y)/cough/gurgly voice/fever/pneumonia/throat clearing/upper respiratory infection

## 2023-11-26 NOTE — SWALLOW BEDSIDE ASSESSMENT ADULT - NS ASR SWALLOW FINDINGS DISCUS
Physician/Nursing/Patient

## 2023-11-26 NOTE — SWALLOW BEDSIDE ASSESSMENT ADULT - POSITIONING
upright (90 degrees)

## 2023-11-26 NOTE — PROGRESS NOTE ADULT - SUBJECTIVE AND OBJECTIVE BOX
Patient is a 89y old  Male who presents with a chief complaint of sob (24 Nov 2023 07:53)        MEDICATIONS  (STANDING):  albuterol/ipratropium for Nebulization 3 milliLiter(s) Nebulizer every 6 hours  allopurinol 100 milliGRAM(s) Oral daily  aspirin  chewable 81 milliGRAM(s) Oral daily  bacitracin   Ointment 1 Application(s) Topical daily  buMETAnide 2 milliGRAM(s) Oral daily  calcium acetate 667 milliGRAM(s) Oral three times a day with meals  carvedilol 12.5 milliGRAM(s) Oral every 12 hours  chlorhexidine 2% Cloths 1 Application(s) Topical <User Schedule>  citalopram 20 milliGRAM(s) Oral daily  clopidogrel Tablet 75 milliGRAM(s) Oral daily  dextrose 5%. 1000 milliLiter(s) (100 mL/Hr) IV Continuous <Continuous>  dextrose 5%. 1000 milliLiter(s) (50 mL/Hr) IV Continuous <Continuous>  dextrose 50% Injectable 50 milliLiter(s) IV Push every 15 minutes  gabapentin 300 milliGRAM(s) Oral daily  glucagon  Injectable 1 milliGRAM(s) IntraMuscular once  heparin   Injectable 5000 Unit(s) SubCutaneous every 12 hours  insulin glargine Injectable (LANTUS) 20 Unit(s) SubCutaneous at bedtime  insulin lispro (ADMELOG) corrective regimen sliding scale   SubCutaneous three times a day before meals  insulin lispro (ADMELOG) corrective regimen sliding scale   SubCutaneous at bedtime  insulin lispro Injectable (ADMELOG) 10 Unit(s) SubCutaneous three times a day before meals  lidocaine   4% Patch 2 Patch Transdermal every 24 hours  pantoprazole    Tablet 40 milliGRAM(s) Oral before breakfast  polyethylene glycol 3350 17 Gram(s) Oral daily  senna 2 Tablet(s) Oral at bedtime  triamcinolone 0.1% Cream 1 Application(s) Topical two times a day    MEDICATIONS  (PRN):  acetaminophen     Tablet .. 650 milliGRAM(s) Oral every 6 hours PRN Temp greater or equal to 38C (100.4F), Mild Pain (1 - 3)  albuterol    90 MICROgram(s) HFA Inhaler 2 Puff(s) Inhalation every 4 hours PRN Shortness of Breath and/or Wheezing  dextrose Oral Gel 15 Gram(s) Oral once PRN Blood Glucose LESS THAN 70 milliGRAM(s)/deciliter  dextrose Oral Gel 15 Gram(s) Oral once PRN Blood Glucose LESS THAN 70 milliGRAM(s)/deciliter  traMADol 25 milliGRAM(s) Oral every 6 hours PRN Moderate Pain (4 - 6)  traZODone 50 milliGRAM(s) Oral at bedtime PRN insomnia      CAPILLARY BLOOD GLUCOSE  POCT Blood Glucose.: 138 mg/dL (26 Nov 2023 07:25)  POCT Blood Glucose.: 102 mg/dL (25 Nov 2023 20:52)  POCT Blood Glucose.: 214 mg/dL (25 Nov 2023 16:21)  POCT Blood Glucose.: 205 mg/dL (25 Nov 2023 12:00)    I&O's Summary    25 Nov 2023 07:01  -  26 Nov 2023 07:00  --------------------------------------------------------  IN: 0 mL / OUT: 1250 mL / NET: -1250 mL        PHYSICAL EXAM:  Vital Signs Last 24 Hrs  T(C): 36.8 (26 Nov 2023 05:25), Max: 36.8 (26 Nov 2023 05:25)  T(F): 98.2 (26 Nov 2023 05:25), Max: 98.2 (26 Nov 2023 05:25)  HR: 64 (26 Nov 2023 05:25) (64 - 89)  BP: 142/62 (26 Nov 2023 05:25) (125/58 - 158/70)  BP(mean): --  RR: 18 (26 Nov 2023 05:25) (18 - 18)  SpO2: 98% (25 Nov 2023 20:10) (98% - 100%)    Parameters below as of 25 Nov 2023 20:10  Patient On (Oxygen Delivery Method): room air        GENERAL: No acute distress, well-developed  HEAD:  Atraumatic, Normocephalic  EYES:  conjunctiva and sclera clear  NECK: Supple, no lymphadenopathy, no JVD  CHEST/LUNG: CTAB; No wheezes, rales, or rhonchi  HEART: Regular rate and rhythm; No murmurs, rubs, or gallops  ABDOMEN: Soft, non-tender, non-distended; normal bowel sounds  EXTREMITIES:  2+ peripheral pulses b/l, No clubbing, cyanosis, or edema  NEUROLOGY: A&O x 3, no focal deficits  SKIN: No rashes or lesions

## 2023-11-26 NOTE — SWALLOW BEDSIDE ASSESSMENT ADULT - SPECIFY REASON(S)
Dysphagia f/u
f/u
Dysphagia f/u
dysphagia evaluation
Dysphagia f/u
f/u s/p MBSS repeat on 11/21

## 2023-11-26 NOTE — SWALLOW BEDSIDE ASSESSMENT ADULT - DATE
09-Nov-2023
21-Nov-2023
22-Nov-2023
17-Nov-2023
03-Nov-2023
14-Nov-2023
20-Nov-2023
12-Nov-2023
31-Oct-2023
26-Nov-2023
10-Nov-2023
13-Nov-2023

## 2023-11-26 NOTE — SWALLOW BEDSIDE ASSESSMENT ADULT - SWALLOW EVAL: RECOMMENDED DIET
Minced & moist with mildly thick liquids
Minced & moist with mildly thick liquids. Consecutive swallows, alt bites/sips. ** allow for sips of thin water between meals.
Minced & moist with mildly thick liquids. Consecutive swallows, alt bites/sips. ** allow for sips of thin water between meals.
Puree/mildly thick
NPO with alternate means of nutrition/hydration ,allow for ice chips
Minced & moist with mildly thick liquids
NPO with alternate means of nutrition/hydration, allow for ice chips and tsp of puree for comfort, as tolerated, when most alert/awake.
NPO with alternate means of nutrition/hydration, allow for ice chips and tsp of puree for comfort, as tolerated, when most alert/awake.
NPO with alternate means of nutrition/hydration
Minced & moist with mildly thick liquids
Puree/mildly thick
NPO with alternate means of nutrition/hydration ,allow for ice chips and tsp sips of thin liquids

## 2023-11-26 NOTE — SWALLOW BEDSIDE ASSESSMENT ADULT - SLP GENERAL OBSERVATIONS
Pt received OOB in chair awake and alert.
Pt received OOB in chair awake and alert eating lunch.
Pt received OOB in chair awake and alert.

## 2023-11-26 NOTE — SWALLOW BEDSIDE ASSESSMENT ADULT - SWALLOW EVAL: CURRENT DIET
Minced & moist with mildly thick liquids
NPO pending speech/swallow
NPO pending speech/swallow
NPO with alternate means of nutrition/hydration, allow for ice chips and tsp of puree for comfort, as tolerated, when most alert/awake.
NPO pending speech/swallow
NPO pending speech/swallow
Puree with mildly thick liquids
NPO +NGT, allow for ice chips and tsp of puree for comfort, as tolerated, when most alert/awake.

## 2023-11-26 NOTE — SWALLOW BEDSIDE ASSESSMENT ADULT - ADDITIONAL RECOMMENDATIONS
SLP services to f/u.
SLP D/C RECONSULT PRN
SLP services to f/u.
SLP services to f/u.
Thins at b/s educated on silent aspiration risk and results of MBSS
Thins at b/s educated on silent aspiration risk and results of MBSS
SLP services to f/u.
Thins at b/s educated on silent aspiration risk and results of MBSS
SLP services to f/u.

## 2023-11-26 NOTE — SWALLOW BEDSIDE ASSESSMENT ADULT - ORAL PREPARATORY PHASE
Within functional limits

## 2023-11-26 NOTE — SWALLOW BEDSIDE ASSESSMENT ADULT - SWALLOW EVAL: DIAGNOSIS
Toleration of minced & moist with mildly thick liquids w/o overt s/s of penetration/aspiration. Also tolerated trials of thin liquids via small controlled cup sips w/o overt s/s aspiration.
Toleration of minced & moist with mildly thick liquids w/o overt s/s of penetration/aspiration. Also tolerated trials of thin liquids via small controlled cup sips w/o overt s/s aspiration.
Concern for pharyngeal dysphagia given AMS, moderate oral dysphagia, congested breath sounds, fever, and overt s/s aspiration.
Concern for pharyngeal impairment. Waxing/waning mental status and respiratory status, seems to be negatively effected by HD. +Toleration of ice chips, tsp sips of thin liquids and tsp of puree w/o overt s/s of penetration/aspiration.
Toleration of minced & moist with mildly thick liquids w/o overt s/s of penetration/aspiration.
Concern for pharyngeal impairment. Waxing/waning mental status and respiratory status, seems to be negatively effected by HD. +Toleration of ice chips and tsp of puree w/o overt s/s of penetration/aspiration.
Toleration of ice chips and tsp sips of thin liquids w/o overt s/s of penetration/aspiration. Overt s/s of penetration/aspiration
Toleration of minced & moist with mildly thick liquids w/o overt s/s of penetration/aspiration. Also tolerated trials of thin liquids via small controlled cup sips w/o overt s/s aspiration.
+ toleration observed without overt symptoms of penetration/aspiration for Puree/mildly thick. +multiple swallows with thins.
+ toleration observed without overt symptoms of penetration/aspiration for Puree/mildly thick. +multiple swallows with thins.
Toleration of minced & moist with mildly thick liquids w/o overt s/s of penetration/aspiration.
Concern for pharyngeal dysphagia given AMS, moderate oral dysphagia, congested breath sounds, fever, and overt s/s aspiration.

## 2023-11-26 NOTE — SWALLOW BEDSIDE ASSESSMENT ADULT - SWALLOW EVAL: FEEDING ASSISTANCE
frequent cues/help required
minimal assistance required
minimal assistance required
frequent cues/help required

## 2023-11-26 NOTE — SWALLOW BEDSIDE ASSESSMENT ADULT - SLP PERTINENT HISTORY OF CURRENT PROBLEM
89 yo male PMHx sig for DM, CAD-bypass, CHF, HTN, HLD presents w sob x few days, no cough fever or chills.  pt placed on NRBM by EMS and subsequently on BiPAP in ED, O2 sat now 99%.  Pt found to have elevated glucose w high AG and b-hydroxybutarate c/w DKA
89 yo male PMHx sig for DM, CAD-bypass, CHF, HTN, HLD presents w sob x few days, no cough fever or chills.  pt placed on NRBM by EMS and subsequently on BiPAP in ED, O2 sat now 99%.  Pt found to have elevated glucose w high AG and b-hydroxybutarate c/w DKA
89 yo male PMHx sig for DM, CAD-bypass, CHF, HTN, HLD presents w sob x few days, no cough fever or chills.  pt placed on NRBM by EMS and subsequently on BiPAP in ED, O2 sat now 99%.  Pt found to have elevated glucose w high AG and b-hydroxybutarate c/w DKA
89 yo male PMHx sig for DM, CAD-bypass, CHF, HTN, HLD presents w sob x few days, no cough fever or chills.  pt placed on NRBM by EMS and subsequently on BiPAP in ED, O2 sat now 99%.  Pt found to have elevated glucose w high AG and b-hydroxybutarate c/w DKA
89 yo male PMHx sig for DM, CAD-bypass, CHF, HTN, HLD presents w sob x few days, no cough fever or chills.  pt placed on NRBM by EMS and subsequently on BiPAP in ED, O2 sat now 99%.  Pt found to have elevated glucose w high AG and b-hydroxybutarate c/w DKA
89 yo male PMHx sig for DM, CAD-bypass, CHF, HTN, HLD presents w sob x few days, no cough fever or chills.  pt placed on NRBM by EMS and subsequently on BiPAP in ED, O2 sat now 99%.  Pt found to have elevated glucose w high AG and b-hydroxybutarate c/w DKA

## 2023-11-27 LAB
GLUCOSE BLDC GLUCOMTR-MCNC: 109 MG/DL — HIGH (ref 70–99)
GLUCOSE BLDC GLUCOMTR-MCNC: 109 MG/DL — HIGH (ref 70–99)
GLUCOSE BLDC GLUCOMTR-MCNC: 131 MG/DL — HIGH (ref 70–99)
GLUCOSE BLDC GLUCOMTR-MCNC: 131 MG/DL — HIGH (ref 70–99)
GLUCOSE BLDC GLUCOMTR-MCNC: 136 MG/DL — HIGH (ref 70–99)
GLUCOSE BLDC GLUCOMTR-MCNC: 136 MG/DL — HIGH (ref 70–99)
GLUCOSE BLDC GLUCOMTR-MCNC: 162 MG/DL — HIGH (ref 70–99)
GLUCOSE BLDC GLUCOMTR-MCNC: 162 MG/DL — HIGH (ref 70–99)
SARS-COV-2 RNA SPEC QL NAA+PROBE: SIGNIFICANT CHANGE UP
SARS-COV-2 RNA SPEC QL NAA+PROBE: SIGNIFICANT CHANGE UP

## 2023-11-27 PROCEDURE — 99232 SBSQ HOSP IP/OBS MODERATE 35: CPT

## 2023-11-27 RX ORDER — CLONAZEPAM 1 MG
1 TABLET ORAL EVERY 8 HOURS
Refills: 0 | Status: DISCONTINUED | OUTPATIENT
Start: 2023-11-27 | End: 2023-11-28

## 2023-11-27 RX ORDER — GABAPENTIN 400 MG/1
300 CAPSULE ORAL
Refills: 0 | Status: DISCONTINUED | OUTPATIENT
Start: 2023-11-27 | End: 2023-11-28

## 2023-11-27 RX ORDER — CLONAZEPAM 1 MG
1 TABLET ORAL AT BEDTIME
Refills: 0 | Status: DISCONTINUED | OUTPATIENT
Start: 2023-11-27 | End: 2023-11-27

## 2023-11-27 RX ADMIN — Medication 10 UNIT(S): at 12:28

## 2023-11-27 RX ADMIN — SENNA PLUS 2 TABLET(S): 8.6 TABLET ORAL at 21:29

## 2023-11-27 RX ADMIN — CARVEDILOL PHOSPHATE 12.5 MILLIGRAM(S): 80 CAPSULE, EXTENDED RELEASE ORAL at 17:25

## 2023-11-27 RX ADMIN — CLOPIDOGREL BISULFATE 75 MILLIGRAM(S): 75 TABLET, FILM COATED ORAL at 12:29

## 2023-11-27 RX ADMIN — PANTOPRAZOLE SODIUM 40 MILLIGRAM(S): 20 TABLET, DELAYED RELEASE ORAL at 06:06

## 2023-11-27 RX ADMIN — CARVEDILOL PHOSPHATE 12.5 MILLIGRAM(S): 80 CAPSULE, EXTENDED RELEASE ORAL at 06:05

## 2023-11-27 RX ADMIN — LIDOCAINE 2 PATCH: 4 CREAM TOPICAL at 12:31

## 2023-11-27 RX ADMIN — Medication 1 APPLICATION(S): at 06:04

## 2023-11-27 RX ADMIN — CITALOPRAM 20 MILLIGRAM(S): 10 TABLET, FILM COATED ORAL at 12:30

## 2023-11-27 RX ADMIN — Medication 1: at 08:33

## 2023-11-27 RX ADMIN — Medication 100 MILLIGRAM(S): at 12:30

## 2023-11-27 RX ADMIN — Medication 81 MILLIGRAM(S): at 12:30

## 2023-11-27 RX ADMIN — Medication 10 UNIT(S): at 08:32

## 2023-11-27 RX ADMIN — GABAPENTIN 300 MILLIGRAM(S): 400 CAPSULE ORAL at 17:25

## 2023-11-27 RX ADMIN — CHLORHEXIDINE GLUCONATE 1 APPLICATION(S): 213 SOLUTION TOPICAL at 06:06

## 2023-11-27 RX ADMIN — Medication 1 APPLICATION(S): at 17:27

## 2023-11-27 RX ADMIN — BUMETANIDE 2 MILLIGRAM(S): 0.25 INJECTION INTRAMUSCULAR; INTRAVENOUS at 06:05

## 2023-11-27 RX ADMIN — HEPARIN SODIUM 5000 UNIT(S): 5000 INJECTION INTRAVENOUS; SUBCUTANEOUS at 17:26

## 2023-11-27 RX ADMIN — Medication 1 MILLIGRAM(S): at 21:30

## 2023-11-27 RX ADMIN — POLYETHYLENE GLYCOL 3350 17 GRAM(S): 17 POWDER, FOR SOLUTION ORAL at 12:30

## 2023-11-27 NOTE — CHART NOTE - NSCHARTNOTESELECT_GEN_ALL_CORE
Barium swallow
Event Note
Insulin/Event Note
Palliative Care - Social Work/Event Note
med note
Hospitalist Shanique Update/Event Note
Nutrition - RD Follow Up/Event Note
Palliative Care - Sign Off/Event Note
Palliative Care - Social Work/Event Note
Palliative Care - Social Work/Event Note
SLP/Event Note
Speech Language PAthology/Event Note
Transfer Note
iSTOP

## 2023-11-27 NOTE — PROGRESS NOTE ADULT - SUBJECTIVE AND OBJECTIVE BOX
Patient is a 89y old  Male who presents with a chief complaint of sob        MEDICATIONS  (STANDING):  albuterol/ipratropium for Nebulization 3 milliLiter(s) Nebulizer every 6 hours  allopurinol 100 milliGRAM(s) Oral daily  aspirin  chewable 81 milliGRAM(s) Oral daily  bacitracin   Ointment 1 Application(s) Topical daily  buMETAnide 2 milliGRAM(s) Oral daily  calcium acetate 667 milliGRAM(s) Oral three times a day with meals  carvedilol 12.5 milliGRAM(s) Oral every 12 hours  chlorhexidine 2% Cloths 1 Application(s) Topical <User Schedule>  citalopram 20 milliGRAM(s) Oral daily  clopidogrel Tablet 75 milliGRAM(s) Oral daily  dextrose 5%. 1000 milliLiter(s) (50 mL/Hr) IV Continuous <Continuous>  dextrose 5%. 1000 milliLiter(s) (100 mL/Hr) IV Continuous <Continuous>  dextrose 50% Injectable 50 milliLiter(s) IV Push every 15 minutes  gabapentin 300 milliGRAM(s) Oral daily  glucagon  Injectable 1 milliGRAM(s) IntraMuscular once  heparin   Injectable 5000 Unit(s) SubCutaneous every 12 hours  insulin glargine Injectable (LANTUS) 20 Unit(s) SubCutaneous at bedtime  insulin lispro (ADMELOG) corrective regimen sliding scale   SubCutaneous three times a day before meals  insulin lispro (ADMELOG) corrective regimen sliding scale   SubCutaneous at bedtime  insulin lispro Injectable (ADMELOG) 10 Unit(s) SubCutaneous three times a day before meals  lidocaine   4% Patch 2 Patch Transdermal every 24 hours  pantoprazole    Tablet 40 milliGRAM(s) Oral before breakfast  polyethylene glycol 3350 17 Gram(s) Oral daily  senna 2 Tablet(s) Oral at bedtime  triamcinolone 0.1% Cream 1 Application(s) Topical two times a day    MEDICATIONS  (PRN):  acetaminophen     Tablet .. 650 milliGRAM(s) Oral every 6 hours PRN Temp greater or equal to 38C (100.4F), Mild Pain (1 - 3)  albuterol    90 MICROgram(s) HFA Inhaler 2 Puff(s) Inhalation every 4 hours PRN Shortness of Breath and/or Wheezing  dextrose Oral Gel 15 Gram(s) Oral once PRN Blood Glucose LESS THAN 70 milliGRAM(s)/deciliter  dextrose Oral Gel 15 Gram(s) Oral once PRN Blood Glucose LESS THAN 70 milliGRAM(s)/deciliter  traMADol 25 milliGRAM(s) Oral every 6 hours PRN Moderate Pain (4 - 6)  traZODone 50 milliGRAM(s) Oral at bedtime PRN insomnia      CAPILLARY BLOOD GLUCOSE  POCT Blood Glucose.: 162 mg/dL (27 Nov 2023 07:25)  POCT Blood Glucose.: 124 mg/dL (26 Nov 2023 21:18)  POCT Blood Glucose.: 77 mg/dL (26 Nov 2023 16:33)    I&O's Summary    26 Nov 2023 07:01  -  27 Nov 2023 07:00  --------------------------------------------------------  IN: 0 mL / OUT: 1250 mL / NET: -1250 mL      PHYSICAL EXAM:  Vital Signs Last 24 Hrs  T(C): 35.7 (27 Nov 2023 05:45), Max: 36.2 (26 Nov 2023 13:29)  T(F): 96.2 (27 Nov 2023 05:45), Max: 97.2 (26 Nov 2023 13:29)  HR: 60 (27 Nov 2023 05:45) (59 - 96)  BP: 130/62 (27 Nov 2023 05:45) (96/50 - 141/64)  BP(mean): --  RR: 18 (27 Nov 2023 05:45) (18 - 18)  SpO2: 99% (26 Nov 2023 21:28) (99% - 99%)    Parameters below as of 26 Nov 2023 21:28  Patient On (Oxygen Delivery Method): room air      GENERAL: No acute distress, well-developed  HEAD:  Atraumatic, Normocephalic  EYES:  conjunctiva and sclera clear  NECK: Supple, no lymphadenopathy, no JVD  CHEST/LUNG: CTAB; No wheezes, rales, or rhonchi  HEART: Regular rate and rhythm; No murmurs, rubs, or gallops  ABDOMEN: Soft, non-tender, non-distended; normal bowel sounds, no organomegaly  EXTREMITIES:  2+ peripheral pulses b/l, No clubbing, cyanosis, or edema  NEUROLOGY: A&O x 2 no focal deficits  SKIN: No rashes or lesions

## 2023-11-27 NOTE — PROGRESS NOTE ADULT - ASSESSMENT
89M w/ CAD c/b NSTEMI s/p LAD DESx2(2022), HFpEF, s/p PPM, DM2 on Insulin, CKD3, HTN, HLD, Hx of Dens Fx, Prostate Ca in remission was admitted to MICU (10/30) for DKA w/ ICU course c/b Septic Shock 2/2 Aspiration Pneumonia (now resolved; s/p course of Cefepime) &  Acute Respiratory Failure w/ Hypoxia 2/2 Fluid Overload from Acute on Chronic HFpEF & Acute Renal Failure requiring temporary HD(last HD 11/10, RIJ HD cath now removed). The patient was transferred to medicine service on 11/16 for further medical optimization. Pending [facility] placement  ***Note on 11/21 during modified barium swallow patient identified to have possible esophageal/pharyngeal mass under epiglottis. CT Neck w/o co ordered, GI consult placed give dysphagia    Back and buttock pain   -From prolonged hospitalization   -Lidocaine patch and Tramadol PRN       Dysphagia with Hypovolemic Hyponatremia   - Det per speech & swallow, avoid IVF given recent Overload requiring Supplemental Oxygen   - CT neck soft tissue: no acute Findings, Radiology recs for contrast if looking for mass  -Pts Kidney function is improving will avoid contrast for now, likely out patient   - GI consult appreciated, recs for ENT eval for Laryngoscope   ****ENT Eval will be O/P, team dose not come to  camp[facility]     DM2 w/ course c/b DKA: now Resolved   - Endocrinology consulted in ICU  - c/w  basal-bolus insulin regimen     Acute Renal Failure on CKD3: improving   - Nephrology consulted & a/w management  - required temporary HD during ICU course   - monitor CrCl daily    Acute on chronic HF 2/2 Fluid overload from renal failure: now resolved  Chronic Diastolic CHF: euvolemic on exam  - c/w Bumex    CAD s/p Stents + Hypertension  - c/w Aspirin, Clopidogrel, Carvedilol    DVT ppx- HSQ  Dispo- plan for [facility]  GOC- Palliative care consulted in ICU, patient is DNR  -Daily D/C anticipation to NH

## 2023-11-28 VITALS
SYSTOLIC BLOOD PRESSURE: 129 MMHG | HEART RATE: 70 BPM | OXYGEN SATURATION: 98 % | DIASTOLIC BLOOD PRESSURE: 60 MMHG | TEMPERATURE: 95 F | RESPIRATION RATE: 16 BRPM

## 2023-11-28 LAB
GLUCOSE BLDC GLUCOMTR-MCNC: 146 MG/DL — HIGH (ref 70–99)
GLUCOSE BLDC GLUCOMTR-MCNC: 146 MG/DL — HIGH (ref 70–99)
GLUCOSE BLDC GLUCOMTR-MCNC: 244 MG/DL — HIGH (ref 70–99)
GLUCOSE BLDC GLUCOMTR-MCNC: 244 MG/DL — HIGH (ref 70–99)
PREALB SERPL-MCNC: 23 MG/DL — SIGNIFICANT CHANGE UP (ref 20–40)
PREALB SERPL-MCNC: 23 MG/DL — SIGNIFICANT CHANGE UP (ref 20–40)

## 2023-11-28 PROCEDURE — 99239 HOSP IP/OBS DSCHRG MGMT >30: CPT

## 2023-11-28 RX ORDER — INSULIN LISPRO 100/ML
8 VIAL (ML) SUBCUTANEOUS
Qty: 10 | Refills: 0
Start: 2023-11-28 | End: 2023-12-27

## 2023-11-28 RX ORDER — LACTOBACILLUS ACIDOPHILUS 100MM CELL
1 CAPSULE ORAL
Qty: 120 | Refills: 0
Start: 2023-11-28 | End: 2024-01-26

## 2023-11-28 RX ORDER — TRAMADOL HYDROCHLORIDE 50 MG/1
0.5 TABLET ORAL
Qty: 20 | Refills: 0
Start: 2023-11-28 | End: 2023-12-07

## 2023-11-28 RX ADMIN — HEPARIN SODIUM 5000 UNIT(S): 5000 INJECTION INTRAVENOUS; SUBCUTANEOUS at 05:23

## 2023-11-28 RX ADMIN — CITALOPRAM 20 MILLIGRAM(S): 10 TABLET, FILM COATED ORAL at 11:12

## 2023-11-28 RX ADMIN — Medication 1 APPLICATION(S): at 05:24

## 2023-11-28 RX ADMIN — TRAMADOL HYDROCHLORIDE 25 MILLIGRAM(S): 50 TABLET ORAL at 11:13

## 2023-11-28 RX ADMIN — Medication 10 UNIT(S): at 09:04

## 2023-11-28 RX ADMIN — Medication 10 UNIT(S): at 12:33

## 2023-11-28 RX ADMIN — Medication 1 APPLICATION(S): at 11:13

## 2023-11-28 RX ADMIN — BUMETANIDE 2 MILLIGRAM(S): 0.25 INJECTION INTRAMUSCULAR; INTRAVENOUS at 05:23

## 2023-11-28 RX ADMIN — Medication 650 MILLIGRAM(S): at 14:14

## 2023-11-28 RX ADMIN — Medication 2: at 12:32

## 2023-11-28 RX ADMIN — PANTOPRAZOLE SODIUM 40 MILLIGRAM(S): 20 TABLET, DELAYED RELEASE ORAL at 05:23

## 2023-11-28 RX ADMIN — Medication 650 MILLIGRAM(S): at 15:13

## 2023-11-28 RX ADMIN — TRAMADOL HYDROCHLORIDE 25 MILLIGRAM(S): 50 TABLET ORAL at 12:13

## 2023-11-28 RX ADMIN — GABAPENTIN 300 MILLIGRAM(S): 400 CAPSULE ORAL at 05:23

## 2023-11-28 RX ADMIN — LIDOCAINE 2 PATCH: 4 CREAM TOPICAL at 01:24

## 2023-11-28 RX ADMIN — CLOPIDOGREL BISULFATE 75 MILLIGRAM(S): 75 TABLET, FILM COATED ORAL at 11:12

## 2023-11-28 RX ADMIN — LIDOCAINE 2 PATCH: 4 CREAM TOPICAL at 11:28

## 2023-11-28 RX ADMIN — Medication 81 MILLIGRAM(S): at 11:12

## 2023-11-28 RX ADMIN — CHLORHEXIDINE GLUCONATE 1 APPLICATION(S): 213 SOLUTION TOPICAL at 05:24

## 2023-11-28 RX ADMIN — CARVEDILOL PHOSPHATE 12.5 MILLIGRAM(S): 80 CAPSULE, EXTENDED RELEASE ORAL at 05:23

## 2023-11-28 NOTE — PROGRESS NOTE ADULT - ASSESSMENT
89M w/ CAD c/b NSTEMI s/p LAD DESx2(2022), HFpEF, s/p PPM, DM2 on Insulin, CKD3, HTN, HLD, Hx of Dens Fx, Prostate Ca in remission was admitted to MICU (10/30) for DKA w/ ICU course c/b Septic Shock 2/2 Aspiration Pneumonia (now resolved; s/p course of Cefepime) &  Acute Respiratory Failure w/ Hypoxia 2/2 Fluid Overload from Acute on Chronic HFpEF & Acute Renal Failure requiring temporary HD(last HD 11/10, RIJ HD cath now removed). The patient was transferred to medicine service on 11/16 for further medical optimization. Pending [facility] placement  ***Note on 11/21 during modified barium swallow patient identified to have possible esophageal/pharyngeal mass under epiglottis. CT Neck w/o co ordered, GI consult placed give dysphagia    Back and buttock pain   -From prolonged hospitalization   -Lidocaine patch and Tramadol PRN       Dysphagia with Hypovolemic Hyponatremia   - Det per speech & swallow, avoid IVF given recent Overload requiring Supplemental Oxygen   - CT neck soft tissue: no acute Findings, Radiology recs for contrast if looking for mass  -Pts Kidney function is improving will avoid contrast for now, likely out patient   - GI consult appreciated, recs for ENT eval for Laryngoscope   ****ENT Eval will be O/P, team dose not come to  campus     DM2 w/ course c/b DKA: now Resolved   - Endocrinology consulted in ICU  - c/w basal-bolus insulin regimen     Acute Renal Failure on CKD3: improving   - Nephrology consulted & a/w management  - required temporary HD during ICU course     Acute on chronic HF 2/2 Fluid overload from renal failure: now resolved  Chronic Diastolic CHF: euvolemic on exam  - c/w Bumex    CAD s/p Stents + Hypertension  - c/w Aspirin, Clopidogrel, Carvedilol    DVT ppx- HSQ  Dispo- plan for [facility]  GOC- Palliative care consulted in ICU, patient is DNR  -Medically ready Daily D/C anticipation to NH

## 2023-11-28 NOTE — PROGRESS NOTE ADULT - SUBJECTIVE AND OBJECTIVE BOX
Patient is a 89y old  Male who presents with a chief complaint of sob (27 Nov 2023 11:32)          MEDICATIONS  (STANDING):  albuterol/ipratropium for Nebulization 3 milliLiter(s) Nebulizer every 6 hours  allopurinol 100 milliGRAM(s) Oral daily  aspirin  chewable 81 milliGRAM(s) Oral daily  bacitracin   Ointment 1 Application(s) Topical daily  buMETAnide 2 milliGRAM(s) Oral daily  carvedilol 12.5 milliGRAM(s) Oral every 12 hours  chlorhexidine 2% Cloths 1 Application(s) Topical <User Schedule>  citalopram 20 milliGRAM(s) Oral daily  clopidogrel Tablet 75 milliGRAM(s) Oral daily  dextrose 5%. 1000 milliLiter(s) (100 mL/Hr) IV Continuous <Continuous>  dextrose 5%. 1000 milliLiter(s) (50 mL/Hr) IV Continuous <Continuous>  dextrose 50% Injectable 50 milliLiter(s) IV Push every 15 minutes  gabapentin 300 milliGRAM(s) Oral two times a day  glucagon  Injectable 1 milliGRAM(s) IntraMuscular once  heparin   Injectable 5000 Unit(s) SubCutaneous every 12 hours  insulin glargine Injectable (LANTUS) 20 Unit(s) SubCutaneous at bedtime  insulin lispro (ADMELOG) corrective regimen sliding scale   SubCutaneous three times a day before meals  insulin lispro (ADMELOG) corrective regimen sliding scale   SubCutaneous at bedtime  insulin lispro Injectable (ADMELOG) 10 Unit(s) SubCutaneous three times a day before meals  lidocaine   4% Patch 2 Patch Transdermal every 24 hours  pantoprazole    Tablet 40 milliGRAM(s) Oral before breakfast  polyethylene glycol 3350 17 Gram(s) Oral daily  senna 2 Tablet(s) Oral at bedtime  triamcinolone 0.1% Cream 1 Application(s) Topical two times a day    MEDICATIONS  (PRN):  acetaminophen     Tablet .. 650 milliGRAM(s) Oral every 6 hours PRN Temp greater or equal to 38C (100.4F), Mild Pain (1 - 3)  albuterol    90 MICROgram(s) HFA Inhaler 2 Puff(s) Inhalation every 4 hours PRN Shortness of Breath and/or Wheezing  clonazePAM  Tablet 1 milliGRAM(s) Oral every 8 hours PRN anxiety / insomnia at bedtime  dextrose Oral Gel 15 Gram(s) Oral once PRN Blood Glucose LESS THAN 70 milliGRAM(s)/deciliter  dextrose Oral Gel 15 Gram(s) Oral once PRN Blood Glucose LESS THAN 70 milliGRAM(s)/deciliter  traMADol 25 milliGRAM(s) Oral every 6 hours PRN Moderate Pain (4 - 6)  traZODone 50 milliGRAM(s) Oral at bedtime PRN insomnia      CAPILLARY BLOOD GLUCOSE  POCT Blood Glucose.: 146 mg/dL (28 Nov 2023 08:07)  POCT Blood Glucose.: 109 mg/dL (27 Nov 2023 21:26)  POCT Blood Glucose.: 131 mg/dL (27 Nov 2023 16:18)  POCT Blood Glucose.: 136 mg/dL (27 Nov 2023 12:26)    I&O's Summary    27 Nov 2023 07:01  -  28 Nov 2023 07:00  --------------------------------------------------------  IN: 0 mL / OUT: 700 mL / NET: -700 mL        PHYSICAL EXAM:  Vital Signs Last 24 Hrs  T(C): 36.1 (28 Nov 2023 05:43), Max: 36.1 (28 Nov 2023 05:43)  T(F): 97 (28 Nov 2023 05:43), Max: 97 (28 Nov 2023 05:43)  HR: 59 (28 Nov 2023 05:43) (59 - 74)  BP: 141/80 (28 Nov 2023 05:43) (102/55 - 141/80)  BP(mean): --  RR: 16 (28 Nov 2023 05:43) (16 - 18)  SpO2: 96% (27 Nov 2023 14:15) (96% - 96%)        GENERAL: No acute distress, well-developed  HEAD:  Atraumatic, Normocephalic  EYES: EOMI, PERRLA, conjunctiva and sclera clear  NECK: Supple, no lymphadenopathy, no JVD  CHEST/LUNG: CTAB; No wheezes, rales, or rhonchi  HEART: Regular rate and rhythm; No murmurs, rubs, or gallops  ABDOMEN: Soft, non-tender, non-distended; normal bowel sounds,   EXTREMITIES:  2+ peripheral pulses b/l, No clubbing, cyanosis, or edema  NEUROLOGY: A&O x 3, no focal deficits  SKIN: No rashes or lesions

## 2023-11-28 NOTE — PROGRESS NOTE ADULT - PROVIDER SPECIALTY LIST ADULT
Critical Care
Hospitalist
Infectious Disease
Nephrology
Pulmonology
Critical Care
Hospitalist
MICU
MICU
Nephrology
Pulmonology
Critical Care
Critical Care
Endocrinology
Hospitalist
Infectious Disease
MICU
Nephrology
Pulmonology
Pulmonology
Critical Care
Hospitalist
Infectious Disease
Internal Medicine
Nephrology
Nutrition Support
Pulmonology
Infectious Disease
Hospitalist

## 2023-11-30 DIAGNOSIS — N17.0 ACUTE KIDNEY FAILURE WITH TUBULAR NECROSIS: ICD-10-CM

## 2023-11-30 DIAGNOSIS — E11.10 TYPE 2 DIABETES MELLITUS WITH KETOACIDOSIS WITHOUT COMA: ICD-10-CM

## 2023-11-30 DIAGNOSIS — I50.9 HEART FAILURE, UNSPECIFIED: ICD-10-CM

## 2023-11-30 DIAGNOSIS — A41.9 SEPSIS, UNSPECIFIED ORGANISM: ICD-10-CM

## 2023-11-30 DIAGNOSIS — Z96.651 PRESENCE OF RIGHT ARTIFICIAL KNEE JOINT: ICD-10-CM

## 2023-11-30 DIAGNOSIS — E78.5 HYPERLIPIDEMIA, UNSPECIFIED: ICD-10-CM

## 2023-11-30 DIAGNOSIS — J69.0 PNEUMONITIS DUE TO INHALATION OF FOOD AND VOMIT: ICD-10-CM

## 2023-11-30 DIAGNOSIS — E83.41 HYPERMAGNESEMIA: ICD-10-CM

## 2023-11-30 DIAGNOSIS — N18.32 CHRONIC KIDNEY DISEASE, STAGE 3B: ICD-10-CM

## 2023-11-30 DIAGNOSIS — Z79.82 LONG TERM (CURRENT) USE OF ASPIRIN: ICD-10-CM

## 2023-11-30 DIAGNOSIS — E11.22 TYPE 2 DIABETES MELLITUS WITH DIABETIC CHRONIC KIDNEY DISEASE: ICD-10-CM

## 2023-11-30 DIAGNOSIS — Z66 DO NOT RESUSCITATE: ICD-10-CM

## 2023-11-30 DIAGNOSIS — I25.10 ATHEROSCLEROTIC HEART DISEASE OF NATIVE CORONARY ARTERY WITHOUT ANGINA PECTORIS: ICD-10-CM

## 2023-11-30 DIAGNOSIS — Z95.0 PRESENCE OF CARDIAC PACEMAKER: ICD-10-CM

## 2023-11-30 DIAGNOSIS — E87.5 HYPERKALEMIA: ICD-10-CM

## 2023-11-30 DIAGNOSIS — E11.65 TYPE 2 DIABETES MELLITUS WITH HYPERGLYCEMIA: ICD-10-CM

## 2023-11-30 DIAGNOSIS — E66.9 OBESITY, UNSPECIFIED: ICD-10-CM

## 2023-11-30 DIAGNOSIS — I13.0 HYPERTENSIVE HEART AND CHRONIC KIDNEY DISEASE WITH HEART FAILURE AND STAGE 1 THROUGH STAGE 4 CHRONIC KIDNEY DISEASE, OR UNSPECIFIED CHRONIC KIDNEY DISEASE: ICD-10-CM

## 2023-11-30 DIAGNOSIS — G93.41 METABOLIC ENCEPHALOPATHY: ICD-10-CM

## 2024-04-25 NOTE — PHYSICAL THERAPY INITIAL EVALUATION ADULT - PRECAUTIONS/LIMITATIONS, REHAB EVAL
Review of Systems   Constitutional:  Negative for appetite change, fatigue and fever.   HENT: Negative.  Negative for hearing loss, tinnitus, trouble swallowing and voice change.    Eyes: Negative.  Negative for photophobia, pain and visual disturbance.   Respiratory: Negative.  Negative for shortness of breath.    Cardiovascular: Negative.  Negative for palpitations.   Gastrointestinal: Negative.  Negative for nausea and vomiting.   Endocrine: Negative.  Negative for cold intolerance.   Genitourinary: Negative.  Negative for dysuria, frequency and urgency.   Musculoskeletal:  Negative for back pain, gait problem, myalgias, neck pain and neck stiffness.   Skin: Negative.  Negative for rash.   Allergic/Immunologic: Negative.    Neurological:  Positive for dizziness and headaches. Negative for tremors, seizures, syncope, facial asymmetry, speech difficulty, weakness, light-headedness and numbness.   Hematological: Negative.  Does not bruise/bleed easily.   Psychiatric/Behavioral: Negative.  Negative for confusion, hallucinations and sleep disturbance.    All other systems reviewed and are negative.       no known precautions/limitations

## 2024-06-17 ENCOUNTER — APPOINTMENT (OUTPATIENT)
Dept: ENDOCRINOLOGY | Facility: CLINIC | Age: 89
End: 2024-06-17

## 2025-06-03 NOTE — PATIENT PROFILE ADULT - NSPROEDALEARNPREF_GEN_A_NUR
At 6/3/2025 at 13:30, I spoke to the patient's son, Param Parisi, and confirmed that he is the surrogate decision maker and that his brother is in agreement with him making the decision. I spoke to him about his father's CODE status, explaining that currently, he is FULL Code so we will resuscitate and intubate him in the scenario that he has cardiac or respiratory arrest. I explained what CPR and intubation would involve and the risks of doing so in his father considering he is advanced in age and has severe dementia. Considering his father's circumstances and what he would have wanted, he decided to make his father's CODE status DNR/DNI with NIV trial. verbal instruction

## 2025-06-10 NOTE — PHYSICAL THERAPY INITIAL EVALUATION ADULT - BALANCE DISTURBANCE, IDENTIFIED IMPAIRMENT CONTRIBUTE, REHAB EVAL
Outpatient Rehab    Physical Therapy Visit    Patient Name: Jerson Isabel  MRN: [id number]  YOB: 1965  Encounter Date: 6/10/2025    Therapy Diagnosis:   Encounter Diagnoses   Name Primary?    Decreased ROM of neck Yes    Decreased range of motion of shoulder, unspecified laterality     Decreased strength of upper extremity      Physician: Brit Fernandez NP    Physician Orders: Eval and Treat  Medical Diagnosis: Spinal stenosis, cervical region  S/P cervical spinal fusion  Shoulder pain, unspecified chronicity, unspecified laterality  Surgical Diagnosis: s/p ACD C4-7 with Dr. Long on 3/12/25.   Surgical Date: 3/12/2025    Visit # / Visits Authorized:  6 / 10  Insurance Authorization Period: 5/6/2025 to 7/14/2025  Date of Evaluation: 5/6/2025  Plan of Care Certification: 5/7/2025 to 7/4/2025      PT/PTA:     Number of PTA visits since last PT visit:   Time In: 1330   Time Out: 1425  Total Time (in minutes): 55   Total Billable Time (in minutes): 55    FOTO:  Intake Score:  %  Survey Score 2:  %  Survey Score 3:  %    Precautions:       Subjective   did not get good sleep so he is hurting more today.  Pain reported as 6/10. neck and shoulders    Objective            Treatment:     Jerson received therapeutic exercises to develop strength, endurance, ROM, flexibility, posture, and core stabilization for (8) minutes including:  UBE forward and back 3/3 6 min  Standing biceps curl x20 reps B 10# dowel     Jerson participated in neuromuscular re-education activities to improve: Balance, Coordination, Kinesthetic, Sense, Proprioception, and Posture for (27) minutes. The following activities were included:  Seated cervical retractions x30 reps  Seated scap squeeze x30 reps  Cervical isometrics x30 reps 4 way  Supine chin tucks into towel x30 reps  Supine cervical rotation on towel 2 min  Bilateral ER 2x10 reps RTB  Horizontal abduction 2x10 reps RTB     Jerson participated in dynamic functional  therapeutic activities to improve functional performance for (10) minutes, including:  Gripping 55# x50 reps B  Straight arm lat pull down 2x10 reps 17#  Resisted rows 2x10 reps 17#       Jerson received the following supervised modalities after being cleared for contradictions: IFC Electrical Stimulation:  Jerson received IFC Electrical Stimulation for pain control applied to the cervical spine. Pt received stimulation set to pt tolerance for (10) minutes. Jerson tolderated treatment well without any adverse effects.      Jerson received cold pack for (10) minutes to neck and shoulders.       *A portion of this treatment session is provided with the assistance of a skilled rehbailitation technician under the supervision of a licensed physical therapist       Time Entry(in minutes):  E-Stim (Unattended) Time Entry: 10  Hot/Cold Pack Time Entry: 10  Therapeutic Exercise Time Entry: 45    Assessment & Plan   Assessment: Pt notes positive resposne to use of modalities at the concludion of our session today. He is doing well with all strengthening activities with minimal compensations.  Evaluation/Treatment Tolerance: Patient tolerated treatment well    The patient will continue to benefit from skilled outpatient physical therapy in order to address the deficits listed in the problem list on the initial evaluation, provide patient and family education, and maximize the patients level of independence in the home and community environments.     The patient's spiritual, cultural, and educational needs were considered, and the patient is agreeable to the plan of care and goals.           Plan: Continue with PT POC    Goals:   Active       ADLs       patient will be able to dress and bathe independently       Start:  05/07/25    Expected End:  06/06/25               Functional outcome       Patient will show a significant change in NDI patient-reported outcome tool to demonstrate subjective improvement       Start:   05/07/25    Expected End:  07/04/25            Patient will demonstrate independence in home program for support of progression       Start:  05/07/25    Expected End:  06/06/25               Pain       Patient will report pain of 4/10 demonstrating a reduction of overall pain       Start:  05/07/25    Expected End:  07/04/25               Range of Motion       Patient will achieve bilateral cervical rotation ROM 45+ degrees       Start:  05/07/25    Expected End:  07/04/25            Patient will achieve bilateral shoulder flexion of 150 degrees       Start:  05/07/25    Expected End:  06/06/25               Strength       Patient will achieve bilateral shoulder flexion strength of 5/5       Start:  05/07/25    Expected End:  07/04/25            Patient will achieve bilateral shoulder abduction strength of 5/5       Start:  05/07/25    Expected End:  07/04/25                Alexander Odom, PT, DPT     decreased strength